# Patient Record
Sex: FEMALE | Race: WHITE | NOT HISPANIC OR LATINO | Employment: OTHER | URBAN - METROPOLITAN AREA
[De-identification: names, ages, dates, MRNs, and addresses within clinical notes are randomized per-mention and may not be internally consistent; named-entity substitution may affect disease eponyms.]

---

## 2017-05-29 ENCOUNTER — LAB VISIT (OUTPATIENT)
Dept: LAB | Facility: HOSPITAL | Age: 67
End: 2017-05-29
Attending: INTERNAL MEDICINE
Payer: MEDICARE

## 2017-05-29 ENCOUNTER — OFFICE VISIT (OUTPATIENT)
Dept: INTERNAL MEDICINE | Facility: CLINIC | Age: 67
End: 2017-05-29
Payer: MEDICARE

## 2017-05-29 VITALS
DIASTOLIC BLOOD PRESSURE: 78 MMHG | SYSTOLIC BLOOD PRESSURE: 114 MMHG | BODY MASS INDEX: 39.63 KG/M2 | WEIGHT: 209.88 LBS | RESPIRATION RATE: 16 BRPM | TEMPERATURE: 98 F | OXYGEN SATURATION: 98 % | HEIGHT: 61 IN | HEART RATE: 61 BPM

## 2017-05-29 DIAGNOSIS — E78.5 HYPERLIPIDEMIA, UNSPECIFIED HYPERLIPIDEMIA TYPE: ICD-10-CM

## 2017-05-29 DIAGNOSIS — R73.02 IGT (IMPAIRED GLUCOSE TOLERANCE): ICD-10-CM

## 2017-05-29 DIAGNOSIS — E55.9 VITAMIN D DEFICIENCY: ICD-10-CM

## 2017-05-29 DIAGNOSIS — I10 BENIGN ESSENTIAL HYPERTENSION: ICD-10-CM

## 2017-05-29 DIAGNOSIS — Z95.0 S/P PLACEMENT OF CARDIAC PACEMAKER: ICD-10-CM

## 2017-05-29 DIAGNOSIS — I49.5 SICK SINUS SYNDROME: ICD-10-CM

## 2017-05-29 DIAGNOSIS — Z79.01 CHRONIC ANTICOAGULATION: ICD-10-CM

## 2017-05-29 DIAGNOSIS — I48.91 ATRIAL FIBRILLATION, UNSPECIFIED TYPE: ICD-10-CM

## 2017-05-29 DIAGNOSIS — I10 BENIGN ESSENTIAL HYPERTENSION: Primary | ICD-10-CM

## 2017-05-29 LAB
25(OH)D3+25(OH)D2 SERPL-MCNC: 45 NG/ML
ALBUMIN SERPL BCP-MCNC: 3.9 G/DL
ALP SERPL-CCNC: 79 U/L
ALT SERPL W/O P-5'-P-CCNC: 21 U/L
ANION GAP SERPL CALC-SCNC: 13 MMOL/L
AST SERPL-CCNC: 20 U/L
BASOPHILS # BLD AUTO: 0.04 K/UL
BASOPHILS NFR BLD: 0.8 %
BILIRUB SERPL-MCNC: 0.9 MG/DL
BUN SERPL-MCNC: 36 MG/DL
CALCIUM SERPL-MCNC: 10 MG/DL
CHLORIDE SERPL-SCNC: 106 MMOL/L
CHOLEST/HDLC SERPL: 5 {RATIO}
CO2 SERPL-SCNC: 22 MMOL/L
CREAT SERPL-MCNC: 1.5 MG/DL
DIFFERENTIAL METHOD: NORMAL
EOSINOPHIL # BLD AUTO: 0.2 K/UL
EOSINOPHIL NFR BLD: 4 %
ERYTHROCYTE [DISTWIDTH] IN BLOOD BY AUTOMATED COUNT: 13 %
EST. GFR  (AFRICAN AMERICAN): 41.3 ML/MIN/1.73 M^2
EST. GFR  (NON AFRICAN AMERICAN): 35.8 ML/MIN/1.73 M^2
GLUCOSE SERPL-MCNC: 101 MG/DL
HCT VFR BLD AUTO: 40.1 %
HDL/CHOLESTEROL RATIO: 20.2 %
HDLC SERPL-MCNC: 223 MG/DL
HDLC SERPL-MCNC: 45 MG/DL
HGB BLD-MCNC: 13.4 G/DL
LDLC SERPL CALC-MCNC: 134.8 MG/DL
LYMPHOCYTES # BLD AUTO: 1.3 K/UL
LYMPHOCYTES NFR BLD: 27 %
MCH RBC QN AUTO: 31 PG
MCHC RBC AUTO-ENTMCNC: 33.4 %
MCV RBC AUTO: 93 FL
MONOCYTES # BLD AUTO: 0.4 K/UL
MONOCYTES NFR BLD: 8.7 %
NEUTROPHILS # BLD AUTO: 2.8 K/UL
NEUTROPHILS NFR BLD: 59.3 %
NONHDLC SERPL-MCNC: 178 MG/DL
PLATELET # BLD AUTO: 206 K/UL
PMV BLD AUTO: 10.5 FL
POTASSIUM SERPL-SCNC: 4.2 MMOL/L
PROT SERPL-MCNC: 7.2 G/DL
RBC # BLD AUTO: 4.32 M/UL
SODIUM SERPL-SCNC: 141 MMOL/L
TRIGL SERPL-MCNC: 216 MG/DL
TSH SERPL DL<=0.005 MIU/L-ACNC: 1.29 UIU/ML
WBC # BLD AUTO: 4.71 K/UL

## 2017-05-29 PROCEDURE — 80053 COMPREHEN METABOLIC PANEL: CPT

## 2017-05-29 PROCEDURE — 82306 VITAMIN D 25 HYDROXY: CPT

## 2017-05-29 PROCEDURE — 99204 OFFICE O/P NEW MOD 45 MIN: CPT | Mod: S$PBB,,, | Performed by: INTERNAL MEDICINE

## 2017-05-29 PROCEDURE — 84443 ASSAY THYROID STIM HORMONE: CPT

## 2017-05-29 PROCEDURE — 99999 PR PBB SHADOW E&M-NEW PATIENT-LVL V: CPT | Mod: PBBFAC,,, | Performed by: INTERNAL MEDICINE

## 2017-05-29 PROCEDURE — 85025 COMPLETE CBC W/AUTO DIFF WBC: CPT

## 2017-05-29 PROCEDURE — 83036 HEMOGLOBIN GLYCOSYLATED A1C: CPT

## 2017-05-29 PROCEDURE — 36415 COLL VENOUS BLD VENIPUNCTURE: CPT

## 2017-05-29 PROCEDURE — 80061 LIPID PANEL: CPT

## 2017-05-29 RX ORDER — ATORVASTATIN CALCIUM 20 MG/1
20 TABLET, FILM COATED ORAL DAILY
COMMUNITY
End: 2018-07-26

## 2017-05-29 RX ORDER — PROPAFENONE HYDROCHLORIDE 225 MG/1
225 CAPSULE, EXTENDED RELEASE ORAL EVERY 12 HOURS
Qty: 180 CAPSULE | Refills: 3 | Status: SHIPPED | OUTPATIENT
Start: 2017-05-29 | End: 2018-05-31 | Stop reason: SDUPTHER

## 2017-05-29 RX ORDER — OLMESARTAN MEDOXOMIL 20 MG/1
20 TABLET ORAL DAILY
COMMUNITY
End: 2017-08-16 | Stop reason: SDUPTHER

## 2017-05-29 RX ORDER — PROPAFENONE HYDROCHLORIDE 225 MG/1
225 CAPSULE, EXTENDED RELEASE ORAL EVERY 12 HOURS
COMMUNITY
End: 2017-05-29 | Stop reason: SDUPTHER

## 2017-05-29 RX ORDER — AMLODIPINE BESYLATE 5 MG/1
5 TABLET ORAL DAILY
Qty: 90 TABLET | Refills: 3 | Status: SHIPPED | OUTPATIENT
Start: 2017-05-29 | End: 2018-07-03 | Stop reason: SDUPTHER

## 2017-05-29 RX ORDER — BISOPROLOL FUMARATE 5 MG/1
5 TABLET, FILM COATED ORAL DAILY
COMMUNITY
End: 2017-05-29 | Stop reason: SDUPTHER

## 2017-05-29 RX ORDER — AMLODIPINE BESYLATE 5 MG/1
5 TABLET ORAL DAILY
COMMUNITY
End: 2017-05-29 | Stop reason: SDUPTHER

## 2017-05-29 RX ORDER — BISOPROLOL FUMARATE 5 MG/1
5 TABLET, FILM COATED ORAL DAILY
Qty: 90 TABLET | Refills: 3 | Status: SHIPPED | OUTPATIENT
Start: 2017-05-29 | End: 2018-06-12 | Stop reason: SDUPTHER

## 2017-05-29 RX ORDER — TRIAMTERENE AND HYDROCHLOROTHIAZIDE 37.5; 25 MG/1; MG/1
1 CAPSULE ORAL EVERY MORNING
COMMUNITY
End: 2018-09-06 | Stop reason: SDUPTHER

## 2017-05-29 NOTE — PROGRESS NOTES
INTERNAL MEDICINE ESTABLISH CARE VISIT NOTE      CHIEF COMPLAINT     EST CARE    HPI     Jesenia Curiel is a 66 y.o. C female who presents to [facility]. Moved here from [place] - Dr. Martínez = PCP. Retired gastroenterologist.     HTN - amlodipine 5mg daily, bisoprolol (generic) 5mg daily, olmesartan 20mg daily, triamterene-HCTZ 37.5-25mg daily.. On the regimen for >25yrs.   No BP issues recently.   CKD3 likely 2/2 HTN.    HLD - atorvastatin 20mg daily.    Vit D Def - OTC vit D 2000 units daily.    SSS s/p PM (not AICD).   Afib - on propafenone SR 225mg bid and xarelto 15mg daily. Easy bruising. No hematochezia/hematuria.   Needs referral to cardiology here.   GERD on Zantac OTC prn.     Recently on belviq 10mg BID when she was in [place] - lost 40lbs but then gained back 20 after stopping belviq. Tried contrave, which did not work and gave her tachycardia. Never on adipex.    Ruptured disc S/p L3-L4 laminectomy. Chronically w/ no DTR at the L patella and some numbness on that side. L leg weaker than right side but s/p PT. No falls.     Past Medical History:  Past Medical History:   Diagnosis Date    Allergy     Anemia     Atrial fibrillation 2017    Disorder of kidney and ureter     GERD (gastroesophageal reflux disease)     Hyperlipidemia     Hypertension        Past Surgical History:  Past Surgical History:   Procedure Laterality Date    BACK SURGERY      lamenectomy     SECTION      ,     HYSTERECTOMY      pace maker  2009    replacement 2014    salpingo opherectomy Bilateral 1998    TONSILLECTOMY  1953       Allergies:  Review of patient's allergies indicates:   Allergen Reactions    Iodinated contrast media - oral and iv dye Anaphylaxis    Penicillins Anaphylaxis    Ciprofloxacin Rash    Sulfa (sulfonamide antibiotics) Rash    Tetracyclines Rash       Home Medications:  Prior to Admission medications    Medication Sig Start Date End Date Taking? Authorizing Provider    amlodipine (NORVASC) 5 MG tablet Take 5 mg by mouth once daily.   Yes Historical Provider, MD   bisoprolol (ZEBETA) 5 MG tablet Take 5 mg by mouth once daily.   Yes Historical Provider, MD   ERGOCALCIFEROL, VITAMIN D2, (VITAMIN D ORAL) Take 2,000 Units by mouth once daily.   Yes Historical Provider, MD   olmesartan (BENICAR) 20 MG tablet Take 20 mg by mouth once daily.   Yes Historical Provider, MD   propafenone (RYTHMOL SR) 225 MG Cp12 Take 225 mg by mouth every 12 (twelve) hours.   Yes Historical Provider, MD   rivaroxaban (XARELTO) 15 mg Tab Take 15 mg by mouth daily with dinner or evening meal.   Yes Historical Provider, MD   triamterene-hydrochlorothiazide 37.5-25 mg (DYAZIDE) 37.5-25 mg per capsule Take 1 capsule by mouth every morning.   Yes Historical Provider, MD       Family History:  Family History   Problem Relation Age of Onset    Hypertension Mother     Diabetes Mother     Hyperlipidemia Mother     Coronary artery disease Mother     Hypertension Father     Diabetes Father     Hyperlipidemia Father     Coronary artery disease Father     Coronary artery disease Brother     Diabetes Paternal Grandmother     Diabetes Paternal Grandfather        Social History:  Social History   Substance Use Topics    Smoking status: Never Smoker    Smokeless tobacco: Not on file    Alcohol use 3.6 oz/week     6 Glasses of wine per week      Comment: occassionally       Review of Systems:  Review of Systems   Constitutional: Negative for chills and fever.   HENT: Negative.    Respiratory: Negative for shortness of breath and wheezing.    Cardiovascular: Positive for palpitations (intermittently). Negative for chest pain and leg swelling.   Gastrointestinal: Negative.    Genitourinary: Negative.    Musculoskeletal: Positive for arthralgias.   Skin: Negative for rash.   Neurological: Negative.    Psychiatric/Behavioral: Negative.        Health Maintainence:   Immunizations:   TDap is up to date, Influenza is  "up to date, Pneumovax is up to date. Zostavax is UTD. Prevnar is up to date.   Cancer Screening:  Mammogram: is up to date. Fall 2016  Colonoscopy: is up to date. At 50 y/o. Does not want another one.   DEXA:  is up to date. Osteopenia - Fall 2016.   Screening:  Hepatitis C is up to date in pts born between 0108-9348.     PHYSICAL EXAM     /78   Pulse 61   Temp 98.3 °F (36.8 °C) (Oral)   Resp 16   Ht 5' 1" (1.549 m)   Wt 95.2 kg (209 lb 14.1 oz)   SpO2 98%   BMI 39.66 kg/m²     GEN - A+OX4, NAD   HEENT - PERRL, EOMI, OP clear. MMM.   Neck - No thyromegaly or cervical LAD. No thyroid masses felt.  CV - RRR, no m/r   Chest - CTAB, no wheezing or rhonchi  Abd - S/NT/ND/+BS.   Ext - 2+BDP and radial pulses. No LE edema.   Neuro - 3+ DTR on the R and none on the L. 5/5 BLE strength.   Skin - No rash.      LABS     Labs reviewed.     ASSESSMENT/PLAN     Jesenia Curiel is a 66 y.o. female with  Diagnoses and all orders for this visit:    Benign essential hypertension - Stable and controlled. Continue current medications.  -     triamterene-hydrochlorothiazide 37.5-25 mg (DYAZIDE) 37.5-25 mg per capsule; Take 1 capsule by mouth every morning.  -     olmesartan (BENICAR) 20 MG tablet; Take 20 mg by mouth once daily.  -     Comprehensive metabolic panel; Future; Expected date: 05/29/2017  -     TSH; Future; Expected date: 05/29/2017  -     amlodipine (NORVASC) 5 MG tablet; Take 1 tablet (5 mg total) by mouth once daily.  -     bisoprolol (ZEBETA) 5 MG tablet; Take 1 tablet (5 mg total) by mouth once daily.    Hyperlipidemia, unspecified hyperlipidemia type  -     Comprehensive metabolic panel; Future; Expected date: 05/29/2017  -     Lipid panel; Future; Expected date: 05/29/2017  -     atorvastatin (LIPITOR) 20 MG tablet; Take 20 mg by mouth once daily.    Atrial fibrillation, unspecified type  -     rivaroxaban (XARELTO) 15 mg Tab; Take 15 mg by mouth daily with dinner or evening meal.  -     CBC auto " differential; Future; Expected date: 05/29/2017  -     Comprehensive metabolic panel; Future; Expected date: 05/29/2017  -     TSH; Future; Expected date: 05/29/2017  -     Ambulatory Referral to Cardiology  -     propafenone (RYTHMOL SR) 225 MG Cp12; Take 1 capsule (225 mg total) by mouth every 12 (twelve) hours.  -     PROPAFENONE, PLASMA; Future; Expected date: 05/29/2017    Chronic anticoagulation  -     rivaroxaban (XARELTO) 15 mg Tab; Take 15 mg by mouth daily with dinner or evening meal.  -     CBC auto differential; Future; Expected date: 05/29/2017  -     Ambulatory Referral to Cardiology    Vitamin D deficiency  -     ERGOCALCIFEROL, VITAMIN D2, (VITAMIN D ORAL); Take 2,000 Units by mouth once daily.  -     Vitamin D; Future; Expected date: 05/29/2017    Sick sinus syndrome  -     Cancel: Ambulatory consult to Electrophysiology  -     Ambulatory Referral to Cardiology    S/P placement of cardiac pacemaker  -     Cancel: Ambulatory consult to Electrophysiology  -     Ambulatory Referral to Cardiology    IGT (impaired glucose tolerance)  -     Hemoglobin A1c; Future; Expected date: 05/29/2017    BMI 39.66,adult  -     lorcaserin (BELVIQ) 10 mg Tab; Take 10 mg by mouth 2 (two) times daily.    Will get medical records from Dr. Martínez in [place].   50 minutes was spent on patient with over half the time was spent in coordination of care and/or counseling.    RTC in 1 month, sooner if needed and depending on labs.    Sandra Michaud MD  Department of Internal Medicine - [facility]  1:25 PM

## 2017-05-30 LAB
ESTIMATED AVG GLUCOSE: 128 MG/DL
HBA1C MFR BLD HPLC: 6.1 %

## 2017-06-02 LAB — PROPAFENONE SERPL-MCNC: 0.9 MCG/ML

## 2017-06-03 ENCOUNTER — TELEPHONE (OUTPATIENT)
Dept: INTERNAL MEDICINE | Facility: CLINIC | Age: 67
End: 2017-06-03

## 2017-06-03 DIAGNOSIS — R73.03 PRE-DIABETES: ICD-10-CM

## 2017-06-03 NOTE — TELEPHONE ENCOUNTER
Please call and let Dr. Curiel know that the propafenone level is normal.    Your labs are consistent with pre-diabetes. This means you are at risk for developing diabetes. I recommend eating low carb diet. Stay away from simple sugars.    Vitamin D, thyroid and liver functions and blood count normal. Cholesterol is still high. Would she be ok w/ increasing atorvastatin from 20 to 40mg daily?    Her creatinine is 1.5. Can you ask pt if she's ever had chronic kidney disease in the past?     Please encourage pt to sign up for the portal also.

## 2017-06-05 NOTE — TELEPHONE ENCOUNTER
Spoke to patient, results given via phone as instructed, patient expressed understanding verbally, no additional questions.   pt stated she got results from portal

## 2017-06-14 ENCOUNTER — DOCUMENTATION ONLY (OUTPATIENT)
Dept: INTERNAL MEDICINE | Facility: CLINIC | Age: 67
End: 2017-06-14

## 2017-06-14 DIAGNOSIS — M85.852 OSTEOPENIA OF LEFT THIGH: ICD-10-CM

## 2017-06-14 NOTE — PROGRESS NOTES
Outside records from [facility]oss[facility] MD in [place], [place] received.    Labs from 3/4/14  BMP-glucose 106, BUNs 25, sodium 140, potassium 4.3, chloride 102, bicarbonate 26, creatinine 1.3, calcium 10.9  Hemoglobin A1c 5.4  ALT 21  Lipid panel-total cholesterol 217, triglycerides 204, HDL 54,     Bone density scan 11/24/15-osteopenia  T score of L1 spine -0.2 (0.3 from 2013)  T score of femoral neck -2.2 (from -1.9 IN 2013)    Mammogram 11/27/15-negative.      Fecal globulin 12/12/16-negative     Labs-11/21/16  CBC-white blood cell 5.7, hemoglobin 13.9, platelets 236.  Lipid profile-total cholesterol 227, triglycerides 151, HDL 55, .  TSH 2.29, within normal limits  CMP-glucose 112, BUNs 26, creatinine 1.2, estimated GFR 45, sodium 142, potassium 4.3, chloride 100, bicarbonate 27, anion gap 15, total protein 7.3, calcium 10.5, albumin 4.6, alkaline phosphatase 82, AST 20, ALT 21, total bilirubin 0.6.  Hemoglobin A1c 5.5     Here no sent throat progress note 2/26/16-right-sided hearing loss and ringing  Prominent right-sided low frequency sensory hearing loss.  Repeat hearing test in 6-12 months would be appropriate.      Flu vaccine record-11/21/16     Esophagram 11/10/14-mild esophageal dysmotility.  Small sliding type hiatal hernia.  Mildly delayed transit of barium coated marshmallow at the level of the aortic arch which past with subsequent administration of water.  No strictures noted.      Her annual visit office note 11/21/16-Pneumovax was given.  Flu vaccine was also given.  Per records, Prevnar 13 given November 20, 2015.  Zoster given 10/14/13.    Records updated. Will scan.     Sandra Michaud MD  Department of Internal Medicine - [facility]  1:27 PM

## 2017-06-26 ENCOUNTER — OFFICE VISIT (OUTPATIENT)
Dept: CARDIOLOGY | Facility: CLINIC | Age: 67
End: 2017-06-26
Payer: MEDICARE

## 2017-06-26 ENCOUNTER — HOSPITAL ENCOUNTER (OUTPATIENT)
Dept: CARDIOLOGY | Facility: CLINIC | Age: 67
Discharge: HOME | End: 2017-06-26
Payer: MEDICARE

## 2017-06-26 VITALS
WEIGHT: 206.38 LBS | DIASTOLIC BLOOD PRESSURE: 60 MMHG | HEART RATE: 60 BPM | SYSTOLIC BLOOD PRESSURE: 130 MMHG | BODY MASS INDEX: 38.96 KG/M2 | HEIGHT: 61 IN

## 2017-06-26 DIAGNOSIS — I49.5 SICK SINUS SYNDROME: ICD-10-CM

## 2017-06-26 DIAGNOSIS — N18.30 CKD (CHRONIC KIDNEY DISEASE) STAGE 3, GFR 30-59 ML/MIN: ICD-10-CM

## 2017-06-26 DIAGNOSIS — E78.5 HYPERLIPIDEMIA, UNSPECIFIED HYPERLIPIDEMIA TYPE: ICD-10-CM

## 2017-06-26 DIAGNOSIS — I48.0 PAROXYSMAL ATRIAL FIBRILLATION: ICD-10-CM

## 2017-06-26 DIAGNOSIS — I10 BENIGN ESSENTIAL HYPERTENSION: ICD-10-CM

## 2017-06-26 DIAGNOSIS — Z95.0 S/P PLACEMENT OF CARDIAC PACEMAKER: ICD-10-CM

## 2017-06-26 DIAGNOSIS — I48.0 PAROXYSMAL ATRIAL FIBRILLATION: Primary | ICD-10-CM

## 2017-06-26 PROCEDURE — 99999 PR PBB SHADOW E&M-EST. PATIENT-LVL IV: CPT | Mod: PBBFAC,,, | Performed by: INTERNAL MEDICINE

## 2017-06-26 PROCEDURE — 93010 ELECTROCARDIOGRAM REPORT: CPT | Mod: S$PBB,,, | Performed by: INTERNAL MEDICINE

## 2017-06-26 PROCEDURE — 1126F AMNT PAIN NOTED NONE PRSNT: CPT | Mod: ,,, | Performed by: INTERNAL MEDICINE

## 2017-06-26 PROCEDURE — 1159F MED LIST DOCD IN RCRD: CPT | Mod: ,,, | Performed by: INTERNAL MEDICINE

## 2017-06-26 PROCEDURE — 93005 ELECTROCARDIOGRAM TRACING: CPT | Mod: PBBFAC | Performed by: INTERNAL MEDICINE

## 2017-06-26 PROCEDURE — 99203 OFFICE O/P NEW LOW 30 MIN: CPT | Mod: S$PBB,,, | Performed by: INTERNAL MEDICINE

## 2017-06-26 RX ORDER — CLOBETASOL PROPIONATE 0.5 MG/G
1 AEROSOL, FOAM TOPICAL
COMMUNITY
End: 2020-04-14 | Stop reason: SDUPTHER

## 2017-06-26 NOTE — LETTER
June 26, 2017      Sandra Michaud MD  [address]  [facility] [address]           [facility]  [address]  [facility] [address]  Phone: [phone]          Patient: Jesenia Curiel   MR Number: [id number]   YOB: 1950   Date of Visit: 6/26/2017       Dear Dr. Sandra Michaud:    Thank you for referring Jesenia Curiel to me for evaluation. Attached you will find relevant portions of my assessment and plan of care.    If you have questions, please do not hesitate to call me. I look forward to following Jesenia Curiel along with you.    Sincerely,    Verna Hodgson MD    Enclosure  CC:  No Recipients    If you would like to receive this communication electronically, please contact [email or web address] or [phone] to request more information on [facility] access.    For providers and/or their staff who would like to refer a patient to [facility], please contact us through our one-stop-shop provider referral line, [facility] , at [phone].    If you feel you have received this communication in error or would no longer like to receive these types of communications, please e-mail [email or web address]

## 2017-06-26 NOTE — PROGRESS NOTES
Subjective:   Patient ID:  Jesenia Curiel is a 66 y.o. female who presents for evaluation of Atrial fibrillation, unspecified type; Chronic anticoagulation; Sick sinus syndrome; and S/P placement of cardiac pacemaker      HPI:  She recently moved from [place] and presents today to establish care. 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 She has a history of atrial fibrillation and sick sinus syndrome. She had a [employer] pacemaker placed in  and was diagnosed with atrial fibrillation in . She has never required cardioversion. She has maintained sinus rhythm on Propafenone. A stress test in  was normal per her report. Jesenia Curiel denies any chest pain, shortness of breath, PND, orthopnea, palpitations, leg edema, or syncope. She does not exercise on a regular basis. She just started taking Belvique and has lost 5 lbs.    ECG: Atrial paced rhythm. Anterior T wave inversions. QTc 406 ms.    Past Medical History:   Diagnosis Date    Allergy     Anemia     Atrial fibrillation 2017    CKD (chronic kidney disease) stage 3, GFR 30-59 ml/min 2017    Disorder of kidney and ureter     GERD (gastroesophageal reflux disease)     Hyperlipidemia     Hypertension     Pre-diabetes 6/3/2017       Past Surgical History:   Procedure Laterality Date    BACK SURGERY      lamenectomy     SECTION      ,     HYSTERECTOMY      INSERT / REPLACE / REMOVE PACEMAKER      placement    INSERT / REPLACE / REMOVE PACEMAKER  2014    [employer] Model #[id number] replacement    pace maker      replacement     salpingo opherectomy Bilateral 1998    TONSILLECTOMY         Social History     Social History    Marital status:      Spouse name: N/A    Number of children: N/A    Years of education: N/A     Social History Main Topics    Smoking status: Never Smoker    Smokeless tobacco: Never Used    Alcohol use 3.6 oz/week     6 Glasses of wine per week       Comment: occassionally    Drug use: No    Sexual activity: Not Currently     Other Topics Concern    None     Social History Narrative    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       Family History   Problem Relation Age of Onset    Hypertension Mother     Diabetes Mother     Hyperlipidemia Mother     Coronary artery disease Mother     Heart disease Mother     Stroke Mother     Hypertension Father     Diabetes Father     Hyperlipidemia Father     Coronary artery disease Father     Heart disease Father     Coronary artery disease Brother     Heart disease Brother     Diabetes Paternal Grandmother     Diabetes Paternal Grandfather     Heart attack Neg Hx        Patient's Medications   New Prescriptions    No medications on file   Previous Medications    AMLODIPINE (NORVASC) 5 MG TABLET    Take 1 tablet (5 mg total) by mouth once daily.    ATORVASTATIN (LIPITOR) 20 MG TABLET    Take 20 mg by mouth once daily.    BISOPROLOL (ZEBETA) 5 MG TABLET    Take 1 tablet (5 mg total) by mouth once daily.    CLOBETASOL (OLUX) 0.05 % FOAM    Apply topically 2 (two) times daily.    ERGOCALCIFEROL, VITAMIN D2, (VITAMIN D ORAL)    Take 2,000 Units by mouth once daily.    LORCASERIN (BELVIQ) 10 MG TAB    Take 10 mg by mouth 2 (two) times daily.    OLMESARTAN (BENICAR) 20 MG TABLET    Take 20 mg by mouth once daily.    PROPAFENONE (RYTHMOL SR) 225 MG CP12    Take 1 capsule (225 mg total) by mouth every 12 (twelve) hours.    RIVAROXABAN (XARELTO) 15 MG TAB    Take 15 mg by mouth daily with dinner or evening meal.    TRIAMTERENE-HYDROCHLOROTHIAZIDE 37.5-25 MG (DYAZIDE) 37.5-25 MG PER CAPSULE    Take 1 capsule by mouth every morning.   Modified Medications    No medications on file   Discontinued Medications    No medications on file       Review of Systems   Constitution: Negative for weakness, malaise/fatigue and weight gain.   HENT: Negative for headaches and hearing loss.    Eyes:  "Negative for visual disturbance.   Cardiovascular: Negative for chest pain, claudication, dyspnea on exertion, leg swelling, near-syncope, orthopnea, palpitations, paroxysmal nocturnal dyspnea and syncope.   Respiratory: Negative for cough, shortness of breath, sleep disturbances due to breathing, snoring and wheezing.    Endocrine: Negative for cold intolerance, heat intolerance, polydipsia, polyphagia and polyuria.   Hematologic/Lymphatic: Negative for bleeding problem. Does not bruise/bleed easily.   Skin: Negative for rash and suspicious lesions.   Musculoskeletal: Negative for arthritis, falls, joint pain, muscle weakness and myalgias.   Gastrointestinal: Negative for abdominal pain, change in bowel habit, constipation, diarrhea, heartburn, hematochezia, melena and nausea.   Genitourinary: Negative for hematuria and nocturia.   Neurological: Negative for excessive daytime sleepiness, dizziness, light-headedness and loss of balance.   Psychiatric/Behavioral: Negative for depression. The patient is not nervous/anxious.    Allergic/Immunologic: Negative for environmental allergies.       /60 (BP Location: Left arm, Patient Position: Sitting, BP Method: Automatic)   Pulse 60   Ht 5' 1" (1.549 m)   Wt 93.6 kg (206 lb 5.6 oz)   BMI 38.99 kg/m²     Objective:   Physical Exam   Constitutional: She is oriented to person, place, and time. She appears well-developed and well-nourished.        HENT:   Head: Normocephalic and atraumatic.   Mouth/Throat: Oropharynx is clear and moist.   Eyes: Conjunctivae and EOM are normal. Pupils are equal, round, and reactive to light. No scleral icterus.   Neck: Normal range of motion. Neck supple. No hepatojugular reflux and no JVD present. No tracheal deviation present. No thyromegaly present.   Cardiovascular: Normal rate, regular rhythm, normal heart sounds and intact distal pulses.  PMI is not displaced.    Pulses:       Carotid pulses are 2+ on the right side, and 2+ on " the left side.       Radial pulses are 2+ on the right side, and 2+ on the left side.        Dorsalis pedis pulses are 2+ on the right side, and 2+ on the left side.        Posterior tibial pulses are 2+ on the right side, and 2+ on the left side.   Pulmonary/Chest: Effort normal and breath sounds normal.   Abdominal: Soft. Bowel sounds are normal. She exhibits no distension and no mass. There is no hepatosplenomegaly. There is no tenderness.   Musculoskeletal: She exhibits no edema or tenderness.   Lymphadenopathy:     She has no cervical adenopathy.   Neurological: She is alert and oriented to person, place, and time.   Skin: Skin is warm and dry. No rash noted. No cyanosis or erythema. Nails show no clubbing.   Psychiatric: She has a normal mood and affect. Her speech is normal and behavior is normal.       Lab Results   Component Value Date     05/29/2017    K 4.2 05/29/2017     05/29/2017    CO2 22 (L) 05/29/2017    BUN 36 (H) 05/29/2017    CREATININE 1.5 (H) 05/29/2017     05/29/2017    HGBA1C 6.1 05/29/2017    AST 20 05/29/2017    ALT 21 05/29/2017    ALBUMIN 3.9 05/29/2017    PROT 7.2 05/29/2017    BILITOT 0.9 05/29/2017    WBC 4.71 05/29/2017    HGB 13.4 05/29/2017    HCT 40.1 05/29/2017    MCV 93 05/29/2017     05/29/2017    TSH 1.293 05/29/2017    CHOL 223 (H) 05/29/2017    HDL 45 05/29/2017    LDLCALC 134.8 05/29/2017    TRIG 216 (H) 05/29/2017       Assessment:     1. Paroxysmal atrial fibrillation : CHADS2-Vasc is 3. Continue Xarelto and propafenone. I have requested her records from her previous cardiologist.   2. Benign essential hypertension : Blood pressure is at goal. I have made no changes. Continue current regimen.   3. Sick sinus syndrome    4. Hyperlipidemia, unspecified hyperlipidemia type : LDL above goal. We discussed that aerobic exercise and weight loss will aid in lowering LDL.   5. S/P placement of cardiac pacemaker : I have referred her to device clinic for  management of her pacemaker.   6. CKD (chronic kidney disease) stage 3, GFR 30-59 ml/min        Plan:     Jesenia was seen today for atrial fibrillation, unspecified type, chronic anticoagulation, sick sinus syndrome and s/p placement of cardiac pacemaker.    Diagnoses and all orders for this visit:    Paroxysmal atrial fibrillation  -     Ambulatory Referral to Electrophysiology  -     EKG 12-lead; Future    Benign essential hypertension    Sick sinus syndrome    Hyperlipidemia, unspecified hyperlipidemia type    S/P placement of cardiac pacemaker  -     Ambulatory Referral to Electrophysiology    CKD (chronic kidney disease) stage 3, GFR 30-59 ml/min        Thank you for allowing me to participate in this patient's care. Please do not hesitate to contact me with any questions or concerns.

## 2017-06-30 ENCOUNTER — PATIENT MESSAGE (OUTPATIENT)
Dept: INTERNAL MEDICINE | Facility: CLINIC | Age: 67
End: 2017-06-30

## 2017-06-30 ENCOUNTER — OFFICE VISIT (OUTPATIENT)
Dept: INTERNAL MEDICINE | Facility: CLINIC | Age: 67
End: 2017-06-30
Payer: MEDICARE

## 2017-06-30 VITALS
HEART RATE: 60 BPM | HEIGHT: 61 IN | DIASTOLIC BLOOD PRESSURE: 66 MMHG | TEMPERATURE: 98 F | BODY MASS INDEX: 38.51 KG/M2 | WEIGHT: 203.94 LBS | SYSTOLIC BLOOD PRESSURE: 110 MMHG

## 2017-06-30 DIAGNOSIS — I48.91 ATRIAL FIBRILLATION, UNSPECIFIED TYPE: ICD-10-CM

## 2017-06-30 DIAGNOSIS — N18.30 CKD (CHRONIC KIDNEY DISEASE) STAGE 3, GFR 30-59 ML/MIN: Primary | ICD-10-CM

## 2017-06-30 DIAGNOSIS — R73.03 PRE-DIABETES: ICD-10-CM

## 2017-06-30 DIAGNOSIS — E78.5 HYPERLIPIDEMIA, UNSPECIFIED HYPERLIPIDEMIA TYPE: ICD-10-CM

## 2017-06-30 PROCEDURE — 1159F MED LIST DOCD IN RCRD: CPT | Mod: ,,, | Performed by: INTERNAL MEDICINE

## 2017-06-30 PROCEDURE — 99213 OFFICE O/P EST LOW 20 MIN: CPT | Mod: PBBFAC | Performed by: INTERNAL MEDICINE

## 2017-06-30 PROCEDURE — 99214 OFFICE O/P EST MOD 30 MIN: CPT | Mod: S$PBB,,, | Performed by: INTERNAL MEDICINE

## 2017-06-30 PROCEDURE — 99999 PR PBB SHADOW E&M-EST. PATIENT-LVL III: CPT | Mod: PBBFAC,,, | Performed by: INTERNAL MEDICINE

## 2017-06-30 PROCEDURE — 1126F AMNT PAIN NOTED NONE PRSNT: CPT | Mod: ,,, | Performed by: INTERNAL MEDICINE

## 2017-06-30 NOTE — PROGRESS NOTES
"Subjective:       Patient ID: Jesenia Curiel is a 66 y.o. female.    Chief Complaint: Weight Loss (follow up on medication)    HPI   Started on Belviq at last visit. Did suppress diet (less). Eats 2 meals a day - hard boiled egg during lunch and slice of bread; salad at night; fruits at night in the evening. Weight loss of 6lbs. Hasn't added exercise to routine yet.     Review of Systems   Constitutional: Negative for unexpected weight change.   HENT: Negative for congestion and rhinorrhea.    Respiratory: Negative for shortness of breath.    Cardiovascular: Negative for chest pain, palpitations and leg swelling.   Gastrointestinal: Negative for abdominal pain, constipation, diarrhea, nausea and vomiting.   Neurological: Negative for dizziness.   Psychiatric/Behavioral: Negative for dysphoric mood. The patient is not nervous/anxious.          Objective:      Physical Exam    /66   Pulse 60   Temp 97.9 °F (36.6 °C) (Oral)   Ht 5' 1" (1.549 m)   Wt 92.5 kg (203 lb 14.8 oz)   BMI 38.53 kg/m²     GEN - A+OX4, NAD   HEENT - PERRL, EOMI, OP clear  Neck - No thyromegaly or cervical LAD. No thyroid masses felt.  CV - RRR, no m/r   Chest - CTAB, no wheezing or rhonchi  Abd - S/NT/ND/+BS.   Ext - 2+BDP and radial pulses. No LE edema.  Skin - No rash.    Labs reviewed.    Assessment/Plan     Jesenia was seen today for weight loss.    Diagnoses and all orders for this visit:    CKD (chronic kidney disease) stage 3, GFR 30-59 ml/min - Cr here 1.5 and baseline around 1.2-1.3. Repeat BMP.   -     Basic metabolic panel; Future    BMI 38.53,adult - 6lbs weight loss in the last month. No adverse effects. On propafenone level ok prior to Belviq initiation. Will recheck propafenone level in a mo. Recommended adding physical activities to the regimen.  -     lorcaserin (BELVIQ) 10 mg Tab; Take 10 mg by mouth 2 (two) times daily.    Hyperlipidemia, unspecified hyperlipidemia type - on atorvastatin 20mg daily. If still " elevated at next visit, then will increase to 40mg daily.   -     Lipid panel; Future    Pre-diabetes - as above.     Return in about 6 months (around 12/30/2017).      Sandra Michaud MD  Department of Internal Medicine - [facility]  3:31 PM

## 2017-07-10 ENCOUNTER — INITIAL CONSULT (OUTPATIENT)
Dept: ELECTROPHYSIOLOGY | Facility: CLINIC | Age: 67
End: 2017-07-10
Payer: MEDICARE

## 2017-07-10 ENCOUNTER — HOSPITAL ENCOUNTER (OUTPATIENT)
Dept: CARDIOLOGY | Facility: CLINIC | Age: 67
Discharge: HOME | End: 2017-07-10
Payer: MEDICARE

## 2017-07-10 ENCOUNTER — CLINICAL SUPPORT (OUTPATIENT)
Dept: ELECTROPHYSIOLOGY | Facility: CLINIC | Age: 67
End: 2017-07-10
Payer: MEDICARE

## 2017-07-10 VITALS
SYSTOLIC BLOOD PRESSURE: 124 MMHG | DIASTOLIC BLOOD PRESSURE: 82 MMHG | HEART RATE: 60 BPM | BODY MASS INDEX: 38.37 KG/M2 | WEIGHT: 203.25 LBS | HEIGHT: 61 IN

## 2017-07-10 DIAGNOSIS — Z95.0 S/P PLACEMENT OF CARDIAC PACEMAKER: ICD-10-CM

## 2017-07-10 DIAGNOSIS — Z95.0 CARDIAC PACEMAKER IN SITU: ICD-10-CM

## 2017-07-10 DIAGNOSIS — N18.30 CKD (CHRONIC KIDNEY DISEASE) STAGE 3, GFR 30-59 ML/MIN: ICD-10-CM

## 2017-07-10 DIAGNOSIS — I49.5 SICK SINUS SYNDROME: ICD-10-CM

## 2017-07-10 DIAGNOSIS — I10 BENIGN ESSENTIAL HYPERTENSION: ICD-10-CM

## 2017-07-10 DIAGNOSIS — Z95.0 CARDIAC PACEMAKER IN SITU: Primary | ICD-10-CM

## 2017-07-10 DIAGNOSIS — Z79.01 CHRONIC ANTICOAGULATION: ICD-10-CM

## 2017-07-10 DIAGNOSIS — I48.91 ATRIAL FIBRILLATION, UNSPECIFIED TYPE: ICD-10-CM

## 2017-07-10 DIAGNOSIS — I48.0 PAROXYSMAL ATRIAL FIBRILLATION: Primary | ICD-10-CM

## 2017-07-10 PROCEDURE — 99204 OFFICE O/P NEW MOD 45 MIN: CPT | Mod: S$PBB,25,, | Performed by: INTERNAL MEDICINE

## 2017-07-10 PROCEDURE — 1126F AMNT PAIN NOTED NONE PRSNT: CPT | Mod: ,,, | Performed by: INTERNAL MEDICINE

## 2017-07-10 PROCEDURE — 99999 PR PBB SHADOW E&M-EST. PATIENT-LVL III: CPT | Mod: PBBFAC,,, | Performed by: INTERNAL MEDICINE

## 2017-07-10 PROCEDURE — 93280 PM DEVICE PROGR EVAL DUAL: CPT | Mod: PBBFAC | Performed by: INTERNAL MEDICINE

## 2017-07-10 PROCEDURE — 93010 ELECTROCARDIOGRAM REPORT: CPT | Mod: S$PBB,,, | Performed by: INTERNAL MEDICINE

## 2017-07-10 PROCEDURE — 1159F MED LIST DOCD IN RCRD: CPT | Mod: ,,, | Performed by: INTERNAL MEDICINE

## 2017-07-10 NOTE — PROGRESS NOTES
Subjective:     HPI    I had the pleasure of seeing Jesenia Curiel in consultation at your request for the evaluation of atrial fibrillation. 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 dual chamber pacemaker implantation in 5/2009 (gen change 9/2014). She has been on Rythmol since 2009, which has significantly brought down her arrhythmia burden (episodes used to last for hours but now last seconds to minutes).    I reviewed today's device interrogation, which shows stable device and lead function. She is pacing 98% in the RA. AF burden <1% (longest episode 1m 26s).      I reviewed ECGs dated 11/17/23015 (atrial pacing at 78 bpm), 9/9/22014 (NSR), and 10/14/2013 (NSR).     I reviewed today's ECG tracing, which shows atrial pacing at 60 bpm.    Review of Systems   Constitution: Negative for decreased appetite, malaise/fatigue, weight gain and weight loss.   HENT: Negative for sore throat.    Eyes: Negative for blurred vision.   Cardiovascular: Negative for chest pain, dyspnea on exertion, irregular heartbeat, leg swelling, near-syncope, orthopnea, palpitations, paroxysmal nocturnal dyspnea and syncope.   Respiratory: Negative for shortness of breath.    Skin: Negative for rash.   Musculoskeletal: Negative for arthritis.   Gastrointestinal: Negative for abdominal pain.   Neurological: Negative for focal weakness.   Psychiatric/Behavioral: Negative for altered mental status.        Objective:    Physical Exam   Constitutional: She is oriented to person, place, and time. She appears well-developed and well-nourished. No distress.   HENT:   Head: Normocephalic and atraumatic.   Mouth/Throat: Oropharynx is clear and moist.   Eyes: Conjunctivae are normal. Pupils are equal, round, and reactive to light. No scleral icterus.   Neck: Normal range of motion. Neck supple. No JVD present. No thyromegaly present.   Cardiovascular: Normal  rate, regular rhythm, normal heart sounds and intact distal pulses.  Exam reveals no gallop and no friction rub.    No murmur heard.  Pulmonary/Chest: Effort normal and breath sounds normal. No respiratory distress. She has no rales.   Abdominal: Soft. Bowel sounds are normal. She exhibits no distension.   Musculoskeletal: She exhibits no edema.   Neurological: She is alert and oriented to person, place, and time.   Skin: Skin is warm and dry.   Psychiatric: She has a normal mood and affect. Her behavior is normal.   Vitals reviewed.        Assessment:       1. Paroxysmal atrial fibrillation    2. Sick sinus syndrome    3. S/P placement of cardiac pacemaker    4. Chronic anticoagulation    5. CKD (chronic kidney disease) stage 3, GFR 30-59 ml/min    6. Benign essential hypertension         Plan:     In summary, Jesenia Curiel is a 66 year old female with a history of sick sinus syndrome, pacemaker implantation, and symptomatic PAF. Her AF has been controlled for many years on Rythmol, and her overall AF burden is <1%. Her NNE2SX2-VLHe Score is 3 (age, female, HTN)  which corresponds to a yearly risk of stroke without warfarin treatment estimated at 3.2%.She should continue on Xarelto.    She will continue to have regular device checks and will see me again in 1 year.    Thank you for allowing me to participate in the care of this patient. Please do not hesitate to call me with any questions or concerns.

## 2017-07-10 NOTE — LETTER
July 10, 2017      Verna Hodgson MD  [address]  [facility] [address]           [facility]rhythm[facility]  [address]  [facility] [address]  Phone: [phone]  Fax: [phone]          Patient: Jesenia Curiel   MR Number: [id number]   YOB: 1950   Date of Visit: 7/10/2017       Dear Dr. Verna Hodgson:    Thank you for referring Jesenia Curiel to me for evaluation. Attached you will find relevant portions of my assessment and plan of care.    If you have questions, please do not hesitate to call me. I look forward to following Jesenia Curiel along with you.    Sincerely,    Trell Hodgson MD    Enclosure  CC:  No Recipients    If you would like to receive this communication electronically, please contact [email or web address] or [phone] to request more information on [employer] access.    For providers and/or their staff who would like to refer a patient to [facility], please contact us through our one-stop-shop provider referral line, [facility], at [phone].    If you feel you have received this communication in error or would no longer like to receive these types of communications, please e-mail [email or web address]

## 2017-07-11 DIAGNOSIS — I48.91 ATRIAL FIBRILLATION, UNSPECIFIED TYPE: Primary | ICD-10-CM

## 2017-07-28 ENCOUNTER — LAB VISIT (OUTPATIENT)
Dept: LAB | Facility: HOSPITAL | Age: 67
End: 2017-07-28
Attending: INTERNAL MEDICINE
Payer: MEDICARE

## 2017-07-28 DIAGNOSIS — N18.30 CKD (CHRONIC KIDNEY DISEASE) STAGE 3, GFR 30-59 ML/MIN: ICD-10-CM

## 2017-07-28 DIAGNOSIS — I48.91 ATRIAL FIBRILLATION, UNSPECIFIED TYPE: ICD-10-CM

## 2017-07-28 DIAGNOSIS — E78.5 HYPERLIPIDEMIA, UNSPECIFIED HYPERLIPIDEMIA TYPE: ICD-10-CM

## 2017-07-28 LAB
ANION GAP SERPL CALC-SCNC: 10 MMOL/L
BUN SERPL-MCNC: 21 MG/DL
CALCIUM SERPL-MCNC: 10.2 MG/DL
CHLORIDE SERPL-SCNC: 103 MMOL/L
CHOLEST/HDLC SERPL: 4.4 {RATIO}
CO2 SERPL-SCNC: 26 MMOL/L
CREAT SERPL-MCNC: 1.4 MG/DL
EST. GFR  (AFRICAN AMERICAN): 45 ML/MIN/1.73 M^2
EST. GFR  (NON AFRICAN AMERICAN): 39 ML/MIN/1.73 M^2
GLUCOSE SERPL-MCNC: 107 MG/DL
HDL/CHOLESTEROL RATIO: 22.7 %
HDLC SERPL-MCNC: 176 MG/DL
HDLC SERPL-MCNC: 40 MG/DL
LDLC SERPL CALC-MCNC: 107.4 MG/DL
NONHDLC SERPL-MCNC: 136 MG/DL
POTASSIUM SERPL-SCNC: 3.8 MMOL/L
SODIUM SERPL-SCNC: 139 MMOL/L
TRIGL SERPL-MCNC: 143 MG/DL

## 2017-07-28 PROCEDURE — 36415 COLL VENOUS BLD VENIPUNCTURE: CPT

## 2017-07-28 PROCEDURE — 80048 BASIC METABOLIC PNL TOTAL CA: CPT

## 2017-07-28 PROCEDURE — 80061 LIPID PANEL: CPT

## 2017-08-03 ENCOUNTER — TELEPHONE (OUTPATIENT)
Dept: INTERNAL MEDICINE | Facility: CLINIC | Age: 67
End: 2017-08-03

## 2017-08-03 LAB — PROPAFENONE SERPL-MCNC: NORMAL UG/L

## 2017-08-03 NOTE — TELEPHONE ENCOUNTER
----- Message from Nina Arguelles sent at 8/3/2017 11:22 AM CDT -----  There was an issue with a test ordered on this patient from 7/28/17.  Please call the [facility] as soon as possible at [phone] ext. [phone] for complete details.  Anyone in the [facility] lab will be able to assist you with further information.

## 2017-08-16 DIAGNOSIS — I10 BENIGN ESSENTIAL HYPERTENSION: ICD-10-CM

## 2017-08-16 RX ORDER — OLMESARTAN MEDOXOMIL 20 MG/1
TABLET ORAL
Qty: 90 TABLET | Refills: 3 | Status: SHIPPED | OUTPATIENT
Start: 2017-08-16 | End: 2018-05-12 | Stop reason: SDUPTHER

## 2017-09-27 ENCOUNTER — PATIENT MESSAGE (OUTPATIENT)
Dept: INTERNAL MEDICINE | Facility: CLINIC | Age: 67
End: 2017-09-27

## 2017-10-11 ENCOUNTER — CLINICAL SUPPORT (OUTPATIENT)
Dept: ELECTROPHYSIOLOGY | Facility: CLINIC | Age: 67
End: 2017-10-11
Payer: MEDICARE

## 2017-10-11 DIAGNOSIS — Z95.0 CARDIAC PACEMAKER IN SITU: ICD-10-CM

## 2017-10-11 PROCEDURE — 93294 REM INTERROG EVL PM/LDLS PM: CPT | Mod: ,,, | Performed by: INTERNAL MEDICINE

## 2017-10-11 PROCEDURE — 93296 REM INTERROG EVL PM/IDS: CPT | Mod: PBBFAC | Performed by: INTERNAL MEDICINE

## 2017-10-19 ENCOUNTER — TELEPHONE (OUTPATIENT)
Dept: ELECTROPHYSIOLOGY | Facility: CLINIC | Age: 67
End: 2017-10-19

## 2017-10-19 NOTE — TELEPHONE ENCOUNTER
Called patient to follow up with [employer] update, called pt contact number as well as her 's number listed, both numbers have a busy signal.

## 2017-11-17 ENCOUNTER — OFFICE VISIT (OUTPATIENT)
Dept: URGENT CARE | Facility: CLINIC | Age: 67
End: 2017-11-17
Payer: MEDICARE

## 2017-11-17 VITALS
RESPIRATION RATE: 18 BRPM | TEMPERATURE: 98 F | OXYGEN SATURATION: 97 % | SYSTOLIC BLOOD PRESSURE: 114 MMHG | HEART RATE: 60 BPM | DIASTOLIC BLOOD PRESSURE: 63 MMHG | WEIGHT: 203 LBS | BODY MASS INDEX: 38.33 KG/M2 | HEIGHT: 61 IN

## 2017-11-17 DIAGNOSIS — S80.12XA TRAUMATIC HEMATOMA OF LEFT LOWER LEG, INITIAL ENCOUNTER: Primary | ICD-10-CM

## 2017-11-17 DIAGNOSIS — M79.605 LEG PAIN, LEFT: ICD-10-CM

## 2017-11-17 PROCEDURE — 99203 OFFICE O/P NEW LOW 30 MIN: CPT | Mod: S$GLB,,, | Performed by: PHYSICIAN ASSISTANT

## 2017-11-17 RX ORDER — ACETAMINOPHEN AND CODEINE PHOSPHATE 300; 30 MG/1; MG/1
1 TABLET ORAL EVERY 6 HOURS PRN
Qty: 12 TABLET | Refills: 0 | Status: SHIPPED | OUTPATIENT
Start: 2017-11-17 | End: 2017-11-20

## 2017-11-17 NOTE — PATIENT INSTRUCTIONS
- Please return here or go to the Emergency Department for any concerns or worsening of condition.   - If you were prescribed a narcotic medication, do not drive or operate heavy equipment or machinery while taking these medications.  - Apply ice packs to the affected area(s) for 20-30 minutes several times daily for swelling and pain in addition to rest, elevation, and compression (ex. Extremities). Allow 1 hour between icing sessions to avoid damage to skin.   * Large, cheap, and reusable ice pack(s) can be easily made as follows. INSTRUCTIONS: Mix 1 cup of rubbing (isopropyl) alcohol to 3 cups water into a 1 gallon zip lock bag. Squeeze remaining air out of the bag and seal. Bag(s) to be kept flat in a freezer until cold and after each use.   - Please follow up with your primary care provider (PCP) or discussed specialist(s) as needed.           Hematoma  A hematoma is a collection of blood trapped outside of a blood vessel. It is what we think of as a bruise or a contusion. It is usually seen under the skin as a black and blue spot on your arm or leg, or a bump on your head after an injury. It can be almost anywhere on or in your body. It can also occur in an internal organ where it can be more serious.  A hematoma is caused by an injury with damage to small blood vessels. This causes blood to leak into the tissues. Blood forms a pocket under the skin that swells and looks like a purplish patch.  Gradually the blood in the hematoma is absorbed back into the body. The swelling and pain of the hematoma will go away. This takes from 1 to 4 weeks, depending on the size of the hematoma. The skin over the hematoma may turn bluish then brown and yellow as the blood is dissolved and absorbed. Usually, this only takes a couple of weeks but can last months.  Home care  · Limit motion of the joints near the hematoma. If the hematoma is large and painful, avoid sports and other vigorous physical activity until the swelling  and pain goes away.  · Apply an ice pack (ice cubes in a plastic bag, wrapped in a thin towel or a frozen bag of peas) over the injured area for 20 minutes every 1 to 2 hours the first day. Continue with ice packs 3 to 4 times a day for the next 2 days. Continue the use of ice packs for relief of pain and swelling as needed.  · If you need anything for pain, you can take acetaminophen, unless you were given a different pain medicine to use. Talk with your doctor before using this medicine if you have chronic liver or kidney disease. Also talk with your doctor if you have had a stomach ulcer or digestive tract bleeding, or are taking blood-thinner medicines.  Follow-up care  Follow up with your healthcare provider, or as advised. If X-rays or a CT scan were done, you will be notified if there is a change in the reading, especially if it affects treatment.  When to seek medical advice  Call your healthcare provider right away f any of the following occur:  · Redness around the hematoma  · Increase in pain or warmth in the hematoma  · Increase in size of the hematoma  · Fever of 100.4ºF (38ºC) or higher, or as directed by your healthcare provider  · If the hematoma is on the arm or leg, watch for:  ¨ Increased swelling or pain in the extremity  ¨ Numbness or tingling or blue color of the hand or foot  Date Last Reviewed: 11/5/2015  © 3391-4286 [employer]. [address] [place] [address], [place] [address]. All rights reserved. This information is not intended as a substitute for professional medical care. Always follow your healthcare professional's instructions.

## 2017-11-17 NOTE — PROGRESS NOTES
"Subjective:       Patient ID: Jesenia Curiel is a 67 y.o. female.    Vitals:  height is 5' 1" (1.549 m) and weight is 92.1 kg (203 lb). Her tympanic temperature is 98.3 °F (36.8 °C). Her blood pressure is 114/63 and her pulse is 60. Her respiration is 18 and oxygen saturation is 97%.     Chief Complaint: Trauma (left leg pain from fall tuesday)    Present with fall on Tuesday and pain to left leg.      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 Has diffuse bruising/ecchymosis of left thigh and lower leg s/p fall 3 days ago. Has been using ice and elevation. Pain has slightly improved but leg is continuing to look worse. Temporarily stopped her anticoagulate to see if her condition would improve. Has history of chronic neuropathy/radiculopathy symptoms with decreased sensation of her left lower extremity.       Trauma   The incident occurred 3 to 5 days ago (3 days ago). The incident occurred at home. The injury mechanism was a pulled limb and a twisted limb. The injury occurred in the context of other. No protective equipment was used. There is an injury to the left lower leg (left ). The pain is moderate. It is unlikely that a foreign body is present. Associated symptoms include numbness (chronic for left LE). Pertinent negatives include no abdominal pain, chest pain, neck pain or weakness. There have been prior injuries to these areas (fracture tib-fib in 1995).     Review of Systems   Constitution: Negative for weakness and malaise/fatigue.   HENT: Negative for nosebleeds.    Cardiovascular: Positive for irregular heartbeat (chronic A-Fib) and leg swelling. Negative for chest pain and syncope.   Respiratory: Negative for shortness of breath.    Skin: Positive for color change and rash.   Musculoskeletal: Positive for falls, joint pain (left ) and joint swelling (left ). Negative for back pain and neck pain.   Gastrointestinal: Negative for abdominal pain. "   Genitourinary: Negative for hematuria.   Neurological: Positive for numbness (chronic for left LE) and paresthesias (chronic for left LE). Negative for disturbances in coordination and dizziness.   Psychiatric/Behavioral: Negative for altered mental status.   Allergic/Immunologic: Negative for hives.       Objective:      Physical Exam   Constitutional: She is oriented to person, place, and time. She appears well-developed and well-nourished.   HENT:   Head: Normocephalic and atraumatic. Head is without abrasion, without contusion and without laceration.   Right Ear: External ear normal.   Left Ear: External ear normal.   Nose: Nose normal.   Mouth/Throat: Oropharynx is clear and moist.   Eyes: Conjunctivae, EOM and lids are normal. Pupils are equal, round, and reactive to light.   Neck: Trachea normal, full passive range of motion without pain and phonation normal. Neck supple.   Cardiovascular: Normal rate, regular rhythm, normal heart sounds, intact distal pulses and normal pulses.  Exam reveals no decreased pulses.    Pulses:       Dorsalis pedis pulses are 2+ on the right side, and 2+ on the left side.        Posterior tibial pulses are 2+ on the right side, and 2+ on the left side.   Pulmonary/Chest: Effort normal and breath sounds normal. No stridor. No respiratory distress.   Musculoskeletal: Normal range of motion.        Left lower leg: She exhibits tenderness, swelling and edema. She exhibits no deformity and no laceration.        Legs:  Neurological: She is alert and oriented to person, place, and time.   Skin: Skin is warm, dry and intact. Capillary refill takes less than 2 seconds. Bruising and ecchymosis noted. No abrasion, no burn, no laceration, no lesion, no petechiae and no rash noted. No erythema.        Psychiatric: She has a normal mood and affect. Her speech is normal and behavior is normal. Judgment and thought content normal. Cognition and memory are normal.   Nursing note and vitals  "reviewed.      /63   Pulse 60   Temp 98.3 °F (36.8 °C) (Tympanic)   Resp 18   Ht 5' 1" (1.549 m)   Wt 92.1 kg (203 lb)   SpO2 97%   BMI 38.36 kg/m²      HISTORY: Recent fall. History of remote fracture in 1995.    TECHNIQUE: 2 view radiograph(s) of the left tibia/fibula    COMPARISON: N/A      FINDINGS:     Fracture: No acute fracture. Remote proximal fibular and distal tibial fracture deformity.     Soft Tissues: Diffuse soft tissue swelling about the mid leg.     Other: N/A   Impression     Diffuse soft tissue swelling. No acute fracture.    Remote fracture deformities.      Electronically signed by: SHAUN ALLRED  Date: 11/17/17  Time: 11:18      Assessment:       1. Traumatic hematoma of left lower leg, initial encounter    2. Leg pain, left        Old proximal tib/fib fracture visualized. No new fractures appreciated at this time.   Non suspicion for compartment syndrome at this time.     Plan:         Traumatic hematoma of left lower leg, initial encounter  -     acetaminophen-codeine 300-30mg (TYLENOL #3) 300-30 mg Tab; Take 1 tablet by mouth every 6 (six) hours as needed (pain).  Dispense: 12 tablet; Refill: 0    Leg pain, left  -     X-Ray Tibia Fibula 2 View Left; Future; Expected date: 11/17/2017    - Please return here or go to the Emergency Department for any concerns or worsening of condition.   - Use ACE bandage for compression for the next 5-7 days  - If you were prescribed a narcotic medication, do not drive or operate heavy equipment or machinery while taking these medications.  - Apply ice packs to the affected area(s) for 20-30 minutes several times daily for swelling and pain in addition to rest, elevation, and compression (ex. Extremities). Allow 1 hour between icing sessions to avoid damage to skin.   * Large, cheap, and reusable ice pack(s) can be easily made as follows. INSTRUCTIONS: Mix 1 cup of rubbing (isopropyl) alcohol to 3 cups water into a 1 gallon zip lock bag. Squeeze " remaining air out of the bag and seal. Bag(s) to be kept flat in a freezer until cold and after each use.   - Please follow up with your primary care provider (PCP) or discussed specialist(s) as needed.

## 2018-01-05 ENCOUNTER — OFFICE VISIT (OUTPATIENT)
Dept: INTERNAL MEDICINE | Facility: CLINIC | Age: 68
End: 2018-01-05
Payer: MEDICARE

## 2018-01-05 ENCOUNTER — IMMUNIZATION (OUTPATIENT)
Dept: INTERNAL MEDICINE | Facility: CLINIC | Age: 68
End: 2018-01-05
Payer: MEDICARE

## 2018-01-05 ENCOUNTER — LAB VISIT (OUTPATIENT)
Dept: LAB | Facility: HOSPITAL | Age: 68
End: 2018-01-05
Attending: INTERNAL MEDICINE
Payer: MEDICARE

## 2018-01-05 ENCOUNTER — PATIENT MESSAGE (OUTPATIENT)
Dept: INTERNAL MEDICINE | Facility: CLINIC | Age: 68
End: 2018-01-05

## 2018-01-05 VITALS
TEMPERATURE: 98 F | RESPIRATION RATE: 16 BRPM | SYSTOLIC BLOOD PRESSURE: 114 MMHG | BODY MASS INDEX: 36.23 KG/M2 | DIASTOLIC BLOOD PRESSURE: 64 MMHG | HEIGHT: 61 IN | HEART RATE: 60 BPM | WEIGHT: 191.88 LBS

## 2018-01-05 DIAGNOSIS — E78.5 HYPERLIPIDEMIA, UNSPECIFIED HYPERLIPIDEMIA TYPE: ICD-10-CM

## 2018-01-05 DIAGNOSIS — E66.01 SEVERE OBESITY (BMI 35.0-39.9) WITH COMORBIDITY: ICD-10-CM

## 2018-01-05 DIAGNOSIS — Z79.01 CHRONIC ANTICOAGULATION: ICD-10-CM

## 2018-01-05 DIAGNOSIS — I49.5 SICK SINUS SYNDROME: ICD-10-CM

## 2018-01-05 DIAGNOSIS — R73.03 PRE-DIABETES: ICD-10-CM

## 2018-01-05 DIAGNOSIS — I48.91 ATRIAL FIBRILLATION, UNSPECIFIED TYPE: ICD-10-CM

## 2018-01-05 DIAGNOSIS — I10 BENIGN ESSENTIAL HYPERTENSION: Primary | ICD-10-CM

## 2018-01-05 DIAGNOSIS — I10 BENIGN ESSENTIAL HYPERTENSION: ICD-10-CM

## 2018-01-05 LAB
ALBUMIN SERPL BCP-MCNC: 4.3 G/DL
ALP SERPL-CCNC: 76 U/L
ALT SERPL W/O P-5'-P-CCNC: 18 U/L
ANION GAP SERPL CALC-SCNC: 11 MMOL/L
AST SERPL-CCNC: 20 U/L
BASOPHILS # BLD AUTO: 0.05 K/UL
BASOPHILS NFR BLD: 0.8 %
BILIRUB SERPL-MCNC: 0.8 MG/DL
BUN SERPL-MCNC: 24 MG/DL
CALCIUM SERPL-MCNC: 10.7 MG/DL
CHLORIDE SERPL-SCNC: 101 MMOL/L
CHOLEST SERPL-MCNC: 216 MG/DL
CHOLEST/HDLC SERPL: 4.5 {RATIO}
CO2 SERPL-SCNC: 28 MMOL/L
CREAT SERPL-MCNC: 1.4 MG/DL
DIFFERENTIAL METHOD: NORMAL
EOSINOPHIL # BLD AUTO: 0.2 K/UL
EOSINOPHIL NFR BLD: 2.9 %
ERYTHROCYTE [DISTWIDTH] IN BLOOD BY AUTOMATED COUNT: 12.8 %
EST. GFR  (AFRICAN AMERICAN): 45 ML/MIN/1.73 M^2
EST. GFR  (NON AFRICAN AMERICAN): 39 ML/MIN/1.73 M^2
ESTIMATED AVG GLUCOSE: 94 MG/DL
GLUCOSE SERPL-MCNC: 98 MG/DL
HBA1C MFR BLD HPLC: 4.9 %
HCT VFR BLD AUTO: 41.1 %
HDLC SERPL-MCNC: 48 MG/DL
HDLC SERPL: 22.2 %
HGB BLD-MCNC: 13.7 G/DL
LDLC SERPL CALC-MCNC: 132 MG/DL
LYMPHOCYTES # BLD AUTO: 1.4 K/UL
LYMPHOCYTES NFR BLD: 23.8 %
MCH RBC QN AUTO: 30.7 PG
MCHC RBC AUTO-ENTMCNC: 33.3 G/DL
MCV RBC AUTO: 92 FL
MONOCYTES # BLD AUTO: 0.6 K/UL
MONOCYTES NFR BLD: 9.9 %
NEUTROPHILS # BLD AUTO: 3.7 K/UL
NEUTROPHILS NFR BLD: 62.4 %
NONHDLC SERPL-MCNC: 168 MG/DL
PLATELET # BLD AUTO: 252 K/UL
PMV BLD AUTO: 9.7 FL
POTASSIUM SERPL-SCNC: 4.3 MMOL/L
PROT SERPL-MCNC: 7.4 G/DL
RBC # BLD AUTO: 4.46 M/UL
SODIUM SERPL-SCNC: 140 MMOL/L
TRIGL SERPL-MCNC: 180 MG/DL
WBC # BLD AUTO: 5.96 K/UL

## 2018-01-05 PROCEDURE — 83036 HEMOGLOBIN GLYCOSYLATED A1C: CPT

## 2018-01-05 PROCEDURE — 99213 OFFICE O/P EST LOW 20 MIN: CPT | Mod: PBBFAC | Performed by: INTERNAL MEDICINE

## 2018-01-05 PROCEDURE — 80053 COMPREHEN METABOLIC PANEL: CPT

## 2018-01-05 PROCEDURE — 99999 PR PBB SHADOW E&M-EST. PATIENT-LVL III: CPT | Mod: PBBFAC,,, | Performed by: INTERNAL MEDICINE

## 2018-01-05 PROCEDURE — 99214 OFFICE O/P EST MOD 30 MIN: CPT | Mod: S$PBB,,, | Performed by: INTERNAL MEDICINE

## 2018-01-05 PROCEDURE — 36415 COLL VENOUS BLD VENIPUNCTURE: CPT

## 2018-01-05 PROCEDURE — 85025 COMPLETE CBC W/AUTO DIFF WBC: CPT

## 2018-01-05 PROCEDURE — 90662 IIV NO PRSV INCREASED AG IM: CPT | Mod: PBBFAC

## 2018-01-05 PROCEDURE — 80061 LIPID PANEL: CPT

## 2018-01-05 NOTE — PROGRESS NOTES
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    Chief Complaint: Hypertension and Hyperlipidemia    HPI   Had a fall on November. Still w/ lingering hematoma in the L shin. Able to bare weight on it. No pain. Occasional itching. Gone down in size.     htn - amlodipine 5mg daily, triamterene-hctz 37.5-25mg daily, olmesartan 20mg daily, bisoprolol 5mg daily.    SSS s/p PM (not AICD). Remote pacemaker monitored 10/11/17.   Afib - on propafenone SR 225mg bid and xarelto 15mg daily.    HLD - atorva 20mg daily.    IGT 5/2017 6.1    Obesity - on belviq 10mg BID. Has lost about 18lbs since she's last seen me (12 in the last 2 mo).     Declines getting MMG. Would like flu vaccine today.    Review of Systems  Comprehensive review of systems otherwise negative. See history/subjective section for more details.    Objective:      Physical Exam    /64   Pulse 60   Temp 97.8 °F (36.6 °C) (Oral)   Resp 16   Ht 5' 1" (1.549 m)   Wt 87 kg (191 lb 14.4 oz)   BMI 36.26 kg/m²     GEN - A+OX4, NAD   HEENT - PERRL, EOMI, OP clear. MMM.  Neck - No thyromegaly or cervical LAD. No thyroid masses felt.  CV - RRR, no m/r   Chest - CTAB, no wheezing or rhonchi  Abd - S/NT/ND/+BS.   Ext - 2+B PT and radial pulses. No LE edema.   Skin - No rash. L shin w/ large hematoma that's not painful and some hyperpigmentation.     Previous labs reviewed.    Assessment/Plan     Jesenia was seen today for hypertension and hyperlipidemia.    Diagnoses and all orders for this visit:    Benign essential hypertension - Stable and controlled. Continue current medications.  -     Comprehensive metabolic panel; Future    Hyperlipidemia, unspecified hyperlipidemia type  -     Comprehensive metabolic panel; Future  -     Lipid panel; Future    Sick sinus syndrome - PM in place. Recently remotely interrogated.     Atrial fibrillation, unspecified type - cont propafenone and xarelto    Pre-diabetes - cont diet and exercise.  -     " Hemoglobin A1c; Future    Chronic anticoagulation  -     CBC auto differential; Future    Severe obesity (BMI 36.26) with comorbidity - cont to work on diet and exercise. Benefiting from Belviq. Cont medicine.    Flu vaccine today.    Return in about 6 months (around 7/5/2018).      Sandra Michaud MD  Department of Internal Medicine - [facility]  11:12 AM

## 2018-01-08 ENCOUNTER — TELEPHONE (OUTPATIENT)
Dept: INTERNAL MEDICINE | Facility: CLINIC | Age: 68
End: 2018-01-08

## 2018-01-08 DIAGNOSIS — E83.52 HYPERCALCEMIA: Primary | ICD-10-CM

## 2018-01-08 NOTE — TELEPHONE ENCOUNTER
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 Can you see if she's taking her lipitor 20mg consistently? If so, I may want to go up to 40mg daily. Her prediabetes has resolved and a1c is now normal. Blood count is normal. Kidney function is stable with Cr of 1.4. Her calcium is very mildly elevated though. Is she taking any multivitamins or calcium supplements? If so, I recommend stopping it and coming in in another month to recheck this. Her HCTZ in her blood pressure medicine can also sometimes cause this. She can cont her vit D.

## 2018-01-15 ENCOUNTER — CLINICAL SUPPORT (OUTPATIENT)
Dept: ELECTROPHYSIOLOGY | Facility: CLINIC | Age: 68
End: 2018-01-15
Payer: MEDICARE

## 2018-01-15 DIAGNOSIS — Z95.0 CARDIAC PACEMAKER IN SITU: ICD-10-CM

## 2018-01-15 PROCEDURE — 93294 REM INTERROG EVL PM/LDLS PM: CPT | Mod: ,,, | Performed by: INTERNAL MEDICINE

## 2018-01-15 PROCEDURE — 93296 REM INTERROG EVL PM/IDS: CPT | Mod: PBBFAC | Performed by: INTERNAL MEDICINE

## 2018-01-20 DIAGNOSIS — I48.0 PAROXYSMAL ATRIAL FIBRILLATION: ICD-10-CM

## 2018-01-20 DIAGNOSIS — Z95.0 CARDIAC PACEMAKER IN SITU: Primary | ICD-10-CM

## 2018-01-20 DIAGNOSIS — I49.5 SSS (SICK SINUS SYNDROME): ICD-10-CM

## 2018-03-09 ENCOUNTER — TELEPHONE (OUTPATIENT)
Dept: ELECTROPHYSIOLOGY | Facility: CLINIC | Age: 68
End: 2018-03-09

## 2018-03-09 NOTE — TELEPHONE ENCOUNTER
Callep the pt to inform her that the schedule isn't open for June nor July.  Said she'd call back in a month.

## 2018-04-23 ENCOUNTER — CLINICAL SUPPORT (OUTPATIENT)
Dept: ELECTROPHYSIOLOGY | Facility: CLINIC | Age: 68
End: 2018-04-23
Attending: INTERNAL MEDICINE
Payer: MEDICARE

## 2018-04-23 DIAGNOSIS — Z95.0 CARDIAC PACEMAKER IN SITU: ICD-10-CM

## 2018-04-23 PROCEDURE — 93294 REM INTERROG EVL PM/LDLS PM: CPT | Mod: ,,, | Performed by: INTERNAL MEDICINE

## 2018-04-23 PROCEDURE — 93296 REM INTERROG EVL PM/IDS: CPT | Mod: PBBFAC | Performed by: INTERNAL MEDICINE

## 2018-05-01 ENCOUNTER — TELEPHONE (OUTPATIENT)
Dept: INTERNAL MEDICINE | Facility: CLINIC | Age: 68
End: 2018-05-01

## 2018-05-01 NOTE — TELEPHONE ENCOUNTER
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

## 2018-05-01 NOTE — TELEPHONE ENCOUNTER
----- Message from Danna Bundy sent at 5/1/2018 12:57 PM CDT -----  Contact: self/ [phone]  Sooner appointment than the  can schedule.    Did you offer to schedule the next available appt and put the patient on the wait list?:   yes    When is the first available appointment: after 9/4    What visit type: phys/ep    Patient preference of timeframe to be scheduled:  Late morning    What is the reason the patient is requesting a sooner appointment? (insurance terminating, changing jobs):  She is scheduled for her phys in May.    Would you prefer an answer via [employer]?    Comments:

## 2018-05-07 DIAGNOSIS — N18.9 CHRONIC KIDNEY DISEASE, UNSPECIFIED CKD STAGE: ICD-10-CM

## 2018-05-07 DIAGNOSIS — Z12.11 COLON CANCER SCREENING: ICD-10-CM

## 2018-05-12 DIAGNOSIS — I10 BENIGN ESSENTIAL HYPERTENSION: ICD-10-CM

## 2018-05-12 RX ORDER — OLMESARTAN MEDOXOMIL 20 MG/1
TABLET ORAL
Qty: 90 TABLET | Refills: 2 | Status: SHIPPED | OUTPATIENT
Start: 2018-05-12 | End: 2018-11-24 | Stop reason: SDUPTHER

## 2018-05-31 DIAGNOSIS — I48.91 ATRIAL FIBRILLATION, UNSPECIFIED TYPE: ICD-10-CM

## 2018-05-31 RX ORDER — NITROFURANTOIN 25; 75 MG/1; MG/1
CAPSULE ORAL
COMMUNITY
Start: 2018-04-23 | End: 2018-07-26

## 2018-05-31 RX ORDER — PROPAFENONE HYDROCHLORIDE 225 MG/1
225 CAPSULE, EXTENDED RELEASE ORAL EVERY 12 HOURS
Qty: 180 CAPSULE | Refills: 3 | Status: SHIPPED | OUTPATIENT
Start: 2018-05-31 | End: 2018-06-04 | Stop reason: SDUPTHER

## 2018-05-31 RX ORDER — MUPIROCIN 20 MG/G
OINTMENT TOPICAL
COMMUNITY
Start: 2018-04-23 | End: 2018-07-26

## 2018-05-31 RX ORDER — OXYCODONE HYDROCHLORIDE 5 MG/1
5 TABLET ORAL EVERY 6 HOURS PRN
Refills: 0 | COMMUNITY
Start: 2018-04-02 | End: 2019-02-13

## 2018-05-31 RX ORDER — PROPAFENONE HYDROCHLORIDE 225 MG/1
225 CAPSULE, EXTENDED RELEASE ORAL EVERY 12 HOURS
Qty: 180 CAPSULE | Refills: 3 | Status: CANCELLED | OUTPATIENT
Start: 2018-05-31

## 2018-05-31 NOTE — TELEPHONE ENCOUNTER
Please call to remind pt to follow up with Dr. Trell Hodgson in July for  device interrogation and let her know that I forwarded her propafenone refill request to Dr. Hodgson.

## 2018-06-04 DIAGNOSIS — I48.91 ATRIAL FIBRILLATION, UNSPECIFIED TYPE: ICD-10-CM

## 2018-06-05 RX ORDER — PROPAFENONE HYDROCHLORIDE 225 MG/1
225 CAPSULE, EXTENDED RELEASE ORAL EVERY 12 HOURS
Qty: 180 CAPSULE | Refills: 3 | Status: SHIPPED | OUTPATIENT
Start: 2018-06-05 | End: 2019-06-10 | Stop reason: SDUPTHER

## 2018-06-12 DIAGNOSIS — I10 BENIGN ESSENTIAL HYPERTENSION: ICD-10-CM

## 2018-06-12 RX ORDER — BISOPROLOL FUMARATE 5 MG/1
5 TABLET, FILM COATED ORAL DAILY
Qty: 90 TABLET | Refills: 3 | Status: SHIPPED | OUTPATIENT
Start: 2018-06-12 | End: 2018-12-13 | Stop reason: SDUPTHER

## 2018-07-03 DIAGNOSIS — I10 BENIGN ESSENTIAL HYPERTENSION: ICD-10-CM

## 2018-07-03 RX ORDER — AMLODIPINE BESYLATE 5 MG/1
5 TABLET ORAL DAILY
Qty: 90 TABLET | Refills: 3 | Status: SHIPPED | OUTPATIENT
Start: 2018-07-03 | End: 2019-07-06 | Stop reason: SDUPTHER

## 2018-07-20 ENCOUNTER — TELEPHONE (OUTPATIENT)
Dept: ELECTROPHYSIOLOGY | Facility: CLINIC | Age: 68
End: 2018-07-20

## 2018-07-20 DIAGNOSIS — I48.91 ATRIAL FIBRILLATION, UNSPECIFIED TYPE: Primary | ICD-10-CM

## 2018-07-23 ENCOUNTER — OFFICE VISIT (OUTPATIENT)
Dept: ELECTROPHYSIOLOGY | Facility: CLINIC | Age: 68
End: 2018-07-23
Payer: MEDICARE

## 2018-07-23 ENCOUNTER — CLINICAL SUPPORT (OUTPATIENT)
Dept: ELECTROPHYSIOLOGY | Facility: CLINIC | Age: 68
End: 2018-07-23
Attending: INTERNAL MEDICINE
Payer: MEDICARE

## 2018-07-23 ENCOUNTER — HOSPITAL ENCOUNTER (OUTPATIENT)
Dept: CARDIOLOGY | Facility: CLINIC | Age: 68
Discharge: HOME | End: 2018-07-23
Payer: MEDICARE

## 2018-07-23 VITALS
HEIGHT: 61 IN | HEART RATE: 60 BPM | DIASTOLIC BLOOD PRESSURE: 68 MMHG | WEIGHT: 185 LBS | BODY MASS INDEX: 34.93 KG/M2 | SYSTOLIC BLOOD PRESSURE: 114 MMHG

## 2018-07-23 DIAGNOSIS — Z95.0 S/P PLACEMENT OF CARDIAC PACEMAKER: ICD-10-CM

## 2018-07-23 DIAGNOSIS — Z79.01 CHRONIC ANTICOAGULATION: ICD-10-CM

## 2018-07-23 DIAGNOSIS — I49.5 SICK SINUS SYNDROME: ICD-10-CM

## 2018-07-23 DIAGNOSIS — I48.0 PAROXYSMAL ATRIAL FIBRILLATION: ICD-10-CM

## 2018-07-23 DIAGNOSIS — N18.30 CKD (CHRONIC KIDNEY DISEASE) STAGE 3, GFR 30-59 ML/MIN: ICD-10-CM

## 2018-07-23 DIAGNOSIS — I10 BENIGN ESSENTIAL HYPERTENSION: ICD-10-CM

## 2018-07-23 DIAGNOSIS — I48.0 PAROXYSMAL ATRIAL FIBRILLATION: Primary | ICD-10-CM

## 2018-07-23 DIAGNOSIS — E78.2 MIXED HYPERLIPIDEMIA: ICD-10-CM

## 2018-07-23 DIAGNOSIS — Z95.0 CARDIAC PACEMAKER IN SITU: ICD-10-CM

## 2018-07-23 DIAGNOSIS — I48.91 ATRIAL FIBRILLATION, UNSPECIFIED TYPE: ICD-10-CM

## 2018-07-23 DIAGNOSIS — I49.5 SSS (SICK SINUS SYNDROME): ICD-10-CM

## 2018-07-23 PROCEDURE — 99999 PR PBB SHADOW E&M-EST. PATIENT-LVL III: CPT | Mod: PBBFAC,,, | Performed by: INTERNAL MEDICINE

## 2018-07-23 PROCEDURE — 93280 PM DEVICE PROGR EVAL DUAL: CPT | Mod: PBBFAC | Performed by: INTERNAL MEDICINE

## 2018-07-23 PROCEDURE — 93010 ELECTROCARDIOGRAM REPORT: CPT | Mod: S$PBB,,, | Performed by: INTERNAL MEDICINE

## 2018-07-23 PROCEDURE — 93005 ELECTROCARDIOGRAM TRACING: CPT | Mod: PBBFAC | Performed by: INTERNAL MEDICINE

## 2018-07-23 PROCEDURE — 99214 OFFICE O/P EST MOD 30 MIN: CPT | Mod: S$PBB,,, | Performed by: INTERNAL MEDICINE

## 2018-07-23 PROCEDURE — 99213 OFFICE O/P EST LOW 20 MIN: CPT | Mod: PBBFAC,25 | Performed by: INTERNAL MEDICINE

## 2018-07-23 RX ORDER — ASPIRIN 81 MG/1
81 TABLET ORAL DAILY
Start: 2018-07-23

## 2018-07-23 NOTE — PROGRESS NOTES
Subjective:     HPI    Cardiologist: Verna Hodgson MD    I had the pleasure of seeing Jesenia Curiel in follow-up for his history of atrial fibrillation. 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 dual chamber pacemaker implantation in 5/2009 (gen change 9/2014). She has been on Rythmol since 2009, which has significantly brought down her arrhythmia burden (episodes used to last for hours but now last seconds to minutes). At her 2017 office visit, she was found to have a <1% AF burden, with the longest episode lasting <90 seconds.    I reviewed today's device interrogation, which shows stable device and lead function. She is paced 11% in the RV. No mode-switch episodes have been reported. Estimated battery longevity 8-9 years.     My interpretation of today's ECG is atrial pacing at 60 bpm.    Review of Systems   Constitution: Negative for decreased appetite, malaise/fatigue, weight gain and weight loss.   HENT: Negative for sore throat.    Eyes: Negative for blurred vision.   Cardiovascular: Negative for chest pain, dyspnea on exertion, irregular heartbeat, leg swelling, near-syncope, orthopnea, palpitations, paroxysmal nocturnal dyspnea and syncope.   Respiratory: Negative for shortness of breath.    Skin: Negative for rash.   Musculoskeletal: Negative for arthritis.   Gastrointestinal: Negative for abdominal pain.   Neurological: Negative for focal weakness.   Psychiatric/Behavioral: Negative for altered mental status.        Objective:    Physical Exam   Constitutional: She is oriented to person, place, and time. She appears well-developed and well-nourished. No distress.   HENT:   Head: Normocephalic and atraumatic.   Mouth/Throat: Oropharynx is clear and moist.   Eyes: Conjunctivae are normal. Pupils are equal, round, and reactive to light. No scleral icterus.   Neck: Normal range of motion. Neck supple. No JVD present.  No thyromegaly present.   Cardiovascular: Normal rate, regular rhythm, normal heart sounds and intact distal pulses.  Exam reveals no gallop and no friction rub.    No murmur heard.  Pulmonary/Chest: Effort normal and breath sounds normal. No respiratory distress. She has no rales.   Abdominal: Soft. Bowel sounds are normal. She exhibits no distension.   Musculoskeletal: She exhibits no edema.   Neurological: She is alert and oriented to person, place, and time.   Skin: Skin is warm and dry.   Psychiatric: She has a normal mood and affect. Her behavior is normal.   Vitals reviewed.        Assessment:       1. Paroxysmal atrial fibrillation    2. Benign essential hypertension    3. Mixed hyperlipidemia    4. Sick sinus syndrome    5. S/P placement of cardiac pacemaker    6. CKD (chronic kidney disease) stage 3, GFR 30-59 ml/min    7. Chronic anticoagulation         Plan:     In summary, Jesenia Curiel is a 67 year old female with a history of sick sinus syndrome, pacemaker implantation, and symptomatic PAF. Her AF has been controlled for many years on Rythmol, and her overall AF burden is <1%. Over the past 2 years she has had virtually no AF. The plan is to stop Xarelto and start aspirin 81 mg daily. She should continue Rythmol for AF suppression.    She will continue to have regular device checks and will see me again in 1 year.    Thank you for allowing me to participate in the care of this patient. Please do not hesitate to call me with any questions or concerns.

## 2018-07-25 ENCOUNTER — OFFICE VISIT (OUTPATIENT)
Dept: INTERNAL MEDICINE | Facility: CLINIC | Age: 68
End: 2018-07-25
Payer: MEDICARE

## 2018-07-25 ENCOUNTER — DOCUMENTATION ONLY (OUTPATIENT)
Dept: INTERNAL MEDICINE | Facility: CLINIC | Age: 68
End: 2018-07-25

## 2018-07-25 ENCOUNTER — LAB VISIT (OUTPATIENT)
Dept: LAB | Facility: HOSPITAL | Age: 68
End: 2018-07-25
Payer: MEDICARE

## 2018-07-25 VITALS
HEART RATE: 62 BPM | BODY MASS INDEX: 35.46 KG/M2 | OXYGEN SATURATION: 97 % | SYSTOLIC BLOOD PRESSURE: 114 MMHG | DIASTOLIC BLOOD PRESSURE: 80 MMHG | WEIGHT: 187.81 LBS | HEIGHT: 61 IN

## 2018-07-25 DIAGNOSIS — Z12.11 SCREENING FOR MALIGNANT NEOPLASM OF COLON: ICD-10-CM

## 2018-07-25 DIAGNOSIS — E66.01 MORBID OBESITY: ICD-10-CM

## 2018-07-25 DIAGNOSIS — I10 BENIGN ESSENTIAL HYPERTENSION: ICD-10-CM

## 2018-07-25 DIAGNOSIS — Z95.0 S/P PLACEMENT OF CARDIAC PACEMAKER: ICD-10-CM

## 2018-07-25 DIAGNOSIS — E83.52 HYPERCALCEMIA: ICD-10-CM

## 2018-07-25 DIAGNOSIS — R73.03 PRE-DIABETES: ICD-10-CM

## 2018-07-25 DIAGNOSIS — E78.2 MIXED HYPERLIPIDEMIA: ICD-10-CM

## 2018-07-25 DIAGNOSIS — Z79.01 CHRONIC ANTICOAGULATION: ICD-10-CM

## 2018-07-25 DIAGNOSIS — I49.5 SICK SINUS SYNDROME: ICD-10-CM

## 2018-07-25 DIAGNOSIS — E55.9 VITAMIN D DEFICIENCY: ICD-10-CM

## 2018-07-25 DIAGNOSIS — I48.0 PAROXYSMAL ATRIAL FIBRILLATION: Primary | ICD-10-CM

## 2018-07-25 DIAGNOSIS — M85.852 OSTEOPENIA OF LEFT THIGH: ICD-10-CM

## 2018-07-25 DIAGNOSIS — N18.30 CKD (CHRONIC KIDNEY DISEASE) STAGE 3, GFR 30-59 ML/MIN: ICD-10-CM

## 2018-07-25 LAB
25(OH)D3+25(OH)D2 SERPL-MCNC: 49 NG/ML
ALBUMIN SERPL BCP-MCNC: 4.1 G/DL
ALP SERPL-CCNC: 67 U/L
ALT SERPL W/O P-5'-P-CCNC: 21 U/L
ANION GAP SERPL CALC-SCNC: 8 MMOL/L
AST SERPL-CCNC: 19 U/L
BASOPHILS # BLD AUTO: 0.03 K/UL
BASOPHILS NFR BLD: 0.6 %
BILIRUB SERPL-MCNC: 0.8 MG/DL
BILIRUB UR QL STRIP: NEGATIVE
BUN SERPL-MCNC: 25 MG/DL
CALCIUM SERPL-MCNC: 10.6 MG/DL
CHLORIDE SERPL-SCNC: 104 MMOL/L
CHOLEST SERPL-MCNC: 212 MG/DL
CHOLEST/HDLC SERPL: 3.7 {RATIO}
CLARITY UR REFRACT.AUTO: ABNORMAL
CO2 SERPL-SCNC: 28 MMOL/L
COLOR UR AUTO: YELLOW
CREAT SERPL-MCNC: 1.5 MG/DL
DIFFERENTIAL METHOD: ABNORMAL
EOSINOPHIL # BLD AUTO: 0.2 K/UL
EOSINOPHIL NFR BLD: 3.8 %
ERYTHROCYTE [DISTWIDTH] IN BLOOD BY AUTOMATED COUNT: 12.9 %
EST. GFR  (AFRICAN AMERICAN): 41 ML/MIN/1.73 M^2
EST. GFR  (NON AFRICAN AMERICAN): 36 ML/MIN/1.73 M^2
ESTIMATED AVG GLUCOSE: 108 MG/DL
GLUCOSE SERPL-MCNC: 102 MG/DL
GLUCOSE UR QL STRIP: NEGATIVE
HBA1C MFR BLD HPLC: 5.4 %
HCT VFR BLD AUTO: 42.4 %
HDLC SERPL-MCNC: 57 MG/DL
HDLC SERPL: 26.9 %
HGB BLD-MCNC: 14.2 G/DL
HGB UR QL STRIP: NEGATIVE
KETONES UR QL STRIP: NEGATIVE
LDLC SERPL CALC-MCNC: 131.8 MG/DL
LEUKOCYTE ESTERASE UR QL STRIP: NEGATIVE
LYMPHOCYTES # BLD AUTO: 1.3 K/UL
LYMPHOCYTES NFR BLD: 24.2 %
MCH RBC QN AUTO: 31.1 PG
MCHC RBC AUTO-ENTMCNC: 33.5 G/DL
MCV RBC AUTO: 93 FL
MONOCYTES # BLD AUTO: 0.4 K/UL
MONOCYTES NFR BLD: 8.1 %
NEUTROPHILS # BLD AUTO: 3.3 K/UL
NEUTROPHILS NFR BLD: 63.1 %
NITRITE UR QL STRIP: NEGATIVE
NONHDLC SERPL-MCNC: 155 MG/DL
PH UR STRIP: 7 [PH] (ref 5–8)
PLATELET # BLD AUTO: 221 K/UL
PMV BLD AUTO: 9.6 FL
POTASSIUM SERPL-SCNC: 4.2 MMOL/L
PROT SERPL-MCNC: 7.3 G/DL
PROT UR QL STRIP: NEGATIVE
RBC # BLD AUTO: 4.56 M/UL
SODIUM SERPL-SCNC: 140 MMOL/L
SP GR UR STRIP: 1.01 (ref 1–1.03)
TRIGL SERPL-MCNC: 116 MG/DL
URN SPEC COLLECT METH UR: ABNORMAL
UROBILINOGEN UR STRIP-ACNC: NEGATIVE EU/DL
WBC # BLD AUTO: 5.28 K/UL

## 2018-07-25 PROCEDURE — 80053 COMPREHEN METABOLIC PANEL: CPT

## 2018-07-25 PROCEDURE — 81003 URINALYSIS AUTO W/O SCOPE: CPT

## 2018-07-25 PROCEDURE — 99999 PR PBB SHADOW E&M-EST. PATIENT-LVL V: CPT | Mod: PBBFAC,,, | Performed by: NURSE PRACTITIONER

## 2018-07-25 PROCEDURE — 85025 COMPLETE CBC W/AUTO DIFF WBC: CPT

## 2018-07-25 PROCEDURE — 80061 LIPID PANEL: CPT

## 2018-07-25 PROCEDURE — 83036 HEMOGLOBIN GLYCOSYLATED A1C: CPT

## 2018-07-25 PROCEDURE — 99214 OFFICE O/P EST MOD 30 MIN: CPT | Mod: S$PBB,,, | Performed by: NURSE PRACTITIONER

## 2018-07-25 PROCEDURE — 99215 OFFICE O/P EST HI 40 MIN: CPT | Mod: PBBFAC | Performed by: NURSE PRACTITIONER

## 2018-07-25 PROCEDURE — 82306 VITAMIN D 25 HYDROXY: CPT

## 2018-07-25 PROCEDURE — 36415 COLL VENOUS BLD VENIPUNCTURE: CPT

## 2018-07-25 NOTE — PROGRESS NOTES
Internal Medicine Annual Exam       CHIEF COMPLAINT     The patient, Jesenia Curiel, who is a 67 y.o. female with afib, htn, SSS, HLD, CKD, Vit d def, pre-dm and osteopenia presents for an annual exam.    HPI   She is a pt of Dr Michaud. Last seen 2018    htn - amlodipine 5mg daily, triamterene-hctz 37.5-25mg daily, olmesartan 20mg daily, bisoprolol 5mg daily.     SSS s/p PM (not AICD). Remote pacemaker monitored 10/11/17.   Followed by Cardiology - Dr Hodgson appointment 2018.   Stopped Xarelto and started asa 81mg     Afib - on propafenone SR 225mg bid and asa 81mg.      HLD - atorva 20mg daily. Increase to 40mg      IGT 2017 6.1- 2018 A1c normal.      Obesity - on belviq 10mg BID. Has lost additional 4lb since January.   Total - 25lb     CKD- creatinine stable at 1.4. Calcium mildly elevated. Does not take MVI or calcium supplement      Falls- poor depth perception and left leg weakness from back surgery .     HM-  MMG- declines   Dexa- 2013 will repeat 2018   FOBT- needed will order   Eye- >5 years ago.     Past Medical History:  Past Medical History:   Diagnosis Date    Allergy     Anemia     Atrial fibrillation 2017    CKD (chronic kidney disease) stage 3, GFR 30-59 ml/min 2017    Disorder of kidney and ureter     GERD (gastroesophageal reflux disease)     Hyperlipidemia     Hypertension     Pre-diabetes 6/3/2017       Past Surgical History:   Procedure Laterality Date    BACK SURGERY      lamenectomy     SECTION      ,     HYSTERECTOMY      INSERT / REPLACE / REMOVE PACEMAKER      placement    INSERT / REPLACE / REMOVE PACEMAKER  2014    [employer] Model #[id number] replacement    pace maker      replacement     salpingo opherectomy Bilateral 1998    TONSILLECTOMY          Family History   Problem Relation Age of Onset    Hypertension Mother     Diabetes Mother     Hyperlipidemia Mother     Coronary artery disease Mother      Heart disease Mother     Stroke Mother     Hypertension Father     Diabetes Father     Hyperlipidemia Father     Coronary artery disease Father     Heart disease Father     Coronary artery disease Brother     Heart disease Brother     Diabetes Paternal Grandmother     Diabetes Paternal Grandfather     Heart attack Neg Hx         Social History     Social History    Marital status:      Spouse name: N/A    Number of children: N/A    Years of education: N/A     Occupational History    Not on file.     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        History   Smoking Status    Never Smoker   Smokeless Tobacco    Never Used        Allergies as of 07/25/2018 - Reviewed 07/25/2018   Allergen Reaction Noted    Iodinated contrast- oral and iv dye Anaphylaxis 05/29/2017    Penicillins Anaphylaxis 05/29/2017    Ciprofloxacin Rash 05/29/2017    Sulfa (sulfonamide antibiotics) Rash 05/29/2017    Tetracyclines Rash 05/29/2017          Home Medications:  Prior to Admission medications    Medication Sig Start Date End Date Taking? Authorizing Provider   amLODIPine (NORVASC) 5 MG tablet Take 1 tablet (5 mg total) by mouth once daily. 7/3/18  Yes Denice Lara MD   aspirin (ECOTRIN) 81 MG EC tablet Take 1 tablet (81 mg total) by mouth once daily. 7/23/18  Yes Trell Hodgson MD   atorvastatin (LIPITOR) 20 MG tablet Take 20 mg by mouth once daily.   Yes Historical Provider, MD   bisoprolol (ZEBETA) 5 MG tablet TAKE 1 TABLET (5 MG TOTAL) BY MOUTH ONCE DAILY. 6/12/18  Yes Sandra Michaud MD   clobetasol (OLUX) 0.05 % Foam Apply 1 application topically every 30 days.    Yes  Historical Provider, MD   ERGOCALCIFEROL, VITAMIN D2, (VITAMIN D ORAL) Take 2,000 Units by mouth once daily.   Yes Historical Provider, MD   lorcaserin (BELVIQ) 10 mg Tab Take 10 mg by mouth 2 (two) times daily. 4/9/18  Yes Sandra Michaud MD   mupirocin (BACTROBAN) 2 % ointment  4/23/18  Yes Historical Provider, MD   nitrofurantoin, macrocrystal-monohydrate, (MACROBID) 100 MG capsule  4/23/18  Yes Historical Provider, MD   olmesartan (BENICAR) 20 MG tablet TAKE 1 TABLET EVERY DAY 5/12/18  Yes Sandra Michaud MD   oxyCODONE (ROXICODONE) 5 MG immediate release tablet Take 5 mg by mouth every 6 (six) hours as needed. 4/2/18  Yes Historical Provider, MD   propafenone (RYTHMOL SR) 225 MG Cp12 Take 1 capsule (225 mg total) by mouth every 12 (twelve) hours. 6/5/18  Yes Trell Hodgson MD   triamterene-hydrochlorothiazide 37.5-25 mg (DYAZIDE) 37.5-25 mg per capsule Take 1 capsule by mouth every morning.   Yes Historical Provider, MD       Review of Systems:  Review of Systems   Constitutional: Negative for activity change and unexpected weight change.   HENT: Negative for hearing loss, rhinorrhea and trouble swallowing.    Eyes: Negative for discharge and visual disturbance.   Respiratory: Negative for cough, chest tightness, shortness of breath and wheezing.    Cardiovascular: Negative for chest pain, palpitations and leg swelling.   Gastrointestinal: Negative for blood in stool, constipation, diarrhea and vomiting.   Endocrine: Negative for polydipsia and polyuria.   Genitourinary: Negative for difficulty urinating, dysuria, hematuria and menstrual problem.   Musculoskeletal: Negative for arthralgias, joint swelling and neck pain.   Skin: Negative for rash.   Neurological: Negative for dizziness, weakness, light-headedness and headaches.   Psychiatric/Behavioral: Negative for confusion and dysphoric mood.       Health Maintainence:   Immunizations:  Health Maintenance       Date Due Completion Date    TETANUS VACCINE 10/12/1967 ---     "Fecal Occult Blood Test (FOBT)/[facility] 12/12/2017 12/12/2016 (Done)    Override on 12/12/2016: Done    Influenza Vaccine 08/01/2018 1/5/2018    Override on 1/5/2018: Done    Override on 11/21/2016: Done    DEXA SCAN 11/24/2018 11/24/2015 (Done)    Override on 11/24/2015: Done    Override on 9/27/2013: Done    Mammogram 01/05/2020 1/5/2018 (Declined)    Override on 1/5/2018: Declined    Override on 11/27/2015: Done    Lipid Panel 01/05/2023 1/5/2018           PHYSICAL EXAM     /80 (BP Location: Right arm, Patient Position: Sitting, BP Method: Large (Manual))   Pulse 62   Ht 5' 1" (1.549 m)   Wt 85.2 kg (187 lb 13.3 oz)   SpO2 97%   BMI 35.49 kg/m²  Body mass index is 35.49 kg/m².    Physical Exam   Constitutional: She is oriented to person, place, and time. She appears well-developed and well-nourished.   HENT:   Head: Normocephalic.   Right Ear: External ear normal.   Left Ear: External ear normal.   Nose: Nose normal.   Mouth/Throat: Oropharynx is clear and moist. No oropharyngeal exudate.   Eyes: Pupils are equal, round, and reactive to light.   Neck: Neck supple. No JVD present. No tracheal deviation present. No thyromegaly present.   Cardiovascular: Normal rate, regular rhythm, normal heart sounds and intact distal pulses.  Exam reveals no gallop and no friction rub.    No murmur heard.  Pulmonary/Chest: Effort normal and breath sounds normal. No respiratory distress. She has no wheezes. She has no rales.   Abdominal: Soft. Bowel sounds are normal. She exhibits no distension. There is no tenderness.   Musculoskeletal: Normal range of motion. She exhibits no edema or tenderness.   Lymphadenopathy:     She has no cervical adenopathy.   Neurological: She is alert and oriented to person, place, and time.   Skin: Skin is warm and dry. No rash noted.   Psychiatric: She has a normal mood and affect. Her behavior is normal.   Vitals reviewed.      LABS     Lab Results   Component Value Date    HGBA1C 4.9 " 01/05/2018     CMP  Sodium   Date Value Ref Range Status   01/05/2018 140 136 - 145 mmol/L Final     Potassium   Date Value Ref Range Status   01/05/2018 4.3 3.5 - 5.1 mmol/L Final     Chloride   Date Value Ref Range Status   01/05/2018 101 95 - 110 mmol/L Final     CO2   Date Value Ref Range Status   01/05/2018 28 23 - 29 mmol/L Final     Glucose   Date Value Ref Range Status   01/05/2018 98 70 - 110 mg/dL Final     BUN, Bld   Date Value Ref Range Status   01/05/2018 24 (H) 8 - 23 mg/dL Final     Creatinine   Date Value Ref Range Status   01/05/2018 1.4 0.5 - 1.4 mg/dL Final     Calcium   Date Value Ref Range Status   01/05/2018 10.7 (H) 8.7 - 10.5 mg/dL Final     Total Protein   Date Value Ref Range Status   01/05/2018 7.4 6.0 - 8.4 g/dL Final     Albumin   Date Value Ref Range Status   01/05/2018 4.3 3.5 - 5.2 g/dL Final     Total Bilirubin   Date Value Ref Range Status   01/05/2018 0.8 0.1 - 1.0 mg/dL Final     Comment:     For infants and newborns, interpretation of results should be based  on gestational age, weight and in agreement with clinical  observations.  Premature Infant recommended reference ranges:  Up to 24 hours.............<8.0 mg/dL  Up to 48 hours............<12.0 mg/dL  3-5 days..................<15.0 mg/dL  6-29 days.................<15.0 mg/dL       Alkaline Phosphatase   Date Value Ref Range Status   01/05/2018 76 55 - 135 U/L Final     AST   Date Value Ref Range Status   01/05/2018 20 10 - 40 U/L Final     ALT   Date Value Ref Range Status   01/05/2018 18 10 - 44 U/L Final     Anion Gap   Date Value Ref Range Status   01/05/2018 11 8 - 16 mmol/L Final     eGFR if    Date Value Ref Range Status   01/05/2018 45 (A) >60 mL/min/1.73 m^2 Final     eGFR if non    Date Value Ref Range Status   01/05/2018 39 (A) >60 mL/min/1.73 m^2 Final     Comment:     Calculation used to obtain the estimated glomerular filtration  rate (eGFR) is the CKD-EPI equation.        Lab  Results   Component Value Date    WBC 5.96 01/05/2018    HGB 13.7 01/05/2018    HCT 41.1 01/05/2018    MCV 92 01/05/2018     01/05/2018     Lab Results   Component Value Date    CHOL 216 (H) 01/05/2018    CHOL 176 07/28/2017    CHOL 223 (H) 05/29/2017     Lab Results   Component Value Date    HDL 48 01/05/2018    HDL 40 07/28/2017    HDL 45 05/29/2017     Lab Results   Component Value Date    LDLCALC 132.0 01/05/2018    LDLCALC 107.4 07/28/2017    LDLCALC 134.8 05/29/2017     Lab Results   Component Value Date    TRIG 180 (H) 01/05/2018    TRIG 143 07/28/2017    TRIG 216 (H) 05/29/2017     Lab Results   Component Value Date    CHOLHDL 22.2 01/05/2018    CHOLHDL 22.7 07/28/2017    CHOLHDL 20.2 05/29/2017     Lab Results   Component Value Date    TSH 1.293 05/29/2017       ASSESSMENT/PLAN     Jesenia Curiel is a 67 y.o. female with  Past Medical History:   Diagnosis Date    Allergy     Anemia     Atrial fibrillation 5/29/2017    CKD (chronic kidney disease) stage 3, GFR 30-59 ml/min 6/26/2017    Disorder of kidney and ureter     GERD (gastroesophageal reflux disease)     Hyperlipidemia     Hypertension     Pre-diabetes 6/3/2017     Paroxysmal atrial fibrillation- stable with Rythmol. Will continue. Cardiology stopped xarelto, continue asa only     Chronic anticoagulation- xarelto stopped, will cont asa only     Benign essential hypertension- at goal. Will cont current meds. Low na diet. Labs today   -     CBC auto differential; Future; Expected date: 07/25/2018  -     Comprehensive metabolic panel; Future; Expected date: 07/25/2018  -     Urinalysis    Mixed hyperlipidemia- At goal on last check. Will recheck today. Cont atorvastatin   -     Lipid panel; Future; Expected date: 07/25/2018    Sick sinus syndrome- stable. Pacer interrogated by cards this week.     S/P placement of cardiac pacemaker    CKD (chronic kidney disease) stage 3, GFR 30-59 ml/min- will check levles today. Advised BP control.      Pre-diabetes- a1c at goal on check 6 months ago will recheck today. Cont low sugar and carb diet.   -     Hemoglobin A1c; Future; Expected date: 07/25/2018    Vitamin D deficiency- vitamin d level today. Cont supplement   -     Vitamin D; Future; Expected date: 07/25/2018    Osteopenia of left thigh- DEXA this November.   -     DXA Bone Density Appendicular Skeleton; Future; Expected date: 07/25/2018    Screening for malignant neoplasm of colon- FOBT   -     Fecal Immunochemical Test (iFOBT); Future; Expected date: 07/25/2018    Hypercalcemia- will check CMP today     Obesity- cont belvique. Congratulated weight loss and encouraged continued weight loss.     Follow up with PCP in 6 months     Gwen Whitaker-Neto HAYNES, APRN, FNP-c   Department of Internal Medicine - [facility]  10:30 AM

## 2018-07-26 ENCOUNTER — OFFICE VISIT (OUTPATIENT)
Dept: CARDIOLOGY | Facility: CLINIC | Age: 68
End: 2018-07-26
Payer: MEDICARE

## 2018-07-26 ENCOUNTER — PATIENT MESSAGE (OUTPATIENT)
Dept: INTERNAL MEDICINE | Facility: CLINIC | Age: 68
End: 2018-07-26

## 2018-07-26 VITALS
WEIGHT: 186.94 LBS | BODY MASS INDEX: 35.29 KG/M2 | HEIGHT: 61 IN | HEART RATE: 68 BPM | SYSTOLIC BLOOD PRESSURE: 120 MMHG | DIASTOLIC BLOOD PRESSURE: 70 MMHG

## 2018-07-26 DIAGNOSIS — I49.5 SICK SINUS SYNDROME: ICD-10-CM

## 2018-07-26 DIAGNOSIS — Z95.0 S/P PLACEMENT OF CARDIAC PACEMAKER: ICD-10-CM

## 2018-07-26 DIAGNOSIS — I48.0 PAROXYSMAL ATRIAL FIBRILLATION: Primary | ICD-10-CM

## 2018-07-26 DIAGNOSIS — E78.2 MIXED HYPERLIPIDEMIA: ICD-10-CM

## 2018-07-26 DIAGNOSIS — I10 BENIGN ESSENTIAL HYPERTENSION: ICD-10-CM

## 2018-07-26 PROBLEM — Z79.01 CHRONIC ANTICOAGULATION: Status: RESOLVED | Noted: 2017-05-29 | Resolved: 2018-07-26

## 2018-07-26 PROBLEM — E83.52 HYPERCALCEMIA: Status: ACTIVE | Noted: 2018-07-26

## 2018-07-26 PROBLEM — E66.01 MORBID OBESITY: Status: ACTIVE | Noted: 2018-07-26

## 2018-07-26 PROCEDURE — 99999 PR PBB SHADOW E&M-EST. PATIENT-LVL III: CPT | Mod: PBBFAC,,, | Performed by: INTERNAL MEDICINE

## 2018-07-26 PROCEDURE — 99214 OFFICE O/P EST MOD 30 MIN: CPT | Mod: S$PBB,,, | Performed by: INTERNAL MEDICINE

## 2018-07-26 PROCEDURE — 99213 OFFICE O/P EST LOW 20 MIN: CPT | Mod: PBBFAC | Performed by: INTERNAL MEDICINE

## 2018-07-26 RX ORDER — ATORVASTATIN CALCIUM 40 MG/1
40 TABLET, FILM COATED ORAL DAILY
Qty: 90 TABLET | Refills: 3 | Status: SHIPPED | OUTPATIENT
Start: 2018-07-26 | End: 2019-03-29

## 2018-07-26 NOTE — PROGRESS NOTES
Subjective:   Patient ID:  Jesenia Curiel is a 67 y.o. female who presents for follow-up of Atrial Fibrillation      HPI: She has been feeling well. She recently saw Dr JOSE Hodgson, and her xarelto was stopped and replaced with asa. Jesenia Curiel denies any chest pain, shortness of breath, PND, orthopnea, palpitations, leg edema, or syncope. Her weight is down 23 lbs. Her last pacer interrogation was  and showed no mode switches or VT. She is RA paced 98% and RV paced 11%.     ECG: (18): Atrial paced rhythm at 60 bpm. QTc 398 ms.    Past Medical History:   Diagnosis Date    Allergy     Anemia     Atrial fibrillation 2017    CKD (chronic kidney disease) stage 3, GFR 30-59 ml/min 2017    Disorder of kidney and ureter     GERD (gastroesophageal reflux disease)     Hyperlipidemia     Hypertension     Pre-diabetes 6/3/2017       Past Surgical History:   Procedure Laterality Date    BACK SURGERY      lamenectomy     SECTION      ,     HYSTERECTOMY      INSERT / REPLACE / REMOVE PACEMAKER      placement    INSERT / REPLACE / REMOVE PACEMAKER  2014    [employer] Model #[id number] replacement    pace maker      replacement     salpingo opherectomy Bilateral 1998    TONSILLECTOMY  1953       Social History     Social History    Marital status:      Spouse name: N/A    Number of children: N/A    Years of education: N/A     Social History Main Topics    Smoking status: Never Smoker    Smokeless tobacco: Never Used    Alcohol use 3.6 oz/week     6 Glasses of wine per week      Comment: occassionally    Drug use: No    Sexual activity: Not Currently     Other Topics Concern    None     Social History Narrative    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       Family History   Problem Relation Age of Onset    Hypertension Mother     Diabetes Mother     Hyperlipidemia Mother     Coronary artery disease Mother      Heart disease Mother     Stroke Mother     Hypertension Father     Diabetes Father     Hyperlipidemia Father     Coronary artery disease Father     Heart disease Father     Coronary artery disease Brother     Heart disease Brother     Hyperthyroidism Brother     Diabetes Paternal Grandmother     Diabetes Paternal Grandfather     Heart attack Neg Hx        Patient's Medications   New Prescriptions    ATORVASTATIN (LIPITOR) 40 MG TABLET    Take 1 tablet (40 mg total) by mouth once daily.   Previous Medications    AMLODIPINE (NORVASC) 5 MG TABLET    Take 1 tablet (5 mg total) by mouth once daily.    ASPIRIN (ECOTRIN) 81 MG EC TABLET    Take 1 tablet (81 mg total) by mouth once daily.    BISOPROLOL (ZEBETA) 5 MG TABLET    TAKE 1 TABLET (5 MG TOTAL) BY MOUTH ONCE DAILY.    CLOBETASOL (OLUX) 0.05 % FOAM    Apply 1 application topically every 30 days.     ERGOCALCIFEROL, VITAMIN D2, (VITAMIN D ORAL)    Take 2,000 Units by mouth once daily.    LORCASERIN (BELVIQ) 10 MG TAB    Take 10 mg by mouth 2 (two) times daily.    OLMESARTAN (BENICAR) 20 MG TABLET    TAKE 1 TABLET EVERY DAY    OXYCODONE (ROXICODONE) 5 MG IMMEDIATE RELEASE TABLET    Take 5 mg by mouth every 6 (six) hours as needed.    PROPAFENONE (RYTHMOL SR) 225 MG CP12    Take 1 capsule (225 mg total) by mouth every 12 (twelve) hours.    TRIAMTERENE-HYDROCHLOROTHIAZIDE 37.5-25 MG (DYAZIDE) 37.5-25 MG PER CAPSULE    Take 1 capsule by mouth every morning.   Modified Medications    No medications on file   Discontinued Medications    ATORVASTATIN (LIPITOR) 20 MG TABLET    Take 20 mg by mouth once daily.    MUPIROCIN (BACTROBAN) 2 % OINTMENT        NITROFURANTOIN, MACROCRYSTAL-MONOHYDRATE, (MACROBID) 100 MG CAPSULE           Review of Systems   Constitution: Negative for weakness, malaise/fatigue and weight gain.   HENT: Negative for hearing loss.    Eyes: Negative for visual disturbance.   Cardiovascular: Negative for chest pain, claudication, dyspnea on  "exertion, leg swelling, near-syncope, orthopnea, palpitations, paroxysmal nocturnal dyspnea and syncope.   Respiratory: Negative for cough, shortness of breath, sleep disturbances due to breathing, snoring and wheezing.    Endocrine: Negative for cold intolerance, heat intolerance, polydipsia, polyphagia and polyuria.   Hematologic/Lymphatic: Negative for bleeding problem. Does not bruise/bleed easily.   Skin: Negative for rash and suspicious lesions.   Musculoskeletal: Negative for arthritis, falls, joint pain, muscle weakness and myalgias.   Gastrointestinal: Negative for abdominal pain, change in bowel habit, constipation, diarrhea, heartburn, hematochezia, melena and nausea.   Genitourinary: Negative for hematuria and nocturia.   Neurological: Negative for excessive daytime sleepiness, dizziness, headaches, light-headedness and loss of balance.   Psychiatric/Behavioral: Negative for depression. The patient is not nervous/anxious.    Allergic/Immunologic: Negative for environmental allergies.       /70 (BP Location: Left arm, Patient Position: Sitting, BP Method: Large (Manual))   Pulse 68   Ht 5' 1" (1.549 m)   Wt 84.8 kg (186 lb 15.2 oz)   BMI 35.32 kg/m²     Objective:   Physical Exam   Constitutional: She is oriented to person, place, and time. She appears well-developed and well-nourished.        HENT:   Head: Normocephalic and atraumatic.   Mouth/Throat: Oropharynx is clear and moist.   Eyes: Conjunctivae and EOM are normal. Pupils are equal, round, and reactive to light. No scleral icterus.   Neck: Normal range of motion. Neck supple. No hepatojugular reflux and no JVD present. No tracheal deviation present. No thyromegaly present.   Cardiovascular: Normal rate, regular rhythm, normal heart sounds and intact distal pulses.  PMI is not displaced.    Pulses:       Carotid pulses are 2+ on the right side, and 2+ on the left side.       Radial pulses are 2+ on the right side, and 2+ on the left side. "        Dorsalis pedis pulses are 2+ on the right side, and 2+ on the left side.        Posterior tibial pulses are 2+ on the right side, and 2+ on the left side.   Pulmonary/Chest: Effort normal and breath sounds normal.   Abdominal: Soft. Bowel sounds are normal. She exhibits no distension and no mass. There is no hepatosplenomegaly. There is no tenderness.   Musculoskeletal: She exhibits no edema or tenderness.   Lymphadenopathy:     She has no cervical adenopathy.   Neurological: She is alert and oriented to person, place, and time.   Skin: Skin is warm and dry. No rash noted. No cyanosis or erythema. Nails show no clubbing.   Psychiatric: She has a normal mood and affect. Her speech is normal and behavior is normal.       Lab Results   Component Value Date     07/25/2018    K 4.2 07/25/2018     07/25/2018    CO2 28 07/25/2018    BUN 25 (H) 07/25/2018    CREATININE 1.5 (H) 07/25/2018     07/25/2018    HGBA1C 5.4 07/25/2018    AST 19 07/25/2018    ALT 21 07/25/2018    ALBUMIN 4.1 07/25/2018    PROT 7.3 07/25/2018    BILITOT 0.8 07/25/2018    WBC 5.28 07/25/2018    HGB 14.2 07/25/2018    HCT 42.4 07/25/2018    MCV 93 07/25/2018     07/25/2018    TSH 1.293 05/29/2017    CHOL 212 (H) 07/25/2018    HDL 57 07/25/2018    LDLCALC 131.8 07/25/2018    TRIG 116 07/25/2018       Assessment:     1. Paroxysmal atrial fibrillation : No recurrences for the past two years. Xarelto stopped and replaced with asa. Continue propafenone for rhythm control.   2. Benign essential hypertension : Blood pressure is at goal. I have made no changes. Continue current regimen.   3. Mixed hyperlipidemia : LDL above goal. Increase atorvastatin to 40 mg and repeat fasting labs in 6 weeks.   4. S/P placement of cardiac pacemaker    5. Sick sinus syndrome        Plan:     Jesenia was seen today for atrial fibrillation.    Diagnoses and all orders for this visit:    Paroxysmal atrial fibrillation  -     atorvastatin  (LIPITOR) 40 MG tablet; Take 1 tablet (40 mg total) by mouth once daily.  -     Lipid panel; Future  -     Comprehensive metabolic panel; Future    Benign essential hypertension  -     atorvastatin (LIPITOR) 40 MG tablet; Take 1 tablet (40 mg total) by mouth once daily.  -     Lipid panel; Future  -     Comprehensive metabolic panel; Future    Mixed hyperlipidemia  -     atorvastatin (LIPITOR) 40 MG tablet; Take 1 tablet (40 mg total) by mouth once daily.  -     Lipid panel; Future  -     Comprehensive metabolic panel; Future    S/P placement of cardiac pacemaker  -     atorvastatin (LIPITOR) 40 MG tablet; Take 1 tablet (40 mg total) by mouth once daily.  -     Lipid panel; Future  -     Comprehensive metabolic panel; Future    Sick sinus syndrome  -     atorvastatin (LIPITOR) 40 MG tablet; Take 1 tablet (40 mg total) by mouth once daily.  -     Lipid panel; Future  -     Comprehensive metabolic panel; Future        Thank you for allowing me to participate in this patient's care. Please do not hesitate to contact me with any questions or concerns.

## 2018-07-26 NOTE — TELEPHONE ENCOUNTER
Vitamin D  WNL  FLP at goal.   A1c at goal.   Calcium decreased to 10.6 and corrected = 10.5 near normal   Urine normal

## 2018-08-17 DIAGNOSIS — N18.9 CHRONIC KIDNEY DISEASE, UNSPECIFIED CKD STAGE: ICD-10-CM

## 2018-09-06 ENCOUNTER — LAB VISIT (OUTPATIENT)
Dept: LAB | Facility: HOSPITAL | Age: 68
End: 2018-09-06
Attending: INTERNAL MEDICINE
Payer: MEDICARE

## 2018-09-06 ENCOUNTER — PATIENT MESSAGE (OUTPATIENT)
Dept: INTERNAL MEDICINE | Facility: CLINIC | Age: 68
End: 2018-09-06

## 2018-09-06 DIAGNOSIS — I10 BENIGN ESSENTIAL HYPERTENSION: ICD-10-CM

## 2018-09-06 DIAGNOSIS — I48.0 PAROXYSMAL ATRIAL FIBRILLATION: ICD-10-CM

## 2018-09-06 DIAGNOSIS — E78.2 MIXED HYPERLIPIDEMIA: ICD-10-CM

## 2018-09-06 DIAGNOSIS — Z95.0 S/P PLACEMENT OF CARDIAC PACEMAKER: ICD-10-CM

## 2018-09-06 DIAGNOSIS — I49.5 SICK SINUS SYNDROME: ICD-10-CM

## 2018-09-06 LAB
ALBUMIN SERPL BCP-MCNC: 3.9 G/DL
ALP SERPL-CCNC: 62 U/L
ALT SERPL W/O P-5'-P-CCNC: 18 U/L
ANION GAP SERPL CALC-SCNC: 6 MMOL/L
AST SERPL-CCNC: 16 U/L
BILIRUB SERPL-MCNC: 1 MG/DL
BUN SERPL-MCNC: 34 MG/DL
CALCIUM SERPL-MCNC: 10.4 MG/DL
CHLORIDE SERPL-SCNC: 104 MMOL/L
CHOLEST SERPL-MCNC: 187 MG/DL
CHOLEST/HDLC SERPL: 3.9 {RATIO}
CO2 SERPL-SCNC: 29 MMOL/L
CREAT SERPL-MCNC: 1.4 MG/DL
EST. GFR  (AFRICAN AMERICAN): 45 ML/MIN/1.73 M^2
EST. GFR  (NON AFRICAN AMERICAN): 39 ML/MIN/1.73 M^2
GLUCOSE SERPL-MCNC: 119 MG/DL
HDLC SERPL-MCNC: 48 MG/DL
HDLC SERPL: 25.7 %
LDLC SERPL CALC-MCNC: 114.6 MG/DL
NONHDLC SERPL-MCNC: 139 MG/DL
POTASSIUM SERPL-SCNC: 3.9 MMOL/L
PROT SERPL-MCNC: 6.8 G/DL
SODIUM SERPL-SCNC: 139 MMOL/L
TRIGL SERPL-MCNC: 122 MG/DL

## 2018-09-06 PROCEDURE — 80061 LIPID PANEL: CPT

## 2018-09-06 PROCEDURE — 80053 COMPREHEN METABOLIC PANEL: CPT

## 2018-09-06 PROCEDURE — 36415 COLL VENOUS BLD VENIPUNCTURE: CPT

## 2018-09-06 RX ORDER — TRIAMTERENE AND HYDROCHLOROTHIAZIDE 37.5; 25 MG/1; MG/1
1 CAPSULE ORAL EVERY MORNING
Qty: 90 CAPSULE | Refills: 1 | Status: SHIPPED | OUTPATIENT
Start: 2018-09-06 | End: 2019-02-26 | Stop reason: SDUPTHER

## 2018-09-07 ENCOUNTER — TELEPHONE (OUTPATIENT)
Dept: CARDIOLOGY | Facility: CLINIC | Age: 68
End: 2018-09-07

## 2018-09-07 ENCOUNTER — PATIENT MESSAGE (OUTPATIENT)
Dept: CARDIOLOGY | Facility: CLINIC | Age: 68
End: 2018-09-07

## 2018-09-07 NOTE — TELEPHONE ENCOUNTER
----- Message from Verna Hodgson MD sent at 9/7/2018 11:01 AM CDT -----  Her lipids have improved, but LDL is > 100. Recommend increasing the atorvastatin to 80 mg daily if she has been tolerating the 40 mg dose.

## 2018-11-01 ENCOUNTER — PATIENT MESSAGE (OUTPATIENT)
Dept: INTERNAL MEDICINE | Facility: CLINIC | Age: 68
End: 2018-11-01

## 2018-11-05 ENCOUNTER — CLINICAL SUPPORT (OUTPATIENT)
Dept: CARDIOLOGY | Facility: HOSPITAL | Age: 68
End: 2018-11-05
Attending: INTERNAL MEDICINE
Payer: MEDICARE

## 2018-11-05 DIAGNOSIS — Z95.0 CARDIAC PACEMAKER IN SITU: ICD-10-CM

## 2018-11-05 PROCEDURE — 93296 REM INTERROG EVL PM/IDS: CPT

## 2018-11-07 ENCOUNTER — LAB VISIT (OUTPATIENT)
Dept: LAB | Facility: HOSPITAL | Age: 68
End: 2018-11-07
Attending: INTERNAL MEDICINE
Payer: MEDICARE

## 2018-11-07 DIAGNOSIS — Z12.11 COLON CANCER SCREENING: ICD-10-CM

## 2018-11-07 PROCEDURE — 82274 ASSAY TEST FOR BLOOD FECAL: CPT

## 2018-11-08 DIAGNOSIS — Z95.0 CARDIAC PACEMAKER IN SITU: Primary | ICD-10-CM

## 2018-11-08 LAB — HEMOCCULT STL QL IA: NEGATIVE

## 2018-11-12 ENCOUNTER — HOSPITAL ENCOUNTER (OUTPATIENT)
Dept: RADIOLOGY | Facility: CLINIC | Age: 68
Discharge: HOME | End: 2018-11-12
Attending: NURSE PRACTITIONER
Payer: MEDICARE

## 2018-11-12 DIAGNOSIS — M85.852 OSTEOPENIA OF LEFT THIGH: ICD-10-CM

## 2018-11-12 PROCEDURE — 77080 DXA BONE DENSITY AXIAL: CPT | Mod: TC

## 2018-11-12 PROCEDURE — 77080 DXA BONE DENSITY AXIAL: CPT | Mod: 26,,, | Performed by: INTERNAL MEDICINE

## 2018-11-19 ENCOUNTER — TELEPHONE (OUTPATIENT)
Dept: INTERNAL MEDICINE | Facility: CLINIC | Age: 68
End: 2018-11-19

## 2018-11-19 NOTE — TELEPHONE ENCOUNTER
----- Message from Rita Bee MD sent at 11/19/2018 10:29 AM CST -----  DEXA with osteopenia (borderline between normal bone and osteoporosis).  I recommend over-the-counter calcium and Vitamin D supplement (such as caltrate twice a day) and weight-bearing exercises as tolerated (running, doing squats or jumping jacks, etc).  We recommend routine f/u c PCP and repeat DEXA in about 2 years.  MA to notify pt of results.

## 2018-11-24 DIAGNOSIS — I10 BENIGN ESSENTIAL HYPERTENSION: ICD-10-CM

## 2018-11-26 RX ORDER — OLMESARTAN MEDOXOMIL 20 MG/1
TABLET ORAL
Qty: 90 TABLET | Refills: 3 | Status: SHIPPED | OUTPATIENT
Start: 2018-11-26 | End: 2019-11-21 | Stop reason: SDUPTHER

## 2018-11-30 DIAGNOSIS — N18.9 CHRONIC KIDNEY DISEASE, UNSPECIFIED CKD STAGE: ICD-10-CM

## 2018-12-13 DIAGNOSIS — I10 BENIGN ESSENTIAL HYPERTENSION: ICD-10-CM

## 2018-12-13 RX ORDER — BISOPROLOL FUMARATE 5 MG/1
5 TABLET, FILM COATED ORAL DAILY
Qty: 90 TABLET | Refills: 2 | Status: SHIPPED | OUTPATIENT
Start: 2018-12-13 | End: 2019-06-17 | Stop reason: SDUPTHER

## 2019-01-03 PROCEDURE — 93294 REM INTERROG EVL PM/LDLS PM: CPT | Mod: ,,, | Performed by: INTERNAL MEDICINE

## 2019-01-31 ENCOUNTER — EXTERNAL CHRONIC CARE MANAGEMENT (OUTPATIENT)
Dept: PRIMARY CARE CLINIC | Facility: CLINIC | Age: 69
End: 2019-01-31
Payer: MEDICARE

## 2019-01-31 PROCEDURE — 99490 CHRNC CARE MGMT STAFF 1ST 20: CPT | Mod: S$PBB,,, | Performed by: INTERNAL MEDICINE

## 2019-01-31 PROCEDURE — 99490 CHRNC CARE MGMT STAFF 1ST 20: CPT | Mod: PBBFAC | Performed by: INTERNAL MEDICINE

## 2019-01-31 PROCEDURE — 99490 PR CHRONIC CARE MGMT, 1ST 20 MIN: ICD-10-PCS | Mod: S$PBB,,, | Performed by: INTERNAL MEDICINE

## 2019-02-12 ENCOUNTER — CLINICAL SUPPORT (OUTPATIENT)
Dept: CARDIOLOGY | Facility: HOSPITAL | Age: 69
End: 2019-02-12
Attending: INTERNAL MEDICINE
Payer: MEDICARE

## 2019-02-12 DIAGNOSIS — Z95.0 CARDIAC PACEMAKER IN SITU: ICD-10-CM

## 2019-02-12 PROCEDURE — 93296 REM INTERROG EVL PM/IDS: CPT

## 2019-02-13 ENCOUNTER — OFFICE VISIT (OUTPATIENT)
Dept: OPHTHALMOLOGY | Facility: CLINIC | Age: 69
End: 2019-02-13
Payer: MEDICARE

## 2019-02-13 ENCOUNTER — TELEPHONE (OUTPATIENT)
Dept: OPHTHALMOLOGY | Facility: CLINIC | Age: 69
End: 2019-02-13

## 2019-02-13 DIAGNOSIS — H25.13 NUCLEAR SCLEROSIS, BILATERAL: Primary | ICD-10-CM

## 2019-02-13 PROCEDURE — 99999 PR PBB SHADOW E&M-EST. PATIENT-LVL II: CPT | Mod: PBBFAC,,, | Performed by: OPHTHALMOLOGY

## 2019-02-13 PROCEDURE — 99999 PR PBB SHADOW E&M-EST. PATIENT-LVL II: ICD-10-PCS | Mod: PBBFAC,,, | Performed by: OPHTHALMOLOGY

## 2019-02-13 PROCEDURE — 99212 OFFICE O/P EST SF 10 MIN: CPT | Mod: PBBFAC | Performed by: OPHTHALMOLOGY

## 2019-02-13 PROCEDURE — 92004 PR EYE EXAM, NEW PATIENT,COMPREHESV: ICD-10-PCS | Mod: S$PBB,,, | Performed by: OPHTHALMOLOGY

## 2019-02-13 PROCEDURE — 92004 COMPRE OPH EXAM NEW PT 1/>: CPT | Mod: S$PBB,,, | Performed by: OPHTHALMOLOGY

## 2019-02-13 RX ORDER — PREDNISOLONE ACETATE 10 MG/ML
1 SUSPENSION/ DROPS OPHTHALMIC 4 TIMES DAILY
Qty: 10 ML | Refills: 4 | Status: SHIPPED | OUTPATIENT
Start: 2019-02-13 | End: 2019-02-13 | Stop reason: SDUPTHER

## 2019-02-13 RX ORDER — TOBRAMYCIN 3 MG/ML
1 SOLUTION/ DROPS OPHTHALMIC 4 TIMES DAILY
Qty: 5 ML | Refills: 3 | Status: SHIPPED | OUTPATIENT
Start: 2019-02-13 | End: 2019-02-23

## 2019-02-13 RX ORDER — TROPICAMIDE 10 MG/ML
1 SOLUTION/ DROPS OPHTHALMIC
Status: CANCELLED | OUTPATIENT
Start: 2019-02-13

## 2019-02-13 RX ORDER — TETRACAINE HYDROCHLORIDE 5 MG/ML
1 SOLUTION OPHTHALMIC
Status: CANCELLED | OUTPATIENT
Start: 2019-02-13

## 2019-02-13 RX ORDER — PHENYLEPHRINE HYDROCHLORIDE 25 MG/ML
1 SOLUTION/ DROPS OPHTHALMIC
Status: CANCELLED | OUTPATIENT
Start: 2019-02-13

## 2019-02-13 RX ORDER — TOBRAMYCIN 3 MG/ML
1 SOLUTION/ DROPS OPHTHALMIC 4 TIMES DAILY
Qty: 5 ML | Refills: 3 | Status: SHIPPED | OUTPATIENT
Start: 2019-02-13 | End: 2019-02-13 | Stop reason: SDUPTHER

## 2019-02-13 RX ORDER — MOXIFLOXACIN 5 MG/ML
1 SOLUTION/ DROPS OPHTHALMIC
Status: CANCELLED | OUTPATIENT
Start: 2019-02-13

## 2019-02-13 RX ORDER — PREDNISOLONE ACETATE 10 MG/ML
1 SUSPENSION/ DROPS OPHTHALMIC 4 TIMES DAILY
Qty: 10 ML | Refills: 4 | Status: SHIPPED | OUTPATIENT
Start: 2019-02-13 | End: 2019-08-12

## 2019-02-13 NOTE — PROGRESS NOTES
HPI     Self referral    Patient states she has known about her cataracts for about 5 years. She   states she recently went to a local optom and was told she has mature   cataracts. Right worse than left. Patient states she is having increasing   glare a halos at night. (-)flashes/floaters.     Eye meds:  Quita GRANDEN OU    Last edited by Alexia Macias on 2/13/2019  8:29 AM. (History)            Assessment /Plan     For exam results, see Encounter Report.    Nuclear sclerosis, bilateral      Visually Significant Cataract: Patient reports decreased vision consistent with the clinical amount of lenticular opacity, which reaches the level of visual significance and affects activities of daily living. Risks, benefits, and alternatives to cataract surgery were discussed and the consent reviewed. IOL options were discussed, including ATIOLs and the associated side effects and additional patient cost associated with them.   IOL Selections:   Right eye  IOL: [id number] vs 225 25.5 at 96      Left eye  IOL: [id number] vs 300 32.0 at 97 (also order 31.5 and 32.5) (HOLD for now)    Pt wishes to have right eye done first.  ALLERGIC to Quinalones, so use Tobramycin and Pred Forte    The patient expresses a desire to reduce spectacle dependence. I reviewed various IOL and LASER refractive surgical options and we will attempt to minimize spectacle dependence by managing astigmatism and optimizing IOL selection. Femtosecond LASER assisted cataract surgery (FLACS) technology was explained to the patient with educational videos and discussion.  The patient voices understanding and wishes to implement this technology during the cataract procedure.  I explained the increased precision of the LASER versus manual techniques, especially as it relates to astigmatism reduction with arcuate incisions.  I emphasized that although our goal is to reduce the need for refractive correction after surgery, there may still be a need for spectacle  correction to achieve optimal visual acuity, and that a reasonable range of functional vision should be the expectation.  No guarantees are made about post operative refraction or visual acuity, as the eye may heal in unpredictable ways, and the standard risks, benefits, and alternatives to cataract surgery were explained.  The patient understands that the refractive portions of this cataract procedure are not covered by insurance, and that there is an out of pocket expense of $1500 per eye. I also explained that even though our pre-operative plan is to utilize advanced refractive technologies during surgery, that I may decide to eliminate part or all of this plan if surgical challenges or complications arise, or I feel that it is not in the patient's best interest. Consent forms and an ABN form were given to the patient to review.    Catalys Parameters:  Right Eye:   JESSY:  12mm   ?Need to sony patient sitting up?: Yes  Capsulotomy: Scanned Capsule   stGstrstastdstest:st st1st Arcuate:  Toric Sony: at  Axis: 96   Incisions: OFF  Left Eye:   JESSY:  12mm   ?Need to sony patient sitting up?: Yes  Capsulotomy: Scanned Capsule  stGstrstastdstest:st st1st Arcuate: Toric Sony: at  Axis: 97   Incisions:  OFF

## 2019-02-14 ENCOUNTER — TELEPHONE (OUTPATIENT)
Dept: OPHTHALMOLOGY | Facility: CLINIC | Age: 69
End: 2019-02-14

## 2019-02-14 DIAGNOSIS — H25.12 NUCLEAR SCLEROTIC CATARACT OF LEFT EYE: Primary | ICD-10-CM

## 2019-02-14 DIAGNOSIS — H25.11 NUCLEAR SCLEROTIC CATARACT OF RIGHT EYE: ICD-10-CM

## 2019-02-26 DIAGNOSIS — I10 BENIGN ESSENTIAL HYPERTENSION: ICD-10-CM

## 2019-02-27 RX ORDER — TRIAMTERENE AND HYDROCHLOROTHIAZIDE 37.5; 25 MG/1; MG/1
CAPSULE ORAL
Qty: 90 CAPSULE | Refills: 3 | Status: SHIPPED | OUTPATIENT
Start: 2019-02-27 | End: 2020-03-02

## 2019-03-28 ENCOUNTER — LAB VISIT (OUTPATIENT)
Dept: LAB | Facility: HOSPITAL | Age: 69
End: 2019-03-28
Attending: INTERNAL MEDICINE
Payer: MEDICARE

## 2019-03-28 ENCOUNTER — OFFICE VISIT (OUTPATIENT)
Dept: INTERNAL MEDICINE | Facility: CLINIC | Age: 69
End: 2019-03-28
Payer: MEDICARE

## 2019-03-28 VITALS
HEART RATE: 65 BPM | WEIGHT: 189.81 LBS | HEIGHT: 61 IN | DIASTOLIC BLOOD PRESSURE: 82 MMHG | TEMPERATURE: 98 F | BODY MASS INDEX: 35.84 KG/M2 | SYSTOLIC BLOOD PRESSURE: 116 MMHG

## 2019-03-28 DIAGNOSIS — Z00.00 ANNUAL PHYSICAL EXAM: Primary | ICD-10-CM

## 2019-03-28 DIAGNOSIS — E78.2 MIXED HYPERLIPIDEMIA: ICD-10-CM

## 2019-03-28 DIAGNOSIS — E66.01 SEVERE OBESITY (BMI 35.0-39.9) WITH COMORBIDITY: ICD-10-CM

## 2019-03-28 DIAGNOSIS — N18.30 CKD (CHRONIC KIDNEY DISEASE) STAGE 3, GFR 30-59 ML/MIN: ICD-10-CM

## 2019-03-28 DIAGNOSIS — I48.0 PAROXYSMAL ATRIAL FIBRILLATION: ICD-10-CM

## 2019-03-28 DIAGNOSIS — Z95.0 S/P PLACEMENT OF CARDIAC PACEMAKER: ICD-10-CM

## 2019-03-28 DIAGNOSIS — I10 BENIGN ESSENTIAL HYPERTENSION: ICD-10-CM

## 2019-03-28 DIAGNOSIS — I49.5 SSS (SICK SINUS SYNDROME): ICD-10-CM

## 2019-03-28 DIAGNOSIS — M85.852 OSTEOPENIA OF LEFT THIGH: ICD-10-CM

## 2019-03-28 PROBLEM — R73.03 PRE-DIABETES: Status: RESOLVED | Noted: 2017-06-03 | Resolved: 2019-03-28

## 2019-03-28 LAB
ALBUMIN SERPL BCP-MCNC: 4 G/DL (ref 3.5–5.2)
ALP SERPL-CCNC: 69 U/L (ref 55–135)
ALT SERPL W/O P-5'-P-CCNC: 21 U/L (ref 10–44)
ANION GAP SERPL CALC-SCNC: 10 MMOL/L (ref 8–16)
AST SERPL-CCNC: 23 U/L (ref 10–40)
BASOPHILS # BLD AUTO: 0.03 K/UL (ref 0–0.2)
BASOPHILS NFR BLD: 0.6 % (ref 0–1.9)
BILIRUB SERPL-MCNC: 0.7 MG/DL (ref 0.1–1)
BUN SERPL-MCNC: 29 MG/DL (ref 8–23)
CALCIUM SERPL-MCNC: 10.5 MG/DL (ref 8.7–10.5)
CHLORIDE SERPL-SCNC: 102 MMOL/L (ref 95–110)
CHOLEST SERPL-MCNC: 204 MG/DL (ref 120–199)
CHOLEST/HDLC SERPL: 3.9 {RATIO} (ref 2–5)
CO2 SERPL-SCNC: 26 MMOL/L (ref 23–29)
CREAT SERPL-MCNC: 1.4 MG/DL (ref 0.5–1.4)
DIFFERENTIAL METHOD: ABNORMAL
EOSINOPHIL # BLD AUTO: 0.2 K/UL (ref 0–0.5)
EOSINOPHIL NFR BLD: 3 % (ref 0–8)
ERYTHROCYTE [DISTWIDTH] IN BLOOD BY AUTOMATED COUNT: 13.3 % (ref 11.5–14.5)
EST. GFR  (AFRICAN AMERICAN): 44 ML/MIN/1.73 M^2
EST. GFR  (NON AFRICAN AMERICAN): 38 ML/MIN/1.73 M^2
GLUCOSE SERPL-MCNC: 117 MG/DL (ref 70–110)
HCT VFR BLD AUTO: 42.2 % (ref 37–48.5)
HDLC SERPL-MCNC: 52 MG/DL (ref 40–75)
HDLC SERPL: 25.5 % (ref 20–50)
HGB BLD-MCNC: 13.8 G/DL (ref 12–16)
LDLC SERPL CALC-MCNC: 127.4 MG/DL (ref 63–159)
LYMPHOCYTES # BLD AUTO: 1.5 K/UL (ref 1–4.8)
LYMPHOCYTES NFR BLD: 27.4 % (ref 18–48)
MCH RBC QN AUTO: 31.4 PG (ref 27–31)
MCHC RBC AUTO-ENTMCNC: 32.7 G/DL (ref 32–36)
MCV RBC AUTO: 96 FL (ref 82–98)
MONOCYTES # BLD AUTO: 0.4 K/UL (ref 0.3–1)
MONOCYTES NFR BLD: 8.1 % (ref 4–15)
NEUTROPHILS # BLD AUTO: 3.2 K/UL (ref 1.8–7.7)
NEUTROPHILS NFR BLD: 60.7 % (ref 38–73)
NONHDLC SERPL-MCNC: 152 MG/DL
PLATELET # BLD AUTO: 227 K/UL (ref 150–350)
PMV BLD AUTO: 9.4 FL (ref 9.2–12.9)
POTASSIUM SERPL-SCNC: 4.3 MMOL/L (ref 3.5–5.1)
PROT SERPL-MCNC: 7.3 G/DL (ref 6–8.4)
RBC # BLD AUTO: 4.4 M/UL (ref 4–5.4)
SODIUM SERPL-SCNC: 138 MMOL/L (ref 136–145)
TRIGL SERPL-MCNC: 123 MG/DL (ref 30–150)
TSH SERPL DL<=0.005 MIU/L-ACNC: 1.11 UIU/ML (ref 0.4–4)
WBC # BLD AUTO: 5.29 K/UL (ref 3.9–12.7)

## 2019-03-28 PROCEDURE — 80061 LIPID PANEL: CPT

## 2019-03-28 PROCEDURE — 36415 COLL VENOUS BLD VENIPUNCTURE: CPT

## 2019-03-28 PROCEDURE — 99397 PR PREVENTIVE VISIT,EST,65 & OVER: ICD-10-PCS | Mod: S$PBB,,, | Performed by: INTERNAL MEDICINE

## 2019-03-28 PROCEDURE — 84443 ASSAY THYROID STIM HORMONE: CPT

## 2019-03-28 PROCEDURE — 99397 PER PM REEVAL EST PAT 65+ YR: CPT | Mod: S$PBB,,, | Performed by: INTERNAL MEDICINE

## 2019-03-28 PROCEDURE — 99999 PR PBB SHADOW E&M-EST. PATIENT-LVL III: ICD-10-PCS | Mod: PBBFAC,,, | Performed by: INTERNAL MEDICINE

## 2019-03-28 PROCEDURE — 80053 COMPREHEN METABOLIC PANEL: CPT

## 2019-03-28 PROCEDURE — 99213 OFFICE O/P EST LOW 20 MIN: CPT | Mod: PBBFAC | Performed by: INTERNAL MEDICINE

## 2019-03-28 PROCEDURE — 85025 COMPLETE CBC W/AUTO DIFF WBC: CPT

## 2019-03-28 PROCEDURE — 99999 PR PBB SHADOW E&M-EST. PATIENT-LVL III: CPT | Mod: PBBFAC,,, | Performed by: INTERNAL MEDICINE

## 2019-03-28 NOTE — PROGRESS NOTES
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    HTN - amlodipine 5mg daily, triamterene-hctz 37.5-25mg daily, olmesartan 20mg daily, bisoprolol 5mg daily.     SSS s/p PM (not AICD). Remote pacemaker interrogation w/ Dr. Hodgson.   Afib - on propafenone SR 225mg bid and previously on xarelto. Follows w/ Dr. JOSE Hodgson - last seen 18. Stopped on Xarelto and started on asa 81mg daily as she has almost no AF on interrogation.     HLD - atorva 40mg daily, increased from 20mg daily 18 by Dr. CANDICE Hodgson.  .8 18-->114.6 18. Recommended inc to 80mg daily. Pt reports still on 40mg daily.      IGT 2017 6.1 but a1c of 5.4 18     Obesity - on belviq 10mg BID.     Osteopenia - DEXA 18    CKD3 - Cr baseline 1.4-1.5.    Last fall was 2018 where she broke her finger and also w/ hematomas. Therefore stopped on xarelto.    Past Medical History:  Past Medical History:   Diagnosis Date    Allergy     Anemia     Atrial fibrillation 2017    CKD (chronic kidney disease) stage 3, GFR 30-59 ml/min 2017    Disorder of kidney and ureter     GERD (gastroesophageal reflux disease)     Hyperlipidemia     Hypertension     Pre-diabetes 6/3/2017       Past Surgical History:  Past Surgical History:   Procedure Laterality Date    BACK SURGERY      lamenectomy     SECTION      ,     HYSTERECTOMY      INSERT / REPLACE / REMOVE PACEMAKER      placement    INSERT / REPLACE / REMOVE PACEMAKER  2014    [employer] Model #[id number] replacement    pace maker      replacement     salpingo opherectomy Bilateral 1998    TONSILLECTOMY         Allergies:  Review of patient's allergies indicates:   Allergen Reactions    Iodinated contrast- oral and iv dye Anaphylaxis    Penicillins Anaphylaxis    Ciprofloxacin Rash    Sulfa (sulfonamide antibiotics) Rash     Tetracyclines Rash       Home Medications:  Prior to Admission medications    Medication Sig Start Date End Date Taking? Authorizing Provider   amLODIPine (NORVASC) 5 MG tablet Take 1 tablet (5 mg total) by mouth once daily. 7/3/18   Denice Lara MD   aspirin (ECOTRIN) 81 MG EC tablet Take 1 tablet (81 mg total) by mouth once daily. 7/23/18   Trell Hodgson MD   atorvastatin (LIPITOR) 40 MG tablet Take 1 tablet (40 mg total) by mouth once daily. 7/26/18 7/26/19  Verna Hodgson MD   BELVIQ 10 mg Tab TAKE 10 MG BY MOUTH 2 (TWO) TIMES DAILY. 1/17/19   Sandra Michaud MD   bisoprolol (ZEBETA) 5 MG tablet TAKE 1 TABLET (5 MG TOTAL) BY MOUTH ONCE DAILY. 12/13/18   Sandra Michaud MD   clobetasol (OLUX) 0.05 % Foam Apply 1 application topically every 30 days.     Historical Provider, MD   ERGOCALCIFEROL, VITAMIN D2, (VITAMIN D ORAL) Take 2,000 Units by mouth once daily.    Historical Provider, MD   FLUZONE HIGH-DOSE 2018-19, PF, 180 mcg/0.5 mL vaccine  11/9/18   Historical Provider, MD   olmesartan (BENICAR) 20 MG tablet TAKE 1 TABLET EVERY DAY 11/26/18   Sandra Michaud MD   prednisoLONE acetate (PRED FORTE) 1 % DrpS Place 1 drop into the right eye 4 (four) times daily. 2/13/19   Raisa Moreno MD   propafenone (RYTHMOL SR) 225 MG Cp12 Take 1 capsule (225 mg total) by mouth every 12 (twelve) hours. 6/5/18   Trell Hodgson MD   triamterene-hydrochlorothiazide 37.5-25 mg (DYAZIDE) 37.5-25 mg per capsule TAKE 1 CAPSULE BY MOUTH EVERY DAY IN THE MORNING 2/27/19   Sandra Michaud MD       Family History:  Family History   Problem Relation Age of Onset    Hypertension Mother     Diabetes Mother     Hyperlipidemia Mother     Coronary artery disease Mother     Heart disease Mother     Stroke Mother     Hypertension Father     Diabetes Father     Hyperlipidemia Father     Coronary artery disease Father     Heart disease Father     Coronary artery disease Brother     Heart disease Brother     Hyperthyroidism Brother     Diabetes  "Paternal Grandmother     Diabetes Paternal Grandfather     Heart attack Neg Hx        Social History:  Social History     Tobacco Use    Smoking status: Never Smoker    Smokeless tobacco: Never Used   Substance Use Topics    Alcohol use: Yes     Alcohol/week: 3.6 oz     Types: 6 Glasses of wine per week     Comment: occassionally    Drug use: No       Review of Systems:  Review of Systems Comprehensive review of systems otherwise negative. See history/subjective section for more details.    Health Maintainence:   Td - uncertain.   Declines MMG.   FITKIT 11/2018 neg.   DEXA 11/2018.   Hep C 2011.   Shingles 11/2013  Prevnar 2015 Pneumovax 2016    PHYSICAL EXAM     /82 (BP Location: Left arm, Patient Position: Sitting, BP Method: Large (Manual))   Pulse 65   Temp 97.6 °F (36.4 °C)   Ht 5' 1" (1.549 m)   Wt 86.1 kg (189 lb 13.1 oz)   BMI 35.87 kg/m²     GEN - A+OX4, NAD   HEENT - PERRL, EOMI, OP clear. MMM. TM normal.   Neck - No thyromegaly or cervical LAD. No thyroid masses felt.  CV - RRR, no m/r   Chest - CTAB, no wheezing or rhonchi  Abd - S/NT/ND/+BS.   Ext - 2+BDP and radial pulses. No LE edema.   Neuro - PERRL, EOMI, no nystagmus, eyebrow raise, facial sensation, hearing, m of mastication, smile, palatal raise, shoulder shrug, tongue protrusion symmetric and intact. 5/5 BUE and BLE strength. Sensation to light touch intact throughout. 2+ DTRs. Normal gait.   MSK - No spinal tenderness to palpation. Normal gait.   Skin - No rash.    LABS     Previous labs reviewed.    ASSESSMENT/PLAN     Jesenia Curiel is a 68 y.o. female with  Jesenia was seen today for follow-up.    Diagnoses and all orders for this visit:    Annual - reports UTD on Td. Declines MMG. Otherwise UTD.    Benign essential hypertension - Stable and controlled. Continue current medications.  -     Comprehensive metabolic panel; Future; Expected date: 03/28/2019  -     TSH; Future; Expected date: 03/28/2019    SSS (sick sinus " syndrome) S/P placement of cardiac pacemaker - f/u w/ Dr. Hodgson.    Mixed hyperlipidemia - on atorva 40.   -     Comprehensive metabolic panel; Future; Expected date: 03/28/2019  -     Lipid panel; Future; Expected date: 03/28/2019    Paroxysmal atrial fibrillation - asa 81 and bisoprolol.     CKD (chronic kidney disease) stage 3, GFR 30-59 ml/min - stable.   -     CBC auto differential; Future; Expected date: 03/28/2019  -     Comprehensive metabolic panel; Future; Expected date: 03/28/2019    Osteopenia of left thigh - no calcium due to hyperCa. Vit D. Cont exercise.     Severe obesity (BMI 35.87) with comorbidity - on belviq - helps and w/o issues. Cont diet and exercise.   -     CBC auto differential; Future; Expected date: 03/28/2019      RTC in 12 months, sooner if needed and depending on labs.    Sandra Michaud MD  [facility]  6:46 AM

## 2019-03-29 ENCOUNTER — TELEPHONE (OUTPATIENT)
Dept: INTERNAL MEDICINE | Facility: CLINIC | Age: 69
End: 2019-03-29

## 2019-03-29 DIAGNOSIS — E78.5 HYPERLIPIDEMIA, UNSPECIFIED HYPERLIPIDEMIA TYPE: Primary | ICD-10-CM

## 2019-03-29 RX ORDER — ATORVASTATIN CALCIUM 80 MG/1
80 TABLET, FILM COATED ORAL DAILY
Qty: 90 TABLET | Refills: 3 | Status: SHIPPED | OUTPATIENT
Start: 2019-03-29 | End: 2020-03-20 | Stop reason: SDUPTHER

## 2019-03-29 NOTE — TELEPHONE ENCOUNTER
Spoke with pt, notified of results. Pt verbalized understanding  Pt would like to increase medication to 80mg. Please send 80mg

## 2019-03-29 NOTE — TELEPHONE ENCOUNTER
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 Cholesterol is still high, so I recommend increasing atorvastatin from 40 to 80mg daily as the total cholesterol is still high. Kidney function is unchanged at chronic kidney disease stage 3. Let me know if she wants me to send in the 80mg tabs or if she wants to take 2 of the 40mg.

## 2019-04-05 ENCOUNTER — PES CALL (OUTPATIENT)
Dept: ADMINISTRATIVE | Facility: CLINIC | Age: 69
End: 2019-04-05

## 2019-05-27 ENCOUNTER — TELEPHONE (OUTPATIENT)
Dept: OPTOMETRY | Facility: CLINIC | Age: 69
End: 2019-05-27

## 2019-05-28 ENCOUNTER — CLINICAL SUPPORT (OUTPATIENT)
Dept: CARDIOLOGY | Facility: HOSPITAL | Age: 69
End: 2019-05-28
Attending: INTERNAL MEDICINE
Payer: MEDICARE

## 2019-05-28 DIAGNOSIS — Z95.0 CARDIAC PACEMAKER IN SITU: ICD-10-CM

## 2019-06-03 ENCOUNTER — TELEPHONE (OUTPATIENT)
Dept: OPHTHALMOLOGY | Facility: CLINIC | Age: 69
End: 2019-06-03

## 2019-06-03 ENCOUNTER — ANESTHESIA EVENT (OUTPATIENT)
Dept: SURGERY | Facility: OTHER | Age: 69
End: 2019-06-03
Payer: MEDICARE

## 2019-06-03 ENCOUNTER — HOSPITAL ENCOUNTER (OUTPATIENT)
Facility: OTHER | Age: 69
Discharge: HOME | End: 2019-06-03
Attending: OPHTHALMOLOGY | Admitting: OPHTHALMOLOGY
Payer: MEDICARE

## 2019-06-03 ENCOUNTER — ANESTHESIA (OUTPATIENT)
Dept: SURGERY | Facility: OTHER | Age: 69
End: 2019-06-03
Payer: MEDICARE

## 2019-06-03 VITALS
SYSTOLIC BLOOD PRESSURE: 100 MMHG | RESPIRATION RATE: 16 BRPM | WEIGHT: 185 LBS | TEMPERATURE: 98 F | BODY MASS INDEX: 34.04 KG/M2 | DIASTOLIC BLOOD PRESSURE: 55 MMHG | HEIGHT: 62 IN | OXYGEN SATURATION: 95 % | HEART RATE: 60 BPM

## 2019-06-03 DIAGNOSIS — H25.13 NUCLEAR SCLEROSIS, BILATERAL: ICD-10-CM

## 2019-06-03 PROCEDURE — 63600175 PHARM REV CODE 636 W HCPCS: Performed by: NURSE ANESTHETIST, CERTIFIED REGISTERED

## 2019-06-03 PROCEDURE — 37000008 HC ANESTHESIA 1ST 15 MINUTES: Performed by: OPHTHALMOLOGY

## 2019-06-03 PROCEDURE — 37000009 HC ANESTHESIA EA ADD 15 MINS: Performed by: OPHTHALMOLOGY

## 2019-06-03 PROCEDURE — 36000706: Performed by: OPHTHALMOLOGY

## 2019-06-03 PROCEDURE — V2787 ASTIGMATISM-CORRECT FUNCTION: HCPCS | Performed by: OPHTHALMOLOGY

## 2019-06-03 PROCEDURE — 66984 PR REMOVAL, CATARACT, W/INSRT INTRAOC LENS, W/O ENDO CYCLO: ICD-10-PCS | Mod: RT,,, | Performed by: OPHTHALMOLOGY

## 2019-06-03 PROCEDURE — 66999 UNLISTED PX ANT SEGMENT EYE: CPT | Mod: CSM,RT,, | Performed by: OPHTHALMOLOGY

## 2019-06-03 PROCEDURE — 25000003 PHARM REV CODE 250: Performed by: OPHTHALMOLOGY

## 2019-06-03 PROCEDURE — 71000015 HC POSTOP RECOV 1ST HR: Performed by: OPHTHALMOLOGY

## 2019-06-03 PROCEDURE — 66984 XCAPSL CTRC RMVL W/O ECP: CPT | Mod: RT,,, | Performed by: OPHTHALMOLOGY

## 2019-06-03 PROCEDURE — 36000707: Performed by: OPHTHALMOLOGY

## 2019-06-03 PROCEDURE — 66999 PR FEMTO TORIC: ICD-10-PCS | Mod: CSM,RT,, | Performed by: OPHTHALMOLOGY

## 2019-06-03 DEVICE — IMPLANTABLE DEVICE: Type: IMPLANTABLE DEVICE | Site: EYE | Status: FUNCTIONAL

## 2019-06-03 RX ORDER — ACETAMINOPHEN 325 MG/1
650 TABLET ORAL EVERY 4 HOURS PRN
Status: DISCONTINUED | OUTPATIENT
Start: 2019-06-03 | End: 2019-06-03 | Stop reason: HOSPADM

## 2019-06-03 RX ORDER — LIDOCAINE HYDROCHLORIDE 40 MG/ML
INJECTION, SOLUTION RETROBULBAR
Status: DISCONTINUED | OUTPATIENT
Start: 2019-06-03 | End: 2019-06-03 | Stop reason: HOSPADM

## 2019-06-03 RX ORDER — PHENYLEPHRINE HYDROCHLORIDE 25 MG/ML
1 SOLUTION/ DROPS OPHTHALMIC
Status: COMPLETED | OUTPATIENT
Start: 2019-06-03 | End: 2019-06-03

## 2019-06-03 RX ORDER — MOXIFLOXACIN 5 MG/ML
1 SOLUTION/ DROPS OPHTHALMIC
Status: DISCONTINUED | OUTPATIENT
Start: 2019-06-03 | End: 2019-06-03

## 2019-06-03 RX ORDER — PHENYLEPHRINE HYDROCHLORIDE 100 MG/ML
1 SOLUTION/ DROPS OPHTHALMIC
Status: DISCONTINUED | OUTPATIENT
Start: 2019-06-03 | End: 2019-06-03 | Stop reason: HOSPADM

## 2019-06-03 RX ORDER — TETRACAINE HYDROCHLORIDE 5 MG/ML
1 SOLUTION OPHTHALMIC ONCE
Status: COMPLETED | OUTPATIENT
Start: 2019-06-03 | End: 2019-06-03

## 2019-06-03 RX ORDER — MIDAZOLAM HYDROCHLORIDE 1 MG/ML
INJECTION INTRAMUSCULAR; INTRAVENOUS
Status: DISCONTINUED | OUTPATIENT
Start: 2019-06-03 | End: 2019-06-03

## 2019-06-03 RX ORDER — TETRACAINE HYDROCHLORIDE 5 MG/ML
SOLUTION OPHTHALMIC
Status: DISCONTINUED | OUTPATIENT
Start: 2019-06-03 | End: 2019-06-03 | Stop reason: HOSPADM

## 2019-06-03 RX ORDER — TROPICAMIDE 10 MG/ML
1 SOLUTION/ DROPS OPHTHALMIC
Status: COMPLETED | OUTPATIENT
Start: 2019-06-03 | End: 2019-06-03

## 2019-06-03 RX ORDER — TETRACAINE HYDROCHLORIDE 5 MG/ML
1 SOLUTION OPHTHALMIC
Status: COMPLETED | OUTPATIENT
Start: 2019-06-03 | End: 2019-06-03

## 2019-06-03 RX ORDER — TOBRAMYCIN 3 MG/ML
SOLUTION/ DROPS OPHTHALMIC
Status: DISCONTINUED | OUTPATIENT
Start: 2019-06-03 | End: 2019-06-03 | Stop reason: HOSPADM

## 2019-06-03 RX ORDER — TOBRAMYCIN 3 MG/ML
1 SOLUTION/ DROPS OPHTHALMIC
Status: COMPLETED | OUTPATIENT
Start: 2019-06-03 | End: 2019-06-03

## 2019-06-03 RX ORDER — MOXIFLOXACIN 5 MG/ML
1 SOLUTION/ DROPS OPHTHALMIC
Status: DISCONTINUED | OUTPATIENT
Start: 2019-06-03 | End: 2019-06-03 | Stop reason: HOSPADM

## 2019-06-03 RX ORDER — PROPARACAINE HYDROCHLORIDE 5 MG/ML
1 SOLUTION/ DROPS OPHTHALMIC
Status: DISCONTINUED | OUTPATIENT
Start: 2019-06-03 | End: 2019-06-03 | Stop reason: HOSPADM

## 2019-06-03 RX ADMIN — PHENYLEPHRINE HYDROCHLORIDE 1 DROP: 25 SOLUTION/ DROPS OPHTHALMIC at 12:06

## 2019-06-03 RX ADMIN — TOBRAMYCIN 1 DROP: 3 SOLUTION/ DROPS OPHTHALMIC at 12:06

## 2019-06-03 RX ADMIN — TROPICAMIDE 1 DROP: 10 SOLUTION/ DROPS OPHTHALMIC at 12:06

## 2019-06-03 RX ADMIN — TETRACAINE HYDROCHLORIDE 1 DROP: 5 SOLUTION OPHTHALMIC at 12:06

## 2019-06-03 RX ADMIN — MIDAZOLAM HYDROCHLORIDE 2 MG: 1 INJECTION, SOLUTION INTRAMUSCULAR; INTRAVENOUS at 01:06

## 2019-06-03 RX ADMIN — TETRACAINE HYDROCHLORIDE 1 DROP: 5 SOLUTION OPHTHALMIC at 02:06

## 2019-06-03 NOTE — OP NOTE
SURGEON:  Raisa Moreno M.D.    PREOPERATIVE DIAGNOSIS:    Nuclear Sclerotic Cataract Right Eye    POSTOPERATIVE DIAGNOSIS:    Nuclear Sclerotic Cataract Right Eye    PROCEDURES:    Phacoemulsification with  intraocular lens, Right eye (41238)  With Femtosecond LASER assist    DATE OF SURGERY: 06/03/2019    IMPLANT: BPT874 26.0    ANESTHESIA:  MAC with topical Lidocaine    COMPLICATIONS:  None    ESTIMATED BLOOD LOSS: None    SPECIMENS: None    INDICATIONS:    The patient has a history of painless progressive visual loss and  difficulty with activities of daily living secondary to cataract formation.  After a thorough discussion of the risks, benefits, and alternatives to cataract surgery, including, but not limited to, the rare risks of infection, retinal detachment, hemorrhage, need for additional surgery, loss of vision, and even loss of the eye, the patient voices understanding and desires to proceed.    DESCRIPTION OF PROCEDURE:    After verification of consent and marking of the operative eye, the patient was positioned under the femtosecond LASER. Topical anesthetic drops were administered. A surgical timeout was initiated with verification of patient identifiers and the laser surgical plan. The eye was docked securely and the laser portion of the cataract procedure was carried out without complication.  The patient was returned to the pre-operative area to await the intraocular surgical portion of the cataract procedure.  The patients IOL calculations were reviewed, and the lens selection confirmed.   After verification and marking of the proper eye in the preop holding area, the patient was brought to the operating room in supine position where the eye was prepped and draped in standard sterile fashion with 5% Betadine and a lid speculum placed in the eye.   Topical 4% Lidocaine was used in addition to the preoperative anesthesia and the procedure was begun by the creation of a paracentesis incision through  which viscoelastic was used to fill the anterior chamber.  Next, a keratome blade was used to create a triplanar temporal clear corneal incision and a cystotome and Utrata forceps used to fashion a continuous curvilinear capsulorrhexis.  Hydrodissection was carried out using the Jean hydrodissection cannula and the nucleus was found to be mobile.  Phacoemulsification of the nucleus was carried out using a quick chop technique, and all remaining epinuclear and cortical material was removed.  The eye was then reformed with Viscoelastic and the  intraocular lens was implanted into the capsular bag.  All remaining viscoelastics were removed from the eye and at the end of the case the pupil was round, the lens was well-centered within the capsular bag and all wounds were found to be water tight.  Drops of Vigamox and Pred Forte were instilled and a shield was placed over the eye. The patient will follow up with Dr. Moreno in the morning.

## 2019-06-03 NOTE — TELEPHONE ENCOUNTER
----- Message from Tracy Gary sent at 5/29/2019 10:16 AM CDT -----  Contact: Jesenia  Needs Advice    Reason for call: pt needed to speak with you about her surgery.         Communication Preference: [phone]    Additional Information:

## 2019-06-03 NOTE — DISCHARGE INSTRUCTIONS
Raisa Moreno MD  [facility]  Department of Ophthalmology      AFTER: Cataract Surgery:  Relax at home and DO NOT exert yourself for the rest of the day.  Plan to see Dr. Moreno tomorrow at the eye clinic:   [facility]  [place] [address]    Refer to attached eye drop instruction sheet     Precautions:  DO NOT rub your eye.  You may resume moderate activity the day after surgery.  Wear protective sunglasses during the day and a shield at night for 1(one) week.  If you have pain, redness and decreased vision, call Dr. Moreno (or the on-call doctor after hours) @[phone].            Home Care Instructions for Eye Surgeries    1. ACTIVITY:  Limit your activity today. Relax at home and DO NOT exert yourself for the rest of the day. Increase activity gradually. You may return to work or school as directed by your physician.    2. DIET:  Drink plenty of fluids. Resume your normal diet unless instructed otherwise.    3. PAIN:  Expect a moderate amount of pain. If a prescription for pain is not sent home with you, you may take your commonly used pain reliever as directed. If this is not sufficient, call your physician. You may resume any other prescription medication unless otherwise directed by your physician.     Discuss any problem with your physician as soon as it arises. Do not Delay.      EMERGENCY- If you are unable to contact your physician, please go to the nearest Emergency Room.       Anesthesia: Monitored Anesthesia Care (MAC)    Anesthesia Safety  · Have an adult family member or friend drive you home after the procedure.  · For the first 24 hours after your surgery:  · Do not drive or use heavy equipment.  · Do not make important decisions or sign documents.  · Avoid alcohol.  · Have someone stay with you, if possible. They can watch for problems and help keep you safe.

## 2019-06-03 NOTE — DISCHARGE SUMMARY
Outcome: Successful outpatient ophthalmic surgical procedure  Preprinted Instructions given to patient.  Regular diet.  Activity: No restrictions  Meds: see Med Rec  Condition: stable  Follow up: 1 day with Dr Moreno  Disposition: Home  Diagnosis: s/p eye surgery

## 2019-06-03 NOTE — PLAN OF CARE
Jesenia Curiel has met all discharge criteria from Phase II. Vital Signs are stable, ambulating  without difficulty. Discharge instructions given, patient verbalized understanding. Discharged from facility via wheelchair in stable condition.

## 2019-06-03 NOTE — ANESTHESIA PREPROCEDURE EVALUATION
06/03/2019  Jesenia Curiel is a 68 y.o., female.    Anesthesia Evaluation    I have reviewed the Patient Summary Reports.    I have reviewed the Nursing Notes.   I have reviewed the Medications.     Review of Systems  Anesthesia Hx:  No problems with previous Anesthesia  Denies Family Hx of Anesthesia complications.   Denies Personal Hx of Anesthesia complications.   Social:  Non-Smoker    Hematology/Oncology:     Oncology Normal    -- Anemia:   EENT/Dental:EENT/Dental Normal   Cardiovascular:   Exercise tolerance: good Pacemaker Hypertension Dysrhythmias atrial fibrillation    Pulmonary:  Pulmonary Normal    Renal/:   Chronic Renal Disease, CRI    Hepatic/GI:   GERD    Musculoskeletal:  Musculoskeletal Normal    Neurological:  Neurology Normal    Endocrine:  Endocrine Normal    Dermatological:  Skin Normal    Psych:  Psychiatric Normal           Physical Exam  General:  Obesity    Airway/Jaw/Neck:  Airway Findings: Mouth Opening: Normal Tongue: Normal  General Airway Assessment: Adult  Mallampati: I  TM Distance: Normal, at least 6 cm  Jaw/Neck Findings:  Neck ROM: Normal ROM      Dental:  Dental Findings: In tact             Anesthesia Plan  Type of Anesthesia, risks & benefits discussed:  Anesthesia Type:  MAC  Patient's Preference:   Intra-op Monitoring Plan:   Intra-op Monitoring Plan Comments:   Post Op Pain Control Plan:   Post Op Pain Control Plan Comments:   Induction:    Beta Blocker:         Informed Consent: Patient understands risks and agrees with Anesthesia plan.  Questions answered. Anesthesia consent signed with patient.  ASA Score: 3     Day of Surgery Review of History & Physical:    H&P update referred to the surgeon.         Ready For Surgery From Anesthesia Perspective.

## 2019-06-03 NOTE — ANESTHESIA POSTPROCEDURE EVALUATION
Anesthesia Post Evaluation    Patient: Jesenia Curiel    Procedure(s) Performed: Procedure(s) (LRB):  EXTRACTION, CATARACT, WITH IOL INSERTION (Right)    Final Anesthesia Type: MAC  Patient location during evaluation: [facility]  Patient participation: Yes- Able to Participate  Level of consciousness: awake and alert, awake and oriented  Post-procedure vital signs: reviewed and stable  Pain management: adequate  Airway patency: patent  PONV status at discharge: No PONV  Anesthetic complications: no      Cardiovascular status: blood pressure returned to baseline  Respiratory status: unassisted  Hydration status: euvolemic  Follow-up not needed.          Vitals Value Taken Time   /43 6/3/2019 12:25 PM   Temp 36.4 °C (97.6 °F) 6/3/2019 12:25 PM   Pulse 71 6/3/2019 12:25 PM   Resp 18 6/3/2019 12:25 PM   SpO2 95 % 6/3/2019 12:25 PM         No case tracking events are documented in the log.      Pain/Criss Score: No data recorded

## 2019-06-04 ENCOUNTER — OFFICE VISIT (OUTPATIENT)
Dept: OPHTHALMOLOGY | Facility: CLINIC | Age: 69
End: 2019-06-04
Attending: OPHTHALMOLOGY
Payer: MEDICARE

## 2019-06-04 DIAGNOSIS — H25.11 NUCLEAR SCLEROTIC CATARACT OF RIGHT EYE: ICD-10-CM

## 2019-06-04 DIAGNOSIS — Z98.890 POST-OPERATIVE STATE: Primary | ICD-10-CM

## 2019-06-04 PROCEDURE — 99024 PR POST-OP FOLLOW-UP VISIT: ICD-10-PCS | Mod: POP,,, | Performed by: OPHTHALMOLOGY

## 2019-06-04 PROCEDURE — 99999 PR PBB SHADOW E&M-EST. PATIENT-LVL II: CPT | Mod: PBBFAC,,, | Performed by: OPHTHALMOLOGY

## 2019-06-04 PROCEDURE — 99999 PR PBB SHADOW E&M-EST. PATIENT-LVL II: ICD-10-PCS | Mod: PBBFAC,,, | Performed by: OPHTHALMOLOGY

## 2019-06-04 PROCEDURE — 99024 POSTOP FOLLOW-UP VISIT: CPT | Mod: POP,,, | Performed by: OPHTHALMOLOGY

## 2019-06-04 PROCEDURE — 99212 OFFICE O/P EST SF 10 MIN: CPT | Mod: PBBFAC | Performed by: OPHTHALMOLOGY

## 2019-06-04 NOTE — PROGRESS NOTES
HPI     1 day PO Phaco w IOL - OD 06/03/19    Pt states that she has a little ache in the OD this morning comes and   goes, other than that doing fine, using drops as instructed.    Eye Med(s) - Pred - TID - OD    Tobramycin - TID - OD    Last edited by Olivia Holly MA on 6/4/2019  9:34 AM. (History)            Assessment /Plan     For exam results, see Encounter Report.    Post-operative state    Nuclear sclerotic cataract of right eye      Slit lamp exam:  L/L: nl  K: clear, wound sealed  AC: 1+ cell  Lens: IOL centered and stable    POD1 s/p Phaco/IOL  Appropriate precautions and post op medications reviewed.  Patient instructed to call or come in if symptoms of redness, decreased vision, or pain are experienced.

## 2019-06-10 DIAGNOSIS — I48.91 ATRIAL FIBRILLATION, UNSPECIFIED TYPE: ICD-10-CM

## 2019-06-10 RX ORDER — PROPAFENONE HYDROCHLORIDE 225 MG/1
225 CAPSULE, EXTENDED RELEASE ORAL EVERY 12 HOURS
Qty: 180 CAPSULE | Refills: 3 | OUTPATIENT
Start: 2019-06-10

## 2019-06-10 RX ORDER — PROPAFENONE HYDROCHLORIDE 225 MG/1
225 CAPSULE, EXTENDED RELEASE ORAL EVERY 12 HOURS
Qty: 180 CAPSULE | Refills: 3 | Status: SHIPPED | OUTPATIENT
Start: 2019-06-10 | End: 2020-06-01 | Stop reason: SDUPTHER

## 2019-06-11 ENCOUNTER — OFFICE VISIT (OUTPATIENT)
Dept: OPHTHALMOLOGY | Facility: CLINIC | Age: 69
End: 2019-06-11
Attending: OPHTHALMOLOGY
Payer: MEDICARE

## 2019-06-11 DIAGNOSIS — Z98.890 POST-OPERATIVE STATE: ICD-10-CM

## 2019-06-11 DIAGNOSIS — H25.13 NUCLEAR SCLEROSIS, BILATERAL: Primary | ICD-10-CM

## 2019-06-11 PROCEDURE — 99999 PR PBB SHADOW E&M-EST. PATIENT-LVL II: CPT | Mod: PBBFAC,,, | Performed by: OPHTHALMOLOGY

## 2019-06-11 PROCEDURE — 99212 OFFICE O/P EST SF 10 MIN: CPT | Mod: PBBFAC | Performed by: OPHTHALMOLOGY

## 2019-06-11 PROCEDURE — 99024 PR POST-OP FOLLOW-UP VISIT: ICD-10-PCS | Mod: POP,,, | Performed by: OPHTHALMOLOGY

## 2019-06-11 PROCEDURE — 99024 POSTOP FOLLOW-UP VISIT: CPT | Mod: POP,,, | Performed by: OPHTHALMOLOGY

## 2019-06-11 PROCEDURE — 99999 PR PBB SHADOW E&M-EST. PATIENT-LVL II: ICD-10-PCS | Mod: PBBFAC,,, | Performed by: OPHTHALMOLOGY

## 2019-06-11 RX ORDER — TROPICAMIDE 10 MG/ML
1 SOLUTION/ DROPS OPHTHALMIC
Status: CANCELLED | OUTPATIENT
Start: 2019-06-11

## 2019-06-11 RX ORDER — TETRACAINE HYDROCHLORIDE 5 MG/ML
1 SOLUTION OPHTHALMIC
Status: CANCELLED | OUTPATIENT
Start: 2019-06-11

## 2019-06-11 RX ORDER — MOXIFLOXACIN 5 MG/ML
1 SOLUTION/ DROPS OPHTHALMIC
Status: CANCELLED | OUTPATIENT
Start: 2019-06-11

## 2019-06-11 RX ORDER — TOBRAMYCIN 3 MG/ML
1 SOLUTION/ DROPS OPHTHALMIC 3 TIMES DAILY
Qty: 5 ML | Refills: 0 | Status: SHIPPED | OUTPATIENT
Start: 2019-06-11 | End: 2019-06-21

## 2019-06-11 RX ORDER — PHENYLEPHRINE HYDROCHLORIDE 25 MG/ML
1 SOLUTION/ DROPS OPHTHALMIC
Status: CANCELLED | OUTPATIENT
Start: 2019-06-11

## 2019-06-11 NOTE — PROGRESS NOTES
HPI     1 wk post op CE with IOL OD (6/3/2019).  Pt states that she feels as though her vision is doing well OD. Pt denies   any flashes or floaters OU. Pt complains of gritty feeling in OD but   thinks its caused by her dry eyes.    Pt confirms using  PGB TID OD      Last edited by Martha Benoit on 6/11/2019  8:54 AM. (History)            Assessment /Plan     For exam results, see Encounter Report.    Nuclear sclerosis, bilateral    Post-operative state      Slit lamp exam:  L/L: nl  K: clear, wound sealed  AC: trace cell  Iris/Lens: IOL centered and stable    POW1 s/p phaco: Surgery healing well with no signs of infection or abnormal inflammation.    Patient wishes to proceed with surgery in the second eye. Risks, benefits, alternatives reviewed. IOL selection reviewed.     Left eye  IOL: [id number] vs 300 32.0 at 97 (also order 31.5 and 32.5)     ALLERGIC to Quinalones, so use Tobramycin and Pred Forte    The patient expresses a desire to reduce spectacle dependence. I reviewed various IOL and LASER refractive surgical options and we will attempt to minimize spectacle dependence by managing astigmatism and optimizing IOL selection. Femtosecond LASER assisted cataract surgery (FLACS) technology was explained to the patient with educational videos and discussion.  The patient voices understanding and wishes to implement this technology during the cataract procedure.  I explained the increased precision of the LASER versus manual techniques, especially as it relates to astigmatism reduction with arcuate incisions.  I emphasized that although our goal is to reduce the need for refractive correction after surgery, there may still be a need for spectacle correction to achieve optimal visual acuity, and that a reasonable range of functional vision should be the expectation.  No guarantees are made about post operative refraction or visual acuity, as the eye may heal in unpredictable ways, and the standard risks,  benefits, and alternatives to cataract surgery were explained.  The patient understands that the refractive portions of this cataract procedure are not covered by insurance, and that there is an out of pocket expense of $1500 per eye. I also explained that even though our pre-operative plan is to utilize advanced refractive technologies during surgery, that I may decide to eliminate part or all of this plan if surgical challenges or complications arise, or I feel that it is not in the patient's best interest. Consent forms and an ABN form were given to the patient to review.    Catalys Parameters:    Left Eye:   JESSY:  12mm   ?Need to sony patient sitting up?: Yes  Capsulotomy: Scanned Capsule  rdGrdrrdarddrderd:rd rd3rd Arcuate: Toric Sony: at  Axis: 97   Incisions:  OFF

## 2019-06-17 DIAGNOSIS — I10 BENIGN ESSENTIAL HYPERTENSION: ICD-10-CM

## 2019-06-17 RX ORDER — BISOPROLOL FUMARATE 5 MG/1
5 TABLET, FILM COATED ORAL DAILY
Qty: 90 TABLET | Refills: 3 | Status: SHIPPED | OUTPATIENT
Start: 2019-06-17 | End: 2020-06-12

## 2019-06-24 ENCOUNTER — TELEPHONE (OUTPATIENT)
Dept: OPHTHALMOLOGY | Facility: CLINIC | Age: 69
End: 2019-06-24

## 2019-06-24 DIAGNOSIS — H25.12 NUCLEAR SCLEROTIC CATARACT OF LEFT EYE: Primary | ICD-10-CM

## 2019-06-28 ENCOUNTER — TELEPHONE (OUTPATIENT)
Dept: INTERNAL MEDICINE | Facility: CLINIC | Age: 69
End: 2019-06-28

## 2019-06-28 NOTE — TELEPHONE ENCOUNTER
Called pt and let her know Kevin Michaud will be out on maternity leave in september and will not be back until after Thanksgiving pt refused to see NP or PA so she will wait until Dr. Michaud returns

## 2019-06-28 NOTE — TELEPHONE ENCOUNTER
----- Message from Marguerite Egan sent at 6/28/2019  2:58 PM CDT -----  Contact: Pt   Pt is requesting a call back in regards to scheduling a f/u visit in September.       Pt can be contacted at [phone]

## 2019-07-06 DIAGNOSIS — I10 BENIGN ESSENTIAL HYPERTENSION: ICD-10-CM

## 2019-07-08 RX ORDER — AMLODIPINE BESYLATE 5 MG/1
TABLET ORAL
Qty: 90 TABLET | Refills: 3 | Status: SHIPPED | OUTPATIENT
Start: 2019-07-08 | End: 2020-06-26

## 2019-08-09 NOTE — PROGRESS NOTES
Subjective:     HPI    Cardiologist: Verna Hodgson MD    I had the pleasure of seeing Jesenia Curiel in follow-up for his history of atrial fibrillation. 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olo dual chamber pacemaker implantation in 5/2009 (gen change 9/2014). She has been on Rythmol since 2009, which has significantly brought down her arrhythmia burden (episodes used to last for hours but now last seconds to minutes). At her 2017 office visit, she was found to have a <1% AF burden, with the longest episode lasting <90 seconds. At her 7/2018 visit, her device recorded no mode-switch episodes. Xarelto was stopped at that visit.    I reviewed today's device interrogation, which shows stable device and lead function. She is RA paced 98% and RV paced 22% of the time. No mode-switch episodes have been reported. Estimated battery longevity 8-9 years.     My interpretation of today's ECG is atrial pacing at 60 bpm.    Review of Systems   Constitution: Positive for malaise/fatigue. Negative for decreased appetite, weight gain and weight loss.   HENT: Negative for sore throat.    Eyes: Negative for blurred vision.   Cardiovascular: Negative for chest pain, dyspnea on exertion, irregular heartbeat, leg swelling, near-syncope, orthopnea, palpitations, paroxysmal nocturnal dyspnea and syncope.   Respiratory: Negative for shortness of breath.    Skin: Negative for rash.   Musculoskeletal: Negative for arthritis.   Gastrointestinal: Negative for abdominal pain.   Neurological: Negative for focal weakness.   Psychiatric/Behavioral: Negative for altered mental status.        Objective:    Physical Exam   Constitutional: She is oriented to person, place, and time. She appears well-developed and well-nourished. No distress.   HENT:   Head: Normocephalic and atraumatic.   Mouth/Throat: Oropharynx is clear and moist.   Eyes: Pupils are  equal, round, and reactive to light. Conjunctivae are normal. No scleral icterus.   Neck: Normal range of motion. Neck supple. No JVD present. No thyromegaly present.   Cardiovascular: Normal rate, regular rhythm, normal heart sounds and intact distal pulses. Exam reveals no gallop and no friction rub.   No murmur heard.  Pulmonary/Chest: Effort normal and breath sounds normal. No respiratory distress. She has no rales.   Abdominal: Soft. Bowel sounds are normal. She exhibits no distension.   Musculoskeletal: She exhibits no edema.   Neurological: She is alert and oriented to person, place, and time.   Skin: Skin is warm and dry.   Psychiatric: She has a normal mood and affect. Her behavior is normal.   Vitals reviewed.        Assessment:       1. Sick sinus syndrome    2. S/P placement of cardiac pacemaker    3. Hyperlipidemia, unspecified hyperlipidemia type    4. Benign essential hypertension    5. Atrial fibrillation, unspecified type    6. CKD (chronic kidney disease) stage 3, GFR 30-59 ml/min         Plan:     In summary, Jesenia Curiel is a 68 year old female with a history of sick sinus syndrome, pacemaker implantation, and symptomatic PAF. Her AF has been controlled for many years on Rythmol, with virtually no AF over the past several years. The plan is to continue aspirin and Rythmol, and to see me again in 1 year.    Ms. Curiel complains of fatigue today. The plan is to increase rate response.    Thank you for allowing me to participate in the care of this patient. Please do not hesitate to call me with any questions or concerns.

## 2019-08-12 ENCOUNTER — HOSPITAL ENCOUNTER (OUTPATIENT)
Dept: CARDIOLOGY | Facility: CLINIC | Age: 69
Discharge: HOME | End: 2019-08-12
Payer: MEDICARE

## 2019-08-12 ENCOUNTER — OFFICE VISIT (OUTPATIENT)
Dept: ELECTROPHYSIOLOGY | Facility: CLINIC | Age: 69
End: 2019-08-12
Payer: MEDICARE

## 2019-08-12 ENCOUNTER — CLINICAL SUPPORT (OUTPATIENT)
Dept: CARDIOLOGY | Facility: HOSPITAL | Age: 69
End: 2019-08-12
Attending: INTERNAL MEDICINE
Payer: MEDICARE

## 2019-08-12 VITALS
HEART RATE: 60 BPM | HEIGHT: 61 IN | WEIGHT: 189.81 LBS | BODY MASS INDEX: 35.84 KG/M2 | SYSTOLIC BLOOD PRESSURE: 112 MMHG | DIASTOLIC BLOOD PRESSURE: 68 MMHG

## 2019-08-12 DIAGNOSIS — I49.5 SICK SINUS SYNDROME: Primary | ICD-10-CM

## 2019-08-12 DIAGNOSIS — I49.5 SSS (SICK SINUS SYNDROME): ICD-10-CM

## 2019-08-12 DIAGNOSIS — N18.30 CKD (CHRONIC KIDNEY DISEASE) STAGE 3, GFR 30-59 ML/MIN: ICD-10-CM

## 2019-08-12 DIAGNOSIS — I48.0 PAROXYSMAL ATRIAL FIBRILLATION: ICD-10-CM

## 2019-08-12 DIAGNOSIS — I48.91 ATRIAL FIBRILLATION, UNSPECIFIED TYPE: ICD-10-CM

## 2019-08-12 DIAGNOSIS — E78.5 HYPERLIPIDEMIA, UNSPECIFIED HYPERLIPIDEMIA TYPE: ICD-10-CM

## 2019-08-12 DIAGNOSIS — I10 BENIGN ESSENTIAL HYPERTENSION: ICD-10-CM

## 2019-08-12 DIAGNOSIS — Z95.0 S/P PLACEMENT OF CARDIAC PACEMAKER: ICD-10-CM

## 2019-08-12 DIAGNOSIS — Z95.0 CARDIAC PACEMAKER IN SITU: ICD-10-CM

## 2019-08-12 PROCEDURE — 99213 OFFICE O/P EST LOW 20 MIN: CPT | Mod: PBBFAC | Performed by: INTERNAL MEDICINE

## 2019-08-12 PROCEDURE — 93010 ELECTROCARDIOGRAM REPORT: CPT | Mod: S$PBB,,, | Performed by: INTERNAL MEDICINE

## 2019-08-12 PROCEDURE — 99999 PR PBB SHADOW E&M-EST. PATIENT-LVL III: CPT | Mod: PBBFAC,,, | Performed by: INTERNAL MEDICINE

## 2019-08-12 PROCEDURE — 99999 PR PBB SHADOW E&M-EST. PATIENT-LVL III: ICD-10-PCS | Mod: PBBFAC,,, | Performed by: INTERNAL MEDICINE

## 2019-08-12 PROCEDURE — 93280 PM DEVICE PROGR EVAL DUAL: CPT

## 2019-08-12 PROCEDURE — 99214 PR OFFICE/OUTPT VISIT, EST, LEVL IV, 30-39 MIN: ICD-10-PCS | Mod: S$PBB,,, | Performed by: INTERNAL MEDICINE

## 2019-08-12 PROCEDURE — 93005 ELECTROCARDIOGRAM TRACING: CPT | Mod: PBBFAC,59 | Performed by: INTERNAL MEDICINE

## 2019-08-12 PROCEDURE — 93010 RHYTHM STRIP: ICD-10-PCS | Mod: S$PBB,,, | Performed by: INTERNAL MEDICINE

## 2019-08-12 PROCEDURE — 99214 OFFICE O/P EST MOD 30 MIN: CPT | Mod: S$PBB,,, | Performed by: INTERNAL MEDICINE

## 2019-08-22 ENCOUNTER — OFFICE VISIT (OUTPATIENT)
Dept: CARDIOLOGY | Facility: CLINIC | Age: 69
End: 2019-08-22
Payer: MEDICARE

## 2019-08-22 VITALS
HEART RATE: 60 BPM | WEIGHT: 192.88 LBS | SYSTOLIC BLOOD PRESSURE: 138 MMHG | BODY MASS INDEX: 35.49 KG/M2 | HEIGHT: 62 IN | DIASTOLIC BLOOD PRESSURE: 61 MMHG

## 2019-08-22 DIAGNOSIS — N18.30 CKD (CHRONIC KIDNEY DISEASE) STAGE 3, GFR 30-59 ML/MIN: ICD-10-CM

## 2019-08-22 DIAGNOSIS — I10 BENIGN ESSENTIAL HYPERTENSION: Primary | ICD-10-CM

## 2019-08-22 DIAGNOSIS — I48.0 PAROXYSMAL ATRIAL FIBRILLATION: ICD-10-CM

## 2019-08-22 DIAGNOSIS — E78.2 MIXED HYPERLIPIDEMIA: ICD-10-CM

## 2019-08-22 DIAGNOSIS — I49.5 SICK SINUS SYNDROME: ICD-10-CM

## 2019-08-22 DIAGNOSIS — Z95.0 S/P PLACEMENT OF CARDIAC PACEMAKER: ICD-10-CM

## 2019-08-22 PROCEDURE — 99999 PR PBB SHADOW E&M-EST. PATIENT-LVL III: ICD-10-PCS | Mod: PBBFAC,,, | Performed by: INTERNAL MEDICINE

## 2019-08-22 PROCEDURE — 99214 PR OFFICE/OUTPT VISIT, EST, LEVL IV, 30-39 MIN: ICD-10-PCS | Mod: S$PBB,,, | Performed by: INTERNAL MEDICINE

## 2019-08-22 PROCEDURE — 99999 PR PBB SHADOW E&M-EST. PATIENT-LVL III: CPT | Mod: PBBFAC,,, | Performed by: INTERNAL MEDICINE

## 2019-08-22 PROCEDURE — 99213 OFFICE O/P EST LOW 20 MIN: CPT | Mod: PBBFAC | Performed by: INTERNAL MEDICINE

## 2019-08-22 PROCEDURE — 99214 OFFICE O/P EST MOD 30 MIN: CPT | Mod: S$PBB,,, | Performed by: INTERNAL MEDICINE

## 2019-08-22 NOTE — PROGRESS NOTES
Subjective:   Patient ID:  Jesenia Curiel is a 68 y.o. 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 She went to [place] earlier this year. Jesenia Curiel denies any chest pain, shortness of breath, PND, orthopnea, palpitations, leg edema, or syncope.  She is not currently exercising. Her atorvastatin was increased to 80 mg in March. She has been tolerating the increased dose well. She had a recent PPM check earlier this month. She is RA paced 98%, RV paced 22% and no afib was seen.    ECG (19): Atrial paced rhythm.     Past Medical History:   Diagnosis Date    Allergy     Anemia     Atrial fibrillation 2017    CKD (chronic kidney disease) stage 3, GFR 30-59 ml/min 2017    Disorder of kidney and ureter     GERD (gastroesophageal reflux disease)     Hyperlipidemia     Hypertension     Pre-diabetes 6/3/2017       Past Surgical History:   Procedure Laterality Date    BACK SURGERY      lamenectomy     SECTION      ,     EXTRACTION, CATARACT, WITH IOL INSERTION Right 6/3/2019    Performed [facility]    HYSTERECTOMY      INSERT / REPLACE / REMOVE PACEMAKER      placement    INSERT / REPLACE / REMOVE PACEMAKER  2014    [employer]olo Model #[id number] replacement    pace maker      replacement     salpingo opherectomy Bilateral 1998    TONSILLECTOMY  1953       Social History     Socioeconomic History    Marital status:      Spouse name: Not on file    Number of children: Not on file    Years of education: Not on file    Highest education level: Not on file   Occupational History    Not on file   Social Needs    Financial resource strain: Not on file    Food insecurity:     Worry: Not on file     Inability: Not on file    Transportation needs:     Medical: Not on file     Non-medical: Not on file   Tobacco Use    Smoking status: Never Smoker     Smokeless tobacco: Never Used   Substance and Sexual Activity    Alcohol use: Yes     Alcohol/week: 3.6 oz     Types: 6 Glasses of wine per week     Comment: occassionally    Drug use: No    Sexual activity: Not Currently   Lifestyle    Physical activity:     Days per week: Not on file     Minutes per session: Not on file    Stress: Not on file   Relationships    Social connections:     Talks on phone: Not on file     Gets together: Not on file     Attends Faith service: Not on file     Active member of club or organization: Not on file     Attends meetings of clubs or organizations: Not on file     Relationship status: Not on file   Other Topics Concern    Not on file   Social History Narrative    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       Family History   Problem Relation Age of Onset    Hypertension Mother     Diabetes Mother     Hyperlipidemia Mother     Coronary artery disease Mother     Heart disease Mother     Stroke Mother     Hypertension Father     Diabetes Father     Hyperlipidemia Father     Coronary artery disease Father     Heart disease Father     Coronary artery disease Brother     Heart disease Brother     Hyperthyroidism Brother     Diabetes Paternal Grandmother     Diabetes Paternal Grandfather     Heart attack Neg Hx        Patient's Medications   New Prescriptions    No medications on file   Previous Medications    AMLODIPINE (NORVASC) 5 MG TABLET    TAKE 1 TABLET BY MOUTH EVERY DAY    ASPIRIN (ECOTRIN) 81 MG EC TABLET    Take 1 tablet (81 mg total) by mouth once daily.    ATORVASTATIN (LIPITOR) 80 MG TABLET    Take 1 tablet (80 mg total) by mouth once daily.    BISOPROLOL (ZEBETA) 5 MG TABLET    TAKE 1 TABLET (5 MG TOTAL) BY MOUTH ONCE DAILY.    CLOBETASOL (OLUX) 0.05 % FOAM    Apply 1 application topically every 30 days.     ERGOCALCIFEROL, VITAMIN D2, (VITAMIN D ORAL)    Take 2,000 Units by mouth once daily.    LORCASERIN  "(BELVIQ) 10 MG TAB    Take 10 mg by mouth 2 (two) times daily.    OLMESARTAN (BENICAR) 20 MG TABLET    TAKE 1 TABLET EVERY DAY    PROPAFENONE (RYTHMOL SR) 225 MG CP12    Take 1 capsule (225 mg total) by mouth every 12 (twelve) hours.    TRIAMTERENE-HYDROCHLOROTHIAZIDE 37.5-25 MG (DYAZIDE) 37.5-25 MG PER CAPSULE    TAKE 1 CAPSULE BY MOUTH EVERY DAY IN THE MORNING   Modified Medications    No medications on file   Discontinued Medications    No medications on file       Review of Systems   Constitution: Negative for malaise/fatigue and weight gain.   HENT: Negative for hearing loss.    Eyes: Negative for visual disturbance.   Cardiovascular: Negative for chest pain, claudication, dyspnea on exertion, leg swelling, near-syncope, orthopnea, palpitations, paroxysmal nocturnal dyspnea and syncope.   Respiratory: Negative for cough, shortness of breath, sleep disturbances due to breathing, snoring and wheezing.    Endocrine: Negative for cold intolerance, heat intolerance, polydipsia, polyphagia and polyuria.   Hematologic/Lymphatic: Negative for bleeding problem. Does not bruise/bleed easily.   Skin: Negative for rash and suspicious lesions.   Musculoskeletal: Negative for arthritis, falls, joint pain, muscle weakness and myalgias.   Gastrointestinal: Negative for abdominal pain, change in bowel habit, constipation, diarrhea, heartburn, hematochezia, melena and nausea.   Genitourinary: Negative for hematuria and nocturia.   Neurological: Negative for excessive daytime sleepiness, dizziness, headaches, light-headedness, loss of balance and weakness.   Psychiatric/Behavioral: Negative for depression. The patient is not nervous/anxious.    Allergic/Immunologic: Negative for environmental allergies.       /61 (BP Location: Left arm, Patient Position: Sitting, BP Method: Medium (Automatic))   Pulse 60   Ht 5' 1.5" (1.562 m)   Wt 87.5 kg (192 lb 14.4 oz)   BMI 35.86 kg/m²     Objective:   Physical Exam "   Constitutional: She is oriented to person, place, and time. She appears well-developed and well-nourished.        HENT:   Head: Normocephalic and atraumatic.   Mouth/Throat: Oropharynx is clear and moist.   Eyes: Pupils are equal, round, and reactive to light. Conjunctivae and EOM are normal. No scleral icterus.   Neck: Normal range of motion. Neck supple. No hepatojugular reflux and no JVD present. No tracheal deviation present. No thyromegaly present.   Cardiovascular: Normal rate, regular rhythm, normal heart sounds and intact distal pulses. PMI is not displaced.   Pulses:       Carotid pulses are 2+ on the right side, and 2+ on the left side.       Radial pulses are 2+ on the right side, and 2+ on the left side.        Dorsalis pedis pulses are 2+ on the right side, and 2+ on the left side.        Posterior tibial pulses are 2+ on the right side, and 2+ on the left side.   Pulmonary/Chest: Effort normal and breath sounds normal.   Abdominal: Soft. Bowel sounds are normal. She exhibits no distension and no mass. There is no hepatosplenomegaly. There is no tenderness.   Musculoskeletal: She exhibits no edema or tenderness.   Lymphadenopathy:     She has no cervical adenopathy.   Neurological: She is alert and oriented to person, place, and time.   Skin: Skin is warm and dry. No rash noted. No cyanosis or erythema. Nails show no clubbing.   Psychiatric: She has a normal mood and affect. Her speech is normal and behavior is normal.       Lab Results   Component Value Date     03/28/2019    K 4.3 03/28/2019     03/28/2019    CO2 26 03/28/2019    BUN 29 (H) 03/28/2019    CREATININE 1.4 03/28/2019     (H) 03/28/2019    HGBA1C 5.4 07/25/2018    AST 23 03/28/2019    ALT 21 03/28/2019    ALBUMIN 4.0 03/28/2019    PROT 7.3 03/28/2019    BILITOT 0.7 03/28/2019    WBC 5.29 03/28/2019    HGB 13.8 03/28/2019    HCT 42.2 03/28/2019    MCV 96 03/28/2019     03/28/2019    TSH 1.111 03/28/2019    CHOL  204 (H) 03/28/2019    HDL 52 03/28/2019    LDLCALC 127.4 03/28/2019    TRIG 123 03/28/2019       Assessment:     1. Benign essential hypertension : Her BP was slightly elevated today. Other readings have been at goal. No changes made.   2. Mixed hyperlipidemia : Atorvastatin recently increased. FLP ordered.   3. S/P placement of cardiac pacemaker    4. Sick sinus syndrome    5. CKD (chronic kidney disease) stage 3, GFR 30-59 ml/min    6. Paroxysmal atrial fibrillation : Follows with Dr. JOSE Hodgson. Continue asa and propafenone. [id number] is 3.       Plan:     Jesenia was seen today for paroxysmal atrial fibrillation.    Diagnoses and all orders for this visit:    Benign essential hypertension  -     Comprehensive metabolic panel; Future  -     Lipid panel; Future  -     Transthoracic echo (TTE) 2D with Color Flow; Future    Mixed hyperlipidemia  -     Comprehensive metabolic panel; Future  -     Lipid panel; Future  -     Transthoracic echo (TTE) 2D with Color Flow; Future    S/P placement of cardiac pacemaker    Sick sinus syndrome    CKD (chronic kidney disease) stage 3, GFR 30-59 ml/min    Paroxysmal atrial fibrillation  -     Comprehensive metabolic panel; Future  -     Lipid panel; Future  -     Transthoracic echo (TTE) 2D with Color Flow; Future        Thank you for allowing me to participate in this patient's care. Please do not hesitate to contact me with any questions or concerns.

## 2019-08-27 ENCOUNTER — CLINICAL SUPPORT (OUTPATIENT)
Dept: CARDIOLOGY | Facility: HOSPITAL | Age: 69
End: 2019-08-27
Attending: INTERNAL MEDICINE
Payer: MEDICARE

## 2019-08-27 DIAGNOSIS — Z95.0 CARDIAC PACEMAKER IN SITU: ICD-10-CM

## 2019-08-27 PROCEDURE — 93296 REM INTERROG EVL PM/IDS: CPT

## 2019-09-05 ENCOUNTER — HOSPITAL ENCOUNTER (OUTPATIENT)
Dept: CARDIOLOGY | Facility: CLINIC | Age: 69
Discharge: HOME | End: 2019-09-05
Attending: INTERNAL MEDICINE
Payer: MEDICARE

## 2019-09-05 ENCOUNTER — PATIENT MESSAGE (OUTPATIENT)
Dept: CARDIOLOGY | Facility: CLINIC | Age: 69
End: 2019-09-05

## 2019-09-05 VITALS
HEART RATE: 67 BPM | HEIGHT: 62 IN | BODY MASS INDEX: 35.33 KG/M2 | WEIGHT: 192 LBS | SYSTOLIC BLOOD PRESSURE: 112 MMHG | DIASTOLIC BLOOD PRESSURE: 74 MMHG

## 2019-09-05 DIAGNOSIS — E78.2 MIXED HYPERLIPIDEMIA: ICD-10-CM

## 2019-09-05 DIAGNOSIS — I48.0 PAROXYSMAL ATRIAL FIBRILLATION: ICD-10-CM

## 2019-09-05 DIAGNOSIS — I10 BENIGN ESSENTIAL HYPERTENSION: ICD-10-CM

## 2019-09-05 LAB
ASCENDING AORTA: 2.67 CM
AV INDEX (PROSTH): 0.76
AV MEAN GRADIENT: 3 MMHG
AV PEAK GRADIENT: 5 MMHG
AV VALVE AREA: 2.34 CM2
AV VELOCITY RATIO: 0.78
BSA FOR ECHO PROCEDURE: 1.95 M2
CV ECHO LV RWT: 0.28 CM
DOP CALC AO PEAK VEL: 1.17 M/S
DOP CALC AO VTI: 26.38 CM
DOP CALC LVOT AREA: 3.1 CM2
DOP CALC LVOT DIAMETER: 1.98 CM
DOP CALC LVOT PEAK VEL: 0.91 M/S
DOP CALC LVOT STROKE VOLUME: 61.7 CM3
DOP CALCLVOT PEAK VEL VTI: 20.05 CM
E WAVE DECELERATION TIME: 178.99 MSEC
E/A RATIO: 1.5
E/E' RATIO: 16.5 M/S
ECHO LV POSTERIOR WALL: 0.77 CM (ref 0.6–1.1)
FRACTIONAL SHORTENING: 20 % (ref 28–44)
INTERVENTRICULAR SEPTUM: 0.64 CM (ref 0.6–1.1)
LA MAJOR: 5 CM
LA MINOR: 4.89 CM
LA WIDTH: 4.78 CM
LEFT ATRIUM SIZE: 3.58 CM
LEFT ATRIUM VOLUME INDEX: 38.3 ML/M2
LEFT ATRIUM VOLUME: 71.92 CM3
LEFT INTERNAL DIMENSION IN SYSTOLE: 4.4 CM (ref 2.1–4)
LEFT VENTRICLE DIASTOLIC VOLUME INDEX: 77.93 ML/M2
LEFT VENTRICLE DIASTOLIC VOLUME: 146.41 ML
LEFT VENTRICLE MASS INDEX: 72 G/M2
LEFT VENTRICLE SYSTOLIC VOLUME INDEX: 46.7 ML/M2
LEFT VENTRICLE SYSTOLIC VOLUME: 87.67 ML
LEFT VENTRICULAR INTERNAL DIMENSION IN DIASTOLE: 5.48 CM (ref 3.5–6)
LEFT VENTRICULAR MASS: 135.81 G
LV LATERAL E/E' RATIO: 12.38 M/S
LV SEPTAL E/E' RATIO: 24.75 M/S
MV PEAK A VEL: 0.66 M/S
MV PEAK E VEL: 0.99 M/S
PISA TR MAX VEL: 2.74 M/S
RA MAJOR: 4.58 CM
RA PRESSURE: 3 MMHG
RA WIDTH: 3.52 CM
RIGHT VENTRICULAR END-DIASTOLIC DIMENSION: 3.49 CM
RV TISSUE DOPPLER FREE WALL SYSTOLIC VELOCITY 1 (APICAL 4 CHAMBER VIEW): 10.94 CM/S
SINUS: 2.85 CM
STJ: 2.79 CM
TDI LATERAL: 0.08 M/S
TDI SEPTAL: 0.04 M/S
TDI: 0.06 M/S
TR MAX PG: 30 MMHG
TRICUSPID ANNULAR PLANE SYSTOLIC EXCURSION: 1.94 CM
TV REST PULMONARY ARTERY PRESSURE: 33 MMHG

## 2019-09-05 PROCEDURE — 93306 TTE W/DOPPLER COMPLETE: CPT | Mod: PBBFAC | Performed by: INTERNAL MEDICINE

## 2019-09-05 PROCEDURE — 93306 TRANSTHORACIC ECHO (TTE) COMPLETE (CUPID ONLY): ICD-10-PCS | Mod: 26,S$PBB,, | Performed by: INTERNAL MEDICINE

## 2019-09-06 ENCOUNTER — PATIENT MESSAGE (OUTPATIENT)
Dept: CARDIOLOGY | Facility: CLINIC | Age: 69
End: 2019-09-06

## 2019-09-18 ENCOUNTER — TELEPHONE (OUTPATIENT)
Dept: OPHTHALMOLOGY | Facility: CLINIC | Age: 69
End: 2019-09-18

## 2019-09-18 NOTE — TELEPHONE ENCOUNTER
Patient advised we are proceding with Toric IOL with Laser.  She agreed and will wait for Batsheva to call to arrange payment.

## 2019-09-18 NOTE — TELEPHONE ENCOUNTER
----- Message from Claire Batres sent at 9/18/2019 10:13 AM CDT -----  Contact: Jesenia May calling to find out what type of cataract surgery she is slotted for and what lenses she is getting.  She would also like to know what medication she is to take on the morning of surgery.  Please contact her at [phone]

## 2019-09-19 ENCOUNTER — TELEPHONE (OUTPATIENT)
Dept: OPTOMETRY | Facility: CLINIC | Age: 69
End: 2019-09-19

## 2019-09-19 NOTE — TELEPHONE ENCOUNTER
Spoke with patient. Confirmed sx arrival time, which is 12pm on 9/23/2019. Nothing to eat or drink after 9 pm the night before sx. May have water, gatorade, or powerade after 9 pm until leaving home the morning of surgery. Pt is to start eyedrops on Saturday, one drop TID into operative eye. TR 9/19/2019

## 2019-09-23 ENCOUNTER — ANESTHESIA (OUTPATIENT)
Dept: SURGERY | Facility: OTHER | Age: 69
End: 2019-09-23
Payer: MEDICARE

## 2019-09-23 ENCOUNTER — ANESTHESIA EVENT (OUTPATIENT)
Dept: SURGERY | Facility: OTHER | Age: 69
End: 2019-09-23
Payer: MEDICARE

## 2019-09-23 ENCOUNTER — HOSPITAL ENCOUNTER (OUTPATIENT)
Facility: OTHER | Age: 69
Discharge: HOME | End: 2019-09-23
Attending: OPHTHALMOLOGY | Admitting: OPHTHALMOLOGY
Payer: MEDICARE

## 2019-09-23 VITALS
HEART RATE: 65 BPM | RESPIRATION RATE: 16 BRPM | BODY MASS INDEX: 35.87 KG/M2 | TEMPERATURE: 98 F | DIASTOLIC BLOOD PRESSURE: 61 MMHG | HEIGHT: 61 IN | SYSTOLIC BLOOD PRESSURE: 121 MMHG | OXYGEN SATURATION: 98 % | WEIGHT: 190 LBS

## 2019-09-23 DIAGNOSIS — H25.12 NUCLEAR SCLEROTIC CATARACT OF LEFT EYE: Primary | ICD-10-CM

## 2019-09-23 PROCEDURE — 71000015 HC POSTOP RECOV 1ST HR: Performed by: OPHTHALMOLOGY

## 2019-09-23 PROCEDURE — V2787 ASTIGMATISM-CORRECT FUNCTION: HCPCS | Performed by: OPHTHALMOLOGY

## 2019-09-23 PROCEDURE — 63600175 PHARM REV CODE 636 W HCPCS: Performed by: NURSE ANESTHETIST, CERTIFIED REGISTERED

## 2019-09-23 PROCEDURE — 36000707: Performed by: OPHTHALMOLOGY

## 2019-09-23 PROCEDURE — 66999 UNLISTED PX ANT SEGMENT EYE: CPT | Mod: CSM,LT,, | Performed by: OPHTHALMOLOGY

## 2019-09-23 PROCEDURE — 66984 PR REMOVAL, CATARACT, W/INSRT INTRAOC LENS, W/O ENDO CYCLO: ICD-10-PCS | Mod: LT,,, | Performed by: OPHTHALMOLOGY

## 2019-09-23 PROCEDURE — 66984 XCAPSL CTRC RMVL W/O ECP: CPT | Mod: LT,,, | Performed by: OPHTHALMOLOGY

## 2019-09-23 PROCEDURE — 37000009 HC ANESTHESIA EA ADD 15 MINS: Performed by: OPHTHALMOLOGY

## 2019-09-23 PROCEDURE — 36000706: Performed by: OPHTHALMOLOGY

## 2019-09-23 PROCEDURE — 66999 PR FEMTO TORIC: ICD-10-PCS | Mod: CSM,LT,, | Performed by: OPHTHALMOLOGY

## 2019-09-23 PROCEDURE — 25000003 PHARM REV CODE 250: Performed by: NURSE ANESTHETIST, CERTIFIED REGISTERED

## 2019-09-23 PROCEDURE — 37000008 HC ANESTHESIA 1ST 15 MINUTES: Performed by: OPHTHALMOLOGY

## 2019-09-23 PROCEDURE — 25000003 PHARM REV CODE 250: Performed by: OPHTHALMOLOGY

## 2019-09-23 PROCEDURE — A4216 STERILE WATER/SALINE, 10 ML: HCPCS | Performed by: NURSE ANESTHETIST, CERTIFIED REGISTERED

## 2019-09-23 RX ORDER — MOXIFLOXACIN 5 MG/ML
1 SOLUTION/ DROPS OPHTHALMIC
Status: DISCONTINUED | OUTPATIENT
Start: 2019-09-23 | End: 2019-09-23

## 2019-09-23 RX ORDER — PHENYLEPHRINE HYDROCHLORIDE 25 MG/ML
1 SOLUTION/ DROPS OPHTHALMIC
Status: COMPLETED | OUTPATIENT
Start: 2019-09-23 | End: 2019-09-23

## 2019-09-23 RX ORDER — SODIUM CHLORIDE 0.9 % (FLUSH) 0.9 %
SYRINGE (ML) INJECTION
Status: DISCONTINUED | OUTPATIENT
Start: 2019-09-23 | End: 2019-09-23

## 2019-09-23 RX ORDER — TOBRAMYCIN 3 MG/ML
SOLUTION/ DROPS OPHTHALMIC
Status: DISCONTINUED | OUTPATIENT
Start: 2019-09-23 | End: 2019-09-23 | Stop reason: HOSPADM

## 2019-09-23 RX ORDER — TETRACAINE HYDROCHLORIDE 5 MG/ML
SOLUTION OPHTHALMIC
Status: DISCONTINUED | OUTPATIENT
Start: 2019-09-23 | End: 2019-09-23 | Stop reason: HOSPADM

## 2019-09-23 RX ORDER — TROPICAMIDE 10 MG/ML
1 SOLUTION/ DROPS OPHTHALMIC
Status: COMPLETED | OUTPATIENT
Start: 2019-09-23 | End: 2019-09-23

## 2019-09-23 RX ORDER — TETRACAINE HYDROCHLORIDE 5 MG/ML
1 SOLUTION OPHTHALMIC
Status: COMPLETED | OUTPATIENT
Start: 2019-09-23 | End: 2019-09-23

## 2019-09-23 RX ORDER — ACETAMINOPHEN 325 MG/1
650 TABLET ORAL EVERY 4 HOURS PRN
Status: DISCONTINUED | OUTPATIENT
Start: 2019-09-23 | End: 2019-09-23 | Stop reason: HOSPADM

## 2019-09-23 RX ORDER — MOXIFLOXACIN 5 MG/ML
1 SOLUTION/ DROPS OPHTHALMIC
Status: CANCELLED | OUTPATIENT
Start: 2019-09-23

## 2019-09-23 RX ORDER — TOBRAMYCIN 3 MG/ML
1 SOLUTION/ DROPS OPHTHALMIC
Status: COMPLETED | OUTPATIENT
Start: 2019-09-23 | End: 2019-09-23

## 2019-09-23 RX ORDER — TOBRAMYCIN 3 MG/ML
1 SOLUTION/ DROPS OPHTHALMIC
Status: DISPENSED | OUTPATIENT
Start: 2019-09-23 | End: 2019-09-23

## 2019-09-23 RX ORDER — PHENYLEPHRINE HYDROCHLORIDE 100 MG/ML
1 SOLUTION/ DROPS OPHTHALMIC ONCE
Status: COMPLETED | OUTPATIENT
Start: 2019-09-23 | End: 2019-09-23

## 2019-09-23 RX ORDER — PROPARACAINE HYDROCHLORIDE 5 MG/ML
1 SOLUTION/ DROPS OPHTHALMIC
Status: DISCONTINUED | OUTPATIENT
Start: 2019-09-23 | End: 2019-09-23 | Stop reason: HOSPADM

## 2019-09-23 RX ORDER — LIDOCAINE HYDROCHLORIDE 40 MG/ML
INJECTION, SOLUTION RETROBULBAR
Status: DISCONTINUED | OUTPATIENT
Start: 2019-09-23 | End: 2019-09-23 | Stop reason: HOSPADM

## 2019-09-23 RX ORDER — PHENYLEPHRINE HYDROCHLORIDE 10 MG/ML
INJECTION INTRAVENOUS
Status: DISCONTINUED | OUTPATIENT
Start: 2019-09-23 | End: 2019-09-23

## 2019-09-23 RX ORDER — MIDAZOLAM HYDROCHLORIDE 1 MG/ML
INJECTION, SOLUTION INTRAMUSCULAR; INTRAVENOUS
Status: DISCONTINUED | OUTPATIENT
Start: 2019-09-23 | End: 2019-09-23

## 2019-09-23 RX ADMIN — PHENYLEPHRINE HYDROCHLORIDE 1 DROP: 25 SOLUTION/ DROPS OPHTHALMIC at 12:09

## 2019-09-23 RX ADMIN — PHENYLEPHRINE HYDROCHLORIDE 200 MCG: 10 INJECTION INTRAVENOUS at 01:09

## 2019-09-23 RX ADMIN — TETRACAINE HYDROCHLORIDE 1 DROP: 5 SOLUTION OPHTHALMIC at 12:09

## 2019-09-23 RX ADMIN — TOBRAMYCIN 1 DROP: 3 SOLUTION/ DROPS OPHTHALMIC at 12:09

## 2019-09-23 RX ADMIN — TOBRAMYCIN 1 DROP: 3 SOLUTION/ DROPS OPHTHALMIC at 02:09

## 2019-09-23 RX ADMIN — TROPICAMIDE 1 DROP: 10 SOLUTION/ DROPS OPHTHALMIC at 12:09

## 2019-09-23 RX ADMIN — SODIUM CHLORIDE, PRESERVATIVE FREE 10 ML: 5 INJECTION INTRAVENOUS at 01:09

## 2019-09-23 RX ADMIN — MIDAZOLAM 2 MG: 1 INJECTION INTRAMUSCULAR; INTRAVENOUS at 01:09

## 2019-09-23 RX ADMIN — SODIUM CHLORIDE, PRESERVATIVE FREE 5 ML: 5 INJECTION INTRAVENOUS at 01:09

## 2019-09-23 NOTE — ANESTHESIA POSTPROCEDURE EVALUATION
Anesthesia Post Evaluation    Patient: Jesenia Curiel    Procedure(s) Performed: Procedure(s) (LRB):  EXTRACTION, CATARACT, WITH IOL INSERTION (Left)    Final Anesthesia Type: MAC  Patient location during evaluation: [facility]  Patient participation: Yes- Able to Participate  Level of consciousness: awake and alert  Post-procedure vital signs: reviewed and stable (123/58, HR-60, O2%-99, RR-20)  Pain management: adequate  Airway patency: patent  PONV status at discharge: No PONV  Anesthetic complications: no      Cardiovascular status: hemodynamically stable  Respiratory status: unassisted, spontaneous ventilation and room air  Hydration status: euvolemic  Follow-up not needed.          Vitals Value Taken Time   /65 9/23/2019 12:36 PM   Temp 36.6 °C (97.9 °F) 9/23/2019 12:36 PM   Pulse 60 9/23/2019 12:36 PM   Resp 18 9/23/2019 12:36 PM   SpO2 97 % 9/23/2019 12:36 PM         No case tracking events are documented in the log.      Pain/Criss Score: No data recorded

## 2019-09-23 NOTE — DISCHARGE INSTRUCTIONS
Raisa Moreno MD  [facility]  Department of Ophthalmology    AFTER: Cataract Surgery:  Relax at home and DO NOT exert yourself for the rest of the day.  Plan to see Dr. Moreno tomorrow at the eye clinic:   [facility] [address] [address]  [place] [address]    Refer to attached eye drop instruction sheet     Precautions:  DO NOT rub your eye.  You may resume moderate activity the day after surgery.  Wear protective sunglasses during the day and a shield at night for 1(one) week.  If you have pain, redness and decreased vision, call Dr. Moreno (or the on-call doctor after hours) @[phone].    Home Care Instructions for Eye Surgeries    1. ACTIVITY:  Limit your activity today. Relax at home and DO NOT exert yourself for the rest of the day. Increase activity gradually. You may return to work or school as directed by your physician.    2. DIET:  Drink plenty of fluids. Resume your normal diet unless instructed otherwise.    3. PAIN:  Expect a moderate amount of pain. If a prescription for pain is not sent home with you, you may take your commonly used pain reliever as directed. If this is not sufficient, call your physician. You may resume any other prescription medication unless otherwise directed by your physician.     Discuss any problem with your physician as soon as it arises. Do not Delay.    EMERGENCY- If you are unable to contact your physician, please go to the nearest Emergency Room.     Anesthesia: Monitored Anesthesia Care (MAC)  Anesthesia Safety  · Have an adult family member or friend drive you home after the procedure.  · For the first 24 hours after your surgery:  · Do not drive or use heavy equipment.  · Do not make important decisions or sign documents.  · Avoid alcohol.  · Have someone stay with you, if possible. They can watch for problems and help keep you safe.

## 2019-09-23 NOTE — OP NOTE
SURGEON:  Raisa Moreno M.D.    PREOPERATIVE DIAGNOSIS:    Nuclear Sclerotic Cataract Left Eye    POSTOPERATIVE DIAGNOSIS:    Nuclear Sclerotic Cataract Left Eye    PROCEDURES:    Phacoemulsification with  intraocular lens, Left eye (19884)  With Femtosecond LASER assist    DATE OF SURGERY: 09/23/2019    IMPLANT: [id number] 32.0    ANESTHESIA:  MAC with topical Lidocaine    COMPLICATIONS:  None    ESTIMATED BLOOD LOSS: None    SPECIMENS: None    INDICATIONS:    The patient has a history of painless progressive visual loss and  difficulty with activities of daily living secondary to cataract formation.  After a thorough discussion of the risks, benefits, and alternatives to cataract surgery, including, but not limited to, the rare risks of infection, retinal detachment, hemorrhage, need for additional surgery, loss of vision, and even loss of the eye, the patient voices understanding and desires to proceed.    DESCRIPTION OF PROCEDURE:    After verification of consent and marking of the operative eye, the patient was positioned under the femtosecond LASER. Topical anesthetic drops were administered. A surgical timeout was initiated with verification of patient identifiers and the laser surgical plan. The eye was docked securely and the laser portion of the cataract procedure was carried out without complication.  The patient was returned to the pre-operative area to await the intraocular surgical portion of the cataract procedure.  The patients IOL calculations were reviewed, and the lens selection confirmed.   After verification and marking of the proper eye in the preop holding area, the patient was brought to the operating room in supine position where the eye was prepped and draped in standard sterile fashion with 5% Betadine and a lid speculum placed in the eye.   Topical 4% Lidocaine was used in addition to the preoperative anesthesia and the procedure was begun by the creation of a paracentesis incision through  which viscoelastic was used to fill the anterior chamber.  Next, a keratome blade was used to create a triplanar temporal clear corneal incision and a cystotome and Utrata forceps used to fashion a continuous curvilinear capsulorrhexis.  Hydrodissection was carried out using the Jean hydrodissection cannula and the nucleus was found to be mobile.  Phacoemulsification of the nucleus was carried out using a quick chop technique, and all remaining epinuclear and cortical material was removed.  The eye was then reformed with Viscoelastic and the  intraocular lens was implanted into the capsular bag.  All remaining viscoelastics were removed from the eye and at the end of the case the pupil was round, the lens was well-centered within the capsular bag and all wounds were found to be water tight.  Drops of Vigamox and Pred Forte were instilled and a shield was placed over the eye. The patient will follow up with Dr. Moreno in the morning.

## 2019-09-23 NOTE — ANESTHESIA PREPROCEDURE EVALUATION
09/23/2019  Jesenia Curiel is a 68 y.o., female.    Pre-op Assessment    I have reviewed the Patient Summary Reports.     I have reviewed the Nursing Notes.   I have reviewed the Medications.     Review of Systems  Anesthesia Hx:  No problems with previous Anesthesia  Denies Family Hx of Anesthesia complications.   Denies Personal Hx of Anesthesia complications.   Social:  Non-Smoker    Hematology/Oncology:     Oncology Normal    -- Anemia:   EENT/Dental:EENT/Dental Normal   Cardiovascular:   Exercise tolerance: good Pacemaker Hypertension Dysrhythmias atrial fibrillation    Pulmonary:  Pulmonary Normal    Renal/:   Chronic Renal Disease, CRI    Hepatic/GI:   GERD    Musculoskeletal:  Musculoskeletal Normal    Neurological:  Neurology Normal    Endocrine:  Endocrine Normal    Dermatological:  Skin Normal    Psych:  Psychiatric Normal           Physical Exam  General:  Obesity    Airway/Jaw/Neck:  Airway Findings: Mouth Opening: Normal Tongue: Normal  General Airway Assessment: Adult  Mallampati: I  TM Distance: Normal, at least 6 cm  Jaw/Neck Findings:  Neck ROM: Normal ROM      Dental:  Dental Findings: In tact             Anesthesia Plan  Type of Anesthesia, risks & benefits discussed:  Anesthesia Type:  MAC  Patient's Preference:   Intra-op Monitoring Plan:   Intra-op Monitoring Plan Comments:   Post Op Pain Control Plan:   Post Op Pain Control Plan Comments:   Induction:    Beta Blocker:         Informed Consent: Patient understands risks and agrees with Anesthesia plan.  Questions answered. Anesthesia consent signed with patient.  ASA Score: 3     Day of Surgery Review of History & Physical:    H&P update referred to the surgeon.         Ready For Surgery From Anesthesia Perspective.

## 2019-09-24 ENCOUNTER — OFFICE VISIT (OUTPATIENT)
Dept: OPHTHALMOLOGY | Facility: CLINIC | Age: 69
End: 2019-09-24
Attending: OPHTHALMOLOGY
Payer: MEDICARE

## 2019-09-24 DIAGNOSIS — Z98.890 POST-OPERATIVE STATE: Primary | ICD-10-CM

## 2019-09-24 DIAGNOSIS — H25.12 NUCLEAR SCLEROTIC CATARACT OF LEFT EYE: ICD-10-CM

## 2019-09-24 PROCEDURE — 99212 OFFICE O/P EST SF 10 MIN: CPT | Mod: PBBFAC | Performed by: OPHTHALMOLOGY

## 2019-09-24 PROCEDURE — 99999 PR PBB SHADOW E&M-EST. PATIENT-LVL II: ICD-10-PCS | Mod: PBBFAC,,, | Performed by: OPHTHALMOLOGY

## 2019-09-24 PROCEDURE — 99999 PR PBB SHADOW E&M-EST. PATIENT-LVL II: CPT | Mod: PBBFAC,,, | Performed by: OPHTHALMOLOGY

## 2019-09-24 PROCEDURE — 99024 PR POST-OP FOLLOW-UP VISIT: ICD-10-PCS | Mod: POP,,, | Performed by: OPHTHALMOLOGY

## 2019-09-24 PROCEDURE — 99024 POSTOP FOLLOW-UP VISIT: CPT | Mod: POP,,, | Performed by: OPHTHALMOLOGY

## 2019-09-24 RX ORDER — PREDNISOLONE ACETATE 10 MG/ML
SUSPENSION/ DROPS OPHTHALMIC
Refills: 4 | COMMUNITY
Start: 2019-08-27 | End: 2020-09-22

## 2019-09-24 RX ORDER — TOBRAMYCIN 3 MG/ML
SOLUTION/ DROPS OPHTHALMIC
Refills: 3 | COMMUNITY
Start: 2019-08-26 | End: 2020-09-22

## 2019-09-24 NOTE — PROGRESS NOTES
HPI     POD1 CE w/IOL OS     Patient states she is doing well, slight photophobia otherwise no   problems.     Using drops as directed OS TID     Last edited by Toma George, PCT on 9/24/2019  9:58 AM. (History)            Assessment /Plan     For exam results, see Encounter Report.    Post-operative state    Nuclear sclerotic cataract of left eye      Slit lamp exam:  L/L: nl  K: clear, wound sealed  AC: 1+ cell  Lens: IOL centered and stable    POD1 s/p Phaco/IOL  Appropriate precautions and post op medications reviewed.  Patient instructed to call or come in if symptoms of redness, decreased vision, or pain are experienced.

## 2019-09-30 ENCOUNTER — CLINICAL SUPPORT (OUTPATIENT)
Dept: CARDIOLOGY | Facility: HOSPITAL | Age: 69
End: 2019-09-30
Payer: MEDICARE

## 2019-09-30 PROCEDURE — 93294 REM INTERROG EVL PM/LDLS PM: CPT | Mod: ,,, | Performed by: INTERNAL MEDICINE

## 2019-09-30 PROCEDURE — 93294 CARDIAC DEVICE CHECK - REMOTE: ICD-10-PCS | Mod: ,,, | Performed by: INTERNAL MEDICINE

## 2019-10-21 NOTE — TELEPHONE ENCOUNTER
----- Message from Luz Love sent at 10/21/2019 12:33 PM CDT -----  Contact: [facility]/pharmacy #[id number] - [place] - [address] AT [phone] (Phone)  Patient is calling for an RX refill or new RX.  Is this a refill or new RX:  refill  RX name and strength: lorcaserin (BELVIQ) 10 mg Tab  Directions (copy/paste from chart):    Is this a 30 day or 90 day RX:    Local pharmacy or mail order pharmacy:  local  Pharmacy name and phone # (copy/paste from chart):   [facility]/pharmacy #[id number] [place] - [address] AT [facility] [phone] (Phone)  [phone] (Fax)      Comments:

## 2019-11-01 ENCOUNTER — OFFICE VISIT (OUTPATIENT)
Dept: OPTOMETRY | Facility: CLINIC | Age: 69
End: 2019-11-01
Payer: MEDICARE

## 2019-11-01 DIAGNOSIS — Z98.42 S/P BILATERAL CATARACT EXTRACTION: Primary | ICD-10-CM

## 2019-11-01 DIAGNOSIS — Z98.41 S/P BILATERAL CATARACT EXTRACTION: Primary | ICD-10-CM

## 2019-11-01 PROCEDURE — 99024 PR POST-OP FOLLOW-UP VISIT: ICD-10-PCS | Mod: POP,,, | Performed by: OPTOMETRIST

## 2019-11-01 PROCEDURE — 99999 PR PBB SHADOW E&M-EST. PATIENT-LVL II: CPT | Mod: PBBFAC,,, | Performed by: OPTOMETRIST

## 2019-11-01 PROCEDURE — 99024 POSTOP FOLLOW-UP VISIT: CPT | Mod: POP,,, | Performed by: OPTOMETRIST

## 2019-11-01 PROCEDURE — 99999 PR PBB SHADOW E&M-EST. PATIENT-LVL II: ICD-10-PCS | Mod: PBBFAC,,, | Performed by: OPTOMETRIST

## 2019-11-01 PROCEDURE — 92134 CPTRZ OPH DX IMG PST SGM RTA: CPT | Mod: PBBFAC | Performed by: OPTOMETRIST

## 2019-11-01 PROCEDURE — 99212 OFFICE O/P EST SF 10 MIN: CPT | Mod: PBBFAC,25 | Performed by: OPTOMETRIST

## 2019-11-01 NOTE — PROGRESS NOTES
HPI     1 mo CE w/IOL OS 09/23/2019    Patient states she is doing well vision is good has noticed a lot of   improvement since sx dry eye seems to be a little worse since surgery.   Denies pain     D/c comb gtts    AT's Systane only at this time     Last edited by Paige Redd on 11/1/2019 10:35 AM. (History)            Assessment /Plan     For exam results, see Encounter Report.    S/P bilateral cataract extraction  -     OCT- Retina            1.  Pt doing well.  No rx given.  No CME OU.   Retina flat and intact OU--no holes, tears, breaks, or RDs.  Return to yearly exams.

## 2019-11-15 DIAGNOSIS — Z12.11 COLON CANCER SCREENING: ICD-10-CM

## 2019-11-21 DIAGNOSIS — I10 BENIGN ESSENTIAL HYPERTENSION: ICD-10-CM

## 2019-11-21 RX ORDER — OLMESARTAN MEDOXOMIL 20 MG/1
TABLET ORAL
Qty: 90 TABLET | Refills: 3 | Status: SHIPPED | OUTPATIENT
Start: 2019-11-21 | End: 2020-11-06

## 2019-12-29 ENCOUNTER — CLINICAL SUPPORT (OUTPATIENT)
Dept: CARDIOLOGY | Facility: HOSPITAL | Age: 69
End: 2019-12-29
Payer: MEDICARE

## 2019-12-29 DIAGNOSIS — I48.91 UNSPECIFIED ATRIAL FIBRILLATION: ICD-10-CM

## 2019-12-29 DIAGNOSIS — Z95.0 PRESENCE OF CARDIAC PACEMAKER: ICD-10-CM

## 2019-12-29 DIAGNOSIS — I49.5 SICK SINUS SYNDROME: ICD-10-CM

## 2019-12-29 PROCEDURE — 93294 REM INTERROG EVL PM/LDLS PM: CPT | Mod: ,,, | Performed by: INTERNAL MEDICINE

## 2019-12-29 PROCEDURE — 93294 CARDIAC DEVICE CHECK - REMOTE: ICD-10-PCS | Mod: ,,, | Performed by: INTERNAL MEDICINE

## 2020-01-06 ENCOUNTER — PATIENT MESSAGE (OUTPATIENT)
Dept: ADMINISTRATIVE | Facility: HOSPITAL | Age: 70
End: 2020-01-06

## 2020-01-06 ENCOUNTER — PATIENT OUTREACH (OUTPATIENT)
Dept: ADMINISTRATIVE | Facility: HOSPITAL | Age: 70
End: 2020-01-06

## 2020-01-06 ENCOUNTER — DOCUMENTATION ONLY (OUTPATIENT)
Dept: ADMINISTRATIVE | Facility: HOSPITAL | Age: 70
End: 2020-01-06

## 2020-01-06 NOTE — PROGRESS NOTES
Chart Reviewed- Vaccines Updated- Responded to pt Portal, mailing Fit Kit to her and made aware of need to call our office to schedule her MMG.

## 2020-02-05 ENCOUNTER — PATIENT OUTREACH (OUTPATIENT)
Dept: ADMINISTRATIVE | Facility: HOSPITAL | Age: 70
End: 2020-02-05

## 2020-02-05 ENCOUNTER — PATIENT MESSAGE (OUTPATIENT)
Dept: ADMINISTRATIVE | Facility: HOSPITAL | Age: 70
End: 2020-02-05

## 2020-02-05 DIAGNOSIS — N18.9 CHRONIC KIDNEY DISEASE, UNSPECIFIED CKD STAGE: ICD-10-CM

## 2020-02-05 NOTE — PROGRESS NOTES
Chart Reviewed - Vaccines Updated - Portal message sent to call to schedule her MMG and to mail her Fit Kit.

## 2020-02-10 ENCOUNTER — LAB VISIT (OUTPATIENT)
Dept: LAB | Facility: HOSPITAL | Age: 70
End: 2020-02-10
Attending: INTERNAL MEDICINE
Payer: MEDICARE

## 2020-02-10 DIAGNOSIS — Z12.11 COLON CANCER SCREENING: ICD-10-CM

## 2020-02-10 PROCEDURE — 82274 ASSAY TEST FOR BLOOD FECAL: CPT

## 2020-02-17 LAB — HEMOCCULT STL QL IA: NEGATIVE

## 2020-02-25 ENCOUNTER — OFFICE VISIT (OUTPATIENT)
Dept: URGENT CARE | Facility: CLINIC | Age: 70
End: 2020-02-25
Payer: MEDICARE

## 2020-02-25 VITALS
RESPIRATION RATE: 16 BRPM | OXYGEN SATURATION: 98 % | HEART RATE: 60 BPM | SYSTOLIC BLOOD PRESSURE: 130 MMHG | WEIGHT: 190 LBS | TEMPERATURE: 98 F | DIASTOLIC BLOOD PRESSURE: 78 MMHG | BODY MASS INDEX: 35.87 KG/M2 | HEIGHT: 61 IN

## 2020-02-25 DIAGNOSIS — A46 ERYSIPELAS: Primary | ICD-10-CM

## 2020-02-25 PROCEDURE — 99213 PR OFFICE/OUTPT VISIT, EST, LEVL III, 20-29 MIN: ICD-10-PCS | Mod: S$GLB,,, | Performed by: FAMILY MEDICINE

## 2020-02-25 PROCEDURE — 99213 OFFICE O/P EST LOW 20 MIN: CPT | Mod: S$GLB,,, | Performed by: FAMILY MEDICINE

## 2020-02-25 RX ORDER — ERYTHROMYCIN 500 MG/1
500 TABLET, COATED ORAL
Qty: 30 TABLET | Refills: 0 | Status: SHIPPED | OUTPATIENT
Start: 2020-02-25 | End: 2020-03-06

## 2020-02-25 NOTE — PROGRESS NOTES
"Subjective:       Patient ID: Jesenia Curiel is a 69 y.o. female.    Vitals:  height is 5' 1" (1.549 m) and weight is 86.2 kg (190 lb). Her oral temperature is 98 °F (36.7 °C). Her blood pressure is 130/78 and her pulse is 60. Her respiration is 16 and oxygen saturation is 98%.     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 The current episode started in the past 7 days (X4 DAYS). The problem occurs constantly. The problem has been unchanged. Associated symptoms include a rash. Pertinent negatives include no arthralgias, chills, coughing, fever, joint swelling or sore throat. Treatments tried: NEOSPORIN. The treatment provided mild relief.       Constitution: Negative for chills and fever.   HENT: Negative for facial swelling and sore throat.    Neck: Negative for painful lymph nodes.   Eyes: Negative for eye itching and eyelid swelling.   Respiratory: Negative for cough.    Musculoskeletal: Negative for joint pain and joint swelling.   Skin: Positive for rash. Negative for color change, pale, wound, abrasion, laceration, lesion, skin thickening/induration, puncture wound, erythema, bruising, abscess, avulsion and hives.        R FACE   Allergic/Immunologic: Positive for itching. Negative for environmental allergies, immunocompromised state and hives.   Hematologic/Lymphatic: Negative for swollen lymph nodes.       Objective:      Physical Exam   Constitutional: She is oriented to person, place, and time. She appears well-developed and well-nourished. She is cooperative.  Non-toxic appearance. She does not appear ill. No distress.   HENT:   Head: Normocephalic and atraumatic.   Right Ear: Hearing, tympanic membrane, external ear and ear canal normal.   Left Ear: Hearing, tympanic " membrane, external ear and ear canal normal.   Nose: Nose normal. No mucosal edema, rhinorrhea or nasal deformity. No epistaxis. Right sinus exhibits no maxillary sinus tenderness and no frontal sinus tenderness. Left sinus exhibits no maxillary sinus tenderness and no frontal sinus tenderness.   Mouth/Throat: Uvula is midline, oropharynx is clear and moist and mucous membranes are normal. No trismus in the jaw. Normal dentition. No uvula swelling. No posterior oropharyngeal erythema.   Eyes: Conjunctivae and lids are normal. Right eye exhibits no discharge. Left eye exhibits no discharge. No scleral icterus.   Neck: Trachea normal, normal range of motion, full passive range of motion without pain and phonation normal. Neck supple.   Cardiovascular: Normal rate, regular rhythm, normal heart sounds, intact distal pulses and normal pulses.   Pulmonary/Chest: Effort normal and breath sounds normal. No respiratory distress.   Abdominal: Soft. Normal appearance and bowel sounds are normal. She exhibits no distension, no pulsatile midline mass and no mass. There is no tenderness.   Musculoskeletal: Normal range of motion. She exhibits no edema or deformity.   Neurological: She is alert and oriented to person, place, and time. She exhibits normal muscle tone. Coordination normal.   Skin: Skin is warm, dry, intact, not diaphoretic and not pale.   Right temporal area with mild erythema minimal warmth and and a circular rash erythema  Psychiatric: She has a normal mood and affect. Her speech is normal and behavior is normal. Judgment and thought content normal. Cognition and memory are normal.   Nursing note and vitals reviewed.        Assessment:       1. Erysipelas        Plan:         Erysipelas    Other orders  -     erythromycin base (E-MYCIN) 500 MG tablet; Take 1 tablet (500 mg total) by mouth 3 (three) times daily with meals. for 10 days  Dispense: 30 tablet; Refill: 0

## 2020-02-27 ENCOUNTER — PATIENT MESSAGE (OUTPATIENT)
Dept: INTERNAL MEDICINE | Facility: CLINIC | Age: 70
End: 2020-02-27

## 2020-03-02 ENCOUNTER — PATIENT OUTREACH (OUTPATIENT)
Dept: ADMINISTRATIVE | Facility: OTHER | Age: 70
End: 2020-03-02

## 2020-03-02 ENCOUNTER — HOSPITAL ENCOUNTER (EMERGENCY)
Facility: HOSPITAL | Age: 70
Discharge: HOME | End: 2020-03-02
Attending: EMERGENCY MEDICINE
Payer: MEDICARE

## 2020-03-02 VITALS
RESPIRATION RATE: 18 BRPM | HEART RATE: 61 BPM | BODY MASS INDEX: 34.96 KG/M2 | OXYGEN SATURATION: 96 % | WEIGHT: 190 LBS | HEIGHT: 62 IN | TEMPERATURE: 98 F | SYSTOLIC BLOOD PRESSURE: 127 MMHG | DIASTOLIC BLOOD PRESSURE: 69 MMHG

## 2020-03-02 DIAGNOSIS — I10 BENIGN ESSENTIAL HYPERTENSION: ICD-10-CM

## 2020-03-02 DIAGNOSIS — B02.9 HERPES ZOSTER WITHOUT COMPLICATION: Primary | ICD-10-CM

## 2020-03-02 PROCEDURE — 99284 EMERGENCY DEPT VISIT MOD MDM: CPT | Mod: ,,, | Performed by: EMERGENCY MEDICINE

## 2020-03-02 PROCEDURE — 99283 EMERGENCY DEPT VISIT LOW MDM: CPT

## 2020-03-02 PROCEDURE — 25000003 PHARM REV CODE 250: Performed by: PHYSICIAN ASSISTANT

## 2020-03-02 PROCEDURE — 99284 PR EMERGENCY DEPT VISIT,LEVEL IV: ICD-10-PCS | Mod: ,,, | Performed by: EMERGENCY MEDICINE

## 2020-03-02 RX ORDER — VALACYCLOVIR HYDROCHLORIDE 500 MG/1
1000 TABLET, FILM COATED ORAL 2 TIMES DAILY
Status: DISCONTINUED | OUTPATIENT
Start: 2020-03-02 | End: 2020-03-02 | Stop reason: HOSPADM

## 2020-03-02 RX ORDER — VALACYCLOVIR HYDROCHLORIDE 1 G/1
1000 TABLET, FILM COATED ORAL 3 TIMES DAILY
Qty: 21 TABLET | Refills: 0 | Status: SHIPPED | OUTPATIENT
Start: 2020-03-02 | End: 2020-09-22

## 2020-03-02 RX ORDER — TRIAMTERENE AND HYDROCHLOROTHIAZIDE 37.5; 25 MG/1; MG/1
CAPSULE ORAL
Qty: 90 CAPSULE | Refills: 2 | Status: SHIPPED | OUTPATIENT
Start: 2020-03-02 | End: 2020-12-12 | Stop reason: SDUPTHER

## 2020-03-02 RX ORDER — PROPARACAINE HYDROCHLORIDE 5 MG/ML
1 SOLUTION/ DROPS OPHTHALMIC
Status: COMPLETED | OUTPATIENT
Start: 2020-03-02 | End: 2020-03-02

## 2020-03-02 RX ADMIN — FLUORESCEIN SODIUM 1 EACH: 1 STRIP OPHTHALMIC at 11:03

## 2020-03-02 RX ADMIN — PROPARACAINE HYDROCHLORIDE 1 DROP: 5 SOLUTION/ DROPS OPHTHALMIC at 11:03

## 2020-03-02 NOTE — PROGRESS NOTES
Chart reviewed.   Mammogram order not placed, patient declined 1/5/2018.  Immunizations reconciled.    updated.

## 2020-03-02 NOTE — ED NOTES
Pharmacy not verifying med, needing renal labs. MD contacting pharmacy to dose pt since labs were not drawn today.

## 2020-03-02 NOTE — ED PROVIDER NOTES
Encounter Date: 3/2/2020       History     Chief Complaint   Patient presents with    Rash     ?singles and worried getting into eye     Patient is a 69 year old female with past medical history of atrial fibrillation, EKG, GERD, hypertension, hyperlipidemia, pre diabetes who presents the emergency department with complaints facial rash, itching, and pre-auricular lymphadenopathy that began about 9 days ago. She presented to urgent care 6 days was diagnosed erysipelas and given erythromycin.  She reports that about 5 days ago, she noticed vesicles and realized that this was in fact shingles.  She reports last episode of shingles was in 2005.  She reports that this morning, she noticed slight blurred vision and discomfort to the right eye.  She denies photophobia, double vision, floaters.  She has not started antivirals but has taken erythromycin. She denies worsening or alleviating factors. This is the extent of the patient's complaints.         Review of patient's allergies indicates:   Allergen Reactions    Iodinated contrast media Anaphylaxis    Penicillins Anaphylaxis    Ciprofloxacin Rash    Quinolones Rash    Sulfa (sulfonamide antibiotics) Rash    Tetracyclines Rash     Past Medical History:   Diagnosis Date    Allergy     Atrial fibrillation 5/29/2017    CKD (chronic kidney disease) stage 3, GFR 30-59 ml/min 6/26/2017    Disorder of kidney and ureter     GERD (gastroesophageal reflux disease)     Hyperlipidemia     Hypertension     Pre-diabetes 6/3/2017     Past Surgical History:   Procedure Laterality Date    BACK SURGERY  2000    lamenectomy    CARDIAC SURGERY      st loida pacemaker     CATARACT EXTRACTION W/  INTRAOCULAR LENS IMPLANT Right 6/3/2019    Procedure: EXTRACTION, CATARACT, WITH IOL INSERTION;  Surgeon: Raisa Moreno MD;  [facility];  Service: Ophthalmology;  Laterality: Right;  Laser    CATARACT EXTRACTION W/  INTRAOCULAR LENS IMPLANT Left 9/23/2019    Procedure:  EXTRACTION, CATARACT, WITH IOL INSERTION;  Surgeon: Raisa Moreno MD;  Location: [facility];  Service: Ophthalmology;  Laterality: Left;  LASER ASSISTED     SECTION      ,     HYSTERECTOMY      INSERT / REPLACE / REMOVE PACEMAKER      placement    INSERT / REPLACE / REMOVE PACEMAKER  2014    [employer] Model #[id number] replacement    pace maker      replacement     salpingo opherectomy Bilateral 1998    TONSILLECTOMY  1953     Family History   Problem Relation Age of Onset    Hypertension Mother     Diabetes Mother     Hyperlipidemia Mother     Coronary artery disease Mother     Heart disease Mother     Stroke Mother     Hypertension Father     Diabetes Father     Hyperlipidemia Father     Coronary artery disease Father     Heart disease Father     Coronary artery disease Brother     Heart disease Brother     Hyperthyroidism Brother     Diabetes Paternal Grandmother     Diabetes Paternal Grandfather     Heart attack Neg Hx      Social History     Tobacco Use    Smoking status: Never Smoker    Smokeless tobacco: Never Used   Substance Use Topics    Alcohol use: Yes     Alcohol/week: 6.0 standard drinks     Types: 6 Glasses of wine per week     Comment: occassionally    Drug use: No     Review of Systems   Constitutional: Negative for fever.   HENT: Negative for sore throat.    Eyes: Positive for pain and visual disturbance.   Respiratory: Negative for shortness of breath.    Cardiovascular: Negative for chest pain.   Gastrointestinal: Negative for nausea.   Genitourinary: Negative for dysuria.   Musculoskeletal: Negative for back pain.   Skin: Positive for rash.   Neurological: Negative for weakness and headaches.       Physical Exam     Initial Vitals [20 1005]   BP Pulse Resp Temp SpO2   127/69 61 18 97.7 °F (36.5 °C) 96 %      MAP       --         Physical Exam    Nursing note and vitals reviewed.  Constitutional: She appears well-developed and  well-nourished. She is not diaphoretic. No distress.   HENT:   Head: Normocephalic and atraumatic.   Right Ear: External ear normal.   Left Ear: External ear normal.   Mouth/Throat: Oropharynx is clear and moist.   Eyes: Conjunctivae and EOM are normal. Pupils are equal, round, and reactive to light.   Wood's lamp examination reveals no fluorescein uptake.  Visual acuity 20/20 in right eye.   Neck: Normal range of motion. Neck supple.   Cardiovascular: Normal rate, regular rhythm, normal heart sounds and intact distal pulses. Exam reveals no gallop and no friction rub.    No murmur heard.  Pulmonary/Chest: Breath sounds normal. She has no wheezes. She has no rhonchi. She has no rales.   Abdominal: Soft. Bowel sounds are normal. There is no tenderness.   Musculoskeletal: Normal range of motion.   Lymphadenopathy:     She has no cervical adenopathy.   Neurological: She is alert and oriented to person, place, and time. She has normal strength. No cranial nerve deficit or sensory deficit. GCS score is 15. GCS eye subscore is 4. GCS verbal subscore is 5. GCS motor subscore is 6.   Skin: Skin is warm and dry. Capillary refill takes less than 2 seconds.   Vesicles surrounded by erythematous base around right temporal region/cranial nerve 5 maxillary distribution.   Psychiatric: She has a normal mood and affect. Her behavior is normal. Judgment and thought content normal.         ED Course   Procedures  Labs Reviewed - No data to display           Medical Decision Making:   History:   Old Medical Records: I decided to obtain old medical records.  Initial Assessment:   Emergent evaluation of a 69-year-old female who presents the emergency department with rash pruritus, right eye discomfort, right eye blurred vision.  She reports rash began about 9 days ago however I complaints began this morning.  Patient is afebrile, hemodynamically stable, nontoxic appearing.  Will perform a Wood's lamp evaluation and contact  Ophthalmology.   Differential Diagnosis:   Differential diagnosis includes but is not limited to herpes zoster, herpes zoster ophthalmologic complication, dermatitis, cellulitis.  ED Management:  Wood's lamp examination reveals no fluorescein uptake.  Discussed case with Ophthalmology who agrees that the patient does not need an emergent consultation in the ED.  Patient is scheduled for an appointment for tomorrow morning at 10:00 a.m..  Will also give a prescription for valacylovir TID.  She is instructed to take this medication as prescribed. The patient was instructed to follow up with Ophthalmology tomorrow or to return to the emergency department for worsening symptoms. The treatment plan was discussed with the patient who demonstrated understanding and comfort with plan. The patient's history, physical exam, and plan of care was discussed with and agreed upon with my supervising physician.                 Attending Attestation:     Physician Attestation Statement for NP/PA:   I have conducted a face to face encounter with this patient in addition to the NP/PA, due to Patient Request    Other NP/PA Attestation Additions:    History of Present Illness: > 72 hr of Localized, pruritic, mildly painful vesicular rash to right temporal region with concern of extension to right eye despite no acuity changes, unusual lacrimation, burning/pain to right eye or any other complaints. No fever or chills. No neck pain or neck stiffness. No headache. No ambulation/gait problems, hearing deficits or pain/itchiness to right ear.   Physical Exam: Localized fascicular rash to right temporal region at maxillary (V2) distribution.  No extension to ear canal, tympanic membrane, nose, nasal septum, or right eye.  Acuity, fields grossly intact. Pupils equally round and reactive to light with no direct or consensual photophobia.  No hyperemia to sclera.  No fluorescein uptake to right cornea.   Medical Decision Making: Localized  vesicular rash consistent with herpetic zoster with no appreciated on ocular or auditory extension.  Despite greater than 24/48 hours of symptom onset, initiated antiviral medication.  No ocular involvement or immunosuppressive state that may warrant IV treatment or admission.  No ocular involvement not may need emergent ophthalmological evaluation at this time.  Nonetheless, referred and spoke to Ophthalmology who will see her in 24 hr as an outpatient.  ____________________  Gerard Calloway MD, [facility]  Emergency Medicine Staff           Attending ED Notes:   STAFF ATTENDING PHYSICIAN NOTE:  I evaluated patient, discussed with the [facility] and agree with their management of care. I have also reviewed their notes, assessments, and/or procedures documented on Jesenia Salud Escamillabons. I individually repeated key portions of the KIA[facility]'s history and physical exam, discussed Jesenia Curiel's care with the [facility], reviewed their documentation and agree with their assessment and management of care provided. I was also present for any critical portion of any procedure(s) performed.  ____________________  Gerard Calloway MD, [facility]  Emergency Medicine Staff                          Clinical Impression:     1. Herpes zoster without complication            Disposition:   Disposition: Discharged  Condition: Stable     ED Disposition Condition    Discharge Stable        ED Prescriptions     Medication Sig Dispense Start Date End Date Auth. Provider    valACYclovir (VALTREX) 1000 MG tablet Take 1 tablet (1,000 mg total) by mouth 3 (three) times daily. for 7 days 21 tablet 3/2/2020 3/9/2020 Gaviota Rhoades PA-C        Follow-up Information     Follow up With Specialties Details Why Contact Info Additional Information    [facility] - Ophthalmology Ophthalmology Schedule an appointment as soon as possible for a visit in 1 day  [address] [facility] [address] [phone] The Optometry department is located on the 10th floor.  If you are seeing Dr. Figueroa, her clinic is located in the [facility] on the 1st floor.    [facility] Emergency Medicine Go to  If symptoms worsen [id number]  [facility] [id number] [phone]                       Future Appointments   Date Time Provider Department Center   3/3/2020 10:00 AM Delano Ordonez OD [facility] OPTOMTY [facility] [address]   3/28/2020 10:00 AM HOME MONITOR DEVICE CHECK, [facility]   4/14/2020 10:30 AM Sandra Michaud MD [facility]                    Gaviota Rhoades PA-C  03/02/20 [id number]       Richard Calloway MD  03/03/20 [id number]

## 2020-03-02 NOTE — ED NOTES
Patient identifiers verified and correct for Jesenia Curiel 1950.   LOC: The patient is awake, alert and aware of environment with an appropriate affect, the patient is oriented x 3 and speaking appropriately.   APPEARANCE: Patient appears comfortable and in no acute distress, patient is clean and well groomed.  SKIN: The skin is warm and dry, color consistent with ethnicity, patient has normal skin turgor and moist mucus membranes, skin intact, no breakdown or bruising noted. EX: R eye irritation   MUSCULOSKELETAL: Patient moving all extremities spontaneously, no swelling noted.  RESPIRATORY: Airway is open and patent, respirations are spontaneous, patient has a normal effort and rate, no accessory muscle use noted, O2 sats noted at 96% on room air.  CARDIAC: Patient has a normal rate and regular rhythm, no edema noted, capillary refill < 3 seconds.   GASTRO: Soft and non tender to palpation, no distention noted, normoactive bowel sounds present in all four quadrants. Pt states bowel movements have been regular.  : Pt denies any pain or frequency with urination.  NEURO: Pt opens eyes spontaneously, behavior appropriate to situation, follows commands, facial expression symmetrical, bilateral hand grasp equal and even, purposeful motor response noted, normal sensation in all extremities when touched with a finger.

## 2020-03-02 NOTE — DISCHARGE INSTRUCTIONS
Please take valcyclovir three times daily for 7 days and follow up with Ophthalmology in 1 day.  Please return to the emergency department for any new or worsening symptoms.

## 2020-03-03 ENCOUNTER — OFFICE VISIT (OUTPATIENT)
Dept: OPTOMETRY | Facility: CLINIC | Age: 70
End: 2020-03-03
Payer: MEDICARE

## 2020-03-03 DIAGNOSIS — Z98.41 S/P BILATERAL CATARACT EXTRACTION: ICD-10-CM

## 2020-03-03 DIAGNOSIS — Z98.42 S/P BILATERAL CATARACT EXTRACTION: ICD-10-CM

## 2020-03-03 DIAGNOSIS — Z96.1 PSEUDOPHAKIA OF BOTH EYES: ICD-10-CM

## 2020-03-03 DIAGNOSIS — Z13.5 SCREENING FOR EYE CONDITION: ICD-10-CM

## 2020-03-03 DIAGNOSIS — B02.8 HERPES ZOSTER WITH COMPLICATION: Primary | ICD-10-CM

## 2020-03-03 PROCEDURE — 92014 PR EYE EXAM, EST PATIENT,COMPREHESV: ICD-10-PCS | Mod: S$PBB,,, | Performed by: OPTOMETRIST

## 2020-03-03 PROCEDURE — 92014 COMPRE OPH EXAM EST PT 1/>: CPT | Mod: S$PBB,,, | Performed by: OPTOMETRIST

## 2020-03-03 PROCEDURE — 99999 PR PBB SHADOW E&M-EST. PATIENT-LVL III: ICD-10-PCS | Mod: PBBFAC,,, | Performed by: OPTOMETRIST

## 2020-03-03 PROCEDURE — 99213 OFFICE O/P EST LOW 20 MIN: CPT | Mod: PBBFAC | Performed by: OPTOMETRIST

## 2020-03-03 PROCEDURE — 99999 PR PBB SHADOW E&M-EST. PATIENT-LVL III: CPT | Mod: PBBFAC,,, | Performed by: OPTOMETRIST

## 2020-03-03 NOTE — PATIENT INSTRUCTIONS
Recent diagnosis of Herpes Zoster on right side of face.  Dr. Curiel reports some symptoms of ocular discomfort in the right eye.  No evidence of involvement of the right eye secondary to Herpez Zoster.       S/P cataract surgery, with posterior chamber intraocular lens.    Good unaided distance VA in each eye.  Refraction not repeated.     Reassued Dr. Curiel that there is no evidence of involvement of the right eye, but she is to monitor for change in symptoms and to call for recheck if she notes any apparent changes in symptoms.  Continue to take Valacyclovir as prescribed by emergency room physician.  Dr. Curiel declines Rx for topical ointment for application to skin lesions on right side of face.

## 2020-03-03 NOTE — PROGRESS NOTES
HPI     Follow-up      Additional comments: Pt comes in today as a ED follow up for Shingles on   right side. Initial skin eruption night of 02/29/20.Was placed on   Valacyclovir yesterday in [facility](1000 mg tid). Noted ocular discomfort   on right side, and reports seeing film over OD              Comments     Patient's age: 69 y.o.  Occupation: Retired GI physician   Approximate date of last eye examination:  11/01/2019  Name of last eye doctor seen:   City/State: [place]  Wears glasses? N   Any problem with VA ?  Film over OD   Wears CLs?:  No   Headaches?  No   Eye pain/discomfort?  Minor pain in OD upon awakening in AM yesterday                                                                                    Flashes?  No   Floaters?  No   Diplopia/Double vision?  No   Patient's Ocular History:         Any eye surgeries? S/p phaco OU          Any eye injury?  No          Any treatment for eye disease?  Dryness   Family history of eye disease?  No   Significant patient medical history:         1. Diabetes?  No        If yes, IDDM or NIDDM?  n/a   2. HBP?  Yes, controlled               3. Other (describe):  Kidney Disease, pace maker, recent   presentation of Herpes Zoster on right side of face.    ! OTC eyedrops currently using:  Systane prn    ! Prescription eye meds currently using:  No    ! Any history of allergy/adverse reaction to any eye meds used   previously?  No     ! Any history of allergy/adverse reaction to eyedrops used during prior   eye exam(s)? No     ! Any history of allergy/adverse reaction to Novacaine or similar meds?   No    ! Any history of allergy/adverse reaction to Epinephrine or similar meds?   No     ! Patient okay with use of anesthetic eyedrops to check eye pressure?    Yes         ! Patient okay with use of eyedrops to dilate pupils today?  Yes    !  Allergies/Medications/Medical History/Family History reviewed today?    Yes                                                                        Last edited by Delano Ordonez, OD on 3/3/2020 10:42 AM. (History)            Assessment /Plan     For exam results, see Encounter Report.    1. Herpes zoster with complication     2. Screening for eye condition     3. S/P bilateral cataract extraction     4. Pseudophakia of both eyes                    Recent diagnosis of Herpes Zoster on right side of face.  Dr. Curiel reports some symptoms of ocular discomfort in the right eye.  No evidence of involvement of the right eye secondary to Herpez Zoster.       S/P cataract surgery, with posterior chamber intraocular lens.    Good unaided distance VA in each eye.  Refraction not repeated.     Reassued Dr. Curiel that there is no evidence of involvement of the right eye, but she is to monitor for change in symptoms and to call for recheck if she notes any apparent changes in symptoms.  Continue to take Valacyclovir as prescribed by emergency room physician.  Dr. Curiel declines Rx for topical ointment for application to skin lesions on right side of face.

## 2020-03-20 DIAGNOSIS — E78.5 HYPERLIPIDEMIA, UNSPECIFIED HYPERLIPIDEMIA TYPE: ICD-10-CM

## 2020-03-20 RX ORDER — ATORVASTATIN CALCIUM 80 MG/1
80 TABLET, FILM COATED ORAL DAILY
Qty: 90 TABLET | Refills: 3 | Status: SHIPPED | OUTPATIENT
Start: 2020-03-20 | End: 2021-03-24

## 2020-03-20 RX ORDER — ATORVASTATIN CALCIUM 80 MG/1
TABLET, FILM COATED ORAL
Qty: 90 TABLET | Refills: 3 | OUTPATIENT
Start: 2020-03-20

## 2020-03-28 ENCOUNTER — CLINICAL SUPPORT (OUTPATIENT)
Dept: CARDIOLOGY | Facility: HOSPITAL | Age: 70
End: 2020-03-28
Attending: INTERNAL MEDICINE
Payer: MEDICARE

## 2020-03-28 DIAGNOSIS — Z95.0 CARDIAC PACEMAKER IN SITU: ICD-10-CM

## 2020-03-28 PROCEDURE — 93294 CARDIAC DEVICE CHECK - REMOTE: ICD-10-PCS | Mod: ,,, | Performed by: INTERNAL MEDICINE

## 2020-03-28 PROCEDURE — 93296 REM INTERROG EVL PM/IDS: CPT | Performed by: INTERNAL MEDICINE

## 2020-03-28 PROCEDURE — 93294 REM INTERROG EVL PM/LDLS PM: CPT | Mod: ,,, | Performed by: INTERNAL MEDICINE

## 2020-04-07 ENCOUNTER — PATIENT MESSAGE (OUTPATIENT)
Dept: INTERNAL MEDICINE | Facility: CLINIC | Age: 70
End: 2020-04-07

## 2020-04-09 ENCOUNTER — TELEPHONE (OUTPATIENT)
Dept: INTERNAL MEDICINE | Facility: CLINIC | Age: 70
End: 2020-04-09

## 2020-04-09 ENCOUNTER — PATIENT MESSAGE (OUTPATIENT)
Dept: INTERNAL MEDICINE | Facility: CLINIC | Age: 70
End: 2020-04-09

## 2020-04-09 NOTE — TELEPHONE ENCOUNTER
Called pt and converted her over to a virtual appointment and explained how to do a virtual visit.  Also sent portal message.

## 2020-04-09 NOTE — TELEPHONE ENCOUNTER
Haylee, can you call pt to change appt to virtual and walk her through how to do visit/send her link for tutorial?

## 2020-04-14 ENCOUNTER — OFFICE VISIT (OUTPATIENT)
Dept: INTERNAL MEDICINE | Facility: CLINIC | Age: 70
End: 2020-04-14
Payer: MEDICARE

## 2020-04-14 DIAGNOSIS — I48.0 PAROXYSMAL ATRIAL FIBRILLATION: ICD-10-CM

## 2020-04-14 DIAGNOSIS — E66.01 MORBID OBESITY: ICD-10-CM

## 2020-04-14 DIAGNOSIS — N18.30 CKD (CHRONIC KIDNEY DISEASE) STAGE 3, GFR 30-59 ML/MIN: ICD-10-CM

## 2020-04-14 DIAGNOSIS — I10 BENIGN ESSENTIAL HYPERTENSION: Primary | ICD-10-CM

## 2020-04-14 DIAGNOSIS — E78.2 MIXED HYPERLIPIDEMIA: ICD-10-CM

## 2020-04-14 PROCEDURE — 99211 OFF/OP EST MAY X REQ PHY/QHP: CPT

## 2020-04-14 PROCEDURE — 99214 OFFICE O/P EST MOD 30 MIN: CPT | Mod: 95,,, | Performed by: INTERNAL MEDICINE

## 2020-04-14 PROCEDURE — 99214 PR OFFICE/OUTPT VISIT, EST, LEVL IV, 30-39 MIN: ICD-10-PCS | Mod: 95,,, | Performed by: INTERNAL MEDICINE

## 2020-04-14 PROCEDURE — G0463 HOSPITAL OUTPT CLINIC VISIT: HCPCS

## 2020-04-14 RX ORDER — CLOBETASOL PROPIONATE 0.5 MG/G
1 AEROSOL, FOAM TOPICAL
Qty: 100 G | Refills: 3 | Status: SHIPPED | OUTPATIENT
Start: 2020-04-14

## 2020-04-14 NOTE — PROGRESS NOTES
The patient location is: home  The chief complaint leading to consultation is: annual  Visit type: Virtual visit with synchronous audio and video  Total time spent with patient: 20 min  Each patient to whom he or she provides medical services by telemedicine is:  (1) informed of the relationship between the physician and patient and the respective role of any other health care provider with respect to management of the patient; and (2) notified that he or she may decline to receive medical services by telemedicine and may withdraw from such care at any time.    Notes:     Subjective:       Patient ID: Jesenia Curiel is a 69 y.o. female.    Chief Complaint: annual    Hypertension   This is a chronic problem. The current episode started more than 1 year ago. The problem has been resolved since onset. The problem is controlled. Pertinent negatives include no anxiety, blurred vision, chest pain, headaches, malaise/fatigue, neck pain, orthopnea, palpitations, peripheral edema, PND, shortness of breath or sweats. Agents associated with hypertension include NSAIDs. Risk factors for coronary artery disease include dyslipidemia, family history, obesity, post-menopausal state and sedentary lifestyle. Past treatments include ACE inhibitors, angiotensin blockers, beta blockers, calcium channel blockers, diuretics and lifestyle changes. The current treatment provides significant improvement. There are no compliance problems.      HTN - amlodipine 5mg daily, triamterene-hctz 37.5-25mg daily, olmesartan 20mg daily, bisoprolol 5mg daily.     SSS s/p PM (not AICD). Remote pacemaker interrogation w/ Dr. Hodgson.   Afib - on propafenone SR 225mg bid and previously on xarelto. Follows w/ Dr. JOSE Hodgson - last seen 7/23/18. Stopped on Xarelto and started on asa 81mg daily as she has almost no AF on interrogation.  Last saw Dr. CANDICE Hodgson 8/22/19.  Echo 9/5/19 w/ grade II LV DD. Low normal RV systolic fxn. LVEF 55%.     HLD - atorva 40mg  daily  LDL 98 9/2019    CKD3 - baseline Cr 1.4-1.5    Obesity - previously was on belviq and took off of the market. Last taken at end of Feb. Started w/ weight gain - 6lbs in 6 weeks.     Review of Systems   Constitutional: Negative for malaise/fatigue.   Eyes: Negative for blurred vision.   Respiratory: Negative for shortness of breath.    Cardiovascular: Negative for chest pain, palpitations, orthopnea and PND.   Musculoskeletal: Negative for neck pain.   Neurological: Negative for headaches.     Comprehensive review of systems otherwise negative. See history/subjective section for more details.    Objective:      Physical Exam      Gen - A+OX4, NAD  CHEST - completing full sentences. Normal work of breathing.       Assessment/Plan     Diagnoses and all orders for this visit:    Benign essential hypertension - Stable and controlled. Continue current medications.  -     Comprehensive metabolic panel; Future; Expected date: 04/14/2020    Mixed hyperlipidemia - cont atorva 80  -     Comprehensive metabolic panel; Future; Expected date: 04/14/2020  -     Lipid panel; Future; Expected date: 04/14/2020    Paroxysmal atrial fibrillation - cont asa 81 and propafenone  -     CBC auto differential; Future; Expected date: 04/14/2020    CKD (chronic kidney disease) stage 3, GFR 30-59 ml/min - Cr stable.   -     CBC auto differential; Future; Expected date: 04/14/2020  -     Comprehensive metabolic panel; Future; Expected date: 04/14/2020    Morbid obesity - off belviq. Offered bariatric medicine referral. Pt reports would like to wait and see if weight will plateau. Will let me know.   -     CBC auto differential; Future; Expected date: 04/14/2020    Other orders  -     clobetasoL (OLUX) 0.05 % Foam; Apply 1 application topically every 30 days.    Pt will ask about shingrix at local pharmacy.   Declines MMG - knows risks.   Labs when COVID risk decreases.   Didn't get flu vaccine this last season. Recommended to get it for  the upcoming season starting Aug/Sept.     RTC in 1 yr, sooner if needed.     Sandra Michaud MD  Department of Internal Medicine - [facility]  10:42 AM

## 2020-06-01 DIAGNOSIS — I48.91 ATRIAL FIBRILLATION, UNSPECIFIED TYPE: ICD-10-CM

## 2020-06-01 RX ORDER — PROPAFENONE HYDROCHLORIDE 225 MG/1
225 CAPSULE, EXTENDED RELEASE ORAL EVERY 12 HOURS
Qty: 180 CAPSULE | Refills: 3 | Status: SHIPPED | OUTPATIENT
Start: 2020-06-01 | End: 2021-05-27 | Stop reason: SDUPTHER

## 2020-06-26 ENCOUNTER — CLINICAL SUPPORT (OUTPATIENT)
Dept: CARDIOLOGY | Facility: HOSPITAL | Age: 70
End: 2020-06-26
Attending: INTERNAL MEDICINE
Payer: MEDICARE

## 2020-06-26 ENCOUNTER — PES CALL (OUTPATIENT)
Dept: ADMINISTRATIVE | Facility: CLINIC | Age: 70
End: 2020-06-26

## 2020-06-26 DIAGNOSIS — Z95.0 CARDIAC PACEMAKER IN SITU: ICD-10-CM

## 2020-06-26 PROCEDURE — 93294 CARDIAC DEVICE CHECK - REMOTE: ICD-10-PCS | Mod: ,,, | Performed by: INTERNAL MEDICINE

## 2020-06-26 PROCEDURE — 93294 REM INTERROG EVL PM/LDLS PM: CPT | Mod: ,,, | Performed by: INTERNAL MEDICINE

## 2020-06-26 PROCEDURE — 93296 REM INTERROG EVL PM/IDS: CPT | Performed by: INTERNAL MEDICINE

## 2020-07-09 DIAGNOSIS — I49.8 OTHER SPECIFIED CARDIAC ARRHYTHMIAS: Primary | ICD-10-CM

## 2020-07-17 DIAGNOSIS — Z71.89 COMPLEX CARE COORDINATION: ICD-10-CM

## 2020-09-22 ENCOUNTER — LAB VISIT (OUTPATIENT)
Dept: LAB | Facility: HOSPITAL | Age: 70
End: 2020-09-22
Attending: INTERNAL MEDICINE
Payer: MEDICARE

## 2020-09-22 ENCOUNTER — OFFICE VISIT (OUTPATIENT)
Dept: INTERNAL MEDICINE | Facility: CLINIC | Age: 70
End: 2020-09-22
Payer: MEDICARE

## 2020-09-22 VITALS
DIASTOLIC BLOOD PRESSURE: 80 MMHG | OXYGEN SATURATION: 97 % | WEIGHT: 207.69 LBS | TEMPERATURE: 98 F | BODY MASS INDEX: 38.22 KG/M2 | HEART RATE: 77 BPM | HEIGHT: 62 IN | SYSTOLIC BLOOD PRESSURE: 118 MMHG

## 2020-09-22 DIAGNOSIS — M85.80 OSTEOPENIA, UNSPECIFIED LOCATION: ICD-10-CM

## 2020-09-22 DIAGNOSIS — E78.2 MIXED HYPERLIPIDEMIA: ICD-10-CM

## 2020-09-22 DIAGNOSIS — I10 BENIGN ESSENTIAL HYPERTENSION: Primary | ICD-10-CM

## 2020-09-22 DIAGNOSIS — Z95.0 S/P PLACEMENT OF CARDIAC PACEMAKER: ICD-10-CM

## 2020-09-22 DIAGNOSIS — E66.01 SEVERE OBESITY (BMI 35.0-39.9) WITH COMORBIDITY: ICD-10-CM

## 2020-09-22 DIAGNOSIS — I49.5 SICK SINUS SYNDROME: ICD-10-CM

## 2020-09-22 DIAGNOSIS — I48.0 PAROXYSMAL ATRIAL FIBRILLATION: ICD-10-CM

## 2020-09-22 DIAGNOSIS — N18.30 CKD (CHRONIC KIDNEY DISEASE) STAGE 3, GFR 30-59 ML/MIN: ICD-10-CM

## 2020-09-22 DIAGNOSIS — E66.01 MORBID OBESITY: ICD-10-CM

## 2020-09-22 DIAGNOSIS — I10 BENIGN ESSENTIAL HYPERTENSION: ICD-10-CM

## 2020-09-22 DIAGNOSIS — Z86.19 HISTORY OF SHINGLES: ICD-10-CM

## 2020-09-22 LAB
ALBUMIN SERPL BCP-MCNC: 3.9 G/DL (ref 3.5–5.2)
ALP SERPL-CCNC: 72 U/L (ref 55–135)
ALT SERPL W/O P-5'-P-CCNC: 27 U/L (ref 10–44)
ANION GAP SERPL CALC-SCNC: 8 MMOL/L (ref 8–16)
AST SERPL-CCNC: 23 U/L (ref 10–40)
BASOPHILS # BLD AUTO: 0.05 K/UL (ref 0–0.2)
BASOPHILS NFR BLD: 1.2 % (ref 0–1.9)
BILIRUB SERPL-MCNC: 0.8 MG/DL (ref 0.1–1)
BUN SERPL-MCNC: 41 MG/DL (ref 8–23)
CALCIUM SERPL-MCNC: 10.4 MG/DL (ref 8.7–10.5)
CHLORIDE SERPL-SCNC: 108 MMOL/L (ref 95–110)
CHOLEST SERPL-MCNC: 219 MG/DL (ref 120–199)
CHOLEST/HDLC SERPL: 4.1 {RATIO} (ref 2–5)
CO2 SERPL-SCNC: 27 MMOL/L (ref 23–29)
CREAT SERPL-MCNC: 1.7 MG/DL (ref 0.5–1.4)
DIFFERENTIAL METHOD: ABNORMAL
EOSINOPHIL # BLD AUTO: 0.2 K/UL (ref 0–0.5)
EOSINOPHIL NFR BLD: 4.4 % (ref 0–8)
ERYTHROCYTE [DISTWIDTH] IN BLOOD BY AUTOMATED COUNT: 13.1 % (ref 11.5–14.5)
EST. GFR  (AFRICAN AMERICAN): 34.7 ML/MIN/1.73 M^2
EST. GFR  (NON AFRICAN AMERICAN): 30.1 ML/MIN/1.73 M^2
GLUCOSE SERPL-MCNC: 117 MG/DL (ref 70–110)
HCT VFR BLD AUTO: 41.5 % (ref 37–48.5)
HDLC SERPL-MCNC: 54 MG/DL (ref 40–75)
HDLC SERPL: 24.7 % (ref 20–50)
HGB BLD-MCNC: 13.3 G/DL (ref 12–16)
IMM GRANULOCYTES # BLD AUTO: 0.02 K/UL (ref 0–0.04)
IMM GRANULOCYTES NFR BLD AUTO: 0.5 % (ref 0–0.5)
LDLC SERPL CALC-MCNC: 123 MG/DL (ref 63–159)
LYMPHOCYTES # BLD AUTO: 1.2 K/UL (ref 1–4.8)
LYMPHOCYTES NFR BLD: 27 % (ref 18–48)
MCH RBC QN AUTO: 31.5 PG (ref 27–31)
MCHC RBC AUTO-ENTMCNC: 32 G/DL (ref 32–36)
MCV RBC AUTO: 98 FL (ref 82–98)
MONOCYTES # BLD AUTO: 0.4 K/UL (ref 0.3–1)
MONOCYTES NFR BLD: 8.6 % (ref 4–15)
NEUTROPHILS # BLD AUTO: 2.5 K/UL (ref 1.8–7.7)
NEUTROPHILS NFR BLD: 58.3 % (ref 38–73)
NONHDLC SERPL-MCNC: 165 MG/DL
NRBC BLD-RTO: 0 /100 WBC
PLATELET # BLD AUTO: 208 K/UL (ref 150–350)
PMV BLD AUTO: 10 FL (ref 9.2–12.9)
POTASSIUM SERPL-SCNC: 4.7 MMOL/L (ref 3.5–5.1)
PROT SERPL-MCNC: 7.1 G/DL (ref 6–8.4)
RBC # BLD AUTO: 4.22 M/UL (ref 4–5.4)
SODIUM SERPL-SCNC: 143 MMOL/L (ref 136–145)
TRIGL SERPL-MCNC: 210 MG/DL (ref 30–150)
WBC # BLD AUTO: 4.3 K/UL (ref 3.9–12.7)

## 2020-09-22 PROCEDURE — 85025 COMPLETE CBC W/AUTO DIFF WBC: CPT

## 2020-09-22 PROCEDURE — 99999 PR PBB SHADOW E&M-EST. PATIENT-LVL IV: CPT | Mod: PBBFAC,,, | Performed by: INTERNAL MEDICINE

## 2020-09-22 PROCEDURE — 99214 OFFICE O/P EST MOD 30 MIN: CPT | Mod: S$PBB,,, | Performed by: INTERNAL MEDICINE

## 2020-09-22 PROCEDURE — 80061 LIPID PANEL: CPT

## 2020-09-22 PROCEDURE — 99999 PR PBB SHADOW E&M-EST. PATIENT-LVL IV: ICD-10-PCS | Mod: PBBFAC,,, | Performed by: INTERNAL MEDICINE

## 2020-09-22 PROCEDURE — 99214 PR OFFICE/OUTPT VISIT, EST, LEVL IV, 30-39 MIN: ICD-10-PCS | Mod: S$PBB,,, | Performed by: INTERNAL MEDICINE

## 2020-09-22 PROCEDURE — 36415 COLL VENOUS BLD VENIPUNCTURE: CPT

## 2020-09-22 PROCEDURE — 99214 OFFICE O/P EST MOD 30 MIN: CPT | Mod: PBBFAC | Performed by: INTERNAL MEDICINE

## 2020-09-22 PROCEDURE — 80053 COMPREHEN METABOLIC PANEL: CPT

## 2020-09-22 RX ORDER — A/SINGAPORE/GP1908/2015 IVR-180 (AN A/MICHIGAN/45/2015 (H1N1)PDM09-LIKE VIRUS, A/HONG KONG/4801/2014, NYMC X-263B (H3N2) (AN A/HONG KONG/4801/2014-LIKE VIRUS), AND B/BRISBANE/60/2008, WILD TYPE (A B/BRISBANE/60/2008-LIKE VIRUS) 15; 15; 15 UG/.5ML; UG/.5ML; UG/.5ML
INJECTION, SUSPENSION INTRAMUSCULAR
COMMUNITY
Start: 2020-08-28 | End: 2022-10-11

## 2020-09-22 NOTE — PROGRESS NOTES
Subjective:       Patient ID: Jesenia Curiel is a 69 y.o. female.    Chief Complaint: Follow-up    HPI   HTN - amlodipine 5mg daily, triamterene-hctz 37.5-25mg daily, olmesartan 20mg daily, bisoprolol 5mg daily.     SSS s/p PM (not AICD). Remote pacemaker interrogation w/ Dr. Hodgson.   Afib - on propafenone SR 225mg bid and previously on xarelto. Follows w/ Dr. JOSE Hodgson - last seen 7/23/18. Stopped on Xarelto and started on asa 81mg daily as she has almost no AF on interrogation.  Last saw Dr. CANDICE Hodgson 8/22/19.  Echo 9/5/19 w/ grade II LV DD. Low normal RV systolic fxn. LVEF 55%.     HLD - atorva 40mg daily  LDL 98 9/2019 - needs updated albs.     CKD3 - baseline Cr 1.4-1.5     Obesity - previously was on belviq and took off of the market. Last taken at end of Feb. Started w/ weight gain. Off about 15lbs since stopping med. Feels that the weight gain stabilized.   Started exercising - treadmill up to 20 min at 2mph; prohibited by her hip, lower back and SI joint pains - takes tylenol prn; pain that radiates to the back of the thighs that improves w/ exercise. Declines PT at this time.   Reports a decent diet - does not have a lot of sweets/desserts.     Had flu vaccine 8/28/20.  FITKIT neg 2/17/20.  DEXA 11/12/18 - Osteopenia. Still take D 2000 units once a day. H/o hypercalcemia.   Had shingles around the eyes but no eye involvement (seen Dr. Ordonez) 3/3/20.    Review of Systems   Constitutional: Positive for activity change and unexpected weight change.   HENT: Negative for hearing loss, rhinorrhea and trouble swallowing.    Eyes: Negative for discharge and visual disturbance.   Respiratory: Negative for chest tightness and wheezing.    Cardiovascular: Negative for chest pain and palpitations.   Gastrointestinal: Negative for blood in stool, constipation, diarrhea and vomiting.   Endocrine: Negative for polydipsia and polyuria.   Genitourinary: Negative for difficulty urinating, dysuria, hematuria and  "menstrual problem.   Musculoskeletal: Positive for arthralgias. Negative for joint swelling and neck pain.   Neurological: Negative for weakness and headaches.   Psychiatric/Behavioral: Negative for confusion and dysphoric mood.         Objective:      Physical Exam    /80   Pulse 77   Temp 97.9 °F (36.6 °C)   Ht 5' 1.5" (1.562 m)   Wt 94.2 kg (207 lb 10.8 oz)   SpO2 97%   BMI 38.60 kg/m²     GEN - A+OX4, NAD   HEENT - PERRL, EOMI, OP clear. TM normal.   Neck - No thyromegaly or cervical LAD. No thyroid masses felt.  CV - RRR, no m/r   Chest - CTAB, no wheezing or rhonchi  Abd - S/NT/ND/+BS.   Ext - 2+BDP and radial pulses. No C/C/E.  Neuro - 5/5 BUE and BLE strength. Normal gait.   MSK - no spinal tenderness to palpation. Normal gait.   Skin - No rash.    Due for labs.     Assessment/Plan     Jesenia was seen today for follow-up.    Diagnoses and all orders for this visit:    Benign essential hypertension - Stable and controlled. Continue current medications. Check CMP.     Mixed hyperlipidemia - cont atorva 80mg qd. Check FLP.    Sick sinus syndrome S/P placement of cardiac pacemaker - has upcoming appts w/ Suzanne Hodgson    Paroxysmal atrial fibrillation - on asa and propafenone.     CKD (chronic kidney disease) stage 3, GFR 30-59 ml/min - check CMP and cbc.     Severe obesity (BMI 38.6) with comorbidity - cont to work on diet and exercise.     Osteopenia, unspecified location - cont Vit D. H/o high Ca so not on calcium supplements. Repeat DEXA at the end of the yr/next yr.     History of shingles - can do shingles vaccine next March.     Pt reports does not want Td unless she gets a cut.     Follow up in about 1 year (around 9/22/2021).      Sandra Michaud MD  Department of Internal Medicine - [facility] Kyle Stubbs  9:38 AM    "

## 2020-09-23 ENCOUNTER — TELEPHONE (OUTPATIENT)
Dept: INTERNAL MEDICINE | Facility: CLINIC | Age: 70
End: 2020-09-23

## 2020-09-23 DIAGNOSIS — N39.0 URINARY TRACT INFECTION WITHOUT HEMATURIA, SITE UNSPECIFIED: ICD-10-CM

## 2020-09-23 DIAGNOSIS — N18.30 CKD (CHRONIC KIDNEY DISEASE) STAGE 3, GFR 30-59 ML/MIN: Primary | ICD-10-CM

## 2020-09-23 NOTE — TELEPHONE ENCOUNTER
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 Please confirm she's taking atorvastatin 80mg consistently. Blood count normal. Kidney function worsened a little to now a creatinine of 1.7 (normally 1.4 or 1.5). recommend repeating kidney function in 2 weeks along w/ checking urine.

## 2020-09-24 ENCOUNTER — TELEPHONE (OUTPATIENT)
Dept: INTERNAL MEDICINE | Facility: CLINIC | Age: 70
End: 2020-09-24

## 2020-09-24 ENCOUNTER — CLINICAL SUPPORT (OUTPATIENT)
Dept: CARDIOLOGY | Facility: HOSPITAL | Age: 70
End: 2020-09-24
Payer: MEDICARE

## 2020-09-24 DIAGNOSIS — Z95.0 PRESENCE OF CARDIAC PACEMAKER: ICD-10-CM

## 2020-09-24 PROCEDURE — 93294 REM INTERROG EVL PM/LDLS PM: CPT | Mod: ,,, | Performed by: INTERNAL MEDICINE

## 2020-09-24 PROCEDURE — 93294 CARDIAC DEVICE CHECK - REMOTE: ICD-10-PCS | Mod: ,,, | Performed by: INTERNAL MEDICINE

## 2020-09-24 NOTE — TELEPHONE ENCOUNTER
Spoke with . All results given . Yes she taking her Atorvastatin 80mg qd as directed. Her f/u labs scheduled for  2 weeks Oct 8th at 12;30pm Thx Griselda

## 2020-09-25 ENCOUNTER — PATIENT OUTREACH (OUTPATIENT)
Dept: ADMINISTRATIVE | Facility: OTHER | Age: 70
End: 2020-09-25

## 2020-09-28 ENCOUNTER — CLINICAL SUPPORT (OUTPATIENT)
Dept: CARDIOLOGY | Facility: HOSPITAL | Age: 70
End: 2020-09-28
Attending: INTERNAL MEDICINE
Payer: MEDICARE

## 2020-09-28 ENCOUNTER — OFFICE VISIT (OUTPATIENT)
Dept: ELECTROPHYSIOLOGY | Facility: CLINIC | Age: 70
End: 2020-09-28
Payer: MEDICARE

## 2020-09-28 ENCOUNTER — HOSPITAL ENCOUNTER (OUTPATIENT)
Dept: CARDIOLOGY | Facility: CLINIC | Age: 70
Discharge: HOME | End: 2020-09-28
Payer: MEDICARE

## 2020-09-28 VITALS
SYSTOLIC BLOOD PRESSURE: 111 MMHG | WEIGHT: 207.25 LBS | HEART RATE: 60 BPM | BODY MASS INDEX: 38.14 KG/M2 | DIASTOLIC BLOOD PRESSURE: 56 MMHG | HEIGHT: 62 IN

## 2020-09-28 DIAGNOSIS — N18.30 CKD (CHRONIC KIDNEY DISEASE) STAGE 3, GFR 30-59 ML/MIN: ICD-10-CM

## 2020-09-28 DIAGNOSIS — I10 BENIGN ESSENTIAL HYPERTENSION: ICD-10-CM

## 2020-09-28 DIAGNOSIS — E78.5 HYPERLIPIDEMIA, UNSPECIFIED HYPERLIPIDEMIA TYPE: ICD-10-CM

## 2020-09-28 DIAGNOSIS — Z95.0 S/P PLACEMENT OF CARDIAC PACEMAKER: ICD-10-CM

## 2020-09-28 DIAGNOSIS — I49.8 OTHER SPECIFIED CARDIAC ARRHYTHMIAS: ICD-10-CM

## 2020-09-28 DIAGNOSIS — I48.0 PAROXYSMAL ATRIAL FIBRILLATION: Primary | ICD-10-CM

## 2020-09-28 DIAGNOSIS — I49.5 SICK SINUS SYNDROME: ICD-10-CM

## 2020-09-28 PROCEDURE — 93010 RHYTHM STRIP: ICD-10-PCS | Mod: S$PBB,,, | Performed by: INTERNAL MEDICINE

## 2020-09-28 PROCEDURE — 93280 CARDIAC DEVICE CHECK - IN CLINIC & HOSPITAL: ICD-10-PCS | Mod: 26,,, | Performed by: INTERNAL MEDICINE

## 2020-09-28 PROCEDURE — 99213 OFFICE O/P EST LOW 20 MIN: CPT | Mod: PBBFAC,25 | Performed by: INTERNAL MEDICINE

## 2020-09-28 PROCEDURE — 99999 PR PBB SHADOW E&M-EST. PATIENT-LVL III: CPT | Mod: PBBFAC,,, | Performed by: INTERNAL MEDICINE

## 2020-09-28 PROCEDURE — 93280 PM DEVICE PROGR EVAL DUAL: CPT

## 2020-09-28 PROCEDURE — 93280 PM DEVICE PROGR EVAL DUAL: CPT | Mod: 26,,, | Performed by: INTERNAL MEDICINE

## 2020-09-28 PROCEDURE — 99214 PR OFFICE/OUTPT VISIT, EST, LEVL IV, 30-39 MIN: ICD-10-PCS | Mod: S$PBB,,, | Performed by: INTERNAL MEDICINE

## 2020-09-28 PROCEDURE — 93005 ELECTROCARDIOGRAM TRACING: CPT | Mod: PBBFAC,59 | Performed by: INTERNAL MEDICINE

## 2020-09-28 PROCEDURE — 93010 ELECTROCARDIOGRAM REPORT: CPT | Mod: S$PBB,,, | Performed by: INTERNAL MEDICINE

## 2020-09-28 PROCEDURE — 99214 OFFICE O/P EST MOD 30 MIN: CPT | Mod: S$PBB,,, | Performed by: INTERNAL MEDICINE

## 2020-09-28 PROCEDURE — 99999 PR PBB SHADOW E&M-EST. PATIENT-LVL III: ICD-10-PCS | Mod: PBBFAC,,, | Performed by: INTERNAL MEDICINE

## 2020-09-28 NOTE — PROGRESS NOTES
Subjective:     HPI    Cardiologist: Verna Hodgson MD    I had the pleasure of seeing Jesenia Curiel in follow-up for her history of atrial fibrillation. She last saw me in 8/2019. 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olo dual chamber pacemaker implantation in 5/2009 (gen change 9/2014). She has been on Rythmol since 2009, which has significantly brought down her arrhythmia burden (episodes used to last for hours but now last seconds to minutes). At her 2017 office visit, she was found to have a <1% AF burden, with the longest episode lasting <90 seconds. At her 7/2018 visit, her device recorded no mode-switch episodes. Xarelto was stopped at that visit.    An echo done in 9/2019 showed an EF of 55%, and grade 2 DD.    I reviewed today's device interrogation, which shows stable device and lead function. She is RA paced 99% and RV paced 37% of the time. No mode-switch episodes have been reported. Estimated battery longevity 8.3 years.     Ms. Curiel describes episodes of regular tachycardia at 110 bpm range, which occur once monthly and last several minutes.    My interpretation of today's ECG is atrial pacing at 60 bpm.    Review of Systems   Constitution: Positive for malaise/fatigue. Negative for decreased appetite, weight gain and weight loss.   HENT: Negative for sore throat.    Eyes: Negative for blurred vision.   Cardiovascular: Negative for chest pain, dyspnea on exertion, irregular heartbeat, leg swelling, near-syncope, orthopnea, palpitations, paroxysmal nocturnal dyspnea and syncope.   Respiratory: Negative for shortness of breath.    Skin: Negative for rash.   Musculoskeletal: Negative for arthritis.   Gastrointestinal: Negative for abdominal pain.   Neurological: Negative for focal weakness.   Psychiatric/Behavioral: Negative for altered mental status.        Objective:    Physical Exam   Constitutional: She  is oriented to person, place, and time. She appears well-developed and well-nourished. No distress.   HENT:   Head: Normocephalic and atraumatic.   Mouth/Throat: Oropharynx is clear and moist.   Eyes: Pupils are equal, round, and reactive to light. Conjunctivae are normal. No scleral icterus.   Neck: Normal range of motion. Neck supple. No JVD present. No thyromegaly present.   Cardiovascular: Normal rate, regular rhythm, normal heart sounds and intact distal pulses. Exam reveals no gallop and no friction rub.   No murmur heard.  Pulmonary/Chest: Effort normal and breath sounds normal. No respiratory distress. She has no rales.   Abdominal: Soft. Bowel sounds are normal. She exhibits no distension.   Musculoskeletal:         General: No edema.   Neurological: She is alert and oriented to person, place, and time.   Skin: Skin is warm and dry.   Psychiatric: She has a normal mood and affect. Her behavior is normal.   Vitals reviewed.        Assessment:       1. Paroxysmal atrial fibrillation    2. Benign essential hypertension    3. Hyperlipidemia, unspecified hyperlipidemia type    4. Sick sinus syndrome    5. S/P placement of cardiac pacemaker    6. CKD (chronic kidney disease) stage 3, GFR 30-59 ml/min         Plan:     In summary, Jesenia Curiel is a 69 year old female with a history of sick sinus syndrome, pacemaker implantation, and symptomatic PAF. Her AF has been controlled for many years on Rythmol, with virtually no AF over the past several years. The plan is to continue aspirin and Rythmol, and to see me again in 1 year.    With regard to her palpitations, we discussed the option of an event monitor. Given that episodes are so rare and short-lived, we both agree for now the best plan is to hold the course.    Thank you for allowing me to participate in the care of this patient. Please do not hesitate to call me with any questions or concerns.

## 2020-09-29 ENCOUNTER — PATIENT MESSAGE (OUTPATIENT)
Dept: OTHER | Facility: OTHER | Age: 70
End: 2020-09-29

## 2020-10-08 ENCOUNTER — LAB VISIT (OUTPATIENT)
Dept: LAB | Facility: HOSPITAL | Age: 70
End: 2020-10-08
Attending: INTERNAL MEDICINE
Payer: MEDICARE

## 2020-10-08 DIAGNOSIS — N18.30 CKD (CHRONIC KIDNEY DISEASE) STAGE 3, GFR 30-59 ML/MIN: ICD-10-CM

## 2020-10-08 DIAGNOSIS — N39.0 URINARY TRACT INFECTION WITHOUT HEMATURIA, SITE UNSPECIFIED: ICD-10-CM

## 2020-10-08 LAB
ANION GAP SERPL CALC-SCNC: 12 MMOL/L (ref 8–16)
BUN SERPL-MCNC: 26 MG/DL (ref 8–23)
CALCIUM SERPL-MCNC: 9.8 MG/DL (ref 8.7–10.5)
CHLORIDE SERPL-SCNC: 104 MMOL/L (ref 95–110)
CO2 SERPL-SCNC: 24 MMOL/L (ref 23–29)
CREAT SERPL-MCNC: 1.4 MG/DL (ref 0.5–1.4)
EST. GFR  (AFRICAN AMERICAN): 43.9 ML/MIN/1.73 M^2
EST. GFR  (NON AFRICAN AMERICAN): 38.1 ML/MIN/1.73 M^2
GLUCOSE SERPL-MCNC: 105 MG/DL (ref 70–110)
POTASSIUM SERPL-SCNC: 4.3 MMOL/L (ref 3.5–5.1)
SODIUM SERPL-SCNC: 140 MMOL/L (ref 136–145)

## 2020-10-08 PROCEDURE — 36415 COLL VENOUS BLD VENIPUNCTURE: CPT

## 2020-10-08 PROCEDURE — 80048 BASIC METABOLIC PNL TOTAL CA: CPT

## 2020-10-13 ENCOUNTER — OFFICE VISIT (OUTPATIENT)
Dept: CARDIOLOGY | Facility: CLINIC | Age: 70
End: 2020-10-13
Payer: MEDICARE

## 2020-10-13 VITALS
DIASTOLIC BLOOD PRESSURE: 63 MMHG | HEART RATE: 66 BPM | HEIGHT: 62 IN | BODY MASS INDEX: 38.46 KG/M2 | WEIGHT: 209 LBS | SYSTOLIC BLOOD PRESSURE: 138 MMHG

## 2020-10-13 DIAGNOSIS — E78.2 MIXED HYPERLIPIDEMIA: ICD-10-CM

## 2020-10-13 DIAGNOSIS — I10 BENIGN ESSENTIAL HYPERTENSION: ICD-10-CM

## 2020-10-13 DIAGNOSIS — I48.0 PAROXYSMAL ATRIAL FIBRILLATION: Primary | ICD-10-CM

## 2020-10-13 DIAGNOSIS — Z95.0 S/P PLACEMENT OF CARDIAC PACEMAKER: ICD-10-CM

## 2020-10-13 DIAGNOSIS — N18.31 STAGE 3A CHRONIC KIDNEY DISEASE: ICD-10-CM

## 2020-10-13 DIAGNOSIS — I49.5 SICK SINUS SYNDROME: ICD-10-CM

## 2020-10-13 PROCEDURE — 99214 OFFICE O/P EST MOD 30 MIN: CPT | Mod: PBBFAC | Performed by: INTERNAL MEDICINE

## 2020-10-13 PROCEDURE — 99214 PR OFFICE/OUTPT VISIT, EST, LEVL IV, 30-39 MIN: ICD-10-PCS | Mod: S$PBB,,, | Performed by: INTERNAL MEDICINE

## 2020-10-13 PROCEDURE — 99999 PR PBB SHADOW E&M-EST. PATIENT-LVL IV: ICD-10-PCS | Mod: PBBFAC,,, | Performed by: INTERNAL MEDICINE

## 2020-10-13 PROCEDURE — 99999 PR PBB SHADOW E&M-EST. PATIENT-LVL IV: CPT | Mod: PBBFAC,,, | Performed by: INTERNAL MEDICINE

## 2020-10-13 PROCEDURE — 99214 OFFICE O/P EST MOD 30 MIN: CPT | Mod: S$PBB,,, | Performed by: INTERNAL MEDICINE

## 2020-10-13 NOTE — PATIENT INSTRUCTIONS
We discussed keeping a log of home blood pressures and notifying the office if it is consistently running above 130/80 mmHg. I have asked her to send me her readings via [facility] in 1-2 weeks.

## 2020-10-13 NOTE — PROGRESS NOTES
Subjective:   Patient ID:  Jesenia Curiel is a 70 y.o. 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  She has not had any recent recurrence of her atrial fibrillation.  She does report a recent 14 lb weight gain after the discontinuation of her weight loss medication.  She did recently start walking 3 days a week.Jesenia Curiel denies any chest pain, shortness of breath, PND, orthopnea, palpitations, leg edema, or syncope.  Her last echocardiogram was 919 and showed an ejection fraction of 55%.  Her last device interrogation showed she is are a paced 99% the time and RV paced 37% of the time.    ECG 2020:  Atrial paced rhythm    Past Medical History:   Diagnosis Date    Allergy     Atrial fibrillation 2017    CKD (chronic kidney disease) stage 3, GFR 30-59 ml/min 2017    Disorder of kidney and ureter     GERD (gastroesophageal reflux disease)     Hyperlipidemia     Hypertension     Pre-diabetes 6/3/2017       Past Surgical History:   Procedure Laterality Date    BACK SURGERY      lamenectomy    CARDIAC SURGERY      st paolo pacemaker     CATARACT EXTRACTION W/  INTRAOCULAR LENS IMPLANT Right 6/3/2019    Procedure: EXTRACTION, CATARACT, WITH IOL INSERTION;  Surgeon: Raisa Moreno MD;  [facility];  Service: Ophthalmology;  Laterality: Right;  Laser    CATARACT EXTRACTION W/  INTRAOCULAR LENS IMPLANT Left 2019    Procedure: EXTRACTION, CATARACT, WITH IOL INSERTION;  Surgeon: Raisa Moreno MD;  Location: [facility];  Service: Ophthalmology;  Laterality: Left;  LASER ASSISTED     SECTION      ,     HYSTERECTOMY      INSERT / REPLACE / REMOVE PACEMAKER      placement    INSERT / REPLACE / REMOVE PACEMAKER  2014    [employer] Paolo Model #[id number] replacement    pace maker      replacement     salpingo opherectomy Bilateral 1998    TONSILLECTOMY   1953       Social History     Socioeconomic History    Marital status:      Spouse name: Not on file    Number of children: Not on file    Years of education: Not on file    Highest education level: Not on file   Occupational History    Not on file   Social Needs    Financial resource strain: Not hard at all    Food insecurity     Worry: Never true     Inability: Never true    Transportation needs     Medical: No     Non-medical: No   Tobacco Use    Smoking status: Never Smoker    Smokeless tobacco: Never Used   Substance and Sexual Activity    Alcohol use: Yes     Alcohol/week: 6.0 standard drinks     Types: 6 Glasses of wine per week     Frequency: 4 or more times a week     Drinks per session: 1 or 2     Binge frequency: Never     Comment: occassionally    Drug use: No    Sexual activity: Not Currently   Lifestyle    Physical activity     Days per week: 4 days     Minutes per session: 20 min    Stress: To some extent   Relationships    Social connections     Talks on phone: Patient refused     Gets together: Patient refused     Attends [employer] service: Not on file     Active member of club or organization: No     Attends meetings of clubs or organizations: Never     Relationship status:    Other Topics Concern    Not on file   Social History Narrative    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       Family History   Problem Relation Age of Onset    Hypertension Mother     Diabetes Mother     Hyperlipidemia Mother     Coronary artery disease Mother     Heart disease Mother     Stroke Mother     Hypertension Father     Diabetes Father     Hyperlipidemia Father     Coronary artery disease Father     Heart disease Father     Coronary artery disease Brother     Heart disease Brother     Hyperthyroidism Brother     Diabetes Paternal Grandmother     Diabetes Paternal Grandfather     Heart attack Neg Hx        Patient's Medications   New  Prescriptions    No medications on file   Previous Medications    AMLODIPINE (NORVASC) 5 MG TABLET    TAKE 1 TABLET BY MOUTH EVERY DAY    ASPIRIN (ECOTRIN) 81 MG EC TABLET    Take 1 tablet (81 mg total) by mouth once daily.    ATORVASTATIN (LIPITOR) 80 MG TABLET    Take 1 tablet (80 mg total) by mouth once daily.    BISOPROLOL (ZEBETA) 5 MG TABLET    TAKE 1 TABLET BY MOUTH EVERY DAY    CLOBETASOL (OLUX) 0.05 % FOAM    Apply 1 application topically every 30 days.    ERGOCALCIFEROL, VITAMIN D2, (VITAMIN D ORAL)    Take 2,000 Units by mouth once daily.    FLUAD QUAD 2020-21,65Y UP,,PF, 60 MCG (15 MCG X 4)/0.5 ML SYRG    TO BE ADMINISTERED BY PHARMACIST FOR IMMUNIZATION    OLMESARTAN (BENICAR) 20 MG TABLET    TAKE 1 TABLET EVERY DAY    PROPAFENONE (RYTHMOL SR) 225 MG CP12    Take 1 capsule (225 mg total) by mouth every 12 (twelve) hours.    TRIAMTERENE-HYDROCHLOROTHIAZIDE 37.5-25 MG (DYAZIDE) 37.5-25 MG PER CAPSULE    TAKE 1 CAPSULE BY MOUTH EVERY DAY IN THE MORNING   Modified Medications    No medications on file   Discontinued Medications    No medications on file       Review of Systems   Constitution: Positive for weight gain. Negative for malaise/fatigue.   HENT: Negative for hearing loss.    Eyes: Negative for visual disturbance.   Cardiovascular: Negative for chest pain, claudication, dyspnea on exertion, leg swelling, near-syncope, orthopnea, palpitations, paroxysmal nocturnal dyspnea and syncope.   Respiratory: Negative for cough, shortness of breath, sleep disturbances due to breathing, snoring and wheezing.    Endocrine: Negative for cold intolerance, heat intolerance, polydipsia, polyphagia and polyuria.   Hematologic/Lymphatic: Negative for bleeding problem. Does not bruise/bleed easily.   Skin: Negative for rash and suspicious lesions.   Musculoskeletal: Negative for arthritis, falls, joint pain, muscle weakness and myalgias.   Gastrointestinal: Negative for abdominal pain, change in bowel habit,  "constipation, diarrhea, heartburn, hematochezia, melena and nausea.   Genitourinary: Negative for hematuria and nocturia.   Neurological: Negative for excessive daytime sleepiness, dizziness, headaches, light-headedness, loss of balance and weakness.   Psychiatric/Behavioral: Negative for depression. The patient is not nervous/anxious.    Allergic/Immunologic: Negative for environmental allergies.       /63 (BP Location: Left arm, Patient Position: Sitting, BP Method: X-Large (Automatic))   Pulse 66   Ht 5' 1.5" (1.562 m)   Wt 94.8 kg (208 lb 15.9 oz)   BMI 38.85 kg/m²     Objective:   Physical Exam   Constitutional: She is oriented to person, place, and time. She appears well-developed and well-nourished.        HENT:   Head: Normocephalic and atraumatic.   Mouth/Throat: Oropharynx is clear and moist.   Eyes: Pupils are equal, round, and reactive to light. Conjunctivae and EOM are normal. No scleral icterus.   Neck: Normal range of motion. Neck supple. No hepatojugular reflux and no JVD present. No tracheal deviation present. No thyromegaly present.   Cardiovascular: Normal rate, regular rhythm, normal heart sounds and intact distal pulses. PMI is not displaced.   Pulses:       Carotid pulses are 2+ on the right side and 2+ on the left side.       Radial pulses are 2+ on the right side and 2+ on the left side.        Dorsalis pedis pulses are 2+ on the right side and 2+ on the left side.        Posterior tibial pulses are 2+ on the right side and 2+ on the left side.   Pulmonary/Chest: Effort normal and breath sounds normal.   Abdominal: Soft. Bowel sounds are normal. She exhibits no distension and no mass. There is no hepatosplenomegaly. There is no abdominal tenderness.   Musculoskeletal:         General: No tenderness or edema.   Lymphadenopathy:     She has no cervical adenopathy.   Neurological: She is alert and oriented to person, place, and time.   Skin: Skin is warm and dry. No rash noted. No " cyanosis or erythema. Nails show no clubbing.   Psychiatric: She has a normal mood and affect. Her speech is normal and behavior is normal.       Lab Results   Component Value Date     10/08/2020    K 4.3 10/08/2020     10/08/2020    CO2 24 10/08/2020    BUN 26 (H) 10/08/2020    CREATININE 1.4 10/08/2020     10/08/2020    HGBA1C 5.4 07/25/2018    AST 23 09/22/2020    ALT 27 09/22/2020    ALBUMIN 3.9 09/22/2020    PROT 7.1 09/22/2020    BILITOT 0.8 09/22/2020    WBC 4.30 09/22/2020    HGB 13.3 09/22/2020    HCT 41.5 09/22/2020    MCV 98 09/22/2020     09/22/2020    TSH 1.111 03/28/2019    CHOL 219 (H) 09/22/2020    HDL 54 09/22/2020    LDLCALC 123.0 09/22/2020    TRIG 210 (H) 09/22/2020       Assessment:     1. Paroxysmal atrial fibrillation :  She has been maintaining sinus rhythm on Rythmol.  She has not had any documented atrial fibrillation since 2017 and has been off of anticoagulation.  She remains on an aspirin 81 mg a day.  She follows with Dr. Trell Hodgson.   2. Benign essential hypertension :  Her blood pressure was slightly elevated today.  Her other readings in epic have been at goal.  I have asked her to monitor her blood pressure at home and notify me if she is persistently running above 130/80.   3. Mixed hyperlipidemia :  She tolerated the increase of her atorvastatin to 80 mg at her last visit.  Despite the increase her LDL has gone up from 98 to 123.  This is likely due to her recent weight gain and inactivity.  She did recently start exercising again and hopefully this will help lower her LDL back down to goal.   4. S/P placement of cardiac pacemaker    5. Sick sinus syndrome    6. Stage 3a chronic kidney disease        Plan:     Jesenia was seen today for benign essential hypertension.    Diagnoses and all orders for this visit:    Paroxysmal atrial fibrillation    Benign essential hypertension    Mixed hyperlipidemia    S/P placement of cardiac pacemaker    Sick sinus  syndrome    Stage 3a chronic kidney disease        Thank you for allowing me to participate in this patient's care. Please do not hesitate to contact me with any questions or concerns.

## 2020-10-22 ENCOUNTER — PATIENT MESSAGE (OUTPATIENT)
Dept: CARDIOLOGY | Facility: CLINIC | Age: 70
End: 2020-10-22

## 2020-10-23 ENCOUNTER — TELEPHONE (OUTPATIENT)
Dept: CARDIOLOGY | Facility: CLINIC | Age: 70
End: 2020-10-23

## 2020-10-23 DIAGNOSIS — I10 BENIGN ESSENTIAL HYPERTENSION: ICD-10-CM

## 2020-10-23 RX ORDER — AMLODIPINE BESYLATE 10 MG/1
10 TABLET ORAL DAILY
Qty: 90 TABLET | Refills: 3 | Status: SHIPPED | OUTPATIENT
Start: 2020-10-23 | End: 2021-11-02

## 2020-11-06 ENCOUNTER — PATIENT MESSAGE (OUTPATIENT)
Dept: CARDIOLOGY | Facility: CLINIC | Age: 70
End: 2020-11-06

## 2020-11-06 ENCOUNTER — TELEPHONE (OUTPATIENT)
Dept: CARDIOLOGY | Facility: CLINIC | Age: 70
End: 2020-11-06

## 2020-11-06 DIAGNOSIS — I10 BENIGN ESSENTIAL HYPERTENSION: ICD-10-CM

## 2020-11-06 RX ORDER — OLMESARTAN MEDOXOMIL 40 MG/1
40 TABLET ORAL DAILY
Qty: 90 TABLET | Refills: 3 | Status: SHIPPED | OUTPATIENT
Start: 2020-11-06 | End: 2021-09-09 | Stop reason: SDUPTHER

## 2020-12-02 ENCOUNTER — PATIENT MESSAGE (OUTPATIENT)
Dept: ADMINISTRATIVE | Facility: HOSPITAL | Age: 70
End: 2020-12-02

## 2020-12-11 ENCOUNTER — PATIENT MESSAGE (OUTPATIENT)
Dept: OTHER | Facility: OTHER | Age: 70
End: 2020-12-11

## 2020-12-14 ENCOUNTER — PATIENT MESSAGE (OUTPATIENT)
Dept: ADMINISTRATIVE | Facility: HOSPITAL | Age: 70
End: 2020-12-14

## 2020-12-14 ENCOUNTER — PATIENT MESSAGE (OUTPATIENT)
Dept: INTERNAL MEDICINE | Facility: CLINIC | Age: 70
End: 2020-12-14

## 2020-12-14 DIAGNOSIS — I10 BENIGN ESSENTIAL HYPERTENSION: ICD-10-CM

## 2020-12-14 RX ORDER — TRIAMTERENE AND HYDROCHLOROTHIAZIDE 37.5; 25 MG/1; MG/1
1 CAPSULE ORAL EVERY MORNING
Qty: 90 CAPSULE | Refills: 2 | Status: SHIPPED | OUTPATIENT
Start: 2020-12-14 | End: 2021-02-09 | Stop reason: SDUPTHER

## 2020-12-23 ENCOUNTER — CLINICAL SUPPORT (OUTPATIENT)
Dept: CARDIOLOGY | Facility: HOSPITAL | Age: 70
End: 2020-12-23
Payer: MEDICARE

## 2020-12-23 DIAGNOSIS — I49.5 SICK SINUS SYNDROME: ICD-10-CM

## 2020-12-23 DIAGNOSIS — I48.91 UNSPECIFIED ATRIAL FIBRILLATION: ICD-10-CM

## 2020-12-23 DIAGNOSIS — Z95.0 PRESENCE OF CARDIAC PACEMAKER: ICD-10-CM

## 2020-12-23 PROCEDURE — 93294 CARDIAC DEVICE CHECK - REMOTE: ICD-10-PCS | Mod: ,,, | Performed by: INTERNAL MEDICINE

## 2020-12-23 PROCEDURE — 93294 REM INTERROG EVL PM/LDLS PM: CPT | Mod: ,,, | Performed by: INTERNAL MEDICINE

## 2020-12-23 PROCEDURE — 93296 REM INTERROG EVL PM/IDS: CPT | Performed by: INTERNAL MEDICINE

## 2020-12-30 ENCOUNTER — PES CALL (OUTPATIENT)
Dept: ADMINISTRATIVE | Facility: CLINIC | Age: 70
End: 2020-12-30

## 2021-01-05 ENCOUNTER — PATIENT MESSAGE (OUTPATIENT)
Dept: INTERNAL MEDICINE | Facility: CLINIC | Age: 71
End: 2021-01-05

## 2021-01-05 DIAGNOSIS — E66.9 OBESITY (BMI 30-39.9): Primary | ICD-10-CM

## 2021-01-07 ENCOUNTER — PATIENT MESSAGE (OUTPATIENT)
Dept: INTERNAL MEDICINE | Facility: CLINIC | Age: 71
End: 2021-01-07

## 2021-01-12 ENCOUNTER — IMMUNIZATION (OUTPATIENT)
Dept: INTERNAL MEDICINE | Facility: CLINIC | Age: 71
End: 2021-01-12
Payer: MEDICARE

## 2021-01-12 DIAGNOSIS — Z23 NEED FOR VACCINATION: ICD-10-CM

## 2021-01-12 PROCEDURE — 91300 COVID-19, MRNA, LNP-S, PF, 30 MCG/0.3 ML DOSE VACCINE: CPT | Mod: PBBFAC,PO

## 2021-02-02 ENCOUNTER — IMMUNIZATION (OUTPATIENT)
Dept: INTERNAL MEDICINE | Facility: CLINIC | Age: 71
End: 2021-02-02
Payer: MEDICARE

## 2021-02-02 DIAGNOSIS — Z23 NEED FOR VACCINATION: Primary | ICD-10-CM

## 2021-02-02 PROCEDURE — 91300 COVID-19, MRNA, LNP-S, PF, 30 MCG/0.3 ML DOSE VACCINE: CPT | Mod: ,,, | Performed by: FAMILY MEDICINE

## 2021-02-02 PROCEDURE — 91300 COVID-19, MRNA, LNP-S, PF, 30 MCG/0.3 ML DOSE VACCINE: ICD-10-PCS | Mod: ,,, | Performed by: FAMILY MEDICINE

## 2021-02-02 PROCEDURE — 0002A COVID-19, MRNA, LNP-S, PF, 30 MCG/0.3 ML DOSE VACCINE: ICD-10-PCS | Mod: CV19,,, | Performed by: FAMILY MEDICINE

## 2021-02-02 PROCEDURE — 0002A COVID-19, MRNA, LNP-S, PF, 30 MCG/0.3 ML DOSE VACCINE: CPT | Mod: CV19,,, | Performed by: FAMILY MEDICINE

## 2021-02-09 ENCOUNTER — OFFICE VISIT (OUTPATIENT)
Dept: INTERNAL MEDICINE | Facility: CLINIC | Age: 71
End: 2021-02-09
Payer: MEDICARE

## 2021-02-09 ENCOUNTER — PATIENT OUTREACH (OUTPATIENT)
Dept: ADMINISTRATIVE | Facility: OTHER | Age: 71
End: 2021-02-09

## 2021-02-09 VITALS
OXYGEN SATURATION: 96 % | TEMPERATURE: 98 F | DIASTOLIC BLOOD PRESSURE: 84 MMHG | SYSTOLIC BLOOD PRESSURE: 122 MMHG | BODY MASS INDEX: 39.39 KG/M2 | HEART RATE: 75 BPM | WEIGHT: 214.06 LBS | HEIGHT: 62 IN

## 2021-02-09 DIAGNOSIS — N18.32 STAGE 3B CHRONIC KIDNEY DISEASE: ICD-10-CM

## 2021-02-09 DIAGNOSIS — E66.01 SEVERE OBESITY (BMI 35.0-39.9) WITH COMORBIDITY: ICD-10-CM

## 2021-02-09 DIAGNOSIS — I48.91 ATRIAL FIBRILLATION, UNSPECIFIED TYPE: ICD-10-CM

## 2021-02-09 DIAGNOSIS — H02.9 EYELID LESION: ICD-10-CM

## 2021-02-09 DIAGNOSIS — E78.2 MIXED HYPERLIPIDEMIA: ICD-10-CM

## 2021-02-09 DIAGNOSIS — I10 BENIGN ESSENTIAL HYPERTENSION: Primary | ICD-10-CM

## 2021-02-09 DIAGNOSIS — I49.5 SICK SINUS SYNDROME DUE TO SINOATRIAL NODE DYSFUNCTION: ICD-10-CM

## 2021-02-09 DIAGNOSIS — Z95.0 CARDIAC PACEMAKER IN SITU: ICD-10-CM

## 2021-02-09 PROCEDURE — 99214 OFFICE O/P EST MOD 30 MIN: CPT | Mod: S$PBB,,, | Performed by: INTERNAL MEDICINE

## 2021-02-09 PROCEDURE — 99214 OFFICE O/P EST MOD 30 MIN: CPT | Mod: PBBFAC | Performed by: INTERNAL MEDICINE

## 2021-02-09 PROCEDURE — 99999 PR PBB SHADOW E&M-EST. PATIENT-LVL IV: CPT | Mod: PBBFAC,,, | Performed by: INTERNAL MEDICINE

## 2021-02-09 PROCEDURE — 99214 PR OFFICE/OUTPT VISIT, EST, LEVL IV, 30-39 MIN: ICD-10-PCS | Mod: S$PBB,,, | Performed by: INTERNAL MEDICINE

## 2021-02-09 PROCEDURE — 99999 PR PBB SHADOW E&M-EST. PATIENT-LVL IV: ICD-10-PCS | Mod: PBBFAC,,, | Performed by: INTERNAL MEDICINE

## 2021-02-09 RX ORDER — TRIAMTERENE AND HYDROCHLOROTHIAZIDE 37.5; 25 MG/1; MG/1
1 CAPSULE ORAL EVERY MORNING
Qty: 90 CAPSULE | Refills: 3 | Status: SHIPPED | OUTPATIENT
Start: 2021-02-09 | End: 2021-03-23 | Stop reason: ALTCHOICE

## 2021-02-11 ENCOUNTER — INITIAL CONSULT (OUTPATIENT)
Dept: BARIATRICS | Facility: CLINIC | Age: 71
End: 2021-02-11
Payer: MEDICARE

## 2021-02-11 VITALS
DIASTOLIC BLOOD PRESSURE: 82 MMHG | HEIGHT: 62 IN | HEART RATE: 59 BPM | WEIGHT: 211.63 LBS | BODY MASS INDEX: 38.94 KG/M2 | OXYGEN SATURATION: 99 % | SYSTOLIC BLOOD PRESSURE: 110 MMHG

## 2021-02-11 DIAGNOSIS — E78.2 MIXED HYPERLIPIDEMIA: ICD-10-CM

## 2021-02-11 DIAGNOSIS — E66.01 CLASS 2 SEVERE OBESITY DUE TO EXCESS CALORIES WITH SERIOUS COMORBIDITY AND BODY MASS INDEX (BMI) OF 39.0 TO 39.9 IN ADULT: Primary | ICD-10-CM

## 2021-02-11 DIAGNOSIS — I10 BENIGN ESSENTIAL HYPERTENSION: ICD-10-CM

## 2021-02-11 DIAGNOSIS — I48.91 ATRIAL FIBRILLATION, UNSPECIFIED TYPE: ICD-10-CM

## 2021-02-11 DIAGNOSIS — N18.31 STAGE 3A CHRONIC KIDNEY DISEASE: ICD-10-CM

## 2021-02-11 PROCEDURE — 99214 OFFICE O/P EST MOD 30 MIN: CPT | Mod: PBBFAC | Performed by: INTERNAL MEDICINE

## 2021-02-11 PROCEDURE — 99214 PR OFFICE/OUTPT VISIT, EST, LEVL IV, 30-39 MIN: ICD-10-PCS | Mod: S$PBB,,, | Performed by: INTERNAL MEDICINE

## 2021-02-11 PROCEDURE — 99214 OFFICE O/P EST MOD 30 MIN: CPT | Mod: S$PBB,,, | Performed by: INTERNAL MEDICINE

## 2021-02-11 PROCEDURE — 99999 PR PBB SHADOW E&M-EST. PATIENT-LVL IV: ICD-10-PCS | Mod: PBBFAC,,, | Performed by: INTERNAL MEDICINE

## 2021-02-11 PROCEDURE — 99999 PR PBB SHADOW E&M-EST. PATIENT-LVL IV: CPT | Mod: PBBFAC,,, | Performed by: INTERNAL MEDICINE

## 2021-02-11 RX ORDER — TOPIRAMATE 25 MG/1
25 TABLET ORAL 2 TIMES DAILY
Qty: 60 TABLET | Refills: 0 | Status: SHIPPED | OUTPATIENT
Start: 2021-02-11 | End: 2021-03-05 | Stop reason: SDUPTHER

## 2021-03-09 RX ORDER — TOPIRAMATE 25 MG/1
25 TABLET ORAL 2 TIMES DAILY
Qty: 60 TABLET | Refills: 0 | Status: SHIPPED | OUTPATIENT
Start: 2021-03-09 | End: 2021-04-05

## 2021-03-10 ENCOUNTER — PATIENT MESSAGE (OUTPATIENT)
Dept: BARIATRICS | Facility: CLINIC | Age: 71
End: 2021-03-10

## 2021-03-10 DIAGNOSIS — Z12.11 COLON CANCER SCREENING: ICD-10-CM

## 2021-03-15 ENCOUNTER — LAB VISIT (OUTPATIENT)
Dept: LAB | Facility: HOSPITAL | Age: 71
End: 2021-03-15
Attending: INTERNAL MEDICINE
Payer: MEDICARE

## 2021-03-15 ENCOUNTER — TELEPHONE (OUTPATIENT)
Dept: INTERNAL MEDICINE | Facility: CLINIC | Age: 71
End: 2021-03-15

## 2021-03-15 ENCOUNTER — PATIENT MESSAGE (OUTPATIENT)
Dept: INTERNAL MEDICINE | Facility: CLINIC | Age: 71
End: 2021-03-15

## 2021-03-15 DIAGNOSIS — E78.2 MIXED HYPERLIPIDEMIA: ICD-10-CM

## 2021-03-15 DIAGNOSIS — N17.9 AKI (ACUTE KIDNEY INJURY): Primary | ICD-10-CM

## 2021-03-15 LAB
ALBUMIN SERPL BCP-MCNC: 3.9 G/DL (ref 3.5–5.2)
ALP SERPL-CCNC: 68 U/L (ref 55–135)
ALT SERPL W/O P-5'-P-CCNC: 23 U/L (ref 10–44)
ANION GAP SERPL CALC-SCNC: 9 MMOL/L (ref 8–16)
AST SERPL-CCNC: 24 U/L (ref 10–40)
BILIRUB SERPL-MCNC: 0.9 MG/DL (ref 0.1–1)
BUN SERPL-MCNC: 35 MG/DL (ref 8–23)
CALCIUM SERPL-MCNC: 9.9 MG/DL (ref 8.7–10.5)
CHLORIDE SERPL-SCNC: 104 MMOL/L (ref 95–110)
CHOLEST SERPL-MCNC: 166 MG/DL (ref 120–199)
CHOLEST/HDLC SERPL: 4.6 {RATIO} (ref 2–5)
CO2 SERPL-SCNC: 24 MMOL/L (ref 23–29)
CREAT SERPL-MCNC: 2 MG/DL (ref 0.5–1.4)
EST. GFR  (AFRICAN AMERICAN): 28.3 ML/MIN/1.73 M^2
EST. GFR  (NON AFRICAN AMERICAN): 24.6 ML/MIN/1.73 M^2
GLUCOSE SERPL-MCNC: 104 MG/DL (ref 70–110)
HDLC SERPL-MCNC: 36 MG/DL (ref 40–75)
HDLC SERPL: 21.7 % (ref 20–50)
LDLC SERPL CALC-MCNC: 95 MG/DL (ref 63–159)
NONHDLC SERPL-MCNC: 130 MG/DL
POTASSIUM SERPL-SCNC: 4.1 MMOL/L (ref 3.5–5.1)
PROT SERPL-MCNC: 7.2 G/DL (ref 6–8.4)
SODIUM SERPL-SCNC: 137 MMOL/L (ref 136–145)
TRIGL SERPL-MCNC: 175 MG/DL (ref 30–150)

## 2021-03-15 PROCEDURE — 80061 LIPID PANEL: CPT | Performed by: INTERNAL MEDICINE

## 2021-03-15 PROCEDURE — 36415 COLL VENOUS BLD VENIPUNCTURE: CPT | Performed by: INTERNAL MEDICINE

## 2021-03-15 PROCEDURE — 80053 COMPREHEN METABOLIC PANEL: CPT | Performed by: INTERNAL MEDICINE

## 2021-03-16 ENCOUNTER — LAB VISIT (OUTPATIENT)
Dept: LAB | Facility: HOSPITAL | Age: 71
End: 2021-03-16
Attending: INTERNAL MEDICINE
Payer: MEDICARE

## 2021-03-16 DIAGNOSIS — N17.9 AKI (ACUTE KIDNEY INJURY): ICD-10-CM

## 2021-03-16 PROCEDURE — 80053 COMPREHEN METABOLIC PANEL: CPT | Performed by: INTERNAL MEDICINE

## 2021-03-16 PROCEDURE — 36415 COLL VENOUS BLD VENIPUNCTURE: CPT | Performed by: INTERNAL MEDICINE

## 2021-03-17 ENCOUNTER — TELEPHONE (OUTPATIENT)
Dept: INTERNAL MEDICINE | Facility: CLINIC | Age: 71
End: 2021-03-17

## 2021-03-17 DIAGNOSIS — N17.9 AKI (ACUTE KIDNEY INJURY): Primary | ICD-10-CM

## 2021-03-17 LAB
ALBUMIN SERPL BCP-MCNC: 4 G/DL (ref 3.5–5.2)
ALP SERPL-CCNC: 66 U/L (ref 55–135)
ALT SERPL W/O P-5'-P-CCNC: 22 U/L (ref 10–44)
ANION GAP SERPL CALC-SCNC: 12 MMOL/L (ref 8–16)
AST SERPL-CCNC: 24 U/L (ref 10–40)
BILIRUB SERPL-MCNC: 0.8 MG/DL (ref 0.1–1)
BUN SERPL-MCNC: 31 MG/DL (ref 8–23)
CALCIUM SERPL-MCNC: 9.8 MG/DL (ref 8.7–10.5)
CHLORIDE SERPL-SCNC: 101 MMOL/L (ref 95–110)
CO2 SERPL-SCNC: 22 MMOL/L (ref 23–29)
CREAT SERPL-MCNC: 2 MG/DL (ref 0.5–1.4)
EST. GFR  (AFRICAN AMERICAN): 28.3 ML/MIN/1.73 M^2
EST. GFR  (NON AFRICAN AMERICAN): 24.6 ML/MIN/1.73 M^2
GLUCOSE SERPL-MCNC: 98 MG/DL (ref 70–110)
POTASSIUM SERPL-SCNC: 3.8 MMOL/L (ref 3.5–5.1)
PROT SERPL-MCNC: 7.2 G/DL (ref 6–8.4)
SODIUM SERPL-SCNC: 135 MMOL/L (ref 136–145)

## 2021-03-18 ENCOUNTER — PATIENT OUTREACH (OUTPATIENT)
Dept: ADMINISTRATIVE | Facility: OTHER | Age: 71
End: 2021-03-18

## 2021-03-19 ENCOUNTER — OFFICE VISIT (OUTPATIENT)
Dept: NEPHROLOGY | Facility: CLINIC | Age: 71
End: 2021-03-19
Payer: MEDICARE

## 2021-03-19 VITALS
WEIGHT: 205.94 LBS | HEIGHT: 62 IN | HEART RATE: 81 BPM | DIASTOLIC BLOOD PRESSURE: 74 MMHG | SYSTOLIC BLOOD PRESSURE: 116 MMHG | OXYGEN SATURATION: 94 % | BODY MASS INDEX: 37.9 KG/M2

## 2021-03-19 DIAGNOSIS — N18.32 STAGE 3B CHRONIC KIDNEY DISEASE: Primary | ICD-10-CM

## 2021-03-19 PROCEDURE — 99999 PR PBB SHADOW E&M-EST. PATIENT-LVL III: ICD-10-PCS | Mod: PBBFAC,,, | Performed by: INTERNAL MEDICINE

## 2021-03-19 PROCEDURE — 99213 OFFICE O/P EST LOW 20 MIN: CPT | Mod: PBBFAC | Performed by: INTERNAL MEDICINE

## 2021-03-19 PROCEDURE — 99204 OFFICE O/P NEW MOD 45 MIN: CPT | Mod: S$PBB,,, | Performed by: INTERNAL MEDICINE

## 2021-03-19 PROCEDURE — 99204 PR OFFICE/OUTPT VISIT, NEW, LEVL IV, 45-59 MIN: ICD-10-PCS | Mod: S$PBB,,, | Performed by: INTERNAL MEDICINE

## 2021-03-19 PROCEDURE — 99999 PR PBB SHADOW E&M-EST. PATIENT-LVL III: CPT | Mod: PBBFAC,,, | Performed by: INTERNAL MEDICINE

## 2021-03-22 ENCOUNTER — PATIENT MESSAGE (OUTPATIENT)
Dept: NEPHROLOGY | Facility: CLINIC | Age: 71
End: 2021-03-22

## 2021-03-22 ENCOUNTER — LAB VISIT (OUTPATIENT)
Dept: LAB | Facility: HOSPITAL | Age: 71
End: 2021-03-22
Attending: INTERNAL MEDICINE
Payer: MEDICARE

## 2021-03-22 DIAGNOSIS — N18.32 STAGE 3B CHRONIC KIDNEY DISEASE: ICD-10-CM

## 2021-03-22 LAB
BASOPHILS # BLD AUTO: 0.06 K/UL (ref 0–0.2)
BASOPHILS NFR BLD: 1.2 % (ref 0–1.9)
DIFFERENTIAL METHOD: ABNORMAL
EOSINOPHIL # BLD AUTO: 0.2 K/UL (ref 0–0.5)
EOSINOPHIL NFR BLD: 4.3 % (ref 0–8)
ERYTHROCYTE [DISTWIDTH] IN BLOOD BY AUTOMATED COUNT: 12.2 % (ref 11.5–14.5)
HCT VFR BLD AUTO: 40.3 % (ref 37–48.5)
HGB BLD-MCNC: 13.8 G/DL (ref 12–16)
IMM GRANULOCYTES # BLD AUTO: 0.02 K/UL (ref 0–0.04)
IMM GRANULOCYTES NFR BLD AUTO: 0.4 % (ref 0–0.5)
LYMPHOCYTES # BLD AUTO: 1.2 K/UL (ref 1–4.8)
LYMPHOCYTES NFR BLD: 23.8 % (ref 18–48)
MCH RBC QN AUTO: 31.3 PG (ref 27–31)
MCHC RBC AUTO-ENTMCNC: 34.2 G/DL (ref 32–36)
MCV RBC AUTO: 91 FL (ref 82–98)
MONOCYTES # BLD AUTO: 0.5 K/UL (ref 0.3–1)
MONOCYTES NFR BLD: 10.4 % (ref 4–15)
NEUTROPHILS # BLD AUTO: 3 K/UL (ref 1.8–7.7)
NEUTROPHILS NFR BLD: 59.9 % (ref 38–73)
NRBC BLD-RTO: 0 /100 WBC
PLATELET # BLD AUTO: 251 K/UL (ref 150–350)
PMV BLD AUTO: 10.3 FL (ref 9.2–12.9)
RBC # BLD AUTO: 4.41 M/UL (ref 4–5.4)
WBC # BLD AUTO: 5.08 K/UL (ref 3.9–12.7)

## 2021-03-22 PROCEDURE — 84165 PATHOLOGIST INTERPRETATION SPE: ICD-10-PCS | Mod: 26,,, | Performed by: PATHOLOGY

## 2021-03-22 PROCEDURE — 84165 PROTEIN E-PHORESIS SERUM: CPT | Performed by: INTERNAL MEDICINE

## 2021-03-22 PROCEDURE — 87340 HEPATITIS B SURFACE AG IA: CPT | Performed by: INTERNAL MEDICINE

## 2021-03-22 PROCEDURE — 84550 ASSAY OF BLOOD/URIC ACID: CPT | Performed by: INTERNAL MEDICINE

## 2021-03-22 PROCEDURE — 83520 IMMUNOASSAY QUANT NOS NONAB: CPT | Performed by: INTERNAL MEDICINE

## 2021-03-22 PROCEDURE — 86334 IMMUNOFIX E-PHORESIS SERUM: CPT | Performed by: INTERNAL MEDICINE

## 2021-03-22 PROCEDURE — 86803 HEPATITIS C AB TEST: CPT | Performed by: INTERNAL MEDICINE

## 2021-03-22 PROCEDURE — 86160 COMPLEMENT ANTIGEN: CPT | Mod: 59 | Performed by: INTERNAL MEDICINE

## 2021-03-22 PROCEDURE — 86334 PATHOLOGIST INTERPRETATION IFE: ICD-10-PCS | Mod: 26,,, | Performed by: PATHOLOGY

## 2021-03-22 PROCEDURE — 84165 PROTEIN E-PHORESIS SERUM: CPT | Mod: 26,,, | Performed by: PATHOLOGY

## 2021-03-22 PROCEDURE — 82550 ASSAY OF CK (CPK): CPT | Performed by: INTERNAL MEDICINE

## 2021-03-22 PROCEDURE — 85025 COMPLETE CBC W/AUTO DIFF WBC: CPT | Performed by: INTERNAL MEDICINE

## 2021-03-22 PROCEDURE — 82306 VITAMIN D 25 HYDROXY: CPT | Performed by: INTERNAL MEDICINE

## 2021-03-22 PROCEDURE — 86160 COMPLEMENT ANTIGEN: CPT | Performed by: INTERNAL MEDICINE

## 2021-03-22 PROCEDURE — 83970 ASSAY OF PARATHORMONE: CPT | Performed by: INTERNAL MEDICINE

## 2021-03-22 PROCEDURE — 83880 ASSAY OF NATRIURETIC PEPTIDE: CPT | Performed by: INTERNAL MEDICINE

## 2021-03-22 PROCEDURE — 83516 IMMUNOASSAY NONANTIBODY: CPT | Performed by: INTERNAL MEDICINE

## 2021-03-22 PROCEDURE — 80069 RENAL FUNCTION PANEL: CPT | Performed by: INTERNAL MEDICINE

## 2021-03-22 PROCEDURE — 86334 IMMUNOFIX E-PHORESIS SERUM: CPT | Mod: 26,,, | Performed by: PATHOLOGY

## 2021-03-23 ENCOUNTER — CLINICAL SUPPORT (OUTPATIENT)
Dept: CARDIOLOGY | Facility: HOSPITAL | Age: 71
End: 2021-03-23
Payer: MEDICARE

## 2021-03-23 DIAGNOSIS — Z95.0 PRESENCE OF CARDIAC PACEMAKER: ICD-10-CM

## 2021-03-23 DIAGNOSIS — I49.5 SICK SINUS SYNDROME: ICD-10-CM

## 2021-03-23 DIAGNOSIS — E79.0 HYPERURICEMIA: Primary | ICD-10-CM

## 2021-03-23 DIAGNOSIS — I48.91 UNSPECIFIED ATRIAL FIBRILLATION: ICD-10-CM

## 2021-03-23 DIAGNOSIS — I10 HYPERTENSION, UNSPECIFIED TYPE: ICD-10-CM

## 2021-03-23 LAB
25(OH)D3+25(OH)D2 SERPL-MCNC: 47 NG/ML (ref 30–96)
ALBUMIN SERPL BCP-MCNC: 4.2 G/DL (ref 3.5–5.2)
ALBUMIN SERPL ELPH-MCNC: 4.23 G/DL (ref 3.35–5.55)
ALPHA1 GLOB SERPL ELPH-MCNC: 0.36 G/DL (ref 0.17–0.41)
ALPHA2 GLOB SERPL ELPH-MCNC: 0.89 G/DL (ref 0.43–0.99)
ANION GAP SERPL CALC-SCNC: 11 MMOL/L (ref 8–16)
B-GLOBULIN SERPL ELPH-MCNC: 0.9 G/DL (ref 0.5–1.1)
BNP SERPL-MCNC: 45 PG/ML (ref 0–99)
BUN SERPL-MCNC: 26 MG/DL (ref 8–23)
C3 SERPL-MCNC: 130 MG/DL (ref 50–180)
C4 SERPL-MCNC: 38 MG/DL (ref 11–44)
CALCIUM SERPL-MCNC: 10.3 MG/DL (ref 8.7–10.5)
CHLORIDE SERPL-SCNC: 98 MMOL/L (ref 95–110)
CK SERPL-CCNC: 82 U/L (ref 20–180)
CO2 SERPL-SCNC: 22 MMOL/L (ref 23–29)
CREAT SERPL-MCNC: 1.8 MG/DL (ref 0.5–1.4)
EST. GFR  (AFRICAN AMERICAN): 32.2 ML/MIN/1.73 M^2
EST. GFR  (NON AFRICAN AMERICAN): 27.9 ML/MIN/1.73 M^2
GAMMA GLOB SERPL ELPH-MCNC: 0.82 G/DL (ref 0.67–1.58)
GLUCOSE SERPL-MCNC: 103 MG/DL (ref 70–110)
HBV SURFACE AG SERPL QL IA: NEGATIVE
HCV AB SERPL QL IA: NEGATIVE
KAPPA LC SER QL IA: 3.12 MG/DL (ref 0.33–1.94)
KAPPA LC/LAMBDA SER IA: 1.53 (ref 0.26–1.65)
LAMBDA LC SER QL IA: 2.04 MG/DL (ref 0.57–2.63)
PHOSPHATE SERPL-MCNC: 2.6 MG/DL (ref 2.7–4.5)
POTASSIUM SERPL-SCNC: 3.4 MMOL/L (ref 3.5–5.1)
PROT SERPL-MCNC: 7.2 G/DL (ref 6–8.4)
PTH-INTACT SERPL-MCNC: 43 PG/ML (ref 9–77)
SODIUM SERPL-SCNC: 131 MMOL/L (ref 136–145)
URATE SERPL-MCNC: 8.3 MG/DL (ref 2.4–5.7)

## 2021-03-23 PROCEDURE — 93294 CARDIAC DEVICE CHECK - REMOTE: ICD-10-PCS | Mod: ,,, | Performed by: INTERNAL MEDICINE

## 2021-03-23 PROCEDURE — 93294 REM INTERROG EVL PM/LDLS PM: CPT | Mod: ,,, | Performed by: INTERNAL MEDICINE

## 2021-03-23 RX ORDER — HYDROCHLOROTHIAZIDE 25 MG/1
25 TABLET ORAL DAILY
Qty: 30 TABLET | Refills: 0 | Status: SHIPPED | OUTPATIENT
Start: 2021-03-23 | End: 2021-04-15

## 2021-03-23 RX ORDER — ALLOPURINOL 100 MG/1
100 TABLET ORAL DAILY
Qty: 90 TABLET | Refills: 1 | Status: SHIPPED | OUTPATIENT
Start: 2021-03-23 | End: 2021-11-19 | Stop reason: SINTOL

## 2021-03-24 LAB
CYSTATIN C SERPL-MCNC: 2.29 MG/L (ref 0.68–1.36)
GFR/BSA.PRED SERPLBLD CYS-BASED-ARV: 23 ML/MIN/BSA
PROTEINASE3 IGG SER-ACNC: <0.2 U

## 2021-03-25 LAB
INTERPRETATION SERPL IFE-IMP: NORMAL
MYELOPEROXIDASE AB SER-ACNC: 4 UNITS
PATHOLOGIST INTERPRETATION IFE: NORMAL
PATHOLOGIST INTERPRETATION SPE: NORMAL

## 2021-04-01 DIAGNOSIS — I10 HYPERTENSION, UNSPECIFIED TYPE: Primary | ICD-10-CM

## 2021-04-05 ENCOUNTER — PATIENT OUTREACH (OUTPATIENT)
Dept: ADMINISTRATIVE | Facility: HOSPITAL | Age: 71
End: 2021-04-05

## 2021-04-05 ENCOUNTER — LAB VISIT (OUTPATIENT)
Dept: LAB | Facility: HOSPITAL | Age: 71
End: 2021-04-05
Attending: INTERNAL MEDICINE
Payer: MEDICARE

## 2021-04-05 ENCOUNTER — PATIENT MESSAGE (OUTPATIENT)
Dept: ADMINISTRATIVE | Facility: HOSPITAL | Age: 71
End: 2021-04-05

## 2021-04-05 DIAGNOSIS — I10 HYPERTENSION, UNSPECIFIED TYPE: ICD-10-CM

## 2021-04-05 LAB
ANION GAP SERPL CALC-SCNC: 13 MMOL/L (ref 8–16)
BASOPHILS # BLD AUTO: 0.07 K/UL (ref 0–0.2)
BASOPHILS NFR BLD: 1 % (ref 0–1.9)
BUN SERPL-MCNC: 26 MG/DL (ref 8–23)
CALCIUM SERPL-MCNC: 10.5 MG/DL (ref 8.7–10.5)
CHLORIDE SERPL-SCNC: 94 MMOL/L (ref 95–110)
CO2 SERPL-SCNC: 27 MMOL/L (ref 23–29)
CREAT SERPL-MCNC: 1.6 MG/DL (ref 0.5–1.4)
DIFFERENTIAL METHOD: ABNORMAL
EOSINOPHIL # BLD AUTO: 0.3 K/UL (ref 0–0.5)
EOSINOPHIL NFR BLD: 4.2 % (ref 0–8)
ERYTHROCYTE [DISTWIDTH] IN BLOOD BY AUTOMATED COUNT: 12.6 % (ref 11.5–14.5)
EST. GFR  (AFRICAN AMERICAN): 37.1 ML/MIN/1.73 M^2
EST. GFR  (NON AFRICAN AMERICAN): 32.2 ML/MIN/1.73 M^2
GLUCOSE SERPL-MCNC: 104 MG/DL (ref 70–110)
HCT VFR BLD AUTO: 39.8 % (ref 37–48.5)
HGB BLD-MCNC: 13.4 G/DL (ref 12–16)
IMM GRANULOCYTES # BLD AUTO: 0.02 K/UL (ref 0–0.04)
IMM GRANULOCYTES NFR BLD AUTO: 0.3 % (ref 0–0.5)
LYMPHOCYTES # BLD AUTO: 1.5 K/UL (ref 1–4.8)
LYMPHOCYTES NFR BLD: 21 % (ref 18–48)
MCH RBC QN AUTO: 31.2 PG (ref 27–31)
MCHC RBC AUTO-ENTMCNC: 33.7 G/DL (ref 32–36)
MCV RBC AUTO: 93 FL (ref 82–98)
MONOCYTES # BLD AUTO: 0.6 K/UL (ref 0.3–1)
MONOCYTES NFR BLD: 8 % (ref 4–15)
NEUTROPHILS # BLD AUTO: 4.5 K/UL (ref 1.8–7.7)
NEUTROPHILS NFR BLD: 65.5 % (ref 38–73)
NRBC BLD-RTO: 0 /100 WBC
PLATELET # BLD AUTO: 253 K/UL (ref 150–450)
PMV BLD AUTO: 9.8 FL (ref 9.2–12.9)
POTASSIUM SERPL-SCNC: 3.2 MMOL/L (ref 3.5–5.1)
RBC # BLD AUTO: 4.3 M/UL (ref 4–5.4)
SODIUM SERPL-SCNC: 134 MMOL/L (ref 136–145)
WBC # BLD AUTO: 6.89 K/UL (ref 3.9–12.7)

## 2021-04-05 PROCEDURE — 85025 COMPLETE CBC W/AUTO DIFF WBC: CPT | Performed by: INTERNAL MEDICINE

## 2021-04-05 PROCEDURE — 80048 BASIC METABOLIC PNL TOTAL CA: CPT | Performed by: INTERNAL MEDICINE

## 2021-04-05 PROCEDURE — 36415 COLL VENOUS BLD VENIPUNCTURE: CPT | Performed by: INTERNAL MEDICINE

## 2021-04-06 ENCOUNTER — PATIENT MESSAGE (OUTPATIENT)
Dept: NEPHROLOGY | Facility: CLINIC | Age: 71
End: 2021-04-06

## 2021-04-06 ENCOUNTER — TELEPHONE (OUTPATIENT)
Dept: NEPHROLOGY | Facility: CLINIC | Age: 71
End: 2021-04-06

## 2021-04-06 ENCOUNTER — PATIENT MESSAGE (OUTPATIENT)
Dept: BARIATRICS | Facility: CLINIC | Age: 71
End: 2021-04-06

## 2021-04-06 DIAGNOSIS — E87.6 HYPOKALEMIA: Primary | ICD-10-CM

## 2021-04-06 DIAGNOSIS — D89.2 PARAPROTEINEMIA: ICD-10-CM

## 2021-04-06 RX ORDER — POTASSIUM CHLORIDE 750 MG/1
10 TABLET, EXTENDED RELEASE ORAL DAILY
Qty: 90 TABLET | Refills: 1 | Status: SHIPPED | OUTPATIENT
Start: 2021-04-06 | End: 2021-09-28

## 2021-04-07 ENCOUNTER — TELEPHONE (OUTPATIENT)
Dept: HEMATOLOGY/ONCOLOGY | Facility: CLINIC | Age: 71
End: 2021-04-07

## 2021-04-16 DIAGNOSIS — I10 HYPERTENSION, UNSPECIFIED TYPE: ICD-10-CM

## 2021-04-16 RX ORDER — HYDROCHLOROTHIAZIDE 25 MG/1
25 TABLET ORAL DAILY
Qty: 30 TABLET | Refills: 0 | Status: CANCELLED | OUTPATIENT
Start: 2021-04-16

## 2021-04-16 RX ORDER — HYDROCHLOROTHIAZIDE 25 MG/1
25 TABLET ORAL DAILY
Qty: 30 TABLET | Refills: 0 | Status: SHIPPED | OUTPATIENT
Start: 2021-04-16 | End: 2021-05-11

## 2021-05-14 ENCOUNTER — OFFICE VISIT (OUTPATIENT)
Dept: HEMATOLOGY/ONCOLOGY | Facility: CLINIC | Age: 71
End: 2021-05-14
Payer: MEDICARE

## 2021-05-14 VITALS
HEIGHT: 62 IN | HEART RATE: 86 BPM | RESPIRATION RATE: 18 BRPM | WEIGHT: 204.06 LBS | OXYGEN SATURATION: 96 % | TEMPERATURE: 98 F | SYSTOLIC BLOOD PRESSURE: 126 MMHG | DIASTOLIC BLOOD PRESSURE: 65 MMHG | BODY MASS INDEX: 37.55 KG/M2

## 2021-05-14 DIAGNOSIS — D89.2 PARAPROTEINEMIA: Primary | ICD-10-CM

## 2021-05-14 DIAGNOSIS — N18.31 STAGE 3A CHRONIC KIDNEY DISEASE: ICD-10-CM

## 2021-05-14 PROCEDURE — 99204 PR OFFICE/OUTPT VISIT, NEW, LEVL IV, 45-59 MIN: ICD-10-PCS | Mod: S$PBB,,, | Performed by: INTERNAL MEDICINE

## 2021-05-14 PROCEDURE — 99999 PR PBB SHADOW E&M-EST. PATIENT-LVL IV: CPT | Mod: PBBFAC,,, | Performed by: INTERNAL MEDICINE

## 2021-05-14 PROCEDURE — 99214 OFFICE O/P EST MOD 30 MIN: CPT | Mod: PBBFAC | Performed by: INTERNAL MEDICINE

## 2021-05-14 PROCEDURE — 99999 PR PBB SHADOW E&M-EST. PATIENT-LVL IV: ICD-10-PCS | Mod: PBBFAC,,, | Performed by: INTERNAL MEDICINE

## 2021-05-14 PROCEDURE — 99204 OFFICE O/P NEW MOD 45 MIN: CPT | Mod: S$PBB,,, | Performed by: INTERNAL MEDICINE

## 2021-05-18 ENCOUNTER — PATIENT OUTREACH (OUTPATIENT)
Dept: ADMINISTRATIVE | Facility: OTHER | Age: 71
End: 2021-05-18

## 2021-05-19 ENCOUNTER — OFFICE VISIT (OUTPATIENT)
Dept: NEPHROLOGY | Facility: CLINIC | Age: 71
End: 2021-05-19
Payer: MEDICARE

## 2021-05-19 ENCOUNTER — LAB VISIT (OUTPATIENT)
Dept: LAB | Facility: HOSPITAL | Age: 71
End: 2021-05-19
Attending: INTERNAL MEDICINE
Payer: MEDICARE

## 2021-05-19 ENCOUNTER — PATIENT MESSAGE (OUTPATIENT)
Dept: NEPHROLOGY | Facility: CLINIC | Age: 71
End: 2021-05-19

## 2021-05-19 VITALS
BODY MASS INDEX: 37.93 KG/M2 | HEIGHT: 62 IN | OXYGEN SATURATION: 95 % | WEIGHT: 206.13 LBS | DIASTOLIC BLOOD PRESSURE: 70 MMHG | SYSTOLIC BLOOD PRESSURE: 122 MMHG | HEART RATE: 68 BPM

## 2021-05-19 DIAGNOSIS — E87.6 HYPOKALEMIA: Primary | ICD-10-CM

## 2021-05-19 DIAGNOSIS — N18.32 STAGE 3B CHRONIC KIDNEY DISEASE: ICD-10-CM

## 2021-05-19 DIAGNOSIS — E87.6 HYPOKALEMIA: ICD-10-CM

## 2021-05-19 LAB
ALBUMIN SERPL BCP-MCNC: 4.2 G/DL (ref 3.5–5.2)
ALBUMIN SERPL BCP-MCNC: 4.2 G/DL (ref 3.5–5.2)
ALP SERPL-CCNC: 75 U/L (ref 55–135)
ALT SERPL W/O P-5'-P-CCNC: 25 U/L (ref 10–44)
ANION GAP SERPL CALC-SCNC: 11 MMOL/L (ref 8–16)
AST SERPL-CCNC: 25 U/L (ref 10–40)
BILIRUB DIRECT SERPL-MCNC: 0.3 MG/DL (ref 0.1–0.3)
BILIRUB SERPL-MCNC: 0.8 MG/DL (ref 0.1–1)
BUN SERPL-MCNC: 28 MG/DL (ref 8–23)
CALCIUM SERPL-MCNC: 11.1 MG/DL (ref 8.7–10.5)
CHLORIDE SERPL-SCNC: 105 MMOL/L (ref 95–110)
CK SERPL-CCNC: 84 U/L (ref 20–180)
CO2 SERPL-SCNC: 24 MMOL/L (ref 23–29)
CREAT SERPL-MCNC: 1.7 MG/DL (ref 0.5–1.4)
EST. GFR  (AFRICAN AMERICAN): 34.5 ML/MIN/1.73 M^2
EST. GFR  (NON AFRICAN AMERICAN): 29.9 ML/MIN/1.73 M^2
GLUCOSE SERPL-MCNC: 117 MG/DL (ref 70–110)
MAGNESIUM SERPL-MCNC: 1.9 MG/DL (ref 1.6–2.6)
PHOSPHATE SERPL-MCNC: 2.1 MG/DL (ref 2.7–4.5)
POTASSIUM SERPL-SCNC: 3.8 MMOL/L (ref 3.5–5.1)
PROT SERPL-MCNC: 7.7 G/DL (ref 6–8.4)
SODIUM SERPL-SCNC: 140 MMOL/L (ref 136–145)
URATE SERPL-MCNC: 6.4 MG/DL (ref 2.4–5.7)

## 2021-05-19 PROCEDURE — 82247 BILIRUBIN TOTAL: CPT | Performed by: INTERNAL MEDICINE

## 2021-05-19 PROCEDURE — 83735 ASSAY OF MAGNESIUM: CPT | Performed by: INTERNAL MEDICINE

## 2021-05-19 PROCEDURE — 84075 ASSAY ALKALINE PHOSPHATASE: CPT | Performed by: INTERNAL MEDICINE

## 2021-05-19 PROCEDURE — 84460 ALANINE AMINO (ALT) (SGPT): CPT | Mod: 91 | Performed by: INTERNAL MEDICINE

## 2021-05-19 PROCEDURE — 99215 OFFICE O/P EST HI 40 MIN: CPT | Mod: S$PBB,,, | Performed by: INTERNAL MEDICINE

## 2021-05-19 PROCEDURE — 99214 OFFICE O/P EST MOD 30 MIN: CPT | Mod: PBBFAC | Performed by: INTERNAL MEDICINE

## 2021-05-19 PROCEDURE — 84550 ASSAY OF BLOOD/URIC ACID: CPT | Mod: 91 | Performed by: INTERNAL MEDICINE

## 2021-05-19 PROCEDURE — 99999 PR PBB SHADOW E&M-EST. PATIENT-LVL IV: ICD-10-PCS | Mod: PBBFAC,,, | Performed by: INTERNAL MEDICINE

## 2021-05-19 PROCEDURE — 82550 ASSAY OF CK (CPK): CPT | Performed by: INTERNAL MEDICINE

## 2021-05-19 PROCEDURE — 99999 PR PBB SHADOW E&M-EST. PATIENT-LVL IV: CPT | Mod: PBBFAC,,, | Performed by: INTERNAL MEDICINE

## 2021-05-19 PROCEDURE — 99215 PR OFFICE/OUTPT VISIT, EST, LEVL V, 40-54 MIN: ICD-10-PCS | Mod: S$PBB,,, | Performed by: INTERNAL MEDICINE

## 2021-05-19 PROCEDURE — 80069 RENAL FUNCTION PANEL: CPT | Performed by: INTERNAL MEDICINE

## 2021-05-19 PROCEDURE — 36415 COLL VENOUS BLD VENIPUNCTURE: CPT | Performed by: INTERNAL MEDICINE

## 2021-05-20 DIAGNOSIS — E83.52 HYPERCALCEMIA: Primary | ICD-10-CM

## 2021-05-20 LAB
CYSTATIN C SERPL-MCNC: 2.39 MG/L (ref 0.68–1.36)
GFR/BSA.PRED SERPLBLD CYS-BASED-ARV: 22 ML/MIN/BSA

## 2021-05-21 ENCOUNTER — TELEPHONE (OUTPATIENT)
Dept: NEPHROLOGY | Facility: CLINIC | Age: 71
End: 2021-05-21

## 2021-05-26 ENCOUNTER — LAB VISIT (OUTPATIENT)
Dept: LAB | Facility: HOSPITAL | Age: 71
End: 2021-05-26
Attending: INTERNAL MEDICINE
Payer: MEDICARE

## 2021-05-26 DIAGNOSIS — E83.52 HYPERCALCEMIA: ICD-10-CM

## 2021-05-26 DIAGNOSIS — E83.52 HYPERCALCEMIA: Primary | ICD-10-CM

## 2021-05-26 LAB
ANION GAP SERPL CALC-SCNC: 13 MMOL/L (ref 8–16)
BUN SERPL-MCNC: 25 MG/DL (ref 8–23)
CA-I BLDV-SCNC: 1.37 MMOL/L (ref 1.06–1.42)
CALCIUM SERPL-MCNC: 11.2 MG/DL (ref 8.7–10.5)
CHLORIDE SERPL-SCNC: 103 MMOL/L (ref 95–110)
CO2 SERPL-SCNC: 23 MMOL/L (ref 23–29)
CREAT SERPL-MCNC: 1.5 MG/DL (ref 0.5–1.4)
EST. GFR  (AFRICAN AMERICAN): 40.1 ML/MIN/1.73 M^2
EST. GFR  (NON AFRICAN AMERICAN): 34.8 ML/MIN/1.73 M^2
GLUCOSE SERPL-MCNC: 124 MG/DL (ref 70–110)
PHOSPHATE SERPL-MCNC: 2.6 MG/DL (ref 2.7–4.5)
POTASSIUM SERPL-SCNC: 3.6 MMOL/L (ref 3.5–5.1)
PTH-INTACT SERPL-MCNC: 47 PG/ML (ref 9–77)
SODIUM SERPL-SCNC: 139 MMOL/L (ref 136–145)

## 2021-05-26 PROCEDURE — 83970 ASSAY OF PARATHORMONE: CPT | Performed by: INTERNAL MEDICINE

## 2021-05-26 PROCEDURE — 84100 ASSAY OF PHOSPHORUS: CPT | Performed by: INTERNAL MEDICINE

## 2021-05-26 PROCEDURE — 36415 COLL VENOUS BLD VENIPUNCTURE: CPT | Performed by: INTERNAL MEDICINE

## 2021-05-26 PROCEDURE — 82652 VIT D 1 25-DIHYDROXY: CPT | Performed by: INTERNAL MEDICINE

## 2021-05-26 PROCEDURE — 82330 ASSAY OF CALCIUM: CPT | Performed by: INTERNAL MEDICINE

## 2021-05-26 PROCEDURE — 80048 BASIC METABOLIC PNL TOTAL CA: CPT | Performed by: INTERNAL MEDICINE

## 2021-05-27 ENCOUNTER — PATIENT MESSAGE (OUTPATIENT)
Dept: NEPHROLOGY | Facility: CLINIC | Age: 71
End: 2021-05-27

## 2021-05-27 DIAGNOSIS — I48.91 ATRIAL FIBRILLATION, UNSPECIFIED TYPE: ICD-10-CM

## 2021-05-27 LAB — ACE SERPL-CCNC: 33 U/L (ref 16–85)

## 2021-05-27 RX ORDER — PROPAFENONE HYDROCHLORIDE 225 MG/1
225 CAPSULE, EXTENDED RELEASE ORAL EVERY 12 HOURS
Qty: 180 CAPSULE | Refills: 3 | Status: SHIPPED | OUTPATIENT
Start: 2021-05-27 | End: 2022-03-08 | Stop reason: SDUPTHER

## 2021-05-28 ENCOUNTER — PATIENT MESSAGE (OUTPATIENT)
Dept: INTERNAL MEDICINE | Facility: CLINIC | Age: 71
End: 2021-05-28

## 2021-05-28 ENCOUNTER — LAB VISIT (OUTPATIENT)
Dept: LAB | Facility: HOSPITAL | Age: 71
End: 2021-05-28
Attending: INTERNAL MEDICINE
Payer: MEDICARE

## 2021-05-28 DIAGNOSIS — E83.52 HYPERCALCEMIA: ICD-10-CM

## 2021-05-28 PROCEDURE — 36415 COLL VENOUS BLD VENIPUNCTURE: CPT | Performed by: INTERNAL MEDICINE

## 2021-05-28 RX ORDER — TOPIRAMATE 25 MG/1
25 TABLET ORAL 3 TIMES DAILY
Qty: 90 TABLET | Refills: 5 | Status: SHIPPED | OUTPATIENT
Start: 2021-05-28 | End: 2021-11-19

## 2021-05-31 LAB — 1,25(OH)2D3 SERPL-MCNC: 25 PG/ML (ref 20–79)

## 2021-06-03 LAB — FGF-23.INTACT SERPL-MCNC: 65 PG/ML

## 2021-06-04 LAB — PTH RELATED PROT SERPL-SCNC: 1.2 PMOL/L

## 2021-06-09 ENCOUNTER — PATIENT MESSAGE (OUTPATIENT)
Dept: NEPHROLOGY | Facility: CLINIC | Age: 71
End: 2021-06-09

## 2021-06-09 ENCOUNTER — TELEPHONE (OUTPATIENT)
Dept: NEPHROLOGY | Facility: CLINIC | Age: 71
End: 2021-06-09
Payer: MEDICARE

## 2021-06-09 DIAGNOSIS — N18.32 STAGE 3B CHRONIC KIDNEY DISEASE: Primary | ICD-10-CM

## 2021-06-09 RX ORDER — FUROSEMIDE 20 MG/1
20 TABLET ORAL DAILY
Qty: 90 TABLET | Refills: 3 | Status: SHIPPED | OUTPATIENT
Start: 2021-06-09 | End: 2022-06-01 | Stop reason: SDUPTHER

## 2021-06-21 ENCOUNTER — CLINICAL SUPPORT (OUTPATIENT)
Dept: CARDIOLOGY | Facility: HOSPITAL | Age: 71
End: 2021-06-21
Payer: MEDICARE

## 2021-06-21 DIAGNOSIS — Z95.0 PRESENCE OF CARDIAC PACEMAKER: ICD-10-CM

## 2021-06-21 PROCEDURE — 93294 REM INTERROG EVL PM/LDLS PM: CPT | Mod: ,,, | Performed by: INTERNAL MEDICINE

## 2021-06-21 PROCEDURE — 93294 CARDIAC DEVICE CHECK - REMOTE: ICD-10-PCS | Mod: ,,, | Performed by: INTERNAL MEDICINE

## 2021-06-30 ENCOUNTER — LAB VISIT (OUTPATIENT)
Dept: LAB | Facility: HOSPITAL | Age: 71
End: 2021-06-30
Attending: INTERNAL MEDICINE
Payer: MEDICARE

## 2021-06-30 DIAGNOSIS — N18.32 STAGE 3B CHRONIC KIDNEY DISEASE: ICD-10-CM

## 2021-06-30 LAB
ANION GAP SERPL CALC-SCNC: 10 MMOL/L (ref 8–16)
BUN SERPL-MCNC: 30 MG/DL (ref 8–23)
CALCIUM SERPL-MCNC: 10.7 MG/DL (ref 8.7–10.5)
CHLORIDE SERPL-SCNC: 104 MMOL/L (ref 95–110)
CO2 SERPL-SCNC: 25 MMOL/L (ref 23–29)
CREAT SERPL-MCNC: 1.7 MG/DL (ref 0.5–1.4)
EST. GFR  (AFRICAN AMERICAN): 34.5 ML/MIN/1.73 M^2
EST. GFR  (NON AFRICAN AMERICAN): 29.9 ML/MIN/1.73 M^2
GLUCOSE SERPL-MCNC: 108 MG/DL (ref 70–110)
POTASSIUM SERPL-SCNC: 3.9 MMOL/L (ref 3.5–5.1)
SODIUM SERPL-SCNC: 139 MMOL/L (ref 136–145)

## 2021-06-30 PROCEDURE — 36415 COLL VENOUS BLD VENIPUNCTURE: CPT | Performed by: INTERNAL MEDICINE

## 2021-06-30 PROCEDURE — 80048 BASIC METABOLIC PNL TOTAL CA: CPT | Performed by: INTERNAL MEDICINE

## 2021-07-07 ENCOUNTER — PATIENT MESSAGE (OUTPATIENT)
Dept: ADMINISTRATIVE | Facility: HOSPITAL | Age: 71
End: 2021-07-07

## 2021-07-09 DIAGNOSIS — I49.8 OTHER SPECIFIED CARDIAC ARRHYTHMIAS: Primary | ICD-10-CM

## 2021-07-12 ENCOUNTER — DOCUMENTATION ONLY (OUTPATIENT)
Dept: ADMINISTRATIVE | Facility: HOSPITAL | Age: 71
End: 2021-07-12

## 2021-07-12 ENCOUNTER — PATIENT OUTREACH (OUTPATIENT)
Dept: ADMINISTRATIVE | Facility: HOSPITAL | Age: 71
End: 2021-07-12

## 2021-07-12 ENCOUNTER — PATIENT MESSAGE (OUTPATIENT)
Dept: ADMINISTRATIVE | Facility: HOSPITAL | Age: 71
End: 2021-07-12

## 2021-07-12 DIAGNOSIS — Z12.31 ENCOUNTER FOR SCREENING MAMMOGRAM FOR MALIGNANT NEOPLASM OF BREAST: ICD-10-CM

## 2021-07-12 DIAGNOSIS — Z12.39 ENCOUNTER FOR SCREENING FOR MALIGNANT NEOPLASM OF BREAST, UNSPECIFIED SCREENING MODALITY: Primary | ICD-10-CM

## 2021-09-09 ENCOUNTER — TELEPHONE (OUTPATIENT)
Dept: ADMINISTRATIVE | Facility: OTHER | Age: 71
End: 2021-09-09

## 2021-09-09 ENCOUNTER — PATIENT MESSAGE (OUTPATIENT)
Dept: INTERNAL MEDICINE | Facility: CLINIC | Age: 71
End: 2021-09-09

## 2021-09-09 DIAGNOSIS — I10 BENIGN ESSENTIAL HYPERTENSION: ICD-10-CM

## 2021-09-09 RX ORDER — OLMESARTAN MEDOXOMIL 40 MG/1
40 TABLET ORAL DAILY
Qty: 90 TABLET | Refills: 0 | Status: SHIPPED | OUTPATIENT
Start: 2021-09-09 | End: 2021-09-11 | Stop reason: SDUPTHER

## 2021-09-09 RX ORDER — BISOPROLOL FUMARATE 5 MG/1
5 TABLET, FILM COATED ORAL DAILY
Qty: 90 TABLET | Refills: 0 | Status: SHIPPED | OUTPATIENT
Start: 2021-09-09 | End: 2021-09-11 | Stop reason: SDUPTHER

## 2021-09-11 ENCOUNTER — PATIENT MESSAGE (OUTPATIENT)
Dept: CARDIOLOGY | Facility: CLINIC | Age: 71
End: 2021-09-11

## 2021-09-11 DIAGNOSIS — I10 BENIGN ESSENTIAL HYPERTENSION: ICD-10-CM

## 2021-09-13 RX ORDER — BISOPROLOL FUMARATE 5 MG/1
5 TABLET, FILM COATED ORAL DAILY
Qty: 90 TABLET | Refills: 3 | Status: SHIPPED | OUTPATIENT
Start: 2021-09-13 | End: 2021-12-15 | Stop reason: SDUPTHER

## 2021-09-13 RX ORDER — OLMESARTAN MEDOXOMIL 40 MG/1
40 TABLET ORAL DAILY
Qty: 90 TABLET | Refills: 3 | Status: ON HOLD | OUTPATIENT
Start: 2021-09-13 | End: 2022-01-12 | Stop reason: SDUPTHER

## 2021-09-19 ENCOUNTER — CLINICAL SUPPORT (OUTPATIENT)
Dept: CARDIOLOGY | Facility: HOSPITAL | Age: 71
End: 2021-09-19
Payer: MEDICARE

## 2021-09-19 DIAGNOSIS — I49.5 SICK SINUS SYNDROME: ICD-10-CM

## 2021-09-19 DIAGNOSIS — I48.91 UNSPECIFIED ATRIAL FIBRILLATION: ICD-10-CM

## 2021-09-19 DIAGNOSIS — Z95.0 PRESENCE OF CARDIAC PACEMAKER: ICD-10-CM

## 2021-09-19 PROCEDURE — 93294 REM INTERROG EVL PM/LDLS PM: CPT | Mod: ,,, | Performed by: INTERNAL MEDICINE

## 2021-09-19 PROCEDURE — 93294 CARDIAC DEVICE CHECK - REMOTE: ICD-10-PCS | Mod: ,,, | Performed by: INTERNAL MEDICINE

## 2021-10-04 ENCOUNTER — PATIENT MESSAGE (OUTPATIENT)
Dept: ADMINISTRATIVE | Facility: HOSPITAL | Age: 71
End: 2021-10-04

## 2021-10-04 ENCOUNTER — CLINICAL SUPPORT (OUTPATIENT)
Dept: CARDIOLOGY | Facility: HOSPITAL | Age: 71
End: 2021-10-04
Attending: INTERNAL MEDICINE
Payer: MEDICARE

## 2021-10-04 ENCOUNTER — HOSPITAL ENCOUNTER (OUTPATIENT)
Dept: CARDIOLOGY | Facility: CLINIC | Age: 71
Discharge: HOME | End: 2021-10-04
Payer: MEDICARE

## 2021-10-04 ENCOUNTER — OFFICE VISIT (OUTPATIENT)
Dept: ELECTROPHYSIOLOGY | Facility: CLINIC | Age: 71
End: 2021-10-04
Payer: MEDICARE

## 2021-10-04 VITALS
HEIGHT: 61 IN | SYSTOLIC BLOOD PRESSURE: 133 MMHG | WEIGHT: 203.94 LBS | HEART RATE: 62 BPM | DIASTOLIC BLOOD PRESSURE: 65 MMHG | BODY MASS INDEX: 38.51 KG/M2

## 2021-10-04 DIAGNOSIS — I49.8 OTHER SPECIFIED CARDIAC ARRHYTHMIAS: ICD-10-CM

## 2021-10-04 DIAGNOSIS — I48.91 ATRIAL FIBRILLATION, UNSPECIFIED TYPE: ICD-10-CM

## 2021-10-04 DIAGNOSIS — I49.5 SICK SINUS SYNDROME: ICD-10-CM

## 2021-10-04 DIAGNOSIS — Z95.0 S/P PLACEMENT OF CARDIAC PACEMAKER: Primary | ICD-10-CM

## 2021-10-04 DIAGNOSIS — I10 BENIGN ESSENTIAL HYPERTENSION: ICD-10-CM

## 2021-10-04 DIAGNOSIS — E78.2 MIXED HYPERLIPIDEMIA: ICD-10-CM

## 2021-10-04 PROCEDURE — 93280 CARDIAC DEVICE CHECK - IN CLINIC & HOSPITAL: ICD-10-PCS | Mod: 26,,, | Performed by: INTERNAL MEDICINE

## 2021-10-04 PROCEDURE — 93280 PM DEVICE PROGR EVAL DUAL: CPT

## 2021-10-04 PROCEDURE — 99213 OFFICE O/P EST LOW 20 MIN: CPT | Mod: PBBFAC | Performed by: INTERNAL MEDICINE

## 2021-10-04 PROCEDURE — 93005 ELECTROCARDIOGRAM TRACING: CPT | Mod: PBBFAC | Performed by: INTERNAL MEDICINE

## 2021-10-04 PROCEDURE — 93010 ELECTROCARDIOGRAM REPORT: CPT | Mod: S$PBB,,, | Performed by: INTERNAL MEDICINE

## 2021-10-04 PROCEDURE — 93280 PM DEVICE PROGR EVAL DUAL: CPT | Mod: 26,,, | Performed by: INTERNAL MEDICINE

## 2021-10-04 PROCEDURE — 99215 PR OFFICE/OUTPT VISIT, EST, LEVL V, 40-54 MIN: ICD-10-PCS | Mod: S$PBB,,, | Performed by: INTERNAL MEDICINE

## 2021-10-04 PROCEDURE — 99999 PR PBB SHADOW E&M-EST. PATIENT-LVL III: CPT | Mod: PBBFAC,,, | Performed by: INTERNAL MEDICINE

## 2021-10-04 PROCEDURE — 93010 RHYTHM STRIP: ICD-10-PCS | Mod: S$PBB,,, | Performed by: INTERNAL MEDICINE

## 2021-10-04 PROCEDURE — 99215 OFFICE O/P EST HI 40 MIN: CPT | Mod: S$PBB,,, | Performed by: INTERNAL MEDICINE

## 2021-10-04 PROCEDURE — 99999 PR PBB SHADOW E&M-EST. PATIENT-LVL III: ICD-10-PCS | Mod: PBBFAC,,, | Performed by: INTERNAL MEDICINE

## 2021-10-07 NOTE — H&P
CC: Painless, progressive loss of vision  Present Illness: Nuclear sclerotic cataract  Allergies/Current Meds: see meds  Mental Status: A&O x3  Pertinent Medical History: n/a     Physical Exam  General: NAD  HEENT: Eye white/quiet  Lungs: Adequate respirations  Heart: + pulses  Abdomen: soft  Rectal/GI/: deferred     Impression: Cataract  Plan: Phacoemulsification with lens implant     Isotretinoin Pregnancy And Lactation Text: This medication is Pregnancy Category X and is considered extremely dangerous during pregnancy. It is unknown if it is excreted in breast milk.

## 2021-10-08 ENCOUNTER — TELEPHONE (OUTPATIENT)
Dept: CARDIOLOGY | Facility: HOSPITAL | Age: 71
End: 2021-10-08

## 2021-10-12 ENCOUNTER — TELEPHONE (OUTPATIENT)
Dept: CARDIOLOGY | Facility: HOSPITAL | Age: 71
End: 2021-10-12

## 2021-11-02 DIAGNOSIS — I10 BENIGN ESSENTIAL HYPERTENSION: ICD-10-CM

## 2021-11-02 RX ORDER — AMLODIPINE BESYLATE 10 MG/1
TABLET ORAL
Qty: 90 TABLET | Refills: 3 | Status: SHIPPED | OUTPATIENT
Start: 2021-11-02 | End: 2022-11-03

## 2021-11-15 ENCOUNTER — PATIENT OUTREACH (OUTPATIENT)
Dept: ADMINISTRATIVE | Facility: OTHER | Age: 71
End: 2021-11-15
Payer: MEDICARE

## 2021-11-16 ENCOUNTER — OFFICE VISIT (OUTPATIENT)
Dept: OPHTHALMOLOGY | Facility: CLINIC | Age: 71
End: 2021-11-16
Payer: MEDICARE

## 2021-11-16 ENCOUNTER — TELEPHONE (OUTPATIENT)
Dept: OPHTHALMOLOGY | Facility: CLINIC | Age: 71
End: 2021-11-16

## 2021-11-16 DIAGNOSIS — H02.413 MECHANICAL PTOSIS, ACQUIRED, BILATERAL: ICD-10-CM

## 2021-11-16 DIAGNOSIS — Z13.9 SCREENING FOR UNSPECIFIED CONDITION: Primary | ICD-10-CM

## 2021-11-16 DIAGNOSIS — H02.9 EYELID LESION: Primary | ICD-10-CM

## 2021-11-16 PROCEDURE — 99999 PR PBB SHADOW E&M-EST. PATIENT-LVL III: ICD-10-PCS | Mod: PBBFAC,,, | Performed by: OPHTHALMOLOGY

## 2021-11-16 PROCEDURE — 92285 EXTERNAL OCULAR PHOTOGRAPHY: CPT | Mod: PBBFAC | Performed by: OPHTHALMOLOGY

## 2021-11-16 PROCEDURE — 99213 PR OFFICE/OUTPT VISIT, EST, LEVL III, 20-29 MIN: ICD-10-PCS | Mod: S$PBB,,, | Performed by: OPHTHALMOLOGY

## 2021-11-16 PROCEDURE — 99213 OFFICE O/P EST LOW 20 MIN: CPT | Mod: PBBFAC | Performed by: OPHTHALMOLOGY

## 2021-11-16 PROCEDURE — 99999 PR PBB SHADOW E&M-EST. PATIENT-LVL III: CPT | Mod: PBBFAC,,, | Performed by: OPHTHALMOLOGY

## 2021-11-16 PROCEDURE — 92285 EXTERNAL PHOTOGRAPHY - OU - BOTH EYES: ICD-10-PCS | Mod: 26,S$PBB,, | Performed by: OPHTHALMOLOGY

## 2021-11-16 PROCEDURE — 99213 OFFICE O/P EST LOW 20 MIN: CPT | Mod: S$PBB,,, | Performed by: OPHTHALMOLOGY

## 2021-11-18 ENCOUNTER — TELEPHONE (OUTPATIENT)
Dept: INTERNAL MEDICINE | Facility: CLINIC | Age: 71
End: 2021-11-18
Payer: MEDICARE

## 2021-11-19 ENCOUNTER — OFFICE VISIT (OUTPATIENT)
Dept: NEPHROLOGY | Facility: CLINIC | Age: 71
End: 2021-11-19
Payer: MEDICARE

## 2021-11-19 ENCOUNTER — LAB VISIT (OUTPATIENT)
Dept: LAB | Facility: HOSPITAL | Age: 71
End: 2021-11-19
Attending: INTERNAL MEDICINE
Payer: MEDICARE

## 2021-11-19 VITALS
DIASTOLIC BLOOD PRESSURE: 72 MMHG | HEART RATE: 64 BPM | HEIGHT: 61 IN | SYSTOLIC BLOOD PRESSURE: 120 MMHG | WEIGHT: 200.81 LBS | OXYGEN SATURATION: 95 % | BODY MASS INDEX: 37.91 KG/M2

## 2021-11-19 DIAGNOSIS — N18.32 STAGE 3B CHRONIC KIDNEY DISEASE: ICD-10-CM

## 2021-11-19 DIAGNOSIS — N18.32 STAGE 3B CHRONIC KIDNEY DISEASE: Primary | ICD-10-CM

## 2021-11-19 PROBLEM — R73.01 IMPAIRED FASTING GLUCOSE: Status: ACTIVE | Noted: 2021-11-19

## 2021-11-19 LAB
ALBUMIN SERPL BCP-MCNC: 4.3 G/DL (ref 3.5–5.2)
ANION GAP SERPL CALC-SCNC: 10 MMOL/L (ref 8–16)
BASOPHILS # BLD AUTO: 0.08 K/UL (ref 0–0.2)
BASOPHILS NFR BLD: 0.9 % (ref 0–1.9)
BUN SERPL-MCNC: 22 MG/DL (ref 8–23)
CALCIUM SERPL-MCNC: 10.4 MG/DL (ref 8.7–10.5)
CHLORIDE SERPL-SCNC: 107 MMOL/L (ref 95–110)
CO2 SERPL-SCNC: 26 MMOL/L (ref 23–29)
CREAT SERPL-MCNC: 1.3 MG/DL (ref 0.5–1.4)
DIFFERENTIAL METHOD: NORMAL
EOSINOPHIL # BLD AUTO: 0.3 K/UL (ref 0–0.5)
EOSINOPHIL NFR BLD: 3.3 % (ref 0–8)
ERYTHROCYTE [DISTWIDTH] IN BLOOD BY AUTOMATED COUNT: 13.1 % (ref 11.5–14.5)
EST. GFR  (AFRICAN AMERICAN): 47.4 ML/MIN/1.73 M^2
EST. GFR  (NON AFRICAN AMERICAN): 41.1 ML/MIN/1.73 M^2
GLUCOSE SERPL-MCNC: 106 MG/DL (ref 70–110)
HCT VFR BLD AUTO: 45.2 % (ref 37–48.5)
HGB BLD-MCNC: 14.6 G/DL (ref 12–16)
IMM GRANULOCYTES # BLD AUTO: 0.03 K/UL (ref 0–0.04)
IMM GRANULOCYTES NFR BLD AUTO: 0.3 % (ref 0–0.5)
LYMPHOCYTES # BLD AUTO: 1.7 K/UL (ref 1–4.8)
LYMPHOCYTES NFR BLD: 19.8 % (ref 18–48)
MCH RBC QN AUTO: 30.7 PG (ref 27–31)
MCHC RBC AUTO-ENTMCNC: 32.3 G/DL (ref 32–36)
MCV RBC AUTO: 95 FL (ref 82–98)
MONOCYTES # BLD AUTO: 0.5 K/UL (ref 0.3–1)
MONOCYTES NFR BLD: 5.9 % (ref 4–15)
NEUTROPHILS # BLD AUTO: 6 K/UL (ref 1.8–7.7)
NEUTROPHILS NFR BLD: 69.8 % (ref 38–73)
NRBC BLD-RTO: 0 /100 WBC
PHOSPHATE SERPL-MCNC: 3.1 MG/DL (ref 2.7–4.5)
PLATELET # BLD AUTO: 241 K/UL (ref 150–450)
PMV BLD AUTO: 10 FL (ref 9.2–12.9)
POTASSIUM SERPL-SCNC: 4.2 MMOL/L (ref 3.5–5.1)
RBC # BLD AUTO: 4.76 M/UL (ref 4–5.4)
SODIUM SERPL-SCNC: 143 MMOL/L (ref 136–145)
WBC # BLD AUTO: 8.6 K/UL (ref 3.9–12.7)

## 2021-11-19 PROCEDURE — 80069 RENAL FUNCTION PANEL: CPT | Performed by: INTERNAL MEDICINE

## 2021-11-19 PROCEDURE — 83520 IMMUNOASSAY QUANT NOS NONAB: CPT | Performed by: INTERNAL MEDICINE

## 2021-11-19 PROCEDURE — 36415 COLL VENOUS BLD VENIPUNCTURE: CPT | Performed by: INTERNAL MEDICINE

## 2021-11-19 PROCEDURE — 99999 PR PBB SHADOW E&M-EST. PATIENT-LVL III: ICD-10-PCS | Mod: PBBFAC,,, | Performed by: INTERNAL MEDICINE

## 2021-11-19 PROCEDURE — 99213 OFFICE O/P EST LOW 20 MIN: CPT | Mod: PBBFAC | Performed by: INTERNAL MEDICINE

## 2021-11-19 PROCEDURE — 99214 PR OFFICE/OUTPT VISIT, EST, LEVL IV, 30-39 MIN: ICD-10-PCS | Mod: S$PBB,,, | Performed by: INTERNAL MEDICINE

## 2021-11-19 PROCEDURE — 99214 OFFICE O/P EST MOD 30 MIN: CPT | Mod: S$PBB,,, | Performed by: INTERNAL MEDICINE

## 2021-11-19 PROCEDURE — 86334 IMMUNOFIX E-PHORESIS SERUM: CPT | Performed by: INTERNAL MEDICINE

## 2021-11-19 PROCEDURE — 86334 PATHOLOGIST INTERPRETATION IFE: ICD-10-PCS | Mod: 26,,, | Performed by: PATHOLOGY

## 2021-11-19 PROCEDURE — 84165 PATHOLOGIST INTERPRETATION SPE: ICD-10-PCS | Mod: 26,,, | Performed by: PATHOLOGY

## 2021-11-19 PROCEDURE — 99999 PR PBB SHADOW E&M-EST. PATIENT-LVL III: CPT | Mod: PBBFAC,,, | Performed by: INTERNAL MEDICINE

## 2021-11-19 PROCEDURE — 84165 PROTEIN E-PHORESIS SERUM: CPT | Performed by: INTERNAL MEDICINE

## 2021-11-19 PROCEDURE — 84165 PROTEIN E-PHORESIS SERUM: CPT | Mod: 26,,, | Performed by: PATHOLOGY

## 2021-11-19 PROCEDURE — 86334 IMMUNOFIX E-PHORESIS SERUM: CPT | Mod: 26,,, | Performed by: PATHOLOGY

## 2021-11-19 PROCEDURE — 85025 COMPLETE CBC W/AUTO DIFF WBC: CPT | Performed by: INTERNAL MEDICINE

## 2021-11-19 RX ORDER — TOPIRAMATE 25 MG/1
25 TABLET ORAL 2 TIMES DAILY
Qty: 90 TABLET | Refills: 5
Start: 2021-11-19 | End: 2021-11-19

## 2021-11-22 LAB
ALBUMIN SERPL ELPH-MCNC: 4.42 G/DL (ref 3.35–5.55)
ALPHA1 GLOB SERPL ELPH-MCNC: 0.39 G/DL (ref 0.17–0.41)
ALPHA2 GLOB SERPL ELPH-MCNC: 1.04 G/DL (ref 0.43–0.99)
B-GLOBULIN SERPL ELPH-MCNC: 0.89 G/DL (ref 0.5–1.1)
GAMMA GLOB SERPL ELPH-MCNC: 0.87 G/DL (ref 0.67–1.58)
INTERPRETATION SERPL IFE-IMP: NORMAL
KAPPA LC SER QL IA: 2.78 MG/DL (ref 0.33–1.94)
KAPPA LC/LAMBDA SER IA: 1.66 (ref 0.26–1.65)
LAMBDA LC SER QL IA: 1.67 MG/DL (ref 0.57–2.63)
PATHOLOGIST INTERPRETATION IFE: NORMAL
PATHOLOGIST INTERPRETATION SPE: NORMAL
PROT SERPL-MCNC: 7.6 G/DL (ref 6–8.4)

## 2021-11-23 ENCOUNTER — CLINICAL SUPPORT (OUTPATIENT)
Dept: CARDIOLOGY | Facility: HOSPITAL | Age: 71
End: 2021-11-23
Attending: INTERNAL MEDICINE
Payer: MEDICARE

## 2021-11-23 ENCOUNTER — OFFICE VISIT (OUTPATIENT)
Dept: CARDIOLOGY | Facility: CLINIC | Age: 71
End: 2021-11-23
Payer: MEDICARE

## 2021-11-23 VITALS
DIASTOLIC BLOOD PRESSURE: 53 MMHG | HEART RATE: 60 BPM | WEIGHT: 199.5 LBS | SYSTOLIC BLOOD PRESSURE: 108 MMHG | HEIGHT: 61 IN | BODY MASS INDEX: 37.66 KG/M2

## 2021-11-23 DIAGNOSIS — Z95.0 S/P PLACEMENT OF CARDIAC PACEMAKER: ICD-10-CM

## 2021-11-23 DIAGNOSIS — I49.8 OTHER SPECIFIED CARDIAC ARRHYTHMIAS: ICD-10-CM

## 2021-11-23 DIAGNOSIS — I48.0 PAROXYSMAL ATRIAL FIBRILLATION: Primary | ICD-10-CM

## 2021-11-23 DIAGNOSIS — I49.5 SICK SINUS SYNDROME: ICD-10-CM

## 2021-11-23 DIAGNOSIS — R73.01 IMPAIRED FASTING GLUCOSE: ICD-10-CM

## 2021-11-23 DIAGNOSIS — E78.2 MIXED HYPERLIPIDEMIA: ICD-10-CM

## 2021-11-23 DIAGNOSIS — N18.31 STAGE 3A CHRONIC KIDNEY DISEASE: ICD-10-CM

## 2021-11-23 DIAGNOSIS — I10 BENIGN ESSENTIAL HYPERTENSION: ICD-10-CM

## 2021-11-23 PROCEDURE — 99999 PR PBB SHADOW E&M-EST. PATIENT-LVL IV: CPT | Mod: PBBFAC,,, | Performed by: INTERNAL MEDICINE

## 2021-11-23 PROCEDURE — 99214 OFFICE O/P EST MOD 30 MIN: CPT | Mod: PBBFAC | Performed by: INTERNAL MEDICINE

## 2021-11-23 PROCEDURE — 99214 OFFICE O/P EST MOD 30 MIN: CPT | Mod: S$PBB,,, | Performed by: INTERNAL MEDICINE

## 2021-11-23 PROCEDURE — 99999 PR PBB SHADOW E&M-EST. PATIENT-LVL IV: ICD-10-PCS | Mod: PBBFAC,,, | Performed by: INTERNAL MEDICINE

## 2021-11-23 PROCEDURE — 99214 PR OFFICE/OUTPT VISIT, EST, LEVL IV, 30-39 MIN: ICD-10-PCS | Mod: S$PBB,,, | Performed by: INTERNAL MEDICINE

## 2021-11-26 ENCOUNTER — LAB VISIT (OUTPATIENT)
Dept: SPORTS MEDICINE | Facility: CLINIC | Age: 71
End: 2021-11-26
Payer: MEDICARE

## 2021-11-26 DIAGNOSIS — Z13.9 SCREENING FOR UNSPECIFIED CONDITION: ICD-10-CM

## 2021-11-26 PROCEDURE — U0003 INFECTIOUS AGENT DETECTION BY NUCLEIC ACID (DNA OR RNA); SEVERE ACUTE RESPIRATORY SYNDROME CORONAVIRUS 2 (SARS-COV-2) (CORONAVIRUS DISEASE [COVID-19]), AMPLIFIED PROBE TECHNIQUE, MAKING USE OF HIGH THROUGHPUT TECHNOLOGIES AS DESCRIBED BY CMS-2020-01-R: HCPCS | Performed by: OPHTHALMOLOGY

## 2021-11-26 PROCEDURE — U0005 INFEC AGEN DETEC AMPLI PROBE: HCPCS | Performed by: OPHTHALMOLOGY

## 2021-11-27 LAB
SARS-COV-2 RNA RESP QL NAA+PROBE: NOT DETECTED
SARS-COV-2- CYCLE NUMBER: NORMAL

## 2021-11-29 ENCOUNTER — PROCEDURE VISIT (OUTPATIENT)
Dept: OPHTHALMOLOGY | Facility: CLINIC | Age: 71
End: 2021-11-29
Payer: MEDICARE

## 2021-11-29 ENCOUNTER — TELEPHONE (OUTPATIENT)
Dept: OPHTHALMOLOGY | Facility: CLINIC | Age: 71
End: 2021-11-29
Payer: MEDICARE

## 2021-11-29 VITALS — DIASTOLIC BLOOD PRESSURE: 76 MMHG | HEART RATE: 75 BPM | SYSTOLIC BLOOD PRESSURE: 124 MMHG

## 2021-11-29 DIAGNOSIS — H02.9 EYELID LESION: Primary | ICD-10-CM

## 2021-11-29 PROCEDURE — 67840 REMOVE EYELID LESION: CPT | Mod: PBBFAC | Performed by: OPHTHALMOLOGY

## 2021-11-29 PROCEDURE — 11311 PR SHAV SKIN LES 0.6-1.0 CM FACE,FACIAL: ICD-10-PCS | Mod: S$PBB,,, | Performed by: OPHTHALMOLOGY

## 2021-11-29 PROCEDURE — 88305 TISSUE EXAM BY PATHOLOGIST: CPT | Performed by: STUDENT IN AN ORGANIZED HEALTH CARE EDUCATION/TRAINING PROGRAM

## 2021-11-29 PROCEDURE — 11311 SHAVE SKIN LESION 0.6-1.0 CM: CPT | Mod: S$PBB,,, | Performed by: OPHTHALMOLOGY

## 2021-11-29 PROCEDURE — 99499 NO LOS: ICD-10-PCS | Mod: S$PBB,,, | Performed by: OPHTHALMOLOGY

## 2021-11-29 PROCEDURE — 88305 TISSUE EXAM BY PATHOLOGIST: ICD-10-PCS | Mod: 26,,, | Performed by: STUDENT IN AN ORGANIZED HEALTH CARE EDUCATION/TRAINING PROGRAM

## 2021-11-29 PROCEDURE — 88305 TISSUE EXAM BY PATHOLOGIST: CPT | Mod: 26,,, | Performed by: STUDENT IN AN ORGANIZED HEALTH CARE EDUCATION/TRAINING PROGRAM

## 2021-11-29 PROCEDURE — 99499 UNLISTED E&M SERVICE: CPT | Mod: S$PBB,,, | Performed by: OPHTHALMOLOGY

## 2021-12-02 ENCOUNTER — TELEPHONE (OUTPATIENT)
Dept: ELECTROPHYSIOLOGY | Facility: CLINIC | Age: 71
End: 2021-12-02
Payer: MEDICARE

## 2021-12-02 ENCOUNTER — TELEPHONE (OUTPATIENT)
Dept: OPHTHALMOLOGY | Facility: CLINIC | Age: 71
End: 2021-12-02
Payer: MEDICARE

## 2021-12-02 LAB
FINAL PATHOLOGIC DIAGNOSIS: NORMAL
GROSS: NORMAL
Lab: NORMAL
MICROSCOPIC EXAM: NORMAL

## 2021-12-06 ENCOUNTER — PATIENT MESSAGE (OUTPATIENT)
Dept: ELECTROPHYSIOLOGY | Facility: CLINIC | Age: 71
End: 2021-12-06
Payer: MEDICARE

## 2021-12-06 DIAGNOSIS — T82.110S PACEMAKER LEAD MALFUNCTION, SEQUELA: Primary | ICD-10-CM

## 2021-12-06 DIAGNOSIS — Z01.812 ENCOUNTER FOR PRE-OPERATIVE LABORATORY TESTING: ICD-10-CM

## 2021-12-06 DIAGNOSIS — I49.9 CARDIAC ARRHYTHMIA, UNSPECIFIED CARDIAC ARRHYTHMIA TYPE: ICD-10-CM

## 2021-12-06 RX ORDER — PREDNISONE 50 MG/1
TABLET ORAL
Qty: 3 TABLET | Refills: 0 | Status: ON HOLD | OUTPATIENT
Start: 2021-12-06 | End: 2022-01-13 | Stop reason: HOSPADM

## 2021-12-06 RX ORDER — DIPHENHYDRAMINE HCL 50 MG
CAPSULE ORAL
Qty: 3 CAPSULE | Refills: 0 | Status: ON HOLD | OUTPATIENT
Start: 2021-12-06 | End: 2022-01-13 | Stop reason: HOSPADM

## 2021-12-06 RX ORDER — CIMETIDINE 300 MG/1
TABLET, FILM COATED ORAL
Qty: 3 TABLET | Refills: 0 | Status: SHIPPED | OUTPATIENT
Start: 2021-12-06 | End: 2022-03-14

## 2021-12-07 ENCOUNTER — TELEPHONE (OUTPATIENT)
Dept: ELECTROPHYSIOLOGY | Facility: CLINIC | Age: 71
End: 2021-12-07
Payer: MEDICARE

## 2021-12-18 ENCOUNTER — CLINICAL SUPPORT (OUTPATIENT)
Dept: CARDIOLOGY | Facility: HOSPITAL | Age: 71
End: 2021-12-18
Payer: MEDICARE

## 2021-12-18 DIAGNOSIS — I49.5 SICK SINUS SYNDROME: ICD-10-CM

## 2021-12-18 DIAGNOSIS — I48.91 UNSPECIFIED ATRIAL FIBRILLATION: ICD-10-CM

## 2021-12-18 DIAGNOSIS — Z95.0 PRESENCE OF CARDIAC PACEMAKER: ICD-10-CM

## 2021-12-18 PROCEDURE — 93294 CARDIAC DEVICE CHECK - REMOTE: ICD-10-PCS | Mod: ,,, | Performed by: INTERNAL MEDICINE

## 2021-12-18 PROCEDURE — 93294 REM INTERROG EVL PM/LDLS PM: CPT | Mod: ,,, | Performed by: INTERNAL MEDICINE

## 2022-01-06 DIAGNOSIS — Z01.818 PRE-OP TESTING: ICD-10-CM

## 2022-01-07 ENCOUNTER — TELEPHONE (OUTPATIENT)
Dept: ELECTROPHYSIOLOGY | Facility: CLINIC | Age: 72
End: 2022-01-07
Payer: MEDICARE

## 2022-01-07 ENCOUNTER — LAB VISIT (OUTPATIENT)
Dept: LAB | Facility: HOSPITAL | Age: 72
End: 2022-01-07
Attending: INTERNAL MEDICINE
Payer: MEDICARE

## 2022-01-07 DIAGNOSIS — I49.9 CARDIAC ARRHYTHMIA, UNSPECIFIED CARDIAC ARRHYTHMIA TYPE: ICD-10-CM

## 2022-01-07 DIAGNOSIS — T82.110S PACEMAKER LEAD MALFUNCTION, SEQUELA: ICD-10-CM

## 2022-01-07 DIAGNOSIS — Z01.812 ENCOUNTER FOR PRE-OPERATIVE LABORATORY TESTING: ICD-10-CM

## 2022-01-07 LAB
ANION GAP SERPL CALC-SCNC: 10 MMOL/L (ref 8–16)
APTT BLDCRRT: 25.1 SEC (ref 21–32)
BASOPHILS # BLD AUTO: 0.07 K/UL (ref 0–0.2)
BASOPHILS NFR BLD: 1.4 % (ref 0–1.9)
BUN SERPL-MCNC: 26 MG/DL (ref 8–23)
CALCIUM SERPL-MCNC: 10.3 MG/DL (ref 8.7–10.5)
CHLORIDE SERPL-SCNC: 108 MMOL/L (ref 95–110)
CO2 SERPL-SCNC: 24 MMOL/L (ref 23–29)
CREAT SERPL-MCNC: 1.3 MG/DL (ref 0.5–1.4)
DIFFERENTIAL METHOD: ABNORMAL
EOSINOPHIL # BLD AUTO: 0.2 K/UL (ref 0–0.5)
EOSINOPHIL NFR BLD: 3.3 % (ref 0–8)
ERYTHROCYTE [DISTWIDTH] IN BLOOD BY AUTOMATED COUNT: 13.2 % (ref 11.5–14.5)
EST. GFR  (AFRICAN AMERICAN): 47.4 ML/MIN/1.73 M^2
EST. GFR  (NON AFRICAN AMERICAN): 41.1 ML/MIN/1.73 M^2
GLUCOSE SERPL-MCNC: 97 MG/DL (ref 70–110)
HCT VFR BLD AUTO: 43 % (ref 37–48.5)
HGB BLD-MCNC: 13.6 G/DL (ref 12–16)
IMM GRANULOCYTES # BLD AUTO: 0.02 K/UL (ref 0–0.04)
IMM GRANULOCYTES NFR BLD AUTO: 0.4 % (ref 0–0.5)
INR PPP: 1 (ref 0.8–1.2)
LYMPHOCYTES # BLD AUTO: 1.5 K/UL (ref 1–4.8)
LYMPHOCYTES NFR BLD: 28.9 % (ref 18–48)
MCH RBC QN AUTO: 30.4 PG (ref 27–31)
MCHC RBC AUTO-ENTMCNC: 31.6 G/DL (ref 32–36)
MCV RBC AUTO: 96 FL (ref 82–98)
MONOCYTES # BLD AUTO: 0.4 K/UL (ref 0.3–1)
MONOCYTES NFR BLD: 7.3 % (ref 4–15)
NEUTROPHILS # BLD AUTO: 3 K/UL (ref 1.8–7.7)
NEUTROPHILS NFR BLD: 58.7 % (ref 38–73)
NRBC BLD-RTO: 0 /100 WBC
PLATELET # BLD AUTO: 241 K/UL (ref 150–450)
PMV BLD AUTO: 9.8 FL (ref 9.2–12.9)
POTASSIUM SERPL-SCNC: 4.5 MMOL/L (ref 3.5–5.1)
PROTHROMBIN TIME: 10.5 SEC (ref 9–12.5)
RBC # BLD AUTO: 4.47 M/UL (ref 4–5.4)
SODIUM SERPL-SCNC: 142 MMOL/L (ref 136–145)
WBC # BLD AUTO: 5.09 K/UL (ref 3.9–12.7)

## 2022-01-07 PROCEDURE — 85610 PROTHROMBIN TIME: CPT | Performed by: INTERNAL MEDICINE

## 2022-01-07 PROCEDURE — 85025 COMPLETE CBC W/AUTO DIFF WBC: CPT | Performed by: INTERNAL MEDICINE

## 2022-01-07 PROCEDURE — 85730 THROMBOPLASTIN TIME PARTIAL: CPT | Performed by: INTERNAL MEDICINE

## 2022-01-07 PROCEDURE — 36415 COLL VENOUS BLD VENIPUNCTURE: CPT | Performed by: INTERNAL MEDICINE

## 2022-01-07 PROCEDURE — 80048 BASIC METABOLIC PNL TOTAL CA: CPT | Performed by: INTERNAL MEDICINE

## 2022-01-07 NOTE — TELEPHONE ENCOUNTER
Conatcted pt to let her know that her covid test scheduled for Monday is being cancelled due to it taking greater than 72 hours to be resulted. Covid testing will be done on admit as a rapid test.

## 2022-01-12 ENCOUNTER — TELEPHONE (OUTPATIENT)
Dept: ELECTROPHYSIOLOGY | Facility: CLINIC | Age: 72
End: 2022-01-12
Payer: MEDICARE

## 2022-01-12 ENCOUNTER — ANESTHESIA EVENT (OUTPATIENT)
Dept: MEDSURG UNIT | Facility: HOSPITAL | Age: 72
End: 2022-01-12
Payer: MEDICARE

## 2022-01-12 DIAGNOSIS — I10 BENIGN ESSENTIAL HYPERTENSION: ICD-10-CM

## 2022-01-12 NOTE — TELEPHONE ENCOUNTER
Spoke to Dr Curiel    CONFIRMED procedure arrival time of 1/13/2022 at 8:00 am    Reiterated instructions including:  -Directions to check in desk  -NPO after midnight night prior to procedure  -Confirmed pt will start contrast prep tonight at 7pm  -Pre-procedure LABS completed and reviewed and okay to proceed.  -COVID vaccines documented and rapid test to be done prior to procedure.  -Confirmed no fever, cough, or shortness of breath in the past 30 days  -Confirmed Contrast Prep Instructions of taking Benadryl 50 mg, Tagamet 300 mg, and Prednisone 50 mg 13 hrs, 7 hrs, and 1 hr prior to procedure  -Do not wear mascara day of procedure  -Bathe night prior and morning prior to procedure with Hibiclens solution or an antibacterial soap  -Reviewed current visitor policy    Patient verbalizes understanding of above and appreciates call.

## 2022-01-12 NOTE — TELEPHONE ENCOUNTER
No new care gaps identified.  Powered by [employer]. Reference number: [id number].   1/12/2022 12:15:17 PM CST

## 2022-01-12 NOTE — ANESTHESIA PREPROCEDURE EVALUATION
01/12/2022  Jesenia Curiel is a 71 y.o., female.  Patient Active Problem List   Diagnosis    Atrial fibrillation    Benign essential hypertension    Hyperlipidemia    Vitamin D deficiency    Sick sinus syndrome    S/P placement of cardiac pacemaker    Osteopenia of left thigh    CKD (chronic kidney disease) stage 3, GFR 30-59 ml/min    Morbid obesity    Hypercalcemia    Nuclear sclerosis, bilateral    Nuclear sclerotic cataract of left eye    Paraproteinemia    Impaired fasting glucose         Anesthesia Evaluation          Review of Systems      Physical Exam   Airway/Jaw/Neck:  Airway Findings: Mouth Opening: Normal Tongue: Normal  General Airway Assessment: Average  Mallampati: II  No airway difficulties anticipated.     Dental:  No active dental problems.    Chest/Lungs:  Chest/Lungs Findings: Clear to auscultation     Heart/Vascular:  Heart Findings: Rate: Normal  Rhythm: Regular Rhythm  Sounds: Normal        Mental Status:  Mental Status Findings:  Cooperative, Alert and Oriented         Anesthesia Plan  Type of Anesthesia, risks & benefits discussed:  Anesthesia Type:  general    Patient's Preference:   Plan Factors:          Intra-op Monitoring Plan: standard ASA monitors  Intra-op Monitoring Plan Comments:   Post Op Pain Control Plan:   Post Op Pain Control Plan Comments:     Induction:    Beta Blocker:         Informed Consent: Patient understands risks and agrees with Anesthesia plan.  Questions answered. Anesthesia consent signed with patient.  ASA Score: 3     Day of Surgery Review of History & Physical:        Anesthesia Plan Notes: Chart reviewed, patient interviewed and examined.  The plan for anesthesia for this case was discussed.  Questions were answered and the e-consent was signed.  Jose Fischer M.D.         Ready For Surgery From Anesthesia Perspective.

## 2022-01-13 ENCOUNTER — ANESTHESIA (OUTPATIENT)
Dept: MEDSURG UNIT | Facility: HOSPITAL | Age: 72
End: 2022-01-13
Payer: MEDICARE

## 2022-01-13 ENCOUNTER — HOSPITAL ENCOUNTER (OUTPATIENT)
Facility: HOSPITAL | Age: 72
Discharge: HOME | End: 2022-01-13
Attending: INTERNAL MEDICINE | Admitting: INTERNAL MEDICINE
Payer: MEDICARE

## 2022-01-13 VITALS
TEMPERATURE: 98 F | OXYGEN SATURATION: 95 % | WEIGHT: 198 LBS | DIASTOLIC BLOOD PRESSURE: 59 MMHG | HEIGHT: 61 IN | SYSTOLIC BLOOD PRESSURE: 103 MMHG | HEART RATE: 74 BPM | BODY MASS INDEX: 37.38 KG/M2 | RESPIRATION RATE: 18 BRPM

## 2022-01-13 DIAGNOSIS — R73.01 IMPAIRED FASTING GLUCOSE: Primary | ICD-10-CM

## 2022-01-13 DIAGNOSIS — T82.110A PACEMAKER LEAD MALFUNCTION, INITIAL ENCOUNTER: ICD-10-CM

## 2022-01-13 DIAGNOSIS — I49.9 ARRHYTHMIA: ICD-10-CM

## 2022-01-13 DIAGNOSIS — Z95.9 CARDIAC DEVICE IN SITU: ICD-10-CM

## 2022-01-13 LAB
CTP QC/QA: YES
SARS-COV-2 AG RESP QL IA.RAPID: NEGATIVE

## 2022-01-13 PROCEDURE — 27800903 OPTIME MED/SURG SUP & DEVICES OTHER IMPLANTS: Performed by: INTERNAL MEDICINE

## 2022-01-13 PROCEDURE — D9220A PRA ANESTHESIA: Mod: ANES,,, | Performed by: ANESTHESIOLOGY

## 2022-01-13 PROCEDURE — 25000003 PHARM REV CODE 250: Performed by: NURSE ANESTHETIST, CERTIFIED REGISTERED

## 2022-01-13 PROCEDURE — 93005 ELECTROCARDIOGRAM TRACING: CPT | Mod: 59

## 2022-01-13 PROCEDURE — C1898 LEAD, PMKR, OTHER THAN TRANS: HCPCS | Performed by: INTERNAL MEDICINE

## 2022-01-13 PROCEDURE — 33216 INSERT 1 ELECTRODE PM-DEFIB: CPT | Performed by: INTERNAL MEDICINE

## 2022-01-13 PROCEDURE — 25000003 PHARM REV CODE 250: Performed by: INTERNAL MEDICINE

## 2022-01-13 PROCEDURE — D9220A PRA ANESTHESIA: ICD-10-PCS | Mod: ANES,,, | Performed by: ANESTHESIOLOGY

## 2022-01-13 PROCEDURE — 93010 EKG 12-LEAD: ICD-10-PCS | Mod: ,,, | Performed by: INTERNAL MEDICINE

## 2022-01-13 PROCEDURE — 93010 ELECTROCARDIOGRAM REPORT: CPT | Mod: 76,,, | Performed by: INTERNAL MEDICINE

## 2022-01-13 PROCEDURE — 63600175 PHARM REV CODE 636 W HCPCS: Performed by: NURSE PRACTITIONER

## 2022-01-13 PROCEDURE — 25500020 PHARM REV CODE 255: Performed by: NURSE ANESTHETIST, CERTIFIED REGISTERED

## 2022-01-13 PROCEDURE — 63600175 PHARM REV CODE 636 W HCPCS: Performed by: ANESTHESIOLOGY

## 2022-01-13 PROCEDURE — C1894 INTRO/SHEATH, NON-LASER: HCPCS | Performed by: INTERNAL MEDICINE

## 2022-01-13 PROCEDURE — D9220A PRA ANESTHESIA: ICD-10-PCS | Mod: CRNA,,, | Performed by: NURSE ANESTHETIST, CERTIFIED REGISTERED

## 2022-01-13 PROCEDURE — 37000008 HC ANESTHESIA 1ST 15 MINUTES: Performed by: INTERNAL MEDICINE

## 2022-01-13 PROCEDURE — 93005 ELECTROCARDIOGRAM TRACING: CPT

## 2022-01-13 PROCEDURE — 37000009 HC ANESTHESIA EA ADD 15 MINS: Performed by: INTERNAL MEDICINE

## 2022-01-13 PROCEDURE — 63600175 PHARM REV CODE 636 W HCPCS: Performed by: NURSE ANESTHETIST, CERTIFIED REGISTERED

## 2022-01-13 PROCEDURE — 33216 PR INSERT TRANSVEN ELECTRODE,SING CHAMBER: ICD-10-PCS | Mod: 22,GC,, | Performed by: INTERNAL MEDICINE

## 2022-01-13 PROCEDURE — D9220A PRA ANESTHESIA: Mod: CRNA,,, | Performed by: NURSE ANESTHETIST, CERTIFIED REGISTERED

## 2022-01-13 PROCEDURE — C1769 GUIDE WIRE: HCPCS | Performed by: INTERNAL MEDICINE

## 2022-01-13 PROCEDURE — 93010 ELECTROCARDIOGRAM REPORT: CPT | Mod: ,,, | Performed by: INTERNAL MEDICINE

## 2022-01-13 PROCEDURE — 27201423 OPTIME MED/SURG SUP & DEVICES STERILE SUPPLY: Performed by: INTERNAL MEDICINE

## 2022-01-13 PROCEDURE — 63600175 PHARM REV CODE 636 W HCPCS: Performed by: INTERNAL MEDICINE

## 2022-01-13 PROCEDURE — 33216 INSERT 1 ELECTRODE PM-DEFIB: CPT | Mod: 22,GC,, | Performed by: INTERNAL MEDICINE

## 2022-01-13 DEVICE — LEAD CAP (IS-1/DF-1/SJ4/DF4/IS4)
Type: IMPLANTABLE DEVICE | Site: CHEST | Status: FUNCTIONAL
Brand: SJM™

## 2022-01-13 DEVICE — PACING LEAD
Type: IMPLANTABLE DEVICE | Site: HEART | Status: FUNCTIONAL
Brand: TENDRIL MRI™

## 2022-01-13 RX ORDER — VANCOMYCIN HYDROCHLORIDE 1 G/20ML
INJECTION, POWDER, LYOPHILIZED, FOR SOLUTION INTRAVENOUS
Status: DISCONTINUED | OUTPATIENT
Start: 2022-01-13 | End: 2022-01-13 | Stop reason: HOSPADM

## 2022-01-13 RX ORDER — DIPHENHYDRAMINE HYDROCHLORIDE 50 MG/ML
25 INJECTION INTRAMUSCULAR; INTRAVENOUS EVERY 6 HOURS PRN
Status: DISCONTINUED | OUTPATIENT
Start: 2022-01-13 | End: 2022-01-13 | Stop reason: HOSPADM

## 2022-01-13 RX ORDER — ACETAMINOPHEN 325 MG/1
650 TABLET ORAL EVERY 4 HOURS PRN
Status: DISCONTINUED | OUTPATIENT
Start: 2022-01-13 | End: 2022-01-13 | Stop reason: HOSPADM

## 2022-01-13 RX ORDER — LIDOCAINE HYDROCHLORIDE 20 MG/ML
INJECTION, SOLUTION EPIDURAL; INFILTRATION; INTRACAUDAL; PERINEURAL
Status: DISCONTINUED | OUTPATIENT
Start: 2022-01-13 | End: 2022-01-13

## 2022-01-13 RX ORDER — PHENYLEPHRINE HCL IN 0.9% NACL 1 MG/10 ML
SYRINGE (ML) INTRAVENOUS
Status: DISCONTINUED | OUTPATIENT
Start: 2022-01-13 | End: 2022-01-13

## 2022-01-13 RX ORDER — BUPIVACAINE HYDROCHLORIDE 2.5 MG/ML
INJECTION, SOLUTION EPIDURAL; INFILTRATION; INTRACAUDAL
Status: DISCONTINUED | OUTPATIENT
Start: 2022-01-13 | End: 2022-01-13 | Stop reason: HOSPADM

## 2022-01-13 RX ORDER — CLARITHROMYCIN 500 MG/1
500 TABLET, FILM COATED ORAL EVERY 12 HOURS
Status: DISCONTINUED | OUTPATIENT
Start: 2022-01-13 | End: 2022-01-13

## 2022-01-13 RX ORDER — FENTANYL CITRATE 50 UG/ML
25 INJECTION, SOLUTION INTRAMUSCULAR; INTRAVENOUS EVERY 5 MIN PRN
Status: DISCONTINUED | OUTPATIENT
Start: 2022-01-13 | End: 2022-01-13 | Stop reason: HOSPADM

## 2022-01-13 RX ORDER — FENTANYL CITRATE 50 UG/ML
INJECTION, SOLUTION INTRAMUSCULAR; INTRAVENOUS
Status: DISCONTINUED | OUTPATIENT
Start: 2022-01-13 | End: 2022-01-13

## 2022-01-13 RX ORDER — PROPOFOL 10 MG/ML
VIAL (ML) INTRAVENOUS
Status: DISCONTINUED | OUTPATIENT
Start: 2022-01-13 | End: 2022-01-13

## 2022-01-13 RX ORDER — VANCOMYCIN HCL IN 5 % DEXTROSE 1G/250ML
1000 PLASTIC BAG, INJECTION (ML) INTRAVENOUS
Status: DISCONTINUED | OUTPATIENT
Start: 2022-01-13 | End: 2022-01-13 | Stop reason: HOSPADM

## 2022-01-13 RX ORDER — IODIXANOL 320 MG/ML
INJECTION, SOLUTION INTRAVASCULAR
Status: DISCONTINUED | OUTPATIENT
Start: 2022-01-13 | End: 2022-01-13

## 2022-01-13 RX ORDER — OLMESARTAN MEDOXOMIL 40 MG/1
40 TABLET ORAL DAILY
Qty: 90 TABLET | Refills: 3 | Status: SHIPPED | OUTPATIENT
Start: 2022-01-13 | End: 2023-01-03 | Stop reason: SDUPTHER

## 2022-01-13 RX ORDER — HYDROMORPHONE HYDROCHLORIDE 1 MG/ML
0.2 INJECTION, SOLUTION INTRAMUSCULAR; INTRAVENOUS; SUBCUTANEOUS EVERY 5 MIN PRN
Status: DISCONTINUED | OUTPATIENT
Start: 2022-01-13 | End: 2022-01-13 | Stop reason: HOSPADM

## 2022-01-13 RX ORDER — PROPOFOL 10 MG/ML
VIAL (ML) INTRAVENOUS CONTINUOUS PRN
Status: DISCONTINUED | OUTPATIENT
Start: 2022-01-13 | End: 2022-01-13

## 2022-01-13 RX ORDER — ONDANSETRON 2 MG/ML
INJECTION INTRAMUSCULAR; INTRAVENOUS
Status: DISCONTINUED | OUTPATIENT
Start: 2022-01-13 | End: 2022-01-13

## 2022-01-13 RX ORDER — EPHEDRINE SULFATE 50 MG/ML
INJECTION, SOLUTION INTRAVENOUS
Status: DISCONTINUED | OUTPATIENT
Start: 2022-01-13 | End: 2022-01-13

## 2022-01-13 RX ORDER — MIDAZOLAM HYDROCHLORIDE 1 MG/ML
INJECTION, SOLUTION INTRAMUSCULAR; INTRAVENOUS
Status: DISCONTINUED | OUTPATIENT
Start: 2022-01-13 | End: 2022-01-13

## 2022-01-13 RX ORDER — SODIUM CHLORIDE 0.9 G/100ML
IRRIGANT IRRIGATION
Status: DISCONTINUED | OUTPATIENT
Start: 2022-01-13 | End: 2022-01-13 | Stop reason: HOSPADM

## 2022-01-13 RX ORDER — CLARITHROMYCIN 500 MG/1
500 TABLET, FILM COATED ORAL 2 TIMES DAILY
Qty: 10 TABLET | Refills: 0 | Status: SHIPPED | OUTPATIENT
Start: 2022-01-13 | End: 2022-01-18

## 2022-01-13 RX ORDER — ONDANSETRON 2 MG/ML
4 INJECTION INTRAMUSCULAR; INTRAVENOUS ONCE AS NEEDED
Status: DISCONTINUED | OUTPATIENT
Start: 2022-01-13 | End: 2022-01-13 | Stop reason: HOSPADM

## 2022-01-13 RX ORDER — LIDOCAINE HYDROCHLORIDE 20 MG/ML
INJECTION, SOLUTION INFILTRATION; PERINEURAL
Status: DISCONTINUED | OUTPATIENT
Start: 2022-01-13 | End: 2022-01-13 | Stop reason: HOSPADM

## 2022-01-13 RX ADMIN — ACETAMINOPHEN 650 MG: 325 TABLET ORAL at 03:01

## 2022-01-13 RX ADMIN — FENTANYL CITRATE 25 MCG: 50 INJECTION INTRAMUSCULAR; INTRAVENOUS at 01:01

## 2022-01-13 RX ADMIN — MIDAZOLAM HYDROCHLORIDE 2 MG: 1 INJECTION, SOLUTION INTRAMUSCULAR; INTRAVENOUS at 12:01

## 2022-01-13 RX ADMIN — EPHEDRINE SULFATE 10 MG: 50 INJECTION INTRAVENOUS at 01:01

## 2022-01-13 RX ADMIN — VANCOMYCIN HYDROCHLORIDE 1 MG: 1 INJECTION, POWDER, LYOPHILIZED, FOR SOLUTION INTRAVENOUS at 01:01

## 2022-01-13 RX ADMIN — SODIUM CHLORIDE: 0.9 INJECTION, SOLUTION INTRAVENOUS at 12:01

## 2022-01-13 RX ADMIN — Medication 125 MCG/KG/MIN: at 12:01

## 2022-01-13 RX ADMIN — EPHEDRINE SULFATE 10 MG: 50 INJECTION INTRAVENOUS at 02:01

## 2022-01-13 RX ADMIN — IODIXANOL 10 ML: 320 INJECTION, SOLUTION INTRAVASCULAR at 12:01

## 2022-01-13 RX ADMIN — PROPOFOL 40 MG: 10 INJECTION, EMULSION INTRAVENOUS at 12:01

## 2022-01-13 RX ADMIN — Medication 200 MCG: at 01:01

## 2022-01-13 RX ADMIN — Medication 100 MCG: at 02:01

## 2022-01-13 RX ADMIN — FENTANYL CITRATE 25 MCG: 50 INJECTION INTRAMUSCULAR; INTRAVENOUS at 03:01

## 2022-01-13 RX ADMIN — LIDOCAINE HYDROCHLORIDE 40 MG: 20 INJECTION, SOLUTION EPIDURAL; INFILTRATION; INTRACAUDAL at 12:01

## 2022-01-13 RX ADMIN — SODIUM CHLORIDE: 0.9 INJECTION, SOLUTION INTRAVENOUS at 02:01

## 2022-01-13 RX ADMIN — Medication 100 MCG/KG/MIN: at 02:01

## 2022-01-13 RX ADMIN — ONDANSETRON 4 MG: 2 INJECTION INTRAMUSCULAR; INTRAVENOUS at 02:01

## 2022-01-13 RX ADMIN — Medication 100 MCG: at 01:01

## 2022-01-13 NOTE — PROGRESS NOTES
Admitted to pacu 4.  Vs stable, arousable.  aquacell dressing intact.  CDI.   notified of arrival.

## 2022-01-13 NOTE — PLAN OF CARE
"Vss. sats 96% on room air. Pt aaox4 follows commands. Pt's  re updated by ep pacu rn. Verbalizes understanding. 12 lead ekg done and chest xray done per md order. sr noted. ekg in chart. Tele box on sscu 09.  Pt bedrest x3hrs. Left chest wall incision with dermabond, aquacell ag, ice pack and left arm sling. Prn po and iv pain meds given per md order. Pt complaints "soreness to left chest wall incision".  At time of transfer to [facility], pt denies pain. Prn pain meds effective. Due to void later. Denies bladder pressure/pain. Tolerating sips of water in ep pacu 4. See flowsheet for full assessment. md did not update pt in ep pacu 4. sscu rn aware. Pharmacy delivered antibiotic prescription. On stretcher with chart.   "

## 2022-01-13 NOTE — Clinical Note
A venogram was performed in the left axillary vein. The vessel was injected via hand injection  with 10 mL of contrast. Per CRNA

## 2022-01-13 NOTE — NURSING TRANSFER
Nursing Transfer Note      1/13/2022     Reason patient is being transferred: ep pacu 4 to sscu 09/home    Transfer To: ep pacu 4 to sscu 09    Transfer via stretcher    Transfer with cardiac monitoring, tele box on confirmed by tele tech    Transported by anam Patel rn    Medicines sent: antibiotic delivered from pharmacy    Any special needs or follow-up needed: bedrest x3hrs from 1457    Chart send with patient: Yes    Notified: spouse    Patient reassessed at: 1/13/2022 1630    Upon arrival to floor: cardiac monitor applied, patient oriented to room, call bell in reach and bed in lowest position.  Pt has box at home. Not a new device. [facility]. Pt has card already/booklet.

## 2022-01-13 NOTE — BRIEF OP NOTE
: Dr. Trell Hodgson MD  Date of procedure: 1/13/2022  Post-operative Diagnosis: RA lead fracture    Procedure Performed: RA lead revision    Description of Procedure: The patient was brought to the EP lab in the fasting state. Prepped and draped in sterile fashion. Safety timeout was performed. Sedation administered by anesthesia staff. Selective venogram of the left axillary and cephalic veins performed via left ac IV. Lidocaine used for local anesthetic. Incision made. Blunt and electrocautery dissection performed. Pocket made. Fluoroscopic guided axillary access obtained. Guide/J wire advanced and confirmed in IVC. Peel away sheath advanced over J wire. RA lead advanced into RA via peel away sheath. After adequate p waves and current of injury detected, the RA lead was fixed into place in the RA appendage. Peel away sheath removed and RA lead sutured into place. Pocket washed using antibiotic solution. Leads connected to generator. Generator placed into pocket, sutured in place, and washed with antibiotic solution. Deep layer closed with interrupted 3-0 suture. Intermediate layer closed with running 3-0 suture. Superficial layer closed with running 4-0 suture. Skin closed with Dermabond. Meplex dressing to be placed after dermabond has dried.     EBL: <10 mL    Specimens Removed: None  Complications: no immediate    1. Sling to left arm - wear for 48 hours, then only at night for 6 weeks.  2. NO HEPARIN PRODUCTS  3. Biaxin 500 mg BID for 5 days at discharge (or after the 2 doses of IV antibiotics if still in the hospital)  5. No lifting left elbow above shoulder height  6. No lifting over 5 pounds  7. No driving for 1 week and for 4 weeks if patient uses left arm to make turns  8. Dressing will be removed in AM  9. Chest Xray (ordered)  10. Follow up in device clinic in 1 week for device and wound checks.     Dr Hodgson, the attending electrophysiologist, was present for the entirety of this  procedure.    Medardo Arrington MD PGY 7

## 2022-01-13 NOTE — Clinical Note
A generator pocket was opened at the left  chest with blunt dissection, electrocautery and sharp dissection.

## 2022-01-13 NOTE — Clinical Note
The chest was prepped. The site was prepped with ChloraPrep. The patient was draped. The patient was positioned supine. The patient was secured using safety straps, with ulnar pads and to an armboard.

## 2022-01-13 NOTE — ANESTHESIA PROCEDURE NOTES
Intubation  Performed by: Nataly Bland CRNA  Authorized by: Jose Fischer MD     Intubation:     Induction:  Intravenous    Intubated:  Postinduction    Mask Ventilation:  Easy with oral airway    Attempts:  1    Attempted By:  Staff anesthesiologist    Method of Intubation:  Direct    Difficult Airway Encountered?: No      Complications:  None    Airway Device:  Supraglottic airway/LMA    Airway Device Size:  3.5    Style/Cuff Inflation:  Cuffed (inflated to minimal occlusive pressure)    Secured at:  The lips    Placement Verified By:  Capnometry    Findings Post-Intubation:  BS equal bilateral

## 2022-01-13 NOTE — TRANSFER OF CARE
"Anesthesia Transfer of Care Note    Patient: Jesenia Curiel    Procedure(s) Performed: Procedure(s) (LRB):  REVISION, ELECTRODE LEAD, CARDIAC PACEMAKER (Left)  Venogram, EP Lab    Patient location: PACU    Anesthesia Type: general    Transport from OR: Transported from OR on 6-10 L/min O2 by face mask with adequate spontaneous ventilation    Post pain: adequate analgesia    Post assessment: no apparent anesthetic complications and tolerated procedure well    Post vital signs: stable    Level of consciousness: sedated    Nausea/Vomiting: no nausea/vomiting    Complications: none    Transfer of care protocol was followed      Last vitals:   Visit Vitals  BP (!) 91/55 (BP Location: Right arm, Patient Position: Lying)   Pulse 78   Temp 36.4 °C (97.5 °F) (Temporal)   Resp (!) 21   Ht 5' 1" (1.549 m)   Wt 89.8 kg (198 lb)   SpO2 (!) 92%   Breastfeeding No   BMI 37.41 kg/m²     "

## 2022-01-13 NOTE — Clinical Note
The lead was inserted under fluorscopic guidance. A new lead was attached to the right atrium.

## 2022-01-13 NOTE — DISCHARGE SUMMARY
[facility] - Short Stay Cardiac Unit  Cardiology  Discharge Summary      Patient Name: Jesenia Curiel  MRN: [id number]  Admission Date: 1/13/2022  Hospital Length of Stay: 0 days  Discharge Date and Time:  01/13/2022 5:49 PM  Attending Physician: Trell Hodgson MD  Discharging Provider: Medardo Arrington MD  Primary Care Physician: Sandra Michaud MD    HPI:      HPI:         Cardiologist: Verna Hodgson MD     I had the pleasure of seeing Jesenia Curiel in follow-up for her history of atrial fibrillation. She last saw me in 8/2019. 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 dual chamber pacemaker implantation in 5/2009 (gen change 9/2014). She has been on Rythmol since 2009, which has significantly brought down her arrhythmia burden (episodes used to last for hours but now last seconds to minutes). At her 2017 office visit, she was found to have a <1% AF burden, with the longest episode lasting <90 seconds. At her 7/2018 visit, her device recorded no mode-switch episodes. Xarelto was stopped at that visit.     An echo done in 9/2019 showed an EF of 55%, and grade 2 DD.     I reviewed today's device interrogation, which shows stable device and lead function. She is RA paced 99% and RV paced 37% of the time. No mode-switch episodes have been reported. Estimated battery longevity 8.3 years.     Ms. Curiel describes episodes of regular tachycardia at 110 bpm range, which occur once monthly and last several minutes.     Has been having RA lead noise indicating lead fracture.     Interval History:  Patient denies fevers chills SOB nausea vomiting. Endorses taking her iodine prophylaxis.    Procedure(s) (LRB):  REVISION, ELECTRODE LEAD, CARDIAC PACEMAKER (Left)  Venogram, EP Lab     Indwelling Lines/Drains at time of discharge:  Lines/Drains/Airways     None                 Hospital Course (synopsis of major diagnoses, care, treatment,  and services provided during the course of the hospital stay): Patient underwent uncomplicated lead revision without issues. Patient without issues on chest xray patient discharged.       Pending Diagnostic Studies:     None          There are no hospital problems to display for this patient.      Discharged Condition: good    Follow Up:   Follow-up Information     Trell Hodgson MD In 4 months.    Specialties: Electrophysiology, Cardiovascular Disease, Cardiology  Contact information:  Lynette PORRAS  [facility] [id number] [phone]                       Patient Instructions:   Sling to left arm - wear for 24 hours, then only at night for 6 weeks.  NO HEPARIN PRODUCTS  Doxycycline 100 mg PO BID for 5 days at discharge  No lifting left elbow above shoulder height  No lifting over 5 pounds  No driving for 1 week and for 4 weeks if patient uses left arm to make turns  Patient may shower in 24 hours, do not let beam of shower hit site directly and no scrubbing in area  Follow up in device clinic in 1 week and with Dr. Hodgson in 4 months.    Medications:  Reconciled Home Medications:      Medication List      START taking these medications    clarithromycin 500 MG tablet  Commonly known as: BIAXIN  Take 1 tablet (500 mg total) by mouth 2 (two) times daily. for 5 days        CONTINUE taking these medications    amLODIPine 10 MG tablet  Commonly known as: NORVASC  TAKE 1 TABLET BY MOUTH EVERY DAY     aspirin 81 MG EC tablet  Commonly known as: ECOTRIN  Take 1 tablet (81 mg total) by mouth once daily.     atorvastatin 80 MG tablet  Commonly known as: LIPITOR  TAKE 1 TABLET BY MOUTH EVERY DAY     bisoprolol 5 MG tablet  Commonly known as: ZEBETA  Take 1 tablet (5 mg total) by mouth once daily.     cimetidine 300 MG tablet  Commonly known as: TAGAMET  Take one of each( Benadryl, Cimetidine, Prednisone) at 7:00 pm  the evening prior to procedure and take one of each at 2:00 am and 8:00 am on day of procedure      clobetasoL 0.05 % Foam  Commonly known as: OLUX  Apply 1 application topically every 30 days.     FLUAD QUAD 2020-21(65Y UP)(PF) 60 mcg (15 mcg x 4)/0.5 mL Syrg  Generic drug: flu vac 2020 65up-[id number](PF)  TO BE ADMINISTERED BY PHARMACIST FOR IMMUNIZATION     furosemide 20 MG tablet  Commonly known as: LASIX  Take 1 tablet (20 mg total) by mouth once daily.     KLOR-CON 10 10 MEQ Tbsr  Generic drug: potassium chloride  TAKE 1 TABLET (10 MEQ TOTAL) BY MOUTH ONCE DAILY.  DOSES     olmesartan 40 MG tablet  Commonly known as: BENICAR  Take 1 tablet (40 mg total) by mouth once daily.     propafenone 225 MG Cp12  Commonly known as: RYTHMOL SR  Take 1 capsule (225 mg total) by mouth every 12 (twelve) hours.     topiramate 25 MG tablet  Commonly known as: TOPAMAX  TAKE 1 TABLET BY MOUTH THREE TIMES A DAY     VITAMIN D ORAL  Take 2,000 Units by mouth once daily.        STOP taking these medications    diphenhydrAMINE 50 MG capsule  Commonly known as: BENADRYL     predniSONE 50 MG Tab  Commonly known as: DELTASONE            Time spent on the discharge of patient: 30 minutes    Medardo Arrington MD  Cardiology  [facility] - Short Stay Cardiac Unit

## 2022-01-13 NOTE — H&P
Electrophysiology Pre Procedure H&P    HPI:        Cardiologist: Verna Hodgson MD     I had the pleasure of seeing Jesenia Curiel in follow-up for her history of atrial fibrillation. She last saw me in 8/2019. 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 dual chamber pacemaker implantation in 5/2009 (gen change 9/2014). She has been on Rythmol since 2009, which has significantly brought down her arrhythmia burden (episodes used to last for hours but now last seconds to minutes). At her 2017 office visit, she was found to have a <1% AF burden, with the longest episode lasting <90 seconds. At her 7/2018 visit, her device recorded no mode-switch episodes. Xarelto was stopped at that visit.     An echo done in 9/2019 showed an EF of 55%, and grade 2 DD.     I reviewed today's device interrogation, which shows stable device and lead function. She is RA paced 99% and RV paced 37% of the time. No mode-switch episodes have been reported. Estimated battery longevity 8.3 years.     Ms. Curiel describes episodes of regular tachycardia at 110 bpm range, which occur once monthly and last several minutes.     Has been having RA lead noise indicating lead fracture.    Interval History:  Patient denies fevers chills SOB nausea vomiting. Endorses taking her iodine prophylaxis.    Past Medical History:   Diagnosis Date    Allergy     Arthritis     Atrial fibrillation 5/29/2017    CKD (chronic kidney disease) stage 3, GFR 30-59 ml/min 6/26/2017    Disorder of kidney and ureter     GERD (gastroesophageal reflux disease)     Hyperlipidemia     Hypertension     Impaired fasting glucose 11/19/2021    Pre-diabetes 6/3/2017        Social History     Socioeconomic History    Marital status:    Tobacco Use    Smoking status: Never Smoker    Smokeless tobacco: Never Used   Substance and Sexual Activity    Alcohol use: Yes      "Alcohol/week: 10.0 standard drinks     Types: 10 Glasses of wine per week    Drug use: No    Sexual activity: Not Currently     Partners: Male   Social History Narrative    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        Family History   Problem Relation Age of Onset    Hypertension Mother     Diabetes Mother     Hyperlipidemia Mother     Coronary artery disease Mother     Heart disease Mother     Stroke Mother     Hypertension Father     Diabetes Father     Hyperlipidemia Father     Coronary artery disease Father     Heart disease Father     Coronary artery disease Brother     Heart disease Brother     Hyperthyroidism Brother     Diabetes Paternal Grandmother     Diabetes Paternal Grandfather     Heart attack Neg Hx         Current Facility-Administered Medications   Medication Dose Route Frequency Provider Last Rate Last Admin    sodium chloride 0.9% bolus 1,000 mL  1,000 mL Intravenous Once Doris Jiménez NP        vancomycin in dextrose 5 % 1 gram/250 mL IVPB 1,000 mg  1,000 mg Intravenous On Call Procedure Doris Jiménez NP          ROS:  Constitutional: (-)fevers, (-)chills, (-)night sweats  HEENT: (-) headaches (-) lightheadedness (-) blurry vision  Cardiovascular: (-)chest pain (-)paroxysmal nocturnal dyspnea (-)orthopnea  Respiratory: (-)shortness of breath, (-)dyspnea on exertion (-)hemoptysis  Gastrointestinal: (-)abdominal pain (-)nausea (-)vomiting (-)tarry stools  Musculoskeletal: (-)arthralgias (-)limited range of motion  Neurologic: (-)parasthesias (-)mood disorder (-)anxiety  Endo: (-)polyuria (-)polydipsia (-)heat/cold intolerance  Skin: (-)rash     Vitals:    01/13/22 0904 01/13/22 0905   BP: 127/62 (!) 108/54   BP Location: Left arm Right arm   Patient Position: Lying Lying   Pulse: 79 78   Resp: 16    Temp: 98.2 °F (36.8 °C)    TempSrc: Temporal    SpO2: 95%    Weight: 89.8 kg (198 lb)    Height: 5' 1" (1.549 m)      Physical Exam:   Gen: " no acute distress, pleasant patient answering questions appropriately  HEENT: extra-ocular muscles intact, normocephalic-atraumatic  Neck: no jugular venous distension, no lymphadenopathy, no thyromegaly, no carotid bruits  CVS: regular rate and rhythm, S1S2 normal, no murmurs/rubs/gallops  CHEST: clear to auscultation bilaterally, no wheezes/rales/ronchi  ABD: Soft, non-tender, nondistended, bowel sounds present  EXT: No Edema, 2+ pulses bilaterally (radial, femoral, dorsales pedis, posterior tibial)  NEURO: awake, alert, and oriented   Skin: No rash     Review of Labs:   Reviewed at baseline  Most recent ECG  NSR rate 72BPM     Assessment/Plan:  Jesenia Curiel is a 71 y.o. female with PPM and RA lead nois  Plan RA lead revision  Will assess with venogram first and proceed accordingly  Tailor post op abx to allergies

## 2022-01-13 NOTE — NURSING
Patient received to room 9 via stretcher. Spouse at bedside. Left chest wall with aquacell dressing intact, sling/swddler to left arm. No bleeding, no hematoma noted. Patient denies any complaints. Vss. Telemetry monitor on. Bedrest continued . Call light within reach.

## 2022-01-13 NOTE — PATIENT INSTRUCTIONS
Sling to left arm - wear for 24 hours, then only at night for 6 weeks.  NO HEPARIN PRODUCTS  Biaxin PO BID for 5 days at discharge  No lifting left elbow above shoulder height  No lifting over 5 pounds  No driving for 1 week and for 4 weeks if patient uses left arm to make turns  Patient may shower in 24 hours, do not let beam of shower hit site directly and no scrubbing in area  Follow up in device clinic in 1 week and with Rema in 4 months.

## 2022-01-14 ENCOUNTER — TELEPHONE (OUTPATIENT)
Dept: PRIMARY CARE CLINIC | Facility: CLINIC | Age: 72
End: 2022-01-14
Payer: MEDICARE

## 2022-01-14 ENCOUNTER — TELEPHONE (OUTPATIENT)
Dept: ELECTROPHYSIOLOGY | Facility: CLINIC | Age: 72
End: 2022-01-14
Payer: MEDICARE

## 2022-01-14 DIAGNOSIS — T82.110S PACEMAKER LEAD MALFUNCTION, SEQUELA: ICD-10-CM

## 2022-01-14 DIAGNOSIS — Z01.812 ENCOUNTER FOR PRE-OPERATIVE LABORATORY TESTING: Primary | ICD-10-CM

## 2022-01-14 NOTE — TELEPHONE ENCOUNTER
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 She reports that yesterday when she woke up her throat really bothered her. Today it hurts even more and she is wondering if she was intubated. Looked through procedure log and checked with EP lab to verify she was not intubated. Pt also requested for a BMP to be checked on Tuesday, 1/18 to check her creatinine and it had not been scheduled. Lab appt made for pt. She also wanted to see how much contrast was given during the procedure. Verfied with EP lab that it was 10 ml.         ----- Message from Inderjit Duran MA sent at 1/14/2022  9:56 AM CST -----  Regarding: FW: please call  Good morning Laura   See below please advise.   Thanks     ----- Message -----  From: Nia Mcwilliams  Sent: 1/14/2022   9:51 AM CST  To: [facility] Cai Staff  Subject: please call                                      The pt is calling about her procedure yesterday. Please call her back @ [phone]. Thanks, Nia

## 2022-01-14 NOTE — ANESTHESIA POSTPROCEDURE EVALUATION
Anesthesia Post Evaluation    Patient: Jesenia Curiel    Procedure(s) Performed: Procedure(s) (LRB):  REVISION, ELECTRODE LEAD, CARDIAC PACEMAKER (Left)  Venogram, EP Lab    Final Anesthesia Type: general      Level of consciousness: awake and alert  Post-procedure vital signs: reviewed and stable  Pain control: Pain has been treated.  Airway patency: patent    PONV status: Absent or treated.  Anesthetic complications: no      Cardiovascular status: hemodynamically stable  Respiratory status: unassisted  Hydration status: euvolemic            Vitals Value Taken Time   /59 01/13/22 1745   Temp 36.6 °C (97.9 °F) 01/13/22 1615   Pulse 74 01/13/22 1745   Resp 18 01/13/22 1645   SpO2 95 % 01/13/22 1645         No case tracking events are documented in the log.      Pain/Criss Score: Pain Rating Prior to Med Admin: 7 (1/13/2022  3:46 PM)  Pain Rating Post Med Admin: 0 (1/13/2022  4:30 PM)  Criss Score: 9 (1/13/2022  4:30 PM)

## 2022-01-14 NOTE — NURSING
Patient voided and ambulated without any problems. Aaox4. Vss.  Left chest wall incision with aquacell dressing intact. No bleeding, no hematoma noted. AVS printed. Home care instructions reviewed . All questions answered. IV hep lock removed. patient ambulatory for discharge.

## 2022-01-17 ENCOUNTER — PATIENT MESSAGE (OUTPATIENT)
Dept: CARDIOLOGY | Facility: CLINIC | Age: 72
End: 2022-01-17
Payer: MEDICARE

## 2022-01-18 ENCOUNTER — TELEPHONE (OUTPATIENT)
Dept: CARDIOLOGY | Facility: HOSPITAL | Age: 72
End: 2022-01-18
Payer: MEDICARE

## 2022-01-18 ENCOUNTER — LAB VISIT (OUTPATIENT)
Dept: LAB | Facility: HOSPITAL | Age: 72
End: 2022-01-18
Attending: INTERNAL MEDICINE
Payer: MEDICARE

## 2022-01-18 DIAGNOSIS — Z01.812 ENCOUNTER FOR PRE-OPERATIVE LABORATORY TESTING: ICD-10-CM

## 2022-01-18 DIAGNOSIS — Z95.0 S/P PLACEMENT OF CARDIAC PACEMAKER: Primary | ICD-10-CM

## 2022-01-18 DIAGNOSIS — I95.81 POSTPROCEDURAL HYPOTENSION: ICD-10-CM

## 2022-01-18 DIAGNOSIS — T82.110S PACEMAKER LEAD MALFUNCTION, SEQUELA: ICD-10-CM

## 2022-01-18 LAB
ANION GAP SERPL CALC-SCNC: 10 MMOL/L (ref 8–16)
BUN SERPL-MCNC: 18 MG/DL (ref 8–23)
CALCIUM SERPL-MCNC: 10.5 MG/DL (ref 8.7–10.5)
CHLORIDE SERPL-SCNC: 106 MMOL/L (ref 95–110)
CO2 SERPL-SCNC: 23 MMOL/L (ref 23–29)
CREAT SERPL-MCNC: 1.4 MG/DL (ref 0.5–1.4)
EST. GFR  (AFRICAN AMERICAN): 43.3 ML/MIN/1.73 M^2
EST. GFR  (NON AFRICAN AMERICAN): 37.6 ML/MIN/1.73 M^2
GLUCOSE SERPL-MCNC: 117 MG/DL (ref 70–110)
POTASSIUM SERPL-SCNC: 3.7 MMOL/L (ref 3.5–5.1)
SODIUM SERPL-SCNC: 139 MMOL/L (ref 136–145)

## 2022-01-18 PROCEDURE — 36415 COLL VENOUS BLD VENIPUNCTURE: CPT | Performed by: INTERNAL MEDICINE

## 2022-01-18 PROCEDURE — 80048 BASIC METABOLIC PNL TOTAL CA: CPT | Performed by: INTERNAL MEDICINE

## 2022-01-18 NOTE — TELEPHONE ENCOUNTER
Pt reporting not feeling well and low BP since atrial lead revision. Has been holding amlodipine. Will check limited echo to rule out pericardial effusion.

## 2022-01-20 ENCOUNTER — HOSPITAL ENCOUNTER (OUTPATIENT)
Facility: HOSPITAL | Age: 72
Discharge: HOME | End: 2022-01-21
Attending: EMERGENCY MEDICINE | Admitting: INTERNAL MEDICINE
Payer: MEDICARE

## 2022-01-20 ENCOUNTER — CLINICAL SUPPORT (OUTPATIENT)
Dept: CARDIOLOGY | Facility: HOSPITAL | Age: 72
End: 2022-01-20
Attending: INTERNAL MEDICINE
Payer: MEDICARE

## 2022-01-20 DIAGNOSIS — Z01.812 ENCOUNTER FOR PRE-OPERATIVE LABORATORY TESTING: ICD-10-CM

## 2022-01-20 DIAGNOSIS — T82.110S PACEMAKER LEAD MALFUNCTION, SEQUELA: ICD-10-CM

## 2022-01-20 DIAGNOSIS — R06.02 SOB (SHORTNESS OF BREATH): Primary | ICD-10-CM

## 2022-01-20 DIAGNOSIS — R06.02 SHORTNESS OF BREATH: ICD-10-CM

## 2022-01-20 DIAGNOSIS — R55 SYNCOPE: ICD-10-CM

## 2022-01-20 DIAGNOSIS — R06.09 DYSPNEA ON EXERTION: ICD-10-CM

## 2022-01-20 DIAGNOSIS — R07.9 CHEST PAIN: ICD-10-CM

## 2022-01-20 LAB
ALBUMIN SERPL BCP-MCNC: 4.3 G/DL (ref 3.5–5.2)
ALP SERPL-CCNC: 110 U/L (ref 55–135)
ALT SERPL W/O P-5'-P-CCNC: 21 U/L (ref 10–44)
ANION GAP SERPL CALC-SCNC: 12 MMOL/L (ref 8–16)
ASCENDING AORTA: 3.01 CM
AST SERPL-CCNC: 24 U/L (ref 10–40)
AV INDEX (PROSTH): 0.71
AV MEAN GRADIENT: 3 MMHG
AV PEAK GRADIENT: 4 MMHG
AV VALVE AREA: 2.14 CM2
AV VELOCITY RATIO: 0.69
BASOPHILS # BLD AUTO: 0.07 K/UL (ref 0–0.2)
BASOPHILS NFR BLD: 1 % (ref 0–1.9)
BILIRUB SERPL-MCNC: 1 MG/DL (ref 0.1–1)
BNP SERPL-MCNC: 216 PG/ML (ref 0–99)
BSA FOR ECHO PROCEDURE: 1.97 M2
BUN SERPL-MCNC: 23 MG/DL (ref 8–23)
CA-I BLDV-SCNC: 1.25 MMOL/L (ref 1.06–1.42)
CALCIUM SERPL-MCNC: 11.2 MG/DL (ref 8.7–10.5)
CHLORIDE SERPL-SCNC: 104 MMOL/L (ref 95–110)
CO2 SERPL-SCNC: 22 MMOL/L (ref 23–29)
CREAT SERPL-MCNC: 1.3 MG/DL (ref 0.5–1.4)
CTP QC/QA: YES
CV ECHO LV RWT: 0.41 CM
D DIMER PPP IA.FEU-MCNC: 1.47 MG/L FEU
DIFFERENTIAL METHOD: ABNORMAL
DOP CALC AO PEAK VEL: 0.99 M/S
DOP CALC AO VTI: 15.84 CM
DOP CALC LVOT AREA: 3 CM2
DOP CALC LVOT DIAMETER: 1.96 CM
DOP CALC LVOT PEAK VEL: 0.68 M/S
DOP CALC LVOT STROKE VOLUME: 33.9 CM3
DOP CALCLVOT PEAK VEL VTI: 11.24 CM
ECHO LV POSTERIOR WALL: 0.82 CM (ref 0.6–1.1)
EJECTION FRACTION: 53 %
EOSINOPHIL # BLD AUTO: 0.2 K/UL (ref 0–0.5)
EOSINOPHIL NFR BLD: 2.3 % (ref 0–8)
ERYTHROCYTE [DISTWIDTH] IN BLOOD BY AUTOMATED COUNT: 13.3 % (ref 11.5–14.5)
EST. GFR  (AFRICAN AMERICAN): 47.4 ML/MIN/1.73 M^2
EST. GFR  (NON AFRICAN AMERICAN): 41.1 ML/MIN/1.73 M^2
FRACTIONAL SHORTENING: 24 % (ref 28–44)
GLUCOSE SERPL-MCNC: 123 MG/DL (ref 70–110)
HCT VFR BLD AUTO: 44.4 % (ref 37–48.5)
HGB BLD-MCNC: 14.7 G/DL (ref 12–16)
IMM GRANULOCYTES # BLD AUTO: 0.04 K/UL (ref 0–0.04)
IMM GRANULOCYTES NFR BLD AUTO: 0.6 % (ref 0–0.5)
INTERVENTRICULAR SEPTUM: 0.86 CM (ref 0.6–1.1)
LA MAJOR: 6.31 CM
LA MINOR: 3.99 CM
LA WIDTH: 3.48 CM
LEFT ATRIUM SIZE: 3.63 CM
LEFT ATRIUM VOLUME INDEX: 27.9 ML/M2
LEFT ATRIUM VOLUME: 52.49 CM3
LEFT INTERNAL DIMENSION IN SYSTOLE: 3.04 CM (ref 2.1–4)
LEFT VENTRICLE DIASTOLIC VOLUME INDEX: 37.02 ML/M2
LEFT VENTRICLE DIASTOLIC VOLUME: 69.6 ML
LEFT VENTRICLE MASS INDEX: 53 G/M2
LEFT VENTRICLE SYSTOLIC VOLUME INDEX: 19.2 ML/M2
LEFT VENTRICLE SYSTOLIC VOLUME: 36.07 ML
LEFT VENTRICULAR INTERNAL DIMENSION IN DIASTOLE: 3.99 CM (ref 3.5–6)
LEFT VENTRICULAR MASS: 99.41 G
LYMPHOCYTES # BLD AUTO: 1.4 K/UL (ref 1–4.8)
LYMPHOCYTES NFR BLD: 18.8 % (ref 18–48)
MCH RBC QN AUTO: 30.8 PG (ref 27–31)
MCHC RBC AUTO-ENTMCNC: 33.1 G/DL (ref 32–36)
MCV RBC AUTO: 93 FL (ref 82–98)
MONOCYTES # BLD AUTO: 0.5 K/UL (ref 0.3–1)
MONOCYTES NFR BLD: 6.6 % (ref 4–15)
NEUTROPHILS # BLD AUTO: 5.1 K/UL (ref 1.8–7.7)
NEUTROPHILS NFR BLD: 70.7 % (ref 38–73)
NRBC BLD-RTO: 0 /100 WBC
PISA TR MAX VEL: 2.34 M/S
PLATELET # BLD AUTO: 277 K/UL (ref 150–450)
PMV BLD AUTO: 9.8 FL (ref 9.2–12.9)
POTASSIUM SERPL-SCNC: 3.6 MMOL/L (ref 3.5–5.1)
PROT SERPL-MCNC: 8.2 G/DL (ref 6–8.4)
RA MAJOR: 4.17 CM
RA PRESSURE: 3 MMHG
RA WIDTH: 2.72 CM
RBC # BLD AUTO: 4.77 M/UL (ref 4–5.4)
RIGHT VENTRICULAR END-DIASTOLIC DIMENSION: 3.23 CM
SARS-COV-2 RDRP RESP QL NAA+PROBE: NEGATIVE
SINUS: 2.95 CM
SODIUM SERPL-SCNC: 138 MMOL/L (ref 136–145)
STJ: 3.03 CM
TDI LATERAL: 0.1 M/S
TDI SEPTAL: 0.08 M/S
TDI: 0.09 M/S
TR MAX PG: 22 MMHG
TRICUSPID ANNULAR PLANE SYSTOLIC EXCURSION: 2.5 CM
TROPONIN I SERPL DL<=0.01 NG/ML-MCNC: 0.02 NG/ML (ref 0–0.03)
TROPONIN I SERPL DL<=0.01 NG/ML-MCNC: 0.02 NG/ML (ref 0–0.03)
TV REST PULMONARY ARTERY PRESSURE: 25 MMHG
WBC # BLD AUTO: 7.27 K/UL (ref 3.9–12.7)

## 2022-01-20 PROCEDURE — 93280 PM DEVICE PROGR EVAL DUAL: CPT | Mod: 26,,, | Performed by: INTERNAL MEDICINE

## 2022-01-20 PROCEDURE — 99285 PR EMERGENCY DEPT VISIT,LEVEL V: ICD-10-PCS | Mod: CS,,, | Performed by: EMERGENCY MEDICINE

## 2022-01-20 PROCEDURE — U0002 COVID-19 LAB TEST NON-CDC: HCPCS | Performed by: EMERGENCY MEDICINE

## 2022-01-20 PROCEDURE — 85379 FIBRIN DEGRADATION QUANT: CPT | Performed by: EMERGENCY MEDICINE

## 2022-01-20 PROCEDURE — 93010 ELECTROCARDIOGRAM REPORT: CPT | Mod: ,,, | Performed by: INTERNAL MEDICINE

## 2022-01-20 PROCEDURE — 99285 EMERGENCY DEPT VISIT HI MDM: CPT | Mod: 25,CS

## 2022-01-20 PROCEDURE — 82330 ASSAY OF CALCIUM: CPT | Performed by: STUDENT IN AN ORGANIZED HEALTH CARE EDUCATION/TRAINING PROGRAM

## 2022-01-20 PROCEDURE — 93280 PM DEVICE PROGR EVAL DUAL: CPT

## 2022-01-20 PROCEDURE — 99285 EMERGENCY DEPT VISIT HI MDM: CPT | Mod: CS,,, | Performed by: EMERGENCY MEDICINE

## 2022-01-20 PROCEDURE — 25000003 PHARM REV CODE 250: Performed by: STUDENT IN AN ORGANIZED HEALTH CARE EDUCATION/TRAINING PROGRAM

## 2022-01-20 PROCEDURE — 85025 COMPLETE CBC W/AUTO DIFF WBC: CPT | Performed by: EMERGENCY MEDICINE

## 2022-01-20 PROCEDURE — 83880 ASSAY OF NATRIURETIC PEPTIDE: CPT | Performed by: EMERGENCY MEDICINE

## 2022-01-20 PROCEDURE — G0378 HOSPITAL OBSERVATION PER HR: HCPCS

## 2022-01-20 PROCEDURE — 93005 ELECTROCARDIOGRAM TRACING: CPT

## 2022-01-20 PROCEDURE — 93010 EKG 12-LEAD: ICD-10-PCS | Mod: ,,, | Performed by: INTERNAL MEDICINE

## 2022-01-20 PROCEDURE — 99214 PR OFFICE/OUTPT VISIT, EST, LEVL IV, 30-39 MIN: ICD-10-PCS | Mod: ,,, | Performed by: INTERNAL MEDICINE

## 2022-01-20 PROCEDURE — 84484 ASSAY OF TROPONIN QUANT: CPT | Mod: 91 | Performed by: STUDENT IN AN ORGANIZED HEALTH CARE EDUCATION/TRAINING PROGRAM

## 2022-01-20 PROCEDURE — 99214 OFFICE O/P EST MOD 30 MIN: CPT | Mod: ,,, | Performed by: INTERNAL MEDICINE

## 2022-01-20 PROCEDURE — 80053 COMPREHEN METABOLIC PANEL: CPT | Performed by: EMERGENCY MEDICINE

## 2022-01-20 PROCEDURE — 93280 CARDIAC DEVICE CHECK - IN CLINIC & HOSPITAL: ICD-10-PCS | Mod: 26,,, | Performed by: INTERNAL MEDICINE

## 2022-01-20 PROCEDURE — 84484 ASSAY OF TROPONIN QUANT: CPT | Performed by: EMERGENCY MEDICINE

## 2022-01-20 RX ORDER — IBUPROFEN 200 MG
16 TABLET ORAL
Status: DISCONTINUED | OUTPATIENT
Start: 2022-01-20 | End: 2022-01-21 | Stop reason: HOSPADM

## 2022-01-20 RX ORDER — PROPAFENONE HYDROCHLORIDE 225 MG/1
225 CAPSULE, EXTENDED RELEASE ORAL EVERY 12 HOURS
Status: DISCONTINUED | OUTPATIENT
Start: 2022-01-20 | End: 2022-01-21 | Stop reason: HOSPADM

## 2022-01-20 RX ORDER — GLUCAGON 1 MG
1 KIT INJECTION
Status: DISCONTINUED | OUTPATIENT
Start: 2022-01-20 | End: 2022-01-21 | Stop reason: HOSPADM

## 2022-01-20 RX ORDER — SODIUM CHLORIDE 0.9 % (FLUSH) 0.9 %
10 SYRINGE (ML) INJECTION EVERY 12 HOURS PRN
Status: DISCONTINUED | OUTPATIENT
Start: 2022-01-20 | End: 2022-01-21 | Stop reason: HOSPADM

## 2022-01-20 RX ORDER — FUROSEMIDE 20 MG/1
20 TABLET ORAL DAILY
Status: DISCONTINUED | OUTPATIENT
Start: 2022-01-21 | End: 2022-01-21 | Stop reason: HOSPADM

## 2022-01-20 RX ORDER — BISOPROLOL FUMARATE 5 MG/1
5 TABLET, FILM COATED ORAL DAILY
Status: DISCONTINUED | OUTPATIENT
Start: 2022-01-21 | End: 2022-01-21

## 2022-01-20 RX ORDER — AMLODIPINE BESYLATE 10 MG/1
10 TABLET ORAL DAILY
Status: DISCONTINUED | OUTPATIENT
Start: 2022-01-21 | End: 2022-01-21 | Stop reason: HOSPADM

## 2022-01-20 RX ORDER — TOPIRAMATE 25 MG/1
25 TABLET ORAL 2 TIMES DAILY
Status: DISCONTINUED | OUTPATIENT
Start: 2022-01-20 | End: 2022-01-21 | Stop reason: HOSPADM

## 2022-01-20 RX ORDER — NALOXONE HCL 0.4 MG/ML
0.02 VIAL (ML) INJECTION
Status: DISCONTINUED | OUTPATIENT
Start: 2022-01-20 | End: 2022-01-21 | Stop reason: HOSPADM

## 2022-01-20 RX ORDER — POTASSIUM CHLORIDE 750 MG/1
10 CAPSULE, EXTENDED RELEASE ORAL DAILY
Status: DISCONTINUED | OUTPATIENT
Start: 2022-01-21 | End: 2022-01-21 | Stop reason: HOSPADM

## 2022-01-20 RX ORDER — IBUPROFEN 200 MG
24 TABLET ORAL
Status: DISCONTINUED | OUTPATIENT
Start: 2022-01-20 | End: 2022-01-21 | Stop reason: HOSPADM

## 2022-01-20 RX ORDER — LOSARTAN POTASSIUM 50 MG/1
100 TABLET ORAL DAILY
Refills: 3 | Status: DISCONTINUED | OUTPATIENT
Start: 2022-01-21 | End: 2022-01-21 | Stop reason: HOSPADM

## 2022-01-20 RX ORDER — ATORVASTATIN CALCIUM 40 MG/1
80 TABLET, FILM COATED ORAL DAILY
Status: DISCONTINUED | OUTPATIENT
Start: 2022-01-21 | End: 2022-01-21 | Stop reason: HOSPADM

## 2022-01-20 RX ORDER — ASPIRIN 81 MG/1
81 TABLET ORAL DAILY
Status: DISCONTINUED | OUTPATIENT
Start: 2022-01-21 | End: 2022-01-21 | Stop reason: HOSPADM

## 2022-01-20 RX ADMIN — TOPIRAMATE 25 MG: 25 TABLET, FILM COATED ORAL at 09:01

## 2022-01-20 NOTE — ED PROVIDER NOTES
Encounter Date: 1/20/2022    SCRIBE #1 NOTE: ITrini, am scribing for, and in the presence of,  Waylon Canales MD. I have scribed the following portions of the note - Other sections scribed: PE,ROS.       History     Chief Complaint   Patient presents with    Shortness of Breath     Sudden onset this morning denies chest pain at this time     72 yo W with pmhx HTN, HLD, GERD, pAfib, SSS s/p pacemaker, CKD presents with a chief complaint of shortness of breath.  Shortness of breath occurred with exertion at 9:48 a.m. while walking from her car in the hospital.  It is still present but significantly improved.  No chest pain, cough, fever.  No history of similar symptoms.  She walked from her car to the lab 2 days ago without any difficulty.  Patient had a recent exchange of the atrial lead on her AICD.  Since that time she has had some presyncope and swelling at the left upper chest.  She spoke with her cardiologist Dr. Hodgson who recommended she come to the emergency department.  Patient is suspicious that she had a PE.  She has never had a PE diagnosed but during a remote pregnancy she suspect she had a small one. No immobilization.  She had a recent outpatient procedure for the pacemaker.  No exogenous hormones. She drove to [place] and back 4 weeks prior. She is a retired gastroenterologist.  She has a contrast media allergy and requires 24 hour prep.  She is apprehensive about getting any CTA because of her concerns for her allergy and her CKD.    The history is provided by the patient, the spouse and medical records. No  was used.     Review of patient's allergies indicates:   Allergen Reactions    Iodinated contrast media Anaphylaxis    Penicillins Anaphylaxis    Allopurinol analogues      Muscle cramps    Ciprofloxacin Rash    Quinolones Rash    Sulfa (sulfonamide antibiotics) Rash    Tetracyclines Rash     Past Medical History:   Diagnosis Date    Allergy      Arthritis     Atrial fibrillation 2017    CKD (chronic kidney disease) stage 3, GFR 30-59 ml/min 2017    Disorder of kidney and ureter     GERD (gastroesophageal reflux disease)     Hyperlipidemia     Hypertension     Impaired fasting glucose 2021    Pre-diabetes 6/3/2017     Past Surgical History:   Procedure Laterality Date    ADENOIDECTOMY      BACK SURGERY      lamenectomy    BREAST SURGERY      CARDIAC SURGERY      st paolo pacemaker     CATARACT EXTRACTION W/  INTRAOCULAR LENS IMPLANT Right 6/3/2019    Procedure: EXTRACTION, CATARACT, WITH IOL INSERTION;  Surgeon: Raisa Moreno MD;  Location: [facility];  Service: Ophthalmology;  Laterality: Right;  Laser    CATARACT EXTRACTION W/  INTRAOCULAR LENS IMPLANT Left 2019    Procedure: EXTRACTION, CATARACT, WITH IOL INSERTION;  Surgeon: Raisa Moreno MD;  Location: [facility];  Service: Ophthalmology;  Laterality: Left;  LASER ASSISTED     SECTION      ,     EYE SURGERY      FRACTURE SURGERY      HYSTERECTOMY      INSERT / REPLACE / REMOVE PACEMAKER      placement    INSERT / REPLACE / REMOVE PACEMAKER  2014    [employer] Model #[id number] replacement    pace maker      replacement     REVISION OF PROCEDURE INVOLVING PACEMAKER LEAD Left 2022    Procedure: REVISION, ELECTRODE LEAD, CARDIAC PACEMAKER;  Surgeon: Trell Hodgson MD;  Location: [facility] EP LAB;  Service: Cardiology;  Laterality: Left;  PPM lead Malfx, RA lead revision, SJM, MAC, GP, 3 PREP*dPPM SJM in situ**Contrast prep given*    salpingo opherectomy Bilateral     SPINE SURGERY      TONSILLECTOMY      TUBAL LIGATION       Family History   Problem Relation Age of Onset    Hypertension Mother     Diabetes Mother     Hyperlipidemia Mother     Coronary artery disease Mother     Heart disease Mother     Stroke Mother     Hypertension Father     Diabetes Father     Hyperlipidemia Father     Coronary artery disease  Father     Heart disease Father     Coronary artery disease Brother     Heart disease Brother     Hyperthyroidism Brother     Diabetes Paternal Grandmother     Diabetes Paternal Grandfather     Heart attack Neg Hx      Social History     Tobacco Use    Smoking status: Never Smoker    Smokeless tobacco: Never Used   Substance Use Topics    Alcohol use: Yes     Alcohol/week: 10.0 standard drinks     Types: 10 Glasses of wine per week    Drug use: No     Review of Systems   Constitutional: Negative for fever.   HENT: Negative for sore throat.    Respiratory: Positive for shortness of breath. Negative for cough.    Cardiovascular: Negative for chest pain.   Gastrointestinal: Negative for blood in stool and nausea.   Genitourinary: Negative for dysuria and vaginal bleeding.   Musculoskeletal: Negative for back pain.   Skin: Negative for rash.        .   Neurological: Positive for light-headedness. Negative for syncope and weakness.   Hematological: Does not bruise/bleed easily.       Physical Exam     Initial Vitals [01/20/22 1052]   BP Pulse Resp Temp SpO2   (!) 165/77 60 18 99.3 °F (37.4 °C) 95 %      MAP       --         Physical Exam    Nursing note and vitals reviewed.  Constitutional: She appears well-developed and well-nourished. She is not diaphoretic. No distress.   HENT:   Head: Normocephalic and atraumatic.   Eyes: EOM are normal. Right eye exhibits no discharge. Left eye exhibits no discharge. No scleral icterus.   Neck: Neck supple. No JVD present.   Left supraclavicular fullness that increases with standing and bearing down   Normal range of motion.  Cardiovascular: Normal rate, regular rhythm and intact distal pulses. Exam reveals no gallop and no friction rub.    No murmur heard.  Pulmonary/Chest: Breath sounds normal. No respiratory distress. She has no wheezes. She has no rhonchi. She has no rales. She exhibits no tenderness.   Left chest wall pacemaker incision intact with skin glue    Abdominal: Abdomen is soft. She exhibits no distension and no mass. There is no abdominal tenderness. There is no rebound and no guarding.   Musculoskeletal:         General: No tenderness or edema. Normal range of motion.      Cervical back: Normal range of motion and neck supple.     Neurological: She is alert and oriented to person, place, and time. She has normal strength. No sensory deficit.   Skin: Skin is warm and dry. Capillary refill takes less than 2 seconds.   Psychiatric: She has a normal mood and affect.         ED Course   Procedures  Labs Reviewed   CBC W/ AUTO DIFFERENTIAL - Abnormal; Notable for the following components:       Result Value    Immature Granulocytes 0.6 (*)     All other components within normal limits   COMPREHENSIVE METABOLIC PANEL - Abnormal; Notable for the following components:    CO2 22 (*)     Glucose 123 (*)     Calcium 11.2 (*)     eGFR if  47.4 (*)     eGFR if non  41.1 (*)     All other components within normal limits   B-TYPE NATRIURETIC PEPTIDE - Abnormal; Notable for the following components:     (*)     All other components within normal limits   D DIMER, QUANTITATIVE - Abnormal; Notable for the following components:    D-Dimer 1.47 (*)     All other components within normal limits   TROPONIN I   SARS-COV-2 RDRP GENE     EKG Readings: (Independently Interpreted)   Initial Reading: No STEMI. Rhythm: Paced Rhythm. Heart Rate: 91. ST Segments: Normal ST Segments. T Waves Flipped: V2. Axis: Left Axis Deviation.     ECG Results          EKG 12-lead (Final result)  Result time 01/20/22 13:10:56    Final result by Interface, Lab In [facility] (01/20/22 13:10:56)                 Narrative:    Test Reason : R06.02,    Vent. Rate : 091 BPM     Atrial Rate : 091 BPM     P-R Int : 000 ms          QRS Dur : 142 ms      QT Int : 414 ms       P-R-T Axes : 000 -88 076 degrees     QTc Int : 509 ms    Ventricular-paced rhythm with occasional AV  dual-paced complexes  Biventricular pacemaker detected  Abnormal ECG  When compared with ECG of 13-JAN-2022 15:02,  Electronic ventricular pacemaker has replaced Sinus rhythm  Confirmed by Verna Hodgson MD ([id number]) on 1/20/2022 1:10:43 PM    Referred By: TEVINERR   SELF           Confirmed By:Verna Hodgson MD                            Imaging Results          NM Lung Scan Ventilation Perfusion (Final result)  Result time 01/20/22 16:31:13    Final result by Sai Bee MD (01/20/22 16:31:13)                 Impression:      This represents a low probability of pulmonary embolism.    I, Sai Bee MD, attest that I reviewed and interpreted the images.    Electronically signed by resident: Alycia Bee  Date:    01/20/2022  Time:    15:44    Electronically signed by: Sai Bee  Date:    01/20/2022  Time:    16:31             Narrative:    EXAMINATION:  NM LUNG VENTILATION AND PERFUSION IMAGING    CLINICAL HISTORY:  Pulmonary embolism (PE) suspected, positive D-dimer;    TECHNIQUE:  42.5 mCi of Tc-99m-DTPA were placed in the nebulizer. Following the inhalation Tc-99m-DTPA in aerosol and the subsequent IV administration of 5.4 mCi of Tc-99m-MAA, multiple images of the thorax were obtained in various projections.    COMPARISON:  Chest radiograph 01/20/2022    FINDINGS:  Perfusion: No segmental or subsegmental defects.  Stripe sign within the right lower lobe posterior segment, unlikely to represent pulmonary embolism.    Ventilation: Normal distribution of tracer..                               X-Ray Chest AP Portable (Final result)  Result time 01/20/22 13:25:40    Final result by Casimiro Clark MD (01/20/22 13:25:40)                 Impression:      No significant intrathoracic abnormality.  No significant detrimental interval change in the appearance of the chest since 01/13/2022, with the current examination demonstrating a much improved inspiratory depth level.      Electronically signed by: Casimiro Clark  MD  Date:    01/20/2022  Time:    13:25             Narrative:    EXAMINATION:  XR CHEST AP PORTABLE    CLINICAL HISTORY:  Shortness of breath;    COMPARISON:  Comparison is made to 01/13/2022 at 16:08.  Clinical information of shortness of breath.    FINDINGS:  Cardiac pacemaker again noted.  Allowing for magnification of the cardiomediastinal silhouette related to projection, the heart does not appear significantly enlarged, and there has been no detrimental change in the appearance of the cardiomediastinal silhouette since the examination referenced above.  Pulmonary vascularity is normal, and there are no findings indicating current cardiac decompensation.  Lung zones are clear, and are free of significant airspace consolidation or volume loss.  No pleural fluid.  No pneumothorax.                                 Medications - No data to display  Medical Decision Making:   History:   Old Medical Records: I decided to obtain old medical records.  Initial Assessment:   70 yo W with pmhx HTN, HLD, GERD, pAfib, SSS s/p pacemaker, CKD presents with a chief complaint of shortness of breath.  Differential Diagnosis:   Arrhythmia, pericardial effusion, PE, ACS, pneumonia, pneumothorax, COVID-19  Clinical Tests:   Lab Tests: Ordered  Radiological Study: Ordered  Medical Tests: Ordered  ED Management:  Will obtain labs and chest x-ray.  Cardiology consulted. Bedside ultrasound with possible epicardial fat pad verses trace hematoma.  No circumferential effusion.  No right heart strain.    Reassessment: COVID neg. CBC without leukocytosis or anemia.  CMP without emergent abnormalities.  Troponin normal.  BNP slightly elevated at 216. Chest x-ray without acute process.  D-dimer elevated at 1.47.  I had an extensive discussion about the need for CTA with the patient.  Patient has a recovered creatinine at this time.  She reports an anaphylactic reaction to contrast media from 50 years ago and her only time having contrast  since that time was for the recent pacemaker lead exchange.  She requests a V/Q scan which was ordered.  Patient was evaluated by Cardiology.    Reassessment:  V/Q scan low probability for PE.  Cardiology ordered formal echo.  They recommend observation for further monitoring.            Scribe Attestation:   Scribe #1: I performed the above scribed service and the documentation accurately describes the services I performed. I attest to the accuracy of the note.    Attending Attestation:           Physician Attestation for Scribe:      Comments: I, Dr. Waylon Canales, personally performed the services described in this documentation. All medical record entries made by the scribe were at my direction and in my presence.  I have reviewed the chart and agree that the record reflects my personal performance and is accurate and complete. Waylon Canales MD.  4:41 PM 01/20/2022                   Clinical Impression:   Final diagnoses:  [R06.02] SOB (shortness of breath) (Primary)          ED Disposition Condition    Observation               Waylon Canales MD  01/20/22 9688

## 2022-01-20 NOTE — HPI
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olo dual chamber pacemaker implantation in 5/2009, presenting with dyspnea on exertion. Follows with Dr. Verna Hodgson in general cardiology clinic and Dr. Trell Hodgson in EP. She does not exercise at baseline but reports that at baseline shes able to walk around for prolonged period without shortness of breath. Since her right atrial lead revision last week she has noticed significant SOB w exertion, which was most significant with need for rest en route from parking lot to Dr. Hodgson's clinic today. No chest pain, no palpitations. In ED she is hypertensive w SBP to 160, HR 60 (of note >99% RA paced). Limited bedside echo with significant pericardial fat pad, unable to fully rule out clot though no biventricular systolic function and diastolic filling looks grossly normal. Noted to have elevated D-dimer to 1.47. Has reported hx anaphyacis w contrast and abnormal renal function at baseline (cr 1.3), planned for VQ scan.

## 2022-01-20 NOTE — SUBJECTIVE & OBJECTIVE
Past Medical History:   Diagnosis Date    Allergy     Arthritis     Atrial fibrillation 2017    CKD (chronic kidney disease) stage 3, GFR 30-59 ml/min 2017    Disorder of kidney and ureter     GERD (gastroesophageal reflux disease)     Hyperlipidemia     Hypertension     Impaired fasting glucose 2021    Pre-diabetes 6/3/2017       Past Surgical History:   Procedure Laterality Date    ADENOIDECTOMY      BACK SURGERY      lamenectomy    BREAST SURGERY      CARDIAC SURGERY      st paolo pacemaker     CATARACT EXTRACTION W/  INTRAOCULAR LENS IMPLANT Right 6/3/2019    Procedure: EXTRACTION, CATARACT, WITH IOL INSERTION;  Surgeon: Raisa Moreno MD;  Location: [facility];  Service: Ophthalmology;  Laterality: Right;  Laser    CATARACT EXTRACTION W/  INTRAOCULAR LENS IMPLANT Left 2019    Procedure: EXTRACTION, CATARACT, WITH IOL INSERTION;  Surgeon: Raisa Moreno MD;  Location: [facility];  Service: Ophthalmology;  Laterality: Left;  LASER ASSISTED     SECTION      ,     EYE SURGERY      FRACTURE SURGERY      HYSTERECTOMY      INSERT / REPLACE / REMOVE PACEMAKER      placement    INSERT / REPLACE / REMOVE PACEMAKER  2014    [employer] Model #[id number] replacement    pace maker      replacement     REVISION OF PROCEDURE INVOLVING PACEMAKER LEAD Left 2022    Procedure: REVISION, ELECTRODE LEAD, CARDIAC PACEMAKER;  Surgeon: Trell Hodgson MD;  Location: [facility] EP [facility];  Service: Cardiology;  Laterality: Left;  PPM lead Malfx, RA lead revision, SJM, MAC, GP, 3 PREP*dPPM SJM in situ**Contrast prep given*    salpingo opherectomy Bilateral     SPINE SURGERY      TONSILLECTOMY      TUBAL LIGATION         Review of patient's allergies indicates:   Allergen Reactions    Iodinated contrast media Anaphylaxis    Penicillins Anaphylaxis    Allopurinol analogues      Muscle cramps    Ciprofloxacin Rash    Quinolones Rash    Sulfa (sulfonamide  antibiotics) Rash    Tetracyclines Rash       No current facility-administered medications on file prior to encounter.     Current Outpatient Medications on File Prior to Encounter   Medication Sig    amLODIPine (NORVASC) 10 MG tablet TAKE 1 TABLET BY MOUTH EVERY DAY    aspirin (ECOTRIN) 81 MG EC tablet Take 1 tablet (81 mg total) by mouth once daily.    atorvastatin (LIPITOR) 80 MG tablet TAKE 1 TABLET BY MOUTH EVERY DAY    bisoprolol (ZEBETA) 5 MG tablet Take 1 tablet (5 mg total) by mouth once daily.    cimetidine (TAGAMET) 300 MG tablet Take one of each( Benadryl, Cimetidine, Prednisone) at 7:00 pm  the evening prior to procedure and take one of each at 2:00 am and 8:00 am on day of procedure    clobetasoL (OLUX) 0.05 % Foam Apply 1 application topically every 30 days.    ERGOCALCIFEROL, VITAMIN D2, (VITAMIN D ORAL) Take 2,000 Units by mouth once daily.    FLUAD QUAD 2020-21,65Y UP,,PF, 60 mcg (15 mcg x 4)/0.5 mL Syrg TO BE ADMINISTERED BY PHARMACIST FOR IMMUNIZATION    furosemide (LASIX) 20 MG tablet Take 1 tablet (20 mg total) by mouth once daily.    KLOR-CON 10 10 mEq TbSR TAKE 1 TABLET (10 MEQ TOTAL) BY MOUTH ONCE DAILY.  DOSES    olmesartan (BENICAR) 40 MG tablet Take 1 tablet (40 mg total) by mouth once daily.    propafenone (RYTHMOL SR) 225 MG Cp12 Take 1 capsule (225 mg total) by mouth every 12 (twelve) hours.    topiramate (TOPAMAX) 25 MG tablet TAKE 1 TABLET BY MOUTH THREE TIMES A DAY     Family History     Problem Relation (Age of Onset)    Coronary artery disease Mother, Father, Brother    Diabetes Mother, Father, Paternal Grandmother, Paternal Grandfather    Heart disease Mother, Father, Brother    Hyperlipidemia Mother, Father    Hypertension Mother, Father    Hyperthyroidism Brother    Stroke Mother        Tobacco Use    Smoking status: Never Smoker    Smokeless tobacco: Never Used   Substance and Sexual Activity    Alcohol use: Yes     Alcohol/week: 10.0 standard drinks      Types: 10 Glasses of wine per week    Drug use: No    Sexual activity: Not Currently     Partners: Male     Review of Systems   Constitutional: Negative. Negative for chills and fever.   HENT: Negative.    Eyes: Negative.    Cardiovascular: Positive for dyspnea on exertion. Negative for chest pain, claudication and paroxysmal nocturnal dyspnea.   Respiratory: Negative for cough, shortness of breath and wheezing.    Endocrine: Negative.    Hematologic/Lymphatic: Does not bruise/bleed easily.   Skin: Negative for nail changes and rash.   Musculoskeletal: Negative.  Negative for back pain.   Gastrointestinal: Negative for abdominal pain, melena, nausea and vomiting.   Neurological: Negative for dizziness and headaches.   Psychiatric/Behavioral: Negative for altered mental status, depression and substance abuse.   Allergic/Immunologic: Negative.      Objective:     Vital Signs (Most Recent):  Temp: 99.3 °F (37.4 °C) (01/20/22 1052)  Pulse: 100 (01/20/22 1434)  Resp: 18 (01/20/22 1434)  BP: 129/77 (01/20/22 1434)  SpO2: 95 % (01/20/22 1434) Vital Signs (24h Range):  Temp:  [99.3 °F (37.4 °C)] 99.3 °F (37.4 °C)  Pulse:  [] 100  Resp:  [18] 18  SpO2:  [95 %] 95 %  BP: (129-165)/(77) 129/77     Weight: 89.8 kg (198 lb)  Body mass index is 37.41 kg/m².    SpO2: 95 %  O2 Device (Oxygen Therapy): room air    No intake or output data in the 24 hours ending 01/20/22 1457    Lines/Drains/Airways     Peripheral Intravenous Line                 Peripheral IV - Single Lumen 01/20/22 1244 22 G Right Antecubital <1 day                Physical Exam  Constitutional:       Appearance: She is well-developed and well-nourished.   HENT:      Head: Normocephalic and atraumatic.   Eyes:      Extraocular Movements: EOM normal.      Conjunctiva/sclera: Conjunctivae normal.      Pupils: Pupils are equal, round, and reactive to light.   Neck:      Vascular: No JVD.   Cardiovascular:      Rate and Rhythm: Normal rate and regular rhythm.       Pulses: Intact distal pulses.      Heart sounds: Normal heart sounds. No murmur heard.  No friction rub. No gallop.    Pulmonary:      Effort: Pulmonary effort is normal.      Breath sounds: No stridor. No wheezing or rales.   Chest:      Chest wall: No tenderness.   Abdominal:      General: Bowel sounds are normal. There is no distension.      Palpations: Abdomen is soft. There is no mass.      Tenderness: There is no abdominal tenderness. There is no guarding.   Musculoskeletal:         General: No tenderness, deformity or edema.   Skin:     General: Skin is warm and dry.      Findings: No erythema or rash.      Comments: Intact, no purulence at site   Neurological:      Mental Status: She is alert and oriented to person, place, and time.   Psychiatric:         Mood and Affect: Mood and affect normal.         Significant Labs:   CMP   Recent Labs   Lab 01/20/22  1243      K 3.6      CO2 22*   *   BUN 23   CREATININE 1.3   CALCIUM 11.2*   PROT 8.2   ALBUMIN 4.3   BILITOT 1.0   ALKPHOS 110   AST 24   ALT 21   ANIONGAP 12   ESTGFRAFRICA 47.4*   EGFRNONAA 41.1*    and CBC   Recent Labs   Lab 01/20/22  1243   WBC 7.27   HGB 14.7   HCT 44.4          Significant Imaging: Echocardiogram:   Transthoracic echo (TTE) complete (Cupid Only):   Results for orders placed or performed during the hospital encounter of 09/05/19   Transthoracic echo (TTE) 2D with Color Flow   Result Value Ref Range    Ascending aorta 2.67 cm    STJ 2.79 cm    AV mean gradient 3 mmHg    Ao peak benny 1.17 m/s    Ao VTI 26.38 cm    IVS 0.64 0.6 - 1.1 cm    LA size 3.58 cm    Left Atrium Major Axis 5.00 cm    Left Atrium Minor Axis 4.89 cm    LVIDd 5.48 3.5 - 6.0 cm    LVIDs 4.40 (A) 2.1 - 4.0 cm    LVOT diameter 1.98 cm    LVOT peak VTI 20.05 cm    Posterior Wall 0.77 0.6 - 1.1 cm    MV Peak A Benny 0.66 m/s    E wave deceleration time 178.99 msec    MV Peak E Benny 0.99 m/s    RA Major Axis 4.58 cm    RA Width 3.52 cm    RVDD  3.49 cm    Sinus 2.85 cm    TAPSE 1.94 cm    TR Max Benny 2.74 m/s    TDI LATERAL 0.08 m/s    TDI SEPTAL 0.04 m/s    LA WIDTH 4.78 cm    LV Diastolic Volume 146.41 mL    LV Systolic Volume 87.67 mL    RV S' 10.94 cm/s    LVOT peak benny 0.91 m/s    LV LATERAL E/E' RATIO 12.38 m/s    LV SEPTAL E/E' RATIO 24.75 m/s    FS 20 %    LA volume 71.92 cm3    LV mass 135.81 g    Left Ventricle Relative Wall Thickness 0.28 cm    AV valve area 2.34 cm2    AV Velocity Ratio 0.78     AV index (prosthetic) 0.76     E/A ratio 1.50     Mean e' 0.06 m/s    LVOT area 3.1 cm2    LVOT stroke volume 61.70 cm3    AV peak gradient 5 mmHg    E/E' ratio 16.50 m/s    Triscuspid Valve Regurgitation Peak Gradient 30 mmHg    BSA 1.95 m2    LV Systolic Volume Index 46.7 mL/m2    LV Diastolic Volume Index 77.93 mL/m2    LA Volume Index 38.3 mL/m2    LV Mass Index 72 g/m2    Right Atrial Pressure (from IVC) 3 mmHg    TV rest pulmonary artery pressure 33 mmHg    Narrative    · Normal left ventricular systolic function. The estimated ejection   fraction is 55%  · No wall motion abnormalities.  · Mild left atrial enlargement.  · Grade II (moderate) left ventricular diastolic dysfunction consistent   with pseudonormalization.  · Elevated left atrial pressure.  · Low normal right ventricular systolic function.  · Mild mitral regurgitation.  · Mild tricuspid regurgitation.  · The estimated PA systolic pressure is 33 mm Hg  · Normal central venous pressure (3 mm Hg).

## 2022-01-20 NOTE — ASSESSMENT & PLAN NOTE
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 dual chamber pacemaker implantation in 5/2009, presenting with dyspnea on exertion.    Unclear etiology, some concern for PE given recent procedure with venous manipulation, though not hypoxic, hypotensive. No tachycardia though would not necessarily expect in this patient who is largely A-paced.    - Recommend placement in obs for further workup as below  - Avoid CTA given CKD, contrast allergy  - Follow up VQ scan (ordered by EM)  - formal TTE (ordered) to rule out pericardial effusion, clot; do not initiate anticoagulation prior to ruling out clot  - If VQ negative, TTE without complications of lead placement (ie pericardial effusion), will consider discharge with outpatient stress testing

## 2022-01-20 NOTE — CONSULTS
[facility] - Emergency Dept  Cardiology  Consult Note    Patient Name: Jesenia Curiel  MRN: [id number]  Admission Date: 1/20/2022  Hospital Length of Stay: 0 days  Code Status: Prior   Attending Provider: Waylon Canales MD   Consulting Provider: Magdy Clarke MD  Primary Care Physician: Sandra Michaud MD  Principal Problem:<principal problem not specified>    Patient information was obtained from patient, past medical records and ER records.     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 dual chamber pacemaker implantation in 5/2009, presenting with dyspnea on exertion. Follows with Dr. Verna Hodgson in general cardiology clinic and Dr. Trell Hodgson in EP. She does not exercise at baseline but reports that at baseline shes able to walk around for prolonged period without shortness of breath. Since her right atrial lead revision last week she has noticed significant SOB w exertion, which was most significant with need for rest en route from parking lot to Dr. Hodgson's clinic today. No chest pain, no palpitations. In ED she is hypertensive w SBP to 160, HR 60 (of note >99% RA paced). Limited bedside echo with significant pericardial fat pad, unable to fully rule out clot though no biventricular systolic function and diastolic filling looks grossly normal. Noted to have elevated D-dimer to 1.47. Has reported hx anaphyacis w contrast and abnormal renal function at baseline (cr 1.3), planned for VQ scan.      Past Medical History:   Diagnosis Date    Allergy     Arthritis     Atrial fibrillation 5/29/2017    CKD (chronic kidney disease)  stage 3, GFR 30-59 ml/min 2017    Disorder of kidney and ureter     GERD (gastroesophageal reflux disease)     Hyperlipidemia     Hypertension     Impaired fasting glucose 2021    Pre-diabetes 6/3/2017       Past Surgical History:   Procedure Laterality Date    ADENOIDECTOMY      BACK SURGERY      lamenectomy    BREAST SURGERY      CARDIAC SURGERY      st paolo pacemaker     CATARACT EXTRACTION W/  INTRAOCULAR LENS IMPLANT Right 6/3/2019    Procedure: EXTRACTION, CATARACT, WITH IOL INSERTION;  Surgeon: Raisa Moreno MD;  Location: [facility];  Service: Ophthalmology;  Laterality: Right;  Laser    CATARACT EXTRACTION W/  INTRAOCULAR LENS IMPLANT Left 2019    Procedure: EXTRACTION, CATARACT, WITH IOL INSERTION;  Surgeon: Raisa Moreno MD;  Location: [facility] OR;  Service: Ophthalmology;  Laterality: Left;  LASER ASSISTED     SECTION      ,     EYE SURGERY      FRACTURE SURGERY      HYSTERECTOMY      INSERT / REPLACE / REMOVE PACEMAKER      placement    INSERT / REPLACE / REMOVE PACEMAKER  2014    [employer]olo Model #[id number] replacement    pace maker      replacement     REVISION OF PROCEDURE INVOLVING PACEMAKER LEAD Left 2022    Procedure: REVISION, ELECTRODE LEAD, CARDIAC PACEMAKER;  Surgeon: Trell Hodgson MD;  Location: [facility] EP LAB;  Service: Cardiology;  Laterality: Left;  PPM lead Malfx, RA lead revision, SJM, MAC, GP, 3 PREP*dPPM SJM in situ**Contrast prep given*    salpingo opherectomy Bilateral     SPINE SURGERY      TONSILLECTOMY      TUBAL LIGATION         Review of patient's allergies indicates:   Allergen Reactions    Iodinated contrast media Anaphylaxis    Penicillins Anaphylaxis    Allopurinol analogues      Muscle cramps    Ciprofloxacin Rash    Quinolones Rash    Sulfa (sulfonamide antibiotics) Rash    Tetracyclines Rash       No current facility-administered medications on file prior to encounter.     Current  Outpatient Medications on File Prior to Encounter   Medication Sig    amLODIPine (NORVASC) 10 MG tablet TAKE 1 TABLET BY MOUTH EVERY DAY    aspirin (ECOTRIN) 81 MG EC tablet Take 1 tablet (81 mg total) by mouth once daily.    atorvastatin (LIPITOR) 80 MG tablet TAKE 1 TABLET BY MOUTH EVERY DAY    bisoprolol (ZEBETA) 5 MG tablet Take 1 tablet (5 mg total) by mouth once daily.    cimetidine (TAGAMET) 300 MG tablet Take one of each( Benadryl, Cimetidine, Prednisone) at 7:00 pm  the evening prior to procedure and take one of each at 2:00 am and 8:00 am on day of procedure    clobetasoL (OLUX) 0.05 % Foam Apply 1 application topically every 30 days.    ERGOCALCIFEROL, VITAMIN D2, (VITAMIN D ORAL) Take 2,000 Units by mouth once daily.    FLUAD QUAD 2020-21,65Y UP,,PF, 60 mcg (15 mcg x 4)/0.5 mL Syrg TO BE ADMINISTERED BY PHARMACIST FOR IMMUNIZATION    furosemide (LASIX) 20 MG tablet Take 1 tablet (20 mg total) by mouth once daily.    KLOR-CON 10 10 mEq TbSR TAKE 1 TABLET (10 MEQ TOTAL) BY MOUTH ONCE DAILY.  DOSES    olmesartan (BENICAR) 40 MG tablet Take 1 tablet (40 mg total) by mouth once daily.    propafenone (RYTHMOL SR) 225 MG Cp12 Take 1 capsule (225 mg total) by mouth every 12 (twelve) hours.    topiramate (TOPAMAX) 25 MG tablet TAKE 1 TABLET BY MOUTH THREE TIMES A DAY     Family History     Problem Relation (Age of Onset)    Coronary artery disease Mother, Father, Brother    Diabetes Mother, Father, Paternal Grandmother, Paternal Grandfather    Heart disease Mother, Father, Brother    Hyperlipidemia Mother, Father    Hypertension Mother, Father    Hyperthyroidism Brother    Stroke Mother        Tobacco Use    Smoking status: Never Smoker    Smokeless tobacco: Never Used   Substance and Sexual Activity    Alcohol use: Yes     Alcohol/week: 10.0 standard drinks     Types: 10 Glasses of wine per week    Drug use: No    Sexual activity: Not Currently     Partners: Male     Review of Systems    Constitutional: Negative. Negative for chills and fever.   HENT: Negative.    Eyes: Negative.    Cardiovascular: Positive for dyspnea on exertion. Negative for chest pain, claudication and paroxysmal nocturnal dyspnea.   Respiratory: Negative for cough, shortness of breath and wheezing.    Endocrine: Negative.    Hematologic/Lymphatic: Does not bruise/bleed easily.   Skin: Negative for nail changes and rash.   Musculoskeletal: Negative.  Negative for back pain.   Gastrointestinal: Negative for abdominal pain, melena, nausea and vomiting.   Neurological: Negative for dizziness and headaches.   Psychiatric/Behavioral: Negative for altered mental status, depression and substance abuse.   Allergic/Immunologic: Negative.      Objective:     Vital Signs (Most Recent):  Temp: 99.3 °F (37.4 °C) (01/20/22 1052)  Pulse: 100 (01/20/22 1434)  Resp: 18 (01/20/22 1434)  BP: 129/77 (01/20/22 1434)  SpO2: 95 % (01/20/22 1434) Vital Signs (24h Range):  Temp:  [99.3 °F (37.4 °C)] 99.3 °F (37.4 °C)  Pulse:  [] 100  Resp:  [18] 18  SpO2:  [95 %] 95 %  BP: (129-165)/(77) 129/77     Weight: 89.8 kg (198 lb)  Body mass index is 37.41 kg/m².    SpO2: 95 %  O2 Device (Oxygen Therapy): room air    No intake or output data in the 24 hours ending 01/20/22 1457    Lines/Drains/Airways     Peripheral Intravenous Line                 Peripheral IV - Single Lumen 01/20/22 1244 22 G Right Antecubital <1 day                Physical Exam  Constitutional:       Appearance: She is well-developed and well-nourished.   HENT:      Head: Normocephalic and atraumatic.   Eyes:      Extraocular Movements: EOM normal.      Conjunctiva/sclera: Conjunctivae normal.      Pupils: Pupils are equal, round, and reactive to light.   Neck:      Vascular: No JVD.   Cardiovascular:      Rate and Rhythm: Normal rate and regular rhythm.      Pulses: Intact distal pulses.      Heart sounds: Normal heart sounds. No murmur heard.  No friction rub. No gallop.     Pulmonary:      Effort: Pulmonary effort is normal.      Breath sounds: No stridor. No wheezing or rales.   Chest:      Chest wall: No tenderness.   Abdominal:      General: Bowel sounds are normal. There is no distension.      Palpations: Abdomen is soft. There is no mass.      Tenderness: There is no abdominal tenderness. There is no guarding.   Musculoskeletal:         General: No tenderness, deformity or edema.   Skin:     General: Skin is warm and dry.      Findings: No erythema or rash.      Comments: Intact, no purulence at site   Neurological:      Mental Status: She is alert and oriented to person, place, and time.   Psychiatric:         Mood and Affect: Mood and affect normal.         Significant Labs:   CMP   Recent Labs   Lab 01/20/22  1243      K 3.6      CO2 22*   *   BUN 23   CREATININE 1.3   CALCIUM 11.2*   PROT 8.2   ALBUMIN 4.3   BILITOT 1.0   ALKPHOS 110   AST 24   ALT 21   ANIONGAP 12   ESTGFRAFRICA 47.4*   EGFRNONAA 41.1*    and CBC   Recent Labs   Lab 01/20/22  1243   WBC 7.27   HGB 14.7   HCT 44.4          Significant Imaging: Echocardiogram:   Transthoracic echo (TTE) complete (Cupid Only):   Results for orders placed or performed during the hospital encounter of 09/05/19   Transthoracic echo (TTE) 2D with Color Flow   Result Value Ref Range    Ascending aorta 2.67 cm    STJ 2.79 cm    AV mean gradient 3 mmHg    Ao peak benny 1.17 m/s    Ao VTI 26.38 cm    IVS 0.64 0.6 - 1.1 cm    LA size 3.58 cm    Left Atrium Major Axis 5.00 cm    Left Atrium Minor Axis 4.89 cm    LVIDd 5.48 3.5 - 6.0 cm    LVIDs 4.40 (A) 2.1 - 4.0 cm    LVOT diameter 1.98 cm    LVOT peak VTI 20.05 cm    Posterior Wall 0.77 0.6 - 1.1 cm    MV Peak A Benny 0.66 m/s    E wave deceleration time 178.99 msec    MV Peak E Benny 0.99 m/s    RA Major Axis 4.58 cm    RA Width 3.52 cm    RVDD 3.49 cm    Sinus 2.85 cm    TAPSE 1.94 cm    TR Max Benny 2.74 m/s    TDI LATERAL 0.08 m/s    TDI SEPTAL 0.04 m/s    LA  WIDTH 4.78 cm    LV Diastolic Volume 146.41 mL    LV Systolic Volume 87.67 mL    RV S' 10.94 cm/s    LVOT peak soledad 0.91 m/s    LV LATERAL E/E' RATIO 12.38 m/s    LV SEPTAL E/E' RATIO 24.75 m/s    FS 20 %    LA volume 71.92 cm3    LV mass 135.81 g    Left Ventricle Relative Wall Thickness 0.28 cm    AV valve area 2.34 cm2    AV Velocity Ratio 0.78     AV index (prosthetic) 0.76     E/A ratio 1.50     Mean e' 0.06 m/s    LVOT area 3.1 cm2    LVOT stroke volume 61.70 cm3    AV peak gradient 5 mmHg    E/E' ratio 16.50 m/s    Triscuspid Valve Regurgitation Peak Gradient 30 mmHg    BSA 1.95 m2    LV Systolic Volume Index 46.7 mL/m2    LV Diastolic Volume Index 77.93 mL/m2    LA Volume Index 38.3 mL/m2    LV Mass Index 72 g/m2    Right Atrial Pressure (from IVC) 3 mmHg    TV rest pulmonary artery pressure 33 mmHg    Narrative    · Normal left ventricular systolic function. The estimated ejection   fraction is 55%  · No wall motion abnormalities.  · Mild left atrial enlargement.  · Grade II (moderate) left ventricular diastolic dysfunction consistent   with pseudonormalization.  · Elevated left atrial pressure.  · Low normal right ventricular systolic function.  · Mild mitral regurgitation.  · Mild tricuspid regurgitation.  · The estimated PA systolic pressure is 33 mm Hg  · Normal central venous pressure (3 mm Hg).        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 [place] dual chamber pacemaker implantation in 5/2009, presenting with dyspnea on exertion.    Unclear etiology, some concern for PE given recent procedure with venous manipulation, though not hypoxic, hypotensive. No tachycardia though would not necessarily expect in this patient who is largely A-paced.    - Recommend placement in obs for further workup as  below  - Avoid CTA given CKD, contrast allergy  - Follow up VQ scan (ordered by EM)  - formal TTE (ordered) to rule out pericardial effusion, clot; do not initiate anticoagulation prior to ruling out clot  - If VQ negative, TTE without complications of lead placement (ie pericardial effusion), will consider discharge with outpatient stress testing          VTE Risk Mitigation (From admission, onward)    None          Thank you for your consult. I will follow-up with patient. Please contact us if you have any additional questions.    Magdy Clarke MD  Cardiology   [facility] - Emergency Dept

## 2022-01-21 ENCOUNTER — PATIENT MESSAGE (OUTPATIENT)
Dept: CARDIOLOGY | Facility: CLINIC | Age: 72
End: 2022-01-21
Payer: MEDICARE

## 2022-01-21 VITALS
BODY MASS INDEX: 37.57 KG/M2 | HEIGHT: 61 IN | TEMPERATURE: 97 F | WEIGHT: 199 LBS | OXYGEN SATURATION: 92 % | RESPIRATION RATE: 16 BRPM | DIASTOLIC BLOOD PRESSURE: 64 MMHG | HEART RATE: 60 BPM | SYSTOLIC BLOOD PRESSURE: 132 MMHG

## 2022-01-21 DIAGNOSIS — R93.1 ABNORMAL FINDINGS ON DIAGNOSTIC IMAGING OF HEART AND CORONARY CIRCULATION: ICD-10-CM

## 2022-01-21 DIAGNOSIS — Z13.6 ENCOUNTER FOR SCREENING FOR CARDIOVASCULAR DISORDERS: ICD-10-CM

## 2022-01-21 LAB
ALBUMIN SERPL BCP-MCNC: 3.6 G/DL (ref 3.5–5.2)
ALP SERPL-CCNC: 94 U/L (ref 55–135)
ALT SERPL W/O P-5'-P-CCNC: 19 U/L (ref 10–44)
ANION GAP SERPL CALC-SCNC: 12 MMOL/L (ref 8–16)
AST SERPL-CCNC: 21 U/L (ref 10–40)
BASOPHILS # BLD AUTO: 0.05 K/UL (ref 0–0.2)
BASOPHILS NFR BLD: 0.8 % (ref 0–1.9)
BILIRUB SERPL-MCNC: 0.7 MG/DL (ref 0.1–1)
BUN SERPL-MCNC: 20 MG/DL (ref 8–23)
CALCIUM SERPL-MCNC: 10.2 MG/DL (ref 8.7–10.5)
CHLORIDE SERPL-SCNC: 106 MMOL/L (ref 95–110)
CO2 SERPL-SCNC: 19 MMOL/L (ref 23–29)
CREAT SERPL-MCNC: 1.3 MG/DL (ref 0.5–1.4)
DIFFERENTIAL METHOD: NORMAL
EOSINOPHIL # BLD AUTO: 0.2 K/UL (ref 0–0.5)
EOSINOPHIL NFR BLD: 3.4 % (ref 0–8)
ERYTHROCYTE [DISTWIDTH] IN BLOOD BY AUTOMATED COUNT: 13.2 % (ref 11.5–14.5)
EST. GFR  (AFRICAN AMERICAN): 47.4 ML/MIN/1.73 M^2
EST. GFR  (NON AFRICAN AMERICAN): 41.1 ML/MIN/1.73 M^2
GLUCOSE SERPL-MCNC: 132 MG/DL (ref 70–110)
HCT VFR BLD AUTO: 40.2 % (ref 37–48.5)
HGB BLD-MCNC: 13.2 G/DL (ref 12–16)
IMM GRANULOCYTES # BLD AUTO: 0.01 K/UL (ref 0–0.04)
IMM GRANULOCYTES NFR BLD AUTO: 0.2 % (ref 0–0.5)
LYMPHOCYTES # BLD AUTO: 1.8 K/UL (ref 1–4.8)
LYMPHOCYTES NFR BLD: 28.8 % (ref 18–48)
MCH RBC QN AUTO: 30.8 PG (ref 27–31)
MCHC RBC AUTO-ENTMCNC: 32.8 G/DL (ref 32–36)
MCV RBC AUTO: 94 FL (ref 82–98)
MONOCYTES # BLD AUTO: 0.6 K/UL (ref 0.3–1)
MONOCYTES NFR BLD: 10.1 % (ref 4–15)
NEUTROPHILS # BLD AUTO: 3.6 K/UL (ref 1.8–7.7)
NEUTROPHILS NFR BLD: 56.7 % (ref 38–73)
NRBC BLD-RTO: 0 /100 WBC
PLATELET # BLD AUTO: 252 K/UL (ref 150–450)
PMV BLD AUTO: 10.1 FL (ref 9.2–12.9)
POTASSIUM SERPL-SCNC: 3.4 MMOL/L (ref 3.5–5.1)
PROT SERPL-MCNC: 6.8 G/DL (ref 6–8.4)
RBC # BLD AUTO: 4.29 M/UL (ref 4–5.4)
SODIUM SERPL-SCNC: 137 MMOL/L (ref 136–145)
TROPONIN I SERPL DL<=0.01 NG/ML-MCNC: 0.01 NG/ML (ref 0–0.03)
WBC # BLD AUTO: 6.25 K/UL (ref 3.9–12.7)

## 2022-01-21 PROCEDURE — G0378 HOSPITAL OBSERVATION PER HR: HCPCS

## 2022-01-21 PROCEDURE — 36415 COLL VENOUS BLD VENIPUNCTURE: CPT | Performed by: STUDENT IN AN ORGANIZED HEALTH CARE EDUCATION/TRAINING PROGRAM

## 2022-01-21 PROCEDURE — 25000003 PHARM REV CODE 250: Performed by: STUDENT IN AN ORGANIZED HEALTH CARE EDUCATION/TRAINING PROGRAM

## 2022-01-21 PROCEDURE — 84484 ASSAY OF TROPONIN QUANT: CPT | Performed by: STUDENT IN AN ORGANIZED HEALTH CARE EDUCATION/TRAINING PROGRAM

## 2022-01-21 PROCEDURE — 85025 COMPLETE CBC W/AUTO DIFF WBC: CPT | Performed by: STUDENT IN AN ORGANIZED HEALTH CARE EDUCATION/TRAINING PROGRAM

## 2022-01-21 PROCEDURE — 99217 PR OBSERVATION CARE DISCHARGE: ICD-10-PCS | Mod: ,,, | Performed by: PHYSICIAN ASSISTANT

## 2022-01-21 PROCEDURE — 25000003 PHARM REV CODE 250: Performed by: INTERNAL MEDICINE

## 2022-01-21 PROCEDURE — 80053 COMPREHEN METABOLIC PANEL: CPT | Performed by: STUDENT IN AN ORGANIZED HEALTH CARE EDUCATION/TRAINING PROGRAM

## 2022-01-21 PROCEDURE — 99217 PR OBSERVATION CARE DISCHARGE: CPT | Mod: ,,, | Performed by: PHYSICIAN ASSISTANT

## 2022-01-21 RX ORDER — BISOPROLOL FUMARATE 5 MG/1
5 TABLET, FILM COATED ORAL DAILY
Status: DISCONTINUED | OUTPATIENT
Start: 2022-01-21 | End: 2022-01-21 | Stop reason: HOSPADM

## 2022-01-21 RX ORDER — BISOPROLOL FUMARATE 5 MG/1
5 TABLET, FILM COATED ORAL DAILY
Status: DISCONTINUED | OUTPATIENT
Start: 2022-01-21 | End: 2022-01-21

## 2022-01-21 RX ADMIN — TOPIRAMATE 25 MG: 25 TABLET, FILM COATED ORAL at 09:01

## 2022-01-21 RX ADMIN — PROPAFENONE 225 MG: 225 CAPSULE, EXTENDED RELEASE ORAL at 09:01

## 2022-01-21 RX ADMIN — LOSARTAN POTASSIUM 100 MG: 50 TABLET, FILM COATED ORAL at 09:01

## 2022-01-21 RX ADMIN — AMLODIPINE BESYLATE 10 MG: 10 TABLET ORAL at 09:01

## 2022-01-21 RX ADMIN — BISOPROLOL FUMARATE 5 MG: 5 TABLET, COATED ORAL at 11:01

## 2022-01-21 RX ADMIN — ATORVASTATIN CALCIUM 80 MG: 40 TABLET, FILM COATED ORAL at 09:01

## 2022-01-21 RX ADMIN — FUROSEMIDE 20 MG: 20 TABLET ORAL at 09:01

## 2022-01-21 RX ADMIN — POTASSIUM CHLORIDE 10 MEQ: 10 CAPSULE, COATED, EXTENDED RELEASE ORAL at 09:01

## 2022-01-21 RX ADMIN — ASPIRIN 81 MG: 81 TABLET, COATED ORAL at 09:01

## 2022-01-21 NOTE — SUBJECTIVE & OBJECTIVE
Interval History: Patient stable overnight with no acute events. No complaints this morning, wishes to be discharged.  Pt has no SOB at rest, but believes that if she walked around her room she would be. Denies chest pain or pain near PPM.    Review of Systems   Constitutional: Negative. Negative for chills and fever.   HENT: Negative.    Eyes: Negative.    Cardiovascular: Positive for dyspnea on exertion. Negative for chest pain, claudication and paroxysmal nocturnal dyspnea.   Respiratory: Negative for cough, shortness of breath and wheezing.    Endocrine: Negative.    Hematologic/Lymphatic: Does not bruise/bleed easily.   Skin: Negative for nail changes and rash.   Musculoskeletal: Negative.  Negative for back pain.   Gastrointestinal: Negative for abdominal pain, melena, nausea and vomiting.   Neurological: Negative for dizziness and headaches.   Psychiatric/Behavioral: Negative for altered mental status, depression and substance abuse.   Allergic/Immunologic: Negative.      Objective:     Vital Signs (Most Recent):  Temp: 96.8 °F (36 °C) (01/21/22 0749)  Pulse: 60 (01/21/22 0749)  Resp: 16 (01/21/22 0749)  BP: 132/64 (01/21/22 0936)  SpO2: 97 % (01/21/22 0749) Vital Signs (24h Range):  Temp:  [96.7 °F (35.9 °C)-99.3 °F (37.4 °C)] 96.8 °F (36 °C)  Pulse:  [] 60  Resp:  [16-18] 16  SpO2:  [92 %-97 %] 97 %  BP: ()/(54-77) 132/64     Weight: 90.3 kg (199 lb)  Body mass index is 37.6 kg/m².     SpO2: 97 %  O2 Device (Oxygen Therapy): room air      Intake/Output Summary (Last 24 hours) at 1/21/2022 1042  Last data filed at 1/21/2022 0600  Gross per 24 hour   Intake 360 ml   Output --   Net 360 ml       Lines/Drains/Airways     Peripheral Intravenous Line                 Peripheral IV - Single Lumen 01/20/22 1244 22 G Right Antecubital <1 day                Physical Exam  Constitutional:       Appearance: She is well-developed.   HENT:      Head: Normocephalic and atraumatic.   Eyes:       Conjunctiva/sclera: Conjunctivae normal.      Pupils: Pupils are equal, round, and reactive to light.   Neck:      Vascular: No JVD.      Comments: No JVD  Cardiovascular:      Rate and Rhythm: Regular rhythm. Bradycardia present.      Pulses: Intact distal pulses.      Heart sounds: Normal heart sounds. No murmur heard.  No friction rub. No gallop.    Pulmonary:      Effort: Pulmonary effort is normal.      Breath sounds: No stridor. No wheezing or rales.   Chest:      Chest wall: No tenderness.   Abdominal:      General: Bowel sounds are normal. There is no distension.      Palpations: Abdomen is soft. There is no mass.      Tenderness: There is no abdominal tenderness. There is no guarding.   Musculoskeletal:         General: No tenderness or deformity.      Comments: No pitting LE edema   Skin:     General: Skin is warm and dry.      Findings: No erythema or rash.      Comments: Intact, no purulence at site   Neurological:      Mental Status: She is alert and oriented to person, place, and time.         Significant Labs: All pertinent lab results from the last 24 hours have been reviewed.    Significant Imaging: Reviewed

## 2022-01-21 NOTE — ASSESSMENT & PLAN NOTE
Unclear etiology, recent PPM revision 1/13 due to lead fracture   CXR unremarkable, stable on RA  Cardiology consulted, appreciate recs  Pacemaker interrogated 1/20 with RA pacing 44% and RV pacing 19%, EP aware.  D-dimer elevated however she is not hypoxic or hypotensive and V/Q scan with low probability  Troponin negative X 3, EKG non- ischemic  TTE below, new WMA may be due to pacing? Will need outpatient ischemic eval  6 minute walk test without oxygen desaturation, HR ~80s  EP follow up in 1 week  SPECT stress ordered as outpatient

## 2022-01-21 NOTE — PLAN OF CARE
[facility] - Telemetry Stepdown ([facility])  Discharge Assessment    Primary Care Provider: Sandra Michaud MD     Discharge Assessment (most recent)     BRIEF DISCHARGE ASSESSMENT - 01/21/22 1115        Discharge Planning    Assessment Type Discharge Planning Brief Assessment     Resource/Environmental Concerns none     Support Systems Spouse/significant other     Equipment Currently Used at Home none     Current Living Arrangements home/apartment/condo     Patient/Family Anticipates Transition to home     Patient/Family Anticipated Services at Transition none     DME Needed Upon Discharge  none     Discharge Plan A Home     Discharge Plan B Home               Pt is a 71 y.o. female admitted with NAVA and has a PMH of paroxymal Afib and HTN. She lives with her , has not required and DME in the past and is independent in all of her IADls and ADLs. [facility] Discharge Packet given to patient and/or family with understanding verbalized.   name and number and estimated discharge date written on white board in patient's room with request to call for any questions or concerns.  Will continue to follow for needs.  Casimiro Fox RN,BSN

## 2022-01-21 NOTE — PLAN OF CARE
Problem: Adult Inpatient Plan of Care  Goal: Plan of Care Review  Outcome: Ongoing, Progressing     Problem: Breathing Pattern Ineffective  Goal: Effective Breathing Pattern  Outcome: Ongoing, Progressing     Problem: Fall Injury Risk  Goal: Absence of Fall and Fall-Related Injury  Outcome: Ongoing, Progressing     Pt remains free from falls or injuries. Pt rested well with no c/o SOB. Pt is on continuous pulse ox with O2 sats ranging from 96-98% room air.

## 2022-01-21 NOTE — PLAN OF CARE
Problem: Adult Inpatient Plan of Care  Goal: Plan of Care Review  1/21/2022 1158 by Adela Egan RN  Outcome: Met  1/21/2022 1158 by Adela Egan RN  Outcome: Ongoing, Progressing  Goal: Patient-Specific Goal (Individualized)  Outcome: Met  Goal: Absence of Hospital-Acquired Illness or Injury  Outcome: Met  Goal: Optimal Comfort and Wellbeing  Outcome: Met  Goal: Readiness for Transition of Care  Outcome: Met     Problem: Breathing Pattern Ineffective  Goal: Effective Breathing Pattern  Outcome: Met     Problem: Fall Injury Risk  Goal: Absence of Fall and Fall-Related Injury  Outcome: Met     Problem: Fatigue  Goal: Improved Activity Tolerance  Outcome: Met     Pt lying supine in bed with eyes open.  at bedside. AAOx4 and able to voice needs to staff. Pt and spouse verbalizes understanding of discharge instructions. Pt will be transported via w/c downstairs to go home via private vehicle per spouse.

## 2022-01-21 NOTE — HOSPITAL COURSE
71 year old woman with recent pacemaker lead revision admitted for acute onset SOB. On labs troponin and EKG unremarkable, BNP slightly elevated but no clinical signs of HF, D-dimer elevated initially but VQ scan reporting low probability of PE. TTE performed which showed wall motion abnormalities (EKG at the time with RV pacing, probably iatrogenic finding) and trace effusion (probably normal post-op finding). EP interrogated pt's device prior to presentation, and found significant RV pacing.

## 2022-01-21 NOTE — H&P
[facility] - Emergency Dept  [facility] Medicine  History & Physical    Patient Name: Jesenia Curiel  MRN: [id number]  Patient Class: OP- Observation  Admission Date: 1/20/2022  Attending Physician: Vaughn Shankar MD  Primary Care Provider: Sandra Michaud MD         Patient information was obtained from patient, spouse/SO and ER records.     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 She underwent right atrial lead revision on 1/13/22. She tolerated procedure well however in the days after she noticed that her blood pressure was running low which required adjustment in her BP medications. Earlier today she noticed new onset dyspnea on exertion without any chest pain. She denies any cough or fever recently. She then presented to the ED for further evaluation.     In the ED she was afebrile, slightly hypertensive with normal O2 saturation. CXR wan largely unremarkable. EKG was generally unchanged from last week. Initial troponin was not elevated. Echo did not reveal a pericardial effusion. D-dimer was elevated however V/Q scan returned with low probability for PE.         Past Medical History:   Diagnosis Date    Allergy     Arthritis     Atrial fibrillation 5/29/2017    CKD (chronic kidney disease) stage 3, GFR 30-59 ml/min 6/26/2017    Disorder of kidney and ureter     GERD (gastroesophageal reflux disease)     Hyperlipidemia     Hypertension     Impaired fasting glucose 11/19/2021    Pre-diabetes 6/3/2017       Past Surgical History:   Procedure Laterality Date    ADENOIDECTOMY      BACK SURGERY  2000    lamenectomy    BREAST  SURGERY      CARDIAC SURGERY      st paolo pacemaker     CATARACT EXTRACTION W/  INTRAOCULAR LENS IMPLANT Right 6/3/2019    Procedure: EXTRACTION, CATARACT, WITH IOL INSERTION;  Surgeon: Raisa Moreno MD;  Location: [facility] OR;  Service: Ophthalmology;  Laterality: Right;  Laser    CATARACT EXTRACTION W/  INTRAOCULAR LENS IMPLANT Left 2019    Procedure: EXTRACTION, CATARACT, WITH IOL INSERTION;  Surgeon: Raisa Moreno MD;  Location: [facility] OR;  Service: Ophthalmology;  Laterality: Left;  LASER ASSISTED     SECTION      ,     EYE SURGERY      FRACTURE SURGERY      HYSTERECTOMY      INSERT / REPLACE / REMOVE PACEMAKER      placement    INSERT / REPLACE / REMOVE PACEMAKER  2014    [employer] Paolo Model #[id number] replacement    pace maker      replacement     REVISION OF PROCEDURE INVOLVING PACEMAKER LEAD Left 2022    Procedure: REVISION, ELECTRODE LEAD, CARDIAC PACEMAKER;  Surgeon: Trell Hodgson MD;  Location: [facility] EP LAB;  Service: Cardiology;  Laterality: Left;  PPM lead Malfx, RA lead revision, SJM, MAC, GP, 3 PREP*dPPM SJM in situ**Contrast prep given*    salpingo opherectomy Bilateral     SPINE SURGERY      TONSILLECTOMY      TUBAL LIGATION         Review of patient's allergies indicates:   Allergen Reactions    Iodinated contrast media Anaphylaxis    Penicillins Anaphylaxis    Allopurinol analogues      Muscle cramps    Ciprofloxacin Rash    Quinolones Rash    Sulfa (sulfonamide antibiotics) Rash    Tetracyclines Rash       No current facility-administered medications on file prior to encounter.     Current Outpatient Medications on File Prior to Encounter   Medication Sig    amLODIPine (NORVASC) 10 MG tablet TAKE 1 TABLET BY MOUTH EVERY DAY    aspirin (ECOTRIN) 81 MG EC tablet Take 1 tablet (81 mg total) by mouth once daily.    atorvastatin (LIPITOR) 80 MG tablet TAKE 1 TABLET BY MOUTH EVERY DAY    bisoprolol (ZEBETA) 5 MG tablet Take 1 tablet (5  mg total) by mouth once daily.    cimetidine (TAGAMET) 300 MG tablet Take one of each( Benadryl, Cimetidine, Prednisone) at 7:00 pm  the evening prior to procedure and take one of each at 2:00 am and 8:00 am on day of procedure    clobetasoL (OLUX) 0.05 % Foam Apply 1 application topically every 30 days.    ERGOCALCIFEROL, VITAMIN D2, (VITAMIN D ORAL) Take 2,000 Units by mouth once daily.    FLUAD QUAD 2020-21,65Y UP,,PF, 60 mcg (15 mcg x 4)/0.5 mL Syrg TO BE ADMINISTERED BY PHARMACIST FOR IMMUNIZATION    furosemide (LASIX) 20 MG tablet Take 1 tablet (20 mg total) by mouth once daily.    KLOR-CON 10 10 mEq TbSR TAKE 1 TABLET (10 MEQ TOTAL) BY MOUTH ONCE DAILY.  DOSES    olmesartan (BENICAR) 40 MG tablet Take 1 tablet (40 mg total) by mouth once daily.    propafenone (RYTHMOL SR) 225 MG Cp12 Take 1 capsule (225 mg total) by mouth every 12 (twelve) hours.    topiramate (TOPAMAX) 25 MG tablet TAKE 1 TABLET BY MOUTH THREE TIMES A DAY     Family History     Problem Relation (Age of Onset)    Coronary artery disease Mother, Father, Brother    Diabetes Mother, Father, Paternal Grandmother, Paternal Grandfather    Heart disease Mother, Father, Brother    Hyperlipidemia Mother, Father    Hypertension Mother, Father    Hyperthyroidism Brother    Stroke Mother        Tobacco Use    Smoking status: Never Smoker    Smokeless tobacco: Never Used   Substance and Sexual Activity    Alcohol use: Yes     Alcohol/week: 10.0 standard drinks     Types: 10 Glasses of wine per week    Drug use: No    Sexual activity: Not Currently     Partners: Male     Review of Systems   Constitutional: Negative for activity change, appetite change, chills, diaphoresis, fatigue, fever and unexpected weight change.   HENT: Negative for congestion and sore throat.    Respiratory: Positive for shortness of breath.    Cardiovascular: Negative for chest pain and palpitations.   Gastrointestinal: Negative for abdominal pain, constipation,  diarrhea, nausea and vomiting.   Genitourinary: Negative for dysuria and hematuria.   Musculoskeletal: Negative for back pain.   Neurological: Negative for dizziness, light-headedness and headaches.   Psychiatric/Behavioral: Negative for confusion.     Objective:     Vital Signs (Most Recent):  Temp: 99.3 °F (37.4 °C) (01/20/22 1052)  Pulse: 94 (01/20/22 1842)  Resp: 18 (01/20/22 1842)  BP: 124/73 (01/20/22 1842)  SpO2: 97 % (01/20/22 1842) Vital Signs (24h Range):  Temp:  [99.3 °F (37.4 °C)] 99.3 °F (37.4 °C)  Pulse:  [] 94  Resp:  [18] 18  SpO2:  [95 %-97 %] 97 %  BP: (124-165)/(73-77) 124/73     Weight: 89.8 kg (198 lb)  Body mass index is 37.41 kg/m².    Physical Exam  Vitals and nursing note reviewed.   Constitutional:       General: She is not in acute distress.     Appearance: Normal appearance. She is not diaphoretic.   HENT:      Head: Normocephalic and atraumatic.   Eyes:      General: No scleral icterus.        Right eye: No discharge.         Left eye: No discharge.      Conjunctiva/sclera: Conjunctivae normal.   Cardiovascular:      Rate and Rhythm: Normal rate and regular rhythm.      Heart sounds: No murmur heard.      Pulmonary:      Effort: Pulmonary effort is normal. No respiratory distress.      Breath sounds: Normal breath sounds. No wheezing.   Abdominal:      General: There is no distension.      Palpations: Abdomen is soft.      Tenderness: There is no abdominal tenderness. There is no guarding.   Skin:     General: Skin is warm.             Significant Labs:   All pertinent labs within the past 24 hours have been reviewed.  BMP:   Recent Labs   Lab 01/20/22  1243   *      K 3.6      CO2 22*   BUN 23   CREATININE 1.3   CALCIUM 11.2*     CBC:   Recent Labs   Lab 01/20/22  1243   WBC 7.27   HGB 14.7   HCT 44.4        Troponin:   Recent Labs   Lab 01/20/22  1243   TROPONINI 0.016       Significant Imaging: I have reviewed all pertinent imaging results/findings  within the past 24 hours.    Assessment/Plan:     * Dyspnea on exertion  Unclear etiology   D-dimer elevated however she is not hypoxic or hypotensive and V/Q scan with low probability  Initial troponin was wnl, ACS rule out overnight. Will follow cardiology's final recommendation but likely DC in the AM with outpatient stress test       Morbid obesity  Body mass index is 37.41 kg/m². Morbid obesity complicates all aspects of disease management from diagnostic modalities to treatment. Weight loss encouraged and health benefits explained to patient.         CKD (chronic kidney disease) stage 3, GFR 30-59 ml/min  CKD 3B  Avoid nephrotoxic medications       S/P placement of cardiac pacemaker  See sick sinus syndrome       Sick sinus syndrome  S/p dual chamber pacemaker   Appreciate cardiology consult  Atrial lead revision last week without signs of clot or pericardial effusion on echo  Monitor on tele overnight       Hyperlipidemia  Continue Atorvastatin      Benign essential hypertension  Relatively elevated today  Continue home meds Norvasc, Bisoprolol and Losartan while in patient (on Olmesartan as outpatient)      Atrial fibrillation  Patient with Paroxysmal (<7 days) atrial fibrillation which is controlled currently with Beta Blocker. Patient is currently in paced rhythm. [id number] Score: 2. She is not on anticoagulation per cardiology.           VTE Risk Mitigation (From admission, onward)         Ordered     Reason for No Pharmacological VTE Prophylaxis  Once        Question:  Reasons:  Answer:  Risk of Bleeding    01/20/22 1746     IP VTE HIGH RISK PATIENT  Once         01/20/22 1746     Place sequential compression device  Until discontinued         01/20/22 1746                   Vaughn Shankar MD  Department of Hospital Medicine   [facility] - Emergency Dept

## 2022-01-21 NOTE — HPI
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 She underwent right atrial lead revision on 1/13/22. She tolerated procedure well however in the days after she noticed that her blood pressure was running low which required adjustment in her BP medications. Earlier today she noticed new onset dyspnea on exertion without any chest pain. She denies any cough or fever recently. She then presented to the ED for further evaluation.     In the ED she was afebrile, slightly hypertensive with normal O2 saturation. CXR wan largely unremarkable. EKG was generally unchanged from last week. Initial troponin was not elevated. Echo did not reveal a pericardial effusion. D-dimer was elevated however V/Q scan returned with low probability for PE.

## 2022-01-21 NOTE — PLAN OF CARE
[facility] - Telemetry Stepdown ([facility]-)  Discharge Final Note    Primary Care Provider: Sandra Michaud MD     Future Appointments   Date Time Provider Department Center   1/25/2022  3:15 PM ECHO, [facility] NOM ECHOSTR [facility]   3/14/2022 11:00 AM Sandra Michaud MD [facility] PR[facility]   3/18/2022 10:00 AM HOME MONITOR DEVICE CHECK, [facility] ARRHPCOLT [facility]   5/16/2022  8:45 AM EKG, [facility] EKG [facility]   5/16/2022  9:00 AM COORDINATED DEVICE CHECK [facility] HAYDEECOLT [facility]   5/16/2022 10:00 AM Trell Hodgson MD [facility] ARRHYTH [facility]       Pt discharged home with no services.      Casimiro Fox, RN,BSN      Expected Discharge Date: 1/21/2022    Final Discharge Note (most recent)     Final Note - 01/21/22 1118        Final Note    Assessment Type Final Discharge Note     Anticipated Discharge Disposition Home or Self Care     Hospital Resources/Appts/Education Provided Provided patient/caregiver with written discharge plan information;Appointments scheduled and added to AVS        Post-Acute Status    Discharge Delays None known at this time                 Important Message from Medicare             Contact Info     Sandra Michaud MD   Specialty: Internal Medicine   Relationship: PCP - General    [address]  [facility] [address]   Phone: [phone]       Next Steps: Follow up in 1 week(s)

## 2022-01-21 NOTE — PROGRESS NOTES
[facility] - Telemetry Stepdown ([place] [address])  Cardiology  Progress Note    Patient Name: Jesenia Curiel  MRN: [id number]  Admission Date: 1/20/2022  Hospital Length of Stay: 0 days  Code Status: Full Code   Attending Physician: Abhishek Arevalo MD   Primary Care Physician: Sandra Michaud MD  Expected Discharge Date: 1/21/2022  Principal Problem:Dyspnea on exertion    Subjective:     Hospital Course:   71 year old woman with recent pacemaker lead revision admitted for acute onset SOB. On labs troponin and EKG unremarkable, BNP slightly elevated but no clinical signs of HF, D-dimer elevated initially but VQ scan reporting low probability of PE. TTE performed which showed wall motion abnormalities (EKG at the time with RV pacing, probably iatrogenic finding) and trace effusion (probably normal post-op finding). EP interrogated pt's device prior to presentation, and found significant RV pacing.       Interval History: Patient stable overnight with no acute events. No complaints this morning, wishes to be discharged.  Pt has no SOB at rest, but believes that if she walked around her room she would be. Denies chest pain or pain near PPM.    Review of Systems   Constitutional: Negative. Negative for chills and fever.   HENT: Negative.    Eyes: Negative.    Cardiovascular: Positive for dyspnea on exertion. Negative for chest pain, claudication and paroxysmal nocturnal dyspnea.   Respiratory: Negative for cough, shortness of breath and wheezing.    Endocrine: Negative.    Hematologic/Lymphatic: Does not bruise/bleed easily.   Skin: Negative for nail changes and rash.   Musculoskeletal: Negative.  Negative for back pain.   Gastrointestinal: Negative for abdominal pain, melena, nausea and vomiting.   Neurological: Negative for dizziness and headaches.   Psychiatric/Behavioral: Negative for altered mental status, depression and substance abuse.   Allergic/Immunologic: Negative.      Objective:     Vital Signs (Most Recent):  Temp:  96.8 °F (36 °C) (01/21/22 0749)  Pulse: 60 (01/21/22 0749)  Resp: 16 (01/21/22 0749)  BP: 132/64 (01/21/22 0936)  SpO2: 97 % (01/21/22 0749) Vital Signs (24h Range):  Temp:  [96.7 °F (35.9 °C)-99.3 °F (37.4 °C)] 96.8 °F (36 °C)  Pulse:  [] 60  Resp:  [16-18] 16  SpO2:  [92 %-97 %] 97 %  BP: ()/(54-77) 132/64     Weight: 90.3 kg (199 lb)  Body mass index is 37.6 kg/m².     SpO2: 97 %  O2 Device (Oxygen Therapy): room air      Intake/Output Summary (Last 24 hours) at 1/21/2022 1042  Last data filed at 1/21/2022 0600  Gross per 24 hour   Intake 360 ml   Output --   Net 360 ml       Lines/Drains/Airways     Peripheral Intravenous Line                 Peripheral IV - Single Lumen 01/20/22 1244 22 G Right Antecubital <1 day                Physical Exam  Constitutional:       Appearance: She is well-developed.   HENT:      Head: Normocephalic and atraumatic.   Eyes:      Conjunctiva/sclera: Conjunctivae normal.      Pupils: Pupils are equal, round, and reactive to light.   Neck:      Vascular: No JVD.      Comments: No JVD  Cardiovascular:      Rate and Rhythm: Regular rhythm. Bradycardia present.      Pulses: Intact distal pulses.      Heart sounds: Normal heart sounds. No murmur heard.  No friction rub. No gallop.    Pulmonary:      Effort: Pulmonary effort is normal.      Breath sounds: No stridor. No wheezing or rales.   Chest:      Chest wall: No tenderness.   Abdominal:      General: Bowel sounds are normal. There is no distension.      Palpations: Abdomen is soft. There is no mass.      Tenderness: There is no abdominal tenderness. There is no guarding.   Musculoskeletal:         General: No tenderness or deformity.      Comments: No pitting LE edema   Skin:     General: Skin is warm and dry.      Findings: No erythema or rash.      Comments: Intact, no purulence at site   Neurological:      Mental Status: She is alert and oriented to person, place, and time.         Significant Labs: All pertinent lab  results from the last 24 hours have been reviewed.    Significant Imaging: Reviewed    Assessment and Plan:     Brief HPI: 71 year old woman with pAF, SSS with PPM and recently revised RA lead, admitted for SOB.    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olo dual chamber pacemaker implantation in 5/2009, presenting with dyspnea on exertion.  Pacemaker lead revised 1/13 due to lead fracture.  Pacemaker interrogated 1/20 with RA pacing 44% and RV pacing 19%, EP aware.  ACS and PE workup -ve, VQ scan with low probability of PE.   Unclear if pt has appropriate chronotropic response to exercise, if not this could be a cause of SOB.  TTE with wall motion changes that may be 2/2 RV pacing? Will need outpatient investigation of ischemia.    Plan  - TTE without complications of lead placement (trace pericardial effusion likely normal post-op finding)  - Workup chronotropic response with 6MWT and PT OT  - Workup ischemia with opt SPECT stress (cannot perform PET due to PPM manipulation)  - DC with EP and device clinic f/u in 1 week      VTE Risk Mitigation (From admission, onward)         Ordered     Reason for No Pharmacological VTE Prophylaxis  Once        Question:  Reasons:  Answer:  Risk of Bleeding    01/20/22 1746     IP VTE HIGH RISK PATIENT  Once         01/20/22 1746     Place sequential compression device  Until discontinued         01/20/22 1746              Thank you for your consult, we will sign off. Please contact us with any questions.     Rose Herrera MD  Cardiology  [facility] - Telemetry Stepdown ([place]-)

## 2022-01-21 NOTE — DISCHARGE SUMMARY
[facility] [address]  [facility] Medicine  Discharge Summary      Patient Name: Jesenia Curiel  MRN: [id number]  Patient Class: OP- Observation  Admission Date: 1/20/2022  Hospital Length of Stay: 0 days  Discharge Date and Time: 1/21/2022 12:55 PM  Attending Physician: No att. 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 She underwent right atrial lead revision on 1/13/22. She tolerated procedure well however in the days after she noticed that her blood pressure was running low which required adjustment in her BP medications. Earlier today she noticed new onset dyspnea on exertion without any chest pain. She denies any cough or fever recently. She then presented to the ED for further evaluation.     In the ED she was afebrile, slightly hypertensive with normal O2 saturation. CXR wan largely unremarkable. EKG was generally unchanged from last week. Initial troponin was not elevated. Echo did not reveal a pericardial effusion. D-dimer was elevated however V/Q scan returned with low probability for PE.         * No surgery found *      Hospital Course:   Mrs. Curiel was admitted to observation for dyspnea on exertion. Cardiology consulted given lead revision 1/13. Pacemaker interrogated 1/20 with RA pacing 44% and RV pacing 19%, EP aware. VQ scan with low probability of PE. TTE with EF 35%, segmental WMA, trace pericardial effusion, no evidence of lead placement complicatons. Will need outpatient ischemic eval for new WMA. May be due to RV pacing. 6MWT without oxygen needs. Outpatient SPECT stress ordered. Cardiology follow up in 1 week. Patient verbalized understanding. All questions answered.        Goals of Care  Treatment Preferences:  Code Status: Full Code      Consults:   Consults (From admission, onward)        Status Ordering Provider     Inpatient consult to Cardiology  Once        Provider:  (Not yet assigned)    RONIT Bhardwaj          * Dyspnea on exertion  Unclear etiology, recent PPM revision 1/13 due to lead fracture   CXR unremarkable, stable on RA  Cardiology consulted, appreciate recs  Pacemaker interrogated 1/20 with RA pacing 44% and RV pacing 19%, EP aware.  D-dimer elevated however she is not hypoxic or hypotensive and V/Q scan with low probability  Troponin negative X 3, EKG non- ischemic  TTE below, new WMA may be due to pacing? Will need outpatient ischemic eval  6 minute walk test without oxygen desaturation, HR ~80s  EP follow up in 1 week  SPECT stress ordered as outpatient       Final Active Diagnoses:    Diagnosis Date Noted POA    PRINCIPAL PROBLEM:  Dyspnea on exertion [R06.00] 01/20/2022 Yes    Morbid obesity [E66.01] 07/26/2018 Yes    CKD (chronic kidney disease) stage 3, GFR 30-59 ml/min [N18.30] 06/26/2017 Yes    Atrial fibrillation [I48.91] 05/29/2017 Yes    Benign essential hypertension [I10] 05/29/2017 Yes    Hyperlipidemia [E78.5] 05/29/2017 Yes    Sick sinus syndrome [I49.5] 05/29/2017 Yes    S/P placement of cardiac pacemaker [Z95.0] 05/29/2017 Yes      Problems Resolved During this Admission:       Discharged Condition: good    Disposition: Home or Self Care    Follow Up:   Follow-up Information     Sandra Michaud MD In 1 week.    Specialty: Internal Medicine  Contact information:  [address]  [place] [id number] [phone]                       Patient Instructions:      NM Myocardial Perfusion Spect Multi Exer   Standing Status: Future Standing Exp. Date: 01/21/23     Order Specific Question Answer Comments   May the Radiologist modify the order per protocol to meet the clinical needs of the patient? Yes    Will a Cardiologist read this study? No       Diet Cardiac     Notify your health care provider if you experience any of the following:  difficulty breathing or increased cough     Notify your health care provider if you experience any of the following:  increased confusion or weakness     Notify your health care provider if you experience any of the following:  persistent dizziness, light-headedness, or visual disturbances     Cardiac PET Scan Stress   Standing Status: Future Standing Exp. Date: 01/21/23     Order Specific Question Answer Comments   Which medicaton for the stress procedure? Regadenoson OK for cardiologist to change reagant   Release to patient Immediate      Activity as tolerated       Significant Diagnostic Studies: Labs:   BMP:   Recent Labs   Lab 01/20/22  1243 01/21/22  0305   * 132*    137   K 3.6 3.4*    106   CO2 22* 19*   BUN 23 20   CREATININE 1.3 1.3   CALCIUM 11.2* 10.2   , CBC   Recent Labs   Lab 01/20/22  1243 01/20/22  1243 01/21/22  0305   WBC 7.27  --  6.25   HGB 14.7  --  13.2   HCT 44.4   < > 40.2     --  252    < > = values in this interval not displayed.    and Troponin   Recent Labs   Lab 01/20/22  1243 01/20/22  1943 01/21/22  0305   TROPONINI 0.016 0.021 0.015     Cardiac Graphics: Echocardiogram:   Transthoracic echo (TTE) complete (Cupid Only):   Results for orders placed or performed during the hospital encounter of 01/20/22   Echo   Result Value Ref Range    BSA 1.97 m2    TDI SEPTAL 0.08 m/s    LA WIDTH 3.48 cm    TDI LATERAL 0.10 m/s    LVIDd 3.99 3.5 - 6.0 cm    IVS 0.86 0.6 - 1.1 cm    Posterior Wall 0.82 0.6 - 1.1 cm    LVIDs 3.04 2.1 - 4.0 cm    FS 24 28 - 44 %    LA volume 52.49 cm3    Sinus 2.95 cm    STJ 3.03 cm    Ascending aorta 3.01 cm    LV mass 99.41 g    LA size 3.63 cm    RVDD 3.23 cm    TAPSE 2.50 cm    Left Ventricle Relative Wall Thickness 0.41 cm    AV mean gradient 3 mmHg    AV valve area 2.14 cm2    AV Velocity Ratio 0.69     AV index (prosthetic) 0.71     Mean e'  0.09 m/s    LVOT diameter 1.96 cm    LVOT area 3.0 cm2    LVOT peak benny 0.68 m/s    LVOT peak VTI 11.24 cm    Ao peak benny 0.99 m/s    Ao VTI 15.84 cm    LVOT stroke volume 33.90 cm3    AV peak gradient 4 mmHg    TR Max Benny 2.34 m/s    LV Systolic Volume 36.07 mL    LV Systolic Volume Index 19.2 mL/m2    LV Diastolic Volume 69.60 mL    LV Diastolic Volume Index 37.02 mL/m2    LA Volume Index 27.9 mL/m2    LV Mass Index 53 g/m2    RA Major Axis 4.17 cm    Left Atrium Minor Axis 3.99 cm    Left Atrium Major Axis 6.31 cm    Triscuspid Valve Regurgitation Peak Gradient 22 mmHg    RA Width 2.72 cm    EF 53 %    Right Atrial Pressure (from IVC) 3 mmHg    TV rest pulmonary artery pressure 25 mmHg    Narrative    · The left ventricle is normal in size with low normal systolic function.  · The estimated ejection fraction is 53%.  · There are segmental left ventricular wall motion abnormalities.  · There is abnormal septal wall motion.  · Normal right ventricular size with normal right ventricular systolic   function.  · Normal left ventricular diastolic function.  · Mild mitral regurgitation.  · Normal central venous pressure (3 mmHg).  · The estimated PA systolic pressure is 25 mmHg.  · Trivial posterior pericardial effusion.          Pending Diagnostic Studies:     None         Medications:  Reconciled Home Medications:      Medication List      CONTINUE taking these medications    amLODIPine 10 MG tablet  Commonly known as: NORVASC  TAKE 1 TABLET BY MOUTH EVERY DAY     aspirin 81 MG EC tablet  Commonly known as: ECOTRIN  Take 1 tablet (81 mg total) by mouth once daily.     atorvastatin 80 MG tablet  Commonly known as: LIPITOR  TAKE 1 TABLET BY MOUTH EVERY DAY     bisoprolol 5 MG tablet  Commonly known as: ZEBETA  Take 1 tablet (5 mg total) by mouth once daily.     cimetidine 300 MG tablet  Commonly known as: TAGAMET  Take one of each( Benadryl, Cimetidine, Prednisone) at 7:00 pm  the evening prior to procedure and take one  of each at 2:00 am and 8:00 am on day of procedure     clobetasoL 0.05 % Foam  Commonly known as: OLUX  Apply 1 application topically every 30 days.     FLUAD QUAD 2020-21(65Y UP)(PF) 60 mcg (15 mcg x 4)/0.5 mL Syrg  Generic drug: flu vac 2020 [id number](PF)  TO BE ADMINISTERED BY PHARMACIST FOR IMMUNIZATION     furosemide 20 MG tablet  Commonly known as: LASIX  Take 1 tablet (20 mg total) by mouth once daily.     KLOR-CON 10 10 MEQ Tbsr  Generic drug: potassium chloride  TAKE 1 TABLET (10 MEQ TOTAL) BY MOUTH ONCE DAILY.  DOSES     olmesartan 40 MG tablet  Commonly known as: BENICAR  Take 1 tablet (40 mg total) by mouth once daily.     propafenone 225 MG Cp12  Commonly known as: RYTHMOL SR  Take 1 capsule (225 mg total) by mouth every 12 (twelve) hours.     topiramate 25 MG tablet  Commonly known as: TOPAMAX  TAKE 1 TABLET BY MOUTH THREE TIMES A DAY     VITAMIN D ORAL  Take 2,000 Units by mouth once daily.            Indwelling Lines/Drains at time of discharge:   Lines/Drains/Airways     None                 Time spent on the discharge of patient: 36 minutes         Judy Wolf PA-C  Department of Hospital Medicine  [facility] - Telemetry Stepdown ([place]-[facility])

## 2022-01-21 NOTE — HOSPITAL COURSE
Mrs. Curiel was admitted to observation for dyspnea on exertion. Cardiology consulted given lead revision 1/13. Pacemaker interrogated 1/20 with RA pacing 44% and RV pacing 19%, EP aware. VQ scan with low probability of PE. TTE with EF 35%, segmental WMA, trace pericardial effusion, no evidence of lead placement complicatons. Will need outpatient ischemic eval for new WMA. May be due to RV pacing. 6MWT without oxygen needs. Outpatient SPECT stress ordered. Cardiology follow up in 1 week. Patient verbalized understanding. All questions answered.

## 2022-01-21 NOTE — ASSESSMENT & PLAN NOTE
Relatively elevated today  Continue home meds Norvasc, Bisoprolol and Losartan while in patient (on Olmesartan as outpatient)

## 2022-01-21 NOTE — SUBJECTIVE & OBJECTIVE
Past Medical History:   Diagnosis Date    Allergy     Arthritis     Atrial fibrillation 2017    CKD (chronic kidney disease) stage 3, GFR 30-59 ml/min 2017    Disorder of kidney and ureter     GERD (gastroesophageal reflux disease)     Hyperlipidemia     Hypertension     Impaired fasting glucose 2021    Pre-diabetes 6/3/2017       Past Surgical History:   Procedure Laterality Date    ADENOIDECTOMY      BACK SURGERY      lamenectomy    BREAST SURGERY      CARDIAC SURGERY      st paolo pacemaker     CATARACT EXTRACTION W/  INTRAOCULAR LENS IMPLANT Right 6/3/2019    Procedure: EXTRACTION, CATARACT, WITH IOL INSERTION;  Surgeon: Raisa Moreno MD;  Location: [facility];  Service: Ophthalmology;  Laterality: Right;  Laser    CATARACT EXTRACTION W/  INTRAOCULAR LENS IMPLANT Left 2019    Procedure: EXTRACTION, CATARACT, WITH IOL INSERTION;  Surgeon: Raisa Moreno MD;  Location: [facility];  Service: Ophthalmology;  Laterality: Left;  LASER ASSISTED     SECTION      ,     EYE SURGERY      FRACTURE SURGERY      HYSTERECTOMY      INSERT / REPLACE / REMOVE PACEMAKER      placement    INSERT / REPLACE / REMOVE PACEMAKER  2014    [employer] Model #[id number] replacement    pace maker      replacement     REVISION OF PROCEDURE INVOLVING PACEMAKER LEAD Left 2022    Procedure: REVISION, ELECTRODE LEAD, CARDIAC PACEMAKER;  Surgeon: Trell Hodgson MD;  Location[facility] EP LAB;  Service: Cardiology;  Laterality: Left;  PPM lead Malfx, RA lead revision, SJM, MAC, GP, 3 PREP*dPPM SJM in situ**Contrast prep given*    salpingo opherectomy Bilateral     SPINE SURGERY      TONSILLECTOMY      TUBAL LIGATION         Review of patient's allergies indicates:   Allergen Reactions    Iodinated contrast media Anaphylaxis    Penicillins Anaphylaxis    Allopurinol analogues      Muscle cramps    Ciprofloxacin Rash    Quinolones Rash    Sulfa (sulfonamide  antibiotics) Rash    Tetracyclines Rash       No current facility-administered medications on file prior to encounter.     Current Outpatient Medications on File Prior to Encounter   Medication Sig    amLODIPine (NORVASC) 10 MG tablet TAKE 1 TABLET BY MOUTH EVERY DAY    aspirin (ECOTRIN) 81 MG EC tablet Take 1 tablet (81 mg total) by mouth once daily.    atorvastatin (LIPITOR) 80 MG tablet TAKE 1 TABLET BY MOUTH EVERY DAY    bisoprolol (ZEBETA) 5 MG tablet Take 1 tablet (5 mg total) by mouth once daily.    cimetidine (TAGAMET) 300 MG tablet Take one of each( Benadryl, Cimetidine, Prednisone) at 7:00 pm  the evening prior to procedure and take one of each at 2:00 am and 8:00 am on day of procedure    clobetasoL (OLUX) 0.05 % Foam Apply 1 application topically every 30 days.    ERGOCALCIFEROL, VITAMIN D2, (VITAMIN D ORAL) Take 2,000 Units by mouth once daily.    FLUAD QUAD 2020-21,65Y UP,,PF, 60 mcg (15 mcg x 4)/0.5 mL Syrg TO BE ADMINISTERED BY PHARMACIST FOR IMMUNIZATION    furosemide (LASIX) 20 MG tablet Take 1 tablet (20 mg total) by mouth once daily.    KLOR-CON 10 10 mEq TbSR TAKE 1 TABLET (10 MEQ TOTAL) BY MOUTH ONCE DAILY.  DOSES    olmesartan (BENICAR) 40 MG tablet Take 1 tablet (40 mg total) by mouth once daily.    propafenone (RYTHMOL SR) 225 MG Cp12 Take 1 capsule (225 mg total) by mouth every 12 (twelve) hours.    topiramate (TOPAMAX) 25 MG tablet TAKE 1 TABLET BY MOUTH THREE TIMES A DAY     Family History     Problem Relation (Age of Onset)    Coronary artery disease Mother, Father, Brother    Diabetes Mother, Father, Paternal Grandmother, Paternal Grandfather    Heart disease Mother, Father, Brother    Hyperlipidemia Mother, Father    Hypertension Mother, Father    Hyperthyroidism Brother    Stroke Mother        Tobacco Use    Smoking status: Never Smoker    Smokeless tobacco: Never Used   Substance and Sexual Activity    Alcohol use: Yes     Alcohol/week: 10.0 standard drinks      Types: 10 Glasses of wine per week    Drug use: No    Sexual activity: Not Currently     Partners: Male     Review of Systems   Constitutional: Negative for activity change, appetite change, chills, diaphoresis, fatigue, fever and unexpected weight change.   HENT: Negative for congestion and sore throat.    Respiratory: Positive for shortness of breath.    Cardiovascular: Negative for chest pain and palpitations.   Gastrointestinal: Negative for abdominal pain, constipation, diarrhea, nausea and vomiting.   Genitourinary: Negative for dysuria and hematuria.   Musculoskeletal: Negative for back pain.   Neurological: Negative for dizziness, light-headedness and headaches.   Psychiatric/Behavioral: Negative for confusion.     Objective:     Vital Signs (Most Recent):  Temp: 99.3 °F (37.4 °C) (01/20/22 1052)  Pulse: 94 (01/20/22 1842)  Resp: 18 (01/20/22 1842)  BP: 124/73 (01/20/22 1842)  SpO2: 97 % (01/20/22 1842) Vital Signs (24h Range):  Temp:  [99.3 °F (37.4 °C)] 99.3 °F (37.4 °C)  Pulse:  [] 94  Resp:  [18] 18  SpO2:  [95 %-97 %] 97 %  BP: (124-165)/(73-77) 124/73     Weight: 89.8 kg (198 lb)  Body mass index is 37.41 kg/m².    Physical Exam  Vitals and nursing note reviewed.   Constitutional:       General: She is not in acute distress.     Appearance: Normal appearance. She is not diaphoretic.   HENT:      Head: Normocephalic and atraumatic.   Eyes:      General: No scleral icterus.        Right eye: No discharge.         Left eye: No discharge.      Conjunctiva/sclera: Conjunctivae normal.   Cardiovascular:      Rate and Rhythm: Normal rate and regular rhythm.      Heart sounds: No murmur heard.      Pulmonary:      Effort: Pulmonary effort is normal. No respiratory distress.      Breath sounds: Normal breath sounds. No wheezing.   Abdominal:      General: There is no distension.      Palpations: Abdomen is soft.      Tenderness: There is no abdominal tenderness. There is no guarding.   Skin:      General: Skin is warm.             Significant Labs:   All pertinent labs within the past 24 hours have been reviewed.  BMP:   Recent Labs   Lab 01/20/22  1243   *      K 3.6      CO2 22*   BUN 23   CREATININE 1.3   CALCIUM 11.2*     CBC:   Recent Labs   Lab 01/20/22  1243   WBC 7.27   HGB 14.7   HCT 44.4        Troponin:   Recent Labs   Lab 01/20/22  1243   TROPONINI 0.016       Significant Imaging: I have reviewed all pertinent imaging results/findings within the past 24 hours.

## 2022-01-21 NOTE — ASSESSMENT & PLAN NOTE
Patient with Paroxysmal (<7 days) atrial fibrillation which is controlled currently with Beta Blocker. Patient is currently in paced rhythm. [id number] Score: 2. She is not on anticoagulation per cardiology.

## 2022-01-21 NOTE — ASSESSMENT & PLAN NOTE
S/p dual chamber pacemaker   Appreciate cardiology consult  Atrial lead revision last week without signs of clot or pericardial effusion on echo  Monitor on tele overnight

## 2022-01-21 NOTE — ASSESSMENT & PLAN NOTE
Body mass index is 37.41 kg/m². Morbid obesity complicates all aspects of disease management from diagnostic modalities to treatment. Weight loss encouraged and health benefits explained to patient.

## 2022-01-21 NOTE — ASSESSMENT & PLAN NOTE
Unclear etiology   D-dimer elevated however she is not hypoxic or hypotensive and V/Q scan with low probability  Initial troponin was wnl, ACS rule out overnight. Will follow cardiology's final recommendation but likely DC in the AM with outpatient stress test

## 2022-02-28 ENCOUNTER — TELEPHONE (OUTPATIENT)
Dept: CARDIOLOGY | Facility: HOSPITAL | Age: 72
End: 2022-02-28
Payer: MEDICARE

## 2022-02-28 NOTE — TELEPHONE ENCOUNTER
Remote transmission received and atrial undersensing noted on AMS episodes.  AT/AF burden <1 %, max episode 4 min 20 secs, egm c/w AF.  Not on OACs.  P waves measured 0.4 mV.  Spoke with patient and scheduled a device check on 3/8/2022 to assess A lead and to adjust sensitivity.

## 2022-03-07 NOTE — PROGRESS NOTES
Subjective:   Patient ID:  Jesenia Curiel is a 71 y.o. female who presents for follow-up of No chief complaint on file.    PROBLEM LIST:  PAF  SSS  PPM  HTN  HLD  CKD3  IFG    HPI 6/17:She recently moved from [place] and presents today to establish care. 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 She has a history of atrial fibrillation and sick sinus syndrome. She had a [employer] Paolo pacemaker placed in 2009 and was diagnosed with atrial fibrillation in 2008. She has never required cardioversion. She has maintained sinus rhythm on Propafenone. A stress test in 2009 was normal per her report. Jesenia Curiel denies any chest pain, shortness of breath, PND, orthopnea, palpitations, leg edema, or syncope. She does not exercise on a regular basis. She just started taking Belvique and has lost 5 lbs.    Interval gobkshl21/21 :She underwent a right atrial lead revision back in January.  One week later she developed acute onset shortness of breath which lasted for about an hour.  She was sent from the arrhythmia Clinic down to the emergency department.  Her evaluation there included an elevated BNP level of 200, normal troponin, and elevated D-dimer.  A follow-up V/Q scan was low probability for pulmonary embolism.  An echocardiogram was performed which did not show a significant pericardial effusion, normal ejection fraction, and reported mid septal wall motion abnormality.  An order was placed for a nuclear stress test, however she was unable to raise her arm at the time due to her recent pacemaker lead revision and wanted to follow up with me for further discussion.  She has not had any recurrence of her symptoms since that time.  She has been trying to increase the amount she is walking daily with her .  She has not had any exertional chest discomfort or shortness of breath.  She denies any PND, orthopnea, or palpitations.  She gets some mild dependent ankle edema at the end of the day.  She does report  that since her lead revision she has noticed swelling in her left supraclavicular fossa which is worse with standing.  She does not have any swelling or pain in her left arm or any tenderness in the supraclavicular area.    ECG 20-JAN-2022 11:00:31: Personally reviewed by me shows atrial sensed ventricular paced rhythm.    Device interrogation 1/22:  Conclusion    Additional Comments  IN-OFFICE device interrogation and testing performed  s/p RA lead revision on 1/13/2022  The incision is healing well, no s/s of infection noted; pt has completed the post op antibiotics  Presenting egram demonstrates AP-VS  Underlying rhythm c/w sinus bradycardia  Device fxn WNL  RA pacing 44%, RV pacing 19%  Atrial arrhythmias: none  Ventricular arrhythmias: none  Anticoagulant: none  Battery Status/Longevity:  3.3-4.3 years  Reprogramming at this visit: none  Reviewed s/s of infection and left arm restrictions  /70, O2 sat 96%  Pt will be evaulated in the ED for sudden onset SOB and near syncope this morning  RTC in 3 mos, F/U via remote interrogation q 3 mos  Report prepared by MELISA Casarez    Echo 1/22    Summary    · The left ventricle is normal in size with low normal systolic function.  · The estimated ejection fraction is 53%.  · There are segmental left ventricular wall motion abnormalities.  · There is abnormal septal wall motion.  · Normal right ventricular size with normal right ventricular systolic function.  · Normal left ventricular diastolic function.  · Mild mitral regurgitation.  · Normal central venous pressure (3 mmHg).  · The estimated PA systolic pressure is 25 mmHg.  · Trivial posterior pericardial effusion.          Past Medical History:   Diagnosis Date    Allergy     Arthritis     Atrial fibrillation 5/29/2017    CKD (chronic kidney disease) stage 3, GFR 30-59 ml/min 6/26/2017    Disorder of kidney and ureter     GERD (gastroesophageal reflux disease)     Hyperlipidemia     Hypertension      Impaired fasting glucose 2021    Pre-diabetes 6/3/2017       Past Surgical History:   Procedure Laterality Date    ADENOIDECTOMY      BACK SURGERY      lamenectomy    BREAST SURGERY      CARDIAC SURGERY      st paolo pacemaker     CATARACT EXTRACTION W/  INTRAOCULAR LENS IMPLANT Right 6/3/2019    Procedure: EXTRACTION, CATARACT, WITH IOL INSERTION;  Surgeon: Raisa Moreno MD;  Location: [facility];  Service: Ophthalmology;  Laterality: Right;  Laser    CATARACT EXTRACTION W/  INTRAOCULAR LENS IMPLANT Left 2019    Procedure: EXTRACTION, CATARACT, WITH IOL INSERTION;  Surgeon: Raisa Moreno MD;  Location: [facility];  Service: Ophthalmology;  Laterality: Left;  LASER ASSISTED     SECTION      ,     EYE SURGERY      FRACTURE SURGERY      HYSTERECTOMY      INSERT / REPLACE / REMOVE PACEMAKER      placement    INSERT / REPLACE / REMOVE PACEMAKER  2014    [employer] Paolo Model #[id number] replacement    pace maker      replacement     REVISION OF PROCEDURE INVOLVING PACEMAKER LEAD Left 2022    Procedure: REVISION, ELECTRODE LEAD, CARDIAC PACEMAKER;  Surgeon: Trell Hodgson MD;  Location: [facility];  Service: Cardiology;  Laterality: Left;  PPM lead Malfx, RA lead revision, SJM, MAC, GP, 3 PREP*dPPM SJM in situ**Contrast prep given*    salpingo opherectomy Bilateral     SPINE SURGERY      TONSILLECTOMY      TUBAL LIGATION         Social History     Socioeconomic History    Marital status:    Tobacco Use    Smoking status: Never Smoker    Smokeless tobacco: Never Used   Substance and Sexual Activity    Alcohol use: Yes     Alcohol/week: 10.0 standard drinks     Types: 10 Glasses of wine per week    Drug use: No    Sexual activity: Not Currently     Partners: Male   Social History Narrative    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 Walks daily along the [facility].       Family History   Problem Relation Age of Onset    Hypertension Mother      Diabetes Mother     Hyperlipidemia Mother     Coronary artery disease Mother     Heart disease Mother     Stroke Mother     Hypertension Father     Diabetes Father     Hyperlipidemia Father     Coronary artery disease Father     Heart disease Father     Coronary artery disease Brother     Heart disease Brother     Hyperthyroidism Brother     Diabetes Paternal Grandmother     Diabetes Paternal Grandfather     Heart attack Neg Hx        Patient's Medications   New Prescriptions    No medications on file   Previous Medications    AMLODIPINE (NORVASC) 10 MG TABLET    TAKE 1 TABLET BY MOUTH EVERY DAY    ASPIRIN (ECOTRIN) 81 MG EC TABLET    Take 1 tablet (81 mg total) by mouth once daily.    ATORVASTATIN (LIPITOR) 80 MG TABLET    TAKE 1 TABLET BY MOUTH EVERY DAY    BISOPROLOL (ZEBETA) 5 MG TABLET    Take 1 tablet (5 mg total) by mouth once daily.    CIMETIDINE (TAGAMET) 300 MG TABLET    Take one of each( Benadryl, Cimetidine, Prednisone) at 7:00 pm  the evening prior to procedure and take one of each at 2:00 am and 8:00 am on day of procedure    CLOBETASOL (OLUX) 0.05 % FOAM    Apply 1 application topically every 30 days.    ERGOCALCIFEROL, VITAMIN D2, (VITAMIN D ORAL)    Take 2,000 Units by mouth once daily.    FLUAD QUAD 2020-21,65Y UP,,PF, 60 MCG (15 MCG X 4)/0.5 ML SYRG    TO BE ADMINISTERED BY PHARMACIST FOR IMMUNIZATION    FUROSEMIDE (LASIX) 20 MG TABLET    Take 1 tablet (20 mg total) by mouth once daily.    KLOR-CON 10 10 MEQ TBSR    TAKE 1 TABLET (10 MEQ TOTAL) BY MOUTH ONCE DAILY.  DOSES    OLMESARTAN (BENICAR) 40 MG TABLET    Take 1 tablet (40 mg total) by mouth once daily.    PROPAFENONE (RYTHMOL SR) 225 MG CP12    Take 1 capsule (225 mg total) by mouth every 12 (twelve) hours.    TOPIRAMATE (TOPAMAX) 25 MG TABLET    TAKE 1 TABLET BY MOUTH THREE TIMES A DAY   Modified Medications    No medications on file   Discontinued Medications    No medications on file       Review of Systems    Constitutional: Negative for malaise/fatigue and weight gain.   HENT: Negative for hearing loss.    Eyes: Negative for visual disturbance.   Cardiovascular: Negative for chest pain, claudication, dyspnea on exertion, leg swelling, near-syncope, orthopnea, palpitations, paroxysmal nocturnal dyspnea and syncope.   Respiratory: Negative for cough, shortness of breath, sleep disturbances due to breathing, snoring and wheezing.    Endocrine: Negative for cold intolerance, heat intolerance, polydipsia, polyphagia and polyuria.   Hematologic/Lymphatic: Negative for bleeding problem. Does not bruise/bleed easily.   Skin: Negative for rash and suspicious lesions.   Musculoskeletal: Negative for arthritis, falls, joint pain, muscle weakness and myalgias.   Gastrointestinal: Negative for abdominal pain, change in bowel habit, constipation, diarrhea, heartburn, hematochezia, melena and nausea.   Genitourinary: Negative for hematuria and nocturia.   Neurological: Negative for excessive daytime sleepiness, dizziness, headaches, light-headedness, loss of balance and weakness.   Psychiatric/Behavioral: Negative for depression. The patient is not nervous/anxious.    Allergic/Immunologic: Negative for environmental allergies.       There were no vitals taken for this visit.    Objective:   Physical Exam  Constitutional:       Appearance: She is well-developed.      Comments:      HENT:      Head: Normocephalic and atraumatic.   Eyes:      General: No scleral icterus.     Conjunctiva/sclera: Conjunctivae normal.      Pupils: Pupils are equal, round, and reactive to light.   Neck:      Thyroid: No thyromegaly.      Vascular: No hepatojugular reflux or JVD.      Trachea: No tracheal deviation.   Cardiovascular:      Rate and Rhythm: Normal rate and regular rhythm.      Chest Wall: PMI is not displaced.      Pulses: Intact distal pulses.           Carotid pulses are 2+ on the right side and 2+ on the left side.       Radial pulses are  2+ on the right side and 2+ on the left side.        Dorsalis pedis pulses are 2+ on the right side and 2+ on the left side.        Posterior tibial pulses are 2+ on the right side and 2+ on the left side.      Heart sounds: Normal heart sounds.   Pulmonary:      Effort: Pulmonary effort is normal.      Breath sounds: Normal breath sounds.   Abdominal:      General: Bowel sounds are normal. There is no distension.      Palpations: Abdomen is soft. There is no mass.      Tenderness: There is no abdominal tenderness.   Musculoskeletal:         General: No tenderness.      Cervical back: Normal range of motion and neck supple.      Comments: She does have an asymmetric fullness on the left supraclavicular area which is exacerbated upon standing.  The area is soft and there is no palpable define mass.  It is nontender.   Lymphadenopathy:      Cervical: No cervical adenopathy.   Skin:     General: Skin is warm and dry.      Findings: No erythema or rash.      Nails: There is no clubbing.   Neurological:      Mental Status: She is alert and oriented to person, place, and time.   Psychiatric:         Speech: Speech normal.         Behavior: Behavior normal.         Lab Results   Component Value Date     01/21/2022    K 3.4 (L) 01/21/2022     01/21/2022    CO2 19 (L) 01/21/2022    BUN 20 01/21/2022    CREATININE 1.3 01/21/2022     (H) 01/21/2022    HGBA1C 5.4 07/25/2018    MG 1.9 05/19/2021    AST 21 01/21/2022    ALT 19 01/21/2022    ALBUMIN 3.6 01/21/2022    PROT 6.8 01/21/2022    BILITOT 0.7 01/21/2022    WBC 6.25 01/21/2022    HGB 13.2 01/21/2022    HCT 40.2 01/21/2022    MCV 94 01/21/2022     01/21/2022    INR 1.0 01/07/2022    TSH 1.111 03/28/2019    CHOL 166 03/15/2021    HDL 36 (L) 03/15/2021    LDLCALC 95.0 03/15/2021    TRIG 175 (H) 03/15/2021     (H) 01/20/2022       Assessment:     1. Paroxysmal atrial fibrillation :  Her CHADS2 Vasc score is 3. She is on aspirinas well as  propafenone for rhythm control.  She follows with Dr. Trell Hodgson.   2. Sick sinus syndrome :   3. S/P placement of cardiac pacemaker :  Status post recent right atrial lead revision.   4. Benign essential hypertension : Blood pressure is at goal. I have made no changes. Continue current regimen.   5. Mixed hyperlipidemia : Lipids are at goal. Continue statin therapy.   6. Stage 3a chronic kidney disease : Follows with Nephrology.   7. Impaired fasting glucose    8.      Swelling in the left supraclavicular fossa:  I did not feel any palpable mass.  I have ordered an ultrasound of that area for further evaluation.    Plan:     Diagnoses and all orders for this visit:    Paroxysmal atrial fibrillation    Benign essential hypertension    Mixed hyperlipidemia    S/P placement of cardiac pacemaker    Sick sinus syndrome    Stage 3a chronic kidney disease    Impaired fasting glucose        Thank you for allowing me to participate in this patient's care. Please do not hesitate to contact me with any questions or concerns.

## 2022-03-08 ENCOUNTER — OFFICE VISIT (OUTPATIENT)
Dept: CARDIOLOGY | Facility: CLINIC | Age: 72
End: 2022-03-08
Payer: MEDICARE

## 2022-03-08 ENCOUNTER — CLINICAL SUPPORT (OUTPATIENT)
Dept: CARDIOLOGY | Facility: HOSPITAL | Age: 72
End: 2022-03-08
Attending: INTERNAL MEDICINE
Payer: MEDICARE

## 2022-03-08 VITALS
WEIGHT: 201.25 LBS | DIASTOLIC BLOOD PRESSURE: 59 MMHG | HEART RATE: 87 BPM | BODY MASS INDEX: 38 KG/M2 | HEIGHT: 61 IN | SYSTOLIC BLOOD PRESSURE: 120 MMHG

## 2022-03-08 DIAGNOSIS — I48.0 PAROXYSMAL ATRIAL FIBRILLATION: Primary | ICD-10-CM

## 2022-03-08 DIAGNOSIS — R73.01 IMPAIRED FASTING GLUCOSE: ICD-10-CM

## 2022-03-08 DIAGNOSIS — R22.1 NECK SWELLING: ICD-10-CM

## 2022-03-08 DIAGNOSIS — N18.31 STAGE 3A CHRONIC KIDNEY DISEASE: ICD-10-CM

## 2022-03-08 DIAGNOSIS — I48.91 ATRIAL FIBRILLATION, UNSPECIFIED TYPE: ICD-10-CM

## 2022-03-08 DIAGNOSIS — E78.2 MIXED HYPERLIPIDEMIA: ICD-10-CM

## 2022-03-08 DIAGNOSIS — I10 BENIGN ESSENTIAL HYPERTENSION: ICD-10-CM

## 2022-03-08 DIAGNOSIS — T82.110S PACEMAKER LEAD MALFUNCTION, SEQUELA: ICD-10-CM

## 2022-03-08 DIAGNOSIS — I49.5 SICK SINUS SYNDROME: ICD-10-CM

## 2022-03-08 DIAGNOSIS — Z01.812 ENCOUNTER FOR PRE-OPERATIVE LABORATORY TESTING: ICD-10-CM

## 2022-03-08 DIAGNOSIS — Z95.0 S/P PLACEMENT OF CARDIAC PACEMAKER: ICD-10-CM

## 2022-03-08 PROCEDURE — 93280 PM DEVICE PROGR EVAL DUAL: CPT | Mod: 26,,, | Performed by: INTERNAL MEDICINE

## 2022-03-08 PROCEDURE — 99999 PR PBB SHADOW E&M-EST. PATIENT-LVL IV: ICD-10-PCS | Mod: PBBFAC,,, | Performed by: INTERNAL MEDICINE

## 2022-03-08 PROCEDURE — 93280 CARDIAC DEVICE CHECK - IN CLINIC & HOSPITAL: ICD-10-PCS | Mod: 26,,, | Performed by: INTERNAL MEDICINE

## 2022-03-08 PROCEDURE — 93280 PM DEVICE PROGR EVAL DUAL: CPT

## 2022-03-08 PROCEDURE — 99214 PR OFFICE/OUTPT VISIT, EST, LEVL IV, 30-39 MIN: ICD-10-PCS | Mod: S$PBB,,, | Performed by: INTERNAL MEDICINE

## 2022-03-08 PROCEDURE — 99214 OFFICE O/P EST MOD 30 MIN: CPT | Mod: S$PBB,,, | Performed by: INTERNAL MEDICINE

## 2022-03-08 PROCEDURE — 99214 OFFICE O/P EST MOD 30 MIN: CPT | Mod: PBBFAC | Performed by: INTERNAL MEDICINE

## 2022-03-08 PROCEDURE — 99999 PR PBB SHADOW E&M-EST. PATIENT-LVL IV: CPT | Mod: PBBFAC,,, | Performed by: INTERNAL MEDICINE

## 2022-03-08 RX ORDER — PROPAFENONE HYDROCHLORIDE 225 MG/1
225 CAPSULE, EXTENDED RELEASE ORAL EVERY 12 HOURS
Qty: 180 CAPSULE | Refills: 3 | Status: SHIPPED | OUTPATIENT
Start: 2022-03-08 | End: 2023-04-05 | Stop reason: SDUPTHER

## 2022-03-14 ENCOUNTER — LAB VISIT (OUTPATIENT)
Dept: LAB | Facility: HOSPITAL | Age: 72
End: 2022-03-14
Attending: INTERNAL MEDICINE
Payer: MEDICARE

## 2022-03-14 ENCOUNTER — OFFICE VISIT (OUTPATIENT)
Dept: PRIMARY CARE CLINIC | Facility: CLINIC | Age: 72
End: 2022-03-14
Payer: MEDICARE

## 2022-03-14 VITALS
BODY MASS INDEX: 36.02 KG/M2 | OXYGEN SATURATION: 98 % | DIASTOLIC BLOOD PRESSURE: 82 MMHG | HEART RATE: 92 BPM | WEIGHT: 195.75 LBS | TEMPERATURE: 98 F | SYSTOLIC BLOOD PRESSURE: 132 MMHG | HEIGHT: 62 IN

## 2022-03-14 DIAGNOSIS — I10 BENIGN ESSENTIAL HYPERTENSION: Primary | ICD-10-CM

## 2022-03-14 DIAGNOSIS — I77.1 TORTUOUS AORTA: ICD-10-CM

## 2022-03-14 DIAGNOSIS — N18.30 STAGE 3 CHRONIC KIDNEY DISEASE, UNSPECIFIED WHETHER STAGE 3A OR 3B CKD: ICD-10-CM

## 2022-03-14 DIAGNOSIS — R31.9 HEMATURIA, UNSPECIFIED TYPE: ICD-10-CM

## 2022-03-14 DIAGNOSIS — Z78.0 POSTMENOPAUSAL ESTROGEN DEFICIENCY: ICD-10-CM

## 2022-03-14 DIAGNOSIS — R73.9 HYPERGLYCEMIA: ICD-10-CM

## 2022-03-14 DIAGNOSIS — E66.01 SEVERE OBESITY (BMI 35.0-39.9) WITH COMORBIDITY: ICD-10-CM

## 2022-03-14 DIAGNOSIS — E78.2 MIXED HYPERLIPIDEMIA: ICD-10-CM

## 2022-03-14 DIAGNOSIS — Z12.11 ENCOUNTER FOR FIT (FECAL IMMUNOCHEMICAL TEST) SCREENING: ICD-10-CM

## 2022-03-14 PROCEDURE — 99999 PR PBB SHADOW E&M-EST. PATIENT-LVL IV: CPT | Mod: PBBFAC,,, | Performed by: INTERNAL MEDICINE

## 2022-03-14 PROCEDURE — 99214 OFFICE O/P EST MOD 30 MIN: CPT | Mod: PBBFAC,PN | Performed by: INTERNAL MEDICINE

## 2022-03-14 PROCEDURE — 99999 PR PBB SHADOW E&M-EST. PATIENT-LVL IV: ICD-10-PCS | Mod: PBBFAC,,, | Performed by: INTERNAL MEDICINE

## 2022-03-14 PROCEDURE — 83036 HEMOGLOBIN GLYCOSYLATED A1C: CPT | Performed by: INTERNAL MEDICINE

## 2022-03-14 PROCEDURE — 99215 OFFICE O/P EST HI 40 MIN: CPT | Mod: S$PBB,,, | Performed by: INTERNAL MEDICINE

## 2022-03-14 PROCEDURE — 80053 COMPREHEN METABOLIC PANEL: CPT | Performed by: INTERNAL MEDICINE

## 2022-03-14 PROCEDURE — 36415 COLL VENOUS BLD VENIPUNCTURE: CPT | Mod: PN | Performed by: INTERNAL MEDICINE

## 2022-03-14 PROCEDURE — 99215 PR OFFICE/OUTPT VISIT, EST, LEVL V, 40-54 MIN: ICD-10-PCS | Mod: S$PBB,,, | Performed by: INTERNAL MEDICINE

## 2022-03-14 PROCEDURE — 80061 LIPID PANEL: CPT | Performed by: INTERNAL MEDICINE

## 2022-03-14 NOTE — PROGRESS NOTES
INTERNAL MEDICINE ANNUAL VISIT NOTE      CHIEF COMPLAINT     ANNUAL    HPI     Jesenia Curiel is a 71 y.o. C female who presents for annual.  Cscope - last FITKIT 2/17/20.  MMG - declines.   DEXA - 11/12/18 osteopenia.     Last seen pt 2/9/21.     Since then, saw Dr. Garcia for weight loss 2/10/21. Started on topamax. Due to afib, rec to avoid stimulants for weight loss (previously on belviq).   Had to leave town w/ Edith. Working on the house. Didn't have access to her treadmill. Slowly walking w/  and getting back w/ . Started being stricter on her diet.     SSS s/p PM (not AICD). Remote pacemaker interrogation w/ Dr. Hodgson.   Afib - on propafenone SR 225mg bid and previously on xarelto. Follows w/ Dr. JOSE Hodgson - last seen 7/23/18. Stopped on Xarelto (hasn't had any Afib recorded since 2017) and  on asa 81mg daily.  Echo 9/5/19 w/ grade II LV DD. Low normal RV systolic fxn. LVEF 55%.  Saw Dr. JOSE Hodgson 10/4/21. Cont asa and rhythmol. RA lead fx'ED..   Saw Dr. CANDICE Hodgson 11/23/21  -->s/p RA lead revision 1/13/22.  -->hosp 1/20 through 1/21/22 due to increased SOB x 1-1.5hr. Elevated D dimer and VQ low prob. PM interrogated. EF 53% w/ segmental WMA, and no evidence of lead placement complications on echo. rec outpt ischemic eval for new WMA (may be due to RV pacing). 6MWT WNL.   Followed up w/ Dr. CANDICE Hodgson 3/8/22.  Reports SOB has resolved. Hasn't had any issues since then.   Daily palpitations that's intermittent. No assoc w/ CP/lightheadedness.  L supraclavicular puffiness more prominent min when upright - US ordered by Dr. Hodgson. Not painful. No actual mass palpated.     CKD 3 - baseline Cr 1.3-1.4  pth 5/26/21 wnl.   Renal US reviewed.   LOV Dr. Mcgarry in nephro 11/19/21. F/u in 6 mo. Changed HCTZ to lasix due to hypercalcemia. Stopped allopurinol due to side effect. On K 10mEq daily.    CXR 1/13/22 - Mediastinal structures are midline with calcification and tortuosity of the aorta and mild to  moderately enlarged cardiac silhouette.  On atorva 80mg daily. Due for repeat lipids.    IgG paraproteinemia. Seen  Statin 21. Recommended following labs yearly. No BM Bx at this point.     Past Medical History:  Past Medical History:   Diagnosis Date    Allergy     Arthritis     Atrial fibrillation 2017    CKD (chronic kidney disease) stage 3, GFR 30-59 ml/min 2017    Disorder of kidney and ureter     GERD (gastroesophageal reflux disease)     Hyperlipidemia     Hypertension     Impaired fasting glucose 2021    Pre-diabetes 6/3/2017       Past Surgical History:  Past Surgical History:   Procedure Laterality Date    ADENOIDECTOMY      BACK SURGERY      lamenectomy    BREAST SURGERY      CARDIAC SURGERY      st paolo pacemaker     CATARACT EXTRACTION W/  INTRAOCULAR LENS IMPLANT Right 6/3/2019    Procedure: EXTRACTION, CATARACT, WITH IOL INSERTION;  Surgeon: Raisa Moreno MD;  Location: [facility];  Service: Ophthalmology;  Laterality: Right;  Laser    CATARACT EXTRACTION W/  INTRAOCULAR LENS IMPLANT Left 2019    Procedure: EXTRACTION, CATARACT, WITH IOL INSERTION;  Surgeon: Raisa Moreno MD;  Location: [facility];  Service: Ophthalmology;  Laterality: Left;  LASER ASSISTED     SECTION      ,     EYE SURGERY      FRACTURE SURGERY      HYSTERECTOMY      INSERT / REPLACE / REMOVE PACEMAKER      placement    INSERT / REPLACE / REMOVE PACEMAKER  2014    [employer] Paolo Model #[id number] replacement    pace maker      replacement     REVISION OF PROCEDURE INVOLVING PACEMAKER LEAD Left 2022    Procedure: REVISION, ELECTRODE LEAD, CARDIAC PACEMAKER;  Surgeon: Trell Hodgson MD;  Location: [facility];  Service: Cardiology;  Laterality: Left;  PPM lead Malfx, RA lead revision, SJM, MAC, GP, 3 PREP*dPPM SJM in situ**Contrast prep given*    salpingo opherectomy Bilateral     SPINE SURGERY      TONSILLECTOMY      TUBAL LIGATION          Allergies:  Review of patient's allergies indicates:   Allergen Reactions    Iodinated contrast media Anaphylaxis    Penicillins Anaphylaxis    Allopurinol analogues      Muscle cramps    Ciprofloxacin Rash    Quinolones Rash    Sulfa (sulfonamide antibiotics) Rash    Tetracyclines Rash       Home Medications:  Prior to Admission medications    Medication Sig Start Date End Date Taking? Authorizing Provider   amLODIPine (NORVASC) 10 MG tablet TAKE 1 TABLET BY MOUTH EVERY DAY 11/2/21   Verna Hodgson MD   aspirin (ECOTRIN) 81 MG EC tablet Take 1 tablet (81 mg total) by mouth once daily. 7/23/18   Trell Hodgson MD   atorvastatin (LIPITOR) 80 MG tablet TAKE 1 TABLET BY MOUTH EVERY DAY 3/24/21   Sandra Michaud MD   bisoprolol (ZEBETA) 5 MG tablet Take 1 tablet (5 mg total) by mouth once daily. 12/15/21   Sandra Michaud MD   cimetidine (TAGAMET) 300 MG tablet Take one of each( Benadryl, Cimetidine, Prednisone) at 7:00 pm  the evening prior to procedure and take one of each at 2:00 am and 8:00 am on day of procedure  Patient not taking: Reported on 3/8/2022 12/6/21   Trell Hodgson MD   clobetasoL (OLUX) 0.05 % Foam Apply 1 application topically every 30 days. 4/14/20   Sandra Michaud MD   ERGOCALCIFEROL, VITAMIN D2, (VITAMIN D ORAL) Take 2,000 Units by mouth once daily.    Historical Provider   FLUAD QUAD 2020-21,65Y UP,,PF, 60 mcg (15 mcg x 4)/0.5 mL Syrg TO BE ADMINISTERED BY PHARMACIST FOR IMMUNIZATION 8/28/20   Historical Provider   furosemide (LASIX) 20 MG tablet Take 1 tablet (20 mg total) by mouth once daily. 6/9/21 6/9/22  Venkatesh Mcgarry MD   KLOR-CON 10 10 mEq TbSR TAKE 1 TABLET (10 MEQ TOTAL) BY MOUTH ONCE DAILY.  DOSES 9/28/21   Venkatesh Mcgarry MD   olmesartan (BENICAR) 40 MG tablet Take 1 tablet (40 mg total) by mouth once daily. 1/13/22   Sandra Michaud MD   propafenone (RYTHMOL SR) 225 MG Cp12 Take 1 capsule (225 mg total) by mouth every 12 (twelve) hours. 3/8/22   Trell Hodgson MD   topiramate  "(TOPAMAX) 25 MG tablet TAKE 1 TABLET BY MOUTH THREE TIMES A DAY 11/19/21   Sandra Michaud MD       Family History:  Family History   Problem Relation Age of Onset    Hypertension Mother     Diabetes Mother     Hyperlipidemia Mother     Coronary artery disease Mother     Heart disease Mother     Stroke Mother     Hypertension Father     Diabetes Father     Hyperlipidemia Father     Coronary artery disease Father     Heart disease Father     Coronary artery disease Brother     Heart disease Brother     Hyperthyroidism Brother     Diabetes Paternal Grandmother     Diabetes Paternal Grandfather     Heart attack Neg Hx        Social History:  Social History     Tobacco Use    Smoking status: Never Smoker    Smokeless tobacco: Never Used   Substance Use Topics    Alcohol use: Yes     Alcohol/week: 10.0 standard drinks     Types: 10 Glasses of wine per week    Drug use: No       Review of Systems:  Review of Systems Comprehensive review of systems otherwise negative. See history/subjective section for more details.    Health Maintainence:    reviewed.     PHYSICAL EXAM     /82 (BP Location: Left arm, Patient Position: Sitting, BP Method: Large (Manual))   Pulse 92   Temp 97.6 °F (36.4 °C) (Oral)   Ht 5' 1.5" (1.562 m)   Wt 88.8 kg (195 lb 12.3 oz)   SpO2 98%   BMI 36.39 kg/m²     GEN - A+OX4, NAD   HEENT - PERRL, EOMI, OP clear. MMM. TM normal.   Neck - No thyromegaly or cervical LAD. No thyroid masses felt. L supraclavicular fossa fullness but no masses. No axillary masses on palpation.   CV - RRR, no m/r   Chest - CTAB, no wheezing or rhonchi  Abd - S/NT/ND/+BS.   Ext - 2+BDP and radial pulses. No LE edema.   Neuro - PERRL, EOMI, no nystagmus, eyebrow raise, facial sensation, hearing, m of mastication, smile, palatal raise, shoulder shrug, tongue protrusion symmetric and intact. 5/5 BUE and BLE strength. Sensation to light touch intact throughout. 2+ DTRs. Normal gait.   MSK - No spinal " tenderness to palpation. Normal gait.   Skin - No rash. Some freckles.     LABS     Previous labs reviewed.    ASSESSMENT/PLAN     Jesenia Curiel is a 71 y.o. female with  Jesenia was seen today for follow-up.    Diagnoses and all orders for this visit:    Benign essential hypertension - Stable and controlled. Continue current medications.    Hyperglycemia - check a1c.   -     Hemoglobin A1C; Future; Expected date: 03/14/2022    Stage 3 chronic kidney disease, unspecified whether stage 3a or 3b CKD  -     Comprehensive Metabolic Panel; Future; Expected date: 03/14/2022    Mixed hyperlipidemia - cont atorva 80.   -     Lipid Panel; Future; Expected date: 03/14/2022  -     Comprehensive Metabolic Panel; Future; Expected date: 03/14/2022    Encounter for FIT (fecal immunochemical test) screening  -     Fecal Immunochemical Test (iFOBT); Future; Expected date: 03/14/2022    Postmenopausal estrogen deficiency  -     DXA Bone Density Spine And Hip; Future; Expected date: 03/14/2022    Severe obesity (BMI 36.39) with comorbidity - lost 6lbs in the last week! Getting back into exercise and watching diet.     Tortuous aorta - cont atorvastatin. Repeat FLP today.     Tdap and shingrix at the pharmacy.       RTC in 6 months, sooner if needed and depending on labs.    Sandra Michaud MD  Department of Internal Medicine - [facility]  9:45 AM

## 2022-03-15 ENCOUNTER — TELEPHONE (OUTPATIENT)
Dept: PRIMARY CARE CLINIC | Facility: CLINIC | Age: 72
End: 2022-03-15
Payer: MEDICARE

## 2022-03-15 LAB
ALBUMIN SERPL BCP-MCNC: 4.4 G/DL (ref 3.5–5.2)
ALP SERPL-CCNC: 97 U/L (ref 55–135)
ALT SERPL W/O P-5'-P-CCNC: 29 U/L (ref 10–44)
ANION GAP SERPL CALC-SCNC: 11 MMOL/L (ref 8–16)
AST SERPL-CCNC: 26 U/L (ref 10–40)
BILIRUB SERPL-MCNC: 0.7 MG/DL (ref 0.1–1)
BUN SERPL-MCNC: 21 MG/DL (ref 8–23)
CALCIUM SERPL-MCNC: 11 MG/DL (ref 8.7–10.5)
CHLORIDE SERPL-SCNC: 106 MMOL/L (ref 95–110)
CHOLEST SERPL-MCNC: 209 MG/DL (ref 120–199)
CHOLEST/HDLC SERPL: 4.2 {RATIO} (ref 2–5)
CO2 SERPL-SCNC: 24 MMOL/L (ref 23–29)
CREAT SERPL-MCNC: 1.2 MG/DL (ref 0.5–1.4)
EST. GFR  (AFRICAN AMERICAN): 52.2 ML/MIN/1.73 M^2
EST. GFR  (NON AFRICAN AMERICAN): 45.3 ML/MIN/1.73 M^2
ESTIMATED AVG GLUCOSE: 111 MG/DL (ref 68–131)
GLUCOSE SERPL-MCNC: 100 MG/DL (ref 70–110)
HBA1C MFR BLD: 5.5 % (ref 4–5.6)
HDLC SERPL-MCNC: 50 MG/DL (ref 40–75)
HDLC SERPL: 23.9 % (ref 20–50)
LDLC SERPL CALC-MCNC: 123.6 MG/DL (ref 63–159)
NONHDLC SERPL-MCNC: 159 MG/DL
POTASSIUM SERPL-SCNC: 3.8 MMOL/L (ref 3.5–5.1)
PROT SERPL-MCNC: 7.8 G/DL (ref 6–8.4)
SODIUM SERPL-SCNC: 141 MMOL/L (ref 136–145)
TRIGL SERPL-MCNC: 177 MG/DL (ref 30–150)

## 2022-03-15 NOTE — TELEPHONE ENCOUNTER
Please call and notify pt:    Sugars are ok. a1c is 5.5. Her fasting sugars were likely high previously as she was getting procedures/in hospital. We'll cont to monitor. Her calcium is high. Recommend to stay off any calcium supplements including MVI w/ calcium in it. She can continue vitamin D though. Cholesterol is high. Please confirm she's taking her atorvastatin 80mg consistently (suspect she may not be). Let me know response. If she's taking daily, then would change to crestor 40 (stronger cholesterol medicine).    Schedule f/u in 3 instead of 6 mo to recheck cholesterol and calcium. Thanks!

## 2022-03-15 NOTE — TELEPHONE ENCOUNTER
Patient called back. Stated that she was aware of her lab results. States she is not on any Calcium supplements. States her Calcium has been high her entire life, and this is not abnormal for her.  She does take Vitamin D. She is taking her Atorvastatin 80 mg. She would like to defer to her Cardiologist for any changes in her cholesterol medications.

## 2022-03-18 ENCOUNTER — CLINICAL SUPPORT (OUTPATIENT)
Dept: CARDIOLOGY | Facility: HOSPITAL | Age: 72
End: 2022-03-18
Payer: MEDICARE

## 2022-03-18 DIAGNOSIS — Z95.0 PRESENCE OF CARDIAC PACEMAKER: ICD-10-CM

## 2022-03-21 ENCOUNTER — PATIENT MESSAGE (OUTPATIENT)
Dept: ADMINISTRATIVE | Facility: HOSPITAL | Age: 72
End: 2022-03-21
Payer: MEDICARE

## 2022-03-22 ENCOUNTER — LAB VISIT (OUTPATIENT)
Dept: LAB | Facility: HOSPITAL | Age: 72
End: 2022-03-22
Attending: INTERNAL MEDICINE
Payer: MEDICARE

## 2022-03-22 DIAGNOSIS — Z12.11 ENCOUNTER FOR FIT (FECAL IMMUNOCHEMICAL TEST) SCREENING: ICD-10-CM

## 2022-03-22 PROCEDURE — 82274 ASSAY TEST FOR BLOOD FECAL: CPT | Performed by: INTERNAL MEDICINE

## 2022-03-29 LAB — HEMOCCULT STL QL IA: NEGATIVE

## 2022-04-05 ENCOUNTER — PATIENT MESSAGE (OUTPATIENT)
Dept: PRIMARY CARE CLINIC | Facility: CLINIC | Age: 72
End: 2022-04-05
Payer: MEDICARE

## 2022-04-05 DIAGNOSIS — E78.5 HYPERLIPIDEMIA, UNSPECIFIED HYPERLIPIDEMIA TYPE: ICD-10-CM

## 2022-04-06 ENCOUNTER — HOSPITAL ENCOUNTER (OUTPATIENT)
Dept: RADIOLOGY | Facility: CLINIC | Age: 72
Discharge: HOME | End: 2022-04-06
Attending: INTERNAL MEDICINE
Payer: MEDICARE

## 2022-04-06 DIAGNOSIS — Z78.0 POSTMENOPAUSAL ESTROGEN DEFICIENCY: ICD-10-CM

## 2022-04-06 PROCEDURE — 77080 DXA BONE DENSITY AXIAL: CPT | Mod: TC

## 2022-04-06 PROCEDURE — 77080 DXA BONE DENSITY AXIAL: CPT | Mod: 26,,, | Performed by: INTERNAL MEDICINE

## 2022-04-06 PROCEDURE — 77080 DEXA BONE DENSITY SPINE HIP: ICD-10-PCS | Mod: 26,,, | Performed by: INTERNAL MEDICINE

## 2022-04-06 RX ORDER — ATORVASTATIN CALCIUM 80 MG/1
80 TABLET, FILM COATED ORAL DAILY
Qty: 90 TABLET | Refills: 3 | Status: SHIPPED | OUTPATIENT
Start: 2022-04-06 | End: 2023-07-02 | Stop reason: SDUPTHER

## 2022-04-06 NOTE — TELEPHONE ENCOUNTER
Refill Authorization Note   Jesenia Curiel  is requesting a refill authorization.  Brief Assessment and Rationale for Refill:  Approve     Medication Therapy Plan:       Medication Reconciliation Completed: No   Comments:     No Care Gaps recommended.     Note composed:7:54 AM 04/06/2022

## 2022-04-11 ENCOUNTER — PES CALL (OUTPATIENT)
Dept: ADMINISTRATIVE | Facility: CLINIC | Age: 72
End: 2022-04-11
Payer: MEDICARE

## 2022-05-12 ENCOUNTER — PATIENT OUTREACH (OUTPATIENT)
Dept: ADMINISTRATIVE | Facility: OTHER | Age: 72
End: 2022-05-12
Payer: MEDICARE

## 2022-05-12 NOTE — PROGRESS NOTES
Subjective:     HPI     Cardiologist: Verna Hodgson MD    I had the pleasure of seeing Jesenia Curiel in follow-up for her history of atrial fibrillation. 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 dual chamber pacemaker implantation in 5/2009 (gen change 9/2014). She has been on Rythmol since 2009, which has significantly brought down her arrhythmia burden (episodes used to last for hours but now last seconds to minutes). At her 2017 office visit, she was found to have a <1% AF burden, with the longest episode lasting <90 seconds. At her 7/2018 visit, her device recorded no mode-switch episodes. Xarelto was stopped at that visit. At her last visit in 92020 no mode-switch episodes were seen. Ms. Curiel described episodes of regular tachycardia at 110 bpm range, which would occur once monthly and last several minutes. We discussed option of event monitor but she wanted to hold the course.    An echo done in 9/2019 showed an EF of 55%, and grade 2 DD.    At her 10/2021 visit a device check showed intermittent atrial lead noise. In 1/2022 an RA lead revision was performed.    I reviewed today's device interrogation which shows stable device/laed function, RA pacing 98%, RV pacing 5%, 9 AMS episodes the longest lasting 46 seconds, battery longevity 4 years.    Ms. Curiel is describing mild pocket discomfort, particularly when she sleeps on her side at night.     My interpretation of today's ECG is AV pacing at 77 bpm.    Review of Systems   Constitutional: Positive for malaise/fatigue. Negative for decreased appetite, weight gain and weight loss.   HENT: Negative for sore throat.    Eyes: Negative for blurred vision.   Cardiovascular: Negative for chest pain, dyspnea on exertion, irregular heartbeat, leg swelling, near-syncope, orthopnea, palpitations, paroxysmal nocturnal dyspnea and syncope.   Respiratory: Negative for  shortness of breath.    Skin: Negative for rash.   Musculoskeletal: Negative for arthritis.   Gastrointestinal: Negative for abdominal pain.   Neurological: Negative for focal weakness.   Psychiatric/Behavioral: Negative for altered mental status.        Objective:    Physical Exam  Vitals reviewed.   Constitutional:       General: She is not in acute distress.     Appearance: She is well-developed.   HENT:      Head: Normocephalic and atraumatic.   Eyes:      General: No scleral icterus.     Conjunctiva/sclera: Conjunctivae normal.      Pupils: Pupils are equal, round, and reactive to light.   Neck:      Thyroid: No thyromegaly.      Vascular: No JVD.   Cardiovascular:      Rate and Rhythm: Normal rate and regular rhythm.      Pulses: Intact distal pulses.      Heart sounds: Normal heart sounds. No murmur heard.    No friction rub. No gallop.   Pulmonary:      Effort: Pulmonary effort is normal. No respiratory distress.      Breath sounds: Normal breath sounds. No rales.   Abdominal:      General: Bowel sounds are normal. There is no distension.      Palpations: Abdomen is soft.   Musculoskeletal:      Cervical back: Normal range of motion and neck supple.   Skin:     General: Skin is warm and dry.   Neurological:      Mental Status: She is alert and oriented to person, place, and time.   Psychiatric:         Behavior: Behavior normal.           Assessment:       1. Sick sinus syndrome    2. S/P placement of cardiac pacemaker    3. Mixed hyperlipidemia    4. Benign essential hypertension    5. Atrial fibrillation, unspecified type         Plan:       In summary, Jesenia Curiel is a 71 year old female with sick sinus syndrome, pacemaker implantation, and symptomatic PAF. Her AF has been controlled for many years on Rythmol, with the longest episode of AF over the past year lasting 5.5 minutes. The plan is to continue aspirin and Rythmol.    Ms. Curiel is 3 months status-post RA lead revision for presumed lead  fracture. Her device is functioning appropriately. She is having some mild pocket discomfort. Reassurance provided. Plan is for regular device checks and to see me again in 1 year.    Thank you for allowing me to participate in the care of this patient. Please do not hesitate to call me with any questions or concerns.

## 2022-05-13 NOTE — PROGRESS NOTES
Patient's chart was reviewed for overdue MELVIN topics.  Health maintenance:updated  Immunizations:reconciled   Legacy:   Media:  Orders placed:  Tasked appts:  Labs Linked:  Upcoming appt:

## 2022-05-16 ENCOUNTER — OFFICE VISIT (OUTPATIENT)
Dept: ELECTROPHYSIOLOGY | Facility: CLINIC | Age: 72
End: 2022-05-16
Payer: MEDICARE

## 2022-05-16 ENCOUNTER — HOSPITAL ENCOUNTER (OUTPATIENT)
Dept: CARDIOLOGY | Facility: CLINIC | Age: 72
Discharge: HOME | End: 2022-05-16
Payer: MEDICARE

## 2022-05-16 ENCOUNTER — PATIENT MESSAGE (OUTPATIENT)
Dept: PRIMARY CARE CLINIC | Facility: CLINIC | Age: 72
End: 2022-05-16
Payer: MEDICARE

## 2022-05-16 ENCOUNTER — CLINICAL SUPPORT (OUTPATIENT)
Dept: CARDIOLOGY | Facility: HOSPITAL | Age: 72
End: 2022-05-16
Attending: INTERNAL MEDICINE
Payer: MEDICARE

## 2022-05-16 VITALS
DIASTOLIC BLOOD PRESSURE: 70 MMHG | HEIGHT: 62 IN | BODY MASS INDEX: 36.39 KG/M2 | WEIGHT: 197.75 LBS | SYSTOLIC BLOOD PRESSURE: 117 MMHG | HEART RATE: 77 BPM

## 2022-05-16 DIAGNOSIS — Z01.812 ENCOUNTER FOR PRE-OPERATIVE LABORATORY TESTING: ICD-10-CM

## 2022-05-16 DIAGNOSIS — I10 BENIGN ESSENTIAL HYPERTENSION: ICD-10-CM

## 2022-05-16 DIAGNOSIS — I49.8 OTHER SPECIFIED CARDIAC ARRHYTHMIAS: ICD-10-CM

## 2022-05-16 DIAGNOSIS — T82.110S PACEMAKER LEAD MALFUNCTION, SEQUELA: ICD-10-CM

## 2022-05-16 DIAGNOSIS — E78.2 MIXED HYPERLIPIDEMIA: ICD-10-CM

## 2022-05-16 DIAGNOSIS — I49.5 SICK SINUS SYNDROME: Primary | ICD-10-CM

## 2022-05-16 DIAGNOSIS — I48.91 ATRIAL FIBRILLATION, UNSPECIFIED TYPE: ICD-10-CM

## 2022-05-16 DIAGNOSIS — Z95.0 S/P PLACEMENT OF CARDIAC PACEMAKER: ICD-10-CM

## 2022-05-16 PROCEDURE — 93005 ELECTROCARDIOGRAM TRACING: CPT | Mod: PBBFAC | Performed by: INTERNAL MEDICINE

## 2022-05-16 PROCEDURE — 93010 ELECTROCARDIOGRAM REPORT: CPT | Mod: S$PBB,,, | Performed by: INTERNAL MEDICINE

## 2022-05-16 PROCEDURE — 99999 PR PBB SHADOW E&M-EST. PATIENT-LVL III: ICD-10-PCS | Mod: PBBFAC,,, | Performed by: INTERNAL MEDICINE

## 2022-05-16 PROCEDURE — 99213 OFFICE O/P EST LOW 20 MIN: CPT | Mod: PBBFAC | Performed by: INTERNAL MEDICINE

## 2022-05-16 PROCEDURE — 93280 PM DEVICE PROGR EVAL DUAL: CPT | Mod: 26,,, | Performed by: INTERNAL MEDICINE

## 2022-05-16 PROCEDURE — 99999 PR PBB SHADOW E&M-EST. PATIENT-LVL III: CPT | Mod: PBBFAC,,, | Performed by: INTERNAL MEDICINE

## 2022-05-16 PROCEDURE — 99214 PR OFFICE/OUTPT VISIT, EST, LEVL IV, 30-39 MIN: ICD-10-PCS | Mod: S$PBB,,, | Performed by: INTERNAL MEDICINE

## 2022-05-16 PROCEDURE — 93280 CARDIAC DEVICE CHECK - IN CLINIC & HOSPITAL: ICD-10-PCS | Mod: 26,,, | Performed by: INTERNAL MEDICINE

## 2022-05-16 PROCEDURE — 99214 OFFICE O/P EST MOD 30 MIN: CPT | Mod: S$PBB,,, | Performed by: INTERNAL MEDICINE

## 2022-05-16 PROCEDURE — 93010 RHYTHM STRIP: ICD-10-PCS | Mod: S$PBB,,, | Performed by: INTERNAL MEDICINE

## 2022-05-16 PROCEDURE — 93280 PM DEVICE PROGR EVAL DUAL: CPT

## 2022-05-16 RX ORDER — TOPIRAMATE 25 MG/1
TABLET ORAL
Qty: 270 TABLET | Refills: 0 | Status: SHIPPED | OUTPATIENT
Start: 2022-05-16 | End: 2022-08-18

## 2022-05-16 NOTE — TELEPHONE ENCOUNTER
Went ahead and refilled topamax x 90 days but she needs to follow up w/ Dr. Garcia since she's taking this to help w/ weight loss.

## 2022-06-01 ENCOUNTER — OFFICE VISIT (OUTPATIENT)
Dept: NEPHROLOGY | Facility: CLINIC | Age: 72
End: 2022-06-01
Payer: MEDICARE

## 2022-06-01 VITALS
OXYGEN SATURATION: 95 % | WEIGHT: 199.5 LBS | BODY MASS INDEX: 36.71 KG/M2 | HEART RATE: 83 BPM | HEIGHT: 62 IN | SYSTOLIC BLOOD PRESSURE: 112 MMHG | DIASTOLIC BLOOD PRESSURE: 80 MMHG

## 2022-06-01 DIAGNOSIS — E87.6 HYPOKALEMIA: ICD-10-CM

## 2022-06-01 DIAGNOSIS — E83.52 HYPERCALCEMIA: ICD-10-CM

## 2022-06-01 DIAGNOSIS — D89.2 PARAPROTEINEMIA: ICD-10-CM

## 2022-06-01 DIAGNOSIS — I10 HYPERTENSION, UNSPECIFIED TYPE: ICD-10-CM

## 2022-06-01 DIAGNOSIS — N18.32 STAGE 3B CHRONIC KIDNEY DISEASE: Primary | ICD-10-CM

## 2022-06-01 PROCEDURE — 99214 OFFICE O/P EST MOD 30 MIN: CPT | Mod: S$PBB,,, | Performed by: INTERNAL MEDICINE

## 2022-06-01 PROCEDURE — 99214 PR OFFICE/OUTPT VISIT, EST, LEVL IV, 30-39 MIN: ICD-10-PCS | Mod: S$PBB,,, | Performed by: INTERNAL MEDICINE

## 2022-06-01 PROCEDURE — 99999 PR PBB SHADOW E&M-EST. PATIENT-LVL III: ICD-10-PCS | Mod: PBBFAC,,, | Performed by: INTERNAL MEDICINE

## 2022-06-01 PROCEDURE — 99999 PR PBB SHADOW E&M-EST. PATIENT-LVL III: CPT | Mod: PBBFAC,,, | Performed by: INTERNAL MEDICINE

## 2022-06-01 PROCEDURE — 99213 OFFICE O/P EST LOW 20 MIN: CPT | Mod: PBBFAC | Performed by: INTERNAL MEDICINE

## 2022-06-01 RX ORDER — POTASSIUM CITRATE 10 MEQ/1
10 TABLET, EXTENDED RELEASE ORAL DAILY
Qty: 90 TABLET | Refills: 3 | Status: SHIPPED | OUTPATIENT
Start: 2022-06-01 | End: 2023-05-29

## 2022-06-01 RX ORDER — FUROSEMIDE 20 MG/1
20 TABLET ORAL DAILY
Qty: 90 TABLET | Refills: 3 | Status: SHIPPED | OUTPATIENT
Start: 2022-06-01 | End: 2023-06-01

## 2022-06-01 NOTE — PROGRESS NOTES
REASON FOR CONSULT/CHIEF COMPLAIN: Chronic Kidney Disease    REFERRING PHYSICIAN: Sandra Michaud MD    HISTORY OF PRESENT ILLNESS: 71 y.o. female  patient was referred here for abnormal renal function.   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 She had one episode of calcium urate stone at her very young age. She did use some NSAID's in the remote past and suffered few BARBARA's.   No new issues. Denied any issues with urination including dysuria, hematuria, urgency, hesitancy, nocturia, incomplete voiding. Denied chest pain, nausea, vomiting, abd pain, nausea, vomiting, diarrhea, shortness of breath, pedal edema, Orthopnea, PND.    ROS:  General: negative for chills, or fatigue  Respiratory: No cough, shortness of breath, or wheezing  Cardiovascular: No chest pain or dyspnea   Gastrointestinal: No abdominal pain, change in bowel habits  Genito-Urinary: No dysuria, trouble voiding, or hematuria  Neurological: No new focal weakness, no numbness  Dermatological: No rash or ulcers.    PAST MEDICAL HISTORY:  Past Medical History:   Diagnosis Date    Allergy     Arthritis     Atrial fibrillation 5/29/2017    CKD (chronic kidney disease) stage 3, GFR 30-59 ml/min 6/26/2017    Disorder of kidney and ureter     GERD (gastroesophageal reflux disease)     Hyperlipidemia     Hypertension     Impaired fasting glucose 11/19/2021    Pre-diabetes 6/3/2017       PAST SURGICAL HISTORY:  Past Surgical History:   Procedure Laterality Date    ADENOIDECTOMY      BACK SURGERY  2000    lamenectomy    BREAST SURGERY      CARDIAC SURGERY      st loida pacemaker     CATARACT EXTRACTION W/  INTRAOCULAR LENS IMPLANT Right 6/3/2019    Procedure: EXTRACTION, CATARACT, WITH IOL INSERTION;  Surgeon: Raisa Moreno MD;  Location: [facility];  Service: Ophthalmology;  Laterality: Right;  Laser    CATARACT EXTRACTION W/  INTRAOCULAR LENS IMPLANT Left 9/23/2019    Procedure:  EXTRACTION, CATARACT, WITH IOL INSERTION;  Surgeon: Raisa Moreno MD;  Location[facility] OR;  Service: Ophthalmology;  Laterality: Left;  LASER ASSISTED     SECTION      ,     EYE SURGERY      FRACTURE SURGERY      HYSTERECTOMY      INSERT / REPLACE / REMOVE PACEMAKER      placement    INSERT / REPLACE / REMOVE PACEMAKER  2014    [employer] Model #[id number] replacement    pace maker  2009    replacement     REVISION OF PROCEDURE INVOLVING PACEMAKER LEAD Left 2022    Procedure: REVISION, ELECTRODE LEAD, CARDIAC PACEMAKER;  Surgeon: Trell Hodgson MD;  Location: [facility] EP LAB;  Service: Cardiology;  Laterality: Left;  PPM lead Malfx, RA lead revision, SJM, MAC, GP, 3 PREP*dPPM SJM in situ**Contrast prep given*    salpingo opherectomy Bilateral     SPINE SURGERY      TONSILLECTOMY      TUBAL LIGATION         FAMILY HISTORY:   Family History   Problem Relation Age of Onset    Hypertension Mother     Diabetes Mother     Hyperlipidemia Mother     Coronary artery disease Mother     Heart disease Mother     Stroke Mother     Hypertension Father     Diabetes Father     Hyperlipidemia Father     Coronary artery disease Father     Heart disease Father     Coronary artery disease Brother     Heart disease Brother     Hyperthyroidism Brother     Diabetes Paternal Grandmother     Diabetes Paternal Grandfather     Heart attack Neg Hx        SOCIAL HISTORY:  Social History     Socioeconomic History    Marital status:    Tobacco Use    Smoking status: Never Smoker    Smokeless tobacco: Never Used   Substance and Sexual Activity    Alcohol use: Yes     Alcohol/week: 10.0 standard drinks     Types: 10 Glasses of wine per week    Drug use: No    Sexual activity: Not Currently     Partners: Male   Social History Narrative    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 [place]       ALLERGIES:  Review of patient's allergies indicates:    Allergen Reactions    Iodinated contrast media Anaphylaxis    Penicillins Anaphylaxis    Allopurinol analogues      Muscle cramps    Ciprofloxacin Rash    Quinolones Rash    Sulfa (sulfonamide antibiotics) Rash    Tetracyclines Rash       MEDICATIONS:    Current Outpatient Medications:     amLODIPine (NORVASC) 10 MG tablet, TAKE 1 TABLET BY MOUTH EVERY DAY, Disp: 90 tablet, Rfl: 3    aspirin (ECOTRIN) 81 MG EC tablet, Take 1 tablet (81 mg total) by mouth once daily., Disp: , Rfl:     atorvastatin (LIPITOR) 80 MG tablet, Take 1 tablet (80 mg total) by mouth once daily., Disp: 90 tablet, Rfl: 3    bisoprolol (ZEBETA) 5 MG tablet, Take 1 tablet (5 mg total) by mouth once daily., Disp: 90 tablet, Rfl: 3    clobetasoL (OLUX) 0.05 % Foam, Apply 1 application topically every 30 days., Disp: 100 g, Rfl: 3    ERGOCALCIFEROL, VITAMIN D2, (VITAMIN D ORAL), Take 2,000 Units by mouth once daily., Disp: , Rfl:     olmesartan (BENICAR) 40 MG tablet, Take 1 tablet (40 mg total) by mouth once daily., Disp: 90 tablet, Rfl: 3    potassium chloride (KLOR-CON 10) 10 MEQ TbSR, Take 1 tablet (10 mEq total) by mouth once daily., Disp: 90 tablet, Rfl: 1    propafenone (RYTHMOL SR) 225 MG Cp12, Take 1 capsule (225 mg total) by mouth every 12 (twelve) hours., Disp: 180 capsule, Rfl: 3    topiramate (TOPAMAX) 25 MG tablet, TAKE 1 TABLET BY MOUTH THREE TIMES A DAY, Disp: 270 tablet, Rfl: 0    FLUAD QUAD 2020-21,65Y UP,,PF, 60 mcg (15 mcg x 4)/0.5 mL Syrg, TO BE ADMINISTERED BY PHARMACIST FOR IMMUNIZATION, Disp: , Rfl:     furosemide (LASIX) 20 MG tablet, Take 1 tablet (20 mg total) by mouth once daily., Disp: 90 tablet, Rfl: 3   Medication List with Changes/Refills   Current Medications    AMLODIPINE (NORVASC) 10 MG TABLET    TAKE 1 TABLET BY MOUTH EVERY DAY    ASPIRIN (ECOTRIN) 81 MG EC TABLET    Take 1 tablet (81 mg total) by mouth once daily.    ATORVASTATIN (LIPITOR) 80 MG TABLET    Take 1 tablet (80 mg total) by mouth  "once daily.    BISOPROLOL (ZEBETA) 5 MG TABLET    Take 1 tablet (5 mg total) by mouth once daily.    CLOBETASOL (OLUX) 0.05 % FOAM    Apply 1 application topically every 30 days.    ERGOCALCIFEROL, VITAMIN D2, (VITAMIN D ORAL)    Take 2,000 Units by mouth once daily.    FLUAD QUAD 2020-21,65Y UP,,PF, 60 MCG (15 MCG X 4)/0.5 ML SYRG    TO BE ADMINISTERED BY PHARMACIST FOR IMMUNIZATION    OLMESARTAN (BENICAR) 40 MG TABLET    Take 1 tablet (40 mg total) by mouth once daily.    POTASSIUM CHLORIDE (KLOR-CON 10) 10 MEQ TBSR    Take 1 tablet (10 mEq total) by mouth once daily.    PROPAFENONE (RYTHMOL SR) 225 MG CP12    Take 1 capsule (225 mg total) by mouth every 12 (twelve) hours.    TOPIRAMATE (TOPAMAX) 25 MG TABLET    TAKE 1 TABLET BY MOUTH THREE TIMES A DAY   Changed and/or Refilled Medications    Modified Medication Previous Medication    FUROSEMIDE (LASIX) 20 MG TABLET furosemide (LASIX) 20 MG tablet       Take 1 tablet (20 mg total) by mouth once daily.    Take 1 tablet (20 mg total) by mouth once daily.        PHYSICAL EXAM:  /80   Pulse 83   Ht 5' 1.5" (1.562 m)   Wt 90.5 kg (199 lb 8.3 oz)   SpO2 95%   BMI 37.09 kg/m²     General: No distress, No fever or chills  Neck: No adenopathy,no carotid bruit,no JVD  Lungs:Clear to auscultation bilaterally, No Crackles  Heart: Regular rate and rhythm, no murmur, gallops or rubs  Abdomen: Soft, no tenderness, bowel sounds normal  Extremities: Atraumatic, no edema in LE  Skin: Turgor normal. No rashes or ulcers  Neurologic: No focal weakness, oriented.  Dialysis Access: Non applicable        LABS:  Lab Results   Component Value Date    HGB 13.2 01/21/2022        Lab Results   Component Value Date    CREATININE 1.2 03/14/2022       Prot/Creat Ratio, Urine   Date Value Ref Range Status   04/05/2021 Unable to calculate 0.00 - 0.20 Final   03/16/2021 0.08 0.00 - 0.20 Final       Lab Results   Component Value Date     03/14/2022    K 3.8 03/14/2022    CO2 24 " 03/14/2022       last PTH   Lab Results   Component Value Date    PTH 47.0 05/26/2021    CALCIUM 11.0 (H) 03/14/2022    CAION 1.25 01/20/2022    PHOS 3.1 11/19/2021       Lab Results   Component Value Date    HGBA1C 5.5 03/14/2022       Lab Results   Component Value Date    LDLCALC 123.6 03/14/2022         Creatinine, Urine   Date Value Ref Range Status   05/26/2021 82.0 15.0 - 325.0 mg/dL Final     Comment:     The random urine reference ranges provided were established   for 24 hour urine collections.  No reference ranges exist for  random urine specimens.  Correlate clinically.     04/05/2021 35.0 15.0 - 325.0 mg/dL Final     Comment:     The random urine reference ranges provided were established   for 24 hour urine collections.  No reference ranges exist for  random urine specimens.  Correlate clinically.     03/16/2021 221.0 15.0 - 325.0 mg/dL Final     Comment:     The random urine reference ranges provided were established   for 24 hour urine collections.  No reference ranges exist for  random urine specimens.  Correlate clinically.         Protein, Urine Random   Date Value Ref Range Status   04/05/2021 <7 0 - 15 mg/dL Final     Comment:     The random urine reference ranges provided were established   for 24 hour urine collections.  No reference ranges exist for  random urine specimens.  Correlate clinically.     03/16/2021 18 (H) 0 - 15 mg/dL Final     Comment:     The random urine reference ranges provided were established   for 24 hour urine collections.  No reference ranges exist for  random urine specimens.  Correlate clinically.         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 2021 it has bumped  to 2.0, now fluctuating between 1.2-1.4 mg/dl (Her creatinine trended down since the second diuretic, triamtere has been discontinued. But cystatin C estimated eGFR is higher than creatinine eGFR). Urine microscopy done by me in the clinic did not show any RBC/casts. Urine analysis has been negative for proteinuria or hematuria Renal ultrasound from march 2021 showed 10.8 and 9.9 cm kidneys. . CKD due to combination of age related nephron loss, HTN, previous BARBARA and NSAID use. Serological work was positive for monoclonal protein and was evaluated by [facility].   Hypokalemia due to diuretic improved with potassium 10 meq daily. Acid Base and hemoglobin in acceptable range. On topamax for weight loss but no acidosis noted with current dose.   Did work up for borderline hypercalcemia and low phosphate. Vitamin D, Vitamin 1,25 Vitamin D, PTHrP, FGF 23 are in acceptable range. 24 hour urine showed low calcium excretion despite high normal PTH. This could go along with Familial hypocalciuria hypercalcemia/due to HCTZ? . Switched HCTZ to lasix.      - Continue potassium 10 meq (switched to potassium citrate to compensate Topamax use) daily for lasix  induced hypokalemia  - Avoid NSAIDs intake  - Discussed about CKD, low sodium, and low animal protein diet.  - Discontinued Allopurinol due to side effect  - SGLT2 is not yet indicated due to lack proteinuria.    RTC in 6 months    Diagnosis and plan of care explained to the patient.  Pt Verbalized understanding. Answered all questions.  Thanks for allowing me to participate in the care of this patient.     9:02 PM    Venkatesh Mcgarry MD  Nephrology & Critical Care

## 2022-06-07 PROBLEM — E87.6 HYPOKALEMIA: Status: ACTIVE | Noted: 2022-06-07

## 2022-06-16 ENCOUNTER — CLINICAL SUPPORT (OUTPATIENT)
Dept: CARDIOLOGY | Facility: HOSPITAL | Age: 72
End: 2022-06-16
Attending: INTERNAL MEDICINE
Payer: MEDICARE

## 2022-06-16 DIAGNOSIS — Z95.0 PRESENCE OF CARDIAC PACEMAKER: ICD-10-CM

## 2022-06-16 PROCEDURE — 93294 REM INTERROG EVL PM/LDLS PM: CPT | Mod: ,,, | Performed by: INTERNAL MEDICINE

## 2022-06-16 PROCEDURE — 93294 CARDIAC DEVICE CHECK - REMOTE: ICD-10-PCS | Mod: ,,, | Performed by: INTERNAL MEDICINE

## 2022-07-06 NOTE — TELEPHONE ENCOUNTER
Amlodipine dose increased to 10 mg daily due to reported elevation in home readings of 140-160.   Fall with Harm Risk

## 2022-08-18 RX ORDER — TOPIRAMATE 25 MG/1
TABLET ORAL
Qty: 270 TABLET | Refills: 0 | Status: SHIPPED | OUTPATIENT
Start: 2022-08-18 | End: 2022-10-11 | Stop reason: SDUPTHER

## 2022-09-14 ENCOUNTER — CLINICAL SUPPORT (OUTPATIENT)
Dept: CARDIOLOGY | Facility: HOSPITAL | Age: 72
End: 2022-09-14
Payer: MEDICARE

## 2022-09-14 DIAGNOSIS — Z95.0 PRESENCE OF CARDIAC PACEMAKER: ICD-10-CM

## 2022-09-20 ENCOUNTER — TELEPHONE (OUTPATIENT)
Dept: ELECTROPHYSIOLOGY | Facility: CLINIC | Age: 72
End: 2022-09-20
Payer: MEDICARE

## 2022-09-20 NOTE — TELEPHONE ENCOUNTER
[facility] cardiac Home monitor shows disconnection.  Called pt and she is currently on vacation and will return home in 2 days.

## 2022-10-11 ENCOUNTER — LAB VISIT (OUTPATIENT)
Dept: LAB | Facility: HOSPITAL | Age: 72
End: 2022-10-11
Attending: INTERNAL MEDICINE
Payer: MEDICARE

## 2022-10-11 ENCOUNTER — OFFICE VISIT (OUTPATIENT)
Dept: PRIMARY CARE CLINIC | Facility: CLINIC | Age: 72
End: 2022-10-11
Payer: MEDICARE

## 2022-10-11 VITALS
HEART RATE: 88 BPM | SYSTOLIC BLOOD PRESSURE: 122 MMHG | WEIGHT: 196.63 LBS | BODY MASS INDEX: 36.18 KG/M2 | DIASTOLIC BLOOD PRESSURE: 84 MMHG | OXYGEN SATURATION: 95 % | HEIGHT: 62 IN

## 2022-10-11 DIAGNOSIS — I10 BENIGN ESSENTIAL HYPERTENSION: Primary | ICD-10-CM

## 2022-10-11 DIAGNOSIS — I48.91 ATRIAL FIBRILLATION, UNSPECIFIED TYPE: ICD-10-CM

## 2022-10-11 DIAGNOSIS — D89.2 PARAPROTEINEMIA: ICD-10-CM

## 2022-10-11 DIAGNOSIS — N18.32 STAGE 3B CHRONIC KIDNEY DISEASE: ICD-10-CM

## 2022-10-11 DIAGNOSIS — E78.2 MIXED HYPERLIPIDEMIA: ICD-10-CM

## 2022-10-11 DIAGNOSIS — E66.01 SEVERE OBESITY (BMI 35.0-39.9) WITH COMORBIDITY: ICD-10-CM

## 2022-10-11 LAB
ALBUMIN SERPL BCP-MCNC: 4.1 G/DL (ref 3.5–5.2)
ALP SERPL-CCNC: 98 U/L (ref 55–135)
ALT SERPL W/O P-5'-P-CCNC: 17 U/L (ref 10–44)
ANION GAP SERPL CALC-SCNC: 12 MMOL/L (ref 8–16)
AST SERPL-CCNC: 22 U/L (ref 10–40)
BILIRUB SERPL-MCNC: 0.9 MG/DL (ref 0.1–1)
BUN SERPL-MCNC: 26 MG/DL (ref 8–23)
CALCIUM SERPL-MCNC: 10.5 MG/DL (ref 8.7–10.5)
CHLORIDE SERPL-SCNC: 106 MMOL/L (ref 95–110)
CHOLEST SERPL-MCNC: 217 MG/DL (ref 120–199)
CHOLEST/HDLC SERPL: 4.4 {RATIO} (ref 2–5)
CO2 SERPL-SCNC: 24 MMOL/L (ref 23–29)
CREAT SERPL-MCNC: 1.3 MG/DL (ref 0.5–1.4)
EST. GFR  (NO RACE VARIABLE): 43.7 ML/MIN/1.73 M^2
GLUCOSE SERPL-MCNC: 104 MG/DL (ref 70–110)
HDLC SERPL-MCNC: 49 MG/DL (ref 40–75)
HDLC SERPL: 22.6 % (ref 20–50)
LDLC SERPL CALC-MCNC: 131.2 MG/DL (ref 63–159)
NONHDLC SERPL-MCNC: 168 MG/DL
POTASSIUM SERPL-SCNC: 4.1 MMOL/L (ref 3.5–5.1)
PROT SERPL-MCNC: 7.3 G/DL (ref 6–8.4)
SODIUM SERPL-SCNC: 142 MMOL/L (ref 136–145)
TRIGL SERPL-MCNC: 184 MG/DL (ref 30–150)

## 2022-10-11 PROCEDURE — 99999 PR PBB SHADOW E&M-EST. PATIENT-LVL IV: ICD-10-PCS | Mod: PBBFAC,,, | Performed by: INTERNAL MEDICINE

## 2022-10-11 PROCEDURE — 86334 IMMUNOFIX E-PHORESIS SERUM: CPT | Mod: 26,,, | Performed by: PATHOLOGY

## 2022-10-11 PROCEDURE — 80053 COMPREHEN METABOLIC PANEL: CPT | Performed by: INTERNAL MEDICINE

## 2022-10-11 PROCEDURE — 84165 PROTEIN E-PHORESIS SERUM: CPT | Mod: 26,,, | Performed by: PATHOLOGY

## 2022-10-11 PROCEDURE — 86334 PATHOLOGIST INTERPRETATION IFE: ICD-10-PCS | Mod: 26,,, | Performed by: PATHOLOGY

## 2022-10-11 PROCEDURE — 99214 OFFICE O/P EST MOD 30 MIN: CPT | Mod: PBBFAC,PN | Performed by: INTERNAL MEDICINE

## 2022-10-11 PROCEDURE — 99214 PR OFFICE/OUTPT VISIT, EST, LEVL IV, 30-39 MIN: ICD-10-PCS | Mod: S$PBB,,, | Performed by: INTERNAL MEDICINE

## 2022-10-11 PROCEDURE — 99999 PR PBB SHADOW E&M-EST. PATIENT-LVL IV: CPT | Mod: PBBFAC,,, | Performed by: INTERNAL MEDICINE

## 2022-10-11 PROCEDURE — G0008 ADMIN INFLUENZA VIRUS VAC: HCPCS | Mod: PBBFAC,PN

## 2022-10-11 PROCEDURE — 83520 IMMUNOASSAY QUANT NOS NONAB: CPT | Mod: 59 | Performed by: INTERNAL MEDICINE

## 2022-10-11 PROCEDURE — 86334 IMMUNOFIX E-PHORESIS SERUM: CPT | Performed by: INTERNAL MEDICINE

## 2022-10-11 PROCEDURE — 84165 PROTEIN E-PHORESIS SERUM: CPT | Performed by: INTERNAL MEDICINE

## 2022-10-11 PROCEDURE — 80061 LIPID PANEL: CPT | Performed by: INTERNAL MEDICINE

## 2022-10-11 PROCEDURE — 36415 COLL VENOUS BLD VENIPUNCTURE: CPT | Mod: PN | Performed by: INTERNAL MEDICINE

## 2022-10-11 PROCEDURE — 99214 OFFICE O/P EST MOD 30 MIN: CPT | Mod: S$PBB,,, | Performed by: INTERNAL MEDICINE

## 2022-10-11 PROCEDURE — 84165 PATHOLOGIST INTERPRETATION SPE: ICD-10-PCS | Mod: 26,,, | Performed by: PATHOLOGY

## 2022-10-11 RX ORDER — BISOPROLOL FUMARATE 5 MG/1
5 TABLET, FILM COATED ORAL DAILY
Qty: 90 TABLET | Refills: 3 | Status: SHIPPED | OUTPATIENT
Start: 2022-10-11 | End: 2023-09-29

## 2022-10-11 RX ORDER — TOPIRAMATE 25 MG/1
TABLET ORAL
Qty: 270 TABLET | Refills: 0
Start: 2022-10-11 | End: 2023-04-03 | Stop reason: SDUPTHER

## 2022-10-11 NOTE — PROGRESS NOTES
"Subjective:       Patient ID: Jesenia Curiel is a 71 y.o. female.    Chief Complaint: Follow-up    HPI  Had COVID in August - got it from wedding in [place].   Going on a cruise in Jan to [place].     SSS s/p PM s/p RA lead revision. Afib on rhythmol and ASA.  LOV Dr. JOSE Hodgson 5/16/22. F/u in 1 yr.  Palpitations sometimes. Sometimes w/ pocket pain. Otherwise no SOB/CP.    CKD 3b.  Saw Dr. Mcgarry 6/7/22.  Changed hctz to lasix 20mg daily due to hyperCa. Cont on olmesartan 40mg daily, amlodipine 10mg daily and bisoprolol 5mg daily. On urocit 10mEq daily as she's on topamax 25mg TID for obesity (started w/ Dr. Garcia in bariatrics clinic 2/11/2021).   Weight has been steady around 195lbs. Topamax TID didn't make a difference from BID so is taking it twice a day.   Checks her BP at home. 110s-120s/70s-80s.     IgG paraproteinemia  Last saw Dr. Lopez 5/14/22 - thinks mildly elevated light chains w/ normal ratio due to CKD. Due to repeat labs for f/u.    Review of Systems  Comprehensive review of systems otherwise negative. See history/subjective section for more details.    Objective:      Physical Exam    /84 (BP Location: Left arm, Patient Position: Sitting, BP Method: Large (Manual))   Pulse 88   Ht 5' 1.5" (1.562 m)   Wt 89.2 kg (196 lb 10.4 oz)   SpO2 95%   BMI 36.56 kg/m²     Gen - A+OX4, NAD  HEENT - PERRL, OP clear. MMM. Tm NORMAL.   Neck - no LAD.   CV - RRR  CHEST - CTAB, no crackles  Abd - S/NT/ND/+BS  Ext -2 + B DP and radial pulses. No LE edema.   MSK - no spinal or CVA tenderness to palpation. Normal gait.     Previous labs reviewed.     Assessment/Plan     Jesenia was seen today for follow-up.    Diagnoses and all orders for this visit:    Benign essential hypertension - Stable and controlled. Continue current medications.  -     bisoprolol (ZEBETA) 5 MG tablet; Take 1 tablet (5 mg total) by mouth once daily.    Stage 3b chronic kidney disease  -     Comprehensive Metabolic Panel; " Future    Atrial fibrillation, unspecified type - on rythmol.     Severe obesity (BMI 36.56) with comorbidity - weight stable on this dosage.   -     topiramate (TOPAMAX) 25 MG tablet; Take 1 tablet twice daily.    Paraproteinemia  -     IMMUNOGLOBULIN FREE LT CHAINS BLOOD; Future  -     PROTEIN ELECTROPHORESIS, SERUM; Future  -     IMMUNOFIXATION ELECTROPHORESIS, SERUM; Future    Mixed hyperlipidemia  -     Lipid Panel; Future    HD flu vaccine today.     Follow up in about 6 months (around 4/11/2023).      Sandra Michaud MD  Department of Internal Medicine - [facility]  7:40 AM

## 2022-10-12 LAB
ALBUMIN SERPL ELPH-MCNC: 4.17 G/DL (ref 3.35–5.55)
ALPHA1 GLOB SERPL ELPH-MCNC: 0.32 G/DL (ref 0.17–0.41)
ALPHA2 GLOB SERPL ELPH-MCNC: 0.96 G/DL (ref 0.43–0.99)
B-GLOBULIN SERPL ELPH-MCNC: 0.81 G/DL (ref 0.5–1.1)
GAMMA GLOB SERPL ELPH-MCNC: 0.75 G/DL (ref 0.67–1.58)
INTERPRETATION SERPL IFE-IMP: NORMAL
KAPPA LC SER QL IA: 2.84 MG/DL (ref 0.33–1.94)
KAPPA LC/LAMBDA SER IA: 1.83 (ref 0.26–1.65)
LAMBDA LC SER QL IA: 1.55 MG/DL (ref 0.57–2.63)
PATHOLOGIST INTERPRETATION IFE: NORMAL
PATHOLOGIST INTERPRETATION SPE: NORMAL
PROT SERPL-MCNC: 7 G/DL (ref 6–8.4)

## 2022-12-13 ENCOUNTER — CLINICAL SUPPORT (OUTPATIENT)
Dept: CARDIOLOGY | Facility: HOSPITAL | Age: 72
End: 2022-12-13
Payer: MEDICARE

## 2022-12-13 DIAGNOSIS — Z95.0 PRESENCE OF CARDIAC PACEMAKER: ICD-10-CM

## 2022-12-13 DIAGNOSIS — I49.5 SICK SINUS SYNDROME: ICD-10-CM

## 2022-12-13 PROCEDURE — 93294 REM INTERROG EVL PM/LDLS PM: CPT | Mod: ,,, | Performed by: INTERNAL MEDICINE

## 2022-12-13 PROCEDURE — 93294 CARDIAC DEVICE CHECK - REMOTE: ICD-10-PCS | Mod: ,,, | Performed by: INTERNAL MEDICINE

## 2022-12-15 ENCOUNTER — PES CALL (OUTPATIENT)
Dept: ADMINISTRATIVE | Facility: OTHER | Age: 72
End: 2022-12-15
Payer: MEDICARE

## 2023-02-16 NOTE — PROGRESS NOTES
Cardiology Clinic Note  Reason for Visit: Shortness of breath   LOV w/ Dr. Hodgson: 3/8/2022    HPI:     PROBLEM LIST:  PAF, s/p PPM 2009 and generator change 2014  SSS  HTN  HLD  CKD3  IFG      Jesenia Curiel is a 72 y.o. female, who presents with complaint of dyspnea on exertion. 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 She has been feeling short of breath since Jan 2022. She had an episode of acute shortness of breath one week after she underwent an atrial lead revision for her PPM. She was unable to undergo stress testing at that time due to being unable to lift her arm, and elected to monitor her symptoms while working on conditioning. She states that she felt her shortness of breath has been gradually worsening since August. She is now only able to walk short distances without needing to stop. She has had one episode of chest pain that occurred yesterday AM, radiated into her left arm and last less than 1 minute. She has also had a few episodes of reflux symptoms, which is not normal for her. She denies any orthopnea or PND. She typically has a mild amount of peripheral swelling, which is not recently worsened and resolves overnight. She has frequent palpitations.       ROS:    Pertinent ROS included in HPI and below.  PMH:     Past Medical History:   Diagnosis Date    Allergy     Arthritis     Atrial fibrillation 5/29/2017    CKD (chronic kidney disease) stage 3, GFR 30-59 ml/min 6/26/2017    Disorder of kidney and ureter     GERD (gastroesophageal reflux disease)     Hyperlipidemia     Hypertension     Impaired fasting glucose 11/19/2021    Pre-diabetes 6/3/2017     Past Surgical History:   Procedure Laterality Date    ADENOIDECTOMY      BACK SURGERY  2000    lamenectomy    BREAST SURGERY      CARDIAC SURGERY      st loida pacemaker     CATARACT EXTRACTION W/  INTRAOCULAR LENS IMPLANT Right 6/3/2019    Procedure: EXTRACTION, CATARACT, WITH IOL INSERTION;  Surgeon: Raisa Moreno MD;  Location:  [facility] OR;  Service: Ophthalmology;  Laterality: Right;  Laser    CATARACT EXTRACTION W/  INTRAOCULAR LENS IMPLANT Left 2019    Procedure: EXTRACTION, CATARACT, WITH IOL INSERTION;  Surgeon: Raisa Moreno MD;  Location: [facility] OR;  Service: Ophthalmology;  Laterality: Left;  LASER ASSISTED     SECTION      ,     EYE SURGERY      FRACTURE SURGERY      HYSTERECTOMY      INSERT / REPLACE / REMOVE PACEMAKER      placement    INSERT / REPLACE / REMOVE PACEMAKER  2014    [employer] Model #[id number] replacement    pace maker      replacement     REVISION OF PROCEDURE INVOLVING PACEMAKER LEAD Left 2022    Procedure: REVISION, ELECTRODE LEAD, CARDIAC PACEMAKER;  Surgeon: Trell Hodgson MD;  Location: [facility] EP LAB;  Service: Cardiology;  Laterality: Left;  PPM lead Malfx, RA lead revision, SJM, MAC, GP, 3 PREP*dPPM SJM in situ**Contrast prep given*    salpingo opherectomy Bilateral     SPINE SURGERY      TONSILLECTOMY      TUBAL LIGATION       Allergies:     Review of patient's allergies indicates:   Allergen Reactions    Iodinated contrast media Anaphylaxis    Penicillins Anaphylaxis    Allopurinol analogues      Muscle cramps    Ciprofloxacin Rash    Quinolones Rash    Sulfa (sulfonamide antibiotics) Rash    Tetracyclines Rash     Medications:     Current Outpatient Medications on File Prior to Visit   Medication Sig Dispense Refill    amLODIPine (NORVASC) 10 MG tablet TAKE 1 TABLET BY MOUTH EVERY DAY 90 tablet 3    aspirin (ECOTRIN) 81 MG EC tablet Take 1 tablet (81 mg total) by mouth once daily.      atorvastatin (LIPITOR) 80 MG tablet Take 1 tablet (80 mg total) by mouth once daily. 90 tablet 3    bisoprolol (ZEBETA) 5 MG tablet Take 1 tablet (5 mg total) by mouth once daily. 90 tablet 3    clobetasoL (OLUX) 0.05 % Foam Apply 1 application topically every 30 days. 100 g 3    ERGOCALCIFEROL, VITAMIN D2, (VITAMIN D ORAL) Take 2,000 Units by mouth once daily.      furosemide  "(LASIX) 20 MG tablet Take 1 tablet (20 mg total) by mouth once daily. 90 tablet 3    olmesartan (BENICAR) 40 MG tablet Take 1 tablet (40 mg total) by mouth once daily. 90 tablet 3    potassium citrate (UROCIT-K) 10 mEq (1,080 mg) TbSR Take 1 tablet (10 mEq total) by mouth once daily. 90 tablet 3    propafenone (RYTHMOL SR) 225 MG Cp12 Take 1 capsule (225 mg total) by mouth every 12 (twelve) hours. 180 capsule 3    topiramate (TOPAMAX) 25 MG tablet Take 1 tablet twice daily. 270 tablet 0     No current facility-administered medications on file prior to visit.     Social History:     Social History     Tobacco Use    Smoking status: Never    Smokeless tobacco: Never   Substance Use Topics    Alcohol use: Yes     Alcohol/week: 10.0 standard drinks     Types: 10 Glasses of wine per week     Family History:     Family History   Problem Relation Age of Onset    Hypertension Mother     Diabetes Mother     Hyperlipidemia Mother     Coronary artery disease Mother     Heart disease Mother     Stroke Mother     Hypertension Father     Diabetes Father     Hyperlipidemia Father     Coronary artery disease Father     Heart disease Father     Coronary artery disease Brother     Heart disease Brother     Hyperthyroidism Brother     Diabetes Paternal Grandmother     Diabetes Paternal Grandfather     Heart attack Neg Hx      Physical Exam:   BP (!) 118/58 (BP Location: Left arm, Patient Position: Sitting)   Pulse 64   Ht 5' 1.5" (1.562 m)   Wt 90 kg (198 lb 6.6 oz)   BMI 36.88 kg/m²      Physical Exam  Vitals and nursing note reviewed.   Constitutional:       Appearance: Normal appearance.   HENT:      Head: Normocephalic and atraumatic.   Neck:      Vascular: No carotid bruit or hepatojugular reflux.   Cardiovascular:      Rate and Rhythm: Normal rate and regular rhythm.      Pulses:           Radial pulses are 2+ on the right side and 2+ on the left side.        Dorsalis pedis pulses are 2+ on the right side and 2+ on the left " side.        Posterior tibial pulses are 2+ on the right side and 2+ on the left side.      Heart sounds: S1 normal and S2 normal.   Pulmonary:      Effort: Pulmonary effort is normal.      Breath sounds: Normal breath sounds.   Abdominal:      General: Bowel sounds are normal.      Palpations: Abdomen is soft.      Tenderness: There is no abdominal tenderness.   Feet:      Right foot:      Skin integrity: Skin integrity normal.      Left foot:      Skin integrity: Skin integrity normal.   Neurological:      Mental Status: She is alert.   Psychiatric:         Behavior: Behavior is cooperative.        Labs:     Blood Tests:  Lab Results   Component Value Date     (H) 01/20/2022     10/11/2022    K 4.1 10/11/2022     10/11/2022    CO2 24 10/11/2022    BUN 26 (H) 10/11/2022    CREATININE 1.3 10/11/2022     10/11/2022    HGBA1C 5.5 03/14/2022    MG 1.9 05/19/2021    AST 22 10/11/2022    ALT 17 10/11/2022    ALBUMIN 4.1 10/11/2022    PROT 7.3 10/11/2022    BILITOT 0.9 10/11/2022    WBC 6.25 01/21/2022    HGB 13.2 01/21/2022    HCT 40.2 01/21/2022    MCV 94 01/21/2022     01/21/2022    INR 1.0 01/07/2022    TSH 1.111 03/28/2019       Lab Results   Component Value Date    CHOL 217 (H) 10/11/2022    HDL 49 10/11/2022    TRIG 184 (H) 10/11/2022       Lab Results   Component Value Date    LDLCALC 131.2 10/11/2022         Imaging:     Echocardiogram  1/20/2022  The left ventricle is normal in size with low normal systolic function.  The estimated ejection fraction is 53%.  There are segmental left ventricular wall motion abnormalities.  There is abnormal septal wall motion.  Normal right ventricular size with normal right ventricular systolic function.  Normal left ventricular diastolic function.  Mild mitral regurgitation.  Normal central venous pressure (3 mmHg).  The estimated PA systolic pressure is 25 mmHg.  Trivial posterior pericardial effusion.    Stress testing  None    Cath  Lab  None    Other  2022 Device check   RA pacing  100%.    RV pacing  5.2%.   Atrial arrhythmias:  4 AMS - <1% burden; max duration 26 secs.     [id number]  Atrial-paced rhythm with prolonged AV conduction   Nonspecific ST abnormality   Abnormal ECG     Assessment:     1. Dyspnea on exertion    2. Chest pain, unspecified type    3. Sick sinus syndrome    4. Paroxysmal atrial fibrillation    5. Benign essential hypertension    6. Mixed hyperlipidemia    7. S/P placement of cardiac pacemaker    8. BMI 35.0-39.9        Plan:     Dyspnea on exertion  -     IN OFFICE EKG 12-LEAD (to Muse)  -     Stress Echo Which stress agent will be used? Pharmacological; Color Flow Doppler? No; Future    Chest pain, unspecified type  -     nitroGLYCERIN (NITROSTAT) 0.4 MG SL tablet; Place 1 tablet (0.4 mg total) under the tongue every 5 (five) minutes as needed for Chest pain.  Dispense: 20 tablet; Refill: 3  -     Stress Echo Which stress agent will be used? Pharmacological; Color Flow Doppler? 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 She understands and agrees.     Sick sinus syndrome  -     Stress Echo Which stress agent will be used? Pharmacological; Color Flow Doppler? No; Future    Paroxysmal atrial fibrillation  LOV w/ Dr. Trell Hodgson 2022  She is no longer on xarelto because she has not had documented episodes of afib device checks   Continues rythmol and aspirin alone     Benign essential hypertension  Well controlled on current medication.     Mixed hyperlipidemia  Continue atorvastatin 80 mg    S/P placement of cardiac pacemaker    BMI 35.0-39.9  Weight loss through a combination of diet and exercise encouraged.       Signed:  Paige Lewis PA-C  Cardiology     2023 4:35 PM    Follow-up:     Future Appointments   Date Time Provider Department Center   3/8/2023  2:30 PM EXERCISE,  STRESS ECHO [facility]OST[facility]   3/13/2023 10:00 AM HOME MONITOR DEVICE CHECK, [facility] ANGELI ARRKAR [facility]

## 2023-02-17 ENCOUNTER — PATIENT MESSAGE (OUTPATIENT)
Dept: CARDIOLOGY | Facility: CLINIC | Age: 73
End: 2023-02-17

## 2023-02-17 ENCOUNTER — OFFICE VISIT (OUTPATIENT)
Dept: CARDIOLOGY | Facility: CLINIC | Age: 73
End: 2023-02-17
Payer: MEDICARE

## 2023-02-17 VITALS
HEART RATE: 64 BPM | WEIGHT: 198.44 LBS | DIASTOLIC BLOOD PRESSURE: 58 MMHG | SYSTOLIC BLOOD PRESSURE: 118 MMHG | BODY MASS INDEX: 36.52 KG/M2 | HEIGHT: 62 IN

## 2023-02-17 DIAGNOSIS — R07.9 CHEST PAIN, UNSPECIFIED TYPE: ICD-10-CM

## 2023-02-17 DIAGNOSIS — E78.2 MIXED HYPERLIPIDEMIA: ICD-10-CM

## 2023-02-17 DIAGNOSIS — I48.0 PAROXYSMAL ATRIAL FIBRILLATION: ICD-10-CM

## 2023-02-17 DIAGNOSIS — E66.01 SEVERE OBESITY (BMI 35.0-39.9) WITH COMORBIDITY: ICD-10-CM

## 2023-02-17 DIAGNOSIS — I10 BENIGN ESSENTIAL HYPERTENSION: ICD-10-CM

## 2023-02-17 DIAGNOSIS — I49.5 SICK SINUS SYNDROME: ICD-10-CM

## 2023-02-17 DIAGNOSIS — Z95.0 S/P PLACEMENT OF CARDIAC PACEMAKER: ICD-10-CM

## 2023-02-17 DIAGNOSIS — R06.09 DYSPNEA ON EXERTION: Primary | ICD-10-CM

## 2023-02-17 PROCEDURE — 93005 ELECTROCARDIOGRAM TRACING: CPT | Mod: PBBFAC | Performed by: INTERNAL MEDICINE

## 2023-02-17 PROCEDURE — 99999 PR PBB SHADOW E&M-EST. PATIENT-LVL IV: ICD-10-PCS | Mod: PBBFAC,,, | Performed by: PHYSICIAN ASSISTANT

## 2023-02-17 PROCEDURE — 99214 OFFICE O/P EST MOD 30 MIN: CPT | Mod: S$PBB,,, | Performed by: PHYSICIAN ASSISTANT

## 2023-02-17 PROCEDURE — 99214 OFFICE O/P EST MOD 30 MIN: CPT | Mod: PBBFAC | Performed by: PHYSICIAN ASSISTANT

## 2023-02-17 PROCEDURE — 93010 ELECTROCARDIOGRAM REPORT: CPT | Mod: S$PBB,,, | Performed by: INTERNAL MEDICINE

## 2023-02-17 PROCEDURE — 93010 EKG 12-LEAD: ICD-10-PCS | Mod: S$PBB,,, | Performed by: INTERNAL MEDICINE

## 2023-02-17 PROCEDURE — 99999 PR PBB SHADOW E&M-EST. PATIENT-LVL IV: CPT | Mod: PBBFAC,,, | Performed by: PHYSICIAN ASSISTANT

## 2023-02-17 PROCEDURE — 99214 PR OFFICE/OUTPT VISIT, EST, LEVL IV, 30-39 MIN: ICD-10-PCS | Mod: S$PBB,,, | Performed by: PHYSICIAN ASSISTANT

## 2023-02-17 RX ORDER — NITROGLYCERIN 0.4 MG/1
0.4 TABLET SUBLINGUAL EVERY 5 MIN PRN
Qty: 20 TABLET | Refills: 3 | Status: SHIPPED | OUTPATIENT
Start: 2023-02-17 | End: 2024-02-17

## 2023-03-08 ENCOUNTER — HOSPITAL ENCOUNTER (OUTPATIENT)
Dept: CARDIOLOGY | Facility: HOSPITAL | Age: 73
Discharge: HOME | End: 2023-03-08
Attending: PHYSICIAN ASSISTANT
Payer: MEDICARE

## 2023-03-08 VITALS
WEIGHT: 195 LBS | BODY MASS INDEX: 36.82 KG/M2 | HEIGHT: 61 IN | RESPIRATION RATE: 16 BRPM | SYSTOLIC BLOOD PRESSURE: 115 MMHG | HEART RATE: 83 BPM | DIASTOLIC BLOOD PRESSURE: 55 MMHG

## 2023-03-08 DIAGNOSIS — I49.5 SICK SINUS SYNDROME: ICD-10-CM

## 2023-03-08 DIAGNOSIS — R06.09 DYSPNEA ON EXERTION: ICD-10-CM

## 2023-03-08 DIAGNOSIS — R07.9 CHEST PAIN, UNSPECIFIED TYPE: ICD-10-CM

## 2023-03-08 LAB
ASCENDING AORTA: 2.8 CM
BSA FOR ECHO PROCEDURE: 1.95 M2
CV ECHO LV RWT: 0.3 CM
CV STRESS BASE HR: 83 BPM
DIASTOLIC BLOOD PRESSURE: 55 MMHG
DOP CALC LVOT AREA: 3.3 CM2
DOP CALC LVOT DIAMETER: 2.06 CM
DOP CALC LVOT PEAK VEL: 0.95 M/S
DOP CALC LVOT STROKE VOLUME: 68.06 CM3
DOP CALCLVOT PEAK VEL VTI: 20.43 CM
E WAVE DECELERATION TIME: 211.99 MSEC
E/A RATIO: 2.34
E/E' RATIO: 10.3 M/S
ECHO LV POSTERIOR WALL: 0.77 CM (ref 0.6–1.1)
EJECTION FRACTION: 55 %
FRACTIONAL SHORTENING: 32 % (ref 28–44)
INTERVENTRICULAR SEPTUM: 0.88 CM (ref 0.6–1.1)
IVRT: 82.78 MSEC
LA MAJOR: 5.25 CM
LA MINOR: 4.92 CM
LA WIDTH: 3.66 CM
LEFT ATRIUM SIZE: 3.32 CM
LEFT ATRIUM VOLUME INDEX: 28.1 ML/M2
LEFT ATRIUM VOLUME: 52.47 CM3
LEFT INTERNAL DIMENSION IN SYSTOLE: 3.54 CM (ref 2.1–4)
LEFT VENTRICLE DIASTOLIC VOLUME INDEX: 68.44 ML/M2
LEFT VENTRICLE DIASTOLIC VOLUME: 127.98 ML
LEFT VENTRICLE MASS INDEX: 80 G/M2
LEFT VENTRICLE SYSTOLIC VOLUME INDEX: 27.9 ML/M2
LEFT VENTRICLE SYSTOLIC VOLUME: 52.11 ML
LEFT VENTRICULAR INTERNAL DIMENSION IN DIASTOLE: 5.17 CM (ref 3.5–6)
LEFT VENTRICULAR MASS: 149.55 G
LV LATERAL E/E' RATIO: 10.3 M/S
LV SEPTAL E/E' RATIO: 10.3 M/S
MV PEAK A VEL: 0.44 M/S
MV PEAK E VEL: 1.03 M/S
MV STENOSIS PRESSURE HALF TIME: 61.48 MS
MV VALVE AREA P 1/2 METHOD: 3.58 CM2
OHS CV CPX 1 MINUTE RECOVERY HEART RATE: 123 BPM
OHS CV CPX 85 PERCENT MAX PREDICTED HEART RATE MALE: 120
OHS CV CPX MAX PREDICTED HEART RATE: 142
OHS CV CPX PATIENT IS FEMALE: 1
OHS CV CPX PATIENT IS MALE: 0
OHS CV CPX PEAK DIASTOLIC BLOOD PRESSURE: 64 MMHG
OHS CV CPX PEAK HEAR RATE: 123 BPM
OHS CV CPX PEAK RATE PRESSURE PRODUCT: NORMAL
OHS CV CPX PEAK SYSTOLIC BLOOD PRESSURE: 164 MMHG
OHS CV CPX PERCENT MAX PREDICTED HEART RATE ACHIEVED: 87
OHS CV CPX RATE PRESSURE PRODUCT PRESENTING: 9545
PISA TR MAX VEL: 2.93 M/S
RA MAJOR: 4.62 CM
RA PRESSURE: 3 MMHG
RA WIDTH: 3.33 CM
RIGHT VENTRICULAR END-DIASTOLIC DIMENSION: 3.66 CM
RV TISSUE DOPPLER FREE WALL SYSTOLIC VELOCITY 1 (APICAL 4 CHAMBER VIEW): 12.7 CM/S
SINUS: 2.95 CM
STJ: 2.71 CM
SYSTOLIC BLOOD PRESSURE: 115 MMHG
TDI LATERAL: 0.1 M/S
TDI SEPTAL: 0.1 M/S
TDI: 0.1 M/S
TR MAX PG: 34 MMHG
TRICUSPID ANNULAR PLANE SYSTOLIC EXCURSION: 2.05 CM
TV REST PULMONARY ARTERY PRESSURE: 37 MMHG

## 2023-03-08 PROCEDURE — 93351 STRESS TTE COMPLETE: CPT

## 2023-03-08 PROCEDURE — 93351 STRESS ECHO (CUPID ONLY): ICD-10-PCS | Mod: 26,,, | Performed by: INTERNAL MEDICINE

## 2023-03-08 PROCEDURE — 93351 STRESS TTE COMPLETE: CPT | Mod: 26,,, | Performed by: INTERNAL MEDICINE

## 2023-03-08 PROCEDURE — 63600150 PHARM REV CODE 636: Performed by: PHYSICIAN ASSISTANT

## 2023-03-08 PROCEDURE — 63600175 PHARM REV CODE 636 W HCPCS: Performed by: PHYSICIAN ASSISTANT

## 2023-03-08 RX ORDER — ATROPINE SULFATE 0.1 MG/ML
0.5 INJECTION INTRAVENOUS
Status: COMPLETED | OUTPATIENT
Start: 2023-03-08 | End: 2023-03-08

## 2023-03-08 RX ORDER — DOBUTAMINE HYDROCHLORIDE 100 MG/100ML
40 INJECTION INTRAVENOUS
Status: COMPLETED | OUTPATIENT
Start: 2023-03-08 | End: 2023-03-08

## 2023-03-08 RX ADMIN — DOBUTAMINE IN DEXTROSE 40 MCG/KG/MIN: 100 INJECTION, SOLUTION INTRAVENOUS at 03:03

## 2023-03-08 RX ADMIN — ATROPINE SULFATE 0.5 MG: 0.1 INJECTION INTRAVENOUS at 03:03

## 2023-03-09 ENCOUNTER — PATIENT MESSAGE (OUTPATIENT)
Dept: PRIMARY CARE CLINIC | Facility: CLINIC | Age: 73
End: 2023-03-09
Payer: MEDICARE

## 2023-03-09 ENCOUNTER — PATIENT MESSAGE (OUTPATIENT)
Dept: CARDIOLOGY | Facility: CLINIC | Age: 73
End: 2023-03-09
Payer: MEDICARE

## 2023-03-09 ENCOUNTER — TELEPHONE (OUTPATIENT)
Dept: CARDIOLOGY | Facility: CLINIC | Age: 73
End: 2023-03-09
Payer: MEDICARE

## 2023-03-09 DIAGNOSIS — R06.02 SHORTNESS OF BREATH: Primary | ICD-10-CM

## 2023-03-09 DIAGNOSIS — I10 BENIGN ESSENTIAL HYPERTENSION: ICD-10-CM

## 2023-03-13 ENCOUNTER — CLINICAL SUPPORT (OUTPATIENT)
Dept: CARDIOLOGY | Facility: HOSPITAL | Age: 73
End: 2023-03-13
Payer: MEDICARE

## 2023-03-13 DIAGNOSIS — I49.5 SICK SINUS SYNDROME: ICD-10-CM

## 2023-03-13 DIAGNOSIS — I48.91 UNSPECIFIED ATRIAL FIBRILLATION: ICD-10-CM

## 2023-03-13 DIAGNOSIS — Z95.0 PRESENCE OF CARDIAC PACEMAKER: ICD-10-CM

## 2023-03-22 ENCOUNTER — PATIENT MESSAGE (OUTPATIENT)
Dept: ADMINISTRATIVE | Facility: HOSPITAL | Age: 73
End: 2023-03-22
Payer: MEDICARE

## 2023-03-22 ENCOUNTER — OFFICE VISIT (OUTPATIENT)
Dept: PRIMARY CARE CLINIC | Facility: CLINIC | Age: 73
End: 2023-03-22
Payer: MEDICARE

## 2023-03-22 ENCOUNTER — LAB VISIT (OUTPATIENT)
Dept: LAB | Facility: HOSPITAL | Age: 73
End: 2023-03-22
Payer: MEDICARE

## 2023-03-22 VITALS
OXYGEN SATURATION: 96 % | DIASTOLIC BLOOD PRESSURE: 68 MMHG | WEIGHT: 197.31 LBS | BODY MASS INDEX: 36.31 KG/M2 | HEART RATE: 91 BPM | HEIGHT: 62 IN | SYSTOLIC BLOOD PRESSURE: 130 MMHG | TEMPERATURE: 98 F

## 2023-03-22 DIAGNOSIS — I10 BENIGN ESSENTIAL HYPERTENSION: ICD-10-CM

## 2023-03-22 DIAGNOSIS — R06.83 SNORING: ICD-10-CM

## 2023-03-22 DIAGNOSIS — G47.8 OTHER SLEEP DISORDERS: ICD-10-CM

## 2023-03-22 DIAGNOSIS — I49.5 SICK SINUS SYNDROME: ICD-10-CM

## 2023-03-22 DIAGNOSIS — N18.32 STAGE 3B CHRONIC KIDNEY DISEASE: ICD-10-CM

## 2023-03-22 DIAGNOSIS — I70.0 AORTIC ATHEROSCLEROSIS: ICD-10-CM

## 2023-03-22 DIAGNOSIS — E66.01 SEVERE OBESITY (BMI 35.0-39.9) WITH COMORBIDITY: ICD-10-CM

## 2023-03-22 DIAGNOSIS — R06.02 SHORTNESS OF BREATH: ICD-10-CM

## 2023-03-22 DIAGNOSIS — R06.09 DOE (DYSPNEA ON EXERTION): Primary | ICD-10-CM

## 2023-03-22 DIAGNOSIS — E78.2 MIXED HYPERLIPIDEMIA: ICD-10-CM

## 2023-03-22 DIAGNOSIS — Z95.0 S/P PLACEMENT OF CARDIAC PACEMAKER: ICD-10-CM

## 2023-03-22 LAB
ANION GAP SERPL CALC-SCNC: 8 MMOL/L (ref 8–16)
BNP SERPL-MCNC: 167 PG/ML (ref 0–99)
BUN SERPL-MCNC: 28 MG/DL (ref 8–23)
CALCIUM SERPL-MCNC: 10.8 MG/DL (ref 8.7–10.5)
CHLORIDE SERPL-SCNC: 109 MMOL/L (ref 95–110)
CO2 SERPL-SCNC: 25 MMOL/L (ref 23–29)
CREAT SERPL-MCNC: 1.4 MG/DL (ref 0.5–1.4)
ERYTHROCYTE [DISTWIDTH] IN BLOOD BY AUTOMATED COUNT: 13.2 % (ref 11.5–14.5)
EST. GFR  (NO RACE VARIABLE): 39.7 ML/MIN/1.73 M^2
GLUCOSE SERPL-MCNC: 110 MG/DL (ref 70–110)
HCT VFR BLD AUTO: 43.6 % (ref 37–48.5)
HGB BLD-MCNC: 13.8 G/DL (ref 12–16)
MCH RBC QN AUTO: 30.3 PG (ref 27–31)
MCHC RBC AUTO-ENTMCNC: 31.7 G/DL (ref 32–36)
MCV RBC AUTO: 96 FL (ref 82–98)
PLATELET # BLD AUTO: 227 K/UL (ref 150–450)
PMV BLD AUTO: 10.1 FL (ref 9.2–12.9)
POTASSIUM SERPL-SCNC: 4.3 MMOL/L (ref 3.5–5.1)
RBC # BLD AUTO: 4.56 M/UL (ref 4–5.4)
SODIUM SERPL-SCNC: 142 MMOL/L (ref 136–145)
WBC # BLD AUTO: 5.35 K/UL (ref 3.9–12.7)

## 2023-03-22 PROCEDURE — 99214 PR OFFICE/OUTPT VISIT, EST, LEVL IV, 30-39 MIN: ICD-10-PCS | Mod: S$PBB,,, | Performed by: INTERNAL MEDICINE

## 2023-03-22 PROCEDURE — 99999 PR PBB SHADOW E&M-EST. PATIENT-LVL V: CPT | Mod: PBBFAC,,, | Performed by: INTERNAL MEDICINE

## 2023-03-22 PROCEDURE — 36415 COLL VENOUS BLD VENIPUNCTURE: CPT | Mod: PN | Performed by: PHYSICIAN ASSISTANT

## 2023-03-22 PROCEDURE — 83880 ASSAY OF NATRIURETIC PEPTIDE: CPT | Performed by: PHYSICIAN ASSISTANT

## 2023-03-22 PROCEDURE — 80048 BASIC METABOLIC PNL TOTAL CA: CPT | Performed by: PHYSICIAN ASSISTANT

## 2023-03-22 PROCEDURE — 99215 OFFICE O/P EST HI 40 MIN: CPT | Mod: PBBFAC,PN | Performed by: INTERNAL MEDICINE

## 2023-03-22 PROCEDURE — 85027 COMPLETE CBC AUTOMATED: CPT | Performed by: PHYSICIAN ASSISTANT

## 2023-03-22 PROCEDURE — 99999 PR PBB SHADOW E&M-EST. PATIENT-LVL V: ICD-10-PCS | Mod: PBBFAC,,, | Performed by: INTERNAL MEDICINE

## 2023-03-22 PROCEDURE — 99214 OFFICE O/P EST MOD 30 MIN: CPT | Mod: S$PBB,,, | Performed by: INTERNAL MEDICINE

## 2023-03-22 NOTE — PROGRESS NOTES
Subjective:       Patient ID: Jesenia Curiel is a 72 y.o. female.    Chief Complaint: Shortness of Breath    HPI  Had cruise in Jan and started exercising in oct (baseline does not exercise much). Last yr in August when she was walking in the airport, she had some SOB walking from gate to gate that she attributed to lack of exercise.   When she's on the treadmill, cannot walk at a fast pace and can only walk for about 30-40 min on the treadmill due to SOB.   Went to [place] in [place], couldn't keep up w/ the people on her first excursion - couldn't walk w/o multiple stops for the 1/2 mile to the [employer] machine. Had to cancel her other excursions.   She hasn't been exercising. Doesn't know if it's worse. Fairly sedentary life in general. No weight gain overall.   No wheezing. Dry cough occasionally. No fevers/chill. Some hot flashes at night.   2nd hand smoking for about 16 yrs ( smoked <1/2 ppd). No occupational exposure.     SSS s/p PM (remotely checked 2/21/23) s/p RA lead revision. Afib on rhythmol and ASA.  LOV Dr. JOSE Hodgson 5/16/22. F/u in 1 yr.  Saw CRISS Dyer 2/17/23 in cardiology for NAVA.   DSE 3/8/23  The stress echo portion of this study is negative for myocardial ischemia.  The ECG portion of this study is abnormal but not diagnostic for ischemia.  The patient reached the end of the protocol.  There were no arrhythmias during stress.  The left ventricle is normal in size with normal systolic function.  The estimated ejection fraction is 55%.  There is abnormal septal wall motion.  Normal left ventricular diastolic function.  The estimated PA systolic pressure is 37 mmHg.  Normal right ventricular size with normal right ventricular systolic function.  Normal central venous pressure (3 mmHg).     CKD 3B - Cr baseline 1.2-1.4, most recently 10/11/22 1.3, eGFR 43.7.  Saw Dr. Mcgarry 6/7/22. Due to F/U.  PTH 2021 wnl. Needs repeat  HTN - amlodipine 10mg daily, bisoprolol 5mg daily, lasix 20mg  "daily, olmesartan 40mg daily.  HCTZ-->hyperCa.  Checks her BP at home. 110s for systolic      IgG paraproteinemia  Last saw Dr. Lopez 5/14/22 - thinks mildly elevated light chains w/ normal ratio due to CKD. Due to repeat labs for f/u.     Obesity - on topamax 25mg BID and on urocit 10mEq daily.  Weight has been stable.     HLD - atorvastatin 80mg daily.  CXR 1/13/22 - Mediastinal structures are midline with calcification and tortuosity of the aorta and mild to moderately enlarged cardiac silhouette.    Snores at night.  never complains of her stopping breathing.    Review of Systems  Comprehensive review of systems otherwise negative. See history/subjective section for more details.    Objective:      Physical Exam    /68   Pulse 91   Temp 98 °F (36.7 °C) (Oral)   Ht 5' 1.5" (1.562 m)   Wt 89.5 kg (197 lb 5 oz)   SpO2 96%   BMI 36.68 kg/m²     GEN - A+OX4, NAD   HEENT - PERRL, EOMI, OP clear but small. TM normal.   Neck - No thyromegaly or cervical LAD. Does have significant fatty tissue around the neck.   CV - RRR, no m/r   Chest - CTAB, no wheezing or rhonchi  Abd - S/NT/ND/+BS.   Ext - 2+BDP and radial pulses. No C/C/E.  Skin - no rash.     Previous labs and imaging reviewed.     Assessment/Plan     Jesenia was seen today for shortness of breath.    Diagnoses and all orders for this visit:    NAVA (dyspnea on exertion)  -     Six Minute Walk Test to qualify for Home Oxygen; Future  -     Complete PFT with bronchodilator; Future  -     CT Chest Without Contrast; Future  -     Ambulatory referral/consult to Pulmonology; Future  -     Home Sleep Study; Future    Snoring  -     Home Sleep Study; Future    Other sleep disorders  -     Home Sleep Study; Future    Sick sinus syndrome S/P placement of cardiac pacemaker - f/u w/ Dr. JOSE denton.     Stage 3b chronic kidney disease - stable on labs 10/2022.    Benign essential hypertension - Stable and controlled. Continue current medications.    Mixed " hyperlipidemia - cont atorva 80    Aortic atherosclerosis - cont asa 81 and atorva 80.    Severe obesity (BMI 36.68) with comorbidity - cont topamax. Work up as above. Blood pressure at goal. Weight may also be contributory.       Follow up if symptoms worsen or fail to improve. Phone review of labs/imaging.      Sandra Michaud MD  Department of Internal Medicine - [facility]  8:13 AM

## 2023-03-23 ENCOUNTER — TELEPHONE (OUTPATIENT)
Dept: SLEEP MEDICINE | Facility: OTHER | Age: 73
End: 2023-03-23
Payer: MEDICARE

## 2023-03-27 NOTE — PROGRESS NOTES
Subjective:   Patient ID:  Jseenia Curiel is a 72 y.o. female who presents for follow-up of No chief complaint on file.    The patient location is: home  The chief complaint leading to consultation is: nava    Visit type: audiovisual    Face to Face time with patient: 30min  30 minutes of total time spent on the encounter, which includes face to face time and non-face to face time preparing to see the patient (eg, review of tests), Obtaining and/or reviewing separately obtained history, Documenting clinical information in the electronic or other health record, Independently interpreting results (not separately reported) and communicating results to the patient/family/caregiver, or Care coordination (not separately reported).         Each patient to whom he or she provides medical services by telemedicine is:  (1) informed of the relationship between the physician and patient and the respective role of any other health care provider with respect to management of the patient; and (2) notified that he or she may decline to receive medical services by telemedicine and may withdraw from such care at any time.    Notes:       PROBLEM LIST:  PAF  SSS  PPM  HTN  HLD  CKD3  IFG    HPI 6/17:She recently moved from [place] and presents today to establish care. 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 She has a history of atrial fibrillation and sick sinus syndrome. She had a [employer] Paolo pacemaker placed in 2009 and was diagnosed with atrial fibrillation in 2008. She has never required cardioversion. She has maintained sinus rhythm on Propafenone. A stress test in 2009 was normal per her report. Jesenia Curiel denies any chest pain, shortness of breath, PND, orthopnea, palpitations, leg edema, or syncope. She does not exercise on a regular basis. She just started taking Belvique and has lost 5 lbs.    Interval history:She reports NAVA with minimal exertion since January. No chest pain, orthopnea, leg edema. NICK had ST  depression on ECG but no WMA, normal LVEF and normal estimated CVP. BNP only minimally elevated. CT chest was abnormal with basilar and peripheral predominant groundglass opacification with subpleural reticulation.  Previous Covid infection?  No honeycombing.  Mild lower lobe traction bronchiectasis. No coronary artery calcification noted.    ECG 03/08/23: Personally reviewed by me shows atrial paced rhythm with prolonged AV conduction    NICK 1/23:  Summary    The stress echo portion of this study is negative for myocardial ischemia.  The ECG portion of this study is abnormal but not diagnostic for ischemia.  The patient reached the end of the protocol.  There were no arrhythmias during stress.  The left ventricle is normal in size with normal systolic function.  The estimated ejection fraction is 55%.  There is abnormal septal wall motion.  Normal left ventricular diastolic function.  The estimated PA systolic pressure is 37 mmHg.  Normal right ventricular size with normal right ventricular systolic function.  Normal central venous pressure (3 mmHg).    Device interrogation 2/23:  Conclusion    Additional Comments   Remote interrogation report on SJM PPM (implanted 9/22/2014)   Presenting egram demonstrates AP/VS   Device fxn WNL. DDDR  60/125   Autocapture algorithms: RA is monitor, RV is on   RA pacing 99%, RV pacing 2.5%   Atrial arrhythmias: AMS p53--wywa c/w afib (some undersensed P waves), longest at 2m 14s and peak vent rate of 122 bpm   Anticoagulation status: not on OAC   Rythmol  mg twice daily, Zebeta 5 mg daily   Ventricular arrhythmias: none detected   Battery status/longevity (estimated): 10.3 months   F/U via remote transmissions on a monthly basis until RRT is reached   Due 4/2023 w/MD and in clinic device check. Messaged MA to schedule appts   Report prepared by MELISA Brady    Echo 1/22    Summary    The left ventricle is normal in size with low normal systolic function.  The estimated  ejection fraction is 53%.  There are segmental left ventricular wall motion abnormalities.  There is abnormal septal wall motion.  Normal right ventricular size with normal right ventricular systolic function.  Normal left ventricular diastolic function.  Mild mitral regurgitation.  Normal central venous pressure (3 mmHg).  The estimated PA systolic pressure is 25 mmHg.  Trivial posterior pericardial effusion.          Past Medical History:   Diagnosis Date    Allergy     Arthritis     Atrial fibrillation 2017    CKD (chronic kidney disease) stage 3, GFR 30-59 ml/min 2017    Disorder of kidney and ureter     GERD (gastroesophageal reflux disease)     Hyperlipidemia     Hypertension     Impaired fasting glucose 2021    Pre-diabetes 6/3/2017       Past Surgical History:   Procedure Laterality Date    ADENOIDECTOMY      BACK SURGERY      lamenectomy    BREAST SURGERY      CARDIAC SURGERY      st paolo pacemaker     CATARACT EXTRACTION W/  INTRAOCULAR LENS IMPLANT Right 6/3/2019    Procedure: EXTRACTION, CATARACT, WITH IOL INSERTION;  Surgeon: Raisa Moreno MD;  Location: [facility];  Service: Ophthalmology;  Laterality: Right;  Laser    CATARACT EXTRACTION W/  INTRAOCULAR LENS IMPLANT Left 2019    Procedure: EXTRACTION, CATARACT, WITH IOL INSERTION;  Surgeon: Raisa Moreno MD;  Location: [facility];  Service: Ophthalmology;  Laterality: Left;  LASER ASSISTED     SECTION      ,     EYE SURGERY      FRACTURE SURGERY      HYSTERECTOMY      INSERT / REPLACE / REMOVE PACEMAKER      placement    INSERT / REPLACE / REMOVE PACEMAKER  2014    St Paolo Model #[id number] replacement    pace maker      replacement     REVISION OF PROCEDURE INVOLVING PACEMAKER LEAD Left 2022    Procedure: REVISION, ELECTRODE LEAD, CARDIAC PACEMAKER;  Surgeon: Trell Hodgson MD;  Location: [facility];  Service: Cardiology;  Laterality: Left;  PPM lead Malfx, RA lead revision, SJM, MAC, GP, 3  PREP*dPPM SJM in situ**Contrast prep given*    salpingo opherectomy Bilateral 1998    SPINE SURGERY      TONSILLECTOMY  1953    TUBAL LIGATION         Social History     Socioeconomic History    Marital status:    Tobacco Use    Smoking status: Never    Smokeless tobacco: Never   Substance and Sexual Activity    Alcohol use: Yes     Alcohol/week: 10.0 standard drinks     Types: 10 Glasses of wine per week    Drug use: No    Sexual activity: Not Currently     Partners: Male   Social History Narrative    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 Walks daily along the [facility].     Social Determinants of Health     Financial Resource Strain: Low Risk     Difficulty of Paying Living Expenses: Not hard at all   Food Insecurity: No Food Insecurity    Worried About Running Out of Food in the Last Year: Never true    Ran Out of Food in the Last Year: Never true   Transportation Needs: No Transportation Needs    Lack of Transportation (Medical): No    Lack of Transportation (Non-Medical): No   Physical Activity: Inactive    Days of Exercise per Week: 0 days    Minutes of Exercise per Session: 0 min   Stress: Stress Concern Present    Feeling of Stress : To some extent   Social Connections: Unknown    Frequency of Communication with Friends and Family: More than three times a week    Frequency of Social Gatherings with Friends and Family: More than three times a week    Active Member of Clubs or Organizations: No    Attends Club or Organization Meetings: Never    Marital Status:    Housing Stability: Low Risk     Unable to Pay for Housing in the Last Year: No    Number of Places Lived in the Last Year: 1    Unstable Housing in the Last Year: No       Family History   Problem Relation Age of Onset    Hypertension Mother     Diabetes Mother     Hyperlipidemia Mother     Coronary artery disease Mother     Heart disease Mother     Stroke Mother     Hypertension Father     Diabetes Father     Hyperlipidemia Father      Coronary artery disease Father     Heart disease Father     Coronary artery disease Brother     Heart disease Brother     Hyperthyroidism Brother     Diabetes Paternal Grandmother     Diabetes Paternal Grandfather     Heart attack Neg Hx        Patient's Medications   New Prescriptions    No medications on file   Previous Medications    AMLODIPINE (NORVASC) 10 MG TABLET    TAKE 1 TABLET BY MOUTH EVERY DAY    ASPIRIN (ECOTRIN) 81 MG EC TABLET    Take 1 tablet (81 mg total) by mouth once daily.    ATORVASTATIN (LIPITOR) 80 MG TABLET    Take 1 tablet (80 mg total) by mouth once daily.    BISOPROLOL (ZEBETA) 5 MG TABLET    Take 1 tablet (5 mg total) by mouth once daily.    CLOBETASOL (OLUX) 0.05 % FOAM    Apply 1 application topically every 30 days.    ERGOCALCIFEROL, VITAMIN D2, (VITAMIN D ORAL)    Take 2,000 Units by mouth once daily.    FUROSEMIDE (LASIX) 20 MG TABLET    Take 1 tablet (20 mg total) by mouth once daily.    NITROGLYCERIN (NITROSTAT) 0.4 MG SL TABLET    Place 1 tablet (0.4 mg total) under the tongue every 5 (five) minutes as needed for Chest pain.    OLMESARTAN (BENICAR) 40 MG TABLET    Take 1 tablet (40 mg total) by mouth once daily.    POTASSIUM CITRATE (UROCIT-K) 10 MEQ (1,080 MG) TBSR    Take 1 tablet (10 mEq total) by mouth once daily.    PROPAFENONE (RYTHMOL SR) 225 MG CP12    Take 1 capsule (225 mg total) by mouth every 12 (twelve) hours.    TOPIRAMATE (TOPAMAX) 25 MG TABLET    Take 1 tablet twice daily.   Modified Medications    No medications on file   Discontinued Medications    No medications on file       Review of Systems   Constitutional: Negative for malaise/fatigue and weight gain.   HENT:  Negative for hearing loss.    Eyes:  Negative for visual disturbance.   Cardiovascular:  Positive for dyspnea on exertion. Negative for chest pain, claudication, leg swelling, near-syncope, orthopnea, palpitations, paroxysmal nocturnal dyspnea and syncope.   Respiratory:  Negative for cough,  shortness of breath, sleep disturbances due to breathing, snoring and wheezing.    Endocrine: Negative for cold intolerance, heat intolerance, polydipsia, polyphagia and polyuria.   Hematologic/Lymphatic: Negative for bleeding problem. Does not bruise/bleed easily.   Skin:  Negative for rash and suspicious lesions.   Musculoskeletal:  Negative for arthritis, falls, joint pain, muscle weakness and myalgias.   Gastrointestinal:  Negative for abdominal pain, change in bowel habit, constipation, diarrhea, heartburn, hematochezia, melena and nausea.   Genitourinary:  Negative for hematuria and nocturia.   Neurological:  Negative for excessive daytime sleepiness, dizziness, headaches, light-headedness, loss of balance and weakness.   Psychiatric/Behavioral:  Negative for depression. The patient is not nervous/anxious.    Allergic/Immunologic: Negative for environmental allergies.     There were no vitals taken for this visit.    Objective:       Lab Results   Component Value Date     03/22/2023    K 4.3 03/22/2023     03/22/2023    CO2 25 03/22/2023    BUN 28 (H) 03/22/2023    CREATININE 1.4 03/22/2023     03/22/2023    HGBA1C 5.5 03/14/2022    MG 1.9 05/19/2021    AST 22 10/11/2022    ALT 17 10/11/2022    ALBUMIN 4.1 10/11/2022    PROT 7.3 10/11/2022    BILITOT 0.9 10/11/2022    WBC 5.35 03/22/2023    HGB 13.8 03/22/2023    HCT 43.6 03/22/2023    MCV 96 03/22/2023     03/22/2023    INR 1.0 01/07/2022    TSH 1.111 03/28/2019    CHOL 217 (H) 10/11/2022    HDL 49 10/11/2022    LDLCALC 131.2 10/11/2022    TRIG 184 (H) 10/11/2022     (H) 03/22/2023       Assessment:     1. Paroxysmal atrial fibrillation :  Her CHADS2 Vasc score is 3. She is on aspirinas well as propafenone for rhythm control.  She follows with Dr. Trell Hodgson.   2. Sick sinus syndrome :s/p ppm   3. S/P placement of cardiac pacemaker :  PAF on last device interrogation. < 5 minutes.   4. Benign essential hypertension : Blood  pressure is at goal. I have made no changes. Continue current regimen.   5. Mixed hyperlipidemia : LDL > 100 on atorvastatin 80 mg. No reported coronary calcium on recent chest CT. Discussed adding zetia for goal LDL < 100. She would like to hold off for now and address pulmonary issues.   6. Stage 3a chronic kidney disease : Follows with Nephrology.   7. Impaired fasting glucose : Recommend limiting sugar, alcohol, and simple carbohydrates in the diet.   8.      NAVA: Her chest CT was abnormal. She is scheduled for upcoming PFT's and Pulmonary appt in June.    Plan:     Diagnoses and all orders for this visit:    Paroxysmal atrial fibrillation    Benign essential hypertension    Mixed hyperlipidemia    Sick sinus syndrome    S/P placement of cardiac pacemaker    Stage 3a chronic kidney disease    Impaired fasting glucose        Thank you for allowing me to participate in this patient's care. Please do not hesitate to contact me with any questions or concerns.

## 2023-03-28 DIAGNOSIS — Z12.11 SCREENING FOR COLON CANCER: ICD-10-CM

## 2023-03-29 ENCOUNTER — OFFICE VISIT (OUTPATIENT)
Dept: CARDIOLOGY | Facility: CLINIC | Age: 73
End: 2023-03-29
Payer: MEDICARE

## 2023-03-29 ENCOUNTER — TELEPHONE (OUTPATIENT)
Dept: PRIMARY CARE CLINIC | Facility: CLINIC | Age: 73
End: 2023-03-29
Payer: MEDICARE

## 2023-03-29 DIAGNOSIS — Z95.0 S/P PLACEMENT OF CARDIAC PACEMAKER: ICD-10-CM

## 2023-03-29 DIAGNOSIS — N18.31 STAGE 3A CHRONIC KIDNEY DISEASE: ICD-10-CM

## 2023-03-29 DIAGNOSIS — I49.5 SICK SINUS SYNDROME: ICD-10-CM

## 2023-03-29 DIAGNOSIS — I48.0 PAROXYSMAL ATRIAL FIBRILLATION: Primary | ICD-10-CM

## 2023-03-29 DIAGNOSIS — I10 BENIGN ESSENTIAL HYPERTENSION: ICD-10-CM

## 2023-03-29 DIAGNOSIS — E27.8 ADRENAL INCIDENTALOMA: Primary | ICD-10-CM

## 2023-03-29 DIAGNOSIS — R73.01 IMPAIRED FASTING GLUCOSE: ICD-10-CM

## 2023-03-29 DIAGNOSIS — E78.2 MIXED HYPERLIPIDEMIA: ICD-10-CM

## 2023-03-29 PROCEDURE — 99214 OFFICE O/P EST MOD 30 MIN: CPT | Mod: 95,,, | Performed by: INTERNAL MEDICINE

## 2023-03-29 PROCEDURE — 99214 PR OFFICE/OUTPT VISIT, EST, LEVL IV, 30-39 MIN: ICD-10-PCS | Mod: 95,,, | Performed by: INTERNAL MEDICINE

## 2023-03-29 NOTE — TELEPHONE ENCOUNTER
Please call and notify pt:  CT of the chest shows some groundglass opacification that may be a sign of previous COVID infection. There is also some mild mucus plug. I want her to follow up with pulmonary as discussed.   There's also an incidental finding of 1.3cm adrenal gland nodule. I'd like her to follow up with endocrinology for further evaluation as adrenal gland hormones will need to be checked along w/ either repeat CT to look at the adrenal gland.

## 2023-03-31 ENCOUNTER — HOSPITAL ENCOUNTER (OUTPATIENT)
Dept: PULMONOLOGY | Facility: CLINIC | Age: 73
Discharge: HOME | End: 2023-03-31
Payer: MEDICARE

## 2023-03-31 VITALS — WEIGHT: 197.06 LBS | BODY MASS INDEX: 37.2 KG/M2 | HEIGHT: 61 IN

## 2023-03-31 DIAGNOSIS — R06.09 DOE (DYSPNEA ON EXERTION): ICD-10-CM

## 2023-03-31 LAB
DLCO ADJ PRE: 11.36 ML/(MIN*MMHG) (ref 13.25–24.72)
DLCO SINGLE BREATH LLN: 13.25
DLCO SINGLE BREATH PRE REF: 60.5 %
DLCO SINGLE BREATH REF: 18.98
DLCOC SBVA LLN: 2.67
DLCOC SBVA PRE REF: 86.1 %
DLCOC SBVA REF: 4.28
DLCOC SINGLE BREATH LLN: 13.25
DLCOC SINGLE BREATH PRE REF: 59.8 %
DLCOC SINGLE BREATH REF: 18.98
DLCOCSBVAULN: 5.89
DLCOCSINGLEBREATHULN: 24.72
DLCOSINGLEBREATHULN: 24.72
DLCOVA LLN: 2.67
DLCOVA PRE REF: 87.2 %
DLCOVA PRE: 3.73 ML/(MIN*MMHG*L) (ref 2.67–5.89)
DLCOVA REF: 4.28
DLCOVAULN: 5.89
DLVAADJ PRE: 3.68 ML/(MIN*MMHG*L) (ref 2.67–5.89)
ERV LLN: -16449.43
ERV PRE REF: 56 %
ERV REF: 0.57
ERVULN: ABNORMAL
FEF 25 75 LLN: 0.73
FEF 25 75 PRE REF: 55.7 %
FEF 25 75 REF: 1.64
FET100 CHG: -0.2 %
FEV05 LLN: 0.66
FEV05 REF: 1.52
FEV1 CHG: 2.9 %
FEV1 FVC LLN: 64
FEV1 FVC PRE REF: 93.4 %
FEV1 FVC REF: 78
FEV1 LLN: 1.37
FEV1 PRE REF: 71.9 %
FEV1 REF: 1.91
FEV1 VOL CHG: 0.04
FRCPLETH LLN: 1.72
FRCPLETH PREREF: 69 %
FRCPLETH REF: 2.54
FRCPLETHULN: 3.37
FVC CHG: 1.8 %
FVC LLN: 1.77
FVC PRE REF: 76.4 %
FVC REF: 2.46
FVC VOL CHG: 0.03
IVC PRE: 2 L (ref 1.77–3.18)
IVC SINGLE BREATH LLN: 1.77
IVC SINGLE BREATH PRE REF: 81.5 %
IVC SINGLE BREATH REF: 2.46
IVCSINGLEBREATHULN: 3.18
LLN IC: -16448.38
PEF LLN: 3.41
PEF PRE REF: 122.7 %
PEF REF: 4.96
PHYSICIAN COMMENT: ABNORMAL
POST FEF 25 75: 0.97 L/S (ref 0.73–2.95)
POST FET 100: 6.29 SEC
POST FEV1 FVC: 73.81 % (ref 64.31–90.04)
POST FEV1: 1.41 L (ref 1.37–2.42)
POST FEV5: 1.18 L (ref 0.66–2.37)
POST FVC: 1.91 L (ref 1.77–3.18)
POST PEF: 5.67 L/S (ref 3.41–6.52)
PRE DLCO: 11.49 ML/(MIN*MMHG) (ref 13.25–24.72)
PRE ERV: 0.32 L (ref -16449.43–16450.57)
PRE FEF 25 75: 0.91 L/S (ref 0.73–2.95)
PRE FET 100: 6.3 SEC
PRE FEV05 REF: 75 %
PRE FEV1 FVC: 72.97 % (ref 64.31–90.04)
PRE FEV1: 1.37 L (ref 1.37–2.42)
PRE FEV5: 1.14 L (ref 0.66–2.37)
PRE FRC PL: 1.76 L (ref 1.72–3.37)
PRE FVC: 1.88 L (ref 1.77–3.18)
PRE IC: 1.68 L (ref -16448.38–16451.62)
PRE PEF: 6.09 L/S (ref 3.41–6.52)
PRE REF IC: 104.1 %
PRE RV: 1.44 L (ref 1.4–2.55)
PRE TLC: 3.44 L (ref 3.45–5.42)
PRE VTG: 1.86 L
RAW PRE REF: 183.8 %
RAW PRE: 5.62 CMH2O*S/L (ref 3.06–3.06)
RAW REF: 3.06
REF IC: 1.62
RV LLN: 1.4
RV PRE REF: 72.8 %
RV REF: 1.97
RVTLC LLN: 34
RVTLC PRE REF: 95.4 %
RVTLC PRE: 41.76 % (ref 34.19–53.37)
RVTLC REF: 44
RVTLCULN: 53
RVULN: 2.55
SGAW PRE REF: 84.9 %
SGAW PRE: 0.09 1/(CMH2O*S) (ref 0.1–0.1)
SGAW REF: 0.1
TLC LLN: 3.45
TLC PRE REF: 77.5 %
TLC REF: 4.44
TLC ULN: 5.42
ULN IC: ABNORMAL
VA PRE: 3.08 L (ref 4.29–4.29)
VA SINGLE BREATH LLN: 4.29
VA SINGLE BREATH PRE REF: 71.9 %
VA SINGLE BREATH REF: 4.29
VASINGLEBREATHULN: 4.29
VC LLN: 1.77
VC PRE REF: 81.5 %
VC PRE: 2 L (ref 1.77–3.18)
VC REF: 2.46
VC ULN: 3.18

## 2023-03-31 PROCEDURE — 94729 DIFFUSING CAPACITY: CPT | Mod: PBBFAC | Performed by: INTERNAL MEDICINE

## 2023-03-31 PROCEDURE — 94618 PULMONARY STRESS TESTING: CPT | Mod: PBBFAC | Performed by: INTERNAL MEDICINE

## 2023-03-31 PROCEDURE — 94729 DIFFUSING CAPACITY: CPT | Mod: 26,S$PBB,, | Performed by: INTERNAL MEDICINE

## 2023-03-31 PROCEDURE — 94060 PR EVAL OF BRONCHOSPASM: ICD-10-PCS | Mod: 26,S$PBB,, | Performed by: INTERNAL MEDICINE

## 2023-03-31 PROCEDURE — 94726 PULM FUNCT TST PLETHYSMOGRAP: ICD-10-PCS | Mod: 26,S$PBB,, | Performed by: INTERNAL MEDICINE

## 2023-03-31 PROCEDURE — 94618 PULMONARY STRESS TESTING: CPT | Mod: 26,S$PBB,, | Performed by: INTERNAL MEDICINE

## 2023-03-31 PROCEDURE — 94729 PR C02/MEMBANE DIFFUSE CAPACITY: ICD-10-PCS | Mod: 26,S$PBB,, | Performed by: INTERNAL MEDICINE

## 2023-03-31 PROCEDURE — 94060 EVALUATION OF WHEEZING: CPT | Mod: 26,S$PBB,, | Performed by: INTERNAL MEDICINE

## 2023-03-31 PROCEDURE — 94060 EVALUATION OF WHEEZING: CPT | Mod: PBBFAC | Performed by: INTERNAL MEDICINE

## 2023-03-31 PROCEDURE — 94726 PLETHYSMOGRAPHY LUNG VOLUMES: CPT | Mod: PBBFAC | Performed by: INTERNAL MEDICINE

## 2023-03-31 PROCEDURE — 94618 PULMONARY STRESS TESTING: ICD-10-PCS | Mod: 26,S$PBB,, | Performed by: INTERNAL MEDICINE

## 2023-03-31 PROCEDURE — 94726 PLETHYSMOGRAPHY LUNG VOLUMES: CPT | Mod: 26,S$PBB,, | Performed by: INTERNAL MEDICINE

## 2023-03-31 NOTE — PROCEDURES
Jesenia Curiel is a 72 y.o.  female patient, who presents for a 6 minute walk test ordered by MD Keily.  The diagnosis is Qualify for Oxygen; Dyspnea on Exertion.  The patient's BMI is 37.3 kg/m2.  Predicted distance (lower limit of normal) is 219.66 meters.      Test Results:    The test was completed without stopping.  The total time walked was 360 seconds.  During walking, the patient reported:  Dyspnea.  The patient used no assistive devices during testing.     03/31/2023---------Distance: 243.84 meters (800 feet)     O2 Sat % Supplemental Oxygen Heart Rate Blood Pressure Drew Scale   Pre-exercise  (Resting) 94 % Room Air 77 bpm 125/66 mmHg 0   During Exercise 94 % Room Air 101 bpm 118/65 mmHg 3   Post-exercise  (Recovery) 96 % Room Air  82 bpm       Recovery Time: 58 seconds    Performing nurse/tech: Ana MIRANDA      PREVIOUS STUDY:   The patient has not had a previous study.      CLINICAL INTERPRETATION:  Six minute walk distance is 243.84 meters (800 feet) with moderate dyspnea.  During exercise, there was no desaturation while breathing room air.  Blood pressure remained stable and Heart rate increased significantly with walking.  The patient did not report non-pulmonary symptoms during exercise.  Significant exercise impairment is likely due to subjective symptoms.  The patient did complete the study, walking 360 seconds of the 360 second test.  No previous study performed.  Based upon age and body mass index, exercise capacity may be normal.

## 2023-04-03 ENCOUNTER — HOSPITAL ENCOUNTER (OUTPATIENT)
Dept: SLEEP MEDICINE | Facility: OTHER | Age: 73
Discharge: HOME | End: 2023-04-03
Attending: INTERNAL MEDICINE
Payer: MEDICARE

## 2023-04-03 DIAGNOSIS — R06.09 DOE (DYSPNEA ON EXERTION): ICD-10-CM

## 2023-04-03 DIAGNOSIS — R06.83 SNORING: ICD-10-CM

## 2023-04-03 DIAGNOSIS — G47.8 OTHER SLEEP DISORDERS: ICD-10-CM

## 2023-04-03 PROCEDURE — 95800 SLP STDY UNATTENDED: CPT

## 2023-04-04 ENCOUNTER — PATIENT MESSAGE (OUTPATIENT)
Dept: PRIMARY CARE CLINIC | Facility: CLINIC | Age: 73
End: 2023-04-04
Payer: MEDICARE

## 2023-04-05 ENCOUNTER — PATIENT MESSAGE (OUTPATIENT)
Dept: PRIMARY CARE CLINIC | Facility: CLINIC | Age: 73
End: 2023-04-05
Payer: MEDICARE

## 2023-04-05 DIAGNOSIS — I48.91 ATRIAL FIBRILLATION, UNSPECIFIED TYPE: ICD-10-CM

## 2023-04-05 DIAGNOSIS — G47.33 OSA (OBSTRUCTIVE SLEEP APNEA): Primary | ICD-10-CM

## 2023-04-05 PROCEDURE — 95806 PR SLEEP STUDY, UNATTENDED, SIMUL RECORD HR/O2 SAT/RESP FLOW/RESP EFFT: ICD-10-PCS | Mod: 26,,, | Performed by: INTERNAL MEDICINE

## 2023-04-05 PROCEDURE — 95806 SLEEP STUDY UNATT&RESP EFFT: CPT | Mod: 26,,, | Performed by: INTERNAL MEDICINE

## 2023-04-05 RX ORDER — PROPAFENONE HYDROCHLORIDE 225 MG/1
225 CAPSULE, EXTENDED RELEASE ORAL EVERY 12 HOURS
Qty: 180 CAPSULE | Refills: 3 | Status: SHIPPED | OUTPATIENT
Start: 2023-04-05 | End: 2023-11-06 | Stop reason: SDUPTHER

## 2023-04-10 ENCOUNTER — TELEPHONE (OUTPATIENT)
Dept: NEPHROLOGY | Facility: CLINIC | Age: 73
End: 2023-04-10
Payer: MEDICARE

## 2023-04-12 ENCOUNTER — TELEPHONE (OUTPATIENT)
Dept: PRIMARY CARE CLINIC | Facility: CLINIC | Age: 73
End: 2023-04-12
Payer: MEDICARE

## 2023-04-12 DIAGNOSIS — R19.5 POSITIVE COLORECTAL CANCER SCREENING USING COLOGUARD TEST: Primary | ICD-10-CM

## 2023-04-12 LAB — HEMOCCULT STL QL IA: POSITIVE

## 2023-04-12 NOTE — TELEPHONE ENCOUNTER
Please let Dr. Curiel know that her fecal test is positive. 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 Referral to endo procedure  to call her - should call within the next few days - to schedule a Cscope.

## 2023-04-14 ENCOUNTER — PATIENT MESSAGE (OUTPATIENT)
Dept: PRIMARY CARE CLINIC | Facility: CLINIC | Age: 73
End: 2023-04-14
Payer: MEDICARE

## 2023-04-14 NOTE — TELEPHONE ENCOUNTER
Dr Curiel asking for recommendations for someone to do her scope.  Prefers communication about this through portal.  Thank you!

## 2023-04-14 NOTE — TELEPHONE ENCOUNTER
Heather PICKERING Michaud Sandra Staff  Caller: Self/[phone] (Today, 10:26 AM)  Patient is returning a phone call.   Who left a message for the patient: Madeleine   Does patient know what this is regarding:     Would you like a call back, or a response through your [facility] portal?:   call back   Comments:

## 2023-05-04 ENCOUNTER — PATIENT MESSAGE (OUTPATIENT)
Dept: ELECTROPHYSIOLOGY | Facility: CLINIC | Age: 73
End: 2023-05-04
Payer: MEDICARE

## 2023-05-04 DIAGNOSIS — I49.8 OTHER CARDIAC ARRHYTHMIA: ICD-10-CM

## 2023-05-17 ENCOUNTER — TELEPHONE (OUTPATIENT)
Dept: ENDOSCOPY | Facility: HOSPITAL | Age: 73
End: 2023-05-17
Payer: MEDICARE

## 2023-05-22 ENCOUNTER — TELEPHONE (OUTPATIENT)
Dept: FAMILY MEDICINE | Facility: CLINIC | Age: 73
End: 2023-05-22
Payer: MEDICARE

## 2023-05-24 ENCOUNTER — PATIENT MESSAGE (OUTPATIENT)
Dept: GASTROENTEROLOGY | Facility: CLINIC | Age: 73
End: 2023-05-24

## 2023-05-24 ENCOUNTER — OFFICE VISIT (OUTPATIENT)
Dept: GASTROENTEROLOGY | Facility: CLINIC | Age: 73
End: 2023-05-24
Payer: MEDICARE

## 2023-05-24 DIAGNOSIS — R19.5 OCCULT BLOOD POSITIVE STOOL: ICD-10-CM

## 2023-05-24 DIAGNOSIS — G47.30 SLEEP APNEA, UNSPECIFIED TYPE: ICD-10-CM

## 2023-05-24 DIAGNOSIS — I27.20 PULMONARY HYPERTENSION: ICD-10-CM

## 2023-05-24 DIAGNOSIS — Z12.11 ENCOUNTER FOR SCREENING COLONOSCOPY: Primary | ICD-10-CM

## 2023-05-24 DIAGNOSIS — Z95.0 CARDIAC PACEMAKER: ICD-10-CM

## 2023-05-24 DIAGNOSIS — J98.4 LUNG DISEASE: ICD-10-CM

## 2023-05-24 PROCEDURE — 99204 OFFICE O/P NEW MOD 45 MIN: CPT | Mod: 95,,, | Performed by: INTERNAL MEDICINE

## 2023-05-24 PROCEDURE — 99204 PR OFFICE/OUTPT VISIT, NEW, LEVL IV, 45-59 MIN: ICD-10-PCS | Mod: 95,,, | Performed by: INTERNAL MEDICINE

## 2023-05-24 NOTE — Clinical Note
Carissa referral to endoscopy schedulers has been placed for 2nd floor colonoscopy  Return GI clinic telemedicine video visit 3 months for follow-up

## 2023-05-24 NOTE — Clinical Note
"Procedure: Colonoscopy  Diagnosis: Screening colonoscopy  Procedure Timin-12 weeks  *If within 4 weeks selected, please raul as high priority*  *If greater than 12 weeks, please select "5-12 weeks" and delay sending until 2 months prior to requested date*  Provider: Myself  Location: [address]  Additional Scheduling Information:  2nd floor for airway protection patient with lung disease elevated pulmonary artery pressure pacemaker and fecal occult blood positive stool, patient needs pulmonary and cardiac clearance before patient has cardiology and pulmonary clinic appointment scheduled  Prep Specifications:Standard prep  Have you attached a patient to this message: yes "

## 2023-05-24 NOTE — PATIENT INSTRUCTIONS
"Procedure: Colonoscopy    Diagnosis: Screening colonoscopy    Procedure Timin-12 weeks    *If within 4 weeks selected, please raul as high priority*    *If greater than 12 weeks, please select "5-12 weeks" and delay sending until 2 months prior to requested date*    Provider: Myself    Location: [address]    Additional Scheduling Information:  2nd floor for airway protection patient with lung disease elevated pulmonary artery pressure pacemaker and fecal occult blood positive stool, patient needs pulmonary and cardiac clearance before patient has cardiology and pulmonary clinic appointment scheduled    Prep Specifications:Standard prep    Have you attached a patient to this message: yes   "

## 2023-05-24 NOTE — PROGRESS NOTES
The patient location is: At home  The chief complaint leading to consultation is:  Screening colonoscopy positive stool for occult blood test last colonoscopy about 20 years ago    Visit type: audiovisual    Face to Face time with patient: 30 minutes of total time spent on the encounter, which includes face to face time and non-face to face time preparing to see the patient (eg, review of tests), Obtaining and/or reviewing separately obtained history, Documenting clinical information in the electronic or other health record, Independently interpreting results (not separately reported) and communicating results to the patient/family/caregiver, or Care coordination (not separately reported).     Each patient to whom he or she provides medical services by telemedicine is:  (1) informed of the relationship between the physician and patient and the respective role of any other health care provider with respect to management of the patient; and (2) notified that he or she may decline to receive medical services by telemedicine and may withdraw from such care at any time.    Notes:         [facility] Consultation Note    Reason for Consult:  The primary encounter diagnosis was Encounter for screening colonoscopy. Diagnoses of Occult blood positive stool, Cardiac pacemaker, Sleep apnea, unspecified type, Pulmonary hypertension, and Lung disease were also pertinent to this visit.    PCP:   Sandra Michaud   [address] / [place] [id number]    Referring MD:  Sandra Michaud Md  [address] [address]    Initial History of Present Illness (HPI):  This is a 72 y.o. female here for evaluation of stool for occult blood positive test last screening colonoscopy about 20 years ago never had colon polyps no family history of colon cancer no family history of advanced colon adenomas polyps no FAP no attenuated FAP no MA P no Cook syndrome nobody with esophageal or stomach cancer nobody with small-bowel cancer  nobody with kidney bladder or ureter cancer nobody with ovarian or uterine cancer.  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  She has a pacemaker in place she is followed by Cardiology and being worked up by Pulmonary for some interested in she will lung disease she does have sleep apnea but does not love her CPAP machine she has no shortness of breath when she is at rest she has mild pulmonary hypertension she has some shortness of breath when she exerts herself no dysphagia no odynophagia no GERD symptoms no abdominal pain or early satiety no diarrhea averages 1-2 well-formed bowel movements a day does not take any NSAIDs other than her aspirin no abdominal pain no unintentional weight loss.    Abdominal pain - no  Reflux - no  Dysphagia - no   Bowel habits - normal  GI bleeding - none  NSAID usage -aspirin but no other NSAIDs    Interval HPI 05/24/2023:  The patient's last visit with me was on Visit date not found.      ROS:  Constitutional: No fevers, chills, No weight loss, some chronic fatigue  ENT:  No heartburn no dysphagia no odynophagia no hoarseness  CV: No chest pain, has a history of atrial fibrillation and cardiac pacemaker in place and pulmonary hypertension  Pulm:  Nonproductive cough, No shortness of breath at rest but some with exertion, no wheezing, history of sleep apnea not living her CPAP machine or tolerating it well  Ophtho: No vision changes  GI: see HPI  Derm: No rash, no itching  Heme: No lymphadenopathy, No easy bruising  MSK: No significant arthritis  : No dysuria, No hematuria, chronic kidney disease  Endo: No hot or cold intolerance  Neuro: No syncope, No seizure, no strokes  Psych: No uncontrolled anxiety, No uncontrolled depression    Medical History:  has a past medical history of Allergy, Arthritis, Atrial fibrillation (5/29/2017), CKD (chronic kidney disease) stage 3, GFR 30-59 ml/min (6/26/2017), Disorder of kidney and ureter, GERD  (gastroesophageal reflux disease), Hyperlipidemia, Hypertension, Impaired fasting glucose (2021), and Pre-diabetes (6/3/2017).    Surgical History:  has a past surgical history that includes Hysterectomy; salpingo opherectomy (Bilateral, );  section; Tonsillectomy (); Back surgery (); pace maker (); Insert / replace / remove pacemaker (); Insert / replace / remove pacemaker (2014); Cataract extraction w/  intraocular lens implant (Right, 6/3/2019); Cardiac surgery; Cataract extraction w/  intraocular lens implant (Left, 2019); Adenoidectomy; Eye surgery; Fracture surgery; Spine surgery; Breast surgery; Tubal ligation; and Revision of procedure involving pacemaker lead (Left, 2022).    Family History: family history includes Coronary artery disease in her brother, father, and mother; Diabetes in her father, mother, paternal grandfather, and paternal grandmother; Heart disease in her brother, father, and mother; Hyperlipidemia in her father and mother; Hypertension in her father and mother; Hyperthyroidism in her brother; Stroke in her mother..     Social History:  reports that she has never smoked. She has never used smokeless tobacco. She reports current alcohol use of about 10.0 standard drinks per week. She reports that she does not use drugs.    Review of patient's allergies indicates:   Allergen Reactions    Iodinated contrast media Anaphylaxis    Penicillins Anaphylaxis    Allopurinol analogues      Muscle cramps    Ciprofloxacin Rash    Quinolones Rash    Sulfa (sulfonamide antibiotics) Rash    Tetracyclines Rash       Medication List with Changes/Refills   Current Medications    AMLODIPINE (NORVASC) 10 MG TABLET    TAKE 1 TABLET BY MOUTH EVERY DAY    ASPIRIN (ECOTRIN) 81 MG EC TABLET    Take 1 tablet (81 mg total) by mouth once daily.    ATORVASTATIN (LIPITOR) 80 MG TABLET    Take 1 tablet (80 mg total) by mouth once daily.    BISOPROLOL (ZEBETA) 5 MG TABLET     Take 1 tablet (5 mg total) by mouth once daily.    CLOBETASOL (OLUX) 0.05 % FOAM    Apply 1 application topically every 30 days.    ERGOCALCIFEROL, VITAMIN D2, (VITAMIN D ORAL)    Take 2,000 Units by mouth once daily.    FUROSEMIDE (LASIX) 20 MG TABLET    Take 1 tablet (20 mg total) by mouth once daily.    NITROGLYCERIN (NITROSTAT) 0.4 MG SL TABLET    Place 1 tablet (0.4 mg total) under the tongue every 5 (five) minutes as needed for Chest pain.    OLMESARTAN (BENICAR) 40 MG TABLET    Take 1 tablet (40 mg total) by mouth once daily.    POTASSIUM CITRATE (UROCIT-K) 10 MEQ (1,080 MG) TBSR    Take 1 tablet (10 mEq total) by mouth once daily.    PROPAFENONE (RYTHMOL SR) 225 MG CP12    Take 1 capsule (225 mg total) by mouth every 12 (twelve) hours.    TOPIRAMATE (TOPAMAX) 25 MG TABLET    Take 1 tablet twice daily.         Objective Findings:    Vital Signs:  There were no vitals taken for this visit.  There is no height or weight on file to calculate BMI.    Physical Exam:  Telemedicine video visit  General Appearance: Well appearing in no acute distress  Neurologic:  Alert and oriented x4  Psychiatric:  Normal speech mentation and affect    Labs:  Lab Results   Component Value Date    WBC 5.35 03/22/2023    HGB 13.8 03/22/2023    HCT 43.6 03/22/2023     03/22/2023    CHOL 217 (H) 10/11/2022    TRIG 184 (H) 10/11/2022    HDL 49 10/11/2022    ALT 17 10/11/2022    AST 22 10/11/2022     03/22/2023    K 4.3 03/22/2023     03/22/2023    CREATININE 1.4 03/22/2023    BUN 28 (H) 03/22/2023    CO2 25 03/22/2023    TSH 1.111 03/28/2019    INR 1.0 01/07/2022    HGBA1C 5.5 03/14/2022               Medical Decision Making:  Lab work reviewed  Colonoscopy talk given  Optical colonoscopy versus CT colonography talk given  The potential limitations and potential risks of both  Importance of clearance by Pulmonary and Cardiology for this procedure  2nd floor endoscopy discussion made      Assessment:  1. Encounter for  screening colonoscopy    2. Occult blood positive stool    3. Cardiac pacemaker    4. Sleep apnea, unspecified type    5. Pulmonary hypertension    6. Lung disease         Recommendations:  1. Referral to endoscopy schedulers for screening colonoscopy and evaluation of occult blood in stool on a stool fit test will recommend 2nd floor for airway protection as patient is currently undergoing evaluation by Cardiology and Pulmonary for some lung disease she does have a pacemaker in place pulmonary artery pressure slightly elevated and patient with some shortness of breath on exertion.  2. Return to GI clinic 3 months for follow-up okay for telemedicine video visit      Follow up in about 3 months (around 8/24/2023).      Order summary:         Thank you so much for allowing me to participate in the care of Jesenia Curiel    Tao Gomez MD    DISCLAIMER: This note was prepared with [employer] voice recognition transcription software. Garbled syntax, mangled or inadvertent pronouns, and other bizarre constructions may be attributed to that software system. While efforts were made to correct any mistakes made by this voice recognition program, some errors and/or omissions may remain in the note that were missed when the note was originally created.

## 2023-05-27 DIAGNOSIS — N18.32 STAGE 3B CHRONIC KIDNEY DISEASE: ICD-10-CM

## 2023-05-29 ENCOUNTER — PATIENT MESSAGE (OUTPATIENT)
Dept: NEPHROLOGY | Facility: CLINIC | Age: 73
End: 2023-05-29
Payer: MEDICARE

## 2023-05-29 ENCOUNTER — TELEPHONE (OUTPATIENT)
Dept: NEPHROLOGY | Facility: CLINIC | Age: 73
End: 2023-05-29
Payer: MEDICARE

## 2023-05-29 RX ORDER — POTASSIUM CITRATE 10 MEQ/1
TABLET, EXTENDED RELEASE ORAL
Qty: 90 TABLET | Refills: 3 | Status: SHIPPED | OUTPATIENT
Start: 2023-05-29

## 2023-06-06 ENCOUNTER — PATIENT MESSAGE (OUTPATIENT)
Dept: PULMONOLOGY | Facility: CLINIC | Age: 73
End: 2023-06-06

## 2023-06-06 ENCOUNTER — LAB VISIT (OUTPATIENT)
Dept: LAB | Facility: HOSPITAL | Age: 73
End: 2023-06-06
Attending: STUDENT IN AN ORGANIZED HEALTH CARE EDUCATION/TRAINING PROGRAM
Payer: MEDICARE

## 2023-06-06 ENCOUNTER — PATIENT MESSAGE (OUTPATIENT)
Dept: NEPHROLOGY | Facility: CLINIC | Age: 73
End: 2023-06-06
Payer: MEDICARE

## 2023-06-06 ENCOUNTER — OFFICE VISIT (OUTPATIENT)
Dept: PULMONOLOGY | Facility: CLINIC | Age: 73
End: 2023-06-06
Payer: MEDICARE

## 2023-06-06 VITALS
HEIGHT: 61 IN | WEIGHT: 199.06 LBS | BODY MASS INDEX: 37.58 KG/M2 | DIASTOLIC BLOOD PRESSURE: 82 MMHG | SYSTOLIC BLOOD PRESSURE: 120 MMHG | OXYGEN SATURATION: 95 % | HEART RATE: 92 BPM

## 2023-06-06 DIAGNOSIS — E66.01 MORBID OBESITY: ICD-10-CM

## 2023-06-06 DIAGNOSIS — I70.0 AORTIC ATHEROSCLEROSIS: ICD-10-CM

## 2023-06-06 DIAGNOSIS — J84.9 INTERSTITIAL PULMONARY DISEASE, UNSPECIFIED: ICD-10-CM

## 2023-06-06 DIAGNOSIS — R06.09 DOE (DYSPNEA ON EXERTION): ICD-10-CM

## 2023-06-06 DIAGNOSIS — R93.89 ABNORMAL CT OF THE CHEST: ICD-10-CM

## 2023-06-06 DIAGNOSIS — J84.10 CALCIFIED GRANULOMA OF LUNG: ICD-10-CM

## 2023-06-06 DIAGNOSIS — N18.31 STAGE 3A CHRONIC KIDNEY DISEASE: ICD-10-CM

## 2023-06-06 DIAGNOSIS — G47.33 OBSTRUCTIVE SLEEP APNEA: ICD-10-CM

## 2023-06-06 DIAGNOSIS — R93.89 ABNORMAL CT OF THE CHEST: Primary | ICD-10-CM

## 2023-06-06 PROBLEM — J98.4 CALCIFIED GRANULOMA OF LUNG: Status: ACTIVE | Noted: 2023-06-06

## 2023-06-06 LAB — RHEUMATOID FACT SERPL-ACNC: <13 IU/ML (ref 0–15)

## 2023-06-06 PROCEDURE — 99205 OFFICE O/P NEW HI 60 MIN: CPT | Mod: S$PBB,,, | Performed by: STUDENT IN AN ORGANIZED HEALTH CARE EDUCATION/TRAINING PROGRAM

## 2023-06-06 PROCEDURE — 99999 PR PBB SHADOW E&M-EST. PATIENT-LVL IV: CPT | Mod: PBBFAC,,, | Performed by: STUDENT IN AN ORGANIZED HEALTH CARE EDUCATION/TRAINING PROGRAM

## 2023-06-06 PROCEDURE — 86431 RHEUMATOID FACTOR QUANT: CPT | Performed by: STUDENT IN AN ORGANIZED HEALTH CARE EDUCATION/TRAINING PROGRAM

## 2023-06-06 PROCEDURE — 36415 COLL VENOUS BLD VENIPUNCTURE: CPT | Performed by: STUDENT IN AN ORGANIZED HEALTH CARE EDUCATION/TRAINING PROGRAM

## 2023-06-06 PROCEDURE — 86200 CCP ANTIBODY: CPT | Performed by: STUDENT IN AN ORGANIZED HEALTH CARE EDUCATION/TRAINING PROGRAM

## 2023-06-06 PROCEDURE — 86039 ANTINUCLEAR ANTIBODIES (ANA): CPT | Performed by: STUDENT IN AN ORGANIZED HEALTH CARE EDUCATION/TRAINING PROGRAM

## 2023-06-06 PROCEDURE — 99999 PR PBB SHADOW E&M-EST. PATIENT-LVL IV: ICD-10-PCS | Mod: PBBFAC,,, | Performed by: STUDENT IN AN ORGANIZED HEALTH CARE EDUCATION/TRAINING PROGRAM

## 2023-06-06 PROCEDURE — 86235 NUCLEAR ANTIGEN ANTIBODY: CPT | Mod: 59 | Performed by: STUDENT IN AN ORGANIZED HEALTH CARE EDUCATION/TRAINING PROGRAM

## 2023-06-06 PROCEDURE — 99205 PR OFFICE/OUTPT VISIT, NEW, LEVL V, 60-74 MIN: ICD-10-PCS | Mod: S$PBB,,, | Performed by: STUDENT IN AN ORGANIZED HEALTH CARE EDUCATION/TRAINING PROGRAM

## 2023-06-06 PROCEDURE — 99214 OFFICE O/P EST MOD 30 MIN: CPT | Mod: PBBFAC | Performed by: STUDENT IN AN ORGANIZED HEALTH CARE EDUCATION/TRAINING PROGRAM

## 2023-06-06 PROCEDURE — 86038 ANTINUCLEAR ANTIBODIES: CPT | Performed by: STUDENT IN AN ORGANIZED HEALTH CARE EDUCATION/TRAINING PROGRAM

## 2023-06-06 RX ORDER — FUROSEMIDE 40 MG/1
40 TABLET ORAL DAILY
Qty: 7 TABLET | Refills: 0 | Status: SHIPPED | OUTPATIENT
Start: 2023-06-06 | End: 2023-06-17

## 2023-06-06 NOTE — PROGRESS NOTES
"Subjective:     Reason for visit:  Dyspnea on exertion    Patient ID:  Jesenia Curiel is a 72 y.o. morbidly obese female with CKD, AFib and sick sinus syndrome, HTN, hyperlipidemia, JOSÉ not yet on CPAP    Patient is accompanied by her  who provides additional history    Interval History:  Unable to keep up with others during a recent cruise excursion.  This is an abrupt change in her exercise capacity.  Patient states that she typically works out in preparation for vacation and last year was no different.  The change, however is that she has significant fatigue and shortness of breath with only moderate exertion.  Her weight has been stable, but she admits to being a couch potato.   The last time she exercise with the intention going to vacation was in 2019.  Since that time she is exercise intermittently.  Has no joint complaints, rashes or fevers.      Additional Pulmonary History:  Childhood Illnesses:  None  Occupational/Environmental:  Retired gastroenterologist  Tobacco/Smoking:  Never    Objective:     Vitals:    06/06/23 1101   BP: 120/82   BP Location: Right arm   Patient Position: Sitting   BP Method: Medium (Manual)   Pulse: 92   SpO2: 95%   Weight: 90.3 kg (199 lb 1.2 oz)   Height: 5' 1" (1.549 m)         Physical Exam  Vitals and nursing note reviewed.   Constitutional:       General: She is not in acute distress.     Appearance: She is morbidly obese. She is not ill-appearing, toxic-appearing or diaphoretic.   HENT:      Head: Normocephalic and atraumatic.      Nose: No rhinorrhea.      Mouth/Throat:      Mouth: Mucous membranes are moist.   Eyes:      General: No scleral icterus.     Extraocular Movements: Extraocular movements intact.   Cardiovascular:      Rate and Rhythm: Normal rate and regular rhythm.   Pulmonary:      Effort: No tachypnea, accessory muscle usage or retractions.   Abdominal:      General: There is no distension.   Skin:     General: Skin is warm and dry.      " Coloration: Skin is not jaundiced.      Findings: No rash.   Neurological:      General: No focal deficit present.      Mental Status: Mental status is at baseline.        Personal Diagnostic Review and Interpretation  03/27/2023 CT chest without contrast:  Bibasilar ground-glass with subpleural reticulations but no honeycombing; scattered subcentimeter nodules; RUL calcified granuloma; no lymphadenopathy      Pertinent Studies Reviewed & Interpreted:     Pulmonary Function Tests:   03/31/2023: FEV1 72, FVC 76, FEV1/FVC 73, FEF ; no bronchodilator response.  TLC 78, ERV 56.  DLCO 60    6 Minute Walk Tests:   03/31/2023:  243 m (220 m) SpO2 94% on room air at peak exercise.  Appropriate tachycardic response but drop in systolic blood pressure from 125-118    Echocardiograms:   03/08/2023 dobutamine stress:  Negative for ischemia EF 55%, PASP 37    01/20/2022: EF 53% with segmental WMA and abnormal septal wall motion; PASP 25    03/22/2023 BNP: 167      Assessment & Plan:       Problem List Items Addressed This Visit          Cardiac/Vascular    NAVA (dyspnea on exertion)    Overview     Suspect this is multifactorial issue including morbid obesity, deconditioning and diastolic heart failure.  Complicated issue given imaging findings, however it would be unusual for patient to have developed an autoimmune interstitial lung disease in the 7th decade of her life.  She has a borderline KOKO on her recent echo with a pulmonary artery pressure that has increased from the 20s to over 37.  Additionally, she is morbid obese yet has an elevated BNP with LE edema and dependent ground-glass on her CT scan.  We will order screening ILD labs, although some of these have been negative in the past as part of her CKD workup.  Restriction on PFTs consistent with obesity given low ERV and complicated by partial airspace filling (GGOs).  Will place on course of Lasix with a goal of removing fluid and repeat her CT scan to see if  the GGOs resolved.  If they worsen or persist, we will pursue more invasive workup.  It is imperative that the patient lose weight nonetheless and exercise regularly         Relevant Medications    furosemide (LASIX) 40 MG tablet    Aortic atherosclerosis    Overview     Noted on recent CT.  Currently on ASA statin.  Needs to lose weight.            Renal/    CKD (chronic kidney disease) stage 3, GFR 30-59 ml/min    Overview     Complicates every aspect of patient care.  Renally dose medicines.            Endocrine    Morbid obesity    Overview     It is imperative that patient lose weight.  Suspect these are playing a part in restrictive physiology on PFTs, and likely contributing to some of the abnormalities on CT imaging.            Other    Obstructive sleep apnea    Overview     Recent HST with AHI 18 consistent with moderate sleep apnea.  Needs to see sleep medicine.          Other Visit Diagnoses       Abnormal CT of the chest    -  Primary    Relevant Medications    furosemide (LASIX) 40 MG tablet    Other Relevant Orders    Cyclic Citrullinated Peptide Antibody, IgG    BOLIVAR Screen w/Reflex    Rheumatoid Factor    Interstitial pulmonary disease, unspecified        Relevant Orders    CT Chest Without Contrast             Portions of the record may have been created with voice-recognition software. Occasional wrong-word or sound-a-like substitutions may have occurred due to the inherent limitations of voice-recognition software. Read the chart carefully and recognize, using context, where substitutions have occurred.

## 2023-06-06 NOTE — PROGRESS NOTES
"Subjective:     Reason for visit:  Dyspnea on exertion    Patient ID:  Jesenia Curiel is a 72 y.o. morbidly obese female with CKD, AFib and sick sinus syndrome, HTN, hyperlipidemia, JOSÉ not yet on CPAP    Patient is accompanied by her  who provides additional history    Interval History:  Unable to keep up with others during a recent cruise excursion.  This is an abrupt change in her exercise capacity.  Patient states that she typically works out in preparation for vacation and last year was no different.  The change, however is that she has significant fatigue and shortness of breath with only moderate exertion.  Her weight has been stable, but she admits to being a couch potato.   The last time she exercise with the intention going to vacation was in 2019.  Since that time she is exercise intermittently.  Has no joint complaints, rashes or fevers.      Additional Pulmonary History:  Childhood Illnesses:  None  Occupational/Environmental:  Retired gastroenterologist  Tobacco/Smoking:  Never    Objective:     Vitals:    06/06/23 1101   BP: 120/82   BP Location: Right arm   Patient Position: Sitting   BP Method: Medium (Manual)   Pulse: 92   SpO2: 95%   Weight: 90.3 kg (199 lb 1.2 oz)   Height: 5' 1" (1.549 m)         Physical Exam  Vitals and nursing note reviewed.   Constitutional:       General: She is not in acute distress.     Appearance: She is morbidly obese. She is not ill-appearing, toxic-appearing or diaphoretic.   HENT:      Head: Normocephalic and atraumatic.      Nose: No rhinorrhea.      Mouth/Throat:      Mouth: Mucous membranes are moist.   Eyes:      General: No scleral icterus.     Extraocular Movements: Extraocular movements intact.   Cardiovascular:      Rate and Rhythm: Normal rate and regular rhythm.   Pulmonary:      Effort: No tachypnea, accessory muscle usage or retractions.   Abdominal:      General: There is no distension.   Skin:     General: Skin is warm and dry.      " Coloration: Skin is not jaundiced.      Findings: No rash.   Neurological:      General: No focal deficit present.      Mental Status: Mental status is at baseline.        Personal Diagnostic Review and Interpretation  03/27/2023 CT chest without contrast:  Bibasilar ground-glass with subpleural reticulations but no honeycombing; scattered subcentimeter nodules; RUL calcified granuloma; no lymphadenopathy      Pertinent Studies Reviewed & Interpreted:     Pulmonary Function Tests:   03/31/2023: FEV1 72, FVC 76, FEV1/FVC 73, FEF ; no bronchodilator response.  TLC 78, ERV 56.  DLCO 60    6 Minute Walk Tests:   03/31/2023:  243 m (220 m) SpO2 94% on room air at peak exercise.  Appropriate tachycardic response but drop in systolic blood pressure from 125-118    Echocardiograms:   03/08/2023 dobutamine stress:  Negative for ischemia EF 55%, PASP 37    01/20/2022: EF 53% with segmental WMA and abnormal septal wall motion; PASP 25    03/22/2023 BNP: 167      Assessment & Plan:       Problem List Items Addressed This Visit          Pulmonary    Calcified granuloma of lung    Overview     RUL calcified granuloma on recent CT chest.  Benign with no need for follow-up.            Cardiac/Vascular    NAVA (dyspnea on exertion)    Overview     Suspect this is multifactorial issue including morbid obesity, deconditioning and diastolic heart failure.  Complicated issue given imaging findings, however it would be unusual for patient to have developed an autoimmune interstitial lung disease in the 7th decade of her life.  She has a borderline KOKO on her recent echo with a pulmonary artery pressure that has increased from the 20s to over 37.  Additionally, she is morbid obese yet has an elevated BNP with LE edema and dependent ground-glass on her CT scan.  We will order screening ILD labs, although some of these have been negative in the past as part of her CKD workup.  Restriction on PFTs consistent with obesity given low ERV  and complicated by partial airspace filling (GGOs).  Will place on course of Lasix with a goal of removing fluid and repeat her CT scan to see if the GGOs resolved.  If they worsen or persist, we will pursue more invasive workup.  It is imperative that the patient lose weight nonetheless and exercise regularly         Relevant Medications    furosemide (LASIX) 40 MG tablet    Aortic atherosclerosis    Overview     Noted on recent CT.  Currently on ASA statin.  Needs to lose weight.            Renal/    CKD (chronic kidney disease) stage 3, GFR 30-59 ml/min    Overview     Complicates every aspect of patient care.  Renally dose medicines.            Endocrine    Morbid obesity    Overview     It is imperative that patient lose weight.  Suspect these are playing a part in restrictive physiology on PFTs, and likely contributing to some of the abnormalities on CT imaging.            Other    Obstructive sleep apnea    Overview     Recent HST with AHI 18 consistent with moderate sleep apnea.  Needs to see sleep medicine.          Other Visit Diagnoses       Abnormal CT of the chest    -  Primary    Relevant Medications    furosemide (LASIX) 40 MG tablet    Other Relevant Orders    Cyclic Citrullinated Peptide Antibody, IgG    BOLIVAR Screen w/Reflex    Rheumatoid Factor    Interstitial pulmonary disease, unspecified        Relevant Orders    CT Chest Without Contrast             Portions of the record may have been created with voice-recognition software. Occasional wrong-word or sound-a-like substitutions may have occurred due to the inherent limitations of voice-recognition software. Read the chart carefully and recognize, using context, where substitutions have occurred.

## 2023-06-07 LAB
ANA PATTERN 1: NORMAL
ANA SER QL IF: POSITIVE
ANA TITR SER IF: NORMAL {TITER}
CCP AB SER IA-ACNC: 0.5 U/ML

## 2023-06-08 LAB
ANTI SM ANTIBODY: 0.1 RATIO (ref 0–0.99)
ANTI SM/RNP ANTIBODY: 0.08 RATIO (ref 0–0.99)
ANTI-SM INTERPRETATION: NEGATIVE
ANTI-SM/RNP INTERPRETATION: NEGATIVE
ANTI-SSA ANTIBODY: 0.08 RATIO (ref 0–0.99)
ANTI-SSA INTERPRETATION: NEGATIVE
ANTI-SSB ANTIBODY: 0.05 RATIO (ref 0–0.99)
ANTI-SSB INTERPRETATION: NEGATIVE
DSDNA AB SER-ACNC: NORMAL [IU]/ML

## 2023-06-11 ENCOUNTER — CLINICAL SUPPORT (OUTPATIENT)
Dept: CARDIOLOGY | Facility: HOSPITAL | Age: 73
End: 2023-06-11
Payer: MEDICARE

## 2023-06-11 DIAGNOSIS — Z95.0 PRESENCE OF CARDIAC PACEMAKER: ICD-10-CM

## 2023-06-11 PROCEDURE — 93294 CARDIAC DEVICE CHECK - REMOTE: ICD-10-PCS | Mod: ,,, | Performed by: INTERNAL MEDICINE

## 2023-06-11 PROCEDURE — 93294 REM INTERROG EVL PM/LDLS PM: CPT | Mod: ,,, | Performed by: INTERNAL MEDICINE

## 2023-06-15 ENCOUNTER — OFFICE VISIT (OUTPATIENT)
Dept: NEPHROLOGY | Facility: CLINIC | Age: 73
End: 2023-06-15
Payer: MEDICARE

## 2023-06-15 ENCOUNTER — LAB VISIT (OUTPATIENT)
Dept: LAB | Facility: HOSPITAL | Age: 73
End: 2023-06-15
Attending: INTERNAL MEDICINE
Payer: MEDICARE

## 2023-06-15 VITALS
BODY MASS INDEX: 37.63 KG/M2 | WEIGHT: 199.31 LBS | SYSTOLIC BLOOD PRESSURE: 100 MMHG | HEART RATE: 96 BPM | DIASTOLIC BLOOD PRESSURE: 74 MMHG | OXYGEN SATURATION: 96 % | HEIGHT: 61 IN

## 2023-06-15 DIAGNOSIS — E66.01 MORBID OBESITY: ICD-10-CM

## 2023-06-15 DIAGNOSIS — N18.32 STAGE 3B CHRONIC KIDNEY DISEASE: ICD-10-CM

## 2023-06-15 DIAGNOSIS — N18.32 STAGE 3B CHRONIC KIDNEY DISEASE: Primary | ICD-10-CM

## 2023-06-15 DIAGNOSIS — I10 BENIGN ESSENTIAL HYPERTENSION: ICD-10-CM

## 2023-06-15 DIAGNOSIS — R73.01 IMPAIRED FASTING GLUCOSE: ICD-10-CM

## 2023-06-15 LAB
ALBUMIN SERPL BCP-MCNC: 4.1 G/DL (ref 3.5–5.2)
ANION GAP SERPL CALC-SCNC: 13 MMOL/L (ref 8–16)
BASOPHILS # BLD AUTO: 0.06 K/UL (ref 0–0.2)
BASOPHILS NFR BLD: 1 % (ref 0–1.9)
BNP SERPL-MCNC: 128 PG/ML (ref 0–99)
BUN SERPL-MCNC: 37 MG/DL (ref 8–23)
CALCIUM SERPL-MCNC: 10.5 MG/DL (ref 8.7–10.5)
CHLORIDE SERPL-SCNC: 104 MMOL/L (ref 95–110)
CO2 SERPL-SCNC: 25 MMOL/L (ref 23–29)
CREAT SERPL-MCNC: 1.7 MG/DL (ref 0.5–1.4)
DIFFERENTIAL METHOD: NORMAL
EOSINOPHIL # BLD AUTO: 0.2 K/UL (ref 0–0.5)
EOSINOPHIL NFR BLD: 3.5 % (ref 0–8)
ERYTHROCYTE [DISTWIDTH] IN BLOOD BY AUTOMATED COUNT: 12.8 % (ref 11.5–14.5)
EST. GFR  (NO RACE VARIABLE): 31.5 ML/MIN/1.73 M^2
GLUCOSE SERPL-MCNC: 107 MG/DL (ref 70–110)
HCT VFR BLD AUTO: 43.5 % (ref 37–48.5)
HGB BLD-MCNC: 14.1 G/DL (ref 12–16)
IMM GRANULOCYTES # BLD AUTO: 0.02 K/UL (ref 0–0.04)
IMM GRANULOCYTES NFR BLD AUTO: 0.3 % (ref 0–0.5)
LYMPHOCYTES # BLD AUTO: 1.5 K/UL (ref 1–4.8)
LYMPHOCYTES NFR BLD: 23.3 % (ref 18–48)
MCH RBC QN AUTO: 30.7 PG (ref 27–31)
MCHC RBC AUTO-ENTMCNC: 32.4 G/DL (ref 32–36)
MCV RBC AUTO: 95 FL (ref 82–98)
MONOCYTES # BLD AUTO: 0.4 K/UL (ref 0.3–1)
MONOCYTES NFR BLD: 5.9 % (ref 4–15)
NEUTROPHILS # BLD AUTO: 4.2 K/UL (ref 1.8–7.7)
NEUTROPHILS NFR BLD: 66 % (ref 38–73)
NRBC BLD-RTO: 0 /100 WBC
PHOSPHATE SERPL-MCNC: 2.9 MG/DL (ref 2.7–4.5)
PLATELET # BLD AUTO: 252 K/UL (ref 150–450)
PMV BLD AUTO: 10 FL (ref 9.2–12.9)
POTASSIUM SERPL-SCNC: 4.1 MMOL/L (ref 3.5–5.1)
RBC # BLD AUTO: 4.6 M/UL (ref 4–5.4)
SODIUM SERPL-SCNC: 142 MMOL/L (ref 136–145)
WBC # BLD AUTO: 6.3 K/UL (ref 3.9–12.7)

## 2023-06-15 PROCEDURE — 83880 ASSAY OF NATRIURETIC PEPTIDE: CPT | Performed by: INTERNAL MEDICINE

## 2023-06-15 PROCEDURE — 99999 PR PBB SHADOW E&M-EST. PATIENT-LVL IV: CPT | Mod: PBBFAC,,, | Performed by: INTERNAL MEDICINE

## 2023-06-15 PROCEDURE — 99215 PR OFFICE/OUTPT VISIT, EST, LEVL V, 40-54 MIN: ICD-10-PCS | Mod: S$PBB,,, | Performed by: INTERNAL MEDICINE

## 2023-06-15 PROCEDURE — 99214 OFFICE O/P EST MOD 30 MIN: CPT | Mod: PBBFAC | Performed by: INTERNAL MEDICINE

## 2023-06-15 PROCEDURE — 83516 IMMUNOASSAY NONANTIBODY: CPT | Performed by: INTERNAL MEDICINE

## 2023-06-15 PROCEDURE — 99215 OFFICE O/P EST HI 40 MIN: CPT | Mod: S$PBB,,, | Performed by: INTERNAL MEDICINE

## 2023-06-15 PROCEDURE — 36415 COLL VENOUS BLD VENIPUNCTURE: CPT | Performed by: INTERNAL MEDICINE

## 2023-06-15 PROCEDURE — 85025 COMPLETE CBC W/AUTO DIFF WBC: CPT | Performed by: INTERNAL MEDICINE

## 2023-06-15 PROCEDURE — 80069 RENAL FUNCTION PANEL: CPT | Performed by: INTERNAL MEDICINE

## 2023-06-15 PROCEDURE — 99999 PR PBB SHADOW E&M-EST. PATIENT-LVL IV: ICD-10-PCS | Mod: PBBFAC,,, | Performed by: INTERNAL MEDICINE

## 2023-06-16 ENCOUNTER — HOSPITAL ENCOUNTER (OUTPATIENT)
Dept: RADIOLOGY | Facility: HOSPITAL | Age: 73
Discharge: HOME | End: 2023-06-16
Attending: STUDENT IN AN ORGANIZED HEALTH CARE EDUCATION/TRAINING PROGRAM
Payer: MEDICARE

## 2023-06-16 ENCOUNTER — PATIENT MESSAGE (OUTPATIENT)
Dept: NEPHROLOGY | Facility: CLINIC | Age: 73
End: 2023-06-16
Payer: MEDICARE

## 2023-06-16 DIAGNOSIS — J84.9 INTERSTITIAL PULMONARY DISEASE, UNSPECIFIED: ICD-10-CM

## 2023-06-16 LAB
1,3 BETA GLUCAN SER-MCNC: <31 PG/ML
ACE SERPL-CCNC: 36 U/L (ref 16–85)
FUNGITELL COMMENTS: NEGATIVE
GALACTOMANNAN AG SERPL IA-ACNC: <0.5 INDEX
PROTEINASE3 IGG SER-ACNC: <0.2 U

## 2023-06-16 PROCEDURE — 71250 CT CHEST WITHOUT CONTRAST: ICD-10-PCS | Mod: 26,,, | Performed by: STUDENT IN AN ORGANIZED HEALTH CARE EDUCATION/TRAINING PROGRAM

## 2023-06-16 PROCEDURE — 71250 CT THORAX DX C-: CPT | Mod: TC

## 2023-06-16 PROCEDURE — 71250 CT THORAX DX C-: CPT | Mod: 26,,, | Performed by: STUDENT IN AN ORGANIZED HEALTH CARE EDUCATION/TRAINING PROGRAM

## 2023-06-17 ENCOUNTER — PATIENT MESSAGE (OUTPATIENT)
Dept: NEPHROLOGY | Facility: CLINIC | Age: 73
End: 2023-06-17
Payer: MEDICARE

## 2023-06-17 DIAGNOSIS — R06.09 DOE (DYSPNEA ON EXERTION): ICD-10-CM

## 2023-06-17 DIAGNOSIS — R93.89 ABNORMAL CT OF THE CHEST: ICD-10-CM

## 2023-06-17 RX ORDER — AMLODIPINE BESYLATE 10 MG/1
5 TABLET ORAL DAILY
Qty: 90 TABLET | Refills: 3
Start: 2023-06-17 | End: 2023-09-29

## 2023-06-17 RX ORDER — FUROSEMIDE 40 MG/1
40 TABLET ORAL DAILY
Qty: 30 TABLET | Refills: 2 | Status: SHIPPED | OUTPATIENT
Start: 2023-06-17 | End: 2023-09-08

## 2023-06-17 NOTE — PROGRESS NOTES
REASON FOR CONSULT/CHIEF COMPLAIN: Chronic Kidney Disease    REFERRING PHYSICIAN: Sandra Michaud MD    HISTORY OF PRESENT ILLNESS: 72 y.o. female  patient was referred here for abnormal renal function.   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 She had one episode of calcium urate stone at her very young age. She did use some NSAID's in the remote past and suffered few BARBARA's.   Has been having dyspnea on exertion for which she has been seeing cardiology and pulmonary. Denied any issues with urination including dysuria, hematuria, urgency, hesitancy, nocturia, incomplete voiding. Denied chest pain, nausea, vomiting, abd pain, nausea, vomiting, diarrhea, shortness of breath, pedal edema, Orthopnea, PND.    ROS:  General: negative for chills, or fatigue  Respiratory: No cough, or wheezing  Cardiovascular: No chest pain or dyspnea   Gastrointestinal: No abdominal pain, change in bowel habits  Genito-Urinary: No dysuria, trouble voiding, or hematuria    PAST MEDICAL HISTORY:  Past Medical History:   Diagnosis Date    Allergy     Arthritis     Atrial fibrillation 5/29/2017    CKD (chronic kidney disease) stage 3, GFR 30-59 ml/min 6/26/2017    Disorder of kidney and ureter     GERD (gastroesophageal reflux disease)     Hyperlipidemia     Hypertension     Impaired fasting glucose 11/19/2021    Pre-diabetes 6/3/2017       PAST SURGICAL HISTORY:  Past Surgical History:   Procedure Laterality Date    ADENOIDECTOMY      BACK SURGERY  2000    lamenectomy    BREAST SURGERY      CARDIAC SURGERY      st loida pacemaker     CATARACT EXTRACTION W/  INTRAOCULAR LENS IMPLANT Right 6/3/2019    Procedure: EXTRACTION, CATARACT, WITH IOL INSERTION;  Surgeon: Raisa Moreno MD;  Location: [facility];  Service: Ophthalmology;  Laterality: Right;  Laser    CATARACT EXTRACTION W/  INTRAOCULAR LENS IMPLANT Left 9/23/2019    Procedure: EXTRACTION, CATARACT, WITH IOL INSERTION;   Surgeon: Raisa Moreno MD;  Location: [facility] OR;  Service: Ophthalmology;  Laterality: Left;  LASER ASSISTED     SECTION      ,     EYE SURGERY      FRACTURE SURGERY      HYSTERECTOMY      INSERT / REPLACE / REMOVE PACEMAKER      placement    INSERT / REPLACE / REMOVE PACEMAKER  2014    [employer] Model #[id number] replacement    pace maker      replacement     REVISION OF PROCEDURE INVOLVING PACEMAKER LEAD Left 2022    Procedure: REVISION, ELECTRODE LEAD, CARDIAC PACEMAKER;  Surgeon: Trell Hodgson MD;  Location: [facility] EP LAB;  Service: Cardiology;  Laterality: Left;  PPM lead Malfx, RA lead revision, SJM, MAC, GP, 3 PREP*dPPM SJM in situ**Contrast prep given*    salpingo opherectomy Bilateral     SPINE SURGERY      TONSILLECTOMY      TUBAL LIGATION         FAMILY HISTORY:   Family History   Problem Relation Age of Onset    Hypertension Mother     Diabetes Mother     Hyperlipidemia Mother     Coronary artery disease Mother     Heart disease Mother     Stroke Mother     Hypertension Father     Diabetes Father     Hyperlipidemia Father     Coronary artery disease Father     Heart disease Father     Coronary artery disease Brother     Heart disease Brother     Hyperthyroidism Brother     Diabetes Paternal Grandmother     Diabetes Paternal Grandfather     Heart attack Neg Hx        SOCIAL HISTORY:  Social History     Socioeconomic History    Marital status:    Tobacco Use    Smoking status: Never    Smokeless tobacco: Never   Substance and Sexual Activity    Alcohol use: Yes     Alcohol/week: 10.0 standard drinks     Types: 10 Glasses of wine per week    Drug use: No    Sexual activity: Not Currently     Partners: Male   Social History Narrative    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     Social Determinants of Health     Financial Resource Strain: Low Risk     Difficulty of Paying Living Expenses: Not hard at all   Food Insecurity: No  Food Insecurity    Worried About Running Out of Food in the Last Year: Never true    Ran Out of Food in the Last Year: Never true   Transportation Needs: No Transportation Needs    Lack of Transportation (Medical): No    Lack of Transportation (Non-Medical): No   Physical Activity: Inactive    Days of Exercise per Week: 0 days    Minutes of Exercise per Session: 0 min   Stress: No Stress Concern Present    Feeling of Stress : Not at all   Social Connections: Unknown    Frequency of Communication with Friends and Family: Once a week    Frequency of Social Gatherings with Friends and Family: Once a week    Active Member of Clubs or Organizations: No    Attends Club or Organization Meetings: Never    Marital Status:    Housing Stability: Unknown    Unable to Pay for Housing in the Last Year: No    Number of Places Lived in the Last Year: 0    Unstable Housing in the Last Year: Patient refused       ALLERGIES:  Review of patient's allergies indicates:   Allergen Reactions    Iodinated contrast media Anaphylaxis    Penicillins Anaphylaxis    Allopurinol analogues      Muscle cramps    Ciprofloxacin Rash    Quinolones Rash    Sulfa (sulfonamide antibiotics) Rash    Tetracyclines Rash       MEDICATIONS:    Current Outpatient Medications:     amLODIPine (NORVASC) 10 MG tablet, TAKE 1 TABLET BY MOUTH EVERY DAY, Disp: 90 tablet, Rfl: 3    aspirin (ECOTRIN) 81 MG EC tablet, Take 1 tablet (81 mg total) by mouth once daily., Disp: , Rfl:     atorvastatin (LIPITOR) 80 MG tablet, Take 1 tablet (80 mg total) by mouth once daily., Disp: 90 tablet, Rfl: 3    bisoprolol (ZEBETA) 5 MG tablet, Take 1 tablet (5 mg total) by mouth once daily., Disp: 90 tablet, Rfl: 3    clobetasoL (OLUX) 0.05 % Foam, Apply 1 application topically every 30 days., Disp: 100 g, Rfl: 3    ERGOCALCIFEROL, VITAMIN D2, (VITAMIN D ORAL), Take 2,000 Units by mouth once daily., Disp: , Rfl:     nitroGLYCERIN (NITROSTAT) 0.4 MG SL tablet, Place 1 tablet (0.4  mg total) under the tongue every 5 (five) minutes as needed for Chest pain., Disp: 20 tablet, Rfl: 3    olmesartan (BENICAR) 40 MG tablet, Take 1 tablet (40 mg total) by mouth once daily., Disp: 90 tablet, Rfl: 3    potassium citrate (UROCIT-K) 10 mEq (1,080 mg) TbSR, TAKE 1 TABLET BY MOUTH ONCE DAILY, Disp: 90 tablet, Rfl: 3    propafenone (RYTHMOL SR) 225 MG Cp12, Take 1 capsule (225 mg total) by mouth every 12 (twelve) hours., Disp: 180 capsule, Rfl: 3    topiramate (TOPAMAX) 25 MG tablet, Take 1 tablet twice daily., Disp: 180 tablet, Rfl: 3    furosemide (LASIX) 40 MG tablet, Take 1 tablet (40 mg total) by mouth once daily., Disp: 30 tablet, Rfl: 2   Medication List with Changes/Refills   Current Medications    AMLODIPINE (NORVASC) 10 MG TABLET    TAKE 1 TABLET BY MOUTH EVERY DAY    ASPIRIN (ECOTRIN) 81 MG EC TABLET    Take 1 tablet (81 mg total) by mouth once daily.    ATORVASTATIN (LIPITOR) 80 MG TABLET    Take 1 tablet (80 mg total) by mouth once daily.    BISOPROLOL (ZEBETA) 5 MG TABLET    Take 1 tablet (5 mg total) by mouth once daily.    CLOBETASOL (OLUX) 0.05 % FOAM    Apply 1 application topically every 30 days.    ERGOCALCIFEROL, VITAMIN D2, (VITAMIN D ORAL)    Take 2,000 Units by mouth once daily.    FUROSEMIDE (LASIX) 20 MG TABLET    Take 1 tablet (20 mg total) by mouth once daily.    NITROGLYCERIN (NITROSTAT) 0.4 MG SL TABLET    Place 1 tablet (0.4 mg total) under the tongue every 5 (five) minutes as needed for Chest pain.    OLMESARTAN (BENICAR) 40 MG TABLET    Take 1 tablet (40 mg total) by mouth once daily.    POTASSIUM CITRATE (UROCIT-K) 10 MEQ (1,080 MG) TBSR    TAKE 1 TABLET BY MOUTH ONCE DAILY    PROPAFENONE (RYTHMOL SR) 225 MG CP12    Take 1 capsule (225 mg total) by mouth every 12 (twelve) hours.    TOPIRAMATE (TOPAMAX) 25 MG TABLET    Take 1 tablet twice daily.   Changed and/or Refilled Medications    Modified Medication Previous Medication    FUROSEMIDE (LASIX) 40 MG TABLET furosemide  "(LASIX) 40 MG tablet       Take 1 tablet (40 mg total) by mouth once daily.    Take 1 tablet (40 mg total) by mouth once daily. for 7 days        PHYSICAL EXAM:  /74   Pulse 96   Ht 5' 1" (1.549 m)   Wt 90.4 kg (199 lb 4.7 oz)   SpO2 96%   BMI 37.66 kg/m²     General: No distress, No fever or chills  Neck: No adenopathy,no carotid bruit,no JVD  Lungs:Clear to auscultation bilaterally, No Crackles  Heart: Regular rate and rhythm, no murmur, gallops or rubs  Abdomen: Soft, no tenderness, bowel sounds normal  Extremities: Atraumatic, no edema in LE  Skin: Turgor normal. No rashes or ulcers  Neurologic: No focal weakness, oriented.  Dialysis Access: Non applicable        LABS:  Lab Results   Component Value Date    HGB 14.1 06/15/2023        Lab Results   Component Value Date    CREATININE 1.7 (H) 06/15/2023       Prot/Creat Ratio, Urine   Date Value Ref Range Status   06/15/2023 Unable to calculate 0.00 - 0.20 Final   04/05/2021 Unable to calculate 0.00 - 0.20 Final   03/16/2021 0.08 0.00 - 0.20 Final       Lab Results   Component Value Date     06/15/2023    K 4.1 06/15/2023    CO2 25 06/15/2023       last PTH   Lab Results   Component Value Date    PTH 47.0 05/26/2021    CALCIUM 10.5 06/15/2023    CAION 1.25 01/20/2022    PHOS 2.9 06/15/2023       Lab Results   Component Value Date    HGBA1C 5.5 03/14/2022       Lab Results   Component Value Date    LDLCALC 131.2 10/11/2022         Creatinine, Urine   Date Value Ref Range Status   06/15/2023 30.0 15.0 - 325.0 mg/dL Final   05/26/2021 82.0 15.0 - 325.0 mg/dL Final     Comment:     The random urine reference ranges provided were established   for 24 hour urine collections.  No reference ranges exist for  random urine specimens.  Correlate clinically.     04/05/2021 35.0 15.0 - 325.0 mg/dL Final     Comment:     The random urine reference ranges provided were established   for 24 hour urine collections.  No reference ranges exist for  random urine " specimens.  Correlate clinically.         Protein, Urine Random   Date Value Ref Range Status   06/15/2023 <7 0 - 15 mg/dL Final   04/05/2021 <7 0 - 15 mg/dL Final     Comment:     The random urine reference ranges provided were established   for 24 hour urine collections.  No reference ranges exist for  random urine specimens.  Correlate clinically.     03/16/2021 18 (H) 0 - 15 mg/dL Final     Comment:     The random urine reference ranges provided were established   for 24 hour urine collections.  No reference ranges exist for  random urine specimens.  Correlate clinically.         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 2021 it has bumped to 2.0, now fluctuating between 1.2-1.5 mg/dl (Fluctuates based on her volume status). But cystatin C estimated eGFR is higher than creatinine eGFR). Urine microscopy done by me during initial visits did not show any RBC/casts. Urine analysis has been negative for proteinuria or hematuria Renal ultrasound from march 2021 showed 10.8 and 9.9 cm kidneys. . CKD due to combination of age related nephron loss, HTN, previous BARBARA and NSAID use. Serological work was positive for monoclonal protein and was evaluated by [facility].   Hypokalemia due to diuretic improved with potassium 10 meq daily. Acid Base and hemoglobin in acceptable range. On topamax for weight loss but no acidosis noted with current dose.   Did work up for borderline hypercalcemia and low phosphate. Vitamin D, Vitamin 1,25 Vitamin D, PTHrP, FGF 23 are in acceptable range. 24 hour urine showed low calcium excretion despite high normal PTH. This could go along with  Familial hypocalciuria hypercalcemia/due to HCTZ? . Switched HCTZ to lasix.    - Agree with lasix 40 mg daily, while we evaluate further into etiology of dyspnea on exertion. (JVD seen through ultrasound is not significantly elevated though).  - Blood pressures are really soft, goal systolic 110-130. Cutting down the amlodipine to 5 mg daily and wean off further if needed to get to this goal.  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 Fungitell and Aspergillus negative. Urine histoplasma antigen pending.  - Continue potassium 10 meq (switched to potassium citrate to compensate Topamax use) daily for lasix  induced hypokalemia.  - No proteinuria to consider SGLT2, however due to expanding indications for SGLT2 will discuss it next visit.  - Avoid NSAIDs intake  - Discussed about CKD, low sodium, and low animal protein diet.  - Discontinued Allopurinol previously due to side effects    RTC in 1 year    Diagnosis and plan of care explained to the patient.  Pt Verbalized understanding. Answered all questions.  Thanks for allowing me to participate in the care of this patient.     9:02 PM    Venkatesh Mcgarry MD  Nephrology & Critical Care

## 2023-06-19 LAB — MYELOPEROXIDASE AB SER-ACNC: <0.2 U/ML

## 2023-06-20 DIAGNOSIS — N18.32 STAGE 3B CHRONIC KIDNEY DISEASE: ICD-10-CM

## 2023-06-20 DIAGNOSIS — R06.09 DOE (DYSPNEA ON EXERTION): Primary | ICD-10-CM

## 2023-06-21 ENCOUNTER — PATIENT MESSAGE (OUTPATIENT)
Dept: NEPHROLOGY | Facility: CLINIC | Age: 73
End: 2023-06-21
Payer: MEDICARE

## 2023-06-27 ENCOUNTER — TELEPHONE (OUTPATIENT)
Dept: ENDOSCOPY | Facility: HOSPITAL | Age: 73
End: 2023-06-27
Payer: MEDICARE

## 2023-06-27 DIAGNOSIS — Z12.11 SPECIAL SCREENING FOR MALIGNANT NEOPLASMS, COLON: Primary | ICD-10-CM

## 2023-06-27 RX ORDER — POLYETHYLENE GLYCOL 3350, SODIUM SULFATE ANHYDROUS, SODIUM BICARBONATE, SODIUM CHLORIDE, POTASSIUM CHLORIDE 236; 22.74; 6.74; 5.86; 2.97 G/4L; G/4L; G/4L; G/4L; G/4L
4 POWDER, FOR SOLUTION ORAL ONCE
Qty: 4000 ML | Refills: 0 | Status: SHIPPED | OUTPATIENT
Start: 2023-06-27 | End: 2023-06-27

## 2023-06-27 NOTE — TELEPHONE ENCOUNTER
"----- Message -----   From: Tao Gomez MD   Sent: 2023   5:04 PM CDT   To: [facility]     Procedure: Colonoscopy     Diagnosis: Screening colonoscopy     Procedure Timin-12 weeks     *If within 4 weeks selected, please raul as high priority*     *If greater than 12 weeks, please select "5-12 weeks" and delay sending until 2 months prior to requested date*     Provider: Myself     Location: [address]     Additional Scheduling Information:  2nd floor for airway protection patient with lung disease elevated pulmonary artery pressure pacemaker and fecal occult blood positive stool, patient needs pulmonary and cardiac clearance before patient has cardiology and pulmonary clinic appointment scheduled     Prep Specifications:Standard prep     Have you attached a patient to this message: yes   "

## 2023-06-27 NOTE — TELEPHONE ENCOUNTER
Spoke to pt to schedule procedure(s) Colonoscopy       Physician to perform procedure(s) Dr. OMAR Gomez  Date of Procedure (s) 9/15/23  Arrival Time 8:45 AM  Time of Procedure(s) 9:45 AM   Location of Procedure(s) [facility] 2nd Floor  Type of Rx Prep sent to patient: PEG  Instructions provided to patient via [facility]    Patient was informed on the following information and verbalized understanding. Screening questionnaire reviewed with patient and complete. If procedure requires anesthesia, a responsible adult needs to be present to accompany the patient home, patient cannot drive after receiving anesthesia. Appointment details are tentative, especially check-in time. Patient will receive a prep-op call 4 days prior to confirm check-in time for procedure. If applicable the patient should contact their pharmacy to verify Rx for procedure prep is ready for pick-up. Patient was advised to call the scheduling department at [phone] if pharmacy states no Rx is available. Patient was advised to call the endoscopy scheduling department if any questions or concerns arise.      [facility] Scheduling Department

## 2023-06-29 ENCOUNTER — CLINICAL SUPPORT (OUTPATIENT)
Dept: CARDIOLOGY | Facility: HOSPITAL | Age: 73
End: 2023-06-29
Attending: INTERNAL MEDICINE
Payer: MEDICARE

## 2023-06-29 ENCOUNTER — OFFICE VISIT (OUTPATIENT)
Dept: ELECTROPHYSIOLOGY | Facility: CLINIC | Age: 73
End: 2023-06-29
Payer: MEDICARE

## 2023-06-29 VITALS
WEIGHT: 195 LBS | SYSTOLIC BLOOD PRESSURE: 105 MMHG | DIASTOLIC BLOOD PRESSURE: 70 MMHG | BODY MASS INDEX: 36.82 KG/M2 | HEART RATE: 60 BPM | HEIGHT: 61 IN

## 2023-06-29 DIAGNOSIS — I49.5 SICK SINUS SYNDROME: Primary | ICD-10-CM

## 2023-06-29 DIAGNOSIS — Z95.0 S/P PLACEMENT OF CARDIAC PACEMAKER: ICD-10-CM

## 2023-06-29 DIAGNOSIS — I49.8 OTHER CARDIAC ARRHYTHMIA: ICD-10-CM

## 2023-06-29 PROCEDURE — 99214 PR OFFICE/OUTPT VISIT, EST, LEVL IV, 30-39 MIN: ICD-10-PCS | Mod: S$PBB,,, | Performed by: INTERNAL MEDICINE

## 2023-06-29 PROCEDURE — 93280 PM DEVICE PROGR EVAL DUAL: CPT

## 2023-06-29 PROCEDURE — 93010 ELECTROCARDIOGRAM REPORT: CPT | Mod: S$PBB,,, | Performed by: INTERNAL MEDICINE

## 2023-06-29 PROCEDURE — 93280 PM DEVICE PROGR EVAL DUAL: CPT | Mod: 26,,, | Performed by: INTERNAL MEDICINE

## 2023-06-29 PROCEDURE — 93005 ELECTROCARDIOGRAM TRACING: CPT | Mod: PBBFAC | Performed by: INTERNAL MEDICINE

## 2023-06-29 PROCEDURE — 99999 PR PBB SHADOW E&M-EST. PATIENT-LVL III: ICD-10-PCS | Mod: PBBFAC,,, | Performed by: INTERNAL MEDICINE

## 2023-06-29 PROCEDURE — 99214 OFFICE O/P EST MOD 30 MIN: CPT | Mod: S$PBB,,, | Performed by: INTERNAL MEDICINE

## 2023-06-29 PROCEDURE — 99999 PR PBB SHADOW E&M-EST. PATIENT-LVL III: CPT | Mod: PBBFAC,,, | Performed by: INTERNAL MEDICINE

## 2023-06-29 PROCEDURE — 93010 RHYTHM STRIP: ICD-10-PCS | Mod: S$PBB,,, | Performed by: INTERNAL MEDICINE

## 2023-06-29 PROCEDURE — 93280 CARDIAC DEVICE CHECK - IN CLINIC & HOSPITAL: ICD-10-PCS | Mod: 26,,, | Performed by: INTERNAL MEDICINE

## 2023-06-29 PROCEDURE — 99213 OFFICE O/P EST LOW 20 MIN: CPT | Mod: PBBFAC | Performed by: INTERNAL MEDICINE

## 2023-06-29 NOTE — PROGRESS NOTES
Subjective:     HPI     Cardiologist: Verna Hodgson MD    I had the pleasure of seeing Jesenia Curiel in follow-up for her history of atrial fibrillation. Last seen in 5/2022. 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 dual chamber pacemaker implantation in 5/2009 (gen change 9/2014). She has been on Rythmol since 2009, which has significantly brought down her arrhythmia burden (episodes used to last for hours but now last seconds to minutes). At her 2017 office visit, she was found to have a <1% AF burden, with the longest episode lasting <90 seconds. At her 7/2018 visit, her device recorded no mode-switch episodes. Xarelto was stopped at that visit. At her last visit in 92020 no mode-switch episodes were seen. Ms. Curiel described episodes of regular tachycardia at 110 bpm range, which would occur once monthly and last several minutes. We discussed option of event monitor but she wanted to hold the course.    An echo done in 9/2019 showed an EF of 55%, and grade 2 DD.    At her 10/2021 visit a device check showed intermittent atrial lead noise. In 1/2022 an RA lead revision was performed.    At her 5/2022 visit, Ms. Curiel described mild pocket discomfort, particularly when she would sleep on her side at night. Device check showed 9 AMS episodes, longest 46 seconds.    I reviewed today's device interrogation which shows stable device/lead function, RA pacing 97%, RV pacing 2.9%, AMS <1% (likely undersensing). Regarding recent AF episodes, 4 hour episodes on 6/25/2023 between 3-7 am. Battery 7 months.    Pt describes NAVA with minimal exertion. Stress echo unremarkable. Per pt, CT chest consistent with diastolic heart failure, although aggressive diuresis has not improved her symptoms.    My interpretation of today's ECG is AV pacing at 77 bpm.    Review of Systems   Constitutional: Positive for malaise/fatigue. Negative  for decreased appetite, weight gain and weight loss.   HENT:  Negative for sore throat.    Eyes:  Negative for blurred vision.   Cardiovascular:  Positive for dyspnea on exertion. Negative for chest pain, irregular heartbeat, leg swelling, near-syncope, orthopnea, palpitations, paroxysmal nocturnal dyspnea and syncope.   Respiratory:  Negative for shortness of breath.    Skin:  Negative for rash.   Musculoskeletal:  Negative for arthritis.   Gastrointestinal:  Negative for abdominal pain.   Neurological:  Negative for focal weakness.   Psychiatric/Behavioral:  Negative for altered mental status.       Objective:    Physical Exam  Vitals reviewed.   Constitutional:       General: She is not in acute distress.     Appearance: She is well-developed.   HENT:      Head: Normocephalic and atraumatic.   Eyes:      General: No scleral icterus.     Conjunctiva/sclera: Conjunctivae normal.      Pupils: Pupils are equal, round, and reactive to light.   Neck:      Thyroid: No thyromegaly.      Vascular: No JVD.   Cardiovascular:      Rate and Rhythm: Normal rate and regular rhythm.      Pulses: Intact distal pulses.      Heart sounds: Normal heart sounds. No murmur heard.    No friction rub. No gallop.   Pulmonary:      Effort: Pulmonary effort is normal. No respiratory distress.      Breath sounds: Normal breath sounds. No rales.   Abdominal:      General: Bowel sounds are normal. There is no distension.      Palpations: Abdomen is soft.   Musculoskeletal:      Cervical back: Normal range of motion and neck supple.   Skin:     General: Skin is warm and dry.   Neurological:      Mental Status: She is alert and oriented to person, place, and time.   Psychiatric:         Behavior: Behavior normal.         Assessment:       1. Sick sinus syndrome    2. S/P placement of cardiac pacemaker         Plan:       In summary, Jesenia Curiel is a 72 year old female with sick sinus syndrome, pacemaker implantation, status-post RA lead  revision in 2022, and symptomatic PAF. Her AF has been controlled for many years on Rythmol, with the longest episode of AF over the past year lasting several minutes. Briefly discussed starting anticoagulation (CHADS2-VASc of 3) but given ongoing issues with NAVA she would prefer to hold the course.    Her device is functioning appropriately. We have adjusted accelerometer to make HR trend more favorable. Plan is for regular device checks and to see me again in 6 months.    Thank you for allowing me to participate in the care of this patient. Please do not hesitate to call me with any questions or concerns.

## 2023-07-02 DIAGNOSIS — E78.5 HYPERLIPIDEMIA, UNSPECIFIED HYPERLIPIDEMIA TYPE: ICD-10-CM

## 2023-07-03 RX ORDER — ATORVASTATIN CALCIUM 80 MG/1
80 TABLET, FILM COATED ORAL DAILY
Qty: 90 TABLET | Refills: 3 | Status: SHIPPED | OUTPATIENT
Start: 2023-07-03 | End: 2023-09-29

## 2023-07-10 ENCOUNTER — OFFICE VISIT (OUTPATIENT)
Dept: PULMONOLOGY | Facility: CLINIC | Age: 73
End: 2023-07-10
Payer: MEDICARE

## 2023-07-10 ENCOUNTER — LAB VISIT (OUTPATIENT)
Dept: LAB | Facility: HOSPITAL | Age: 73
End: 2023-07-10
Attending: INTERNAL MEDICINE
Payer: MEDICARE

## 2023-07-10 VITALS
SYSTOLIC BLOOD PRESSURE: 109 MMHG | DIASTOLIC BLOOD PRESSURE: 62 MMHG | HEIGHT: 61 IN | OXYGEN SATURATION: 93 % | HEART RATE: 102 BPM | BODY MASS INDEX: 36.96 KG/M2 | WEIGHT: 195.75 LBS

## 2023-07-10 DIAGNOSIS — R06.09 DOE (DYSPNEA ON EXERTION): ICD-10-CM

## 2023-07-10 DIAGNOSIS — I70.0 AORTIC ATHEROSCLEROSIS: ICD-10-CM

## 2023-07-10 DIAGNOSIS — N18.32 STAGE 3B CHRONIC KIDNEY DISEASE: ICD-10-CM

## 2023-07-10 DIAGNOSIS — J84.9 INTERSTITIAL LUNG DISEASE: ICD-10-CM

## 2023-07-10 DIAGNOSIS — J84.10 CALCIFIED GRANULOMA OF LUNG: Primary | ICD-10-CM

## 2023-07-10 DIAGNOSIS — E66.01 MORBID OBESITY: ICD-10-CM

## 2023-07-10 DIAGNOSIS — G47.33 OBSTRUCTIVE SLEEP APNEA: ICD-10-CM

## 2023-07-10 DIAGNOSIS — N18.31 STAGE 3A CHRONIC KIDNEY DISEASE: ICD-10-CM

## 2023-07-10 LAB
ALBUMIN SERPL BCP-MCNC: 4 G/DL (ref 3.5–5.2)
ANION GAP SERPL CALC-SCNC: 10 MMOL/L (ref 8–16)
BNP SERPL-MCNC: 70 PG/ML (ref 0–99)
BUN SERPL-MCNC: 27 MG/DL (ref 8–23)
CALCIUM SERPL-MCNC: 10.6 MG/DL (ref 8.7–10.5)
CHLORIDE SERPL-SCNC: 106 MMOL/L (ref 95–110)
CO2 SERPL-SCNC: 25 MMOL/L (ref 23–29)
CREAT SERPL-MCNC: 1.6 MG/DL (ref 0.5–1.4)
EST. GFR  (NO RACE VARIABLE): 33.8 ML/MIN/1.73 M^2
GLUCOSE SERPL-MCNC: 97 MG/DL (ref 70–110)
PHOSPHATE SERPL-MCNC: 3 MG/DL (ref 2.7–4.5)
POTASSIUM SERPL-SCNC: 3.9 MMOL/L (ref 3.5–5.1)
SODIUM SERPL-SCNC: 141 MMOL/L (ref 136–145)

## 2023-07-10 PROCEDURE — 99214 PR OFFICE/OUTPT VISIT, EST, LEVL IV, 30-39 MIN: ICD-10-PCS | Mod: S$PBB,,, | Performed by: STUDENT IN AN ORGANIZED HEALTH CARE EDUCATION/TRAINING PROGRAM

## 2023-07-10 PROCEDURE — 36415 COLL VENOUS BLD VENIPUNCTURE: CPT | Performed by: INTERNAL MEDICINE

## 2023-07-10 PROCEDURE — 80069 RENAL FUNCTION PANEL: CPT | Performed by: INTERNAL MEDICINE

## 2023-07-10 PROCEDURE — 99999 PR PBB SHADOW E&M-EST. PATIENT-LVL III: CPT | Mod: PBBFAC,,, | Performed by: STUDENT IN AN ORGANIZED HEALTH CARE EDUCATION/TRAINING PROGRAM

## 2023-07-10 PROCEDURE — 99214 OFFICE O/P EST MOD 30 MIN: CPT | Mod: S$PBB,,, | Performed by: STUDENT IN AN ORGANIZED HEALTH CARE EDUCATION/TRAINING PROGRAM

## 2023-07-10 PROCEDURE — 99213 OFFICE O/P EST LOW 20 MIN: CPT | Mod: PBBFAC | Performed by: STUDENT IN AN ORGANIZED HEALTH CARE EDUCATION/TRAINING PROGRAM

## 2023-07-10 PROCEDURE — 83880 ASSAY OF NATRIURETIC PEPTIDE: CPT | Performed by: INTERNAL MEDICINE

## 2023-07-10 PROCEDURE — 99999 PR PBB SHADOW E&M-EST. PATIENT-LVL III: ICD-10-PCS | Mod: PBBFAC,,, | Performed by: STUDENT IN AN ORGANIZED HEALTH CARE EDUCATION/TRAINING PROGRAM

## 2023-07-10 RX ORDER — POLYETHYLENE GLYCOL-3350 AND ELECTROLYTES 236; 6.74; 5.86; 2.97; 22.74 G/274.31G; G/274.31G; G/274.31G; G/274.31G; G/274.31G
4000 POWDER, FOR SOLUTION ORAL ONCE
COMMUNITY
Start: 2023-06-27 | End: 2023-09-26 | Stop reason: ALTCHOICE

## 2023-07-10 NOTE — H&P (VIEW-ONLY)
"Subjective:     Reason for visit:  Dyspnea on exertion    Patient ID:  Jesenia Curiel is a 72 y.o. morbidly obese female with CKD, AFib and sick sinus syndrome, HTN, hyperlipidemia, JOSÉ not yet on CPAP    Patient is accompanied by her  who provides additional history    Interval History:  No change in symptoms since last visit.  No improvement in symptoms following diuresis      Additional Pulmonary History:  Childhood Illnesses:  None  Occupational/Environmental:  Retired gastroenterologist  Tobacco/Smoking:  Never    Objective:     Vitals:    07/10/23 1603   BP: 109/62   BP Location: Left arm   Patient Position: Sitting   BP Method: Medium (Manual)   Pulse: 102   SpO2: (!) 93%   Weight: 88.8 kg (195 lb 12.3 oz)   Height: 5' 1" (1.549 m)           Physical Exam  Vitals and nursing note reviewed.   Constitutional:       General: She is not in acute distress.     Appearance: She is morbidly obese. She is not ill-appearing, toxic-appearing or diaphoretic.   HENT:      Head: Normocephalic and atraumatic.   Eyes:      General: No scleral icterus.     Extraocular Movements: Extraocular movements intact.   Cardiovascular:      Rate and Rhythm: Normal rate and regular rhythm.   Pulmonary:      Effort: No tachypnea, accessory muscle usage, respiratory distress or retractions.   Abdominal:      General: There is no distension.   Skin:     General: Skin is warm and dry.      Coloration: Skin is not jaundiced.      Findings: No rash.   Neurological:      General: No focal deficit present.      Mental Status: Mental status is at baseline.        Personal Diagnostic Review and Interpretation  03/27/2023 CT chest without contrast:  Bibasilar ground-glass with subpleural reticulations but no honeycombing; scattered subcentimeter nodules; RUL calcified granuloma; no lymphadenopathy      Pertinent Studies Reviewed & Interpreted:     Pulmonary Function Tests:   03/31/2023: FEV1 72, FVC 76, FEV1/FVC 73, FEF ; no " bronchodilator response.  TLC 78, ERV 56.  DLCO 60    6 Minute Walk Tests:   03/31/2023:  243 m (220 m) SpO2 94% on room air at peak exercise.  Appropriate tachycardic response but drop in systolic blood pressure from 125-118    Echocardiograms:   03/08/2023 dobutamine stress:  Negative for ischemia EF 55%, PASP 37    01/20/2022: EF 53% with segmental WMA and abnormal septal wall motion; PASP 25    Other pertinent laboratories:  03/22/2023 BNP: 167      Assessment & Plan:       Problem List Items Addressed This Visit          Pulmonary    Calcified granuloma of lung - Primary    Overview     RUL calcified granuloma on recent CT chest.  Benign with no need for follow-up.         Interstitial lung disease    Overview     BOLIVAR weakly positive (1:80) but exhaustive autoimmune/CTD workup otherwise negative.  Follow-up CT imaging with scattered mosaicism and bibasilar scarring with fibrosis and mild traction bronchiectasis.  Has appearance similar to hypersensitivity pneumonitis.  No feather/down bedding, no molds or organic material exposures.  Has carpet in her house which is vacuumed frequently.  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  Her  has quit smoking.  Does report an allergy to pet dander and is frequently watching her daughter's pet dog.  The time course of this fits with the symptoms as well.  Is around the dog almost weekly.  We will move forward with bronchoscopy, transbronchial biopsies and BAL with cell count and cultures.  May ultimately wind up needing wedge biopsy            Cardiac/Vascular    NAVA (dyspnea on exertion)    Overview     Multi factorial issue including morbid obesity deconditioning diastolic heart failure now with some underlying ILD.  Previous echo with borderline LAE and increasing PASP from 20-37.  Dyspnea did not improve appreciably with diuresis, however CT imaging appears much more clear.  Restrictive physiology on PFTs likely  combination of gas trapping and morbid obesity as evidenced by decreased ERV.  See the section on ILD         Aortic atherosclerosis    Overview     Noted on recent CT.  Currently on ASA statin.  Needs to lose weight.            Renal/    CKD (chronic kidney disease) stage 3, GFR 30-59 ml/min    Overview     Complicates every aspect of patient care.  Renally dose medicines.            Endocrine    Morbid obesity    Overview     It is imperative that patient lose weight.  Suspect these are playing a part in restrictive physiology on PFTs, and likely contributing to some of the abnormalities on CT imaging.            Other    Obstructive sleep apnea    Overview     Recent HST with AHI 18 consistent with moderate sleep apnea.  Needs to see sleep medicine.               Portions of the record may have been created with voice-recognition software. Occasional wrong-word or sound-a-like substitutions may have occurred due to the inherent limitations of voice-recognition software. Read the chart carefully and recognize, using context, where substitutions have occurred.

## 2023-07-11 ENCOUNTER — PATIENT MESSAGE (OUTPATIENT)
Dept: PULMONOLOGY | Facility: CLINIC | Age: 73
End: 2023-07-11
Payer: MEDICARE

## 2023-07-11 ENCOUNTER — PATIENT MESSAGE (OUTPATIENT)
Dept: NEPHROLOGY | Facility: CLINIC | Age: 73
End: 2023-07-11
Payer: MEDICARE

## 2023-07-11 ENCOUNTER — TELEPHONE (OUTPATIENT)
Dept: ENDOSCOPY | Facility: HOSPITAL | Age: 73
End: 2023-07-11
Payer: MEDICARE

## 2023-07-11 DIAGNOSIS — R93.89 ABNORMAL CAT SCAN: Primary | ICD-10-CM

## 2023-07-11 NOTE — TELEPHONE ENCOUNTER
Dear Dr Bhakta,    Patient has a scheduled procedure Colonoscopy 9/15/23 and in order to ensure patient safety, we would like to confirm if he/she can be cleared for the procedure.      Thank you for your prompt reply.    [facility] Endoscopy Scheduling

## 2023-07-12 ENCOUNTER — TELEPHONE (OUTPATIENT)
Dept: ENDOSCOPY | Facility: HOSPITAL | Age: 73
End: 2023-07-12
Payer: MEDICARE

## 2023-07-12 ENCOUNTER — TELEPHONE (OUTPATIENT)
Dept: PULMONOLOGY | Facility: CLINIC | Age: 73
End: 2023-07-12
Payer: MEDICARE

## 2023-07-12 NOTE — TELEPHONE ENCOUNTER
Pre Bronchoscopy instruction given to patient over the phone. Patient was instructed to remain in NPO after midnight. Patient was instructed to take morning medication with a little water and not to take blood thinners. Patient verbalized understanding to report to [facility] on the second floor at 630am and to have a designated .

## 2023-07-12 NOTE — TELEPHONE ENCOUNTER
----- Message from Mindy B Seipel, RN sent at 2023  2:48 PM CDT -----  Regardin/15 Cl  The patient is currently under an internal cardiologist Nataly Hodgson care and requires a clearance Cardiology for their upcoming scheduled Colonoscopy on 9/15/23.     Additional clearances required:  internal   pulmonologist   clearance   Pulmonology Dr. Ridge Bhakta - has appointment to clear on 7/10/23      Notes: pt states just message Dr. Hodgson for cardiac clearance and has appt to see pulmonary on 7/10/23 for clearance

## 2023-07-13 ENCOUNTER — HOSPITAL ENCOUNTER (OUTPATIENT)
Facility: HOSPITAL | Age: 73
Discharge: HOME | End: 2023-07-13
Attending: INTERNAL MEDICINE | Admitting: INTERNAL MEDICINE
Payer: MEDICARE

## 2023-07-13 VITALS
BODY MASS INDEX: 36.82 KG/M2 | WEIGHT: 195 LBS | DIASTOLIC BLOOD PRESSURE: 56 MMHG | HEIGHT: 61 IN | SYSTOLIC BLOOD PRESSURE: 102 MMHG | TEMPERATURE: 98 F | HEART RATE: 63 BPM | RESPIRATION RATE: 20 BRPM | OXYGEN SATURATION: 95 %

## 2023-07-13 DIAGNOSIS — R93.89 ABNORMAL CAT SCAN: ICD-10-CM

## 2023-07-13 DIAGNOSIS — J84.10 CALCIFIED GRANULOMA OF LUNG: ICD-10-CM

## 2023-07-13 DIAGNOSIS — J84.9 INTERSTITIAL LUNG DISEASE: Primary | ICD-10-CM

## 2023-07-13 LAB
ACID FAST MOD KINY STN SPEC: NORMAL
APPEARANCE FLD: NORMAL
BODY FLD TYPE: NORMAL
COLOR FLD: COLORLESS
EOSINOPHIL NFR FLD MANUAL: 1 %
KOH PREP SPEC: NORMAL
LYMPHOCYTES NFR FLD MANUAL: 70 %
MONOS+MACROS NFR FLD MANUAL: 26 %
NEUTROPHILS NFR FLD MANUAL: 3 %
WBC # FLD: 33 /CU MM

## 2023-07-13 PROCEDURE — 88312 SPECIAL STAINS GROUP 1: CPT | Performed by: STUDENT IN AN ORGANIZED HEALTH CARE EDUCATION/TRAINING PROGRAM

## 2023-07-13 PROCEDURE — 63600175 PHARM REV CODE 636 W HCPCS: Performed by: INTERNAL MEDICINE

## 2023-07-13 PROCEDURE — 87206 SMEAR FLUORESCENT/ACID STAI: CPT | Performed by: STUDENT IN AN ORGANIZED HEALTH CARE EDUCATION/TRAINING PROGRAM

## 2023-07-13 PROCEDURE — 87070 CULTURE OTHR SPECIMN AEROBIC: CPT | Performed by: STUDENT IN AN ORGANIZED HEALTH CARE EDUCATION/TRAINING PROGRAM

## 2023-07-13 PROCEDURE — 31624 PR BRONCHOSCOPY,DIAG2STIC W LAVAGE: ICD-10-PCS | Mod: 51,LT,GC, | Performed by: INTERNAL MEDICINE

## 2023-07-13 PROCEDURE — 88112 CYTOPATH CELL ENHANCE TECH: CPT | Mod: 26,,, | Performed by: STUDENT IN AN ORGANIZED HEALTH CARE EDUCATION/TRAINING PROGRAM

## 2023-07-13 PROCEDURE — 88305 TISSUE EXAM BY PATHOLOGIST: ICD-10-PCS | Mod: 26,,, | Performed by: STUDENT IN AN ORGANIZED HEALTH CARE EDUCATION/TRAINING PROGRAM

## 2023-07-13 PROCEDURE — 87205 SMEAR GRAM STAIN: CPT | Performed by: STUDENT IN AN ORGANIZED HEALTH CARE EDUCATION/TRAINING PROGRAM

## 2023-07-13 PROCEDURE — 99153 MOD SED SAME PHYS/QHP EA: CPT | Performed by: INTERNAL MEDICINE

## 2023-07-13 PROCEDURE — 89051 BODY FLUID CELL COUNT: CPT | Performed by: STUDENT IN AN ORGANIZED HEALTH CARE EDUCATION/TRAINING PROGRAM

## 2023-07-13 PROCEDURE — 31628 BRONCHOSCOPY/LUNG BX EACH: CPT | Mod: LT,GC,, | Performed by: INTERNAL MEDICINE

## 2023-07-13 PROCEDURE — 31628 BRONCHOSCOPY/LUNG BX EACH: CPT | Performed by: INTERNAL MEDICINE

## 2023-07-13 PROCEDURE — 88305 TISSUE EXAM BY PATHOLOGIST: CPT | Mod: 26,,, | Performed by: STUDENT IN AN ORGANIZED HEALTH CARE EDUCATION/TRAINING PROGRAM

## 2023-07-13 PROCEDURE — 87102 FUNGUS ISOLATION CULTURE: CPT | Performed by: STUDENT IN AN ORGANIZED HEALTH CARE EDUCATION/TRAINING PROGRAM

## 2023-07-13 PROCEDURE — 88112 CYTOPATH CELL ENHANCE TECH: CPT | Performed by: STUDENT IN AN ORGANIZED HEALTH CARE EDUCATION/TRAINING PROGRAM

## 2023-07-13 PROCEDURE — 27201011 HC FORCEPS DISPOSABLE: Performed by: INTERNAL MEDICINE

## 2023-07-13 PROCEDURE — 31624 DX BRONCHOSCOPE/LAVAGE: CPT | Performed by: INTERNAL MEDICINE

## 2023-07-13 PROCEDURE — 88312 PR  SPECIAL STAINS,GROUP I: ICD-10-PCS | Mod: 26,,, | Performed by: STUDENT IN AN ORGANIZED HEALTH CARE EDUCATION/TRAINING PROGRAM

## 2023-07-13 PROCEDURE — 31628 BRONCHOSCOPY/LUNG BX EACH: CPT

## 2023-07-13 PROCEDURE — 87206 SMEAR FLUORESCENT/ACID STAI: CPT | Mod: 91 | Performed by: STUDENT IN AN ORGANIZED HEALTH CARE EDUCATION/TRAINING PROGRAM

## 2023-07-13 PROCEDURE — 31624 DX BRONCHOSCOPE/LAVAGE: CPT

## 2023-07-13 PROCEDURE — 87015 SPECIMEN INFECT AGNT CONCNTJ: CPT | Performed by: STUDENT IN AN ORGANIZED HEALTH CARE EDUCATION/TRAINING PROGRAM

## 2023-07-13 PROCEDURE — 99152 MOD SED SAME PHYS/QHP 5/>YRS: CPT | Performed by: INTERNAL MEDICINE

## 2023-07-13 PROCEDURE — 88312 SPECIAL STAINS GROUP 1: CPT | Mod: 26,,, | Performed by: STUDENT IN AN ORGANIZED HEALTH CARE EDUCATION/TRAINING PROGRAM

## 2023-07-13 PROCEDURE — 31628 PR BRONCHOSCOPY,TRANSBRONCH BIOPSY: ICD-10-PCS | Mod: LT,GC,, | Performed by: INTERNAL MEDICINE

## 2023-07-13 PROCEDURE — 88305 TISSUE EXAM BY PATHOLOGIST: CPT | Mod: 59 | Performed by: STUDENT IN AN ORGANIZED HEALTH CARE EDUCATION/TRAINING PROGRAM

## 2023-07-13 PROCEDURE — 87116 MYCOBACTERIA CULTURE: CPT | Performed by: STUDENT IN AN ORGANIZED HEALTH CARE EDUCATION/TRAINING PROGRAM

## 2023-07-13 PROCEDURE — 25000003 PHARM REV CODE 250: Performed by: INTERNAL MEDICINE

## 2023-07-13 PROCEDURE — 88112 PR  CYTOPATH, CELL ENHANCE TECH: ICD-10-PCS | Mod: 26,,, | Performed by: STUDENT IN AN ORGANIZED HEALTH CARE EDUCATION/TRAINING PROGRAM

## 2023-07-13 PROCEDURE — 87210 SMEAR WET MOUNT SALINE/INK: CPT | Performed by: STUDENT IN AN ORGANIZED HEALTH CARE EDUCATION/TRAINING PROGRAM

## 2023-07-13 PROCEDURE — 31624 DX BRONCHOSCOPE/LAVAGE: CPT | Mod: 51,LT,GC, | Performed by: INTERNAL MEDICINE

## 2023-07-13 RX ORDER — LIDOCAINE HYDROCHLORIDE 20 MG/ML
INJECTION, SOLUTION INFILTRATION; PERINEURAL CODE/TRAUMA/SEDATION MEDICATION
Status: COMPLETED | OUTPATIENT
Start: 2023-07-13 | End: 2023-07-13

## 2023-07-13 RX ORDER — FENTANYL CITRATE 50 UG/ML
INJECTION, SOLUTION INTRAMUSCULAR; INTRAVENOUS CODE/TRAUMA/SEDATION MEDICATION
Status: COMPLETED | OUTPATIENT
Start: 2023-07-13 | End: 2023-07-13

## 2023-07-13 RX ORDER — MIDAZOLAM HYDROCHLORIDE 5 MG/ML
INJECTION INTRAMUSCULAR; INTRAVENOUS CODE/TRAUMA/SEDATION MEDICATION
Status: COMPLETED | OUTPATIENT
Start: 2023-07-13 | End: 2023-07-13

## 2023-07-13 RX ORDER — LIDOCAINE HYDROCHLORIDE 10 MG/ML
INJECTION INFILTRATION; PERINEURAL CODE/TRAUMA/SEDATION MEDICATION
Status: COMPLETED | OUTPATIENT
Start: 2023-07-13 | End: 2023-07-13

## 2023-07-13 RX ADMIN — MIDAZOLAM 2 MG: 5 INJECTION INTRAMUSCULAR; INTRAVENOUS at 08:07

## 2023-07-13 RX ADMIN — FENTANYL CITRATE 50 MCG: 50 INJECTION, SOLUTION INTRAMUSCULAR; INTRAVENOUS at 08:07

## 2023-07-13 RX ADMIN — LIDOCAINE HYDROCHLORIDE 8 ML: 10 INJECTION, SOLUTION INFILTRATION; PERINEURAL at 08:07

## 2023-07-13 RX ADMIN — LIDOCAINE HYDROCHLORIDE 6 ML: 20 INJECTION, SOLUTION INFILTRATION; PERINEURAL at 08:07

## 2023-07-13 NOTE — ED NOTES
Moderate concious sedation was performed and cardiorespiratory functions were monitored the entire procedure by Sabrina Colin RN.  Sedation began at 821am  and concluded at 851am.

## 2023-07-13 NOTE — INTERVAL H&P NOTE
The patient has been examined and the H&P has been reviewed:     I concur with the findings and no changes have occurred since H&P was written.     Procedure and anesthesia risks, benefits and alternative options discussed and understood by patient/family.     ASA 2  Mallampati 2    Abnormal CT scan. Interstitial Lung disease. Chronic dyspnea.      ETTA Bennett MD  [facility]

## 2023-07-13 NOTE — ED NOTES
Specimens obtained during Bronchoscopy:  BAL RML 120ml nacl inserted with a return of 45ml.  LLL TBBX obtained. Verbal report will be given to [facility] RN at bedside to include documentation charted in procedural sedation documentation.  Patient to be in NPO 1 hour post procedure and place in PO tolerance at 940am.  The patient tolerated the procedure well.

## 2023-07-13 NOTE — DISCHARGE SUMMARY
[facility] - Surgery (2nd Fl)  Discharge Note  Short Stay    Procedure(s) (LRB):  BRONCHOSCOPY with BAL and transbronchial biopsy(N/A)      OUTCOME: Patient tolerated treatment/procedure well without complication and is now ready for discharge.    DISPOSITION: Home or Self Care    FINAL DIAGNOSIS:  Abnormal CAT scan    FOLLOWUP:  Will arrange follow up based on results.     DISCHARGE INSTRUCTIONS:  Return or notify physician for large volume hemoptysis, persistent fevers or chest pain.      TIME SPENT ON DISCHARGE: 15 minutes    ETTA Bennett MD  [employer] [facility] Fellow

## 2023-07-13 NOTE — ED NOTES
H and P updated-yes, patient placed on cardiac monitor, anesthesia Plan:  Conscious sedation, ASA verified-yes, Airway exam performed-yes, Personal or Family history of anesthesia complications-No  Consent signed and witnessed, Sabrina Colin RN

## 2023-07-15 LAB
BACTERIA SPEC AEROBE CULT: NORMAL
BACTERIA SPEC AEROBE CULT: NORMAL
GRAM STN SPEC: NORMAL

## 2023-07-19 LAB
COMMENT: NORMAL
FINAL PATHOLOGIC DIAGNOSIS: NORMAL
Lab: NORMAL
MICROSCOPIC EXAM: NORMAL

## 2023-07-25 ENCOUNTER — PATIENT MESSAGE (OUTPATIENT)
Dept: PULMONOLOGY | Facility: CLINIC | Age: 73
End: 2023-07-25
Payer: MEDICARE

## 2023-07-25 ENCOUNTER — TELEPHONE (OUTPATIENT)
Dept: PRIMARY CARE CLINIC | Facility: CLINIC | Age: 73
End: 2023-07-25
Payer: MEDICARE

## 2023-07-25 ENCOUNTER — PATIENT MESSAGE (OUTPATIENT)
Dept: ELECTROPHYSIOLOGY | Facility: CLINIC | Age: 73
End: 2023-07-25
Payer: MEDICARE

## 2023-07-25 DIAGNOSIS — R73.01 IMPAIRED FASTING GLUCOSE: ICD-10-CM

## 2023-07-25 DIAGNOSIS — E78.2 MIXED HYPERLIPIDEMIA: ICD-10-CM

## 2023-07-25 DIAGNOSIS — I10 BENIGN ESSENTIAL HYPERTENSION: Primary | ICD-10-CM

## 2023-07-25 DIAGNOSIS — N18.31 STAGE 3A CHRONIC KIDNEY DISEASE: ICD-10-CM

## 2023-07-25 NOTE — TELEPHONE ENCOUNTER
Appointment Request   Jesenia Curiel   Sent:  10:14 AM   To: [facility] Clinical Support Staff      Jesenia R. Moisés   MRN: [id number] : 1950   Pt Home: [phone]     Entered: [phone]        Message    Appointment Request From: Jesenia Curiel      With Provider: Sandra Michaud MD [[facility] 65[facility] [place]]      Preferred Date Range: 2023 - 2023      Preferred Times: Any Time      Reason for visit: Office Visit      Comments:   Overall assessment

## 2023-08-07 NOTE — PROGRESS NOTES
Endocrinology Initial Visit  08/07/2023      Subjective:      Patient ID: Jesenia Curiel is a 72 y.o.    Chief Complaint:  Adrenal Adenoma      History of Present Illness  Medical dx include HTN, HLD, ILD, obesity, CKD3, AFib, sick sinus syndrome,  JOSÉ, primary hyperparathyroidism, and osteoporosis.    Referred by PCP for adrenal incidentaloma seen on CT done to evaluate her known interstitial lung disease.    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    Regarding adrenal nodule  Found on CT Chest Without Contrast on 3/27/2023 but not noted on radiology report until repeat CT on 6/16/2023.    Imaging with stable 1.5 cm right adrenal gland nodule.    Also noted 1.8 cm hypoattenuating lesion within the tail of the pancreas with attenuation similar to fluid (2-109) which appears stable to slightly enlarged when compared to prior CT chest.     HU -8 to -10 when done multiple times on multiple CT scans in epic    No dedicated adrenal imaging 2/2 CKD (avoiding contrast) and pacemaker (not MRI compatible)    Current symptoms:  Headache: no  Sweating: no  Palpitations: off and on 2/2 afib   Weakness: no  Panic attacks: no  Pallor: no  Orthostatic hypotension: no  Weight loss: hodan; wt has been stable recently; hx of being overweight with oscillating weight periodically when attempting to lose weight. No unintentional weight loss.  Known history of paroxysmal elevations in blood pressure or heart rate during diagnostic procedures/induction of anesthesia/surgery: no    Denied supraclavicular/dorsocervical fat pads, facial edema/erythema, facial rounding. Does report truncal obesity as well as a long hx of dark facial hair on chin but otherwise denies hirsutism/acne/voice deepening. Also denies purple striae, bruising, skin atrophy, proximal muscle weakness, depression/mood changes, hematuria.      H/o HTN: yes, dx at age 32yo, on Amlodipine 5mg,  "Olmesartan 40mg, Lasix 40mg qd; has been worked up for secondary hypertension multiple times, has been evaluated for hyperaldosteronism twice in the past, most recently in 2021 - negative   H/o bone disease/fracture: she reports osteopenia based on DXA  Hx tib/fib spiral fracture in 1995 after falling off a curb, no fractures since then  H/o DM/glucose intolerance: denies DM; was "prediabetic" when on HCTZ, after stopping that a1c normalized      Last DXA 4/6/2022: low BMD, T score -1.3 total hip and fem neck, FRAX 14%/1.9%      Regarding primary hyperparathyroidism  Episodic, mild hypercalcemia since 2019, recently persistent hyperCa in 10s with inappropriately normal PTH.    Followed by and managed by nephrology who monitors her serum Ca  Has had 24hr urine Ca checked in 2021 -- not elevated     Has CKD and osteoporosis (dx by fragility fx) but pt not interested in surgery and prefers to continue monitoring her labs as nephro has been doing     One kidney stone in her 20s  tib/fib fracture - spiral fracture - fell off a curb- maybe mid-1990s  Last DXA 4/2022 (as above)    No longer on HCTZ. Not on lithium.      Regarding Osteoporosis  Dx by low BMD (osteopenia range) with hx fragility fracture  Never been on tx before, was always told she had osteopenia     ROS:   As above    Objective:     /82   Ht 5' 1" (1.549 m)   Wt 88.8 kg (195 lb 12.3 oz)   BMI 36.99 kg/m²     Body mass index is 36.99 kg/m².  Physical Exam  Vitals reviewed.   Constitutional:       General: She is not in acute distress.     Appearance: She is obese. She is not ill-appearing.   HENT:      Mouth/Throat:      Mouth: Mucous membranes are moist.   Eyes:      Conjunctiva/sclera: Conjunctivae normal.   Neck:      Comments: +mild supraclavicular fullness  No dorsocervical fat pad   Cardiovascular:      Rate and Rhythm: Normal rate.   Pulmonary:      Effort: Pulmonary effort is normal.   Musculoskeletal:         General: No swelling.      " Cervical back: Normal range of motion and neck supple.   Skin:     General: Skin is warm and dry.      Comments: No dark striae  +decreased peripheral fat   Neurological:      Mental Status: She is alert and oriented to person, place, and time.   Psychiatric:         Mood and Affect: Mood normal.         Behavior: Behavior normal.       Lab Review:   Lab Results   Component Value Date    HGBA1C 5.5 03/14/2022     Lab Results   Component Value Date    CHOL 217 (H) 10/11/2022    HDL 49 10/11/2022    LDLCALC 131.2 10/11/2022    TRIG 184 (H) 10/11/2022    CHOLHDL 22.6 10/11/2022     Lab Results   Component Value Date     07/10/2023    K 3.9 07/10/2023     07/10/2023    CO2 25 07/10/2023    GLU 97 07/10/2023    BUN 27 (H) 07/10/2023    CREATININE 1.6 (H) 07/10/2023    CALCIUM 10.6 (H) 07/10/2023    PROT 7.3 10/11/2022    ALBUMIN 4.0 07/10/2023    BILITOT 0.9 10/11/2022    ALKPHOS 98 10/11/2022    AST 22 10/11/2022    ALT 17 10/11/2022    ANIONGAP 10 07/10/2023    ESTGFRAFRICA 52.2 (A) 03/14/2022    EGFRNONAA 45.3 (A) 03/14/2022    TSH 1.111 03/28/2019     Vit D, 25-Hydroxy   Date Value Ref Range Status   03/22/2021 47 30 - 96 ng/mL Final     Comment:     Vitamin D deficiency.........<10 ng/mL                              Vitamin D insufficiency......10-29 ng/mL       Vitamin D sufficiency........> or equal to 30 ng/mL  Vitamin D toxicity............>100 ng/mL       Lab Results   Component Value Date    WBC 6.30 06/15/2023    HGB 14.1 06/15/2023    HCT 43.5 06/15/2023    MCV 95 06/15/2023     06/15/2023     CT Chest Without Contrast 6/16/2023        Assessment and Plan     Adrenal incidentaloma  1.8 cm right adrenal nodule stable on imaging in epic (no access to imaging outside [facility])  HU <10 (-8) -- malignancy and pheo unlikely  Pt has had negative hyperaldosteronism evaluation x2 by nephrology per the pt with most recent in 2021. She is satisfied with this and does not wish to have this repeated.  She does have HTN however BP well controlled on current anti-HTN regimen  She has not been evaluated for subclinical hypercortisolism; she does have supraclavicular fullness and decreased peripheral fat, but otherwise no signs or symptoms of overt Cushings  We will do a 1ng DST now and if normal, plan to repeat annually up for 4-5yrs   Will also check DHEAS to complete workup - although low clinical suspicion for abnormality   She has not had dedicated adrenal imaging -- avoiding contrast 2/2 CKD and unable to get MRI 2/2 non-compatible pacemaker.  She is having frequent noncon CT chest done for pulmonary dz -- so I will not order any additional imaging now. When she follows up in 1yr, if she has not had recent imaging at that time then we will order at that time to monitor for change in size and/or appearance on noncon CT at the very least.    Primary hyperparathyroidism  Primary hyperpara diagnosed and managed by nephro per the pt   On my review of labs Ca mildly increased (10s) with inappropriately normal PTH c/w PHPT  She does have surgical indications (CKD, osteoporosis, hx kidney stone) but she is not interested in surgery and would prefer continued monitoring with nephro as she has been doing which is reasonable given very mildly elevated serum Ca and no hypercalciuria on 24hr urine Ca, however - would recommend treating osteoporosis       Osteoporosis  We discussed dx of osteoporosis based on low BMD/osteopenia on DXA with hx of fragility fracture (1995, tib/fib spiral fx falling off a curb)  We discussed that with CKD we recommend Prolia. We discussed a little bit about prolia, however, she plans to research/read about it and will get back to me with questions and/or a decision if she would like to start it.  Optimize Ca and vit D -- 1200 mg daily of dietary calcium preferred; vit D has been normal in the past and she is on supplementation per nephro   Fall precautions, weight-bearing exercise       Fasting  8am labs (8am cortisol, dex level, DHEAS)  RTC in 1yr      Viridiana Molina MD  [facility], 6th Floor  [address] [place], [address]    Office: [phone]  Fax: [phone]

## 2023-08-08 ENCOUNTER — OFFICE VISIT (OUTPATIENT)
Dept: ENDOCRINOLOGY | Facility: CLINIC | Age: 73
End: 2023-08-08
Payer: MEDICARE

## 2023-08-08 VITALS
HEIGHT: 61 IN | SYSTOLIC BLOOD PRESSURE: 118 MMHG | BODY MASS INDEX: 36.96 KG/M2 | DIASTOLIC BLOOD PRESSURE: 82 MMHG | WEIGHT: 195.75 LBS

## 2023-08-08 DIAGNOSIS — E27.8 ADRENAL INCIDENTALOMA: Primary | ICD-10-CM

## 2023-08-08 DIAGNOSIS — M81.0 OSTEOPOROSIS, UNSPECIFIED OSTEOPOROSIS TYPE, UNSPECIFIED PATHOLOGICAL FRACTURE PRESENCE: ICD-10-CM

## 2023-08-08 DIAGNOSIS — E21.0 PRIMARY HYPERPARATHYROIDISM: ICD-10-CM

## 2023-08-08 PROCEDURE — 99204 OFFICE O/P NEW MOD 45 MIN: CPT | Mod: S$PBB,GC,, | Performed by: STUDENT IN AN ORGANIZED HEALTH CARE EDUCATION/TRAINING PROGRAM

## 2023-08-08 PROCEDURE — 99204 PR OFFICE/OUTPT VISIT, NEW, LEVL IV, 45-59 MIN: ICD-10-PCS | Mod: S$PBB,GC,, | Performed by: STUDENT IN AN ORGANIZED HEALTH CARE EDUCATION/TRAINING PROGRAM

## 2023-08-08 PROCEDURE — 99214 OFFICE O/P EST MOD 30 MIN: CPT | Mod: PBBFAC | Performed by: STUDENT IN AN ORGANIZED HEALTH CARE EDUCATION/TRAINING PROGRAM

## 2023-08-08 PROCEDURE — 99999 PR PBB SHADOW E&M-EST. PATIENT-LVL IV: ICD-10-PCS | Mod: PBBFAC,GC,, | Performed by: STUDENT IN AN ORGANIZED HEALTH CARE EDUCATION/TRAINING PROGRAM

## 2023-08-08 PROCEDURE — 99999 PR PBB SHADOW E&M-EST. PATIENT-LVL IV: CPT | Mod: PBBFAC,GC,, | Performed by: STUDENT IN AN ORGANIZED HEALTH CARE EDUCATION/TRAINING PROGRAM

## 2023-08-08 RX ORDER — DEXAMETHASONE 1 MG/1
1 TABLET ORAL ONCE
Qty: 1 TABLET | Refills: 0 | Status: SHIPPED | OUTPATIENT
Start: 2023-08-08 | End: 2023-08-08

## 2023-08-08 NOTE — ASSESSMENT & PLAN NOTE
Primary hyperpara diagnosed and managed by nephro per the pt   On my review of labs Ca mildly increased (10s) with inappropriately normal PTH c/w PHPT  She does have surgical indications (CKD, osteoporosis, hx kidney stone) but she is not interested in surgery and would prefer continued monitoring with nephro as she has been doing which is reasonable given very mildly elevated serum Ca and no hypercalciuria on 24hr urine Ca, however - would recommend treating osteoporosis

## 2023-08-08 NOTE — ASSESSMENT & PLAN NOTE
1.8 cm right adrenal nodule stable on imaging in epic (no access to imaging outside [facility])  HU <10 (-8) -- malignancy and pheo unlikely  Pt has had negative hyperaldosteronism evaluation x2 by nephrology per the pt with most recent in 2021. She is satisfied with this and does not wish to have this repeated. She does have HTN however BP well controlled on current anti-HTN regimen  She has not been evaluated for subclinical hypercortisolism; she does have supraclavicular fullness and decreased peripheral fat, but otherwise no signs or symptoms of overt Cushings  We will do a 1ng DST now and if normal, plan to repeat annually up for 4-5yrs   Will also check DHEAS to complete workup - although low clinical suspicion for abnormality   She has not had dedicated adrenal imaging -- avoiding contrast 2/2 CKD and unable to get MRI 2/2 non-compatible pacemaker.  She is having frequent noncon CT chest done for pulmonary dz -- so I will not order any additional imaging now. When she follows up in 1yr, if she has not had recent imaging at that time then we will order at that time to monitor for change in size and/or appearance on noncon CT at the very least.

## 2023-08-08 NOTE — PATIENT INSTRUCTIONS
Dexamethasone Suppression Test:    - Take 1 mg tablet of dexamethasone by mouth at exactly 11:00 p.m. the night before your 8:00 a.m. blood test is scheduled  - Have blood drawn exactly at 8:00 a.m. as the timing of the medication and blood draw are very important    - I have sent a prescription for 1 mg of dexamethasone (one dose) to your pharmacy  - A normal response to taking the dexamethasone is a low cortisol level the next day. Do not be alarmed if your cortisol level is marked as abnormally low because that is a normal response    With primary hyperparathyroidism and osteoporosis we would recommend treating with Prolia. After you read about it let us know your thoughts.  For more information on medications: https://www.nof.org/patients/treatment/medicationadherence/    Follow up in 1yr  We will repeat the DST at that time and evaluate if we need to repeat imaging then (if you have recent imaging done near that time  then we will not repeat it)    We will repeat the DXA next year (and include the distal 1/3 radius due to hyperpara)

## 2023-08-08 NOTE — ASSESSMENT & PLAN NOTE
We discussed dx of osteoporosis based on low BMD/osteopenia on DXA with hx of fragility fracture (1995, tib/fib spiral fx falling off a curb)  We discussed that with CKD we recommend Prolia. We discussed a little bit about prolia, however, she plans to research/read about it and will get back to me with questions and/or a decision if she would like to start it.  Optimize Ca and vit D -- 1200 mg daily of dietary calcium preferred; vit D has been normal in the past and she is on supplementation per nephro   Fall precautions, weight-bearing exercise

## 2023-08-09 LAB — FUNGUS SPEC CULT: NORMAL

## 2023-08-16 ENCOUNTER — PATIENT MESSAGE (OUTPATIENT)
Dept: ENDOCRINOLOGY | Facility: CLINIC | Age: 73
End: 2023-08-16
Payer: MEDICARE

## 2023-08-21 DIAGNOSIS — R94.7 ABNORMAL DEXAMETHASONE SUPPRESSION TEST: Primary | ICD-10-CM

## 2023-08-21 DIAGNOSIS — I10 BENIGN ESSENTIAL HYPERTENSION: ICD-10-CM

## 2023-08-21 DIAGNOSIS — E27.8 ADRENAL INCIDENTALOMA: ICD-10-CM

## 2023-08-21 RX ORDER — AMLODIPINE BESYLATE 10 MG/1
10 TABLET ORAL
Qty: 90 TABLET | Refills: 3 | OUTPATIENT
Start: 2023-08-21

## 2023-08-31 LAB
ACID FAST MOD KINY STN SPEC: NORMAL
MYCOBACTERIUM SPEC QL CULT: NORMAL

## 2023-09-05 ENCOUNTER — PATIENT MESSAGE (OUTPATIENT)
Dept: PULMONOLOGY | Facility: CLINIC | Age: 73
End: 2023-09-05
Payer: MEDICARE

## 2023-09-05 DIAGNOSIS — R94.7 ABNORMAL DEXAMETHASONE SUPPRESSION TEST: Primary | ICD-10-CM

## 2023-09-05 RX ORDER — DEXAMETHASONE 1 MG/1
1 TABLET ORAL ONCE
Qty: 1 TABLET | Refills: 0 | Status: SHIPPED | OUTPATIENT
Start: 2023-09-05 | End: 2023-09-29

## 2023-09-06 ENCOUNTER — PATIENT MESSAGE (OUTPATIENT)
Dept: ENDOSCOPY | Facility: HOSPITAL | Age: 73
End: 2023-09-06
Payer: MEDICARE

## 2023-09-07 ENCOUNTER — TELEPHONE (OUTPATIENT)
Dept: ENDOSCOPY | Facility: HOSPITAL | Age: 73
End: 2023-09-07
Payer: MEDICARE

## 2023-09-08 ENCOUNTER — TELEPHONE (OUTPATIENT)
Dept: ENDOSCOPY | Facility: HOSPITAL | Age: 73
End: 2023-09-08
Payer: MEDICARE

## 2023-09-08 DIAGNOSIS — K86.9 PANCREATIC LESION: Primary | ICD-10-CM

## 2023-09-08 NOTE — TELEPHONE ENCOUNTER
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

## 2023-09-09 ENCOUNTER — CLINICAL SUPPORT (OUTPATIENT)
Dept: CARDIOLOGY | Facility: HOSPITAL | Age: 73
End: 2023-09-09
Payer: MEDICARE

## 2023-09-09 DIAGNOSIS — I49.5 SICK SINUS SYNDROME: ICD-10-CM

## 2023-09-09 DIAGNOSIS — I48.91 UNSPECIFIED ATRIAL FIBRILLATION: ICD-10-CM

## 2023-09-09 DIAGNOSIS — Z95.0 PRESENCE OF CARDIAC PACEMAKER: ICD-10-CM

## 2023-09-11 ENCOUNTER — TELEPHONE (OUTPATIENT)
Dept: ENDOSCOPY | Facility: HOSPITAL | Age: 73
End: 2023-09-11
Payer: MEDICARE

## 2023-09-11 NOTE — TELEPHONE ENCOUNTER
----- Message from Deepa Ackerman sent at 9/11/2023  2:18 PM CDT -----  Regarding: Appointment  Contact: [phone]  Calling in regards to missed call from office. Please call and discuss.

## 2023-09-13 ENCOUNTER — PATIENT MESSAGE (OUTPATIENT)
Dept: ENDOSCOPY | Facility: HOSPITAL | Age: 73
End: 2023-09-13
Payer: MEDICARE

## 2023-09-13 ENCOUNTER — TELEPHONE (OUTPATIENT)
Dept: ENDOSCOPY | Facility: HOSPITAL | Age: 73
End: 2023-09-13
Payer: MEDICARE

## 2023-09-13 DIAGNOSIS — R94.7 ABNORMAL DEXAMETHASONE SUPPRESSION TEST: Primary | ICD-10-CM

## 2023-09-13 RX ORDER — DEXAMETHASONE 1 MG/1
1 TABLET ORAL ONCE
Qty: 1 TABLET | Refills: 0 | Status: SHIPPED | OUTPATIENT
Start: 2023-09-13 | End: 2023-09-29

## 2023-09-13 NOTE — TELEPHONE ENCOUNTER
Spoke to Pt to schedule procedure(s) Upper Endoscopy Ultrasound (EUS)       Physician to perform procedure(s) Dr. ABEBA De Los Santos  Date of Procedure (s) 9/19/23  Arrival Time 8:45 AM  Time of Procedure(s) 9:45 AM   Location of Procedure(s) [facility] 2nd floor  Type of Rx Prep sent to patient: N/A  Instructions provided to patient via [facility]    Patient was informed on the following information and verbalized understanding. Screening questionnaire reviewed with patient and complete. If procedure requires anesthesia, a responsible adult needs to be present to accompany the patient home, patient cannot drive after receiving anesthesia. Appointment details are tentative, especially check-in time. Patient will receive a prep-op call 4 days prior to confirm check-in time for procedure. If applicable the patient should contact their pharmacy to verify Rx for procedure prep is ready for pick-up. Patient was advised to call the scheduling department at [phone] if pharmacy states no Rx is available. Patient was advised to call the endoscopy scheduling department if any questions or concerns arise.      [facility]oscopy Scheduling Department

## 2023-09-15 ENCOUNTER — ANESTHESIA EVENT (OUTPATIENT)
Dept: ENDOSCOPY | Facility: HOSPITAL | Age: 73
End: 2023-09-15
Payer: MEDICARE

## 2023-09-15 ENCOUNTER — ANESTHESIA (OUTPATIENT)
Dept: ENDOSCOPY | Facility: HOSPITAL | Age: 73
End: 2023-09-15
Payer: MEDICARE

## 2023-09-15 ENCOUNTER — TELEPHONE (OUTPATIENT)
Dept: ENDOSCOPY | Facility: HOSPITAL | Age: 73
End: 2023-09-15
Payer: MEDICARE

## 2023-09-15 ENCOUNTER — HOSPITAL ENCOUNTER (OUTPATIENT)
Facility: HOSPITAL | Age: 73
Discharge: HOME | End: 2023-09-15
Attending: INTERNAL MEDICINE | Admitting: INTERNAL MEDICINE
Payer: MEDICARE

## 2023-09-15 VITALS
WEIGHT: 195 LBS | RESPIRATION RATE: 16 BRPM | HEIGHT: 61 IN | BODY MASS INDEX: 36.82 KG/M2 | TEMPERATURE: 98 F | DIASTOLIC BLOOD PRESSURE: 56 MMHG | SYSTOLIC BLOOD PRESSURE: 111 MMHG | OXYGEN SATURATION: 97 % | HEART RATE: 95 BPM

## 2023-09-15 DIAGNOSIS — R19.5 POSITIVE FECAL OCCULT BLOOD TEST: ICD-10-CM

## 2023-09-15 PROCEDURE — D9220A PRA ANESTHESIA: ICD-10-PCS | Mod: PT,ANES,, | Performed by: ANESTHESIOLOGY

## 2023-09-15 PROCEDURE — 88305 TISSUE EXAM BY PATHOLOGIST: ICD-10-PCS | Mod: 26,,, | Performed by: STUDENT IN AN ORGANIZED HEALTH CARE EDUCATION/TRAINING PROGRAM

## 2023-09-15 PROCEDURE — 37000008 HC ANESTHESIA 1ST 15 MINUTES: Performed by: INTERNAL MEDICINE

## 2023-09-15 PROCEDURE — 88305 TISSUE EXAM BY PATHOLOGIST: CPT | Mod: 26,,, | Performed by: STUDENT IN AN ORGANIZED HEALTH CARE EDUCATION/TRAINING PROGRAM

## 2023-09-15 PROCEDURE — 27200997: Performed by: INTERNAL MEDICINE

## 2023-09-15 PROCEDURE — 45385 COLONOSCOPY W/LESION REMOVAL: CPT | Mod: PT | Performed by: INTERNAL MEDICINE

## 2023-09-15 PROCEDURE — D9220A PRA ANESTHESIA: ICD-10-PCS | Mod: PT,CRNA,, | Performed by: NURSE ANESTHETIST, CERTIFIED REGISTERED

## 2023-09-15 PROCEDURE — D9220A PRA ANESTHESIA: Mod: PT,CRNA,, | Performed by: NURSE ANESTHETIST, CERTIFIED REGISTERED

## 2023-09-15 PROCEDURE — 88305 TISSUE EXAM BY PATHOLOGIST: CPT | Performed by: STUDENT IN AN ORGANIZED HEALTH CARE EDUCATION/TRAINING PROGRAM

## 2023-09-15 PROCEDURE — 27201089 HC SNARE, DISP (ANY): Performed by: INTERNAL MEDICINE

## 2023-09-15 PROCEDURE — D9220A PRA ANESTHESIA: Mod: PT,ANES,, | Performed by: ANESTHESIOLOGY

## 2023-09-15 PROCEDURE — 45385 PR COLONOSCOPY,REMV LESN,SNARE: ICD-10-PCS | Mod: PT,,, | Performed by: INTERNAL MEDICINE

## 2023-09-15 PROCEDURE — 25000003 PHARM REV CODE 250: Performed by: NURSE ANESTHETIST, CERTIFIED REGISTERED

## 2023-09-15 PROCEDURE — 63600175 PHARM REV CODE 636 W HCPCS: Performed by: NURSE ANESTHETIST, CERTIFIED REGISTERED

## 2023-09-15 PROCEDURE — 45385 COLONOSCOPY W/LESION REMOVAL: CPT | Mod: PT,,, | Performed by: INTERNAL MEDICINE

## 2023-09-15 PROCEDURE — 37000009 HC ANESTHESIA EA ADD 15 MINS: Performed by: INTERNAL MEDICINE

## 2023-09-15 RX ORDER — SODIUM CHLORIDE 9 MG/ML
INJECTION, SOLUTION INTRAVENOUS CONTINUOUS
Status: DISCONTINUED | OUTPATIENT
Start: 2023-09-15 | End: 2023-09-15 | Stop reason: HOSPADM

## 2023-09-15 RX ORDER — LIDOCAINE HYDROCHLORIDE 20 MG/ML
INJECTION, SOLUTION EPIDURAL; INFILTRATION; INTRACAUDAL; PERINEURAL
Status: DISCONTINUED | OUTPATIENT
Start: 2023-09-15 | End: 2023-09-15

## 2023-09-15 RX ORDER — PROPOFOL 10 MG/ML
VIAL (ML) INTRAVENOUS
Status: DISCONTINUED | OUTPATIENT
Start: 2023-09-15 | End: 2023-09-15

## 2023-09-15 RX ORDER — SODIUM CHLORIDE 0.9 % (FLUSH) 0.9 %
10 SYRINGE (ML) INJECTION
Status: DISCONTINUED | OUTPATIENT
Start: 2023-09-15 | End: 2023-09-15 | Stop reason: HOSPADM

## 2023-09-15 RX ADMIN — PROPOFOL 50 MG: 10 INJECTION, EMULSION INTRAVENOUS at 09:09

## 2023-09-15 RX ADMIN — PROPOFOL 100 MCG/KG/MIN: 10 INJECTION, EMULSION INTRAVENOUS at 09:09

## 2023-09-15 RX ADMIN — LIDOCAINE HYDROCHLORIDE 100 MG: 20 INJECTION, SOLUTION EPIDURAL; INFILTRATION; INTRACAUDAL; PERINEURAL at 09:09

## 2023-09-15 RX ADMIN — SODIUM CHLORIDE: 0.9 INJECTION, SOLUTION INTRAVENOUS at 09:09

## 2023-09-15 NOTE — TRANSFER OF CARE
"Anesthesia Transfer of Care Note    Patient: Jesenia Curiel    Procedure(s) Performed: Procedure(s) (LRB):  COLONOSCOPY (N/A)    Patient location: [facility]    Anesthesia Type: MAC    Transport from OR: Transported from OR on 2-3 L/min O2 by NC with adequate spontaneous ventilation    Post pain: adequate analgesia    Post assessment: no apparent anesthetic complications and tolerated procedure well    Post vital signs: stable    Level of consciousness: awake and alert    Nausea/Vomiting: no nausea/vomiting    Complications: none    Transfer of care protocol was followed      Last vitals:   Visit Vitals  /70 (BP Location: Left arm, Patient Position: Lying)   Pulse 75   Temp 36.7 °C (98.1 °F)   Resp 16   Ht 5' 1" (1.549 m)   Wt 88.5 kg (195 lb)   SpO2 97%   Breastfeeding No   BMI 36.84 kg/m²     "

## 2023-09-15 NOTE — PROGRESS NOTES
Discharge instructions reviewed w/ pt and , verbalized understanding. Pt in NADN.No complaints at this time. Tolerated liquids w/ no issues. To be dc'd home w/ family.

## 2023-09-15 NOTE — H&P
[facility] - Endoscopy  History & Physical    Subjective:      Chief Complaint/Reason for Admission:     This is a 72 y.o. female here for evaluation of stool for occult blood positive test last screening colonoscopy about 20 years ago never had colon polyps no family history of colon cancer no family history of advanced colon adenomas polyps no FAP no attenuated FAP no MA P no Cook syndrome nobody with esophageal or stomach cancer nobody with small-bowel cancer nobody with kidney bladder or ureter cancer nobody with ovarian or uterine cancer.  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  She has a pacemaker in place she is followed by Cardiology and being worked up by Pulmonary for some interested in she will lung disease she does have sleep apnea but does not love her CPAP machine she has no shortness of breath when she is at rest she has mild pulmonary hypertension she has some shortness of breath when she exerts herself no dysphagia no odynophagia no GERD symptoms no abdominal pain or early satiety no diarrhea averages 1-2 well-formed bowel movements a day does not take any NSAIDs other than her aspirin no abdominal pain no unintentional weight loss.    Jesenia Curiel is a 72 y.o. female.    Past Medical History:   Diagnosis Date    Allergy     Arthritis     Atrial fibrillation 5/29/2017    CKD (chronic kidney disease) stage 3, GFR 30-59 ml/min 6/26/2017    Disorder of kidney and ureter     GERD (gastroesophageal reflux disease)     Hyperlipidemia     Hypertension     Impaired fasting glucose 11/19/2021    Pre-diabetes 6/3/2017     Past Surgical History:   Procedure Laterality Date    ADENOIDECTOMY      BACK SURGERY  2000    lamenectomy    BREAST SURGERY      BRONCHOSCOPY N/A 7/13/2023    Procedure: BRONCHOSCOPY;  [facility];  Location: [facility] OR [address] [place] [address];  Service: Anesthesiology;  Laterality: N/A;    CARDIAC SURGERY      st loida pacemaker      CATARACT EXTRACTION W/  INTRAOCULAR LENS IMPLANT Right 6/3/2019    Procedure: EXTRACTION, CATARACT, WITH IOL INSERTION;  Surgeon: Raisa Moreno MD;  Location: [facility] OR;  Service: Ophthalmology;  Laterality: Right;  Laser    CATARACT EXTRACTION W/  INTRAOCULAR LENS IMPLANT Left 2019    Procedure: EXTRACTION, CATARACT, WITH IOL INSERTION;  Surgeon: Raisa Moreno MD;  Location: [facility];  Service: Ophthalmology;  Laterality: Left;  LASER ASSISTED     SECTION      ,     EYE SURGERY      FRACTURE SURGERY      HYSTERECTOMY      INSERT / REPLACE / REMOVE PACEMAKER      placement    INSERT / REPLACE / REMOVE PACEMAKER  2014    [employer] Model #[id number] replacement    pace maker      replacement     REVISION OF PROCEDURE INVOLVING PACEMAKER LEAD Left 2022    Procedure: REVISION, ELECTRODE LEAD, CARDIAC PACEMAKER;  Surgeon: Trell Hodgson MD;  Location: [facility] LAB;  Service: Cardiology;  Laterality: Left;  PPM lead Malfx, RA lead revision, SJM, MAC, GP, 3 PREP*dPPM SJM in situ**Contrast prep given*    salpingo opherectomy Bilateral     SPINE SURGERY      TONSILLECTOMY      TUBAL LIGATION       Family History   Problem Relation Age of Onset    Hypertension Mother     Diabetes Mother     Hyperlipidemia Mother     Coronary artery disease Mother     Heart disease Mother     Stroke Mother     Hypertension Father     Diabetes Father     Hyperlipidemia Father     Coronary artery disease Father     Heart disease Father     Coronary artery disease Brother     Heart disease Brother     Hyperthyroidism Brother     Diabetes Paternal Grandmother     Diabetes Paternal Grandfather     Heart attack Neg Hx      Social History     Tobacco Use    Smoking status: Never    Smokeless tobacco: Never   Substance Use Topics    Alcohol use: Yes     Alcohol/week: 10.0 standard drinks of alcohol     Types: 10 Glasses of wine per week    Drug use: No       PTA Medications   Medication Sig    amLODIPine  (NORVASC) 10 MG tablet Take 0.5 tablets (5 mg total) by mouth once daily.    aspirin (ECOTRIN) 81 MG EC tablet Take 1 tablet (81 mg total) by mouth once daily.    atorvastatin (LIPITOR) 80 MG tablet Take 1 tablet (80 mg total) by mouth once daily.    olmesartan (BENICAR) 40 MG tablet Take 1 tablet (40 mg total) by mouth once daily. (Patient taking differently: Take 20 mg by mouth once daily.)    propafenone (RYTHMOL SR) 225 MG Cp12 Take 1 capsule (225 mg total) by mouth every 12 (twelve) hours.    topiramate (TOPAMAX) 25 MG tablet Take 1 tablet twice daily.    bisoprolol (ZEBETA) 5 MG tablet Take 1 tablet (5 mg total) by mouth once daily.    clobetasoL (OLUX) 0.05 % Foam Apply 1 application topically every 30 days.    ERGOCALCIFEROL, VITAMIN D2, (VITAMIN D ORAL) Take 2,000 Units by mouth once daily.    furosemide (LASIX) 40 MG tablet TAKE 1 TABLET BY MOUTH EVERY DAY    GAVILYTE-G 236-22.74-6.74 -5.86 gram suspension Take 4,000 mLs by mouth once.    nitroGLYCERIN (NITROSTAT) 0.4 MG SL tablet Place 1 tablet (0.4 mg total) under the tongue every 5 (five) minutes as needed for Chest pain.    potassium citrate (UROCIT-K) 10 mEq (1,080 mg) TbSR TAKE 1 TABLET BY MOUTH ONCE DAILY     Review of patient's allergies indicates:   Allergen Reactions    Iodinated contrast media Anaphylaxis    Penicillins Anaphylaxis    Allopurinol analogues      Muscle cramps    Ciprofloxacin Rash    Quinolones Rash    Sulfa (sulfonamide antibiotics) Rash    Tetracyclines Rash        Review of Systems   Constitutional:  Negative for fever.       Objective:      Vital Signs (Most Recent)  Temp: 98.1 °F (36.7 °C) (09/15/23 0857)  Pulse: 75 (09/15/23 0857)  Resp: 16 (09/15/23 0857)  BP: 131/70 (09/15/23 0857)  SpO2: 97 % (09/15/23 0857)    Vital Signs Range (Last 24H):  Temp:  [98.1 °F (36.7 °C)]   Pulse:  [75]   Resp:  [16]   BP: (131)/(70)   SpO2:  [97 %]     Physical Exam  Cardiovascular:      Rate and Rhythm: Normal rate.   Pulmonary:       Effort: Pulmonary effort is normal.   Neurological:      Mental Status: She is alert and oriented to person, place, and time.           Assessment:      This is a 72 y.o. female here for evaluation of stool for occult blood positive test last screening colonoscopy about 20 years ago never had colon polyps no family history of colon cancer no family history of advanced colon adenomas polyps no FAP no attenuated FAP no MA P no Cook syndrome nobody with esophageal or stomach cancer nobody with small-bowel cancer nobody with kidney bladder or ureter cancer nobody with ovarian or uterine cancer.  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  She has a pacemaker in place she is followed by Cardiology and being worked up by Pulmonary for some interested in she will lung disease she does have sleep apnea but does not love her CPAP machine she has no shortness of breath when she is at rest she has mild pulmonary hypertension she has some shortness of breath when she exerts herself no dysphagia no odynophagia no GERD symptoms no abdominal pain or early satiety no diarrhea averages 1-2 well-formed bowel movements a day does not take any NSAIDs other than her aspirin no abdominal pain no unintentional weight loss.      Plan:      Colonsocopy

## 2023-09-15 NOTE — ANESTHESIA PREPROCEDURE EVALUATION
09/15/2023  Jesenia Curiel is a 72 y.o., female.  Past Medical History:   Diagnosis Date    Allergy     Arthritis     Atrial fibrillation 2017    CKD (chronic kidney disease) stage 3, GFR 30-59 ml/min 2017    Disorder of kidney and ureter     GERD (gastroesophageal reflux disease)     Hyperlipidemia     Hypertension     Impaired fasting glucose 2021    Pre-diabetes 6/3/2017     Past Surgical History:   Procedure Laterality Date    ADENOIDECTOMY      BACK SURGERY      lamenectomy    BREAST SURGERY      BRONCHOSCOPY N/A 2023    Procedure: BRONCHOSCOPY;  Surgeon: [facility] Diagnostic Provider;  Location: [address];  Service: Anesthesiology;  Laterality: N/A;    CARDIAC SURGERY      st paolo pacemaker     CATARACT EXTRACTION W/  INTRAOCULAR LENS IMPLANT Right 6/3/2019    Procedure: EXTRACTION, CATARACT, WITH IOL INSERTION;  Surgeon: Raisa Moreno MD;  Location: [facility];  Service: Ophthalmology;  Laterality: Right;  Laser    CATARACT EXTRACTION W/  INTRAOCULAR LENS IMPLANT Left 2019    Procedure: EXTRACTION, CATARACT, WITH IOL INSERTION;  Surgeon: Raisa Moreno MD;  Location: [facility];  Service: Ophthalmology;  Laterality: Left;  LASER ASSISTED     SECTION      ,     EYE SURGERY      FRACTURE SURGERY      HYSTERECTOMY      INSERT / REPLACE / REMOVE PACEMAKER      placement    INSERT / REPLACE / REMOVE PACEMAKER  2014    [employer] Model #[id number] replacement    pace maker      replacement     REVISION OF PROCEDURE INVOLVING PACEMAKER LEAD Left 2022    Procedure: REVISION, ELECTRODE LEAD, CARDIAC PACEMAKER;  Surgeon: Trell Hodgson MD;  Location: [facility] EP LAB;  Service: Cardiology;  Laterality: Left;  PPM lead Malfx, RA lead revision, SJM, MAC, GP, 3 PREP*dPPM SJM in situ**Contrast prep given*    salpingo opherectomy  Bilateral 1998    SPINE SURGERY      TONSILLECTOMY  1953    TUBAL LIGATION             Pre-op Assessment    I have reviewed the Patient Summary Reports.    I have reviewed the NPO Status.   I have reviewed the Medications.     Review of Systems  Anesthesia Hx:  No problems with previous Anesthesia  History of prior surgery of interest to airway management or planning: Denies Family Hx of Anesthesia complications.   Denies Personal Hx of Anesthesia complications.   Social:  Non-Smoker    Cardiovascular:   Pacemaker Hypertension NAVA NAVA walking 1/2 block, pacemaker for SSS   Pulmonary:   Shortness of breath Sleep Apnea ILD   Renal/:   Chronic Renal Disease, CKD    Hepatic/GI:   GERD    Endocrine:  Endocrine Normal        Physical Exam  General: Well nourished    Airway:  Mallampati: I   Mouth Opening: Normal  TM Distance: Normal      Dental:  Intact    Chest/Lungs:  Normal Respiratory Rate    Heart:  Rate: Normal        Anesthesia Plan  Type of Anesthesia, risks & benefits discussed:    Anesthesia Type: Gen Natural Airway  Intra-op Monitoring Plan: Standard ASA Monitors  Induction:  IV  Informed Consent: Informed consent signed with the Patient and all parties understand the risks and agree with anesthesia plan.  All questions answered.   ASA Score: 3  Day of Surgery Review of History & Physical: H&P Update referred to the surgeon/provider.    Ready For Surgery From Anesthesia Perspective.     .

## 2023-09-15 NOTE — [employer]OVATION PATIENT INSTRUCTIONS
Discharge Summary/Instructions after an Endoscopic Procedure  Patient Name: Jesenia Curiel  Patient MRN: [id number]  Patient YOB: 1950  Friday, September 15, 2023  Tao Gomez MD  Dear patient,  As a result of recent federal legislation (The Federal Cures Act), you may   receive lab or pathology results from your procedure in your [facility]   account before your physician is able to contact you. Your physician or   their representative will relay the results to you with their   recommendations at their soonest availability.  Thank you,  RESTRICTIONS:  During your procedure today, you received medications for sedation.  These   medications may affect your judgment, balance and coordination.  Therefore,   for 24 hours, you have the following restrictions:   - DO NOT drive a car, operate machinery, make legal/financial decisions,   sign important papers or drink alcohol.    ACTIVITY:  Today: no heavy lifting, straining or running due to procedural   sedation/anesthesia.  The following day: return to full activity including work.  DIET:  Eat and drink normally unless instructed otherwise.     TREATMENT FOR COMMON SIDE EFFECTS:  - Mild abdominal pain, nausea, belching, bloating or excessive gas:  rest,   eat lightly and use a heating pad.  - Sore Throat: treat with throat lozenges and/or gargle with warm salt   water.  - Because air was used during the procedure, expelling large amounts of air   from your rectum or belching is normal.  - If a bowel prep was taken, you may not have a bowel movement for 1-3 days.    This is normal.  SYMPTOMS TO WATCH FOR AND REPORT TO YOUR PHYSICIAN:  1. Abdominal pain or bloating, other than gas cramps.  2. Chest pain.  3. Back pain.  4. Signs of infection such as: chills or fever occurring within 24 hours   after the procedure.  5. Rectal bleeding, which would show as bright red, maroon, or black stools.   (A tablespoon of blood from the rectum is not serious,  especially if   hemorrhoids are present.)  6. Vomiting.  7. Weakness or dizziness.  GO DIRECTLY TO THE NEAREST EMERGENCY ROOM IF YOU HAVE ANY OF THE FOLLOWING:      Difficulty breathing              Chills and/or fever over 101 F   Persistent vomiting and/or vomiting blood   Severe abdominal pain   Severe chest pain   Black, tarry stools   Bleeding- more than one tablespoon   Any other symptom or condition that you feel may need urgent attention  Your doctor recommends these additional instructions:  If any biopsies were taken, your doctors clinic will contact you in 1 to 2   weeks with any results.  - Discharge patient to home.   - Await pathology results.   - Telephone endoscopist for pathology results in 3 weeks.   - Repeat colonoscopy in 7 years for surveillance based on pathology results.     - Return to primary care physician.   - The findings and recommendations were discussed with the patient.  For questions, problems or results please call your physician - Tao Gomez MD at Work:  [phone].  [facility], EMERGENCY ROOM PHONE NUMBER: [phone]  IF A COMPLICATION OR EMERGENCY SITUATION ARISES AND YOU ARE UNABLE TO REACH   YOUR PHYSICIAN - GO DIRECTLY TO THE EMERGENCY ROOM.  Tao Gomez MD  9/15/2023 10:01:26 AM  This report has been verified and signed electronically.  Dear patient,  As a result of recent federal legislation (The Federal Cures Act), you may   receive lab or pathology results from your procedure in your [facility]   account before your physician is able to contact you. Your physician or   their representative will relay the results to you with their   recommendations at their soonest availability.  Thank you,  PROVATION

## 2023-09-18 ENCOUNTER — TELEPHONE (OUTPATIENT)
Dept: ENDOSCOPY | Facility: HOSPITAL | Age: 73
End: 2023-09-18
Payer: MEDICARE

## 2023-09-18 NOTE — ANESTHESIA RELEASE NOTE
"Anesthesia Release from PACU Note    Patient: Jesenia Curiel    Procedure(s) Performed: Procedure(s) (LRB):  COLONOSCOPY (N/A)    Anesthesia type: general    Post pain: Adequate analgesia    Post assessment: no apparent anesthetic complications and tolerated procedure well    Last Vitals:   Visit Vitals  BP (!) 111/56   Pulse 95   Temp 36.5 °C (97.7 °F) (Skin)   Resp 16   Ht 5' 1" (1.549 m)   Wt 88.5 kg (195 lb)   SpO2 97%   Breastfeeding No   BMI 36.84 kg/m²       Post vital signs: stable    Level of consciousness: awake and alert     Nausea/Vomiting: no nausea/no vomiting    Complications: none    Airway Patency: patent    Respiratory: unassisted, spontaneous ventilation, room air    Cardiovascular: stable and blood pressure at baseline    Hydration: euvolemic  "

## 2023-09-18 NOTE — ANESTHESIA POSTPROCEDURE EVALUATION
Anesthesia Post Evaluation    Patient: Jesenia Curiel    Procedure(s) Performed: Procedure(s) (LRB):  COLONOSCOPY (N/A)    Final Anesthesia Type: general      Patient location during evaluation: PACU  Patient participation: Yes- Able to Participate  Level of consciousness: awake and alert  Post-procedure vital signs: reviewed and stable  Pain management: adequate  Airway patency: patent    PONV status at discharge: No PONV  Anesthetic complications: no      Cardiovascular status: hemodynamically stable  Respiratory status: unassisted, spontaneous ventilation and room air  Hydration status: euvolemic  Follow-up not needed.          Vitals Value Taken Time   /56 09/15/23 1034   Temp 36.5 °C (97.7 °F) 09/15/23 1034   Pulse 95 09/15/23 1034   Resp 16 09/15/23 1034   SpO2 97 % 09/15/23 1034         No case tracking events are documented in the log.      Pain/Criss Score: No data recorded

## 2023-09-18 NOTE — TELEPHONE ENCOUNTER
Spoke with patient about arrival time @ 0845.   [facility]    Day Before Procedure 9/18/23      You may have a light evening meal.   No solid food after 7:00 pm.   Continue drinking clear liquids.        Day of the Procedure 9/19/23      You may have water/clear liquids until 4 hours before your procedure or as directed by the scheduling nurse 5:45 AM.   See below for list.     What You CANNOT do:   Do not drink milk or anything colored red.  Do not drink alcohol.  No gum chewing or candy morning of procedure     Liquids That Are OK to Drink:   Water  Sports drinks (Gatorade, Power-Aid)  Coffee or tea (no cream or nondairy creamer)  Clear juices without pulp (apple, white grape)  Gelatin desserts (no fruit or toppings)  Clear soda (sprite, coke, ginger ale)  Chicken broth (until 12 midnight the night before procedure)    Medications: Do not take Insulin or oral diabetic medications the day of the procedure.    Take as prescribed: heart, seizure and blood pressure medication in the morning with a sip of water (less than an ounce).  Take any breathing medications and bring inhalers to hospital with you.     Leave all valuables and jewelry at home. Wear comfortable clothes to procedure to change into hospital gown.   You cannot drive for 24 hours after your procedure because you will receive sedation for your procedure to make you comfortable.    A ride must be provided at discharge.

## 2023-09-19 ENCOUNTER — HOSPITAL ENCOUNTER (OUTPATIENT)
Facility: HOSPITAL | Age: 73
Discharge: HOME | End: 2023-09-19
Attending: INTERNAL MEDICINE | Admitting: INTERNAL MEDICINE
Payer: MEDICARE

## 2023-09-19 ENCOUNTER — ANESTHESIA (OUTPATIENT)
Dept: ENDOSCOPY | Facility: HOSPITAL | Age: 73
End: 2023-09-19
Payer: MEDICARE

## 2023-09-19 ENCOUNTER — ANESTHESIA EVENT (OUTPATIENT)
Dept: ENDOSCOPY | Facility: HOSPITAL | Age: 73
End: 2023-09-19
Payer: MEDICARE

## 2023-09-19 VITALS
OXYGEN SATURATION: 95 % | RESPIRATION RATE: 13 BRPM | TEMPERATURE: 98 F | HEIGHT: 61 IN | SYSTOLIC BLOOD PRESSURE: 100 MMHG | WEIGHT: 195 LBS | BODY MASS INDEX: 36.82 KG/M2 | DIASTOLIC BLOOD PRESSURE: 84 MMHG | HEART RATE: 61 BPM

## 2023-09-19 DIAGNOSIS — K86.2 PANCREAS CYST: ICD-10-CM

## 2023-09-19 DIAGNOSIS — I10 BENIGN ESSENTIAL HYPERTENSION: ICD-10-CM

## 2023-09-19 LAB
FINAL PATHOLOGIC DIAGNOSIS: NORMAL
GROSS: NORMAL
Lab: NORMAL

## 2023-09-19 PROCEDURE — 43259 PR ENDOSCOPIC ULTRASOUND EXAM: ICD-10-PCS | Mod: ,,, | Performed by: INTERNAL MEDICINE

## 2023-09-19 PROCEDURE — 63600175 PHARM REV CODE 636 W HCPCS: Performed by: NURSE ANESTHETIST, CERTIFIED REGISTERED

## 2023-09-19 PROCEDURE — D9220A PRA ANESTHESIA: ICD-10-PCS | Mod: CRNA,,, | Performed by: NURSE ANESTHETIST, CERTIFIED REGISTERED

## 2023-09-19 PROCEDURE — D9220A PRA ANESTHESIA: Mod: CRNA,,, | Performed by: NURSE ANESTHETIST, CERTIFIED REGISTERED

## 2023-09-19 PROCEDURE — 25000003 PHARM REV CODE 250: Performed by: INTERNAL MEDICINE

## 2023-09-19 PROCEDURE — 43259 EGD US EXAM DUODENUM/JEJUNUM: CPT | Mod: ,,, | Performed by: INTERNAL MEDICINE

## 2023-09-19 PROCEDURE — 37000009 HC ANESTHESIA EA ADD 15 MINS: Performed by: INTERNAL MEDICINE

## 2023-09-19 PROCEDURE — D9220A PRA ANESTHESIA: Mod: ANES,,, | Performed by: STUDENT IN AN ORGANIZED HEALTH CARE EDUCATION/TRAINING PROGRAM

## 2023-09-19 PROCEDURE — D9220A PRA ANESTHESIA: ICD-10-PCS | Mod: ANES,,, | Performed by: STUDENT IN AN ORGANIZED HEALTH CARE EDUCATION/TRAINING PROGRAM

## 2023-09-19 PROCEDURE — 37000008 HC ANESTHESIA 1ST 15 MINUTES: Performed by: INTERNAL MEDICINE

## 2023-09-19 PROCEDURE — 25000003 PHARM REV CODE 250: Performed by: NURSE ANESTHETIST, CERTIFIED REGISTERED

## 2023-09-19 PROCEDURE — 43259 EGD US EXAM DUODENUM/JEJUNUM: CPT | Performed by: INTERNAL MEDICINE

## 2023-09-19 RX ORDER — PROPOFOL 10 MG/ML
VIAL (ML) INTRAVENOUS CONTINUOUS PRN
Status: DISCONTINUED | OUTPATIENT
Start: 2023-09-19 | End: 2023-09-19

## 2023-09-19 RX ORDER — SODIUM CHLORIDE 9 MG/ML
INJECTION, SOLUTION INTRAVENOUS CONTINUOUS
Status: DISCONTINUED | OUTPATIENT
Start: 2023-09-19 | End: 2023-09-19 | Stop reason: HOSPADM

## 2023-09-19 RX ORDER — PROPOFOL 10 MG/ML
VIAL (ML) INTRAVENOUS
Status: DISCONTINUED | OUTPATIENT
Start: 2023-09-19 | End: 2023-09-19

## 2023-09-19 RX ORDER — SODIUM CHLORIDE 0.9 % (FLUSH) 0.9 %
10 SYRINGE (ML) INJECTION
Status: DISCONTINUED | OUTPATIENT
Start: 2023-09-19 | End: 2023-09-19 | Stop reason: HOSPADM

## 2023-09-19 RX ORDER — PHENYLEPHRINE HYDROCHLORIDE 10 MG/ML
INJECTION INTRAVENOUS
Status: DISCONTINUED | OUTPATIENT
Start: 2023-09-19 | End: 2023-09-19

## 2023-09-19 RX ORDER — LIDOCAINE HYDROCHLORIDE 20 MG/ML
INJECTION INTRAVENOUS
Status: DISCONTINUED | OUTPATIENT
Start: 2023-09-19 | End: 2023-09-19

## 2023-09-19 RX ADMIN — PROPOFOL 150 MCG/KG/MIN: 10 INJECTION, EMULSION INTRAVENOUS at 09:09

## 2023-09-19 RX ADMIN — SODIUM CHLORIDE: 9 INJECTION, SOLUTION INTRAVENOUS at 09:09

## 2023-09-19 RX ADMIN — PHENYLEPHRINE HYDROCHLORIDE 200 MCG: 10 INJECTION INTRAVENOUS at 09:09

## 2023-09-19 RX ADMIN — PROPOFOL 50 MG: 10 INJECTION, EMULSION INTRAVENOUS at 09:09

## 2023-09-19 RX ADMIN — LIDOCAINE HYDROCHLORIDE 70 MG: 20 INJECTION, SOLUTION INTRAVENOUS at 09:09

## 2023-09-19 NOTE — H&P
Short Stay Endoscopy History and Physical    PCP - Sandra Michaud MD  Referring Physician - Omar Coreas MD  [address] [place] [address]    Procedure - eus  ASA - per anesthesia  Mallampati - per anesthesia  History of Anesthesia problems - no  Family history Anesthesia problems -  no   Plan of anesthesia - General    HPI:  This is a 72 y.o. female here for evaluation of: pancreas cyst    Reflux - no  Dysphagia - no  Abdominal pain - no  Diarrhea - no    ROS:  Constitutional: No fevers, chills, No weight loss  CV: No chest pain  Pulm: No cough, No shortness of breath  Ophtho: No vision changes  GI: see HPI  Derm: No rash    Medical History:  has a past medical history of Allergy, Arthritis, Atrial fibrillation (2017), CKD (chronic kidney disease) stage 3, GFR 30-59 ml/min (2017), Disorder of kidney and ureter, GERD (gastroesophageal reflux disease), Hyperlipidemia, Hypertension, Impaired fasting glucose (2021), and Pre-diabetes (6/3/2017).    Surgical History:  has a past surgical history that includes Hysterectomy; salpingo opherectomy (Bilateral, );  section; Tonsillectomy (); Back surgery (); pace maker (); Insert / replace / remove pacemaker (); Insert / replace / remove pacemaker (2014); Cataract extraction w/  intraocular lens implant (Right, 6/3/2019); Cardiac surgery; Cataract extraction w/  intraocular lens implant (Left, 2019); Adenoidectomy; Eye surgery; Fracture surgery; Spine surgery; Breast surgery; Tubal ligation; Revision of procedure involving pacemaker lead (Left, 2022); Bronchoscopy (N/A, 2023); and Colonoscopy (N/A, 9/15/2023).    Family History: family history includes Coronary artery disease in her brother, father, and mother; Diabetes in her father, mother, paternal grandfather, and paternal grandmother; Heart disease in her brother, father, and mother; Hyperlipidemia in her father and mother; Hypertension in  her father and mother; Hyperthyroidism in her brother; Stroke in her mother..    Social History:  reports that she has never smoked. She has never used smokeless tobacco. She reports current alcohol use of about 5.0 standard drinks of alcohol per week. She reports that she does not use drugs.    Review of patient's allergies indicates:   Allergen Reactions    Iodinated contrast media Anaphylaxis    Penicillins Anaphylaxis    Allopurinol analogues      Muscle cramps    Ciprofloxacin Rash    Quinolones Rash    Sulfa (sulfonamide antibiotics) Rash    Tetracyclines Rash       Medications:   Medications Prior to Admission   Medication Sig Dispense Refill Last Dose    amLODIPine (NORVASC) 10 MG tablet Take 0.5 tablets (5 mg total) by mouth once daily. 90 tablet 3 9/18/2023    aspirin (ECOTRIN) 81 MG EC tablet Take 1 tablet (81 mg total) by mouth once daily.   9/18/2023    atorvastatin (LIPITOR) 80 MG tablet Take 1 tablet (80 mg total) by mouth once daily. 90 tablet 3 9/18/2023    bisoprolol (ZEBETA) 5 MG tablet Take 1 tablet (5 mg total) by mouth once daily. 90 tablet 3 Past Week    clobetasoL (OLUX) 0.05 % Foam Apply 1 application topically every 30 days. 100 g 3 Past Month    ERGOCALCIFEROL, VITAMIN D2, (VITAMIN D ORAL) Take 2,000 Units by mouth once daily.   9/18/2023    furosemide (LASIX) 40 MG tablet TAKE 1 TABLET BY MOUTH EVERY DAY 90 tablet 1 9/18/2023    olmesartan (BENICAR) 40 MG tablet Take 1 tablet (40 mg total) by mouth once daily. (Patient taking differently: Take 20 mg by mouth once daily.) 90 tablet 3 Past Week    potassium citrate (UROCIT-K) 10 mEq (1,080 mg) TbSR TAKE 1 TABLET BY MOUTH ONCE DAILY 90 tablet 3 9/18/2023    propafenone (RYTHMOL SR) 225 MG Cp12 Take 1 capsule (225 mg total) by mouth every 12 (twelve) hours. 180 capsule 3 9/18/2023    topiramate (TOPAMAX) 25 MG tablet Take 1 tablet twice daily. 180 tablet 3 9/18/2023    GAVILYTE-G 236-22.74-6.74 -5.86 gram suspension Take 4,000 mLs by mouth  once.       nitroGLYCERIN (NITROSTAT) 0.4 MG SL tablet Place 1 tablet (0.4 mg total) under the tongue every 5 (five) minutes as needed for Chest pain. 20 tablet 3 Unknown       Physical Exam:    Vital Signs:   Vitals:    09/19/23 0928   BP: 123/60   Pulse: 67   Resp: 18   Temp: 97.9 °F (36.6 °C)       General Appearance: Well appearing in no acute distress    Labs:  Lab Results   Component Value Date    WBC 6.30 06/15/2023    HGB 14.1 06/15/2023    HCT 43.5 06/15/2023     06/15/2023    CHOL 217 (H) 10/11/2022    TRIG 184 (H) 10/11/2022    HDL 49 10/11/2022    ALT 17 10/11/2022    AST 22 10/11/2022     07/10/2023    K 3.9 07/10/2023     07/10/2023    CREATININE 1.6 (H) 07/10/2023    BUN 27 (H) 07/10/2023    CO2 25 07/10/2023    TSH 1.111 03/28/2019    INR 1.0 01/07/2022    HGBA1C 5.5 03/14/2022       I have explained the risks and benefits of this endoscopic procedure to the patient including but not limited to bleeding, inflammation, infection, perforation, and death.      Casimiro De Los Santos MD

## 2023-09-19 NOTE — TRANSFER OF CARE
"Anesthesia Transfer of Care Note    Patient: Jesenia Curiel    Procedure(s) Performed: Procedure(s) (LRB):  ULTRASOUND, UPPER GI TRACT, ENDOSCOPIC (N/A)    Patient location: GI    Anesthesia Type: general    Transport from OR: Transported from OR on room air with adequate spontaneous ventilation    Post pain: adequate analgesia    Post assessment: no apparent anesthetic complications and tolerated procedure well    Post vital signs: stable    Level of consciousness: responds to stimulation and awake    Nausea/Vomiting: no nausea/vomiting    Complications: none    Transfer of care protocol was followed      Last vitals:   Visit Vitals  /60 (BP Location: Left arm, Patient Position: Lying)   Pulse 67   Temp 36.6 °C (97.9 °F) (Skin)   Resp 18   Ht 5' 1" (1.549 m)   Wt 88.5 kg (195 lb)   SpO2 96%   Breastfeeding No   BMI 36.84 kg/m²     "

## 2023-09-19 NOTE — ANESTHESIA POSTPROCEDURE EVALUATION
Anesthesia Post Evaluation    Patient: Jeseina Curiel    Procedure(s) Performed: Procedure(s) (LRB):  ULTRASOUND, UPPER GI TRACT, ENDOSCOPIC (N/A)    Final Anesthesia Type: general      Patient location during evaluation: PACU  Patient participation: Yes- Able to Participate  Level of consciousness: awake and alert  Post-procedure vital signs: reviewed and stable  Pain management: adequate  Airway patency: patent    PONV status at discharge: No PONV  Anesthetic complications: no      Cardiovascular status: stable  Respiratory status: room air  Hydration status: euvolemic  Follow-up not needed.          Vitals Value Taken Time   /59 09/19/23 1022   Temp 36.5 °C (97.7 °F) 09/19/23 1022   Pulse 74 09/19/23 1022   Resp 20 09/19/23 1022   SpO2 98 % 09/19/23 1022         No case tracking events are documented in the log.      Pain/Criss Score: No data recorded

## 2023-09-19 NOTE — ANESTHESIA PREPROCEDURE EVALUATION
09/19/2023  Jesenia Curiel is a 72 y.o., female.      Pre-op Assessment    I have reviewed the Patient Summary Reports.    I have reviewed the NPO Status.   I have reviewed the Medications.     Review of Systems  Anesthesia Hx:  No problems with previous Anesthesia   Denies Personal Hx of Anesthesia complications.   Social:  Non-Smoker    Cardiovascular:   Exercise tolerance: poor Pacemaker Hypertension CAD  Dysrhythmias  hyperlipidemia NAVA    Pulmonary:   Sleep Apnea    Renal/:   Chronic Renal Disease, CKD    Hepatic/GI:   GERD    Neurological:  Neurology Normal    Endocrine:  Endocrine Normal        Physical Exam  General: Well nourished and Cooperative    Airway:  Mallampati: II   Mouth Opening: Normal  TM Distance: Normal  Tongue: Normal  Neck ROM: Normal ROM    Dental:  Intact    Chest/Lungs:  Normal Respiratory Rate        Anesthesia Plan  Type of Anesthesia, risks & benefits discussed:    Anesthesia Type: Gen Natural Airway  Intra-op Monitoring Plan: Standard ASA Monitors  Post Op Pain Control Plan: multimodal analgesia  Induction:  IV  Informed Consent: Informed consent signed with the Patient and all parties understand the risks and agree with anesthesia plan.  All questions answered.   ASA Score: 3  Day of Surgery Review of History & Physical: H&P Update referred to the surgeon/provider.    Ready For Surgery From Anesthesia Perspective.     .

## 2023-09-19 NOTE — PROVATION PATIENT INSTRUCTIONS
Discharge Summary/Instructions after an Endoscopic Procedure  Patient Name: Jesenia Curiel  Patient MRN: [id number]  Patient YOB: 1950  Tuesday, September 19, 2023  Casimiro De Los Santos MD  Dear patient,  As a result of recent federal legislation (The Federal Cures Act), you may   receive lab or pathology results from your procedure in your [facility]   account before your physician is able to contact you. Your physician or   their representative will relay the results to you with their   recommendations at their soonest availability.  Thank you,  Your health is very important to us during the Covid Crisis. Following your   procedure today, you will receive a daily text for 2 weeks asking about   signs or symptoms of Covid 19.  Please respond to this text when you   receive it so we can follow up and keep you as safe as possible.   RESTRICTIONS:  During your procedure today, you received medications for sedation.  These   medications may affect your judgment, balance and coordination.  Therefore,   for 24 hours, you have the following restrictions:   - DO NOT drive a car, operate machinery, make legal/financial decisions,   sign important papers or drink alcohol.    ACTIVITY:  Today: no heavy lifting, straining or running due to procedural   sedation/anesthesia.  The following day: return to full activity including work.  DIET:  Eat and drink normally unless instructed otherwise.     TREATMENT FOR COMMON SIDE EFFECTS:  - Mild abdominal pain, nausea, belching, bloating or excessive gas:  rest,   eat lightly and use a heating pad.  - Sore Throat: treat with throat lozenges and/or gargle with warm salt   water.  - Because air was used during the procedure, expelling large amounts of air   from your rectum or belching is normal.  - If a bowel prep was taken, you may not have a bowel movement for 1-3 days.    This is normal.  SYMPTOMS TO WATCH FOR AND REPORT TO YOUR PHYSICIAN:  1. Abdominal pain or bloating, other  than gas cramps.  2. Chest pain.  3. Back pain.  4. Signs of infection such as: chills or fever occurring within 24 hours   after the procedure.  5. Rectal bleeding, which would show as bright red, maroon, or black stools.   (A tablespoon of blood from the rectum is not serious, especially if   hemorrhoids are present.)  6. Vomiting.  7. Weakness or dizziness.  GO DIRECTLY TO THE NEAREST EMERGENCY ROOM IF YOU HAVE ANY OF THE FOLLOWING:      Difficulty breathing              Chills and/or fever over 101 F   Persistent vomiting and/or vomiting blood   Severe abdominal pain   Severe chest pain   Black, tarry stools   Bleeding- more than one tablespoon   Any other symptom or condition that you feel may need urgent attention  Your doctor recommends these additional instructions:  If any biopsies were taken, your doctors clinic will contact you in 1 to 2   weeks with any results.  - Discharge patient to home.   - Resume previous diet.   - Continue present medications.   - Return to GI clinic in 6 months (virtual visit is fine) to discuss   pancreatic cyst surveillance.  For questions, problems or results please call your physician - Casimiro De Los Santos MD.  EMERGENCY PHONE NUMBER: [phone],  LAB RESULTS: [phone]  IF A COMPLICATION OR EMERGENCY SITUATION ARISES AND YOU ARE UNABLE TO REACH   YOUR PHYSICIAN - GO DIRECTLY TO THE EMERGENCY ROOM.  Casimiro De Los Santos MD  9/19/2023 10:14:36 AM  This report has been verified and signed electronically.  Dear patient,  As a result of recent federal legislation (The Federal Cures Act), you may   receive lab or pathology results from your procedure in your [facility]   account before your physician is able to contact you. Your physician or   their representative will relay the results to you with their   recommendations at their soonest availability.  Thank you,  PROVATION

## 2023-09-21 ENCOUNTER — PATIENT MESSAGE (OUTPATIENT)
Dept: ENDOCRINOLOGY | Facility: CLINIC | Age: 73
End: 2023-09-21
Payer: MEDICARE

## 2023-09-26 ENCOUNTER — LAB VISIT (OUTPATIENT)
Dept: LAB | Facility: HOSPITAL | Age: 73
End: 2023-09-26
Attending: STUDENT IN AN ORGANIZED HEALTH CARE EDUCATION/TRAINING PROGRAM
Payer: MEDICARE

## 2023-09-26 ENCOUNTER — OFFICE VISIT (OUTPATIENT)
Dept: PULMONOLOGY | Facility: CLINIC | Age: 73
End: 2023-09-26
Payer: MEDICARE

## 2023-09-26 VITALS
HEART RATE: 99 BPM | SYSTOLIC BLOOD PRESSURE: 126 MMHG | HEIGHT: 61 IN | WEIGHT: 198.19 LBS | OXYGEN SATURATION: 95 % | BODY MASS INDEX: 37.42 KG/M2 | DIASTOLIC BLOOD PRESSURE: 76 MMHG

## 2023-09-26 DIAGNOSIS — R06.09 DOE (DYSPNEA ON EXERTION): ICD-10-CM

## 2023-09-26 DIAGNOSIS — J84.10 CALCIFIED GRANULOMA OF LUNG: ICD-10-CM

## 2023-09-26 DIAGNOSIS — J84.9 INTERSTITIAL LUNG DISEASE: Primary | ICD-10-CM

## 2023-09-26 DIAGNOSIS — I70.0 AORTIC ATHEROSCLEROSIS: ICD-10-CM

## 2023-09-26 DIAGNOSIS — J84.9 INTERSTITIAL LUNG DISEASE: ICD-10-CM

## 2023-09-26 DIAGNOSIS — N18.31 STAGE 3A CHRONIC KIDNEY DISEASE: ICD-10-CM

## 2023-09-26 DIAGNOSIS — G47.33 OBSTRUCTIVE SLEEP APNEA: ICD-10-CM

## 2023-09-26 LAB — CK SERPL-CCNC: 74 U/L (ref 20–180)

## 2023-09-26 PROCEDURE — 82085 ASSAY OF ALDOLASE: CPT | Performed by: STUDENT IN AN ORGANIZED HEALTH CARE EDUCATION/TRAINING PROGRAM

## 2023-09-26 PROCEDURE — 82550 ASSAY OF CK (CPK): CPT | Performed by: STUDENT IN AN ORGANIZED HEALTH CARE EDUCATION/TRAINING PROGRAM

## 2023-09-26 PROCEDURE — 86235 NUCLEAR ANTIGEN ANTIBODY: CPT | Mod: 59 | Performed by: STUDENT IN AN ORGANIZED HEALTH CARE EDUCATION/TRAINING PROGRAM

## 2023-09-26 PROCEDURE — 99215 OFFICE O/P EST HI 40 MIN: CPT | Mod: S$PBB,,, | Performed by: STUDENT IN AN ORGANIZED HEALTH CARE EDUCATION/TRAINING PROGRAM

## 2023-09-26 PROCEDURE — 36415 COLL VENOUS BLD VENIPUNCTURE: CPT | Performed by: STUDENT IN AN ORGANIZED HEALTH CARE EDUCATION/TRAINING PROGRAM

## 2023-09-26 PROCEDURE — 99999 PR PBB SHADOW E&M-EST. PATIENT-LVL III: CPT | Mod: PBBFAC,,, | Performed by: STUDENT IN AN ORGANIZED HEALTH CARE EDUCATION/TRAINING PROGRAM

## 2023-09-26 PROCEDURE — 86235 NUCLEAR ANTIGEN ANTIBODY: CPT | Performed by: STUDENT IN AN ORGANIZED HEALTH CARE EDUCATION/TRAINING PROGRAM

## 2023-09-26 PROCEDURE — 86225 DNA ANTIBODY NATIVE: CPT | Performed by: STUDENT IN AN ORGANIZED HEALTH CARE EDUCATION/TRAINING PROGRAM

## 2023-09-26 PROCEDURE — 99999 PR PBB SHADOW E&M-EST. PATIENT-LVL III: ICD-10-PCS | Mod: PBBFAC,,, | Performed by: STUDENT IN AN ORGANIZED HEALTH CARE EDUCATION/TRAINING PROGRAM

## 2023-09-26 PROCEDURE — 99215 PR OFFICE/OUTPT VISIT, EST, LEVL V, 40-54 MIN: ICD-10-PCS | Mod: S$PBB,,, | Performed by: STUDENT IN AN ORGANIZED HEALTH CARE EDUCATION/TRAINING PROGRAM

## 2023-09-26 PROCEDURE — 99213 OFFICE O/P EST LOW 20 MIN: CPT | Mod: PBBFAC | Performed by: STUDENT IN AN ORGANIZED HEALTH CARE EDUCATION/TRAINING PROGRAM

## 2023-09-26 NOTE — PROGRESS NOTES
"Subjective:     Reason for visit:  Dyspnea on exertion    Patient ID:  eJsenia Curiel is a 72 y.o. morbidly obese female with CKD, AFib and sick sinus syndrome, HTN, hyperlipidemia, JOSÉ not yet on CPAP    Interval History:  Continuing to have intermittent shortness of breath and persistence of cough but overall no change in symptoms      Additional Pulmonary History:  Childhood Illnesses:  None  Occupational/Environmental:  Retired gastroenterologist  Tobacco/Smoking:  Never    Objective:     Vitals:    09/26/23 1528   BP: 126/76   Pulse: 99   SpO2: 95%   Weight: 89.9 kg (198 lb 3.1 oz)   Height: 5' 1" (1.549 m)         Physical Exam  Vitals and nursing note reviewed.   Constitutional:       General: She is not in acute distress.     Appearance: She is morbidly obese. She is not ill-appearing, toxic-appearing or diaphoretic.   HENT:      Head: Normocephalic and atraumatic.   Eyes:      General: No scleral icterus.     Extraocular Movements: Extraocular movements intact.   Cardiovascular:      Rate and Rhythm: Normal rate and regular rhythm.   Pulmonary:      Effort: No tachypnea, accessory muscle usage, respiratory distress or retractions.   Abdominal:      General: There is no distension.   Skin:     General: Skin is warm and dry.      Coloration: Skin is not jaundiced.      Findings: No rash.   Neurological:      General: No focal deficit present.      Mental Status: Mental status is at baseline.          Personal Diagnostic Review and Interpretation  03/27/2023 CT chest without contrast:  Bibasilar ground-glass with subpleural reticulations but no honeycombing; scattered subcentimeter nodules; RUL calcified granuloma; no lymphadenopathy      Pertinent Studies Reviewed & Interpreted:     Pulmonary Function Tests:   03/31/2023: FEV1 72, FVC 76, FEV1/FVC 73, FEF 25-75 56; no bronchodilator response.  TLC 78, ERV 56.  DLCO 60    6 Minute Walk Tests:   03/31/2023:  243 m (220 m) SpO2 94% on room air at peak " exercise.  Appropriate tachycardic response but drop in systolic blood pressure from 125-118    Echocardiograms:   03/08/2023 dobutamine stress:  Negative for ischemia EF 55%, PASP 37    01/20/2022: EF 53% with segmental WMA and abnormal septal wall motion; PASP 25    Other pertinent laboratories:  03/22/2023 BNP: 167    07/13/2023 Bronchoscopy with BAL and TBBx:  Fungal, AFB and routine culture all negative.  Pathology with chronic inflammation, fibrosis and dense lymphoid infiltrates, likely autoimmune related      Assessment & Plan:       Problem List Items Addressed This Visit          Pulmonary    Calcified granuloma of lung    Overview     RUL calcified granuloma on recent CT chest.  Benign with no need for follow-up.         Interstitial lung disease - Primary    Overview     BOLIVAR weakly positive (1:80) but exhaustive autoimmune/CTD workup otherwise negative.  Follow-up CT imaging with scattered GGOs and bibasilar scarring with fibrosis and mild traction bronchiectasis.  Has appearance similar to hypersensitivity pneumonitis but possible NSIP.  No feather/down bedding, no molds or organic material exposures.  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  Her  has quit smoking.  Does report an allergy to pet dander and is frequently watching her daughter's pet dog.  Bronchoscopy with transbronchial biopsies with CTD-ILD appearance.  Expanding autoimmune laboratories.  If this is negative, we will likely move forward with wedge biopsy         Relevant Orders    ANTI-SCLERODERMA ANTIBODY    CK    ANTI -SSA ANTIBODY    ANTI-SSB ANTIBODY    ANTI-DNA ANTIBODY, DOUBLE-STRANDED    ANTI- JANE-1 ANTIBODY    ANTI-SMITH ANTIBODY    ANTI SM/RNP ANTIBODY    ALDOLASE    PM-Scl Antibody       Cardiac/Vascular    NAVA (dyspnea on exertion)    Overview     Multi factorial issue including morbid obesity deconditioning diastolic heart failure now with some underlying ILD.  Previous echo  with borderline LAE and increasing PASP from 20-37.  Dyspnea did not improve appreciably with diuresis, however CT imaging appears much more clear.  Restrictive physiology on PFTs likely combination of ILD and morbid obesity as evidenced by decreased ERV.  See the section on ILD         Aortic atherosclerosis    Overview     Noted on recent CT.  Currently on ASA statin.  Needs to lose weight.            Renal/    CKD (chronic kidney disease) stage 3, GFR 30-59 ml/min    Overview     Complicates every aspect of patient care.  Renally dose medicines.            Other    Obstructive sleep apnea    Overview     Recent HST with AHI 18 consistent with moderate sleep apnea.  Needs to see sleep medicine.          RETURN TO CLINIC IN 4 WEEKS     Portions of the record may have been created with voice-recognition software. Occasional wrong-word or sound-a-like substitutions may have occurred due to the inherent limitations of voice-recognition software. Read the chart carefully and recognize, using context, where substitutions have occurred.

## 2023-09-27 LAB
ALDOLASE SERPL-CCNC: 2.7 U/L (ref 1.2–7.6)
ANTI SM/RNP ANTIBODY: 0.1 RATIO (ref 0–0.99)
ANTI-SM/RNP INTERPRETATION: NEGATIVE
ANTI-SSA ANTIBODY: 0.09 RATIO (ref 0–0.99)
ANTI-SSA INTERPRETATION: NEGATIVE
ANTI-SSB ANTIBODY: 0.08 RATIO (ref 0–0.99)
ANTI-SSB INTERPRETATION: NEGATIVE
DSDNA AB SER-ACNC: NORMAL [IU]/ML

## 2023-09-28 LAB — ENA JO1 IGG SER-ACNC: <0.2 U

## 2023-09-29 ENCOUNTER — PATIENT MESSAGE (OUTPATIENT)
Dept: CARDIOLOGY | Facility: CLINIC | Age: 73
End: 2023-09-29

## 2023-09-29 ENCOUNTER — OFFICE VISIT (OUTPATIENT)
Dept: PRIMARY CARE CLINIC | Facility: CLINIC | Age: 73
End: 2023-09-29
Payer: MEDICARE

## 2023-09-29 ENCOUNTER — OFFICE VISIT (OUTPATIENT)
Dept: CARDIOLOGY | Facility: CLINIC | Age: 73
End: 2023-09-29
Payer: MEDICARE

## 2023-09-29 VITALS
TEMPERATURE: 98 F | HEIGHT: 61 IN | OXYGEN SATURATION: 97 % | BODY MASS INDEX: 37.2 KG/M2 | DIASTOLIC BLOOD PRESSURE: 82 MMHG | RESPIRATION RATE: 18 BRPM | WEIGHT: 197.06 LBS | SYSTOLIC BLOOD PRESSURE: 132 MMHG | HEART RATE: 65 BPM

## 2023-09-29 DIAGNOSIS — I50.32 CHRONIC DIASTOLIC HEART FAILURE: ICD-10-CM

## 2023-09-29 DIAGNOSIS — I25.10 CORONARY ARTERY CALCIFICATION SEEN ON CAT SCAN: ICD-10-CM

## 2023-09-29 DIAGNOSIS — I49.5 SICK SINUS SYNDROME: ICD-10-CM

## 2023-09-29 DIAGNOSIS — Z95.0 S/P PLACEMENT OF CARDIAC PACEMAKER: ICD-10-CM

## 2023-09-29 DIAGNOSIS — E78.2 MIXED HYPERLIPIDEMIA: ICD-10-CM

## 2023-09-29 DIAGNOSIS — R73.03 PREDIABETES: ICD-10-CM

## 2023-09-29 DIAGNOSIS — N18.31 STAGE 3A CHRONIC KIDNEY DISEASE: ICD-10-CM

## 2023-09-29 DIAGNOSIS — E27.8 ADRENAL INCIDENTALOMA: ICD-10-CM

## 2023-09-29 DIAGNOSIS — G47.33 OBSTRUCTIVE SLEEP APNEA: ICD-10-CM

## 2023-09-29 DIAGNOSIS — J84.9 INTERSTITIAL LUNG DISEASE: ICD-10-CM

## 2023-09-29 DIAGNOSIS — E66.01 MORBID OBESITY: ICD-10-CM

## 2023-09-29 DIAGNOSIS — I70.0 AORTIC ATHEROSCLEROSIS: ICD-10-CM

## 2023-09-29 DIAGNOSIS — J84.9 ILD (INTERSTITIAL LUNG DISEASE): Primary | ICD-10-CM

## 2023-09-29 DIAGNOSIS — I50.32 HEART FAILURE, DIASTOLIC, CHRONIC: ICD-10-CM

## 2023-09-29 DIAGNOSIS — I10 BENIGN ESSENTIAL HYPERTENSION: ICD-10-CM

## 2023-09-29 DIAGNOSIS — I48.91 ATRIAL FIBRILLATION, UNSPECIFIED TYPE: ICD-10-CM

## 2023-09-29 DIAGNOSIS — Z53.20 SCREENING MAMMOGRAPHY DECLINED: ICD-10-CM

## 2023-09-29 DIAGNOSIS — N18.32 STAGE 3B CHRONIC KIDNEY DISEASE: ICD-10-CM

## 2023-09-29 DIAGNOSIS — E21.0 PRIMARY HYPERPARATHYROIDISM: ICD-10-CM

## 2023-09-29 DIAGNOSIS — J47.9 BRONCHIECTASIS WITHOUT COMPLICATION: ICD-10-CM

## 2023-09-29 DIAGNOSIS — R73.01 IMPAIRED FASTING GLUCOSE: ICD-10-CM

## 2023-09-29 DIAGNOSIS — I48.0 PAROXYSMAL ATRIAL FIBRILLATION: Primary | ICD-10-CM

## 2023-09-29 DIAGNOSIS — K86.9 PANCREATIC LESION: ICD-10-CM

## 2023-09-29 DIAGNOSIS — G47.33 OSA (OBSTRUCTIVE SLEEP APNEA): ICD-10-CM

## 2023-09-29 PROCEDURE — 99215 PR OFFICE/OUTPT VISIT, EST, LEVL V, 40-54 MIN: ICD-10-PCS | Mod: S$PBB,,, | Performed by: INTERNAL MEDICINE

## 2023-09-29 PROCEDURE — 99213 OFFICE O/P EST LOW 20 MIN: CPT | Mod: PBBFAC,PN | Performed by: INTERNAL MEDICINE

## 2023-09-29 PROCEDURE — 99215 OFFICE O/P EST HI 40 MIN: CPT | Mod: S$PBB,,, | Performed by: INTERNAL MEDICINE

## 2023-09-29 PROCEDURE — 99999 PR PBB SHADOW E&M-EST. PATIENT-LVL III: CPT | Mod: PBBFAC,,, | Performed by: INTERNAL MEDICINE

## 2023-09-29 PROCEDURE — 99214 OFFICE O/P EST MOD 30 MIN: CPT | Mod: 95,,, | Performed by: INTERNAL MEDICINE

## 2023-09-29 PROCEDURE — 99999 PR PBB SHADOW E&M-EST. PATIENT-LVL III: ICD-10-PCS | Mod: PBBFAC,,, | Performed by: INTERNAL MEDICINE

## 2023-09-29 PROCEDURE — 99214 PR OFFICE/OUTPT VISIT, EST, LEVL IV, 30-39 MIN: ICD-10-PCS | Mod: 95,,, | Performed by: INTERNAL MEDICINE

## 2023-09-29 RX ORDER — OLMESARTAN MEDOXOMIL 40 MG/1
40 TABLET ORAL
COMMUNITY
Start: 2023-09-29 | End: 2023-09-29

## 2023-09-29 RX ORDER — ROSUVASTATIN CALCIUM 40 MG/1
40 TABLET, COATED ORAL NIGHTLY
Qty: 90 TABLET | Refills: 3 | Status: SHIPPED | OUTPATIENT
Start: 2023-09-29 | End: 2024-09-28

## 2023-09-29 RX ORDER — AMLODIPINE BESYLATE 5 MG/1
5 TABLET ORAL DAILY
Qty: 90 TABLET | Refills: 3
Start: 2023-09-29 | End: 2023-10-03 | Stop reason: SDUPTHER

## 2023-09-29 RX ORDER — METOPROLOL SUCCINATE 25 MG/1
12.5 TABLET, EXTENDED RELEASE ORAL DAILY
Qty: 45 TABLET | Refills: 3 | Status: SHIPPED | OUTPATIENT
Start: 2023-09-29 | End: 2024-02-21 | Stop reason: ALTCHOICE

## 2023-09-29 RX ORDER — OLMESARTAN MEDOXOMIL 20 MG/1
20 TABLET ORAL DAILY
Qty: 90 TABLET | Refills: 3 | Status: SHIPPED | OUTPATIENT
Start: 2023-09-29 | End: 2024-02-21 | Stop reason: ALTCHOICE

## 2023-09-29 NOTE — PROGRESS NOTES
INTERNAL MEDICINE ANNUAL VISIT NOTE      CHIEF COMPLAINT     ANNUAL    HPI     Jesenia Curiel is a 72 y.o. C female who presents for annual.  Last seen pt 3/2023  MMG - declines.  Home FITKIT positive 4/5/23. Cscope - 9/15/23 - diverticulosis. 4mm polyp. Nonbleeding internal hemorrhoids. Benign path. Repeat in 7 yrs.  DEXA-4/6/22 osteopenia    Chronic cough and SOB. LOV w/ Dr. Bhakta in pulm 9/26/23. F/u in 4 weeks. So far more esoteric autoimmune testing is neg but PM-Scl abs still pending.  Neg DSE 3/2023  Normal 6MWT 3/2023  Home sleep study 4/3/23 - mod to severe JOSÉ. Couldn't tolerate the CPAP - tried it for 3 weeks.   CT of chest w/o con 6/16/23 - PM in place. Mild calcific aortic atherosclerosis and calcific disease of the aortic annulus. Mild multi vessel coronary a atherosclerosis. Mild biapical fibronodular scarring. Mild traction bronchiectasis. Air trapping. Small hiatal hernia. B renal atrophy. Stable 1.5cm R adrenal gland nodule. 1.8cm lesion at pancreatic tail, slightly enlarged when compared to prior CT of chest. Degen changes of the spine.   Bronchoscopy 7/13/23: cultures negative including AFB.   - Chronic cough with abnormal CT                          - Interstitial lung disease                          - Edema was present throughout the                          tracheobronchial tree.                          - Bronchoalveolar lavage was performed.                          - Transbronchial lung biopsies were performed.    Path w/ chronic ILD as consideration.  May need wedge biopsy.  Started exercising again - start walking and work her way up.     For the pancreatic lesion:  EUS 9/19/23 -   Impression:            - Normal esophagus.                          - Normal stomach.                          - Normal examined duodenum.                          - Three cystic lesions were seen in the pancreatic                          head, pancreatic body and pancreatic tail.                           - No specimens collected.   -->f/u in 6 mo w/ GI    Adrenal incidentaloma - abnormal labs for cortisol. Plan to repeat DST, 24 hr urine cortisol and MN salivary cortisol in 6 mo.   Primary hyperparathyroidism w/ mild hyperCa. Does have osteopenia w/ high FRAX score. PTH 9/22/23 142.8. Ca ok 9/22/23  LOV Dr. Molina w/ Dr. Sparks 8/9/23 - f/u in 1 yr.    Symptomatic AFib  SSS s/p PM 2009 s/p RA lead revision 2022. LOV Dr. JOSE Hodgson 6/29/23. F/u in 6 mo. Plan for PM replacement in the next few mo.  Diastolic HF.   Lasix 40mg daily, amlodipine 5mg daily, propafenone sr 225mg daily, olmesartan 20mg daily, asa 81mg daily (declined anticoag). Bisoprolol 5mg daily is currently on hold.   Checks BP at home and it's 110s-120s usually.     CKD3 - cR BASELINE 1.2-1.7, most recent 9/22/23 1.47, eGFR 37.5  on above meds plus urocit k 10mEq daily. H/o renal stone.  Nephrologist - Dr. Mcgarry.  Not anemic.    HLD/aortic atherosclerosis - atorva 80mg daily previously and changed from atorva 80 to crestor 40 this morning.   .8 w/  9/22/23    Pdm - A1C 5.7 9/22/23    Past Medical History:  Past Medical History:   Diagnosis Date    Allergy     Arthritis     Atrial fibrillation 5/29/2017    CKD (chronic kidney disease) stage 3, GFR 30-59 ml/min 6/26/2017    Disorder of kidney and ureter     GERD (gastroesophageal reflux disease)     Hyperlipidemia     Hypertension     Impaired fasting glucose 11/19/2021    Pre-diabetes 6/3/2017       Past Surgical History:  Past Surgical History:   Procedure Laterality Date    ADENOIDECTOMY      BACK SURGERY  2000    lamenectomy    BREAST SURGERY      BRONCHOSCOPY N/A 7/13/2023    Procedure: BRONCHOSCOPY;  Surgeon: Paul Diagnostic Provider;  Location: [facility] OR [address] [place] [address];  Service: Anesthesiology;  Laterality: N/A;    CARDIAC SURGERY      st loida pacemaker     CATARACT EXTRACTION W/  INTRAOCULAR LENS IMPLANT Right 6/3/2019    Procedure: EXTRACTION, CATARACT, WITH IOL INSERTION;  Surgeon:  Raisa Moreno MD;  Location: [facility] OR;  Service: Ophthalmology;  Laterality: Right;  Laser    CATARACT EXTRACTION W/  INTRAOCULAR LENS IMPLANT Left 2019    Procedure: EXTRACTION, CATARACT, WITH IOL INSERTION;  Surgeon: Raisa Moreno MD;  Location: [facility] OR;  Service: Ophthalmology;  Laterality: Left;  LASER ASSISTED     SECTION      ,     COLONOSCOPY N/A 9/15/2023    Procedure: COLONOSCOPY;  Surgeon: Tao Gomez MD;  Location: [facility] ENDO (2ND FLR);  Service: Endoscopy;  Laterality: N/A;  inst to portal/[employer] pacemaker   2nd floor for airway protection patient with lung disease elevated pulmonary artery pressure pacemaker and fecal occult blood positive stool,   cleared by pulmonary Dr Bhakta-see note on -    ENDOSCOPIC ULTRASOUND OF UPPER GASTROINTESTINAL TRACT N/A 2023    Procedure: ULTRASOUND, UPPER GI TRACT, ENDOSCOPIC;  Surgeon: Casimiro De Los Santos MD;  Location: [facility] ENDO;  Service: Endoscopy;  Laterality: N/A;  23: instructions send via portal. [employer] ppm- GD    EYE SURGERY      FRACTURE SURGERY      HYSTERECTOMY      INSERT / REPLACE / REMOVE PACEMAKER      placement    INSERT / REPLACE / REMOVE PACEMAKER  2014    [employer] Model #[id number] replacement    pace maker      replacement     REVISION OF PROCEDURE INVOLVING PACEMAKER LEAD Left 2022    Procedure: REVISION, ELECTRODE LEAD, CARDIAC PACEMAKER;  Surgeon: Trell Hodgson MD;  Location: [facility] EP LAB;  Service: Cardiology;  Laterality: Left;  PPM lead Malfx, RA lead revision, SJM, MAC, GP, 3 PREP*dPPM SJM in situ**Contrast prep given*    salpingo opherectomy Bilateral     SPINE SURGERY      TONSILLECTOMY      TUBAL LIGATION         Allergies:  Review of patient's allergies indicates:   Allergen Reactions    Iodinated contrast media Anaphylaxis    Penicillins Anaphylaxis    Allopurinol analogues      Muscle cramps    Ciprofloxacin Rash    Quinolones Rash    Sulfa (sulfonamide antibiotics)  Rash    Tetracyclines Rash       Home Medications:  Prior to Admission medications    Medication Sig Start Date End Date Taking? Authorizing Provider   amLODIPine (NORVASC) 10 MG tablet Take 0.5 tablets (5 mg total) by mouth once daily. 6/17/23   Venkatesh Mcgarry MD   aspirin (ECOTRIN) 81 MG EC tablet Take 1 tablet (81 mg total) by mouth once daily. 7/23/18   Trell Hodgson MD   atorvastatin (LIPITOR) 80 MG tablet Take 1 tablet (80 mg total) by mouth once daily. 7/3/23   Hai Gregg MD   bisoprolol (ZEBETA) 5 MG tablet Take 1 tablet (5 mg total) by mouth once daily. 10/11/22   Sandra Michaud MD   clobetasoL (OLUX) 0.05 % Foam Apply 1 application topically every 30 days. 4/14/20   Sandra Michaud MD   ERGOCALCIFEROL, VITAMIN D2, (VITAMIN D ORAL) Take 2,000 Units by mouth once daily.    Provider, Historical   furosemide (LASIX) 40 MG tablet TAKE 1 TABLET BY MOUTH EVERY DAY 9/8/23   Venkatesh Mcgarry MD   nitroGLYCERIN (NITROSTAT) 0.4 MG SL tablet Place 1 tablet (0.4 mg total) under the tongue every 5 (five) minutes as needed for Chest pain. 2/17/23 2/17/24  Paige Lewis PA-C   olmesartan (BENICAR) 40 MG tablet Take 1 tablet (40 mg total) by mouth once daily.  Patient taking differently: Take 20 mg by mouth once daily. 1/4/23   Sandra Michaud MD   potassium citrate (UROCIT-K) 10 mEq (1,080 mg) TbSR TAKE 1 TABLET BY MOUTH ONCE DAILY 5/29/23   Venkatesh Mcgarry MD   propafenone (RYTHMOL SR) 225 MG Cp12 Take 1 capsule (225 mg total) by mouth every 12 (twelve) hours. 4/5/23   Sandra Michaud MD   topiramate (TOPAMAX) 25 MG tablet Take 1 tablet twice daily. 4/3/23   Sandra Michaud MD       Family History:  Family History   Problem Relation Age of Onset    Hypertension Mother     Diabetes Mother     Hyperlipidemia Mother     Coronary artery disease Mother     Heart disease Mother     Stroke Mother     Hypertension Father     Diabetes Father     Hyperlipidemia Father     Coronary artery disease Father     Heart disease Father      "Coronary artery disease Brother     Heart disease Brother     Hyperthyroidism Brother     Diabetes Paternal Grandmother     Diabetes Paternal Grandfather     Heart attack Neg Hx        Social History:  Social History     Tobacco Use    Smoking status: Never    Smokeless tobacco: Never   Substance Use Topics    Alcohol use: Yes     Alcohol/week: 5.0 standard drinks of alcohol     Types: 5 Glasses of wine per week    Drug use: No       Review of Systems:  Review of Systems Comprehensive review of systems otherwise negative. See history/subjective section for more details.    Health Maintainence:   HM reviewed.     PHYSICAL EXAM     /82   Pulse 65   Temp 97.8 °F (36.6 °C) (Oral)   Resp 18   Ht 5' 1" (1.549 m)   Wt 89.4 kg (197 lb 1.5 oz)   SpO2 97%   BMI 37.24 kg/m²     GEN - A+OX4, NAD   HEENT - PERRL, EOMI, OP clear but small. TM normal.   Neck - No thyromegaly or cervical LAD. Does have significant fatty tissue around the neck.   CV - RRR, no m/r. PM in place - not painful.   Chest - CTAB, no wheezing or rhonchi  Abd - S/NT/ND/+BS.   Ext - 2+BDP and radial pulses. No C/C/E.  Neuro - PERRL, EOMI, no nystagmus, eyebrow raise, facial sensation, hearing, m of mastication, smile, palatal raise, shoulder shrug, tongue protrusion symmetric and intact. 5/5 BUE and BLE m strength. Normal gait.   Skin - no rash. Freckles.    LABS     Previous labs reviewed.    ASSESSMENT/PLAN     Jesenia Curile is a 72 y.o. female with  Diagnoses and all orders for this visit:    ILD (interstitial lung disease) - waiting on a few more autoimmune labs. Plan for potential wedge biopsy. F/u w/ Dr. Bhakta. Pt will start walking.     JOSÉ (obstructive sleep apnea) - couldn't tolerate CPAP.     Aortic atherosclerosis - cont crestor (just changed from atorva). Follows w/ Dr. CANDICE Hodgson and has repeat FLP and CMP ordered for Dec.     Mixed hyperlipidemia - as above.     Prediabetes - repeat a1c in Dec. Pt reports under a lot of stress. " Will start walking.   -     Hemoglobin A1C; Future; Expected date: 09/29/2023    Bronchiectasis without complication - as above.     Adrenal incidentaloma - plan for repeat cortisol testing w/ endo in 6 mo.     Pancreatic lesion - plan for GI f/u in 6 mo.     Atrial fibrillation, unspecified type - cont rhythmol. Follows w/ Dr. JOSE Hodgson    Primary hyperparathyroidism - f/u w/ endo. Declines prolia at this time.     Heart failure, diastolic, chronic - euvolemic. Cont BP control along w/ lasix daily.     Stage 3b chronic kidney disease - mildly improved from previous Cr level.     Screening mammography declined    Other orders  -     Discontinue: olmesartan (BENICAR) 40 MG tablet; Take 40 mg by mouth.    Advance Care Planning     Date: 09/29/2023    Living Will  During this visit, I engaged the patient  in the voluntary advance care planning process.  The patient and I reviewed the role for advance directives and their purpose in directing future healthcare if the patient's unable to speak for him/herself.  At this point in time, the patient does have full decision-making capacity.  We discussed different extreme health states that she could experience, and reviewed what kind of medical care she would want in those situations.  The patient communicated that if she were comatose and had little chance of a meaningful recovery, she would not want machines/life-sustaining treatments used. In addition to the above preference, other important end-of-life issues for the patient include  see scanned form . The patient has completed a living will to reflect these preferences and The patient has already designated a healthcare power of  to make decisions on [unfilled] behalf.  I spent a total of 5 minutes engaging the patient in this advance care planning discussion.          Power of   I initiated the process of voluntary advance care planning today and explained the importance of this process to the patient.  I  introduced the concept of advance directives to the patient, as well. Then the patient received detailed information about the importance of designating a Health Care Power of  (HCPOA). She was also instructed to communicate with this person about their wishes for future healthcare, should she become sick and lose decision-making capacity. The patient has previously appointed a HCPOA. After our discussion, the patient has previously decided to complete a HCPOA and has appointed her  spouse and daughters , health care agent:  Sarina Carroll, Humphrey Chavarria  & health care agent number:  see chart  I spent a total time of 5 minutes discussing this issue with the patient.     Flu and COVID vaccine at the local pharmacy.   RTC in 6 months, sooner if needed and depending on labs.    Sandra Michaud MD  Department of Internal Medicine - [facility]  7:31 AM

## 2023-09-29 NOTE — PROGRESS NOTES
Subjective:   Patient ID:  Jesenia Curiel is a 72 y.o. female who presents for follow-up of No chief complaint on file.    The patient location is: home  The chief complaint leading to consultation is: hand    Visit type: audiovisual    Face to Face time with patient: 30min  30 minutes of total time spent on the encounter, which includes face to face time and non-face to face time preparing to see the patient (eg, review of tests), Obtaining and/or reviewing separately obtained history, Documenting clinical information in the electronic or other health record, Independently interpreting results (not separately reported) and communicating results to the patient/family/caregiver, or Care coordination (not separately reported).         Each patient to whom he or she provides medical services by telemedicine is:  (1) informed of the relationship between the physician and patient and the respective role of any other health care provider with respect to management of the patient; and (2) notified that he or she may decline to receive medical services by telemedicine and may withdraw from such care at any time.    Notes:       PROBLEM LIST:  PAF  SSS  PPM  HFpEF  Coronary artery calcification on CT  HTN  HLD  CKD3  IFG  JOSÉ  ILD    HPI 6/17:She recently moved from [place] and presents today to establish care. 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 She has a history of atrial fibrillation and sick sinus syndrome. She had a [employer] pacemaker placed in 2009 and was diagnosed with atrial fibrillation in 2008. She has never required cardioversion. She has maintained sinus rhythm on Propafenone. A stress test in 2009 was normal per her report. Jesenia Curiel denies any chest pain, shortness of breath, PND, orthopnea, palpitations, leg edema, or syncope. She does not exercise on a regular basis. She just started taking Belvique and has lost 5 lbs.    Interval history:She has been diagnosed with ILD and undergoing  evaluation with Pulmonary. She has recently had some changes to her antihypertensive regimen by Nephrology. Her AM systolic BP's were running in the 90's. She was asymptomatic but there was concern of renal hypoperfusion. Her olmesartan and amlodipine doses were cut in half and bisoprolol was stopped. After the changes were made she noted two episodes of afib each lasting 4-5 hours. Her lasix was also increased to 40 mg by Pulmonary which did not improve her shortness of breath but helped slightly with LE edema. Her recent BNP was 70. She denies chest pain or orthopnea. Weight has been stable. BP at goal after recent changes.    ECG 29-JUN-2023 15:07:21: Personally reviewed by me shows atrial paced rhythm with prolonged AV conduction    DSE 3/23:  Summary    The stress echo portion of this study is negative for myocardial ischemia.  The ECG portion of this study is abnormal but not diagnostic for ischemia.  The patient reached the end of the protocol.  There were no arrhythmias during stress.  The left ventricle is normal in size with normal systolic function.  The estimated ejection fraction is 55%.  There is abnormal septal wall motion.  Normal left ventricular diastolic function.  The estimated PA systolic pressure is 37 mmHg.  Normal right ventricular size with normal right ventricular systolic function.  Normal central venous pressure (3 mmHg).    Device interrogation 7/23:  Conclusion    Additional Comments   REMOTE Interrogation Report   Dual Chamber PPM programmed DDDR 60   Presenting egram demonstrates AP/VS   Device fxn WNL   Autocapture algorithms are RA-Monitor/RV-On   RA pacing 99%, RV pacing 6.6%   Atrial arrhythmias: <1% burden, max 14 mins 40 secs   Anticoagulation status: None   Ventricular arrhythmias: None   Battery Status/Longevity: <3 months   F/U via remote interrogation monthly   Report prepared by Nina Cleaning RN    Echo 1/22    Summary    The left ventricle is normal in size with low  normal systolic function.  The estimated ejection fraction is 53%.  There are segmental left ventricular wall motion abnormalities.  There is abnormal septal wall motion.  Normal right ventricular size with normal right ventricular systolic function.  Normal left ventricular diastolic function.  Mild mitral regurgitation.  Normal central venous pressure (3 mmHg).  The estimated PA systolic pressure is 25 mmHg.  Trivial posterior pericardial effusion.          Past Medical History:   Diagnosis Date    Allergy     Arthritis     Atrial fibrillation 2017    Chronic diastolic heart failure 2023    CKD (chronic kidney disease) stage 3, GFR 30-59 ml/min 2017    Coronary artery calcification seen on CAT scan 2023    Disorder of kidney and ureter     GERD (gastroesophageal reflux disease)     Hyperlipidemia     Hypertension     Impaired fasting glucose 2021    Pre-diabetes 6/3/2017       Past Surgical History:   Procedure Laterality Date    ADENOIDECTOMY      BACK SURGERY      lamenectomy    BREAST SURGERY      BRONCHOSCOPY N/A 2023    Procedure: BRONCHOSCOPY;  Surgeon: Paul Diagnostic Provider;  [address];  Service: Anesthesiology;  Laterality: N/A;    CARDIAC SURGERY      st paolo pacemaker     CATARACT EXTRACTION W/  INTRAOCULAR LENS IMPLANT Right 6/3/2019    Procedure: EXTRACTION, CATARACT, WITH IOL INSERTION;  Surgeon: Raisa Moreno MD;  Location: [facility];  Service: Ophthalmology;  Laterality: Right;  Laser    CATARACT EXTRACTION W/  INTRAOCULAR LENS IMPLANT Left 2019    Procedure: EXTRACTION, CATARACT, WITH IOL INSERTION;  Surgeon: Raisa Moreno MD;  Location: [facility];  Service: Ophthalmology;  Laterality: Left;  LASER ASSISTED     SECTION      ,     COLONOSCOPY N/A 9/15/2023    Procedure: COLONOSCOPY;  Surgeon: Tao Gomez MD;  Location: [facility] ENDO (2ND FLR);  Service: Endoscopy;  Laterality: N/A;  inst to portal/[employer] pacemaker   2nd  floor for airway protection patient with lung disease elevated pulmonary artery pressure pacemaker and fecal occult blood positive stool,   cleared by pulmonary Dr Bhakta-see note on 7/11-GT    ENDOSCOPIC ULTRASOUND OF UPPER GASTROINTESTINAL TRACT N/A 9/19/2023    Procedure: ULTRASOUND, UPPER GI TRACT, ENDOSCOPIC;  Surgeon: Casimiro De Los Santos MD;  Location: [facility] ENDO;  Service: Endoscopy;  Laterality: N/A;  9/13/23: instructions send via portal. [employer] Paolo ppm- GD    EYE SURGERY      FRACTURE SURGERY      HYSTERECTOMY      INSERT / REPLACE / REMOVE PACEMAKER  2009    placement    INSERT / REPLACE / REMOVE PACEMAKER  09/22/2014    [employer] Paolo Model #[id number] replacement    pace maker  2009    replacement 2014    REVISION OF PROCEDURE INVOLVING PACEMAKER LEAD Left 1/13/2022    Procedure: REVISION, ELECTRODE LEAD, CARDIAC PACEMAKER;  Surgeon: Trell Hodgson MD;  Location: [facility] EP LAB;  Service: Cardiology;  Laterality: Left;  PPM lead Malfx, RA lead revision, SJM, MAC, GP, 3 PREP*dPPM SJM in situ**Contrast prep given*    salpingo opherectomy Bilateral 1998    SPINE SURGERY      TONSILLECTOMY  1953    TUBAL LIGATION         Social History     Socioeconomic History    Marital status:    Tobacco Use    Smoking status: Never    Smokeless tobacco: Never   Substance and Sexual Activity    Alcohol use: Yes     Alcohol/week: 5.0 standard drinks of alcohol     Types: 5 Glasses of wine per week    Drug use: No    Sexual activity: Not Currently     Partners: Male   Social History Narrative    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 [facility] [facility] [place] [facility] [place] [facility]     Social Determinants of Health     Financial Resource Strain: Low Risk  (9/26/2023)    Overall Financial Resource Strain (CARDIA)     Difficulty of Paying Living Expenses: Not hard at all   Food Insecurity: No Food Insecurity (9/26/2023)    Hunger Vital Sign     Worried About Running Out of Food in the Last Year: Never true     Ran Out of Food in the Last Year: Never  true   Transportation Needs: No Transportation Needs (9/26/2023)    PRAPARE - Transportation     Lack of Transportation (Medical): No     Lack of Transportation (Non-Medical): No   Physical Activity: Inactive (9/26/2023)    Exercise Vital Sign     Days of Exercise per Week: 0 days     Minutes of Exercise per Session: 0 min   Stress: No Stress Concern Present (9/26/2023)    [employer] - Occupational Stress Questionnaire     Feeling of Stress : Not at all   Social Connections: Unknown (9/26/2023)    Social Connection and Isolation Panel [NHANES]     Frequency of Communication with Friends and Family: More than three times a week     Frequency of Social Gatherings with Friends and Family: More than three times a week     Active Member of Clubs or Organizations: No     Attends Club or Organization Meetings: Never     Marital Status:    Housing Stability: Low Risk  (9/26/2023)    Housing Stability Vital Sign     Unable to Pay for Housing in the Last Year: No     Number of Places Lived in the Last Year: 0     Unstable Housing in the Last Year: No       Family History   Problem Relation Age of Onset    Hypertension Mother     Diabetes Mother     Hyperlipidemia Mother     Coronary artery disease Mother     Heart disease Mother     Stroke Mother     Hypertension Father     Diabetes Father     Hyperlipidemia Father     Coronary artery disease Father     Heart disease Father     Coronary artery disease Brother     Heart disease Brother     Hyperthyroidism Brother     Diabetes Paternal Grandmother     Diabetes Paternal Grandfather     Heart attack Neg Hx        Patient's Medications   New Prescriptions    ROSUVASTATIN (CRESTOR) 40 MG TAB    Take 1 tablet (40 mg total) by mouth every evening.   Previous Medications    ASPIRIN (ECOTRIN) 81 MG EC TABLET    Take 1 tablet (81 mg total) by mouth once daily.    CLOBETASOL (OLUX) 0.05 % FOAM    Apply 1 application topically every 30 days.     ERGOCALCIFEROL, VITAMIN D2, (VITAMIN D ORAL)    Take 2,000 Units by mouth once daily.    FUROSEMIDE (LASIX) 40 MG TABLET    TAKE 1 TABLET BY MOUTH EVERY DAY    NITROGLYCERIN (NITROSTAT) 0.4 MG SL TABLET    Place 1 tablet (0.4 mg total) under the tongue every 5 (five) minutes as needed for Chest pain.    POTASSIUM CITRATE (UROCIT-K) 10 MEQ (1,080 MG) TBSR    TAKE 1 TABLET BY MOUTH ONCE DAILY    PROPAFENONE (RYTHMOL SR) 225 MG CP12    Take 1 capsule (225 mg total) by mouth every 12 (twelve) hours.    TOPIRAMATE (TOPAMAX) 25 MG TABLET    Take 1 tablet twice daily.   Modified Medications    Modified Medication Previous Medication    AMLODIPINE (NORVASC) 5 MG TABLET amLODIPine (NORVASC) 10 MG tablet       Take 1 tablet (5 mg total) by mouth once daily.    Take 0.5 tablets (5 mg total) by mouth once daily.    OLMESARTAN (BENICAR) 20 MG TABLET olmesartan (BENICAR) 40 MG tablet       Take 1 tablet (20 mg total) by mouth once daily.    Take 1 tablet (40 mg total) by mouth once daily.   Discontinued Medications    ATORVASTATIN (LIPITOR) 80 MG TABLET    Take 1 tablet (80 mg total) by mouth once daily.    BISOPROLOL (ZEBETA) 5 MG TABLET    Take 1 tablet (5 mg total) by mouth once daily.       Review of Systems   Constitutional: Negative for malaise/fatigue and weight gain.   HENT:  Negative for hearing loss.    Eyes:  Negative for visual disturbance.   Cardiovascular:  Positive for dyspnea on exertion. Negative for chest pain, claudication, leg swelling, near-syncope, orthopnea, palpitations, paroxysmal nocturnal dyspnea and syncope.   Respiratory:  Positive for cough. Negative for shortness of breath, sleep disturbances due to breathing, snoring and wheezing.    Endocrine: Negative for cold intolerance, heat intolerance, polydipsia, polyphagia and polyuria.   Hematologic/Lymphatic: Negative for bleeding problem. Does not bruise/bleed easily.   Skin:  Negative for rash and suspicious lesions.   Musculoskeletal:  Negative for  arthritis, falls, joint pain, muscle weakness and myalgias.   Gastrointestinal:  Negative for abdominal pain, change in bowel habit, constipation, diarrhea, heartburn, hematochezia, melena and nausea.   Genitourinary:  Negative for hematuria and nocturia.   Neurological:  Negative for excessive daytime sleepiness, dizziness, headaches, light-headedness, loss of balance and weakness.   Psychiatric/Behavioral:  Negative for depression. The patient is not nervous/anxious.    Allergic/Immunologic: Negative for environmental allergies.       There were no vitals taken for this visit.    Objective:       Lab Results   Component Value Date     09/22/2023    K 3.6 09/22/2023     09/22/2023    CO2 23 09/22/2023    BUN 23 (H) 09/22/2023    CREATININE 1.47 (H) 09/22/2023     (H) 09/22/2023    HGBA1C 5.7 (H) 09/22/2023    MG 1.9 05/19/2021    AST 29 09/22/2023    ALT 27 09/22/2023    ALBUMIN 3.9 09/22/2023    PROT 7.1 09/22/2023    BILITOT 0.7 09/22/2023    WBC 4.34 09/22/2023    HGB 13.0 09/22/2023    HCT 39.3 09/22/2023    MCV 93 09/22/2023     09/22/2023    INR 1.0 01/07/2022    TSH 1.110 09/22/2023    CHOL 182 09/22/2023    HDL 42 09/22/2023    LDLCALC 104.8 09/22/2023    TRIG 176 (H) 09/22/2023    BNP 70 07/10/2023       Assessment:     1. Paroxysmal atrial fibrillation :  Her CHADS2 Vasc score is 3. She is on propafenone for rhythm control.  She follows with Dr. Trell Hodgson. Given two recent episodes of afib, I would recommend restarting full anticoagulation as she has not had any recent falls. She would like to hold off until she has follow up with Dr. JOSE Hodgson. Since bisoprolol was stopped would recommend starting low dose metoprolol while on Propafenone. Discussed with Dr. JOSE Hodgson.   2. Sick sinus syndrome :s/p ppm   3. S/P placement of cardiac pacemaker :  Two recent episodes of AF.   4. Benign essential hypertension :BP at goal after recent changes.    5. Mixed hyperlipidemia : LDL > 100 on  atorvastatin 80 mg. Coronary calcification noted on recent chest CT. Discussed adding zetia, however she declined at this time. Will change atorvastatin to rosuvastatin 40 gm daily and repeat labs in 3 months.   6. Stage 3a chronic kidney disease : Follows with Nephrology.   7. Impaired fasting glucose : Recommend limiting sugar, alcohol, and simple carbohydrates in the diet.   8.     ILD: Following with pulmonary.  9.     Aortic atherosclerosis: Continue statin therapy.  10.  HFpEF: Recent BNP normal. Will discuss SGLT-2 inhibitor at next visit.  11.  JOSÉ: Not yet on cpap.    Plan:     Diagnoses and all orders for this visit:    Paroxysmal atrial fibrillation    Sick sinus syndrome    S/P placement of cardiac pacemaker    Benign essential hypertension  -     olmesartan (BENICAR) 20 MG tablet; Take 1 tablet (20 mg total) by mouth once daily.  -     amLODIPine (NORVASC) 5 MG tablet; Take 1 tablet (5 mg total) by mouth once daily.    Mixed hyperlipidemia  -     rosuvastatin (CRESTOR) 40 MG Tab; Take 1 tablet (40 mg total) by mouth every evening.  -     Lipid Panel; Future  -     Comprehensive Metabolic Panel; Future    Stage 3a chronic kidney disease    Impaired fasting glucose    Obstructive sleep apnea    Interstitial lung disease    Aortic atherosclerosis    Coronary artery calcification seen on CAT scan    Morbid obesity    Chronic diastolic heart failure        Thank you for allowing me to participate in this patient's care. Please do not hesitate to contact me with any questions or concerns.

## 2023-09-30 LAB — ENA SCL70 AB SER-ACNC: <0.6 U/ML

## 2023-10-02 DIAGNOSIS — J84.9 INTERSTITIAL LUNG DISEASE: Primary | ICD-10-CM

## 2023-10-03 ENCOUNTER — TELEPHONE (OUTPATIENT)
Dept: CARDIOLOGY | Facility: CLINIC | Age: 73
End: 2023-10-03
Payer: MEDICARE

## 2023-10-03 DIAGNOSIS — I10 BENIGN ESSENTIAL HYPERTENSION: ICD-10-CM

## 2023-10-03 DIAGNOSIS — J84.9 INTERSTITIAL LUNG DISEASE: Primary | ICD-10-CM

## 2023-10-03 RX ORDER — AMLODIPINE BESYLATE 5 MG/1
5 TABLET ORAL DAILY
Qty: 90 TABLET | Refills: 3
Start: 2023-10-03 | End: 2024-02-21 | Stop reason: ALTCHOICE

## 2023-10-04 ENCOUNTER — TELEPHONE (OUTPATIENT)
Dept: ELECTROPHYSIOLOGY | Facility: CLINIC | Age: 73
End: 2023-10-04
Payer: MEDICARE

## 2023-10-04 NOTE — TELEPHONE ENCOUNTER
Returned the pt's call on this afternoon and informed her that in the [employer] remote web site auto transmissions are scheduled every 31 days with the next tx scheduled on 10/24/23.  Understanding was verbalized.  Pt appreciated the call.

## 2023-10-04 NOTE — TELEPHONE ENCOUNTER
----- Message from Craig Horton sent at 10/4/2023  2:36 PM CDT -----  Regarding: Monthly  Pt called let staff know that her appts are suppose to be monthly, she only sees December appt. She didn't see any appts for October or November.     Contact @ [phone]    Thanks

## 2023-10-05 ENCOUNTER — PATIENT MESSAGE (OUTPATIENT)
Dept: PULMONOLOGY | Facility: CLINIC | Age: 73
End: 2023-10-05
Payer: MEDICARE

## 2023-10-05 LAB — PM1 AB SER QL: <20 UNITS

## 2023-10-09 ENCOUNTER — HOSPITAL ENCOUNTER (OUTPATIENT)
Dept: PULMONOLOGY | Facility: CLINIC | Age: 73
Discharge: HOME | End: 2023-10-09
Payer: MEDICARE

## 2023-10-09 ENCOUNTER — HOSPITAL ENCOUNTER (OUTPATIENT)
Dept: RADIOLOGY | Facility: HOSPITAL | Age: 73
Discharge: HOME | End: 2023-10-09
Attending: PHYSICIAN ASSISTANT
Payer: MEDICARE

## 2023-10-09 ENCOUNTER — OFFICE VISIT (OUTPATIENT)
Dept: CARDIOTHORACIC SURGERY | Facility: CLINIC | Age: 73
End: 2023-10-09
Payer: MEDICARE

## 2023-10-09 VITALS
HEIGHT: 61 IN | SYSTOLIC BLOOD PRESSURE: 130 MMHG | HEART RATE: 79 BPM | WEIGHT: 197.56 LBS | OXYGEN SATURATION: 97 % | BODY MASS INDEX: 37.3 KG/M2 | DIASTOLIC BLOOD PRESSURE: 75 MMHG

## 2023-10-09 DIAGNOSIS — J84.9 INTERSTITIAL LUNG DISEASE: ICD-10-CM

## 2023-10-09 DIAGNOSIS — D68.9 COAGULOPATHY: Primary | ICD-10-CM

## 2023-10-09 PROCEDURE — 94727 PR PULM FUNCTION TEST BY GAS: ICD-10-PCS | Mod: 26,S$PBB,, | Performed by: INTERNAL MEDICINE

## 2023-10-09 PROCEDURE — 99204 OFFICE O/P NEW MOD 45 MIN: CPT | Mod: S$PBB,,, | Performed by: STUDENT IN AN ORGANIZED HEALTH CARE EDUCATION/TRAINING PROGRAM

## 2023-10-09 PROCEDURE — 94729 PR C02/MEMBANE DIFFUSE CAPACITY: ICD-10-PCS | Mod: 26,S$PBB,, | Performed by: INTERNAL MEDICINE

## 2023-10-09 PROCEDURE — 94060 EVALUATION OF WHEEZING: CPT | Mod: PBBFAC | Performed by: INTERNAL MEDICINE

## 2023-10-09 PROCEDURE — 94727 GAS DIL/WSHOT DETER LNG VOL: CPT | Mod: PBBFAC | Performed by: INTERNAL MEDICINE

## 2023-10-09 PROCEDURE — 94729 DIFFUSING CAPACITY: CPT | Mod: 26,S$PBB,, | Performed by: INTERNAL MEDICINE

## 2023-10-09 PROCEDURE — 94060 EVALUATION OF WHEEZING: CPT | Mod: 26,S$PBB,, | Performed by: INTERNAL MEDICINE

## 2023-10-09 PROCEDURE — 99999 PR PBB SHADOW E&M-EST. PATIENT-LVL V: ICD-10-PCS | Mod: PBBFAC,,, | Performed by: STUDENT IN AN ORGANIZED HEALTH CARE EDUCATION/TRAINING PROGRAM

## 2023-10-09 PROCEDURE — 99204 PR OFFICE/OUTPT VISIT, NEW, LEVL IV, 45-59 MIN: ICD-10-PCS | Mod: S$PBB,,, | Performed by: STUDENT IN AN ORGANIZED HEALTH CARE EDUCATION/TRAINING PROGRAM

## 2023-10-09 PROCEDURE — 71250 CT THORAX DX C-: CPT | Mod: 26,,, | Performed by: RADIOLOGY

## 2023-10-09 PROCEDURE — 99999 PR PBB SHADOW E&M-EST. PATIENT-LVL V: CPT | Mod: PBBFAC,,, | Performed by: STUDENT IN AN ORGANIZED HEALTH CARE EDUCATION/TRAINING PROGRAM

## 2023-10-09 PROCEDURE — 94060 PR EVAL OF BRONCHOSPASM: ICD-10-PCS | Mod: 26,S$PBB,, | Performed by: INTERNAL MEDICINE

## 2023-10-09 PROCEDURE — 71250 CT THORAX DX C-: CPT | Mod: TC

## 2023-10-09 PROCEDURE — 94727 GAS DIL/WSHOT DETER LNG VOL: CPT | Mod: 26,S$PBB,, | Performed by: INTERNAL MEDICINE

## 2023-10-09 PROCEDURE — 94729 DIFFUSING CAPACITY: CPT | Mod: PBBFAC | Performed by: INTERNAL MEDICINE

## 2023-10-09 PROCEDURE — 99215 OFFICE O/P EST HI 40 MIN: CPT | Mod: PBBFAC,25 | Performed by: STUDENT IN AN ORGANIZED HEALTH CARE EDUCATION/TRAINING PROGRAM

## 2023-10-09 PROCEDURE — 71250 CT CHEST WITHOUT CONTRAST: ICD-10-PCS | Mod: 26,,, | Performed by: RADIOLOGY

## 2023-10-09 NOTE — H&P (VIEW-ONLY)
History & Physical    SUBJECTIVE:     History of Present Illness:  72 y.o. female never smoker (BMI: 37.2) with CKD3, AFib and sick sinus syndrome, HTN, hyperlipidemia, and JOSÉ not on CPAP who presents to clinic for evaluation and consideration of surgical biopsy for ILD. Patient followed by Dr. Bhakta, Pulmonology, who has performed extensive workup. Underwent bronchoscopy 23; path: left lower lobe - lung alveolated tissue with mild fibrosis and focally dense lymphoid inflammatory infiltrates. No infectious organisms identified. Negative for dysplasia or malignancy. Today patient reports NAVA. States that she becomes SOB with any sort of exercise including walking 1 flight of stairs or short distances on a flat surface. Denies CP, cough, palpitations, dizziness, syncope.      Occupation: retired gastroenterologist  PSH: pacemaker placement x2 (, ), revision of pacemaker lead (), hysterectomy and b/l salpingo-oopherectomy (), h/o spine surgery (lamenectomy) ???, b/l cataract extraction w/ IOL,  x2, colonoscopy, EUS (), Bronchoscopy ()  Meds: ASA 81, Xarelto    Chief Complaint   Patient presents with    Consult       Review of patient's allergies indicates:   Allergen Reactions    Iodinated contrast media Anaphylaxis    Penicillins Anaphylaxis    Allopurinol analogues      Muscle cramps    Ciprofloxacin Rash    Quinolones Rash    Sulfa (sulfonamide antibiotics) Rash    Tetracyclines Rash       Current Outpatient Medications   Medication Sig Dispense Refill    amLODIPine (NORVASC) 5 MG tablet Take 1 tablet (5 mg total) by mouth once daily. 90 tablet 3    aspirin (ECOTRIN) 81 MG EC tablet Take 1 tablet (81 mg total) by mouth once daily.      clobetasoL (OLUX) 0.05 % Foam Apply 1 application topically every 30 days. 100 g 3    ERGOCALCIFEROL, VITAMIN D2, (VITAMIN D ORAL) Take 2,000 Units by mouth once daily.      furosemide (LASIX) 40 MG tablet TAKE 1 TABLET BY MOUTH EVERY DAY 90  tablet 1    metoprolol succinate (TOPROL-XL) 25 MG 24 hr tablet Take 0.5 tablets (12.5 mg total) by mouth once daily. 45 tablet 3    nitroGLYCERIN (NITROSTAT) 0.4 MG SL tablet Place 1 tablet (0.4 mg total) under the tongue every 5 (five) minutes as needed for Chest pain. 20 tablet 3    olmesartan (BENICAR) 20 MG tablet Take 1 tablet (20 mg total) by mouth once daily. 90 tablet 3    potassium citrate (UROCIT-K) 10 mEq (1,080 mg) TbSR TAKE 1 TABLET BY MOUTH ONCE DAILY 90 tablet 3    propafenone (RYTHMOL SR) 225 MG Cp12 Take 1 capsule (225 mg total) by mouth every 12 (twelve) hours. 180 capsule 3    rivaroxaban (XARELTO) 15 mg Tab Take 1 tablet (15 mg total) by mouth daily with dinner or evening meal. 90 tablet 3    rosuvastatin (CRESTOR) 40 MG Tab Take 1 tablet (40 mg total) by mouth every evening. 90 tablet 3    topiramate (TOPAMAX) 25 MG tablet Take 1 tablet twice daily. 180 tablet 3     No current facility-administered medications for this visit.       Past Medical History:   Diagnosis Date    Allergy     Arthritis     Atrial fibrillation 5/29/2017    Chronic diastolic heart failure 9/29/2023    CKD (chronic kidney disease) stage 3, GFR 30-59 ml/min 6/26/2017    Coronary artery calcification seen on CAT scan 9/29/2023    Disorder of kidney and ureter     GERD (gastroesophageal reflux disease)     Hyperlipidemia     Hypertension     Impaired fasting glucose 11/19/2021    Pre-diabetes 6/3/2017     Past Surgical History:   Procedure Laterality Date    ADENOIDECTOMY      BACK SURGERY  2000    lamenectomy    BREAST SURGERY      BRONCHOSCOPY N/A 7/13/2023    Procedure: BRONCHOSCOPY;  Surgeon: Paul Diagnostic Provider;  Location: [facility] OR [address] [place] [address];  Service: Anesthesiology;  Laterality: N/A;    CARDIAC SURGERY      st loida pacemaker     CATARACT EXTRACTION W/  INTRAOCULAR LENS IMPLANT Right 6/3/2019    Procedure: EXTRACTION, CATARACT, WITH IOL INSERTION;  Surgeon: Raisa Moreno MD;  Location: [facility] OR;  Service:  Ophthalmology;  Laterality: Right;  Laser    CATARACT EXTRACTION W/  INTRAOCULAR LENS IMPLANT Left 2019    Procedure: EXTRACTION, CATARACT, WITH IOL INSERTION;  Surgeon: Raisa Moreno MD;  Location: [facility] OR;  Service: Ophthalmology;  Laterality: Left;  LASER ASSISTED     SECTION      ,     COLONOSCOPY N/A 9/15/2023    Procedure: COLONOSCOPY;  Surgeon: Tao Gomez MD;  Location: [facility] ENDO (2ND FLR);  Service: Endoscopy;  Laterality: N/A;  inst to portal/[employer] pacemaker   2nd floor for airway protection patient with lung disease elevated pulmonary artery pressure pacemaker and fecal occult blood positive stool,   cleared by pulmonary Dr Yony-see note on -GT    ENDOSCOPIC ULTRASOUND OF UPPER GASTROINTESTINAL TRACT N/A 2023    Procedure: ULTRASOUND, UPPER GI TRACT, ENDOSCOPIC;  Surgeon: Casimiro De Los Santos MD;  Location: [facility] ENDO;  Service: Endoscopy;  Laterality: N/A;  23: instructions send via portal. [employer] ppm- GD    EYE SURGERY      FRACTURE SURGERY      HYSTERECTOMY      INSERT / REPLACE / REMOVE PACEMAKER      placement    INSERT / REPLACE / REMOVE PACEMAKER  2014    [employer] Model #[id number] replacement    pace maker      replacement     REVISION OF PROCEDURE INVOLVING PACEMAKER LEAD Left 2022    Procedure: REVISION, ELECTRODE LEAD, CARDIAC PACEMAKER;  Surgeon: Trell Hodgson MD;  Location: [facility] EP LAB;  Service: Cardiology;  Laterality: Left;  PPM lead Malfx, RA lead revision, SJM, MAC, GP, 3 PREP*dPPM SJM in situ**Contrast prep given*    salpingo opherectomy Bilateral     SPINE SURGERY      TONSILLECTOMY      TUBAL LIGATION       Family History   Problem Relation Age of Onset    Hypertension Mother     Diabetes Mother     Hyperlipidemia Mother     Coronary artery disease Mother     Heart disease Mother     Stroke Mother     Hypertension Father     Diabetes Father     Hyperlipidemia Father     Coronary artery disease Father     Heart  "disease Father     Coronary artery disease Brother     Heart disease Brother     Hyperthyroidism Brother     Diabetes Paternal Grandmother     Diabetes Paternal Grandfather     Heart attack Neg Hx      Social History     Tobacco Use    Smoking status: Never    Smokeless tobacco: Never   Substance Use Topics    Alcohol use: Yes     Alcohol/week: 5.0 standard drinks of alcohol     Types: 5 Glasses of wine per week    Drug use: No        Review of Systems:  Review of Systems   Constitutional:  Negative for chills, diaphoresis, fatigue, fever and unexpected weight change.   Respiratory:  Positive for shortness of breath (See HPI). Negative for cough, choking, wheezing and stridor.    Cardiovascular: Negative.  Negative for chest pain, palpitations and leg swelling.   Neurological: Negative.  Negative for dizziness, syncope, weakness and light-headedness.   Hematological: Negative.    Psychiatric/Behavioral: Negative.         OBJECTIVE:     Vital Signs (Most Recent)  Pulse: 79 (10/09/23 1138)  BP: 130/75 (10/09/23 1138)  SpO2: 97 % (10/09/23 1138)  5' 1" (1.549 m)  89.6 kg (197 lb 8.5 oz)     Physical Exam:  Physical Exam  Constitutional:       Appearance: Normal appearance. She is obese.   Eyes:      Extraocular Movements: Extraocular movements intact.   Cardiovascular:      Rate and Rhythm: Normal rate and regular rhythm.      Pulses: Normal pulses.   Pulmonary:      Effort: Pulmonary effort is normal.      Breath sounds: Normal breath sounds.   Abdominal:      General: Abdomen is flat.      Palpations: Abdomen is soft.   Skin:     General: Skin is warm and dry.   Neurological:      General: No focal deficit present.      Mental Status: She is alert and oriented to person, place, and time. Mental status is at baseline.   Psychiatric:         Mood and Affect: Mood normal.         Behavior: Behavior normal.         Thought Content: Thought content normal.         Diagnostic Results:    6MWT 03/31/23:       PFTs 03/31/23: " FEV1 1.37 81.9% DLCO 11.49 60.5%    Chest CT 06/16/23:   Airways: Trachea and bronchi are patent.  Lungs/Pleura: Mild biapical fibronodular scarring.  Interval increase in right upper lobe ground glass opacities (4-93) with adjacent 4 mm solid pulmonary nodule (4-118).  Peripheral and basilar predominant ground-glass opacities with subpleural reticulation and interlobular septal thickening.  Mild traction bronchiectasis.  Increased mosaic attenuation of the bilateral lung bases.  Multiple areas of air trapping on expiratory views.  Additional scattered pulmonary micro nodules, largest measuring 3 mm within the right lower lobe (4-219), unchanged.  No new pulmonary nodules.  No large focal consolidation.  No pleural effusion or pleural thickening.    Chest CT 10/09/23: awaiting final report. Images personally reviewed.     PFTs 10/09/23: FEV1 75% DLCO 51%    Stress ECHO 03/08/23:   The stress echo portion of this study is negative for myocardial ischemia.  The ECG portion of this study is abnormal but not diagnostic for ischemia.  The patient reached the end of the protocol.  There were no arrhythmias during stress.  The left ventricle is normal in size with normal systolic function.  The estimated ejection fraction is 55%.  There is abnormal septal wall motion.  Normal left ventricular diastolic function.  The estimated PA systolic pressure is 37 mmHg.  Normal right ventricular size with normal right ventricular systolic function.  Normal central venous pressure (3 mmHg).       ASSESSMENT/PLAN:     72 y.o. female never smoker (BMI: 37.2) with CKD, AFib and sick sinus syndrome, HTN, hyperlipidemia, and JOSÉ not on CPAP who presents to clinic for evaluation and consideration of surgical biopsy for ILD.  We discussed that although the surgery is straight forward and not technically difficult her severely diminished PFTs do place her at an increased risk for morbidity/mortality, especially pulmonary complications.  Nonetheless we discussed that obtaining more tissue for analysis will allow her pulmonologist to confidently initiate appropriate therapy that may improve her QOL    PLAN:Plan     Discussed feroz-operative course of treatment as well as risks and benefits of surgery.   Scheduled for R VATS wedge biopsy  Obtain consents day of surgery.   Order pre-operative labs including: aPTT, INR, Pre-albumin

## 2023-10-09 NOTE — PROGRESS NOTES
History & Physical    SUBJECTIVE:     History of Present Illness:  72 y.o. female never smoker (BMI: 37.2) with CKD3, AFib and sick sinus syndrome, HTN, hyperlipidemia, and JOSÉ not on CPAP who presents to clinic for evaluation and consideration of surgical biopsy for ILD. Patient followed by Dr. Bhakta, Pulmonology, who has performed extensive workup. Underwent bronchoscopy 23; path: left lower lobe - lung alveolated tissue with mild fibrosis and focally dense lymphoid inflammatory infiltrates. No infectious organisms identified. Negative for dysplasia or malignancy. Today patient reports NAVA. States that she becomes SOB with any sort of exercise including walking 1 flight of stairs or short distances on a flat surface. Denies CP, cough, palpitations, dizziness, syncope.      Occupation: retired gastroenterologist  PSH: pacemaker placement x2 (, ), revision of pacemaker lead (), hysterectomy and b/l salpingo-oopherectomy (), h/o spine surgery (lamenectomy) ???, b/l cataract extraction w/ IOL,  x2, colonoscopy, EUS (), Bronchoscopy ()  Meds: ASA 81, Xarelto    Chief Complaint   Patient presents with    Consult       Review of patient's allergies indicates:   Allergen Reactions    Iodinated contrast media Anaphylaxis    Penicillins Anaphylaxis    Allopurinol analogues      Muscle cramps    Ciprofloxacin Rash    Quinolones Rash    Sulfa (sulfonamide antibiotics) Rash    Tetracyclines Rash       Current Outpatient Medications   Medication Sig Dispense Refill    amLODIPine (NORVASC) 5 MG tablet Take 1 tablet (5 mg total) by mouth once daily. 90 tablet 3    aspirin (ECOTRIN) 81 MG EC tablet Take 1 tablet (81 mg total) by mouth once daily.      clobetasoL (OLUX) 0.05 % Foam Apply 1 application topically every 30 days. 100 g 3    ERGOCALCIFEROL, VITAMIN D2, (VITAMIN D ORAL) Take 2,000 Units by mouth once daily.      furosemide (LASIX) 40 MG tablet TAKE 1 TABLET BY MOUTH EVERY DAY 90  tablet 1    metoprolol succinate (TOPROL-XL) 25 MG 24 hr tablet Take 0.5 tablets (12.5 mg total) by mouth once daily. 45 tablet 3    nitroGLYCERIN (NITROSTAT) 0.4 MG SL tablet Place 1 tablet (0.4 mg total) under the tongue every 5 (five) minutes as needed for Chest pain. 20 tablet 3    olmesartan (BENICAR) 20 MG tablet Take 1 tablet (20 mg total) by mouth once daily. 90 tablet 3    potassium citrate (UROCIT-K) 10 mEq (1,080 mg) TbSR TAKE 1 TABLET BY MOUTH ONCE DAILY 90 tablet 3    propafenone (RYTHMOL SR) 225 MG Cp12 Take 1 capsule (225 mg total) by mouth every 12 (twelve) hours. 180 capsule 3    rivaroxaban (XARELTO) 15 mg Tab Take 1 tablet (15 mg total) by mouth daily with dinner or evening meal. 90 tablet 3    rosuvastatin (CRESTOR) 40 MG Tab Take 1 tablet (40 mg total) by mouth every evening. 90 tablet 3    topiramate (TOPAMAX) 25 MG tablet Take 1 tablet twice daily. 180 tablet 3     No current facility-administered medications for this visit.       Past Medical History:   Diagnosis Date    Allergy     Arthritis     Atrial fibrillation 5/29/2017    Chronic diastolic heart failure 9/29/2023    CKD (chronic kidney disease) stage 3, GFR 30-59 ml/min 6/26/2017    Coronary artery calcification seen on CAT scan 9/29/2023    Disorder of kidney and ureter     GERD (gastroesophageal reflux disease)     Hyperlipidemia     Hypertension     Impaired fasting glucose 11/19/2021    Pre-diabetes 6/3/2017     Past Surgical History:   Procedure Laterality Date    ADENOIDECTOMY      BACK SURGERY  2000    lamenectomy    BREAST SURGERY      BRONCHOSCOPY N/A 7/13/2023    Procedure: BRONCHOSCOPY;  Surgeon: Paul Diagnostic Provider;  Location: [facility] OR [address] [place] [address];  Service: Anesthesiology;  Laterality: N/A;    CARDIAC SURGERY      st loida pacemaker     CATARACT EXTRACTION W/  INTRAOCULAR LENS IMPLANT Right 6/3/2019    Procedure: EXTRACTION, CATARACT, WITH IOL INSERTION;  Surgeon: Raisa Moreno MD;  Location: [facility] OR;  Service:  Ophthalmology;  Laterality: Right;  Laser    CATARACT EXTRACTION W/  INTRAOCULAR LENS IMPLANT Left 2019    Procedure: EXTRACTION, CATARACT, WITH IOL INSERTION;  Surgeon: Raisa Moreno MD;  Location: [facility] [place] OR;  Service: Ophthalmology;  Laterality: Left;  LASER ASSISTED     SECTION      ,     COLONOSCOPY N/A 9/15/2023    Procedure: COLONOSCOPY;  Surgeon: Tao Gomez MD;  Location: [facility] ENDO (2ND FLR);  Service: Endoscopy;  Laterality: N/A;  inst to portal/[employer] pacemaker   2nd floor for airway protection patient with lung disease elevated pulmonary artery pressure pacemaker and fecal occult blood positive stool,   cleared by pulmonary Dr Yony-see note on -GT    ENDOSCOPIC ULTRASOUND OF UPPER GASTROINTESTINAL TRACT N/A 2023    Procedure: ULTRASOUND, UPPER GI TRACT, ENDOSCOPIC;  Surgeon: Casimiro De Los Santos MD;  Location: [facility] ENDO;  Service: Endoscopy;  Laterality: N/A;  23: instructions send via portal. [employer] ppm- GD    EYE SURGERY      FRACTURE SURGERY      HYSTERECTOMY      INSERT / REPLACE / REMOVE PACEMAKER      placement    INSERT / REPLACE / REMOVE PACEMAKER  2014    [employer] Model #[id number] replacement    pace maker      replacement     REVISION OF PROCEDURE INVOLVING PACEMAKER LEAD Left 2022    Procedure: REVISION, ELECTRODE LEAD, CARDIAC PACEMAKER;  Surgeon: Trell Hodgson MD;  Location: [facility] EP LAB;  Service: Cardiology;  Laterality: Left;  PPM lead Malfx, RA lead revision, SJM, MAC, GP, 3 PREP*dPPM SJM in situ**Contrast prep given*    salpingo opherectomy Bilateral     SPINE SURGERY      TONSILLECTOMY      TUBAL LIGATION       Family History   Problem Relation Age of Onset    Hypertension Mother     Diabetes Mother     Hyperlipidemia Mother     Coronary artery disease Mother     Heart disease Mother     Stroke Mother     Hypertension Father     Diabetes Father     Hyperlipidemia Father     Coronary artery disease Father     Heart  "disease Father     Coronary artery disease Brother     Heart disease Brother     Hyperthyroidism Brother     Diabetes Paternal Grandmother     Diabetes Paternal Grandfather     Heart attack Neg Hx      Social History     Tobacco Use    Smoking status: Never    Smokeless tobacco: Never   Substance Use Topics    Alcohol use: Yes     Alcohol/week: 5.0 standard drinks of alcohol     Types: 5 Glasses of wine per week    Drug use: No        Review of Systems:  Review of Systems   Constitutional:  Negative for chills, diaphoresis, fatigue, fever and unexpected weight change.   Respiratory:  Positive for shortness of breath (See HPI). Negative for cough, choking, wheezing and stridor.    Cardiovascular: Negative.  Negative for chest pain, palpitations and leg swelling.   Neurological: Negative.  Negative for dizziness, syncope, weakness and light-headedness.   Hematological: Negative.    Psychiatric/Behavioral: Negative.         OBJECTIVE:     Vital Signs (Most Recent)  Pulse: 79 (10/09/23 1138)  BP: 130/75 (10/09/23 1138)  SpO2: 97 % (10/09/23 1138)  5' 1" (1.549 m)  89.6 kg (197 lb 8.5 oz)     Physical Exam:  Physical Exam  Constitutional:       Appearance: Normal appearance. She is obese.   Eyes:      Extraocular Movements: Extraocular movements intact.   Cardiovascular:      Rate and Rhythm: Normal rate and regular rhythm.      Pulses: Normal pulses.   Pulmonary:      Effort: Pulmonary effort is normal.      Breath sounds: Normal breath sounds.   Abdominal:      General: Abdomen is flat.      Palpations: Abdomen is soft.   Skin:     General: Skin is warm and dry.   Neurological:      General: No focal deficit present.      Mental Status: She is alert and oriented to person, place, and time. Mental status is at baseline.   Psychiatric:         Mood and Affect: Mood normal.         Behavior: Behavior normal.         Thought Content: Thought content normal.         Diagnostic Results:    6MWT 03/31/23:       PFTs 03/31/23: " FEV1 1.37 81.9% DLCO 11.49 60.5%    Chest CT 06/16/23:   Airways: Trachea and bronchi are patent.  Lungs/Pleura: Mild biapical fibronodular scarring.  Interval increase in right upper lobe ground glass opacities (4-93) with adjacent 4 mm solid pulmonary nodule (4-118).  Peripheral and basilar predominant ground-glass opacities with subpleural reticulation and interlobular septal thickening.  Mild traction bronchiectasis.  Increased mosaic attenuation of the bilateral lung bases.  Multiple areas of air trapping on expiratory views.  Additional scattered pulmonary micro nodules, largest measuring 3 mm within the right lower lobe (4-219), unchanged.  No new pulmonary nodules.  No large focal consolidation.  No pleural effusion or pleural thickening.    Chest CT 10/09/23: awaiting final report. Images personally reviewed.     PFTs 10/09/23: FEV1 75% DLCO 51%    Stress ECHO 03/08/23:   The stress echo portion of this study is negative for myocardial ischemia.  The ECG portion of this study is abnormal but not diagnostic for ischemia.  The patient reached the end of the protocol.  There were no arrhythmias during stress.  The left ventricle is normal in size with normal systolic function.  The estimated ejection fraction is 55%.  There is abnormal septal wall motion.  Normal left ventricular diastolic function.  The estimated PA systolic pressure is 37 mmHg.  Normal right ventricular size with normal right ventricular systolic function.  Normal central venous pressure (3 mmHg).       ASSESSMENT/PLAN:     72 y.o. female never smoker (BMI: 37.2) with CKD, AFib and sick sinus syndrome, HTN, hyperlipidemia, and JOSÉ not on CPAP who presents to clinic for evaluation and consideration of surgical biopsy for ILD.  We discussed that although the surgery is straight forward and not technically difficult her severely diminished PFTs do place her at an increased risk for morbidity/mortality, especially pulmonary complications.  Nonetheless we discussed that obtaining more tissue for analysis will allow her pulmonologist to confidently initiate appropriate therapy that may improve her QOL    PLAN:Plan     Discussed feroz-operative course of treatment as well as risks and benefits of surgery.   Scheduled for R VATS wedge biopsy  Obtain consents day of surgery.   Order pre-operative labs including: aPTT, INR, Pre-albumin

## 2023-10-19 ENCOUNTER — TELEPHONE (OUTPATIENT)
Dept: CARDIOTHORACIC SURGERY | Facility: CLINIC | Age: 73
End: 2023-10-19
Payer: MEDICARE

## 2023-10-19 ENCOUNTER — DOCUMENTATION ONLY (OUTPATIENT)
Dept: ELECTROPHYSIOLOGY | Facility: CLINIC | Age: 73
End: 2023-10-19
Payer: MEDICARE

## 2023-10-19 ENCOUNTER — PATIENT MESSAGE (OUTPATIENT)
Dept: GASTROENTEROLOGY | Facility: CLINIC | Age: 73
End: 2023-10-19
Payer: MEDICARE

## 2023-10-19 ENCOUNTER — ANESTHESIA EVENT (OUTPATIENT)
Dept: SURGERY | Facility: HOSPITAL | Age: 73
DRG: 167 | End: 2023-10-19
Payer: MEDICARE

## 2023-10-19 DIAGNOSIS — J84.9 INTERSTITIAL LUNG DISEASE: Primary | ICD-10-CM

## 2023-10-19 NOTE — PROGRESS NOTES
Jesenia your colonoscopy pathology was benign no dysplasia.     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

## 2023-10-19 NOTE — PROGRESS NOTES
Pt has a [employer] Pacemaker.    Pt is going to Surgery for: R Vats     Underlying rhythm: Severe Sinus Bradycardia at last device check    Will need to reprogram Mode to DOO prior to surgery.        Please call [phone] or Cardiology Fellow On Call after hours to have device reprogramming  before surgery.

## 2023-10-19 NOTE — ANESTHESIA PREPROCEDURE EVALUATION
[facility]  Anesthesia Pre-Operative Evaluation        Patient Name: Jesenia Curiel  YOB: 1950  MRN: [id number]    SUBJECTIVE:     Pre-operative Evaluation for Procedure(s) (LRB):  VATS WEDGE (Right)     10/19/2023    Jesenia Curiel is a 73 y.o. female with a PMHx significant for HTN, SSS s/p PPM, CKD, Afib, GERD, JOSÉ, ILD (never smoker) presenting for surgical biopsy for ILD. Noted in Dr. Tiwari's note that patient experiences SOB with any exercise including walking 1 flight of stairs or short distances on flat surfaces.    PFTs 10/09/23: FEV1 75% DLCO 51%    The patient now presents for the above procedure(s).    Previous Airway: None documented.    Patient Active Problem List   Diagnosis    Atrial fibrillation    Benign essential hypertension    Hyperlipidemia    Vitamin D deficiency    Sick sinus syndrome    S/P placement of cardiac pacemaker    Osteopenia of left thigh    CKD (chronic kidney disease) stage 3, GFR 30-59 ml/min    Morbid obesity    Hypercalcemia    Nuclear sclerosis, bilateral    Nuclear sclerotic cataract of left eye    Paraproteinemia    Impaired fasting glucose    NAVA (dyspnea on exertion)    Tortuous aorta    Hypokalemia    Obstructive sleep apnea    Aortic atherosclerosis    Calcified granuloma of lung    Interstitial lung disease    Abnormal CAT scan    Adrenal incidentaloma    Primary hyperparathyroidism    Osteoporosis    Coronary artery calcification seen on CAT scan    Chronic diastolic heart failure       Past Medical History:   Diagnosis Date    Allergy     Arthritis     Atrial fibrillation 5/29/2017    Chronic diastolic heart failure 9/29/2023    CKD (chronic kidney disease) stage 3, GFR 30-59 ml/min 6/26/2017    Coronary artery calcification seen on CAT scan 9/29/2023    Disorder of kidney and ureter     GERD (gastroesophageal reflux disease)     Hyperlipidemia     Hypertension     Impaired fasting  glucose 11/19/2021    Pre-diabetes 6/3/2017       Review of patient's allergies indicates:   Allergen Reactions    Iodinated contrast media Anaphylaxis    Penicillins Anaphylaxis    Allopurinol analogues      Muscle cramps    Ciprofloxacin Rash    Quinolones Rash    Sulfa (sulfonamide antibiotics) Rash    Tetracyclines Rash       Current Outpatient Medications   Medication Instructions    amLODIPine (NORVASC) 5 mg, Oral, Daily    aspirin (ECOTRIN) 81 mg, Oral, Daily    clobetasoL (OLUX) 0.05 % Foam 1 application , Topical (Top), Every 30 days    ERGOCALCIFEROL, VITAMIN D2, (VITAMIN D ORAL) 2,000 Units, Oral, Daily    furosemide (LASIX) 40 mg, Oral    metoprolol succinate (TOPROL-XL) 12.5 mg, Oral, Daily    nitroGLYCERIN (NITROSTAT) 0.4 mg, Sublingual, Every 5 min PRN    olmesartan (BENICAR) 20 mg, Oral, Daily    potassium citrate (UROCIT-K) 10 mEq (1,080 mg) TbSR TAKE 1 TABLET BY MOUTH ONCE DAILY    propafenone (RYTHMOL SR) 225 mg, Oral, Every 12 hours    rivaroxaban (XARELTO) 15 mg, Oral, With dinner    rosuvastatin (CRESTOR) 40 mg, Oral, Nightly    topiramate (TOPAMAX) 25 MG tablet Take 1 tablet twice daily.       Past Surgical History:   Procedure Laterality Date    ADENOIDECTOMY      BACK SURGERY  2000    lamenectomy    BREAST SURGERY      BRONCHOSCOPY N/A 7/13/2023    Procedure: BRONCHOSCOPY;  Surgeon: Paul Diagnostic Provider;  Location: [address];  Service: Anesthesiology;  Laterality: N/A;    CARDIAC SURGERY      st loida pacemaker     CATARACT EXTRACTION W/  INTRAOCULAR LENS IMPLANT Right 6/3/2019    Procedure: EXTRACTION, CATARACT, WITH IOL INSERTION;  Surgeon: Raisa Moreno MD;  Location: [facility] OR;  Service: Ophthalmology;  Laterality: Right;  Laser    CATARACT EXTRACTION W/  INTRAOCULAR LENS IMPLANT Left 9/23/2019    Procedure: EXTRACTION, CATARACT, WITH IOL INSERTION;  Surgeon: Raisa Moreno MD;  Location: [facility];  Service: Ophthalmology;  Laterality: Left;  LASER  ASSISTED     SECTION      1980    COLONOSCOPY N/A 9/15/2023    Procedure: COLONOSCOPY;  Surgeon: Tao Gomez MD;  Location: [facility] ENDO (2ND FLR);  Service: Endoscopy;  Laterality: N/A;  inst to portal/[employer] pacemaker   2nd floor for airway protection patient with lung disease elevated pulmonary artery pressure pacemaker and fecal occult blood positive stool,   cleared by pulmonary Dr Bhakta-see note on -GT    ENDOSCOPIC ULTRASOUND OF UPPER GASTROINTESTINAL TRACT N/A 2023    Procedure: ULTRASOUND, UPPER GI TRACT, ENDOSCOPIC;  Surgeon: Casimiro De Los Santos MD;  Location: [facility] ENDO;  Service: Endoscopy;  Laterality: N/A;  23: instructions send via portal. [employer] ppm- GD    EYE SURGERY      FRACTURE SURGERY      HYSTERECTOMY      INSERT / REPLACE / REMOVE PACEMAKER      placement    INSERT / REPLACE / REMOVE PACEMAKER  2014    [employer] Model #[id number] replacement    pace maker      replacement     REVISION OF PROCEDURE INVOLVING PACEMAKER LEAD Left 2022    Procedure: REVISION, ELECTRODE LEAD, CARDIAC PACEMAKER;  Surgeon: Trell Hodgson MD;  Location: [facility] EP LAB;  Service: Cardiology;  Laterality: Left;  PPM lead Malfx, RA lead revision, SJM, MAC, GP, 3 PREP*dPPM SJM in situ**Contrast prep given*    salpingo opherectomy Bilateral     SPINE SURGERY      TONSILLECTOMY      TUBAL LIGATION         Social History     Substance and Sexual Activity   Drug Use No     Alcohol Use: Not At Risk (2023)    AUDIT-C     Frequency of Alcohol Consumption: 4 or more times a week     Average Number of Drinks: 1 or 2     Frequency of Binge Drinking: Never     Tobacco Use: Low Risk  (10/9/2023)    Patient History     Smoking Tobacco Use: Never     Smokeless Tobacco Use: Never     Passive Exposure: Not on file       OBJECTIVE:     Vital Signs Range (Last 24H):         Significant Labs    Heme Profile  Lab Results   Component Value Date    WBC 4.34  09/22/2023    HGB 13.0 09/22/2023    HCT 39.3 09/22/2023     09/22/2023       Coagulation Studies  Lab Results   Component Value Date    LABPROT 11.1 10/13/2023    INR 1.0 10/13/2023    APTT 25.2 10/13/2023       BMP  Lab Results   Component Value Date     09/22/2023    K 3.6 09/22/2023     09/22/2023    CO2 23 09/22/2023    BUN 23 (H) 09/22/2023    CREATININE 1.47 (H) 09/22/2023    MG 1.9 05/19/2021    PHOS 3.1 09/22/2023    CAION 1.25 01/20/2022       Liver Function Tests  Lab Results   Component Value Date    AST 29 09/22/2023    ALT 27 09/22/2023    ALKPHOS 86 09/22/2023    BILITOT 0.7 09/22/2023    PROT 7.1 09/22/2023    ALBUMIN 3.9 09/22/2023       Lipid Profile  Lab Results   Component Value Date    CHOL 182 09/22/2023    HDL 42 09/22/2023    TRIG 176 (H) 09/22/2023       Endocrine Profile  Lab Results   Component Value Date    HGBA1C 5.7 (H) 09/22/2023    TSH 1.110 09/22/2023         Cardiac Studies    EKG:   Results for orders placed or performed in visit on 02/17/23   IN OFFICE EKG 12-LEAD (to [place])    Collection Time: 02/17/23  8:18 AM    Narrative    Test Reason : R06.09,    Vent. Rate : 060 BPM     Atrial Rate : 326 BPM     P-R Int : 270 ms          QRS Dur : 086 ms      QT Int : 428 ms       P-R-T Axes : -89 055 057 degrees     QTc Int : 428 ms    Atrial-paced rhythm with prolonged AV conduction  Nonspecific ST abnormality  Abnormal ECG  When compared with ECG of 16-MAY-2022 08:47,  Electronic atrial pacemaker has replaced Electronic ventricular pacemaker  Confirmed by AFIA DIA MD ([id number]) on 2/17/2023 9:54:39 AM    Referred By: JOSE CRISTOBAL           Confirmed By:AFIA DIA MD       GAYATHRI  No results found for this or any previous visit.      TTE  Results for orders placed during the hospital encounter of 01/20/22    Echo    Interpretation Summary  · The left ventricle is normal in size with low normal systolic function.  · The estimated ejection fraction is 53%.  · There are  segmental left ventricular wall motion abnormalities.  · There is abnormal septal wall motion.  · Normal right ventricular size with normal right ventricular systolic function.  · Normal left ventricular diastolic function.  · Mild mitral regurgitation.  · Normal central venous pressure (3 mmHg).  · The estimated PA systolic pressure is 25 mmHg.  · Trivial posterior pericardial effusion.    Stress Echo  Results for orders placed during the hospital encounter of 03/08/23    Stress Echo Which stress agent will be used? Pharmacological; Color Flow Doppler? No    Interpretation Summary  · The stress echo portion of this study is negative for myocardial ischemia.  · The ECG portion of this study is abnormal but not diagnostic for ischemia.  · The patient reached the end of the protocol.  · There were no arrhythmias during stress.  · The left ventricle is normal in size with normal systolic function.  · The estimated ejection fraction is 55%.  · There is abnormal septal wall motion.  · Normal left ventricular diastolic function.  · The estimated PA systolic pressure is 37 mmHg.  · Normal right ventricular size with normal right ventricular systolic function.  · Normal central venous pressure (3 mmHg).    Cardiac Device Check  Results for orders placed in visit on 06/29/23    Cardiac device check - In Clinic & Hospital    Narrative  Additional Comments  IN-OFFICE device interrogation and testing performed.  Dual PPM, DDDR 60/125    Presenting egram demonstrates ApVs  Underlying rhythm c/w SB @ 38 bpm w/ 1st degree AVB  Device function: decrease in p waves, was around 1.3-1.4 mV. P waves today in clinic were 0.3 mV, threshold and impedance stable.  RA pacing 97% RV pacing 2.9%  Anticoagulation Status: none  Atrial arrhythmias: AMS x 4, max 36 seconds, likely event was long and device is under sensing AF  Ventricular arrhythmias: none  Battery Status/Longevity: 7.2 months  Reprogramming at this visit: changed RA sensitivity  to 0.2 mV  Rate resp sensor threshold changed from +0.0 ->  -0.5  Follow up in EP clinic today w/ Dr. Hodgson.  Follow up via remote monitoring monthly until RRT triggered.    Report prepared by OMAR Vick RN    Results for orders placed in visit on 09/09/23    Cardiac device check - Remote    Narrative  Additional Comments  REMOTE Interrogation Report  Dual Chamber PPM programmed DDDR 60  Presenting egram demonstrates AP/VS  Device fxn WNL  Aut[employer]ure algorithms are RA-Monitor/RV-On  RA pacing 99%, RV pacing 6.6%  Atrial arrhythmias: <1% burden, max 14 mins 40 secs  Anticoagulation status: None  Ventricular arrhythmias: None  Battery Status/Longevity: <3 months  F/U via remote interrogation monthly  Report prepared by Nina Cleaning RN      ASSESSMENT/PLAN:         Pre-op Assessment    I have reviewed the Patient Summary Reports.     I have reviewed the Nursing Notes. I have reviewed the NPO Status.   I have reviewed the Medications.     Review of Systems  Anesthesia Hx:  No problems with previous Anesthesia   Denies Personal Hx of Anesthesia complications.   Social:  Non-Smoker    Cardiovascular:   Exercise tolerance: poor Pacemaker Hypertension CAD  Dysrhythmias  hyperlipidemia NAVA    Pulmonary:   Sleep Apnea    Renal/:   Chronic Renal Disease, CKD    Hepatic/GI:   GERD    Neurological:  Neurology Normal    Endocrine:  Endocrine Normal        Physical Exam  General: Well nourished and Cooperative    Airway:  Mallampati: II   Mouth Opening: Normal  TM Distance: Normal  Tongue: Normal  Neck ROM: Normal ROM    Dental:  Intact        Anesthesia Plan  Type of Anesthesia, risks & benefits discussed:    Anesthesia Type: Gen ETT  Intra-op Monitoring Plan: Standard ASA Monitors  Post Op Pain Control Plan: multimodal analgesia and IV/PO Opioids PRN  Induction:  IV  Airway Plan: Video and Fiberoptic, Post-Induction  Informed Consent: Informed consent signed with the Patient and all parties understand the risks and agree  with anesthesia plan.  All questions answered.   ASA Score: 3  Day of Surgery Review of History & Physical: H&P Update referred to the surgeon/provider.    Ready For Surgery From Anesthesia Perspective.     .

## 2023-10-19 NOTE — PRE-PROCEDURE INSTRUCTIONS
PreOp Instructions given:   - Verbal medication information (what to hold and what to take)   - NPO guidelines   - Arrival place directions given; time to be given the day before procedure by the   Surgeon's Office 0630 DOSC  - Bathing with antibacterial soap   - Don't wear any jewelry or bring any valuables AM of surgery   - No makeup or moisturizer to face   - No perfume/cologne, powder, lotions or aftershave   Pt. verbalized understanding.   Pt denies any h/o Anesthesia/Sedation complications or side effects.  Pacemaker - [employer] - Device clinic Called - SW - June Ball - Note will be placed in chart*

## 2023-10-20 ENCOUNTER — DOCUMENTATION ONLY (OUTPATIENT)
Dept: ELECTROPHYSIOLOGY | Facility: CLINIC | Age: 73
End: 2023-10-20
Payer: MEDICARE

## 2023-10-20 ENCOUNTER — HOSPITAL ENCOUNTER (INPATIENT)
Facility: HOSPITAL | Age: 73
LOS: 1 days | Discharge: HOME | DRG: 167 | End: 2023-10-21
Attending: STUDENT IN AN ORGANIZED HEALTH CARE EDUCATION/TRAINING PROGRAM | Admitting: STUDENT IN AN ORGANIZED HEALTH CARE EDUCATION/TRAINING PROGRAM
Payer: MEDICARE

## 2023-10-20 ENCOUNTER — ANESTHESIA (OUTPATIENT)
Dept: SURGERY | Facility: HOSPITAL | Age: 73
DRG: 167 | End: 2023-10-20
Payer: MEDICARE

## 2023-10-20 DIAGNOSIS — J84.9 INTERSTITIAL LUNG DISEASE: Primary | ICD-10-CM

## 2023-10-20 DIAGNOSIS — R07.9 CHEST PAIN: ICD-10-CM

## 2023-10-20 DIAGNOSIS — J84.9 ILD (INTERSTITIAL LUNG DISEASE): ICD-10-CM

## 2023-10-20 LAB
ABO + RH BLD: NORMAL
BLD GP AB SCN CELLS X3 SERPL QL: NORMAL
GLUCOSE SERPL-MCNC: 209 MG/DL (ref 70–110)
GRAM STN SPEC: NORMAL
HCO3 UR-SCNC: 19.8 MMOL/L (ref 24–28)
HCT VFR BLD CALC: 35 %PCV (ref 36–54)
PCO2 BLDA: 34.4 MMHG (ref 35–45)
PH SMN: 7.37 [PH] (ref 7.35–7.45)
PO2 BLDA: 60 MMHG (ref 80–100)
POC BE: -6 MMOL/L
POC IONIZED CALCIUM: 1.29 MMOL/L (ref 1.06–1.42)
POC SATURATED O2: 90 % (ref 95–100)
POC TCO2: 21 MMOL/L (ref 23–27)
POTASSIUM BLD-SCNC: 3.8 MMOL/L (ref 3.5–5.1)
SAMPLE: ABNORMAL
SODIUM BLD-SCNC: 139 MMOL/L (ref 136–145)
SPECIMEN OUTDATE: NORMAL

## 2023-10-20 PROCEDURE — 88307 TISSUE EXAM BY PATHOLOGIST: CPT | Mod: 59 | Performed by: PATHOLOGY

## 2023-10-20 PROCEDURE — 63600175 PHARM REV CODE 636 W HCPCS: Performed by: STUDENT IN AN ORGANIZED HEALTH CARE EDUCATION/TRAINING PROGRAM

## 2023-10-20 PROCEDURE — 25000003 PHARM REV CODE 250

## 2023-10-20 PROCEDURE — 88307 TISSUE EXAM BY PATHOLOGIST: CPT | Mod: 26,,, | Performed by: PATHOLOGY

## 2023-10-20 PROCEDURE — 25000003 PHARM REV CODE 250: Performed by: STUDENT IN AN ORGANIZED HEALTH CARE EDUCATION/TRAINING PROGRAM

## 2023-10-20 PROCEDURE — 88307 PR  SURG PATH,LEVEL V: ICD-10-PCS | Mod: 26,,, | Performed by: PATHOLOGY

## 2023-10-20 PROCEDURE — 36620 INSERTION CATHETER ARTERY: CPT | Mod: 59,,, | Performed by: ANESTHESIOLOGY

## 2023-10-20 PROCEDURE — 36000710: Performed by: STUDENT IN AN ORGANIZED HEALTH CARE EDUCATION/TRAINING PROGRAM

## 2023-10-20 PROCEDURE — D9220A PRA ANESTHESIA: Mod: ,,, | Performed by: ANESTHESIOLOGY

## 2023-10-20 PROCEDURE — 36620 ARTERIAL: ICD-10-PCS | Mod: 59,,, | Performed by: ANESTHESIOLOGY

## 2023-10-20 PROCEDURE — 86901 BLOOD TYPING SEROLOGIC RH(D): CPT

## 2023-10-20 PROCEDURE — 87205 SMEAR GRAM STAIN: CPT | Performed by: STUDENT IN AN ORGANIZED HEALTH CARE EDUCATION/TRAINING PROGRAM

## 2023-10-20 PROCEDURE — 87070 CULTURE OTHR SPECIMN AEROBIC: CPT | Performed by: STUDENT IN AN ORGANIZED HEALTH CARE EDUCATION/TRAINING PROGRAM

## 2023-10-20 PROCEDURE — 71000016 HC POSTOP RECOV ADDL HR: Performed by: STUDENT IN AN ORGANIZED HEALTH CARE EDUCATION/TRAINING PROGRAM

## 2023-10-20 PROCEDURE — 71000015 HC POSTOP RECOV 1ST HR: Performed by: STUDENT IN AN ORGANIZED HEALTH CARE EDUCATION/TRAINING PROGRAM

## 2023-10-20 PROCEDURE — 36415 COLL VENOUS BLD VENIPUNCTURE: CPT | Performed by: STUDENT IN AN ORGANIZED HEALTH CARE EDUCATION/TRAINING PROGRAM

## 2023-10-20 PROCEDURE — 37000009 HC ANESTHESIA EA ADD 15 MINS: Performed by: STUDENT IN AN ORGANIZED HEALTH CARE EDUCATION/TRAINING PROGRAM

## 2023-10-20 PROCEDURE — 27201423 OPTIME MED/SURG SUP & DEVICES STERILE SUPPLY: Performed by: STUDENT IN AN ORGANIZED HEALTH CARE EDUCATION/TRAINING PROGRAM

## 2023-10-20 PROCEDURE — 87206 SMEAR FLUORESCENT/ACID STAI: CPT | Mod: 91 | Performed by: STUDENT IN AN ORGANIZED HEALTH CARE EDUCATION/TRAINING PROGRAM

## 2023-10-20 PROCEDURE — 99900035 HC TECH TIME PER 15 MIN (STAT)

## 2023-10-20 PROCEDURE — 32607 PR THORACOSCOPY W/BX INFILTRATE: ICD-10-PCS | Mod: RT,,, | Performed by: STUDENT IN AN ORGANIZED HEALTH CARE EDUCATION/TRAINING PROGRAM

## 2023-10-20 PROCEDURE — 37000008 HC ANESTHESIA 1ST 15 MINUTES: Performed by: STUDENT IN AN ORGANIZED HEALTH CARE EDUCATION/TRAINING PROGRAM

## 2023-10-20 PROCEDURE — C1729 CATH, DRAINAGE: HCPCS | Performed by: STUDENT IN AN ORGANIZED HEALTH CARE EDUCATION/TRAINING PROGRAM

## 2023-10-20 PROCEDURE — 32607 THORACOSCOPY W/BX INFILTRATE: CPT | Mod: RT,,, | Performed by: STUDENT IN AN ORGANIZED HEALTH CARE EDUCATION/TRAINING PROGRAM

## 2023-10-20 PROCEDURE — 27201037 HC PRESSURE MONITORING SET UP

## 2023-10-20 PROCEDURE — 94761 N-INVAS EAR/PLS OXIMETRY MLT: CPT

## 2023-10-20 PROCEDURE — D9220A PRA ANESTHESIA: ICD-10-PCS | Mod: ,,, | Performed by: ANESTHESIOLOGY

## 2023-10-20 PROCEDURE — 27000221 HC OXYGEN, UP TO 24 HOURS

## 2023-10-20 PROCEDURE — 87116 MYCOBACTERIA CULTURE: CPT | Performed by: STUDENT IN AN ORGANIZED HEALTH CARE EDUCATION/TRAINING PROGRAM

## 2023-10-20 PROCEDURE — 36000711: Performed by: STUDENT IN AN ORGANIZED HEALTH CARE EDUCATION/TRAINING PROGRAM

## 2023-10-20 PROCEDURE — 87102 FUNGUS ISOLATION CULTURE: CPT | Performed by: STUDENT IN AN ORGANIZED HEALTH CARE EDUCATION/TRAINING PROGRAM

## 2023-10-20 PROCEDURE — 86920 COMPATIBILITY TEST SPIN: CPT | Performed by: STUDENT IN AN ORGANIZED HEALTH CARE EDUCATION/TRAINING PROGRAM

## 2023-10-20 PROCEDURE — 71000033 HC RECOVERY, INTIAL HOUR: Performed by: STUDENT IN AN ORGANIZED HEALTH CARE EDUCATION/TRAINING PROGRAM

## 2023-10-20 PROCEDURE — 63600175 PHARM REV CODE 636 W HCPCS

## 2023-10-20 PROCEDURE — 87075 CULTR BACTERIA EXCEPT BLOOD: CPT | Performed by: STUDENT IN AN ORGANIZED HEALTH CARE EDUCATION/TRAINING PROGRAM

## 2023-10-20 PROCEDURE — 20600001 HC STEP DOWN PRIVATE ROOM

## 2023-10-20 PROCEDURE — 87015 SPECIMEN INFECT AGNT CONCNTJ: CPT | Mod: 59 | Performed by: STUDENT IN AN ORGANIZED HEALTH CARE EDUCATION/TRAINING PROGRAM

## 2023-10-20 RX ORDER — ASPIRIN 81 MG/1
81 TABLET ORAL DAILY
Status: DISCONTINUED | OUTPATIENT
Start: 2023-10-20 | End: 2023-10-21 | Stop reason: HOSPADM

## 2023-10-20 RX ORDER — LIDOCAINE 50 MG/G
1 PATCH TOPICAL
Status: DISCONTINUED | OUTPATIENT
Start: 2023-10-20 | End: 2023-10-21 | Stop reason: HOSPADM

## 2023-10-20 RX ORDER — HYDROMORPHONE HYDROCHLORIDE 1 MG/ML
0.5 INJECTION, SOLUTION INTRAMUSCULAR; INTRAVENOUS; SUBCUTANEOUS EVERY 6 HOURS PRN
Status: DISCONTINUED | OUTPATIENT
Start: 2023-10-20 | End: 2023-10-21 | Stop reason: HOSPADM

## 2023-10-20 RX ORDER — LIDOCAINE HYDROCHLORIDE 20 MG/ML
INJECTION, SOLUTION EPIDURAL; INFILTRATION; INTRACAUDAL; PERINEURAL
Status: DISCONTINUED | OUTPATIENT
Start: 2023-10-20 | End: 2023-10-20

## 2023-10-20 RX ORDER — ONDANSETRON 2 MG/ML
INJECTION INTRAMUSCULAR; INTRAVENOUS
Status: DISCONTINUED | OUTPATIENT
Start: 2023-10-20 | End: 2023-10-20

## 2023-10-20 RX ORDER — METHOCARBAMOL 500 MG/1
500 TABLET, FILM COATED ORAL 4 TIMES DAILY
Status: DISCONTINUED | OUTPATIENT
Start: 2023-10-20 | End: 2023-10-21 | Stop reason: HOSPADM

## 2023-10-20 RX ORDER — PROPAFENONE HYDROCHLORIDE 225 MG/1
225 CAPSULE, EXTENDED RELEASE ORAL EVERY 12 HOURS
Status: DISCONTINUED | OUTPATIENT
Start: 2023-10-20 | End: 2023-10-20

## 2023-10-20 RX ORDER — BUPIVACAINE HYDROCHLORIDE 2.5 MG/ML
INJECTION, SOLUTION EPIDURAL; INFILTRATION; INTRACAUDAL
Status: DISCONTINUED | OUTPATIENT
Start: 2023-10-20 | End: 2023-10-20 | Stop reason: HOSPADM

## 2023-10-20 RX ORDER — METOCLOPRAMIDE HYDROCHLORIDE 5 MG/ML
5 INJECTION INTRAMUSCULAR; INTRAVENOUS EVERY 6 HOURS PRN
Status: DISCONTINUED | OUTPATIENT
Start: 2023-10-20 | End: 2023-10-21 | Stop reason: HOSPADM

## 2023-10-20 RX ORDER — MUPIROCIN 20 MG/G
1 OINTMENT TOPICAL 2 TIMES DAILY
Status: DISCONTINUED | OUTPATIENT
Start: 2023-10-20 | End: 2023-10-21 | Stop reason: HOSPADM

## 2023-10-20 RX ORDER — AMOXICILLIN 250 MG
1 CAPSULE ORAL 2 TIMES DAILY
Status: DISCONTINUED | OUTPATIENT
Start: 2023-10-20 | End: 2023-10-21 | Stop reason: HOSPADM

## 2023-10-20 RX ORDER — DEXAMETHASONE SODIUM PHOSPHATE 4 MG/ML
INJECTION, SOLUTION INTRA-ARTICULAR; INTRALESIONAL; INTRAMUSCULAR; INTRAVENOUS; SOFT TISSUE
Status: DISCONTINUED | OUTPATIENT
Start: 2023-10-20 | End: 2023-10-20

## 2023-10-20 RX ORDER — AMLODIPINE BESYLATE 5 MG/1
5 TABLET ORAL DAILY
Status: DISCONTINUED | OUTPATIENT
Start: 2023-10-21 | End: 2023-10-21

## 2023-10-20 RX ORDER — FENTANYL CITRATE 50 UG/ML
INJECTION, SOLUTION INTRAMUSCULAR; INTRAVENOUS
Status: DISCONTINUED | OUTPATIENT
Start: 2023-10-20 | End: 2023-10-20

## 2023-10-20 RX ORDER — PROPAFENONE HYDROCHLORIDE 225 MG/1
225 CAPSULE, EXTENDED RELEASE ORAL EVERY 12 HOURS
Status: DISCONTINUED | OUTPATIENT
Start: 2023-10-20 | End: 2023-10-21 | Stop reason: HOSPADM

## 2023-10-20 RX ORDER — ACETAMINOPHEN 325 MG/1
650 TABLET ORAL EVERY 4 HOURS PRN
Status: DISCONTINUED | OUTPATIENT
Start: 2023-10-20 | End: 2023-10-20

## 2023-10-20 RX ORDER — HEPARIN SODIUM 5000 [USP'U]/ML
5000 INJECTION, SOLUTION INTRAVENOUS; SUBCUTANEOUS EVERY 8 HOURS
Status: DISCONTINUED | OUTPATIENT
Start: 2023-10-20 | End: 2023-10-21 | Stop reason: HOSPADM

## 2023-10-20 RX ORDER — ROCURONIUM BROMIDE 10 MG/ML
INJECTION, SOLUTION INTRAVENOUS
Status: DISCONTINUED | OUTPATIENT
Start: 2023-10-20 | End: 2023-10-20

## 2023-10-20 RX ORDER — HALOPERIDOL 5 MG/ML
0.5 INJECTION INTRAMUSCULAR EVERY 10 MIN PRN
Status: DISCONTINUED | OUTPATIENT
Start: 2023-10-20 | End: 2023-10-20 | Stop reason: HOSPADM

## 2023-10-20 RX ORDER — GABAPENTIN 300 MG/1
300 CAPSULE ORAL 3 TIMES DAILY
Status: DISCONTINUED | OUTPATIENT
Start: 2023-10-20 | End: 2023-10-21 | Stop reason: HOSPADM

## 2023-10-20 RX ORDER — METHOCARBAMOL 500 MG/1
500 TABLET, FILM COATED ORAL 4 TIMES DAILY
Status: DISCONTINUED | OUTPATIENT
Start: 2023-10-20 | End: 2023-10-20

## 2023-10-20 RX ORDER — ONDANSETRON 8 MG/1
8 TABLET, ORALLY DISINTEGRATING ORAL EVERY 8 HOURS PRN
Status: DISCONTINUED | OUTPATIENT
Start: 2023-10-20 | End: 2023-10-21 | Stop reason: HOSPADM

## 2023-10-20 RX ORDER — ACETAMINOPHEN 500 MG
1000 TABLET ORAL
Status: COMPLETED | OUTPATIENT
Start: 2023-10-20 | End: 2023-10-20

## 2023-10-20 RX ORDER — SODIUM CHLORIDE 0.9 % (FLUSH) 0.9 %
10 SYRINGE (ML) INJECTION
Status: DISCONTINUED | OUTPATIENT
Start: 2023-10-20 | End: 2023-10-21 | Stop reason: HOSPADM

## 2023-10-20 RX ORDER — CLINDAMYCIN PHOSPHATE 900 MG/50ML
900 INJECTION, SOLUTION INTRAVENOUS
Status: COMPLETED | OUTPATIENT
Start: 2023-10-20 | End: 2023-10-20

## 2023-10-20 RX ORDER — ACETAMINOPHEN 500 MG
1000 TABLET ORAL EVERY 8 HOURS
Status: DISCONTINUED | OUTPATIENT
Start: 2023-10-20 | End: 2023-10-21 | Stop reason: HOSPADM

## 2023-10-20 RX ORDER — PROPOFOL 10 MG/ML
VIAL (ML) INTRAVENOUS
Status: DISCONTINUED | OUTPATIENT
Start: 2023-10-20 | End: 2023-10-20

## 2023-10-20 RX ORDER — LIDOCAINE HYDROCHLORIDE 10 MG/ML
1 INJECTION, SOLUTION EPIDURAL; INFILTRATION; INTRACAUDAL; PERINEURAL ONCE
Status: DISCONTINUED | OUTPATIENT
Start: 2023-10-20 | End: 2023-10-20 | Stop reason: HOSPADM

## 2023-10-20 RX ORDER — HYDROMORPHONE HYDROCHLORIDE 1 MG/ML
0.2 INJECTION, SOLUTION INTRAMUSCULAR; INTRAVENOUS; SUBCUTANEOUS EVERY 5 MIN PRN
Status: COMPLETED | OUTPATIENT
Start: 2023-10-20 | End: 2023-10-20

## 2023-10-20 RX ORDER — OXYCODONE HYDROCHLORIDE 5 MG/1
5 TABLET ORAL EVERY 4 HOURS PRN
Status: DISCONTINUED | OUTPATIENT
Start: 2023-10-20 | End: 2023-10-21 | Stop reason: HOSPADM

## 2023-10-20 RX ORDER — ATORVASTATIN CALCIUM 40 MG/1
80 TABLET, FILM COATED ORAL DAILY
Status: DISCONTINUED | OUTPATIENT
Start: 2023-10-20 | End: 2023-10-21 | Stop reason: HOSPADM

## 2023-10-20 RX ADMIN — ASPIRIN 81 MG: 81 TABLET, COATED ORAL at 02:10

## 2023-10-20 RX ADMIN — SENNOSIDES AND DOCUSATE SODIUM 1 TABLET: 50; 8.6 TABLET ORAL at 08:10

## 2023-10-20 RX ADMIN — ROCURONIUM BROMIDE 20 MG: 10 INJECTION INTRAVENOUS at 10:10

## 2023-10-20 RX ADMIN — CLINDAMYCIN PHOSPHATE 900 MG: 150 INJECTION, SOLUTION INTRAVENOUS at 09:10

## 2023-10-20 RX ADMIN — METHOCARBAMOL 500 MG: 500 TABLET ORAL at 05:10

## 2023-10-20 RX ADMIN — FENTANYL CITRATE 25 MCG: 50 INJECTION INTRAMUSCULAR; INTRAVENOUS at 11:10

## 2023-10-20 RX ADMIN — HYDROMORPHONE HYDROCHLORIDE 0.2 MG: 1 INJECTION, SOLUTION INTRAMUSCULAR; INTRAVENOUS; SUBCUTANEOUS at 12:10

## 2023-10-20 RX ADMIN — HEPARIN SODIUM 5000 UNITS: 5000 INJECTION INTRAVENOUS; SUBCUTANEOUS at 02:10

## 2023-10-20 RX ADMIN — PROPAFENONE 225 MG: 225 CAPSULE, EXTENDED RELEASE ORAL at 08:10

## 2023-10-20 RX ADMIN — OXYCODONE HYDROCHLORIDE 5 MG: 5 TABLET ORAL at 05:10

## 2023-10-20 RX ADMIN — DEXAMETHASONE SODIUM PHOSPHATE 4 MG: 4 INJECTION INTRA-ARTICULAR; INTRALESIONAL; INTRAMUSCULAR; INTRAVENOUS; SOFT TISSUE at 09:10

## 2023-10-20 RX ADMIN — FENTANYL CITRATE 50 MCG: 50 INJECTION INTRAMUSCULAR; INTRAVENOUS at 09:10

## 2023-10-20 RX ADMIN — LIDOCAINE 1 PATCH: 50 PATCH CUTANEOUS at 02:10

## 2023-10-20 RX ADMIN — ONDANSETRON 4 MG: 2 INJECTION INTRAMUSCULAR; INTRAVENOUS at 11:10

## 2023-10-20 RX ADMIN — ATORVASTATIN CALCIUM 80 MG: 40 TABLET, FILM COATED ORAL at 05:10

## 2023-10-20 RX ADMIN — HEPARIN SODIUM 5000 UNITS: 5000 INJECTION INTRAVENOUS; SUBCUTANEOUS at 09:10

## 2023-10-20 RX ADMIN — SODIUM CHLORIDE: 9 INJECTION, SOLUTION INTRAVENOUS at 09:10

## 2023-10-20 RX ADMIN — GABAPENTIN 300 MG: 300 CAPSULE ORAL at 02:10

## 2023-10-20 RX ADMIN — SUGAMMADEX 200 MG: 100 INJECTION, SOLUTION INTRAVENOUS at 12:10

## 2023-10-20 RX ADMIN — HYDROMORPHONE HYDROCHLORIDE 0.2 MG: 1 INJECTION, SOLUTION INTRAMUSCULAR; INTRAVENOUS; SUBCUTANEOUS at 01:10

## 2023-10-20 RX ADMIN — MUPIROCIN 1 G: 20 OINTMENT TOPICAL at 08:10

## 2023-10-20 RX ADMIN — LIDOCAINE HYDROCHLORIDE 40 MG: 20 INJECTION, SOLUTION EPIDURAL; INFILTRATION; INTRACAUDAL at 09:10

## 2023-10-20 RX ADMIN — HYDROMORPHONE HYDROCHLORIDE 0.5 MG: 1 INJECTION, SOLUTION INTRAMUSCULAR; INTRAVENOUS; SUBCUTANEOUS at 08:10

## 2023-10-20 RX ADMIN — OXYCODONE HYDROCHLORIDE 5 MG: 5 TABLET ORAL at 02:10

## 2023-10-20 RX ADMIN — GABAPENTIN 400 MG: 300 CAPSULE ORAL at 08:10

## 2023-10-20 RX ADMIN — GABAPENTIN 300 MG: 300 CAPSULE ORAL at 08:10

## 2023-10-20 RX ADMIN — PROPOFOL 150 MG: 10 INJECTION, EMULSION INTRAVENOUS at 09:10

## 2023-10-20 RX ADMIN — SODIUM CHLORIDE, SODIUM GLUCONATE, SODIUM ACETATE, POTASSIUM CHLORIDE AND MAGNESIUM CHLORIDE: 526; 502; 368; 37; 30 INJECTION, SOLUTION INTRAVENOUS at 09:10

## 2023-10-20 RX ADMIN — ACETAMINOPHEN 1000 MG: 500 TABLET ORAL at 08:10

## 2023-10-20 RX ADMIN — GLYCOPYRROLATE 0.2 MG: 0.2 INJECTION INTRAMUSCULAR; INTRAVENOUS at 09:10

## 2023-10-20 RX ADMIN — METHOCARBAMOL 500 MG: 500 TABLET ORAL at 02:10

## 2023-10-20 RX ADMIN — FENTANYL CITRATE 25 MCG: 50 INJECTION INTRAMUSCULAR; INTRAVENOUS at 12:10

## 2023-10-20 RX ADMIN — METHOCARBAMOL 500 MG: 500 TABLET ORAL at 08:10

## 2023-10-20 RX ADMIN — ACETAMINOPHEN 1000 MG: 500 TABLET ORAL at 09:10

## 2023-10-20 RX ADMIN — ROCURONIUM BROMIDE 50 MG: 10 INJECTION INTRAVENOUS at 09:10

## 2023-10-20 RX ADMIN — HYDROMORPHONE HYDROCHLORIDE 0.2 MG: 1 INJECTION, SOLUTION INTRAMUSCULAR; INTRAVENOUS; SUBCUTANEOUS at 02:10

## 2023-10-20 NOTE — ANESTHESIA PROCEDURE NOTES
Arterial    Diagnosis: ILD    Patient location during procedure: done in OR    Staffing  Authorizing Provider: Navid Yousif MD  Performing Provider: Azeem Glover MD    Staffing  Performed by: Azeem Glover MD  Authorized by: Navid Yousif MD    Anesthesiologist was present at the time of the procedure.    Preanesthetic Checklist  Completed: patient identified, IV checked, site marked, risks and benefits discussed, surgical consent, monitors and equipment checked, pre-op evaluation, timeout performed and anesthesia consent givenArterial  Skin Prep: chlorhexidine gluconate and isopropyl alcohol  Local Infiltration: none  Orientation: left  Location: radial    Catheter Size: 20 G  Catheter placement by Anatomical landmarks. Heme positive aspiration all ports. Insertion Attempts: 1  Assessment  Dressing: secured with tape and tegaderm  Patient: Tolerated well

## 2023-10-20 NOTE — PROGRESS NOTES
Pt has a [employer] Pacemaker.    Pt is going to Surgery for: R Vats    Underlying rhythm: SB 1st degree AVB, 45 bpm        Reprogramming indicated: Mode changed from    DDDR to DOO and Base rate changed from:   60 bpm to 70 bpm        Notified pt's nurse that reprogramming was performed.     Post op: Please call [phone] or Cardiology Fellow On Call after hours to have initial settings restored if reprogramming was performed.

## 2023-10-20 NOTE — TRANSFER OF CARE
"Anesthesia Transfer of Care Note    Patient: Jesenia Curiel    Procedure(s) Performed: Procedure(s) (LRB):  VATS WEDGE (Right)  BRONCHOSCOPY (N/A)  BLOCK, NERVE, INTERCOSTAL, 2 OR MORE    Patient location: PACU    Anesthesia Type: general    Transport from OR: Transported from OR on 6-10 L/min O2 by face mask with adequate spontaneous ventilation    Post pain: adequate analgesia    Post assessment: no apparent anesthetic complications    Post vital signs: stable    Level of consciousness: awake and alert    Nausea/Vomiting: no nausea/vomiting    Complications: none    Transfer of care protocol was followed      Last vitals:   Visit Vitals  BP (!) 111/58   Pulse 70   Temp 36.5 °C (97.7 °F) (Temporal)   Resp 18   Ht 5' 1" (1.549 m)   Wt 88.5 kg (195 lb)   SpO2 100%   Breastfeeding No   BMI 36.84 kg/m²     "

## 2023-10-20 NOTE — PLAN OF CARE
Pt arrived via stretcher from PACU on telemetry admitted to room 1061 pt is alert and oriented X4 express 5/10 for pain will medicate as ordered. Right chest tube patent and intact to suction. Skin check completed.  at bedside. Place heat pack and elevated right arm secondary to line infiltration in PACU.

## 2023-10-20 NOTE — ANESTHESIA PROCEDURE NOTES
Intubation    Date/Time: 10/20/2023 9:15 AM    Performed by: Azeem Glover MD  Authorized by: Navid Yousif MD    Intubation:     Induction:  Intravenous    Intubated:  Postinduction    Mask Ventilation:  Easy mask    Attempts:  1    Attempted By:  CRNA    Method of Intubation:  Direct    Blade:  Sosa 2    Laryngeal View Grade: Grade I - full view of cords      Difficult Airway Encountered?: No      Complications:  None    Airway Device:  Oral endotracheal tube    Airway Device Size:  8.0    Style/Cuff Inflation:  Cuffed (inflated to minimal occlusive pressure)    Placement Verified By:  Capnometry    Complicating Factors:  None    Findings Post-Intubation:  BS equal bilateral and atraumatic/condition of teeth unchanged

## 2023-10-20 NOTE — ANESTHESIA PROCEDURE NOTES
Left 35 Fr Double Lumen Tube Intubation    Date/Time: 10/20/2023 9:40 AM    Performed by: Azeem Glover MD  Authorized by: Navid Yousif MD    Intubation:     Induction:  Intravenous    Intubated:  Postinduction    Mask Ventilation:  N/a    Attempts:  1    Attempted By:  Resident anesthesiologist    Method of Intubation:  Video laryngoscopy    Blade:  Becker 3    Laryngeal View Grade: Grade I - full view of cords      Difficult Airway Encountered?: No      Complications:  None    Airway Device:  Double lumen tube left    Airway Device Size:  35F    Style/Cuff Inflation:  Cuffed (inflated to minimal occlusive pressure)    Placement Verified By:  Capnometry and Fiber optic visualization    Complicating Factors:  None    Findings Post-Intubation:  BS equal bilateral and atraumatic/condition of teeth unchanged

## 2023-10-20 NOTE — PROGRESS NOTES
Pt out of the Surgery.    Cardiac device reprogramming  for pacemaker    Reprogramming indicated: Mode changed from  DOO to DDDR Base rate changed from:  70 bpm to 60 bpm    Pt's nurse notified of changes.

## 2023-10-20 NOTE — NURSING TRANSFER
Nursing Transfer Note      10/20/2023   3:54 PM    Nurse giving handoff:RAYMOND Fatima  Nurse receiving handoff:RAYMOND Staley    Reason patient is being transferred: admit    Transfer To: 1061    Transfer via stretcher    Transfer with cardiac monitoring 3L O2    Transported by desomnd, rn and keenan,rn    Telemetry: Box Number na  Order for Tele Monitor? Yes    Additional Lines: Chest Tube    4eyes on Skin: yes    Medicines sent: yes, statin given to charge nurse    Any special needs or follow-up needed: pain, due meds    Patient belongings transferred with patient: Yes    Chart send with patient: Yes    Notified: spouse    PA paged for orders and from homed reconciliation

## 2023-10-20 NOTE — BRIEF OP NOTE
[facility] - Surgery ([facility])  Brief Operative Note    SUMMARY     Surgery Date: 10/20/2023     Surgeon(s) and Role:     * Anatoly Reyna MD - Primary     * Yimi Serrano MD - Resident - Assisting        Pre-op Diagnosis:  Interstitial lung disease [J84.9]    Post-op Diagnosis:  Post-Op Diagnosis Codes:     * Interstitial lung disease [J84.9]    Procedure(s) (LRB):  VATS WEDGE (Right)  BRONCHOSCOPY (N/A)  BLOCK, NERVE, INTERCOSTAL, 2 OR MORE    Anesthesia: General    Implants:  * No implants in log *    Operative Findings: Wedge resection x 2. Bronchoscopy with no acute abnormality.     Estimated Blood Loss: * No values recorded between 10/20/2023 10:25 AM and 10/20/2023 12:22 PM *    Estimated Blood Loss has been documented.         Specimens:   Specimen (24h ago, onward)       Start     Ordered    10/20/23 1148  Specimen to Pathology, Surgery Pulmonary and Thoracic  Once        Comments: Pre-op Diagnosis: Interstitial lung disease [J84.9]Procedure(s):VATS WEDGEBRONCHOSCOPY Number of specimens: 2Name of specimens: 1. Right Lower Lobe Wedge- Perm.2. Right Middle Lobe Wedge - Perm.     References:    Click here for ordering Quick Tip   Question Answer Comment   Procedure Type: Pulmonary and Thoracic    Specimen Class: Routine/Screening    Which provider would you like to cc? ANATOLY REYNA    Release to patient Immediate        10/20/23 1149                    [id number]

## 2023-10-21 VITALS
HEART RATE: 63 BPM | WEIGHT: 195 LBS | DIASTOLIC BLOOD PRESSURE: 54 MMHG | HEIGHT: 61 IN | SYSTOLIC BLOOD PRESSURE: 92 MMHG | RESPIRATION RATE: 16 BRPM | TEMPERATURE: 97 F | OXYGEN SATURATION: 92 % | BODY MASS INDEX: 36.82 KG/M2

## 2023-10-21 LAB
BASOPHILS # BLD AUTO: 0.03 K/UL (ref 0–0.2)
BASOPHILS NFR BLD: 0.3 % (ref 0–1.9)
CREAT SERPL-MCNC: 1.4 MG/DL (ref 0.5–1.4)
DIFFERENTIAL METHOD: ABNORMAL
EOSINOPHIL # BLD AUTO: 0 K/UL (ref 0–0.5)
EOSINOPHIL NFR BLD: 0 % (ref 0–8)
ERYTHROCYTE [DISTWIDTH] IN BLOOD BY AUTOMATED COUNT: 12.8 % (ref 11.5–14.5)
EST. GFR  (NO RACE VARIABLE): 39.5 ML/MIN/1.73 M^2
HCT VFR BLD AUTO: 34.2 % (ref 37–48.5)
HGB BLD-MCNC: 10.9 G/DL (ref 12–16)
IMM GRANULOCYTES # BLD AUTO: 0.02 K/UL (ref 0–0.04)
IMM GRANULOCYTES NFR BLD AUTO: 0.2 % (ref 0–0.5)
LYMPHOCYTES # BLD AUTO: 0.8 K/UL (ref 1–4.8)
LYMPHOCYTES NFR BLD: 8.2 % (ref 18–48)
MCH RBC QN AUTO: 31.1 PG (ref 27–31)
MCHC RBC AUTO-ENTMCNC: 31.9 G/DL (ref 32–36)
MCV RBC AUTO: 98 FL (ref 82–98)
MONOCYTES # BLD AUTO: 0.8 K/UL (ref 0.3–1)
MONOCYTES NFR BLD: 7.9 % (ref 4–15)
NEUTROPHILS # BLD AUTO: 8.3 K/UL (ref 1.8–7.7)
NEUTROPHILS NFR BLD: 83.4 % (ref 38–73)
NRBC BLD-RTO: 0 /100 WBC
PLATELET # BLD AUTO: 178 K/UL (ref 150–450)
PMV BLD AUTO: 10 FL (ref 9.2–12.9)
RBC # BLD AUTO: 3.5 M/UL (ref 4–5.4)
WBC # BLD AUTO: 9.99 K/UL (ref 3.9–12.7)

## 2023-10-21 PROCEDURE — 36415 COLL VENOUS BLD VENIPUNCTURE: CPT | Performed by: STUDENT IN AN ORGANIZED HEALTH CARE EDUCATION/TRAINING PROGRAM

## 2023-10-21 PROCEDURE — 85025 COMPLETE CBC W/AUTO DIFF WBC: CPT | Performed by: STUDENT IN AN ORGANIZED HEALTH CARE EDUCATION/TRAINING PROGRAM

## 2023-10-21 PROCEDURE — 25000003 PHARM REV CODE 250: Performed by: STUDENT IN AN ORGANIZED HEALTH CARE EDUCATION/TRAINING PROGRAM

## 2023-10-21 PROCEDURE — 63600175 PHARM REV CODE 636 W HCPCS: Performed by: STUDENT IN AN ORGANIZED HEALTH CARE EDUCATION/TRAINING PROGRAM

## 2023-10-21 PROCEDURE — 25000003 PHARM REV CODE 250

## 2023-10-21 PROCEDURE — 82565 ASSAY OF CREATININE: CPT | Performed by: STUDENT IN AN ORGANIZED HEALTH CARE EDUCATION/TRAINING PROGRAM

## 2023-10-21 RX ORDER — GABAPENTIN 300 MG/1
300 CAPSULE ORAL 3 TIMES DAILY
Qty: 90 CAPSULE | Refills: 0 | Status: SHIPPED | OUTPATIENT
Start: 2023-10-21 | End: 2024-02-21 | Stop reason: ALTCHOICE

## 2023-10-21 RX ORDER — OXYCODONE HYDROCHLORIDE 5 MG/1
5 TABLET ORAL EVERY 4 HOURS PRN
Qty: 20 TABLET | Refills: 0 | Status: SHIPPED | OUTPATIENT
Start: 2023-10-21 | End: 2024-02-21 | Stop reason: ALTCHOICE

## 2023-10-21 RX ORDER — METHOCARBAMOL 500 MG/1
500 TABLET, FILM COATED ORAL 4 TIMES DAILY
Qty: 40 TABLET | Refills: 0 | Status: SHIPPED | OUTPATIENT
Start: 2023-10-21 | End: 2023-10-31

## 2023-10-21 RX ADMIN — OXYCODONE HYDROCHLORIDE 5 MG: 5 TABLET ORAL at 02:10

## 2023-10-21 RX ADMIN — OXYCODONE HYDROCHLORIDE 5 MG: 5 TABLET ORAL at 08:10

## 2023-10-21 RX ADMIN — METHOCARBAMOL 500 MG: 500 TABLET ORAL at 08:10

## 2023-10-21 RX ADMIN — ACETAMINOPHEN 1000 MG: 500 TABLET ORAL at 05:10

## 2023-10-21 RX ADMIN — PROPAFENONE 225 MG: 225 CAPSULE, EXTENDED RELEASE ORAL at 08:10

## 2023-10-21 RX ADMIN — HEPARIN SODIUM 5000 UNITS: 5000 INJECTION INTRAVENOUS; SUBCUTANEOUS at 02:10

## 2023-10-21 RX ADMIN — OXYCODONE HYDROCHLORIDE 5 MG: 5 TABLET ORAL at 03:10

## 2023-10-21 RX ADMIN — HEPARIN SODIUM 5000 UNITS: 5000 INJECTION INTRAVENOUS; SUBCUTANEOUS at 05:10

## 2023-10-21 RX ADMIN — ACETAMINOPHEN 1000 MG: 500 TABLET ORAL at 02:10

## 2023-10-21 RX ADMIN — ATORVASTATIN CALCIUM 80 MG: 40 TABLET, FILM COATED ORAL at 08:10

## 2023-10-21 RX ADMIN — GABAPENTIN 300 MG: 300 CAPSULE ORAL at 08:10

## 2023-10-21 RX ADMIN — GABAPENTIN 300 MG: 300 CAPSULE ORAL at 03:10

## 2023-10-21 RX ADMIN — METHOCARBAMOL 500 MG: 500 TABLET ORAL at 02:10

## 2023-10-21 RX ADMIN — SENNOSIDES AND DOCUSATE SODIUM 1 TABLET: 50; 8.6 TABLET ORAL at 08:10

## 2023-10-21 RX ADMIN — MUPIROCIN 1 G: 20 OINTMENT TOPICAL at 08:10

## 2023-10-21 NOTE — PROGRESS NOTES
Patient complaining of new onset pain that radiates from back around both sides of chest. Patient states pain waxes and wanes between a 4 to 7 pain level. Patient states difficulty taking deep breaths.  Surgery intern on-call notified and stated would assess bedside.

## 2023-10-21 NOTE — SUBJECTIVE & OBJECTIVE
Interval History: Did well overnight  AFVSS on minimal supplemental O2 via nasal cannula  Pain controlled    Medications:  Continuous Infusions:  Scheduled Meds:   acetaminophen  1,000 mg Oral Q8H    aspirin  81 mg Oral Daily    atorvastatin  80 mg Oral Daily    gabapentin  300 mg Oral TID    heparin (porcine)  5,000 Units Subcutaneous Q8H    LIDOcaine  1 patch Transdermal Q24H    methocarbamoL  500 mg Oral QID    metoprolol succinate  12.5 mg Oral Daily    mupirocin  1 g Nasal BID    propafenone  225 mg Oral Q12H    senna-docusate 8.6-50 mg  1 tablet Oral BID     PRN Meds:HYDROmorphone, metoclopramide HCl, ondansetron, oxyCODONE, sodium chloride 0.9%     Review of patient's allergies indicates:   Allergen Reactions    Iodinated contrast media Anaphylaxis    Penicillins Anaphylaxis    Allopurinol analogues      Muscle cramps    Ciprofloxacin Rash    Quinolones Rash    Sulfa (sulfonamide antibiotics) Rash    Tetracyclines Rash     Objective:     Vital Signs (Most Recent):  Temp: 97.6 °F (36.4 °C) (10/21/23 0813)  Pulse: 61 (10/21/23 0813)  Resp: 18 (10/21/23 0824)  BP: (!) 104/53 (10/21/23 0813)  SpO2: (!) 90 % (10/21/23 0813) Vital Signs (24h Range):  Temp:  [96 °F (35.6 °C)-97.9 °F (36.6 °C)] 97.6 °F (36.4 °C)  Pulse:  [60-70] 61  Resp:  [8-33] 18  SpO2:  [90 %-100 %] 90 %  BP: ()/(50-65) 104/53     Intake/Output - Last 3 Shifts         10/19 0700  10/20 0659 10/20 0700  10/21 0659 10/21 0700  10/22 0659    P.O.  120     IV Piggyback  50     Total Intake(mL/kg)  170 (1.9)     Urine (mL/kg/hr)  625 (0.3)     Stool  0     Chest Tube  218     Total Output  843     Net  -673            Urine Occurrence  2 x     Stool Occurrence  0 x             SpO2: (!) 90 %        Physical Exam  Vitals and nursing note reviewed.   Constitutional:       Appearance: Normal appearance.   HENT:      Head: Normocephalic.      Mouth/Throat:      Mouth: Mucous membranes are moist.   Eyes:      Extraocular Movements: Extraocular  movements intact.      Pupils: Pupils are equal, round, and reactive to light.   Cardiovascular:      Rate and Rhythm: Normal rate and regular rhythm.      Pulses: Normal pulses.   Abdominal:      General: Abdomen is flat.      Palpations: Abdomen is soft.   Musculoskeletal:         General: Normal range of motion.      Cervical back: Neck supple.   Skin:     General: Skin is warm.      Comments: Right sided incisions c/d/I  CT in place with thin SS output. No airleak   Neurological:      General: No focal deficit present.      Mental Status: She is alert and oriented to person, place, and time.   Psychiatric:         Mood and Affect: Mood normal.         Behavior: Behavior normal.            Significant Labs:  All pertinent labs from the last 24 hours have been reviewed.    Significant Diagnostics:  I have reviewed all pertinent imaging results/findings within the past 24 hours.    VTE Risk Mitigation (From admission, onward)           Ordered     heparin (porcine) injection 5,000 Units  Every 8 hours         10/20/23 1333     IP VTE HIGH RISK PATIENT  Once         10/20/23 1333     Place CURLY hose  Until discontinued         10/20/23 0740     Place sequential compression device  Until discontinued         10/20/23 0740

## 2023-10-21 NOTE — PLAN OF CARE
[facility] GIS  Discharge Final Note    Primary Care Provider: Sandra Michaud MD    Expected Discharge Date: 10/21/2023    Final Discharge Note (most recent)       Final Note - 10/21/23 1613          Final Note    Assessment Type Final Discharge Note     Anticipated Discharge Disposition Home or Self Care     Hospital Resources/Appts/Education Provided Provided patient/caregiver with written discharge plan information        Post-Acute Status    Discharge Delays None known at this time                     Important Message from Medicare             Contact Info       Anatoly Tiwari MD   Specialty: Cardiothoracic Surgery    [address] [facility] [address]   Phone: [phone]       Next Steps: Follow up in 2 week(s)    Instructions: For wound re-check          Pt d/c home with family. No d/c needs reported by medical team at this time.     Reta Carpenter LCSW  Case Management/[facility]  [phone]

## 2023-10-21 NOTE — ASSESSMENT & PLAN NOTE
72 y.o. female never smoker (BMI: 37.2) with CKD, AFib and sick sinus syndrome, HTN, hyperlipidemia, and JOSÉ not on CPAP s/p right VATS, wedge biopsy on 10/20/23 to r/o ILD    Regular diet   Multimodal pain regimen   CT clamp trial and possible removal later  Wean O2 off  Home meds  DVT ppx  Ambulate in halls     Anticipate d/c home this afternoon pending CT clamp trial

## 2023-10-21 NOTE — HOSPITAL COURSE
Patient s/p right VATS, wedge biopsy on 10/20/23 to r/o ILD. Doing well POD 1 and without air leak. Chest tube clamped in AM and repeat CXR in afternoon stable and without pneumo. Chest tube removed without issue. Patient pain well controlled, on RA, ambulating, and voiding appropriately. Deemed okay for discharge and will follow up in clinic in 2 weeks.

## 2023-10-21 NOTE — DISCHARGE SUMMARY
[facility]  General Surgery  Discharge Summary      Patient Name: Jesenia Curiel  MRN: [id number]  Admission Date: 10/20/2023  Hospital Length of Stay: 1 days  Discharge Date and Time: 10/21/2023  4:54 PM  Attending Physician: No att. providers found   Discharging Provider: Gi Reece MD  Primary Care Provider: Sandra Michaud MD    HPI:   No notes on file    Procedure(s) (LRB):  VATS WEDGE (Right)  BRONCHOSCOPY (N/A)  BLOCK, NERVE, INTERCOSTAL, 2 OR MORE      Indwelling Lines/Drains at time of discharge:   Lines/Drains/Airways     None               Hospital Course: Patient s/p right VATS, wedge biopsy on 10/20/23 to r/o ILD. Doing well POD 1 and without air leak. Chest tube clamped in AM and repeat CXR in afternoon stable and without pneumo. Chest tube removed without issue. Patient pain well controlled, on RA, ambulating, and voiding appropriately. Deemed okay for discharge and will follow up in clinic in 2 weeks.        Goals of Care Treatment Preferences:  Code Status: Full Code    Health care agent: Sarina Carroll and Andrea Nolan  [facility] agent number: see chart    Living Will: Yes              Consults:     Significant Diagnostic Studies: Radiology: X-Ray: CXR: X-Ray Chest 1 View (CXR):   Results for orders placed or performed during the hospital encounter of 10/20/23   X-Ray Chest 1 View    Narrative    EXAMINATION:  XR CHEST 1 VIEW    CLINICAL HISTORY:  Chest tube clamp trial;    TECHNIQUE:  Single frontal view of the chest was performed.    COMPARISON:  10/21/2023    FINDINGS:  Cardiac pacemaker noted in the left chest.  Note made of interstitial prominence seen in the lower lung zones.  No significant pleural effusion.  No pneumothorax.  Cardiac silhouette is enlarged.      Impression    As above.      Electronically signed by: King Smith MD  Date:    10/21/2023  Time:    12:44       Pending Diagnostic Studies:     Procedure Component Value Units Date/Time    Specimen to Pathology,  Surgery Pulmonary and Thoracic [[id number]] Collected: 10/20/23 1149    Order Status: Sent Lab Status: In process Updated: 10/20/23 1149    Specimen: Tissue     X-Ray Chest AP Portable [[id number]]     Order Status: Sent Lab Status: No result         Final Active Diagnoses:    Diagnosis Date Noted POA    PRINCIPAL PROBLEM:  Interstitial lung disease [J84.9] 07/10/2023 Yes      Problems Resolved During this Admission:      Discharged Condition: good    Disposition: Home or Self Care    Follow Up:   Follow-up Information     Anatoly Tiwari MD Follow up in 2 week(s).    Specialty: Cardiothoracic Surgery  Why: For wound re-check  Contact information:  Lynette Wright dmitriy  [facility] [id number] [phone]                       Patient Instructions:      Diet Adult Regular     Other restrictions (specify):   Order Comments: Please no heavy lifting/pushing/pulling. Do not lift anything heavier than a gallon of milk (about 10 pounds).   Please no driving while on narcotic pain medication.   Please limit raising right arm above head.     You have been prescribed a narcotic pain medication to help with post-operative pain. Please wean over the next few days. You may alternate tylenol and ibuprofen instead.   Please make sure to take 1 capful of Miralax per day to help with narcotic associated constipation.     Keep dressing in place for 48 hours. Then remove and may change as needed. There are steri strips in place. Please do not remove until you are seen in clinic.  Showers are okay 48 hours after chest tube dressing is removed. Please no baths or soaking.     You have no diet restrictions.    Please follow up in clinic with Dr. Tiwari in 2 weeks.     Notify your health care provider if you experience any of the following:  increased confusion or weakness     Notify your health care provider if you experience any of the following:  persistent dizziness, light-headedness, or visual disturbances     Notify your health  care provider if you experience any of the following:  worsening rash     Notify your health care provider if you experience any of the following:  severe persistent headache     Notify your health care provider if you experience any of the following:  difficulty breathing or increased cough     Notify your health care provider if you experience any of the following:  redness, tenderness, or signs of infection (pain, swelling, redness, odor or green/yellow discharge around incision site)     Notify your health care provider if you experience any of the following:  severe uncontrolled pain     Notify your health care provider if you experience any of the following:  persistent nausea and vomiting or diarrhea     Notify your health care provider if you experience any of the following:  temperature >100.4     Remove dressing in 48 hours     Medications:  Reconciled Home Medications:      Medication List      START taking these medications    gabapentin 300 MG capsule  Commonly known as: NEURONTIN  Take 1 capsule (300 mg total) by mouth 3 (three) times daily.     methocarbamoL 500 MG Tab  Commonly known as: ROBAXIN  Take 1 tablet (500 mg total) by mouth 4 (four) times daily. for 10 days     oxyCODONE 5 MG immediate release tablet  Commonly known as: ROXICODONE  Take 1 tablet (5 mg total) by mouth every 4 (four) hours as needed for Pain.        CHANGE how you take these medications    rosuvastatin 40 MG Tab  Commonly known as: CRESTOR  Take 1 tablet (40 mg total) by mouth every evening.  What changed: when to take this        CONTINUE taking these medications    amLODIPine 5 MG tablet  Commonly known as: NORVASC  Take 1 tablet (5 mg total) by mouth once daily.     aspirin 81 MG EC tablet  Commonly known as: ECOTRIN  Take 1 tablet (81 mg total) by mouth once daily.     clobetasoL 0.05 % Foam  Commonly known as: OLUX  Apply 1 application topically every 30 days.     furosemide 40 MG tablet  Commonly known as: LASIX  TAKE 1  TABLET BY MOUTH EVERY DAY     metoprolol succinate 25 MG 24 hr tablet  Commonly known as: TOPROL-XL  Take 0.5 tablets (12.5 mg total) by mouth once daily.     nitroGLYCERIN 0.4 MG SL tablet  Commonly known as: NITROSTAT  Place 1 tablet (0.4 mg total) under the tongue every 5 (five) minutes as needed for Chest pain.     olmesartan 20 MG tablet  Commonly known as: BENICAR  Take 1 tablet (20 mg total) by mouth once daily.     potassium citrate 10 mEq (1,080 mg) Tbsr  Commonly known as: UROCIT-K  TAKE 1 TABLET BY MOUTH ONCE DAILY     propafenone 225 MG Cp12  Commonly known as: RYTHMOL SR  Take 1 capsule (225 mg total) by mouth every 12 (twelve) hours.     rivaroxaban 15 mg Tab  Commonly known as: XARELTO  Take 1 tablet (15 mg total) by mouth daily with dinner or evening meal.     topiramate 25 MG tablet  Commonly known as: TOPAMAX  Take 1 tablet twice daily.     VITAMIN D ORAL  Take 2,000 Units by mouth once daily.              Gi Reece MD  General Surgery  [facility]

## 2023-10-21 NOTE — PLAN OF CARE
[facility]  Initial Discharge Assessment       Primary Care Provider: Sandra Michaud MD    Admission Diagnosis: Interstitial lung disease [J84.9]  ILD (interstitial lung disease) [J84.9]    Admission Date: 10/20/2023  Expected Discharge Date:     Transition of Care Barriers: None    Payor: MEDICARE / Plan: MEDICARE PART A & B / Product Type: Government /     Extended Emergency Contact Information  Primary Emergency Contact: Glen Curiel  Address: [address] [place] [address] [facility]  Home Phone: [phone]  Mobile Phone: [phone]  Relation: Spouse    Discharge Plan A: Home with family  Discharge Plan B: Home      [id number] - [place], [place] - [address] [facility]  [address]  [facility] [address]  Phone: [phone] Fax: [phone]    [id number] - [facility] [place] [address] [place] [address] AT [facility] [address] [place] [address]  Phone: [phone] Fax: [phone]      Initial Assessment (most recent)       Adult Discharge Assessment - 10/21/23 1028          Discharge Assessment    Assessment Type Discharge Planning Assessment     Confirmed/corrected address, phone number and insurance Yes     Confirmed Demographics Correct on Facesheet     Source of Information patient     When was your last doctors appointment? 10/02/23     Does patient/caregiver understand observation status Yes     Communicated HERMINIO with patient/caregiver Yes     Reason For Admission See chart     People in Home spouse     Do you expect to return to your current living situation? Yes     Do you have help at home or someone to help you manage your care at home? Yes     Who are your caregiver(s) and their phone number(s)? see demo.     Prior to hospitilization cognitive status: Alert/Oriented     Current cognitive status: Alert/Oriented     Home Accessibility wheelchair accessible     Home Layout Able to live on 1st floor      Equipment Currently Used at Home none     Readmission within 30 days? No     Patient currently being followed by outpatient case management? No     Do you currently have service(s) that help you manage your care at home? No     Do you take prescription medications? Yes     Do you have prescription coverage? Yes     Coverage medicare A and B     Do you have any problems affording any of your prescribed medications? No     Is the patient taking medications as prescribed? yes     Who is going to help you get home at discharge? Spouse     How do you get to doctors appointments? car, drives self     Are you on dialysis? No     Do you take coumadin? No     DME Needed Upon Discharge  none     Discharge Plan discussed with: Patient     Transition of Care Barriers None     Discharge Plan A Home with family     Discharge Plan B Home                          Spoke to pt. Pt lives at home with spouse. Post hospital  stay spouse will be pt support person and pt. has transportation at d/c with spouse. There have been no hospitalizations within the last 30 days per pt. Verified pt PCP and preferred pharmacy. Pt stated not on Coumadin and is not receiving dialysis. All questions answered regarding case management/ discharge planning , pt verbalized understanding. Discharge booklet with SW contact information given to pt.     Reta Carpenter LCSW  Case Management/[facility]  [phone]

## 2023-10-21 NOTE — NURSING
[facility] Plan of Care Note  Dx Interstitial lung disease    Shift Events none    Goals of Care: drain chest tube, pain management, ambulation    Neuro: a&ox4     Vital Signs: VSS     Respiratory: RA, 1Ln/c at night     Diet: regular    Is patient tolerating current diet? yes    GTTS: none    Urine Output/Bowel Movement: 300 no BM    Drains/Tubes/Tube Feeds (include total output/shift): chest tube 128 output    Lines: LFA 18ga      Accuchecks:none    Skin: intact, bruising chest tube incision    Fall Risk Score: 13    Activity level? Independent     Any scheduled procedures? none    Any safety concerns? Fall risk    Other: none

## 2023-10-21 NOTE — PROGRESS NOTES
[facility] - [facility]  Thoracic Surgery  Progress Note    Subjective:     History of Present Illness:  No notes on file    Post-Op Info:  Procedure(s) (LRB):  VATS WEDGE (Right)  BRONCHOSCOPY (N/A)  BLOCK, NERVE, INTERCOSTAL, 2 OR MORE   1 Day Post-Op     Interval History: Did well overnight  AFVSS on minimal supplemental O2 via nasal cannula  Pain controlled    Medications:  Continuous Infusions:  Scheduled Meds:   acetaminophen  1,000 mg Oral Q8H    aspirin  81 mg Oral Daily    atorvastatin  80 mg Oral Daily    gabapentin  300 mg Oral TID    heparin (porcine)  5,000 Units Subcutaneous Q8H    LIDOcaine  1 patch Transdermal Q24H    methocarbamoL  500 mg Oral QID    metoprolol succinate  12.5 mg Oral Daily    mupirocin  1 g Nasal BID    propafenone  225 mg Oral Q12H    senna-docusate 8.6-50 mg  1 tablet Oral BID     PRN Meds:HYDROmorphone, metoclopramide HCl, ondansetron, oxyCODONE, sodium chloride 0.9%     Review of patient's allergies indicates:   Allergen Reactions    Iodinated contrast media Anaphylaxis    Penicillins Anaphylaxis    Allopurinol analogues      Muscle cramps    Ciprofloxacin Rash    Quinolones Rash    Sulfa (sulfonamide antibiotics) Rash    Tetracyclines Rash     Objective:     Vital Signs (Most Recent):  Temp: 97.6 °F (36.4 °C) (10/21/23 0813)  Pulse: 61 (10/21/23 0813)  Resp: 18 (10/21/23 0824)  BP: (!) 104/53 (10/21/23 0813)  SpO2: (!) 90 % (10/21/23 0813) Vital Signs (24h Range):  Temp:  [96 °F (35.6 °C)-97.9 °F (36.6 °C)] 97.6 °F (36.4 °C)  Pulse:  [60-70] 61  Resp:  [8-33] 18  SpO2:  [90 %-100 %] 90 %  BP: ()/(50-65) 104/53     Intake/Output - Last 3 Shifts         10/19 0700  10/20 0659 10/20 0700  10/21 0659 10/21 0700  10/22 0659    P.O.  120     IV Piggyback  50     Total Intake(mL/kg)  170 (1.9)     Urine (mL/kg/hr)  625 (0.3)     Stool  0     Chest Tube  218     Total Output  843     Net  -673            Urine Occurrence  2 x     Stool Occurrence  0 x              SpO2: (!) 90 %        Physical Exam  Vitals and nursing note reviewed.   Constitutional:       Appearance: Normal appearance.   HENT:      Head: Normocephalic.      Mouth/Throat:      Mouth: Mucous membranes are moist.   Eyes:      Extraocular Movements: Extraocular movements intact.      Pupils: Pupils are equal, round, and reactive to light.   Cardiovascular:      Rate and Rhythm: Normal rate and regular rhythm.      Pulses: Normal pulses.   Abdominal:      General: Abdomen is flat.      Palpations: Abdomen is soft.   Musculoskeletal:         General: Normal range of motion.      Cervical back: Neck supple.   Skin:     General: Skin is warm.      Comments: Right sided incisions c/d/I  CT in place with thin SS output. No airleak   Neurological:      General: No focal deficit present.      Mental Status: She is alert and oriented to person, place, and time.   Psychiatric:         Mood and Affect: Mood normal.         Behavior: Behavior normal.            Significant Labs:  All pertinent labs from the last 24 hours have been reviewed.    Significant Diagnostics:  I have reviewed all pertinent imaging results/findings within the past 24 hours.    VTE Risk Mitigation (From admission, onward)           Ordered     heparin (porcine) injection 5,000 Units  Every 8 hours         10/20/23 1333     IP VTE HIGH RISK PATIENT  Once         10/20/23 1333     Place CURLY hose  Until discontinued         10/20/23 0740     Place sequential compression device  Until discontinued         10/20/23 0740                  Assessment/Plan:     * Interstitial lung disease  72 y.o. female never smoker (BMI: 37.2) with CKD, AFib and sick sinus syndrome, HTN, hyperlipidemia, and JOSÉ not on CPAP s/p right VATS, wedge biopsy on 10/20/23 to r/o ILD    Regular diet   Multimodal pain regimen   CT clamp trial and possible removal later  Wean O2 off  Home meds  DVT ppx  Ambulate in halls     Anticipate d/c home this afternoon pending CT clamp  trial         Yomi Barrera MD  Thoracic Surgery  [facility]

## 2023-10-21 NOTE — ANESTHESIA POSTPROCEDURE EVALUATION
Anesthesia Post Evaluation    Patient: Jesenia Curiel    Procedure(s) Performed: Procedure(s) (LRB):  VATS WEDGE (Right)  BRONCHOSCOPY (N/A)  BLOCK, NERVE, INTERCOSTAL, 2 OR MORE    Final Anesthesia Type: general      Patient location during evaluation: floor  Patient participation: Yes- Able to Participate  Level of consciousness: awake and alert and oriented  Post-procedure vital signs: reviewed and stable  Pain management: adequate  Airway patency: patent    PONV status at discharge: No PONV  Anesthetic complications: no      Cardiovascular status: hemodynamically stable  Respiratory status: spontaneous ventilation  Hydration status: euvolemic  Follow-up not needed.          Vitals Value Taken Time   BP 92/54 10/21/23 1126   Temp 36.3 °C (97.4 °F) 10/21/23 1126   Pulse 63 10/21/23 1126   Resp 18 10/21/23 1126   SpO2 92 % 10/21/23 1126         Event Time   Out of Recovery 13:00:00         Pain/Criss Score: Pain Rating Prior to Med Admin: 5 (10/21/2023  8:24 AM)  Pain Rating Post Med Admin: 4 (10/21/2023  6:13 AM)  Criss Score: 10 (10/20/2023  7:45 PM)

## 2023-10-21 NOTE — PLAN OF CARE
Patient discharged to home. Discharge instructions verbally given and hard copy provided to patient and spouse. Prescription delivered to bedside. Removed 20 g IV with cath tip in place. Patient tolerated IV removal well with bleeding controlled. Patient remains free of falls with no acute pain or distress noted. Patient left floor via wheelchair.

## 2023-10-22 ENCOUNTER — HOSPITAL ENCOUNTER (INPATIENT)
Facility: HOSPITAL | Age: 73
LOS: 4 days | Discharge: HOME | DRG: 683 | End: 2023-10-26
Attending: EMERGENCY MEDICINE | Admitting: EMERGENCY MEDICINE
Payer: MEDICARE

## 2023-10-22 DIAGNOSIS — N17.9 AKI (ACUTE KIDNEY INJURY): ICD-10-CM

## 2023-10-22 DIAGNOSIS — J84.9 INTERSTITIAL LUNG DISEASE: ICD-10-CM

## 2023-10-22 DIAGNOSIS — I50.32 CHRONIC DIASTOLIC HEART FAILURE: ICD-10-CM

## 2023-10-22 DIAGNOSIS — E87.1 HYPONATREMIA: Primary | ICD-10-CM

## 2023-10-22 DIAGNOSIS — R00.0 TACHYCARDIA: ICD-10-CM

## 2023-10-22 DIAGNOSIS — R53.1 WEAKNESS: ICD-10-CM

## 2023-10-22 DIAGNOSIS — I48.91 A-FIB: ICD-10-CM

## 2023-10-22 DIAGNOSIS — I49.9 ARRHYTHMIA: ICD-10-CM

## 2023-10-22 PROBLEM — E87.20 METABOLIC ACIDOSIS: Status: ACTIVE | Noted: 2023-10-22

## 2023-10-22 LAB
ALBUMIN SERPL BCP-MCNC: 3.2 G/DL (ref 3.5–5.2)
ALLENS TEST: NORMAL
ALP SERPL-CCNC: 71 U/L (ref 55–135)
ALT SERPL W/O P-5'-P-CCNC: 19 U/L (ref 10–44)
AMORPH CRY UR QL COMP ASSIST: ABNORMAL
ANION GAP SERPL CALC-SCNC: 13 MMOL/L (ref 8–16)
APTT PPP: 23.6 SEC (ref 21–32)
AST SERPL-CCNC: 38 U/L (ref 10–40)
BACTERIA #/AREA URNS AUTO: ABNORMAL /HPF
BASOPHILS # BLD AUTO: 0.05 K/UL (ref 0–0.2)
BASOPHILS # BLD AUTO: 0.05 K/UL (ref 0–0.2)
BASOPHILS NFR BLD: 0.4 % (ref 0–1.9)
BASOPHILS NFR BLD: 0.4 % (ref 0–1.9)
BILIRUB SERPL-MCNC: 0.9 MG/DL (ref 0.1–1)
BILIRUB UR QL STRIP: NEGATIVE
BNP SERPL-MCNC: 213 PG/ML (ref 0–99)
BUN SERPL-MCNC: 34 MG/DL (ref 8–23)
CALCIUM SERPL-MCNC: 9.1 MG/DL (ref 8.7–10.5)
CHLORIDE SERPL-SCNC: 98 MMOL/L (ref 95–110)
CLARITY UR REFRACT.AUTO: ABNORMAL
CO2 SERPL-SCNC: 14 MMOL/L (ref 23–29)
COLOR UR AUTO: YELLOW
CREAT SERPL-MCNC: 2.5 MG/DL (ref 0.5–1.4)
DIFFERENTIAL METHOD: ABNORMAL
DIFFERENTIAL METHOD: ABNORMAL
EOSINOPHIL # BLD AUTO: 0.1 K/UL (ref 0–0.5)
EOSINOPHIL # BLD AUTO: 0.1 K/UL (ref 0–0.5)
EOSINOPHIL NFR BLD: 0.4 % (ref 0–8)
EOSINOPHIL NFR BLD: 0.6 % (ref 0–8)
ERYTHROCYTE [DISTWIDTH] IN BLOOD BY AUTOMATED COUNT: 13.1 % (ref 11.5–14.5)
ERYTHROCYTE [DISTWIDTH] IN BLOOD BY AUTOMATED COUNT: 13.2 % (ref 11.5–14.5)
EST. GFR  (NO RACE VARIABLE): 19.7 ML/MIN/1.73 M^2
GLUCOSE SERPL-MCNC: 89 MG/DL (ref 70–110)
GLUCOSE UR QL STRIP: NEGATIVE
HCT VFR BLD AUTO: 33.6 % (ref 37–48.5)
HCT VFR BLD AUTO: 33.9 % (ref 37–48.5)
HCV AB SERPL QL IA: NORMAL
HGB BLD-MCNC: 11 G/DL (ref 12–16)
HGB BLD-MCNC: 11 G/DL (ref 12–16)
HGB UR QL STRIP: ABNORMAL
HIV 1+2 AB+HIV1 P24 AG SERPL QL IA: NORMAL
HYALINE CASTS UR QL AUTO: 1 /LPF
IMM GRANULOCYTES # BLD AUTO: 0.08 K/UL (ref 0–0.04)
IMM GRANULOCYTES # BLD AUTO: 0.08 K/UL (ref 0–0.04)
IMM GRANULOCYTES NFR BLD AUTO: 0.7 % (ref 0–0.5)
IMM GRANULOCYTES NFR BLD AUTO: 0.7 % (ref 0–0.5)
KETONES UR QL STRIP: NEGATIVE
LDH SERPL L TO P-CCNC: 1.02 MMOL/L (ref 0.5–2.2)
LEUKOCYTE ESTERASE UR QL STRIP: NEGATIVE
LYMPHOCYTES # BLD AUTO: 1.1 K/UL (ref 1–4.8)
LYMPHOCYTES # BLD AUTO: 1.2 K/UL (ref 1–4.8)
LYMPHOCYTES NFR BLD: 10.4 % (ref 18–48)
LYMPHOCYTES NFR BLD: 9.1 % (ref 18–48)
MAGNESIUM SERPL-MCNC: 2.1 MG/DL (ref 1.6–2.6)
MCH RBC QN AUTO: 31 PG (ref 27–31)
MCH RBC QN AUTO: 31.2 PG (ref 27–31)
MCHC RBC AUTO-ENTMCNC: 32.4 G/DL (ref 32–36)
MCHC RBC AUTO-ENTMCNC: 32.7 G/DL (ref 32–36)
MCV RBC AUTO: 95 FL (ref 82–98)
MCV RBC AUTO: 96 FL (ref 82–98)
MICROSCOPIC COMMENT: ABNORMAL
MONOCYTES # BLD AUTO: 1.3 K/UL (ref 0.3–1)
MONOCYTES # BLD AUTO: 1.3 K/UL (ref 0.3–1)
MONOCYTES NFR BLD: 11 % (ref 4–15)
MONOCYTES NFR BLD: 11.5 % (ref 4–15)
NEUTROPHILS # BLD AUTO: 8.6 K/UL (ref 1.8–7.7)
NEUTROPHILS # BLD AUTO: 9.3 K/UL (ref 1.8–7.7)
NEUTROPHILS NFR BLD: 76.6 % (ref 38–73)
NEUTROPHILS NFR BLD: 78.2 % (ref 38–73)
NITRITE UR QL STRIP: NEGATIVE
NRBC BLD-RTO: 0 /100 WBC
NRBC BLD-RTO: 0 /100 WBC
OSMOLALITY SERPL: 287 MOSM/KG (ref 275–295)
OSMOLALITY UR: 209 MOSM/KG (ref 50–1200)
PH UR STRIP: 5 [PH] (ref 5–8)
PLATELET # BLD AUTO: 190 K/UL (ref 150–450)
PLATELET # BLD AUTO: 197 K/UL (ref 150–450)
PMV BLD AUTO: 10 FL (ref 9.2–12.9)
PMV BLD AUTO: 10 FL (ref 9.2–12.9)
POCT GLUCOSE: 130 MG/DL (ref 70–110)
POTASSIUM SERPL-SCNC: 4.3 MMOL/L (ref 3.5–5.1)
PROT SERPL-MCNC: 7 G/DL (ref 6–8.4)
PROT UR QL STRIP: ABNORMAL
RBC # BLD AUTO: 3.53 M/UL (ref 4–5.4)
RBC # BLD AUTO: 3.55 M/UL (ref 4–5.4)
RBC #/AREA URNS AUTO: 4 /HPF (ref 0–4)
SAMPLE: NORMAL
SITE: NORMAL
SODIUM SERPL-SCNC: 125 MMOL/L (ref 136–145)
SODIUM UR-SCNC: <10 MMOL/L (ref 20–250)
SP GR UR STRIP: 1.01 (ref 1–1.03)
SQUAMOUS #/AREA URNS AUTO: 1 /HPF
TROPONIN I SERPL DL<=0.01 NG/ML-MCNC: 0.02 NG/ML (ref 0–0.03)
URN SPEC COLLECT METH UR: ABNORMAL
WBC # BLD AUTO: 11.23 K/UL (ref 3.9–12.7)
WBC # BLD AUTO: 11.92 K/UL (ref 3.9–12.7)
WBC #/AREA URNS AUTO: 4 /HPF (ref 0–5)

## 2023-10-22 PROCEDURE — 80053 COMPREHEN METABOLIC PANEL: CPT

## 2023-10-22 PROCEDURE — 93010 ELECTROCARDIOGRAM REPORT: CPT | Mod: ,,, | Performed by: INTERNAL MEDICINE

## 2023-10-22 PROCEDURE — 93005 ELECTROCARDIOGRAM TRACING: CPT

## 2023-10-22 PROCEDURE — 85730 THROMBOPLASTIN TIME PARTIAL: CPT | Performed by: STUDENT IN AN ORGANIZED HEALTH CARE EDUCATION/TRAINING PROGRAM

## 2023-10-22 PROCEDURE — 25000003 PHARM REV CODE 250: Performed by: STUDENT IN AN ORGANIZED HEALTH CARE EDUCATION/TRAINING PROGRAM

## 2023-10-22 PROCEDURE — 84443 ASSAY THYROID STIM HORMONE: CPT | Performed by: STUDENT IN AN ORGANIZED HEALTH CARE EDUCATION/TRAINING PROGRAM

## 2023-10-22 PROCEDURE — 84439 ASSAY OF FREE THYROXINE: CPT | Performed by: STUDENT IN AN ORGANIZED HEALTH CARE EDUCATION/TRAINING PROGRAM

## 2023-10-22 PROCEDURE — 99900035 HC TECH TIME PER 15 MIN (STAT)

## 2023-10-22 PROCEDURE — 87389 HIV-1 AG W/HIV-1&-2 AB AG IA: CPT | Performed by: PHYSICIAN ASSISTANT

## 2023-10-22 PROCEDURE — 94761 N-INVAS EAR/PLS OXIMETRY MLT: CPT

## 2023-10-22 PROCEDURE — 83880 ASSAY OF NATRIURETIC PEPTIDE: CPT

## 2023-10-22 PROCEDURE — 83605 ASSAY OF LACTIC ACID: CPT

## 2023-10-22 PROCEDURE — 86803 HEPATITIS C AB TEST: CPT | Performed by: PHYSICIAN ASSISTANT

## 2023-10-22 PROCEDURE — 27000221 HC OXYGEN, UP TO 24 HOURS

## 2023-10-22 PROCEDURE — 84300 ASSAY OF URINE SODIUM: CPT

## 2023-10-22 PROCEDURE — 21400001 HC TELEMETRY ROOM

## 2023-10-22 PROCEDURE — 85025 COMPLETE CBC W/AUTO DIFF WBC: CPT | Performed by: STUDENT IN AN ORGANIZED HEALTH CARE EDUCATION/TRAINING PROGRAM

## 2023-10-22 PROCEDURE — 87040 BLOOD CULTURE FOR BACTERIA: CPT | Mod: 59

## 2023-10-22 PROCEDURE — 93010 ELECTROCARDIOGRAM REPORT: CPT | Mod: 76,,, | Performed by: INTERNAL MEDICINE

## 2023-10-22 PROCEDURE — 83935 ASSAY OF URINE OSMOLALITY: CPT

## 2023-10-22 PROCEDURE — 99285 EMERGENCY DEPT VISIT HI MDM: CPT | Mod: 25

## 2023-10-22 PROCEDURE — 83735 ASSAY OF MAGNESIUM: CPT

## 2023-10-22 PROCEDURE — 83930 ASSAY OF BLOOD OSMOLALITY: CPT

## 2023-10-22 PROCEDURE — 84484 ASSAY OF TROPONIN QUANT: CPT

## 2023-10-22 PROCEDURE — 85025 COMPLETE CBC W/AUTO DIFF WBC: CPT | Mod: 91

## 2023-10-22 PROCEDURE — 63600175 PHARM REV CODE 636 W HCPCS: Performed by: STUDENT IN AN ORGANIZED HEALTH CARE EDUCATION/TRAINING PROGRAM

## 2023-10-22 PROCEDURE — 93010 EKG 12-LEAD: ICD-10-PCS | Mod: ,,, | Performed by: INTERNAL MEDICINE

## 2023-10-22 PROCEDURE — 80069 RENAL FUNCTION PANEL: CPT | Performed by: STUDENT IN AN ORGANIZED HEALTH CARE EDUCATION/TRAINING PROGRAM

## 2023-10-22 PROCEDURE — 81001 URINALYSIS AUTO W/SCOPE: CPT

## 2023-10-22 RX ORDER — ATORVASTATIN CALCIUM 40 MG/1
80 TABLET, FILM COATED ORAL DAILY
Status: DISCONTINUED | OUTPATIENT
Start: 2023-10-23 | End: 2023-10-26 | Stop reason: HOSPADM

## 2023-10-22 RX ORDER — HEPARIN SODIUM,PORCINE/D5W 25000/250
0-40 INTRAVENOUS SOLUTION INTRAVENOUS CONTINUOUS
Status: DISCONTINUED | OUTPATIENT
Start: 2023-10-22 | End: 2023-10-23

## 2023-10-22 RX ORDER — PROPAFENONE HYDROCHLORIDE 225 MG/1
225 CAPSULE, EXTENDED RELEASE ORAL EVERY 12 HOURS
Status: DISCONTINUED | OUTPATIENT
Start: 2023-10-22 | End: 2023-10-26 | Stop reason: HOSPADM

## 2023-10-22 RX ORDER — AMOXICILLIN 250 MG
1 CAPSULE ORAL 2 TIMES DAILY
Status: DISCONTINUED | OUTPATIENT
Start: 2023-10-22 | End: 2023-10-26 | Stop reason: HOSPADM

## 2023-10-22 RX ORDER — IBUPROFEN 200 MG
16 TABLET ORAL
Status: DISCONTINUED | OUTPATIENT
Start: 2023-10-22 | End: 2023-10-26 | Stop reason: HOSPADM

## 2023-10-22 RX ORDER — ACETAMINOPHEN 500 MG
1000 TABLET ORAL EVERY 8 HOURS
Status: DISCONTINUED | OUTPATIENT
Start: 2023-10-22 | End: 2023-10-26 | Stop reason: HOSPADM

## 2023-10-22 RX ORDER — INSULIN ASPART 100 [IU]/ML
0-5 INJECTION, SOLUTION INTRAVENOUS; SUBCUTANEOUS
Status: DISCONTINUED | OUTPATIENT
Start: 2023-10-22 | End: 2023-10-26 | Stop reason: HOSPADM

## 2023-10-22 RX ORDER — ASPIRIN 81 MG/1
81 TABLET ORAL DAILY
Status: DISCONTINUED | OUTPATIENT
Start: 2023-10-23 | End: 2023-10-26 | Stop reason: HOSPADM

## 2023-10-22 RX ORDER — METOCLOPRAMIDE HYDROCHLORIDE 5 MG/ML
5 INJECTION INTRAMUSCULAR; INTRAVENOUS EVERY 6 HOURS PRN
Status: DISCONTINUED | OUTPATIENT
Start: 2023-10-22 | End: 2023-10-22

## 2023-10-22 RX ORDER — MUPIROCIN 20 MG/G
1 OINTMENT TOPICAL 2 TIMES DAILY
Status: DISCONTINUED | OUTPATIENT
Start: 2023-10-22 | End: 2023-10-26 | Stop reason: HOSPADM

## 2023-10-22 RX ORDER — HYDROMORPHONE HYDROCHLORIDE 1 MG/ML
0.5 INJECTION, SOLUTION INTRAMUSCULAR; INTRAVENOUS; SUBCUTANEOUS EVERY 6 HOURS PRN
Status: DISCONTINUED | OUTPATIENT
Start: 2023-10-22 | End: 2023-10-26 | Stop reason: HOSPADM

## 2023-10-22 RX ORDER — OXYCODONE HYDROCHLORIDE 5 MG/1
5 TABLET ORAL EVERY 4 HOURS PRN
Status: DISCONTINUED | OUTPATIENT
Start: 2023-10-22 | End: 2023-10-26 | Stop reason: HOSPADM

## 2023-10-22 RX ORDER — HEPARIN SODIUM 5000 [USP'U]/ML
5000 INJECTION, SOLUTION INTRAVENOUS; SUBCUTANEOUS EVERY 8 HOURS
Status: DISCONTINUED | OUTPATIENT
Start: 2023-10-22 | End: 2023-10-22

## 2023-10-22 RX ORDER — METHOCARBAMOL 500 MG/1
500 TABLET, FILM COATED ORAL 4 TIMES DAILY
Status: DISCONTINUED | OUTPATIENT
Start: 2023-10-22 | End: 2023-10-26 | Stop reason: HOSPADM

## 2023-10-22 RX ORDER — LIDOCAINE 50 MG/G
1 PATCH TOPICAL
Status: DISCONTINUED | OUTPATIENT
Start: 2023-10-22 | End: 2023-10-26 | Stop reason: HOSPADM

## 2023-10-22 RX ORDER — SODIUM CHLORIDE 9 MG/ML
INJECTION, SOLUTION INTRAVENOUS CONTINUOUS
Status: DISCONTINUED | OUTPATIENT
Start: 2023-10-22 | End: 2023-10-23

## 2023-10-22 RX ORDER — SODIUM CHLORIDE 0.9 % (FLUSH) 0.9 %
10 SYRINGE (ML) INJECTION
Status: DISCONTINUED | OUTPATIENT
Start: 2023-10-22 | End: 2023-10-26 | Stop reason: HOSPADM

## 2023-10-22 RX ORDER — SODIUM CHLORIDE 0.9 % (FLUSH) 0.9 %
10 SYRINGE (ML) INJECTION
Status: DISCONTINUED | OUTPATIENT
Start: 2023-10-22 | End: 2023-10-22

## 2023-10-22 RX ORDER — ONDANSETRON 8 MG/1
8 TABLET, ORALLY DISINTEGRATING ORAL EVERY 8 HOURS PRN
Status: DISCONTINUED | OUTPATIENT
Start: 2023-10-22 | End: 2023-10-26 | Stop reason: HOSPADM

## 2023-10-22 RX ADMIN — SENNOSIDES AND DOCUSATE SODIUM 1 TABLET: 50; 8.6 TABLET ORAL at 10:10

## 2023-10-22 RX ADMIN — HEPARIN SODIUM 5000 UNITS: 5000 INJECTION INTRAVENOUS; SUBCUTANEOUS at 10:10

## 2023-10-22 RX ADMIN — LIDOCAINE 5% 1 PATCH: 700 PATCH TOPICAL at 10:10

## 2023-10-22 RX ADMIN — ACETAMINOPHEN 1000 MG: 500 TABLET ORAL at 10:10

## 2023-10-22 RX ADMIN — SODIUM CHLORIDE: 9 INJECTION, SOLUTION INTRAVENOUS at 11:10

## 2023-10-22 RX ADMIN — MUPIROCIN 1 G: 20 OINTMENT TOPICAL at 10:10

## 2023-10-22 NOTE — ED NOTES
The patient had a biopsy and chest tube last week. Was taken out and sent home. Fall on Saturday and since then weak. Slid out of chair today and could not get up. Ems called and reports bp systolic in 80s. Given liter of fluids with improvement to systolic in 100s. No chest pain. + sob. + cough. Both are chronic. Back pain since fall. Hx of pacemaker and SSS. + bruising present to arms. Pt not normally on oxygen but pulse ox was reportedly low and ems placed on nasal cannula.

## 2023-10-22 NOTE — ED PROVIDER NOTES
Encounter Date: 10/22/2023       History     Chief Complaint   Patient presents with    Weakness     Pt with lung biopsy here and discharge on Sat, fell at home Sat due to weakness, today still weak and slid out of bed and family found hypotensive     Patient is a 73-year-old female with past medical history of hypertension, AFib, CKD, arthrosclerosis, GERD and interstitial lung disease that presents to the emergency department with weakness after a fall on Saturday afternoon 10/21.  Patient states that she was walking down the hallway fell and landed on her back in the back of her head.  Ever since then she is had significant weakness to the point where she can not walk can not get out of bed.  Slid out of her chair today and her  and her were not able to get off the ground.  As I they called 911.  She is no chest pain.  She has a baseline shortness of breath without worsening.  She has a chronic cough that has not worsened either.  She denies fevers, denies any leg pain or swelling.  Denies abdominal pain, nausea or vomiting.    No other symptoms at this time.    The history is provided by the patient, a relative and medical records.     Review of patient's allergies indicates:   Allergen Reactions    Iodinated contrast media Anaphylaxis    Penicillins Anaphylaxis    Allopurinol analogues      Muscle cramps    Ciprofloxacin Rash    Quinolones Rash    Sulfa (sulfonamide antibiotics) Rash    Tetracyclines Rash     Past Medical History:   Diagnosis Date    Allergy     Arthritis     Atrial fibrillation 5/29/2017    Chronic diastolic heart failure 9/29/2023    CKD (chronic kidney disease) stage 3, GFR 30-59 ml/min 6/26/2017    Coronary artery calcification seen on CAT scan 9/29/2023    Disorder of kidney and ureter     GERD (gastroesophageal reflux disease)     Hyperlipidemia     Hypertension     Impaired fasting glucose 11/19/2021    Pre-diabetes 6/3/2017     Past Surgical History:   Procedure Laterality Date     ADENOIDECTOMY      BACK SURGERY      lamenectomy    BREAST SURGERY      BRONCHOSCOPY N/A 2023    Procedure: BRONCHOSCOPY;  Surgeon: [facility] Diagnostic Provider;  Location: [facility] OR 2ND FLR;  Service: Anesthesiology;  Laterality: N/A;    CARDIAC SURGERY      [employer] pacemaker     CATARACT EXTRACTION W/  INTRAOCULAR LENS IMPLANT Right 6/3/2019    Procedure: EXTRACTION, CATARACT, WITH IOL INSERTION;  Surgeon: Raisa Moreno MD;  Location: [facility] OR;  Service: Ophthalmology;  Laterality: Right;  Laser    CATARACT EXTRACTION W/  INTRAOCULAR LENS IMPLANT Left 2019    Procedure: EXTRACTION, CATARACT, WITH IOL INSERTION;  Surgeon: Raisa Moreno MD;  Location: [facility] OR;  Service: Ophthalmology;  Laterality: Left;  LASER ASSISTED     SECTION      ,     COLONOSCOPY N/A 9/15/2023    Procedure: COLONOSCOPY;  Surgeon: Tao Gomez MD;  Location: [facility] ENDO (2ND FLR);  Service: Endoscopy;  Laterality: N/A;  inst to portal/[employer] pacemaker   2nd floor for airway protection patient with lung disease elevated pulmonary artery pressure pacemaker and fecal occult blood positive stool,   cleared by pulmonary Dr Yony-see note on -GT    ENDOSCOPIC ULTRASOUND OF UPPER GASTROINTESTINAL TRACT N/A 2023    Procedure: ULTRASOUND, UPPER GI TRACT, ENDOSCOPIC;  Surgeon: Casimiro De Los Santos MD;  Location: [facility] ENDO;  Service: Endoscopy;  Laterality: N/A;  23: instructions send via portal. [employer] ppm- GD    EYE SURGERY      FRACTURE SURGERY      HYSTERECTOMY      INSERT / REPLACE / REMOVE PACEMAKER      placement    INSERT / REPLACE / REMOVE PACEMAKER  2014    [employer] Model #[id number] replacement    pace maker      replacement     REVISION OF PROCEDURE INVOLVING PACEMAKER LEAD Left 2022    Procedure: REVISION, ELECTRODE LEAD, CARDIAC PACEMAKER;  Surgeon: Trell Hodgson MD;  Location: [facility] EP LAB;  Service: Cardiology;  Laterality: Left;  PPM lead Malfx, RA lead revision, SJM, MAC,  GP, 3 PREP*dPPM SJM in situ**Contrast prep given*    salpingo opherectomy Bilateral 1998    SPINE SURGERY      TONSILLECTOMY  1953    TUBAL LIGATION       Family History   Problem Relation Age of Onset    Hypertension Mother     Diabetes Mother     Hyperlipidemia Mother     Coronary artery disease Mother     Heart disease Mother     Stroke Mother     Hypertension Father     Diabetes Father     Hyperlipidemia Father     Coronary artery disease Father     Heart disease Father     Coronary artery disease Brother     Heart disease Brother     Hyperthyroidism Brother     Diabetes Paternal Grandmother     Diabetes Paternal Grandfather     Heart attack Neg Hx      Social History     Tobacco Use    Smoking status: Never    Smokeless tobacco: Never   Substance Use Topics    Alcohol use: Yes     Alcohol/week: 5.0 standard drinks of alcohol     Types: 5 Glasses of wine per week    Drug use: No     Review of Systems  ROS negative except as noted in HPI    Physical Exam     Initial Vitals [10/22/23 1707]   BP Pulse Resp Temp SpO2   (!) 83/59 65 20 98.7 °F (37.1 °C) (!) 93 %      MAP       --         Physical Exam    Nursing note and vitals reviewed.  Constitutional: She is not diaphoretic. She is cooperative. She appears toxic. She appears ill. No distress.   HENT:   Head: Normocephalic.   Right Ear: External ear normal.   Left Ear: External ear normal.   Nose: Nose normal.   Mouth/Throat: Mucous membranes are pale and dry. No oropharyngeal exudate.   Neck: Neck supple. No thyromegaly present. No JVD present.   Cardiovascular:  Normal rate, regular rhythm, normal heart sounds and intact distal pulses.           No murmur heard.  Pulmonary/Chest: Effort normal. No accessory muscle usage. No tachypnea. No respiratory distress. She has decreased breath sounds. She has rales in the right lower field and the left lower field.   Musculoskeletal:      Cervical back: Neck supple.     Neurological: She is alert.           ED Course    Procedures  Labs Reviewed   CBC W/ AUTO DIFFERENTIAL - Abnormal; Notable for the following components:       Result Value    RBC 3.55 (*)     Hemoglobin 11.0 (*)     Hematocrit 33.9 (*)     Immature Granulocytes 0.7 (*)     Gran # (ANC) 9.3 (*)     Immature Grans (Abs) 0.08 (*)     Mono # 1.3 (*)     Gran % 78.2 (*)     Lymph % 9.1 (*)     All other components within normal limits   COMPREHENSIVE METABOLIC PANEL - Abnormal; Notable for the following components:    Sodium 125 (*)     CO2 14 (*)     BUN 34 (*)     Creatinine 2.5 (*)     Albumin 3.2 (*)     eGFR 19.7 (*)     All other components within normal limits   URINALYSIS, REFLEX TO URINE CULTURE - Abnormal; Notable for the following components:    Appearance, UA Cloudy (*)     Protein, UA 1+ (*)     Occult Blood UA 2+ (*)     All other components within normal limits    Narrative:     Specimen Source->Urine   B-TYPE NATRIURETIC PEPTIDE - Abnormal; Notable for the following components:     (*)     All other components within normal limits   SODIUM, URINE, RANDOM - Abnormal; Notable for the following components:    Sodium, Urine <10 (*)     All other components within normal limits    Narrative:     Specimen Source->Urine   URINALYSIS MICROSCOPIC - Abnormal; Notable for the following components:    Bacteria Many (*)     Amorphous, UA Many (*)     All other components within normal limits    Narrative:     Specimen Source->Urine   CULTURE, BLOOD   CULTURE, BLOOD   HIV 1 / 2 ANTIBODY    Narrative:     Release to patient->Immediate   HEPATITIS C ANTIBODY    Narrative:     Release to patient->Immediate   MAGNESIUM   TROPONIN I   OSMOLALITY, URINE RANDOM    Narrative:     Specimen Source->Urine   ISTAT LACTATE   POCT GLUCOSE MONITORING CONTINUOUS     EKG Readings: (Independently Interpreted)   ST depressions in V2, V3.  Posterior EKG obtained given depressions which did not demonstrate any ST elevations in V7 through V9.  No STEMI       Imaging Results               X-Ray Chest AP Portable (Final result)  Result time 10/22/23 18:50:57      Final result by Eriberto Yeboah MD (10/22/23 18:50:57)                   Impression:      As above.      Electronically signed by: Eriberto Yeboah MD  Date:    10/22/2023  Time:    18:50               Narrative:    EXAMINATION:  XR CHEST AP PORTABLE    CLINICAL HISTORY:  Weakness    TECHNIQUE:  Single frontal view of the chest was performed.    COMPARISON:  CT thorax 10/22/2023, chest radiograph 10/21/23    FINDINGS:  Monitoring leads overlie the chest.  Patient is slightly rotated.  Resolution is limited by body habitus with underpenetration.    Right basilar platelike scarring versus atelectasis with opacification obscuring the diaphragm and costophrenic angle consistent with small right pleural effusion and associated right basilar atelectasis better demonstrated on cross-sectional imaging performed earlier same day.  No consolidation or sizable pleural effusion on the left.  No consolidation at the imaged upper lung zones.  No definite pneumothorax.    Cardiomediastinal silhouette is midline and stable.  Cardiac device is unchanged.    Osseous structures are stable without acute process seen.                                       CT Chest Without Contrast (Final result)  Result time 10/22/23 19:19:31      Final result by Eriberto Yeboah MD (10/22/23 19:19:31)                   Impression:      Trace residual right pneumothorax and fat stranding with subcutaneous emphysema involving the right lateral chest wall in this patient with recent chest tube removal.    Persistent basilar fibrotic changes poorly evaluated due to superimposed subsegmental atelectasis.    Stable cystic lesions in the pancreas.    Stable right adrenal gland nodule.    Other findings above.    Electronically signed by resident: Femi Donahue  Date:    10/22/2023  Time:    18:24    Electronically signed by: Eriberto Yeboah MD  Date:    10/22/2023  Time:    19:19                Narrative:    EXAMINATION:  CT CHEST WITHOUT CONTRAST    CLINICAL HISTORY:  Chest trauma, blunt;    TECHNIQUE:  Axial images, sagittal and coronal reformations were obtained from the thoracic inlet to the lung bases. Contrast was not administered.    COMPARISON:  CT chest 10/09/2023, 06/16/2023    FINDINGS:  LINES/TUBES/DEVICES: Cardiac conduction device left upper chest wall with leads terminating within the heart.    SOFT TISSUES: There is fat stranding and subcutaneous emphysema involving the right lateral chest wall, presumably related to recent chest tube removal.    HEART AND MEDIASTINUM: No mediastinal or hilar lymphadenopathy. Heart is borderline enlarged.  No pericardial effusion.  The main pulmonary artery is normal in size.    PLEURA: Trace right pneumothorax.  Trace right pleural effusion.    LUNGS AND AIRWAYS: Central airways are patent.  Linear radiopaque structure in the right upper and lower lobes with mild adjacent consolidation, uncertain etiology, presumably related to recent VATS procedure (see axial series 4, images 168 and 231).  Numerous linear opacities in the bilateral lower lobes likely subsegmental atelectasis.  There are persistent subpleural reticular opacities at the lung bases, difficult to further evaluate due to superimposed atelectasis.  Previous pulmonary nodules are poorly visualized.    UPPER ABDOMEN: Small hiatal hernia.  Stable subcentimeter hypodensity right hepatic lobe, too small to characterize.  Stable 1.7 cm right adrenal gland nodule.  Stable cystic lesions within the pancreas, largest 1.6 cm in the pancreatic tail.    BONES: No fracture or focal osseous lesion.                                       CT Head Without Contrast (Final result)  Result time 10/22/23 18:28:27      Final result by Eribetro Yeboah MD (10/22/23 18:28:27)                   Impression:      No acute intracranial findings.    Changes of chronic small vessel disease.    Electronically signed  by resident: Femi Donahue  Date:    10/22/2023  Time:    18:16    Electronically signed by: Eriberto Yeboah MD  Date:    10/22/2023  Time:    18:28               Narrative:    EXAMINATION:  CT HEAD WITHOUT CONTRAST    CLINICAL HISTORY:  Head trauma, minor (Age >= 65y);    TECHNIQUE:  Low dose axial CT images obtained throughout the head without the use of intravenous contrast.  Axial, sagittal and coronal reconstructions were performed.    COMPARISON:  None.    FINDINGS:  Mild supratentorial white matter hypoattenuation, nonspecific, likely represents chronic small vessel disease.  No intracranial hemorrhage.  No mass effect. No evidence of acute infarction.    No extra-axial blood or fluid collections identified.    Age-related mild generalized cerebral volume loss without distortion by mass effect or acute hydrocephalus.  Skull base atherosclerotic vascular calcifications noted.    No fracture or focal osseous lesion. Mastoid air cells and paranasal sinuses are well aerated. Extracranial soft tissues are unremarkable.                                       Medications   mupirocin 2 % ointment 1 g (1 g Nasal Given 10/22/23 2249)   ondansetron disintegrating tablet 8 mg (has no administration in time range)   sodium chloride 0.9% flush 10 mL (has no administration in time range)   oxyCODONE immediate release tablet 5 mg (has no administration in time range)   HYDROmorphone injection 0.5 mg (has no administration in time range)   senna-docusate 8.6-50 mg per tablet 1 tablet (1 tablet Oral Given 10/22/23 2247)   aspirin EC tablet 81 mg (has no administration in time range)   atorvastatin tablet 80 mg (has no administration in time range)   methocarbamoL tablet 500 mg (500 mg Oral Not Given 10/22/23 2200)   acetaminophen tablet 1,000 mg (1,000 mg Oral Given 10/22/23 2247)   LIDOcaine 5 % patch 1 patch (1 patch Transdermal Patch Applied 10/22/23 2251)   metoprolol succinate (TOPROL-XL) 24 hr split tablet 12.5 mg (has no  administration in time range)   propafenone 12 hr capsule 225 mg (has no administration in time range)   glucose chewable tablet 16 g (has no administration in time range)   insulin aspart U-100 pen 0-5 Units (has no administration in time range)   dextrose 10% bolus 125 mL 125 mL (has no administration in time range)   dextrose 10% bolus 250 mL 250 mL (has no administration in time range)   0.9%  NaCl infusion ( Intravenous New Bag 10/22/23 2303)   heparin 25,000 units in dextrose 5% (100 units/ml) IV bolus from bag INITIAL BOLUS (0 Units Intravenous Hold 10/22/23 2300)   heparin 25,000 units in dextrose 5% 250 mL (100 units/mL) infusion LOW INTENSITY nomogram - [facility] (0 Units/kg/hr × 66.8 kg (Adjusted) Intravenous Hold 10/22/23 2300)   heparin 25,000 units in dextrose 5% (100 units/ml) IV bolus from bag - ADDITIONAL PRN BOLUS - 60 units/kg (has no administration in time range)   heparin 25,000 units in dextrose 5% (100 units/ml) IV bolus from bag - ADDITIONAL PRN BOLUS - 30 units/kg (has no administration in time range)   sodium chloride 0.9% bolus 500 mL 500 mL (has no administration in time range)     Medical Decision Making  Patient is a 73-year-old female with past medical history of hypertension, AFib, CKD, arthrosclerosis, GERD and interstitial lung disease that presents to the emergency department with weakness after a fall on Saturday afternoon 10/21.    On initial evaluation patient was hypotensive however not tachycardic.  She was also hypoxic to 88% on room air and placed on oxygen.  She was in no acute distress but with had severe weakness in both her ability to talk and move her body.  Denied any active symptoms other than pain from where she fell yesterday evening.    Differential:  Considering sequela of trauma such as subdural or intracranial hemorrhage.  Also considering infection as cause of weakness, considering infection such as pneumonia or other complication of her chest tube.  Also considering  electrolyte abnormalities given poor oral intake after her procedure.  Doubt neurologic etiology shows CVA or TIA given normal neurologic exam without any focal findings throughout entire course of illness so far.  Patient has no confusion, aphasia or other deficit.  Considering symptomatic anemia given her pale skin and weakness.    Workup to include basic labs, blood cultures, lactic acid.  We will obtain EKG, CT head and CT chest.    Workup described in ED course demonstrating hyponatremia and BARBARA likely cause of her symptoms.  We will send additional urine studies and urine osmoles.  CT head and chest were negative for sequela of trauma.  CT chest was consistent with findings related to her previous procedure earlier this week.  No evidence of leukocytosis or other infection.    We will admit to hospital medicine given symptomatic hyponatremia needing further workup and treatment.  Additionally patient will need to be weaned off her oxygen requirement.    Amount and/or Complexity of Data Reviewed  Labs: ordered. Decision-making details documented in ED Course.  Radiology: ordered.    Risk  Decision regarding hospitalization.              Attending Attestation:   Physician Attestation Statement for Resident:  As the supervising MD   Physician Attestation Statement: I have personally seen and examined this patient.   I agree with the above history.  -:   As the supervising MD I agree with the above PE.     As the supervising MD I agree with the above treatment, course, plan, and disposition.    I have reviewed and agree with the residents interpretation of the following: lab data, x-rays and EKG.         Attending Critical Care:   Critical Care Times:   Direct Patient Care (initial evaluation, reassessments, and time considering the case)................................................................15 minutes.   Ordering, Reviewing, and Interpreting Diagnostic  Studies...............................................................................................................5 minutes.   Documentation..................................................................................................................................................................................5 minutes.   ==============================================================  Total Critical Care Time - exclusive of procedural time: 25 minutes.  ==============================================================  Critical care was necessary to treat or prevent imminent or life-threatening deterioration of the following conditions: metabolic crisis.   Critical care was time spent personally by me on the following activities: re-evaluation of patient's conition, interpretation of cardiac measurements, evaluation of patient's response to treatment, ordering and performing treatments and interventions, development of treatment plan with patient or relative, ordering lab, x-rays, and/or EKG, review of old charts, examination of patient and obtaining history from patient or relative.   Critical Care Condition: potentially life-threatening       Attending ED Notes:   STAFF ATTENDING PHYSICIAN NOTE:  I have individually/jointly evaluated Jesenia Curiel and discussed their ED management with the resident physician. I have also reviewed their notes, assessments, and procedures documented.  I was present during all critical portions of any procedure(s) performed on Jesenia Curiel.   ____________________  Gerard Calloway MD, [facility]  Emergency Medicine Staff        ED Course as of 10/23/23 0045   Sun Oct 22, 2023   2046 Comprehensive metabolic panel(!)  Hyponatremia and BARBARA consistent with etiology of her weakness   [TK]   2046 CBC auto differential(!)  Hgb at baseline, no Leukocytosis which is reassuring in setting of symptoms, and CT chest.  [TK]      ED Course User Index  [TK] Carroll Castaneda, DO                     Clinical Impression:   Final diagnoses:  [R53.1] Weakness  [E87.1] Hyponatremia (Primary)        ED Disposition Condition    Admit                 Carroll Castaneda DO  Resident  10/23/23 0045       Richard Calloway MD  10/26/23 1159

## 2023-10-23 ENCOUNTER — PATIENT OUTREACH (OUTPATIENT)
Dept: ADMINISTRATIVE | Facility: CLINIC | Age: 73
End: 2023-10-23
Payer: MEDICARE

## 2023-10-23 ENCOUNTER — TELEPHONE (OUTPATIENT)
Dept: PRIMARY CARE CLINIC | Facility: CLINIC | Age: 73
End: 2023-10-23
Payer: MEDICARE

## 2023-10-23 LAB
ALBUMIN SERPL BCP-MCNC: 2.5 G/DL (ref 3.5–5.2)
ALBUMIN SERPL BCP-MCNC: 2.6 G/DL (ref 3.5–5.2)
ALBUMIN SERPL BCP-MCNC: 2.6 G/DL (ref 3.5–5.2)
ALBUMIN SERPL BCP-MCNC: 3 G/DL (ref 3.5–5.2)
ALP SERPL-CCNC: 61 U/L (ref 55–135)
ALT SERPL W/O P-5'-P-CCNC: 16 U/L (ref 10–44)
ANION GAP SERPL CALC-SCNC: 10 MMOL/L (ref 8–16)
ANION GAP SERPL CALC-SCNC: 10 MMOL/L (ref 8–16)
ANION GAP SERPL CALC-SCNC: 14 MMOL/L (ref 8–16)
ANION GAP SERPL CALC-SCNC: 9 MMOL/L (ref 8–16)
APTT PPP: 29.9 SEC (ref 21–32)
AST SERPL-CCNC: 25 U/L (ref 10–40)
BACTERIA SPEC AEROBE CULT: NO GROWTH
BACTERIA SPEC AEROBE CULT: NO GROWTH
BASOPHILS # BLD AUTO: 0.03 K/UL (ref 0–0.2)
BASOPHILS NFR BLD: 0.4 % (ref 0–1.9)
BILIRUB SERPL-MCNC: 0.6 MG/DL (ref 0.1–1)
BUN SERPL-MCNC: 30 MG/DL (ref 8–23)
BUN SERPL-MCNC: 33 MG/DL (ref 8–23)
CALCIUM SERPL-MCNC: 8.2 MG/DL (ref 8.7–10.5)
CALCIUM SERPL-MCNC: 8.4 MG/DL (ref 8.7–10.5)
CALCIUM SERPL-MCNC: 8.4 MG/DL (ref 8.7–10.5)
CALCIUM SERPL-MCNC: 9 MG/DL (ref 8.7–10.5)
CHLORIDE SERPL-SCNC: 103 MMOL/L (ref 95–110)
CHLORIDE SERPL-SCNC: 103 MMOL/L (ref 95–110)
CHLORIDE SERPL-SCNC: 104 MMOL/L (ref 95–110)
CHLORIDE SERPL-SCNC: 96 MMOL/L (ref 95–110)
CK SERPL-CCNC: 613 U/L (ref 20–180)
CO2 SERPL-SCNC: 13 MMOL/L (ref 23–29)
CO2 SERPL-SCNC: 13 MMOL/L (ref 23–29)
CO2 SERPL-SCNC: 18 MMOL/L (ref 23–29)
CO2 SERPL-SCNC: 18 MMOL/L (ref 23–29)
CORTIS SERPL-MCNC: 13 UG/DL (ref 4.3–22.4)
CREAT SERPL-MCNC: 2.1 MG/DL (ref 0.5–1.4)
CREAT SERPL-MCNC: 2.1 MG/DL (ref 0.5–1.4)
CREAT SERPL-MCNC: 2.2 MG/DL (ref 0.5–1.4)
CREAT SERPL-MCNC: 2.6 MG/DL (ref 0.5–1.4)
DIFFERENTIAL METHOD: ABNORMAL
EOSINOPHIL # BLD AUTO: 0.2 K/UL (ref 0–0.5)
EOSINOPHIL NFR BLD: 1.9 % (ref 0–8)
ERYTHROCYTE [DISTWIDTH] IN BLOOD BY AUTOMATED COUNT: 13 % (ref 11.5–14.5)
EST. GFR  (NO RACE VARIABLE): 18.8 ML/MIN/1.73 M^2
EST. GFR  (NO RACE VARIABLE): 23 ML/MIN/1.73 M^2
EST. GFR  (NO RACE VARIABLE): 24.3 ML/MIN/1.73 M^2
EST. GFR  (NO RACE VARIABLE): 24.3 ML/MIN/1.73 M^2
GLUCOSE SERPL-MCNC: 106 MG/DL (ref 70–110)
GLUCOSE SERPL-MCNC: 117 MG/DL (ref 70–110)
GLUCOSE SERPL-MCNC: 94 MG/DL (ref 70–110)
GLUCOSE SERPL-MCNC: 95 MG/DL (ref 70–110)
HCT VFR BLD AUTO: 30.1 % (ref 37–48.5)
HGB BLD-MCNC: 9.8 G/DL (ref 12–16)
IMM GRANULOCYTES # BLD AUTO: 0.03 K/UL (ref 0–0.04)
IMM GRANULOCYTES NFR BLD AUTO: 0.4 % (ref 0–0.5)
LYMPHOCYTES # BLD AUTO: 1.2 K/UL (ref 1–4.8)
LYMPHOCYTES NFR BLD: 14.2 % (ref 18–48)
MAGNESIUM SERPL-MCNC: 2.1 MG/DL (ref 1.6–2.6)
MCH RBC QN AUTO: 31.4 PG (ref 27–31)
MCHC RBC AUTO-ENTMCNC: 32.6 G/DL (ref 32–36)
MCV RBC AUTO: 97 FL (ref 82–98)
MONOCYTES # BLD AUTO: 1 K/UL (ref 0.3–1)
MONOCYTES NFR BLD: 12.4 % (ref 4–15)
NEUTROPHILS # BLD AUTO: 5.9 K/UL (ref 1.8–7.7)
NEUTROPHILS NFR BLD: 70.7 % (ref 38–73)
NRBC BLD-RTO: 0 /100 WBC
PHOSPHATE SERPL-MCNC: 2.9 MG/DL (ref 2.7–4.5)
PHOSPHATE SERPL-MCNC: 3.1 MG/DL (ref 2.7–4.5)
PHOSPHATE SERPL-MCNC: 3.2 MG/DL (ref 2.7–4.5)
PHOSPHATE SERPL-MCNC: 3.3 MG/DL (ref 2.7–4.5)
PLATELET # BLD AUTO: 175 K/UL (ref 150–450)
PMV BLD AUTO: 10.2 FL (ref 9.2–12.9)
POCT GLUCOSE: 101 MG/DL (ref 70–110)
POCT GLUCOSE: 107 MG/DL (ref 70–110)
POCT GLUCOSE: 114 MG/DL (ref 70–110)
POTASSIUM SERPL-SCNC: 3.1 MMOL/L (ref 3.5–5.1)
POTASSIUM SERPL-SCNC: 3.3 MMOL/L (ref 3.5–5.1)
POTASSIUM SERPL-SCNC: 3.3 MMOL/L (ref 3.5–5.1)
POTASSIUM SERPL-SCNC: 3.4 MMOL/L (ref 3.5–5.1)
PROT SERPL-MCNC: 5.4 G/DL (ref 6–8.4)
RBC # BLD AUTO: 3.12 M/UL (ref 4–5.4)
SODIUM SERPL-SCNC: 126 MMOL/L (ref 136–145)
SODIUM SERPL-SCNC: 126 MMOL/L (ref 136–145)
SODIUM SERPL-SCNC: 128 MMOL/L (ref 136–145)
SODIUM SERPL-SCNC: 131 MMOL/L (ref 136–145)
SODIUM SERPL-SCNC: 132 MMOL/L (ref 136–145)
T4 FREE SERPL-MCNC: 1.07 NG/DL (ref 0.71–1.51)
TSH SERPL DL<=0.005 MIU/L-ACNC: 0.51 UIU/ML (ref 0.4–4)
WBC # BLD AUTO: 8.37 K/UL (ref 3.9–12.7)

## 2023-10-23 PROCEDURE — 27000221 HC OXYGEN, UP TO 24 HOURS

## 2023-10-23 PROCEDURE — 25000003 PHARM REV CODE 250: Performed by: STUDENT IN AN ORGANIZED HEALTH CARE EDUCATION/TRAINING PROGRAM

## 2023-10-23 PROCEDURE — 94761 N-INVAS EAR/PLS OXIMETRY MLT: CPT

## 2023-10-23 PROCEDURE — 84100 ASSAY OF PHOSPHORUS: CPT | Performed by: STUDENT IN AN ORGANIZED HEALTH CARE EDUCATION/TRAINING PROGRAM

## 2023-10-23 PROCEDURE — 99223 1ST HOSP IP/OBS HIGH 75: CPT | Mod: FS,,, | Performed by: HOSPITALIST

## 2023-10-23 PROCEDURE — 36415 COLL VENOUS BLD VENIPUNCTURE: CPT | Performed by: NURSE PRACTITIONER

## 2023-10-23 PROCEDURE — 63600175 PHARM REV CODE 636 W HCPCS: Performed by: STUDENT IN AN ORGANIZED HEALTH CARE EDUCATION/TRAINING PROGRAM

## 2023-10-23 PROCEDURE — 83735 ASSAY OF MAGNESIUM: CPT | Performed by: STUDENT IN AN ORGANIZED HEALTH CARE EDUCATION/TRAINING PROGRAM

## 2023-10-23 PROCEDURE — 80069 RENAL FUNCTION PANEL: CPT | Performed by: STUDENT IN AN ORGANIZED HEALTH CARE EDUCATION/TRAINING PROGRAM

## 2023-10-23 PROCEDURE — 85025 COMPLETE CBC W/AUTO DIFF WBC: CPT | Performed by: STUDENT IN AN ORGANIZED HEALTH CARE EDUCATION/TRAINING PROGRAM

## 2023-10-23 PROCEDURE — 83874 ASSAY OF MYOGLOBIN: CPT | Performed by: NURSE PRACTITIONER

## 2023-10-23 PROCEDURE — 21400001 HC TELEMETRY ROOM

## 2023-10-23 PROCEDURE — 82533 TOTAL CORTISOL: CPT | Performed by: STUDENT IN AN ORGANIZED HEALTH CARE EDUCATION/TRAINING PROGRAM

## 2023-10-23 PROCEDURE — 36415 COLL VENOUS BLD VENIPUNCTURE: CPT | Performed by: STUDENT IN AN ORGANIZED HEALTH CARE EDUCATION/TRAINING PROGRAM

## 2023-10-23 PROCEDURE — 80053 COMPREHEN METABOLIC PANEL: CPT | Performed by: STUDENT IN AN ORGANIZED HEALTH CARE EDUCATION/TRAINING PROGRAM

## 2023-10-23 PROCEDURE — 99223 PR INITIAL HOSPITAL CARE,LEVL III: ICD-10-PCS | Mod: FS,,, | Performed by: HOSPITALIST

## 2023-10-23 PROCEDURE — 84295 ASSAY OF SERUM SODIUM: CPT | Performed by: STUDENT IN AN ORGANIZED HEALTH CARE EDUCATION/TRAINING PROGRAM

## 2023-10-23 PROCEDURE — 82550 ASSAY OF CK (CPK): CPT | Performed by: NURSE PRACTITIONER

## 2023-10-23 PROCEDURE — 85730 THROMBOPLASTIN TIME PARTIAL: CPT | Performed by: STUDENT IN AN ORGANIZED HEALTH CARE EDUCATION/TRAINING PROGRAM

## 2023-10-23 RX ORDER — HEPARIN SODIUM 5000 [USP'U]/ML
5000 INJECTION, SOLUTION INTRAVENOUS; SUBCUTANEOUS EVERY 8 HOURS
Status: DISCONTINUED | OUTPATIENT
Start: 2023-10-23 | End: 2023-10-26 | Stop reason: HOSPADM

## 2023-10-23 RX ORDER — POTASSIUM CHLORIDE 20 MEQ/1
40 TABLET, EXTENDED RELEASE ORAL ONCE
Status: COMPLETED | OUTPATIENT
Start: 2023-10-23 | End: 2023-10-23

## 2023-10-23 RX ADMIN — SENNOSIDES AND DOCUSATE SODIUM 1 TABLET: 50; 8.6 TABLET ORAL at 09:10

## 2023-10-23 RX ADMIN — MUPIROCIN 1 G: 20 OINTMENT TOPICAL at 08:10

## 2023-10-23 RX ADMIN — METHOCARBAMOL 500 MG: 500 TABLET ORAL at 08:10

## 2023-10-23 RX ADMIN — ATORVASTATIN CALCIUM 80 MG: 40 TABLET, FILM COATED ORAL at 08:10

## 2023-10-23 RX ADMIN — SENNOSIDES AND DOCUSATE SODIUM 1 TABLET: 50; 8.6 TABLET ORAL at 08:10

## 2023-10-23 RX ADMIN — SODIUM BICARBONATE: 84 INJECTION, SOLUTION INTRAVENOUS at 05:10

## 2023-10-23 RX ADMIN — POTASSIUM CHLORIDE 40 MEQ: 1500 TABLET, EXTENDED RELEASE ORAL at 11:10

## 2023-10-23 RX ADMIN — PROPAFENONE HYDROCHLORIDE 225 MG: 225 CAPSULE, EXTENDED RELEASE ORAL at 09:10

## 2023-10-23 RX ADMIN — SODIUM CHLORIDE 500 ML: 9 INJECTION, SOLUTION INTRAVENOUS at 01:10

## 2023-10-23 RX ADMIN — SODIUM CHLORIDE: 9 INJECTION, SOLUTION INTRAVENOUS at 08:10

## 2023-10-23 RX ADMIN — PROPAFENONE HYDROCHLORIDE 225 MG: 225 CAPSULE, EXTENDED RELEASE ORAL at 08:10

## 2023-10-23 RX ADMIN — METOPROLOL SUCCINATE 12.5 MG: 25 TABLET, EXTENDED RELEASE ORAL at 08:10

## 2023-10-23 RX ADMIN — ASPIRIN 81 MG: 81 TABLET, COATED ORAL at 08:10

## 2023-10-23 RX ADMIN — HEPARIN SODIUM 5000 UNITS: 5000 INJECTION INTRAVENOUS; SUBCUTANEOUS at 09:10

## 2023-10-23 RX ADMIN — POTASSIUM CHLORIDE 40 MEQ: 1500 TABLET, EXTENDED RELEASE ORAL at 04:10

## 2023-10-23 RX ADMIN — ACETAMINOPHEN 1000 MG: 500 TABLET ORAL at 05:10

## 2023-10-23 RX ADMIN — ACETAMINOPHEN 1000 MG: 500 TABLET ORAL at 02:10

## 2023-10-23 NOTE — ASSESSMENT & PLAN NOTE
Patient has hyponatremia which is controlled,We will aim to correct the sodium by 4-6mEq in 24 hours. We will monitor sodium Every 6 hours. The hyponatremia is due to Dehydration/hypovolemia. We will obtain the following studies: Urine sodium, urine osmolality, serum osmolality, AM cortisol or TSH, T4. We will treat the hyponatremia with IV fluids as follows: NaCl 100ml/hr for 24 hrs and reassess. The patient's sodium results have been reviewed and are listed below.  Recent Labs   Lab 10/23/23  1136   *       Nephrology is following, appreciate assistance.

## 2023-10-23 NOTE — CONSULTS
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  Ever since then she has had significant weakness to the point where she can not walk or get out of bed.  Slid out of her chair yesterday and her  was not able to get off the ground so he called EMS. She denies fevers, chest pain, palpitations, dizziness, orthopnea, N/V/D, abdominal pain. She has baseline shortness of breath without worsening and is not on home oxygen. Placed on NC here for O2 sats 88-90 at rest. She has a chronic cough that has not worsened. Patient was recently admitted for right VATS, wedge biopsy on 10/20/23 to r/o ILD and assess for possible causes. She was doing well and discharged after chest tube removal. Sodium at discharge was 140 with Cr 1.47. In the ED yesterday, Na 125 and Cr 2.5. HCO3 23>14. Patient has had poor appetite, but has been drinking water regularly. Nephrology was consulted for hyponatremia and BARBARA.        Past Medical History:   Diagnosis Date    Allergy     Arthritis     Atrial fibrillation 5/29/2017    Chronic diastolic heart failure 9/29/2023    CKD (chronic kidney disease) stage 3, GFR 30-59 ml/min 6/26/2017    Coronary artery calcification seen on CAT scan 9/29/2023    Disorder of kidney  and ureter     GERD (gastroesophageal reflux disease)     Hyperlipidemia     Hypertension     Impaired fasting glucose 2021    Pre-diabetes 6/3/2017       Past Surgical History:   Procedure Laterality Date    ADENOIDECTOMY      BACK SURGERY      lamenectomy    BREAST SURGERY      BRONCHOSCOPY N/A 2023    Procedure: BRONCHOSCOPY;  Surgeon: Paul Diagnostic Provider;  Location: [facility] OR 2ND FLR;  Service: Anesthesiology;  Laterality: N/A;    BRONCHOSCOPY N/A 10/20/2023    Procedure: BRONCHOSCOPY;  Surgeon: Anatoly Tiwari MD;  Location: [facility] 2ND FLR;  Service: Cardiothoracic;  Laterality: N/A;    CARDIAC SURGERY      [employer] pacemaker     CATARACT EXTRACTION W/  INTRAOCULAR LENS IMPLANT Right 6/3/2019    Procedure: EXTRACTION, CATARACT, WITH IOL INSERTION;  Surgeon: Raisa Moreno MD;  Location: [facility];  Service: Ophthalmology;  Laterality: Right;  Laser    CATARACT EXTRACTION W/  INTRAOCULAR LENS IMPLANT Left 2019    Procedure: EXTRACTION, CATARACT, WITH IOL INSERTION;  Surgeon: Raisa Moreno MD;  Location: [facility];  Service: Ophthalmology;  Laterality: Left;  LASER ASSISTED     SECTION      1980    COLONOSCOPY N/A 9/15/2023    Procedure: COLONOSCOPY;  Surgeon: Tao Gomez MD;  Location: [facility] (2ND FLR);  Service: Endoscopy;  Laterality: N/A;  inst to portal/[employer] pacemaker   2nd floor for airway protection patient with lung disease elevated pulmonary artery pressure pacemaker and fecal occult blood positive stool,   cleared by pulmonary Dr Bhakta-see note on -GT    ENDOSCOPIC ULTRASOUND OF UPPER GASTROINTESTINAL TRACT N/A 2023    Procedure: ULTRASOUND, UPPER GI TRACT, ENDOSCOPIC;  Surgeon: Casimiro De Los Santos MD;  Location: [facility] ENDO;  Service: Endoscopy;  Laterality: N/A;  23: instructions send via portal. [employer] ppm- GD    EYE SURGERY      FRACTURE SURGERY      HYSTERECTOMY      INJECTION OF ANESTHETIC AGENT AROUND MULTIPLE INTERCOSTAL NERVES   10/20/2023    Procedure: BLOCK, NERVE, INTERCOSTAL, 2 OR MORE;  Surgeon: Anatoly Tiwari MD;  Location: [facility] OR [address] [place] [address];  Service: Cardiothoracic;;    INSERT / REPLACE / REMOVE PACEMAKER  2009    placement    INSERT / REPLACE / REMOVE PACEMAKER  09/22/2014    [employer] Model #[id number] replacement    pace maker  2009    replacement 2014    REVISION OF PROCEDURE INVOLVING PACEMAKER LEAD Left 1/13/2022    Procedure: REVISION, ELECTRODE LEAD, CARDIAC PACEMAKER;  Surgeon: Trell Hodgson MD;  Location: [facility] EP LAB;  Service: Cardiology;  Laterality: Left;  PPM lead Malfx, RA lead revision, SJM, MAC, GP, 3 PREP*dPPM SJM in situ**Contrast prep given*    salpingo opherectomy Bilateral 1998    SPINE SURGERY      TONSILLECTOMY  1953    TUBAL LIGATION      VIDEO-ASSISTED THORACOSCOPIC SURGERY (VATS) Right 10/20/2023    Procedure: VATS WEDGE;  Surgeon: Anatoly Tiwari MD;  Location: [facility] OR [address] [place] [address];  Service: Cardiothoracic;  Laterality: Right;       Review of patient's allergies indicates:   Allergen Reactions    Iodinated contrast media Anaphylaxis    Penicillins Anaphylaxis    Allopurinol analogues      Muscle cramps    Ciprofloxacin Rash    Quinolones Rash    Sulfa (sulfonamide antibiotics) Rash    Tetracyclines Rash     Current Facility-Administered Medications   Medication Frequency    0.9%  NaCl infusion Continuous    acetaminophen tablet 1,000 mg Q8H    aspirin EC tablet 81 mg Daily    atorvastatin tablet 80 mg Daily    dextrose 10% bolus 125 mL 125 mL PRN    dextrose 10% bolus 250 mL 250 mL PRN    glucose chewable tablet 16 g PRN    heparin 25,000 units in dextrose 5% (100 units/ml) IV bolus from bag - ADDITIONAL PRN BOLUS - 30 units/kg PRN    heparin 25,000 units in dextrose 5% (100 units/ml) IV bolus from bag - ADDITIONAL PRN BOLUS - 60 units/kg PRN    heparin 25,000 units in dextrose 5% (100 units/ml) IV bolus from bag INITIAL BOLUS Once    heparin 25,000 units in dextrose 5% 250 mL (100 units/mL) infusion LOW  INTENSITY nomogram - [facility] Continuous    HYDROmorphone injection 0.5 mg Q6H PRN    insulin aspart U-100 pen 0-5 Units QID (AC + HS) PRN    LIDOcaine 5 % patch 1 patch Q24H    methocarbamoL tablet 500 mg QID    metoprolol succinate (TOPROL-XL) 24 hr split tablet 12.5 mg Daily    mupirocin 2 % ointment 1 g BID    ondansetron disintegrating tablet 8 mg Q8H PRN    oxyCODONE immediate release tablet 5 mg Q4H PRN    potassium chloride SA CR tablet 40 mEq Once    propafenone 12 hr capsule 225 mg Q12H    senna-docusate 8.6-50 mg per tablet 1 tablet BID    sodium chloride 0.9% flush 10 mL PRN     Family History       Problem Relation (Age of Onset)    Coronary artery disease Mother, Father, Brother    Diabetes Mother, Father, Paternal Grandmother, Paternal Grandfather    Heart disease Mother, Father, Brother    Hyperlipidemia Mother, Father    Hypertension Mother, Father    Hyperthyroidism Brother    Stroke Mother          Tobacco Use    Smoking status: Never    Smokeless tobacco: Never   Substance and Sexual Activity    Alcohol use: Yes     Alcohol/week: 5.0 standard drinks of alcohol     Types: 5 Glasses of wine per week    Drug use: No    Sexual activity: Not Currently     Partners: Male     Review of Systems   Constitutional: Negative.    HENT: Negative.     Eyes: Negative.    Respiratory:  Positive for shortness of breath.    Cardiovascular: Negative.    Gastrointestinal: Negative.    Genitourinary:  Positive for decreased urine volume.   Musculoskeletal: Negative.    Skin: Negative.    Neurological:  Positive for dizziness and weakness.   Psychiatric/Behavioral: Negative.       Objective:     Vital Signs (Most Recent):  Temp: 97.7 °F (36.5 °C) (10/23/23 1131)  Pulse: 65 (10/23/23 1507)  Resp: 18 (10/23/23 1131)  BP: (!) 107/58 (10/23/23 1131)  SpO2: 95 % (10/23/23 1131) Vital Signs (24h Range):  Temp:  [97.5 °F (36.4 °C)-98.7 °F (37.1 °C)] 97.7 °F (36.5 °C)  Pulse:  [] 65  Resp:  [18-20] 18  SpO2:  [88 %-97 %] 95  %  BP: ()/(51-64) 107/58     Weight: 95.3 kg (210 lb 1.6 oz) (10/22/23 2230)  Body mass index is 39.72 kg/m².  Body surface area is 2.02 meters squared.    I/O last 3 completed shifts:  In: -   Out: 1050 [Urine:1050]     Physical Exam  Constitutional:       Appearance: Normal appearance. She is obese.   HENT:      Head: Normocephalic and atraumatic.      Nose: Nose normal.   Eyes:      Conjunctiva/sclera: Conjunctivae normal.   Cardiovascular:      Rate and Rhythm: Tachycardia present. Rhythm irregular.   Pulmonary:      Effort: Pulmonary effort is normal.   Abdominal:      General: Abdomen is flat.   Musculoskeletal:      Cervical back: Normal range of motion.      Right lower leg: No edema.      Left lower leg: No edema.   Skin:     General: Skin is warm and dry.   Neurological:      General: No focal deficit present.      Mental Status: She is alert and oriented to person, place, and time.   Psychiatric:         Mood and Affect: Mood normal.         Behavior: Behavior normal.          Significant Labs:  CBC:   Recent Labs   Lab 10/23/23  0612   WBC 8.37   RBC 3.12*   HGB 9.8*   HCT 30.1*      MCV 97   MCH 31.4*   MCHC 32.6     CMP:   Recent Labs   Lab 10/23/23  0612 10/23/23  1136   GLU 95  94 106   CALCIUM 8.4*  8.4* 8.2*   ALBUMIN 2.6*  2.6* 2.5*   PROT 5.4*  --    *  126* 131*   K 3.3*  3.3* 3.1*   CO2 13*  13* 18*     103 104   BUN 33*  33* 30*   CREATININE 2.1*  2.2* 2.1*   ALKPHOS 61  --    ALT 16  --    AST 25  --    BILITOT 0.6  --      All labs within the past 24 hours have been reviewed.      Assessment/Plan:     Renal/  * Hyponatremia  Hyponatremia likely 2/2 dehydration. Review of urine/serum osmo, and other pertinent labs, support diagnosis of hypovolemic hyponatremia.     Plan/Recommendations:     -Recommend 1L/day PO fluid restriction   -Trend serum sodium every 6hrs   -Call nephrology on-call if sodium corrects 8 or more mmol/L in a 24hr period     BARBARA (acute  kidney injury)  Baseline Cr of 1.3. Cr up to 2.1 at time of nephrology consultation; currently trending down with administration of IVF's. Suspect BARBARA 2/2 prerenal causes/dehydration. Patient still producing adequate urine.     Plan/Recommendations:     -Recommend isotonic bicarb gtt at 100ml/hr x 10hrs (150mEq Bicarb in 1L D5W)   -Will obtain CPK and urine myoglobin to rule out rhabdomyolysis   -Transfuse for Hg <7.0  -Keep MAP >65  -Renally dose meds/avoid nephrotoxic medications           Thank you for your consult. I will follow-up with patient. Please contact us if you have any additional questions.    West Wong, NP  Nephrology  [facility] ([facility]

## 2023-10-23 NOTE — ASSESSMENT & PLAN NOTE
Patient has hyponatremia which is controlled,We will aim to correct the sodium by 4-6mEq in 24 hours. We will monitor sodium Every 6 hours. The hyponatremia is due to Dehydration/hypovolemia. We will obtain the following studies: Urine sodium, urine osmolality, serum osmolality, AM cortisol or TSH, T4. We will treat the hyponatremia with IV fluids as follows: NaCl 100ml/hr for 24 hrs and reassess. The patient's sodium results have been reviewed and are listed below.  Recent Labs   Lab 10/22/23  1826   *

## 2023-10-23 NOTE — ASSESSMENT & PLAN NOTE
Patient with acute kidney injury/acute renal failure likely due to pre-renal azotemia due to dehydration BARBARA is currently stable. Baseline creatinine 1.2-1.3 - Labs reviewed- Renal function/electrolytes with Estimated Creatinine Clearance: 24.8 mL/min (A) (based on SCr of 2.1 mg/dL (H)). according to latest data. Monitor urine output and serial BMP and adjust therapy as needed. Avoid nephrotoxins and renally dose meds for GFR listed above.    -starting isotonic bicarb to correct BARBARA, metabolic acidosis and hyponatremia  -hold nephrotoxic medications  -renally dose medications  -trend sodium Q6 overnight

## 2023-10-23 NOTE — ASSESSMENT & PLAN NOTE
Baseline Cr of 1.3. Cr up to 2.1 at time of nephrology consultation; currently trending down with administration of IVF's. Suspect BARBARA prerenal cause/dehydration. Patient still producing adequate urine.     Plan/Recommendations:     -Recommend isotonic bicarb gtt at 100ml/hr x 10hrs (150mEq Bicarb in 1L D5W)   -Will obtain CPK and urine myoglobin to rule out rhabdomyolysis   -Transfuse for Hg <7.0  -Keep MAP >65  -Renally dose meds/avoid nephrotoxic medications

## 2023-10-23 NOTE — ASSESSMENT & PLAN NOTE
Patient with acute kidney injury/acute renal failure likely due to pre-renal azotemia due to dehydration BARBARA is currently stable. Baseline creatinine 1.2-1.3 - Labs reviewed- Renal function/electrolytes with Estimated Creatinine Clearance: 20.8 mL/min (A) (based on SCr of 2.5 mg/dL (H)). according to latest data. Monitor urine output and serial BMP and adjust therapy as needed. Avoid nephrotoxins and renally dose meds for GFR listed above.    -gentle IVF to correct BARBARA, metabolic acidosis and hyponatremia  -hold nephrotoxic medications  -renally dose medications  -if not improving consider nephrology evaluation  -trend renal function Q6 overnight

## 2023-10-23 NOTE — OP NOTE
Thoracic Surgery Operative Report       Date: 10/20/23      Preoperative Diagnosis: Interstitial lung disease      Postoperative Diagnosis: Interstitial lung disease      Procedure Performed:   Flexible Bronchoscopy    2.   Right video assisted thoracoscopy  3.   Right middle, and lower lobe wedge resection for tissue and culture  4.   Intercostal nerve blocks T4-T9      Intraoperative Findings: No evidence of dense fibrosis. No pleural effusion. No lung or pleural nodularity      Surgeon: Anatoly Tiwari M.D.     Resident: Zach Geller M.D.      Anesthesia Type: General Anesthesia.       Estimated Blood Loss: 50 mL.       Intravenous Fluid: Per anesthesia record      Specimens:   Right lower lobe wedge resection for permanent and culture panel  Right middle lobe wedge resection for permanent and culture panel    Drains: 24 [place] Vel Drain chest tube       Complications: None.       Disposition: The patient tolerated the operation well, was extubated, and transported the postanesthesia care unit in stable condition.       Indication for the procedure:    Mrs. Curiel is a 73 year/old woman, who presented with evidence of interstitial lung disease.   In coordination with the patients pulmonologist, after perioperative testing and appropriate staging, she was recommended a thorascopic lung biopsy. Risk, benefits, and alternatives were discussed with the patient, who wishes to proceed.      Description of the operation: The patient was transferred to the operating room and Intravenous lines and an arterial line were placed the anesthesia team. The patient was intubated with a single-lumen endotracheal tube by anesthesia. General anesthesia was induced. The patient received 2 g of IV antibiotics prior to the incision for antimicrobial prophylaxis. Compression boots were placed in the lower extremities with DVT prophylaxis. After a time out, a flexible bronchoscopy was performed. The flexible bronchoscope was  advanced through the endotracheal tube advanced to the olya. The olya appeared to be sharp. The right mainstem bronchus was within normal limits. The right upper lobe bronchial orifice was normal. The bronchus intermedius was normal. The right middle lobe and right lower lobe bronchial orifices were normal. Left mainstem bronchus was entered and was normal. The left upper lobe and left lower lobe bronchial orifices were normal. No secretions were noted. The patient was then intubated with a double-lumen endotracheal tube and positioned in the left lateral decubitus position with the right chest facing upward.       The right chest was prepped and draped in the standard sterile surgical fashion. An incision was made in the posterior axillary line at the 7th intercostal space. Soft tissue was dissected with the bovie. Ventilation was held, chest was entered and a 12mm port was inserted. Camera was inserted confirming no injury to underlying tissue and insufflation was obtained.  A second 12mm port was placed in the posterior to the first under direct visualization.      The lung tissue was then visually inspected and looked normal, however there was evidence of fibrosis given the lack of comliance/collapse, despite appropriate position of the double lumen tube, which was confirmed by the anesthesiologist. The patients saturations were also in the low 80s with single lung ventilation. Due to the poor visualization and poor saturations, obtaining a biopsy of the posteromedial lower lobe, which appeared most diseased on CT scan was unable to be performed. As such using a grasper for assistance, 2 sequential wedge lung biopsies were taken from the right middle and right lower lobes using the Endo-ANDRAE 45 mm purple load stapler.  Wedge resections were removed individually through the anterior port.  Of note the middle lobe did have a parenchymal lymph node which was removed in situ as well.  The staple lines were  hemostatic. Ex vivo, wedge biopsies had a small section sharply transected for and sent for culture while the remaining tissue was sent for permanent pathologic analysis.    Next, 0.50% Marcain with epinephrine was then injected in the T4-10 interspaces under direct thoracoscopic guidances.Once completed a 24 Fr escobar tube was inserted through the anterior most incision.  Ports were removed and the lungs were re-expanded under direct visualization.  Incisions were closed in multiple layers with 0 vicryl & 2-0 vicryl in muscle and deep dermal layers and a running subcuticular 4-0 Monocryl for skin.  Chest tube was secured using an 0 Ethibond suture.  Mastisol and steri strips were used to cover skin incisions.     The sponge and instrument counts were correct x 2. I  was present for the entire procedure and participated in the entire procedure. There were no intraoperative complications. The patient was extubated and transported to postanesthesia care unit in stable condition.

## 2023-10-23 NOTE — PLAN OF CARE
Problem: Adult Inpatient Plan of Care  Goal: Absence of Hospital-Acquired Illness or Injury  Outcome: Ongoing, Progressing  Goal: Optimal Comfort and Wellbeing  Outcome: Ongoing, Progressing     Problem: Fluid and Electrolyte Imbalance (Acute Kidney Injury/Impairment)  Goal: Fluid and Electrolyte Balance  Outcome: Ongoing, Progressing     Problem: Oral Intake Inadequate (Acute Kidney Injury/Impairment)  Goal: Optimal Nutrition Intake  Outcome: Ongoing, Progressing     Problem: Renal Function Impairment (Acute Kidney Injury/Impairment)  Goal: Effective Renal Function  Outcome: Ongoing, Progressing     Problem: Fall Injury Risk  Goal: Absence of Fall and Fall-Related Injury  Outcome: Ongoing, Progressing

## 2023-10-23 NOTE — ASSESSMENT & PLAN NOTE
Patient with Paroxysmal (<7 days) atrial fibrillation which is controlled currently with propafenone, toprol. Patient is currently in atrial fibrillation with rates 80-110s. [id number] Score: 3. Anticoagulation indicated and had discussed xarelto but has not started due to recent biopsy with plans to discuss in clinic with Dr. Hodgson.    -continue propafenone 225 BID, toprol daily  -patient would like to discuss with primary cardiologist on starting xarelto. Will start on heparin gtt  -CT head negative for any bleeding after fall  -HR regular this AM. Planning on getting EKG as well. She is ok with heparin subq, but did not want the heparin gtt. Patient reports that she was asked to stop A/C per surgery and cardiology. It seems that she comfortable discussing A/C with them.

## 2023-10-23 NOTE — SUBJECTIVE & OBJECTIVE
Past Medical History:   Diagnosis Date    Allergy     Arthritis     Atrial fibrillation 2017    Chronic diastolic heart failure 2023    CKD (chronic kidney disease) stage 3, GFR 30-59 ml/min 2017    Coronary artery calcification seen on CAT scan 2023    Disorder of kidney and ureter     GERD (gastroesophageal reflux disease)     Hyperlipidemia     Hypertension     Impaired fasting glucose 2021    Pre-diabetes 6/3/2017     Past Surgical History:   Procedure Laterality Date    ADENOIDECTOMY      BACK SURGERY      lamenectomy    BREAST SURGERY      BRONCHOSCOPY N/A 2023    Procedure: BRONCHOSCOPY;  Surgeon: [facility];  Location: [facility] 2ND FLR;  Service: Anesthesiology;  Laterality: N/A;    CARDIAC SURGERY      [employer] pacemaker     CATARACT EXTRACTION W/  INTRAOCULAR LENS IMPLANT Right 6/3/2019    Procedure: EXTRACTION, CATARACT, WITH IOL INSERTION;  Surgeon: Raisa Moreno MD;  Location: [facility];  Service: Ophthalmology;  Laterality: Right;  Laser    CATARACT EXTRACTION W/  INTRAOCULAR LENS IMPLANT Left 2019    Procedure: EXTRACTION, CATARACT, WITH IOL INSERTION;  Surgeon: Raisa Moreno MD;  Location: [facility];  Service: Ophthalmology;  Laterality: Left;  LASER ASSISTED     SECTION      1980    COLONOSCOPY N/A 9/15/2023    Procedure: COLONOSCOPY;  Surgeon: Tao Gomez MD;  Location: [facility] (2ND FLR);  Service: Endoscopy;  Laterality: N/A;  inst to portal/[employer] pacemaker   2nd floor for airway protection patient with lung disease elevated pulmonary artery pressure pacemaker and fecal occult blood positive stool,   cleared by pulmonary Dr Bhakta-see note on -GT    ENDOSCOPIC ULTRASOUND OF UPPER GASTROINTESTINAL TRACT N/A 2023    Procedure: ULTRASOUND, UPPER GI TRACT, ENDOSCOPIC;  Surgeon: Casimiro De Los Santos MD;  Location: [facility] ENDO;  Service: Endoscopy;  Laterality: N/A;  23: instructions send via portal. [employer] ppm- GD     EYE SURGERY      FRACTURE SURGERY      HYSTERECTOMY      INSERT / REPLACE / REMOVE PACEMAKER  2009    placement    INSERT / REPLACE / REMOVE PACEMAKER  09/22/2014    [employer] Model #[id number] replacement    pace maker  2009    replacement 2014    REVISION OF PROCEDURE INVOLVING PACEMAKER LEAD Left 1/13/2022    Procedure: REVISION, ELECTRODE LEAD, CARDIAC PACEMAKER;  Surgeon: Trell Hodgson MD;  Location: [facility] EP LAB;  Service: Cardiology;  Laterality: Left;  PPM lead Malfx, RA lead revision, SJM, MAC, GP, 3 PREP*dPPM SJM in situ**Contrast prep given*    salpingo opherectomy Bilateral 1998    SPINE SURGERY      TONSILLECTOMY  1953    TUBAL LIGATION         Review of patient's allergies indicates:   Allergen Reactions    Iodinated contrast media Anaphylaxis    Penicillins Anaphylaxis    Allopurinol analogues      Muscle cramps    Ciprofloxacin Rash    Quinolones Rash    Sulfa (sulfonamide antibiotics) Rash    Tetracyclines Rash       No current facility-administered medications on file prior to encounter.     Current Outpatient Medications on File Prior to Encounter   Medication Sig    amLODIPine (NORVASC) 5 MG tablet Take 1 tablet (5 mg total) by mouth once daily.    aspirin (ECOTRIN) 81 MG EC tablet Take 1 tablet (81 mg total) by mouth once daily.    furosemide (LASIX) 40 MG tablet TAKE 1 TABLET BY MOUTH EVERY DAY    metoprolol succinate (TOPROL-XL) 25 MG 24 hr tablet Take 0.5 tablets (12.5 mg total) by mouth once daily.    olmesartan (BENICAR) 20 MG tablet Take 1 tablet (20 mg total) by mouth once daily.    clobetasoL (OLUX) 0.05 % Foam Apply 1 application topically every 30 days.    ERGOCALCIFEROL, VITAMIN D2, (VITAMIN D ORAL) Take 2,000 Units by mouth once daily.    gabapentin (NEURONTIN) 300 MG capsule Take 1 capsule (300 mg total) by mouth 3 (three) times daily.    methocarbamoL (ROBAXIN) 500 MG Tab Take 1 tablet (500 mg total) by mouth 4 (four) times daily. for 10 days    nitroGLYCERIN (NITROSTAT) 0.4 MG SL  tablet Place 1 tablet (0.4 mg total) under the tongue every 5 (five) minutes as needed for Chest pain.    oxyCODONE (ROXICODONE) 5 MG immediate release tablet Take 1 tablet (5 mg total) by mouth every 4 (four) hours as needed for Pain.    potassium citrate (UROCIT-K) 10 mEq (1,080 mg) TbSR TAKE 1 TABLET BY MOUTH ONCE DAILY    propafenone (RYTHMOL SR) 225 MG Cp12 Take 1 capsule (225 mg total) by mouth every 12 (twelve) hours.    rivaroxaban (XARELTO) 15 mg Tab Take 1 tablet (15 mg total) by mouth daily with dinner or evening meal.    rosuvastatin (CRESTOR) 40 MG Tab Take 1 tablet (40 mg total) by mouth every evening. (Patient taking differently: Take 40 mg by mouth once daily.)    topiramate (TOPAMAX) 25 MG tablet Take 1 tablet twice daily.     Family History       Problem Relation (Age of Onset)    Coronary artery disease Mother, Father, Brother    Diabetes Mother, Father, Paternal Grandmother, Paternal Grandfather    Heart disease Mother, Father, Brother    Hyperlipidemia Mother, Father    Hypertension Mother, Father    Hyperthyroidism Brother    Stroke Mother          Tobacco Use    Smoking status: Never    Smokeless tobacco: Never   Substance and Sexual Activity    Alcohol use: Yes     Alcohol/week: 5.0 standard drinks of alcohol     Types: 5 Glasses of wine per week    Drug use: No    Sexual activity: Not Currently     Partners: Male     Review of Systems   Constitutional:  Negative for activity change, appetite change, chills, diaphoresis, fatigue, fever and unexpected weight change.   HENT:  Negative for congestion and sore throat.    Eyes:  Negative for visual disturbance.   Respiratory:  Positive for shortness of breath. Negative for chest tightness.    Cardiovascular:  Negative for chest pain, palpitations and leg swelling.   Gastrointestinal:  Negative for abdominal distention, abdominal pain, blood in stool, constipation, diarrhea, nausea and vomiting.   Genitourinary:  Negative for dysuria and hematuria.    Musculoskeletal:  Negative for back pain.   Skin:  Negative for rash.   Neurological:  Positive for dizziness and weakness. Negative for tremors, seizures, facial asymmetry, speech difficulty, light-headedness and headaches.   Psychiatric/Behavioral:  Negative for agitation and confusion.      Objective:     Vital Signs (Most Recent):  Temp: 97.6 °F (36.4 °C) (10/22/23 2227)  Pulse: 91 (10/22/23 2227)  Resp: 19 (10/22/23 2227)  BP: (!) 109/52 (10/22/23 2227)  SpO2: 97 % (10/22/23 2227) Vital Signs (24h Range):  Temp:  [97.5 °F (36.4 °C)-98.7 °F (37.1 °C)] 97.6 °F (36.4 °C)  Pulse:  [] 91  Resp:  [19-20] 19  SpO2:  [88 %-97 %] 97 %  BP: ()/(51-64) 109/52     Weight: 95.3 kg (210 lb 1.6 oz)  Body mass index is 39.72 kg/m².    Physical Exam  Vitals and nursing note reviewed.   Constitutional:       General: She is not in acute distress.     Appearance: Normal appearance. She is not diaphoretic.   HENT:      Head: Normocephalic and atraumatic.   Eyes:      General: No scleral icterus.     Extraocular Movements: Extraocular movements intact.      Conjunctiva/sclera: Conjunctivae normal.      Pupils: Pupils are equal, round, and reactive to light.   Cardiovascular:      Rate and Rhythm: Tachycardia present. Rhythm irregular.      Heart sounds: No murmur heard.     Comments: Atrial fibrillation 80-110s  Pulmonary:      Effort: Pulmonary effort is normal. No respiratory distress.      Breath sounds: Normal breath sounds. No wheezing.   Abdominal:      General: There is no distension.      Palpations: Abdomen is soft.      Tenderness: There is no abdominal tenderness. There is no guarding.      Comments: Mild abdominal pain over site of recent chest tube on R   Skin:     General: Skin is warm.   Neurological:      Mental Status: She is alert.     Significant Labs:   All pertinent labs within the past 24 hours have been reviewed.  BMP:   Recent Labs   Lab 10/22/23  1826   GLU 89   *   K 4.3   CL 98   CO2  14*   BUN 34*   CREATININE 2.5*   CALCIUM 9.1   MG 2.1   CBC:   Recent Labs   Lab 10/21/23  0236 10/22/23  1826   WBC 9.99 11.92   HGB 10.9* 11.0*   HCT 34.2* 33.9*    190     Significant Imaging: I have reviewed all pertinent imaging results/findings within the past 24 hours.

## 2023-10-23 NOTE — ASSESSMENT & PLAN NOTE
Follows with Dr. Bhakta. BOLIVAR weakly positive (1:80) but exhaustive autoimmune/CTD workup otherwise negative.  Follow-up CT imaging with scattered GGOs and bibasilar scarring with fibrosis and mild traction bronchiectasis. Has appearance similar to hypersensitivity pneumonitis but possible NSIP.  No feather/down bedding, no molds or organic material exposures.  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  Her  has quit smoking.  Does report an allergy to pet dander and is frequently watching her daughter's pet dog.  Bronchoscopy with transbronchial biopsies with CTD-ILD appearance.  Expanding autoimmune laboratories that were negative. Next underwent recent wedge biopsy with pending pathology.

## 2023-10-23 NOTE — H&P
[facility] - Intensive Care ([address] Medicine  History & Physical    Patient Name: Jesenia Curiel  MRN: [id number]  Patient Class: IP- Inpatient  Admission Date: 10/22/2023  Attending Physician: Eriberto Pastrana MD   Primary Care Provider: Sandra Michaud MD      Patient information was obtained from patient, spouse/SO and ER records.     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      Patient told the ER that she was walking down the hallway fell and landed on her back in the back of her head.  Ever since then she is had significant weakness to the point where she can not walk can not get out of bed.  Slid out of her chair today and her  and her were not able to get off the ground, so they called EMS. She denies fevers, chest pain, palpitations, dizziness, orthopnea, N/V/D, abdominal pain. She has baseline shortness of breath without worsening and is not on home oxygen. Placed on NC here for O2 sats 88-90 at rest. She has a chronic cough that has not worsened.      Patient was recently admitted for right VATS, wedge biopsy on 10/20/23 to r/o ILD and assess for possible causes. She was doing well and discharged after chest tube removal without any post-procedure issues. Sodium at discharge was 140 with Cr 1.47. In the ED today, Na 125 and Cr 2.5. HCO3 23>14. Patient has had slightly reduced PO intake. No post procedure bleeding.     She reports her fall was  slumping against her bed and then going to the ground. Did not lose consciousness. No visual disturbances but was too weak to stand up and called EMS. She had not started anticoagulation for her Afib yet with this procedure pending. She missed her dose of propafenone this morning. Denied any SOB, CP, palpitations and reports normally can tell when she is in Afib. She is currently in Afib with rates 80-110s with stable BP and asymptomatic. Could be related to missed dose of propafenone or volume depletion.       Past Medical History:   Diagnosis Date    Allergy     Arthritis     Atrial fibrillation 2017    Chronic diastolic heart failure 2023    CKD (chronic kidney disease) stage 3, GFR 30-59 ml/min 2017    Coronary artery calcification seen on CAT scan 2023    Disorder of kidney and ureter     GERD (gastroesophageal reflux disease)     Hyperlipidemia     Hypertension     Impaired fasting glucose 2021    Pre-diabetes 6/3/2017     Past Surgical History:   Procedure Laterality Date    ADENOIDECTOMY      BACK SURGERY      lamenectomy    BREAST SURGERY      BRONCHOSCOPY N/A 2023    Procedure: BRONCHOSCOPY;  Surgeon: Paul Diagnostic Provider;  [address];  Service: Anesthesiology;  Laterality: N/A;    CARDIAC SURGERY      st [employer] pacemaker     CATARACT EXTRACTION W/  INTRAOCULAR LENS IMPLANT Right 6/3/2019    Procedure: EXTRACTION, CATARACT, WITH IOL INSERTION;  Surgeon: Raisa Moreno MD;  Location: [facility];  Service: Ophthalmology;  Laterality: Right;  Laser    CATARACT EXTRACTION W/  INTRAOCULAR LENS IMPLANT Left 2019    Procedure: EXTRACTION, CATARACT, WITH IOL INSERTION;  Surgeon: Raisa Moreno MD;  Location: [facility] OR;  Service: Ophthalmology;  Laterality: Left;  LASER ASSISTED     SECTION      1980    COLONOSCOPY N/A 9/15/2023    Procedure: COLONOSCOPY;  Surgeon: Tao Gomez MD;  Location: [facility] ENDO (2ND FLR);  Service:  Endoscopy;  Laterality: N/A;  inst to portal/[employer] pacemaker   2nd floor for airway protection patient with lung disease elevated pulmonary artery pressure pacemaker and fecal occult blood positive stool,   cleared by pulmonary Dr Bhakta-see note on 7/11-GT    ENDOSCOPIC ULTRASOUND OF UPPER GASTROINTESTINAL TRACT N/A 9/19/2023    Procedure: ULTRASOUND, UPPER GI TRACT, ENDOSCOPIC;  Surgeon: Casimiro De Los Santos MD;  Location: [facility];  Service: Endoscopy;  Laterality: N/A;  9/13/23: instructions send via portal. [employer] ppm- GD    EYE SURGERY      FRACTURE SURGERY      HYSTERECTOMY      INSERT / REPLACE / REMOVE PACEMAKER  2009    placement    INSERT / REPLACE / REMOVE PACEMAKER  09/22/2014    [employer] Model #[id number] replacement    pace maker  2009    replacement 2014    REVISION OF PROCEDURE INVOLVING PACEMAKER LEAD Left 1/13/2022    Procedure: REVISION, ELECTRODE LEAD, CARDIAC PACEMAKER;  Surgeon: Trell Hodgson MD;  Location: [facility] EP LAB;  Service: Cardiology;  Laterality: Left;  PPM lead Malfx, RA lead revision, SJM, MAC, GP, 3 PREP*dPPM SJM in situ**Contrast prep given*    salpingo opherectomy Bilateral 1998    SPINE SURGERY      TONSILLECTOMY  1953    TUBAL LIGATION         Review of patient's allergies indicates:   Allergen Reactions    Iodinated contrast media Anaphylaxis    Penicillins Anaphylaxis    Allopurinol analogues      Muscle cramps    Ciprofloxacin Rash    Quinolones Rash    Sulfa (sulfonamide antibiotics) Rash    Tetracyclines Rash       No current facility-administered medications on file prior to encounter.     Current Outpatient Medications on File Prior to Encounter   Medication Sig    amLODIPine (NORVASC) 5 MG tablet Take 1 tablet (5 mg total) by mouth once daily.    aspirin (ECOTRIN) 81 MG EC tablet Take 1 tablet (81 mg total) by mouth once daily.    furosemide (LASIX) 40 MG tablet TAKE 1 TABLET BY MOUTH EVERY DAY    metoprolol succinate (TOPROL-XL) 25 MG 24 hr tablet  Take 0.5 tablets (12.5 mg total) by mouth once daily.    olmesartan (BENICAR) 20 MG tablet Take 1 tablet (20 mg total) by mouth once daily.    clobetasoL (OLUX) 0.05 % Foam Apply 1 application topically every 30 days.    ERGOCALCIFEROL, VITAMIN D2, (VITAMIN D ORAL) Take 2,000 Units by mouth once daily.    gabapentin (NEURONTIN) 300 MG capsule Take 1 capsule (300 mg total) by mouth 3 (three) times daily.    methocarbamoL (ROBAXIN) 500 MG Tab Take 1 tablet (500 mg total) by mouth 4 (four) times daily. for 10 days    nitroGLYCERIN (NITROSTAT) 0.4 MG SL tablet Place 1 tablet (0.4 mg total) under the tongue every 5 (five) minutes as needed for Chest pain.    oxyCODONE (ROXICODONE) 5 MG immediate release tablet Take 1 tablet (5 mg total) by mouth every 4 (four) hours as needed for Pain.    potassium citrate (UROCIT-K) 10 mEq (1,080 mg) TbSR TAKE 1 TABLET BY MOUTH ONCE DAILY    propafenone (RYTHMOL SR) 225 MG Cp12 Take 1 capsule (225 mg total) by mouth every 12 (twelve) hours.    rivaroxaban (XARELTO) 15 mg Tab Take 1 tablet (15 mg total) by mouth daily with dinner or evening meal.    rosuvastatin (CRESTOR) 40 MG Tab Take 1 tablet (40 mg total) by mouth every evening. (Patient taking differently: Take 40 mg by mouth once daily.)    topiramate (TOPAMAX) 25 MG tablet Take 1 tablet twice daily.     Family History       Problem Relation (Age of Onset)    Coronary artery disease Mother, Father, Brother    Diabetes Mother, Father, Paternal Grandmother, Paternal Grandfather    Heart disease Mother, Father, Brother    Hyperlipidemia Mother, Father    Hypertension Mother, Father    Hyperthyroidism Brother    Stroke Mother          Tobacco Use    Smoking status: Never    Smokeless tobacco: Never   Substance and Sexual Activity    Alcohol use: Yes     Alcohol/week: 5.0 standard drinks of alcohol     Types: 5 Glasses of wine per week    Drug use: No    Sexual activity: Not Currently     Partners: Male     Review of  Systems   Constitutional:  Negative for activity change, appetite change, chills, diaphoresis, fatigue, fever and unexpected weight change.   HENT:  Negative for congestion and sore throat.    Eyes:  Negative for visual disturbance.   Respiratory:  Positive for shortness of breath. Negative for chest tightness.    Cardiovascular:  Negative for chest pain, palpitations and leg swelling.   Gastrointestinal:  Negative for abdominal distention, abdominal pain, blood in stool, constipation, diarrhea, nausea and vomiting.   Genitourinary:  Negative for dysuria and hematuria.   Musculoskeletal:  Negative for back pain.   Skin:  Negative for rash.   Neurological:  Positive for dizziness and weakness. Negative for tremors, seizures, facial asymmetry, speech difficulty, light-headedness and headaches.   Psychiatric/Behavioral:  Negative for agitation and confusion.      Objective:     Vital Signs (Most Recent):  Temp: 97.6 °F (36.4 °C) (10/22/23 2227)  Pulse: 91 (10/22/23 2227)  Resp: 19 (10/22/23 2227)  BP: (!) 109/52 (10/22/23 2227)  SpO2: 97 % (10/22/23 2227) Vital Signs (24h Range):  Temp:  [97.5 °F (36.4 °C)-98.7 °F (37.1 °C)] 97.6 °F (36.4 °C)  Pulse:  [] 91  Resp:  [19-20] 19  SpO2:  [88 %-97 %] 97 %  BP: ()/(51-64) 109/52     Weight: 95.3 kg (210 lb 1.6 oz)  Body mass index is 39.72 kg/m².    Physical Exam  Vitals and nursing note reviewed.   Constitutional:       General: She is not in acute distress.     Appearance: Normal appearance. She is not diaphoretic.   HENT:      Head: Normocephalic and atraumatic.   Eyes:      General: No scleral icterus.     Extraocular Movements: Extraocular movements intact.      Conjunctiva/sclera: Conjunctivae normal.      Pupils: Pupils are equal, round, and reactive to light.   Cardiovascular:      Rate and Rhythm: Tachycardia present. Rhythm irregular.      Heart sounds: No murmur heard.     Comments: Atrial fibrillation 80-110s  Pulmonary:      Effort: Pulmonary effort  is normal. No respiratory distress.      Breath sounds: Normal breath sounds. No wheezing.   Abdominal:      General: There is no distension.      Palpations: Abdomen is soft.      Tenderness: There is no abdominal tenderness. There is no guarding.      Comments: Mild abdominal pain over site of recent chest tube on R   Skin:     General: Skin is warm.   Neurological:      Mental Status: She is alert.     Significant Labs:   All pertinent labs within the past 24 hours have been reviewed.  BMP:   Recent Labs   Lab 10/22/23  1826   GLU 89   *   K 4.3   CL 98   CO2 14*   BUN 34*   CREATININE 2.5*   CALCIUM 9.1   MG 2.1   CBC:   Recent Labs   Lab 10/21/23  0236 10/22/23  1826   WBC 9.99 11.92   HGB 10.9* 11.0*   HCT 34.2* 33.9*    190     Significant Imaging: I have reviewed all pertinent imaging results/findings within the past 24 hours.    Assessment/Plan:     * Hyponatremia  Patient has hyponatremia which is controlled,We will aim to correct the sodium by 4-6mEq in 24 hours. We will monitor sodium Every 6 hours. The hyponatremia is due to Dehydration/hypovolemia. We will obtain the following studies: Urine sodium, urine osmolality, serum osmolality, AM cortisol or TSH, T4. We will treat the hyponatremia with IV fluids as follows: NaCl 100ml/hr for 24 hrs and reassess. The patient's sodium results have been reviewed and are listed below.  Recent Labs   Lab 10/22/23  1826   *       Weakness  Likely secondary to BARBARA and symptomatic hyponatremia  CT head unremarkable after fall at home  Afib may be contributing, see Atrial fibrillation for management      Metabolic acidosis  Likely secondary to BARBARA. Trend and monitor for improvement with IVF.      BARBARA (acute kidney injury)  Patient with acute kidney injury/acute renal failure likely due to pre-renal azotemia due to dehydration BARBARA is currently stable. Baseline creatinine 1.2-1.3 - Labs reviewed- Renal function/electrolytes with Estimated Creatinine  Clearance: 20.8 mL/min (A) (based on SCr of 2.5 mg/dL (H)). according to latest data. Monitor urine output and serial BMP and adjust therapy as needed. Avoid nephrotoxins and renally dose meds for GFR listed above.    -gentle IVF to correct BARBARA, metabolic acidosis and hyponatremia  -hold nephrotoxic medications  -renally dose medications  -if not improving consider nephrology evaluation  -trend renal function Q6 overnight    Chronic diastolic heart failure  -monitor closely with IVF for hyponatremia. No signs of volume overload with recent poor PO intake and symptomatic hyponatremia.      Interstitial lung disease  Follows with Dr. Bhakta. OBLIVAR weakly positive (1:80) but exhaustive autoimmune/CTD workup otherwise negative.  Follow-up CT imaging with scattered GGOs and bibasilar scarring with fibrosis and mild traction bronchiectasis. Has appearance similar to hypersensitivity pneumonitis but possible NSIP.  No feather/down bedding, no molds or organic material exposures.  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  Her  has quit smoking.  Does report an allergy to pet dander and is frequently watching her daughter's pet dog.  Bronchoscopy with transbronchial biopsies with CTD-ILD appearance.  Expanding autoimmune laboratories that were negative. Next underwent recent wedge biopsy with pending pathology.      Atrial fibrillation  Patient with Paroxysmal (<7 days) atrial fibrillation which is controlled currently with propafenone, toprol. Patient is currently in atrial fibrillation with rates 80-110s. [id number] Score: 3. Anticoagulation indicated and had discussed xarelto but has not started due to recent biopsy with plans to discuss in clinic with Dr. Hodgson.    -continue propafenone 225 BID, toprol daily  -patient would like to discuss with primary cardiologist on starting xarelto. Will start on heparin gtt  -CT head negative for any bleeding after fall  -discussed  possible cardiology consult in AM and possible GAYATHRI/DCCV prior to discharge if patient remains in Afib      VTE Risk Mitigation (From admission, onward)         Ordered     heparin 25,000 units in dextrose 5% (100 units/ml) IV bolus from bag - ADDITIONAL PRN BOLUS - 60 units/kg  As needed (PRN)        Question:  Heparin Infusion Adjustment (DO NOT MODIFY ANSWER)  Answer:  [email or web address]\epic\Images\Pharmacy\HeparinInfusions\heparin LOW INTENSITY nomogram for [facility] [id number]    10/22/23 2259     heparin 25,000 units in dextrose 5% (100 units/ml) IV bolus from bag - ADDITIONAL PRN BOLUS - 30 units/kg  As needed (PRN)        Question:  Heparin Infusion Adjustment (DO NOT MODIFY ANSWER)  Answer:  [email or web address]\epic\Images\Pharmacy\HeparinInfusions\heparin LOW INTENSITY nomogram for [facility] [id number]    10/22/23 2259     heparin 25,000 units in dextrose 5% (100 units/ml) IV bolus from bag INITIAL BOLUS  Once        Question:  Heparin Infusion Adjustment (DO NOT MODIFY ANSWER)  Answer:  [email or web address]\epic\Images\Pharmacy\HeparinInfusions\heparin LOW INTENSITY nomogram for [facility] [id number]    10/22/23 2259     heparin 25,000 units in dextrose 5% 250 mL (100 units/mL) infusion LOW INTENSITY nomogram - [facility]  Continuous        Question:  Begin at (units/kg/hr)  Answer:  12    10/22/23 2259     Place CURLY hose  Until discontinued         10/22/23 2149     Place sequential compression device  Until discontinued         10/22/23 2149     IP VTE HIGH RISK PATIENT  Once         10/22/23 2149     Place sequential compression device  Until discontinued         10/22/23 2149                  Kameron Dong MD  Department of Hospital Medicine  [facility] - Intensive Care ([place])

## 2023-10-23 NOTE — ASSESSMENT & PLAN NOTE
Hyponatremia likely 2/2 dehydration. Review of urine/serum osmo, and other pertinent labs, support diagnosis of hypovolemic hyponatremia. Can consider possible component of water intoxication as well.     Plan/Recommendations:     -Recommend 1L/day fluid restriction   -Trend serum sodium every 6hrs   -Call nephrology on-call if sodium corrects 8 or more mmol/L in a 24hr period

## 2023-10-23 NOTE — ED NOTES
Assumed care of patient. Patient is alert and resting comfortably in bed in NAD. BP, cardiac, and O2 monitoring continued. Family member at bedside. Patient updated on plan of care, pt denies any needs or complaints at this time. Bed locked in lowest position, side rails up x2, call light within reach. Warm blanket provided. VSS. Will continue to monitor.

## 2023-10-23 NOTE — HPI
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  Ever since then she has had significant weakness to the point where she can not walk or get out of bed.  Slid out of her chair yesterday and her  was not able to get off the ground so he called EMS. She denies fevers, chest pain, palpitations, dizziness, orthopnea, N/V/D, abdominal pain. She has baseline shortness of breath without worsening and is not on home oxygen. Placed on NC here for O2 sats 88-90 at rest. She has a chronic cough that has not worsened. Patient was recently admitted for right VATS, wedge biopsy on 10/20/23 to r/o ILD and assess for possible causes. She was doing well and discharged after chest tube removal. Sodium at discharge was 140 with Cr 1.47. In the ED yesterday, Na 125 and Cr 2.5. HCO3 23>14. Patient has had poor appetite, but has been drinking water regularly. Nephrology was consulted for hyponatremia and BARBARA.

## 2023-10-23 NOTE — SUBJECTIVE & OBJECTIVE
Past Medical History:   Diagnosis Date    Allergy     Arthritis     Atrial fibrillation 2017    Chronic diastolic heart failure 2023    CKD (chronic kidney disease) stage 3, GFR 30-59 ml/min 2017    Coronary artery calcification seen on CAT scan 2023    Disorder of kidney and ureter     GERD (gastroesophageal reflux disease)     Hyperlipidemia     Hypertension     Impaired fasting glucose 2021    Pre-diabetes 6/3/2017       Past Surgical History:   Procedure Laterality Date    ADENOIDECTOMY      BACK SURGERY      lamenectomy    BREAST SURGERY      BRONCHOSCOPY N/A 2023    Procedure: BRONCHOSCOPY;  Surgeon: [facility] Diagnostic Provider;  [facility];  Service: Anesthesiology;  Laterality: N/A;    BRONCHOSCOPY N/A 10/20/2023    Procedure: BRONCHOSCOPY;  Surgeon: Anatoly Tiwari MD;  Location: [facility] OR [address] [place] [address];  Service: Cardiothoracic;  Laterality: N/A;    CARDIAC SURGERY      [employer] pacemaker     CATARACT EXTRACTION W/  INTRAOCULAR LENS IMPLANT Right 6/3/2019    Procedure: EXTRACTION, CATARACT, WITH IOL INSERTION;  Surgeon: Raisa Moreno MD;  Location: [facility];  Service: Ophthalmology;  Laterality: Right;  Laser    CATARACT EXTRACTION W/  INTRAOCULAR LENS IMPLANT Left 2019    Procedure: EXTRACTION, CATARACT, WITH IOL INSERTION;  Surgeon: Raisa Moreno MD;  Location: [facility] OR;  Service: Ophthalmology;  Laterality: Left;  LASER ASSISTED     SECTION      1980    COLONOSCOPY N/A 9/15/2023    Procedure: COLONOSCOPY;  Surgeon: Tao Gomez MD;  [facility] ([facility]  Service: Endoscopy;  Laterality: N/A;  inst to portal/[employer] pacemaker   2nd floor for airway protection patient with lung disease elevated pulmonary artery pressure pacemaker and fecal occult blood positive stool,   cleared by pulmonary Dr Bhakta-see note on -GT    ENDOSCOPIC ULTRASOUND OF UPPER GASTROINTESTINAL TRACT N/A 2023    Procedure: ULTRASOUND, UPPER GI  TRACT, ENDOSCOPIC;  Surgeon: Casimiro De Los Santos MD;  Location: [facility] ENDO;  Service: Endoscopy;  Laterality: N/A;  9/13/23: instructions send via portal. [employer] ppm- GD    EYE SURGERY      FRACTURE SURGERY      HYSTERECTOMY      INJECTION OF ANESTHETIC AGENT AROUND MULTIPLE INTERCOSTAL NERVES  10/20/2023    Procedure: BLOCK, NERVE, INTERCOSTAL, 2 OR MORE;  Surgeon: Anatoly Tiwari MD;  Location: [facility] OR [address] [place] [address];  Service: Cardiothoracic;;    INSERT / REPLACE / REMOVE PACEMAKER  2009    placement    INSERT / REPLACE / REMOVE PACEMAKER  09/22/2014    [employer] Model #[id number] replacement    pace maker  2009    replacement 2014    REVISION OF PROCEDURE INVOLVING PACEMAKER LEAD Left 1/13/2022    Procedure: REVISION, ELECTRODE LEAD, CARDIAC PACEMAKER;  Surgeon: Trell Hodgson MD;  Location: [facility] EP LAB;  Service: Cardiology;  Laterality: Left;  PPM lead Malfx, RA lead revision, SJM, MAC, GP, 3 PREP*dPPM SJM in situ**Contrast prep given*    salpingo opherectomy Bilateral 1998    SPINE SURGERY      TONSILLECTOMY  1953    TUBAL LIGATION      VIDEO-ASSISTED THORACOSCOPIC SURGERY (VATS) Right 10/20/2023    Procedure: VATS WEDGE;  Surgeon: Anatoly Tiwari MD;  Location: [facility] OR [address] [place] [address];  Service: Cardiothoracic;  Laterality: Right;       Review of patient's allergies indicates:   Allergen Reactions    Iodinated contrast media Anaphylaxis    Penicillins Anaphylaxis    Allopurinol analogues      Muscle cramps    Ciprofloxacin Rash    Quinolones Rash    Sulfa (sulfonamide antibiotics) Rash    Tetracyclines Rash     Current Facility-Administered Medications   Medication Frequency    0.9%  NaCl infusion Continuous    acetaminophen tablet 1,000 mg Q8H    aspirin EC tablet 81 mg Daily    atorvastatin tablet 80 mg Daily    dextrose 10% bolus 125 mL 125 mL PRN    dextrose 10% bolus 250 mL 250 mL PRN    glucose chewable tablet 16 g PRN    heparin 25,000 units in dextrose 5% (100 units/ml) IV bolus from bag - ADDITIONAL PRN BOLUS - 30  units/kg PRN    heparin 25,000 units in dextrose 5% (100 units/ml) IV bolus from bag - ADDITIONAL PRN BOLUS - 60 units/kg PRN    heparin 25,000 units in dextrose 5% (100 units/ml) IV bolus from bag INITIAL BOLUS Once    heparin 25,000 units in dextrose 5% 250 mL (100 units/mL) infusion LOW INTENSITY nomogram - [facility] Continuous    HYDROmorphone injection 0.5 mg Q6H PRN    insulin aspart U-100 pen 0-5 Units QID (AC + HS) PRN    LIDOcaine 5 % patch 1 patch Q24H    methocarbamoL tablet 500 mg QID    metoprolol succinate (TOPROL-XL) 24 hr split tablet 12.5 mg Daily    mupirocin 2 % ointment 1 g BID    ondansetron disintegrating tablet 8 mg Q8H PRN    oxyCODONE immediate release tablet 5 mg Q4H PRN    potassium chloride SA CR tablet 40 mEq Once    propafenone 12 hr capsule 225 mg Q12H    senna-docusate 8.6-50 mg per tablet 1 tablet BID    sodium chloride 0.9% flush 10 mL PRN     Family History       Problem Relation (Age of Onset)    Coronary artery disease Mother, Father, Brother    Diabetes Mother, Father, Paternal Grandmother, Paternal Grandfather    Heart disease Mother, Father, Brother    Hyperlipidemia Mother, Father    Hypertension Mother, Father    Hyperthyroidism Brother    Stroke Mother          Tobacco Use    Smoking status: Never    Smokeless tobacco: Never   Substance and Sexual Activity    Alcohol use: Yes     Alcohol/week: 5.0 standard drinks of alcohol     Types: 5 Glasses of wine per week    Drug use: No    Sexual activity: Not Currently     Partners: Male     Review of Systems   Constitutional: Negative.    HENT: Negative.     Eyes: Negative.    Respiratory:  Positive for shortness of breath.    Cardiovascular: Negative.    Gastrointestinal: Negative.    Genitourinary:  Positive for decreased urine volume.   Musculoskeletal: Negative.    Skin: Negative.    Neurological:  Positive for dizziness and weakness.   Psychiatric/Behavioral: Negative.       Objective:     Vital Signs (Most Recent):  Temp: 97.7 °F  (36.5 °C) (10/23/23 1131)  Pulse: 65 (10/23/23 1507)  Resp: 18 (10/23/23 1131)  BP: (!) 107/58 (10/23/23 1131)  SpO2: 95 % (10/23/23 1131) Vital Signs (24h Range):  Temp:  [97.5 °F (36.4 °C)-98.7 °F (37.1 °C)] 97.7 °F (36.5 °C)  Pulse:  [] 65  Resp:  [18-20] 18  SpO2:  [88 %-97 %] 95 %  BP: ()/(51-64) 107/58     Weight: 95.3 kg (210 lb 1.6 oz) (10/22/23 2230)  Body mass index is 39.72 kg/m².  Body surface area is 2.02 meters squared.    I/O last 3 completed shifts:  In: -   Out: 1050 [Urine:1050]     Physical Exam  Constitutional:       Appearance: Normal appearance. She is obese.   HENT:      Head: Normocephalic and atraumatic.      Nose: Nose normal.   Eyes:      Conjunctiva/sclera: Conjunctivae normal.   Cardiovascular:      Rate and Rhythm: Tachycardia present. Rhythm irregular.   Pulmonary:      Effort: Pulmonary effort is normal.   Abdominal:      General: Abdomen is flat.   Musculoskeletal:      Cervical back: Normal range of motion.      Right lower leg: No edema.      Left lower leg: No edema.   Skin:     General: Skin is warm and dry.   Neurological:      General: No focal deficit present.      Mental Status: She is alert and oriented to person, place, and time.   Psychiatric:         Mood and Affect: Mood normal.         Behavior: Behavior normal.          Significant Labs:  CBC:   Recent Labs   Lab 10/23/23  0612   WBC 8.37   RBC 3.12*   HGB 9.8*   HCT 30.1*      MCV 97   MCH 31.4*   MCHC 32.6     CMP:   Recent Labs   Lab 10/23/23  0612 10/23/23  1136   GLU 95  94 106   CALCIUM 8.4*  8.4* 8.2*   ALBUMIN 2.6*  2.6* 2.5*   PROT 5.4*  --    *  126* 131*   K 3.3*  3.3* 3.1*   CO2 13*  13* 18*     103 104   BUN 33*  33* 30*   CREATININE 2.1*  2.2* 2.1*   ALKPHOS 61  --    ALT 16  --    AST 25  --    BILITOT 0.6  --      All labs within the past 24 hours have been reviewed.

## 2023-10-23 NOTE — ED NOTES
Telemetry Verification   Patient placed on Telemetry Box  Verified with War Room  Box # [id number]   Monitor Tech    Rate 93   Rhythm A.fib

## 2023-10-23 NOTE — PROGRESS NOTES
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      Patient told the ER that she was walking down the hallway fell and landed on her back in the back of her head.  Ever since then she is had significant weakness to the point where she can not walk can not get out of bed.  Slid out of her chair today and her  and her were not able to get off the ground, so they called EMS. She denies fevers, chest pain, palpitations, dizziness, orthopnea, N/V/D, abdominal pain. She has baseline shortness of breath without worsening and is not on home oxygen. Placed on NC here for O2 sats 88-90 at rest. She has a chronic cough that has not worsened.      Patient was recently admitted for right VATS, wedge biopsy on 10/20/23 to r/o ILD and assess for possible causes. She was doing well and discharged after chest tube removal without any post-procedure issues. Sodium at discharge was 140 with Cr 1.47. In the ED today, Na 125 and Cr 2.5. HCO3 23>14. Patient has had slightly reduced PO intake. No post procedure bleeding.     She reports her fall was slumping against her bed and then going to the ground. Did not lose consciousness. No visual disturbances but was too weak to stand up and called EMS. She had not started anticoagulation for her Afib yet with this procedure pending. She missed her dose of propafenone this morning. Denied  any SOB, CP, palpitations and reports normally can tell when she is in Afib. She is currently in Afib with rates 80-110s with stable BP and asymptomatic. Could be related to missed dose of propafenone or volume depletion.       Overview/Hospital Course:  Patient presented with BARBARA, hyponatremia.  Etiology is unknown, most likely dehydration.  Patient was hypotensive on arrival as well.  Today, patient reports that she is feeling significantly improved from yesterday.  Checked outpatient again this afternoon and she was sleeping.  Nephrology consulted, appreciate assistance.  Patient's sodium continues to trend up as well as her creatinine is trending down.  Discussed anticoagulation with the patient, she would like subcutaneous heparin at this point.  She would like to discuss anticoagulation with her cardiologist.      Interval History: see above      Objective:     Vital Signs (Most Recent):  Temp: 97.9 °F (36.6 °C) (10/23/23 1643)  Pulse: 63 (10/23/23 1643)  Resp: 17 (10/23/23 1643)  BP: (!) 107/59 (10/23/23 1643)  SpO2: (!) 92 % (10/23/23 1643) Vital Signs (24h Range):  Temp:  [97.5 °F (36.4 °C)-98.7 °F (37.1 °C)] 97.9 °F (36.6 °C)  Pulse:  [] 63  Resp:  [17-20] 17  SpO2:  [88 %-97 %] 92 %  BP: ()/(51-64) 107/59     Weight: 95.3 kg (210 lb 1.6 oz)  Body mass index is 39.72 kg/m².    Intake/Output Summary (Last 24 hours) at 10/23/2023 1646  Last data filed at 10/23/2023 1200  Gross per 24 hour   Intake 240 ml   Output 1050 ml   Net -810 ml         Physical Exam  Constitutional:       Appearance: Normal appearance.   HENT:      Head: Normocephalic and atraumatic.      Nose: Nose normal.   Eyes:      Conjunctiva/sclera: Conjunctivae normal.   Cardiovascular:      Rate and Rhythm: Normal rate and regular rhythm.   Pulmonary:      Effort: Pulmonary effort is normal.   Abdominal:      General: Abdomen is flat.   Musculoskeletal:      Cervical back: Normal range of motion.      Right lower leg: No edema.       Left lower leg: No edema.   Skin:     General: Skin is warm and dry.   Neurological:      General: No focal deficit present.      Mental Status: She is alert and oriented to person, place, and time.   Psychiatric:         Mood and Affect: Mood normal.         Behavior: Behavior normal.             Significant Labs: All pertinent labs within the past 24 hours have been reviewed.    Significant Imaging: I have reviewed all pertinent imaging results/findings within the past 24 hours.      Assessment/Plan:      * Hyponatremia  Patient has hyponatremia which is controlled,We will aim to correct the sodium by 4-6mEq in 24 hours. We will monitor sodium Every 6 hours. The hyponatremia is due to Dehydration/hypovolemia. We will obtain the following studies: Urine sodium, urine osmolality, serum osmolality, AM cortisol or TSH, T4. We will treat the hyponatremia with IV fluids as follows: NaCl 100ml/hr for 24 hrs and reassess. The patient's sodium results have been reviewed and are listed below.  Recent Labs   Lab 10/23/23  1136   *       Nephrology is following, appreciate assistance.     Weakness  Likely secondary to BARBARA and symptomatic hyponatremia  CT head unremarkable after fall at home  Afib may be contributing, see Atrial fibrillation for management      Metabolic acidosis  Likely secondary to BARBARA. Trend and monitor for improvement with IVF.      BARBARA (acute kidney injury)  Patient with acute kidney injury/acute renal failure likely due to pre-renal azotemia due to dehydration BARBARA is currently stable. Baseline creatinine 1.2-1.3 - Labs reviewed- Renal function/electrolytes with Estimated Creatinine Clearance: 24.8 mL/min (A) (based on SCr of 2.1 mg/dL (H)). according to latest data. Monitor urine output and serial BMP and adjust therapy as needed. Avoid nephrotoxins and renally dose meds for GFR listed above.    -starting isotonic bicarb to correct BARBARA, metabolic acidosis and hyponatremia  -hold nephrotoxic  medications  -renally dose medications  -trend sodium Q6 overnight    Chronic diastolic heart failure  -monitor closely with IVF for hyponatremia. No signs of volume overload with recent poor PO intake and symptomatic hyponatremia.      Interstitial lung disease  Follows with Dr. Bhakta. BOLIVAR weakly positive (1:80) but exhaustive autoimmune/CTD workup otherwise negative.  Follow-up CT imaging with scattered GGOs and bibasilar scarring with fibrosis and mild traction bronchiectasis. Has appearance similar to hypersensitivity pneumonitis but possible NSIP.  No feather/down bedding, no molds or organic material exposures.  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  Her  has quit smoking.  Does report an allergy to pet dander and is frequently watching her daughter's pet dog.  Bronchoscopy with transbronchial biopsies with CTD-ILD appearance.  Expanding autoimmune laboratories that were negative. Next underwent recent wedge biopsy with pending pathology.      Atrial fibrillation  Patient with Paroxysmal (<7 days) atrial fibrillation which is controlled currently with propafenone, toprol. Patient is currently in atrial fibrillation with rates 80-110s. [id number] Score: 3. Anticoagulation indicated and had discussed xarelto but has not started due to recent biopsy with plans to discuss in clinic with Dr. Hodgson.    -continue propafenone 225 BID, toprol daily  -patient would like to discuss with primary cardiologist on starting xarelto. Will start on heparin gtt  -CT head negative for any bleeding after fall  -HR regular this AM. Planning on getting EKG as well. She is ok with heparin subq, but did not want the heparin gtt. Patient reports that she was asked to stop A/C per surgery and cardiology. It seems that she comfortable discussing A/C with them.       VTE Risk Mitigation (From admission, onward)         Ordered     heparin (porcine) injection 5,000 Units  Every 8 hours          10/23/23 1534     Place CURLY hose  Until discontinued         10/22/23 2149     Place sequential compression device  Until discontinued         10/22/23 2149     IP VTE HIGH RISK PATIENT  Once         10/22/23 2149     Place sequential compression device  Until discontinued         10/22/23 2149                Discharge Planning   HERMINIO:      Code Status: Full Code   Is the patient medically ready for discharge?:     Reason for patient still in hospital (select all that apply): Patient trending condition and Treatment  Discharge Plan A: Home with family                  Martine Corona DO  Department of Hospital Medicine   [facility] - Intensive Care ([place]-16)

## 2023-10-23 NOTE — NURSING
Nurses Note -- 4 Eyes      10/23/2023   12:37 AM      Skin assessed during: Admit      [x] No Altered Skin Integrity Present    [x]Prevention Measures Documented      [] Yes- Altered Skin Integrity Present or Discovered   [] LDA Added if Not in Epic (Describe Wound)   [] New Altered Skin Integrity was Present on Admit and Documented in LDA   [] Wound Image Taken    Wound Care Consulted? No    Attending Nurse:  Fernando Calero RN/Staff Member:  Evie

## 2023-10-23 NOTE — HPI
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      Patient told the ER that she was walking down the hallway fell and landed on her back in the back of her head.  Ever since then she is had significant weakness to the point where she can not walk can not get out of bed.  Slid out of her chair today and her  and her were not able to get off the ground, so they called EMS. She denies fevers, chest pain, palpitations, dizziness, orthopnea, N/V/D, abdominal pain. She has baseline shortness of breath without worsening and is not on home oxygen. Placed on NC here for O2 sats 88-90 at rest. She has a chronic cough that has not worsened.      Patient was recently admitted for right VATS, wedge biopsy on 10/20/23 to r/o ILD and assess for possible causes. She was doing well and discharged after chest tube removal without any post-procedure issues. Sodium at discharge was 140 with Cr 1.47. In the ED today, Na 125 and Cr 2.5. HCO3 23>14. Patient has had slightly reduced PO intake. No post procedure bleeding.     She reports her fall was slumping against her bed and then going to the ground. Did not lose consciousness. No visual disturbances but was too weak to stand up and called EMS. She had not started anticoagulation for her Afib yet with this procedure pending. She missed her dose of propafenone this morning. Denied any SOB, CP, palpitations and reports normally can tell when she is in Afib. She is currently in Afib with rates 80-110s with stable BP and asymptomatic. Could be related to missed dose of propafenone or volume depletion.

## 2023-10-23 NOTE — ASSESSMENT & PLAN NOTE
Likely secondary to BARBARA and symptomatic hyponatremia  CT head unremarkable after fall at home  Afib may be contributing, see Atrial fibrillation for management

## 2023-10-23 NOTE — ASSESSMENT & PLAN NOTE
-monitor closely with IVF for hyponatremia. No signs of volume overload with recent poor PO intake and symptomatic hyponatremia.

## 2023-10-23 NOTE — SUBJECTIVE & OBJECTIVE
Interval History: see above      Objective:     Vital Signs (Most Recent):  Temp: 97.9 °F (36.6 °C) (10/23/23 1643)  Pulse: 63 (10/23/23 1643)  Resp: 17 (10/23/23 1643)  BP: (!) 107/59 (10/23/23 1643)  SpO2: (!) 92 % (10/23/23 1643) Vital Signs (24h Range):  Temp:  [97.5 °F (36.4 °C)-98.7 °F (37.1 °C)] 97.9 °F (36.6 °C)  Pulse:  [] 63  Resp:  [17-20] 17  SpO2:  [88 %-97 %] 92 %  BP: ()/(51-64) 107/59     Weight: 95.3 kg (210 lb 1.6 oz)  Body mass index is 39.72 kg/m².    Intake/Output Summary (Last 24 hours) at 10/23/2023 1646  Last data filed at 10/23/2023 1200  Gross per 24 hour   Intake 240 ml   Output 1050 ml   Net -810 ml         Physical Exam  Constitutional:       Appearance: Normal appearance.   HENT:      Head: Normocephalic and atraumatic.      Nose: Nose normal.   Eyes:      Conjunctiva/sclera: Conjunctivae normal.   Cardiovascular:      Rate and Rhythm: Normal rate and regular rhythm.   Pulmonary:      Effort: Pulmonary effort is normal.   Abdominal:      General: Abdomen is flat.   Musculoskeletal:      Cervical back: Normal range of motion.      Right lower leg: No edema.      Left lower leg: No edema.   Skin:     General: Skin is warm and dry.   Neurological:      General: No focal deficit present.      Mental Status: She is alert and oriented to person, place, and time.   Psychiatric:         Mood and Affect: Mood normal.         Behavior: Behavior normal.             Significant Labs: All pertinent labs within the past 24 hours have been reviewed.    Significant Imaging: I have reviewed all pertinent imaging results/findings within the past 24 hours.   Leona

## 2023-10-23 NOTE — PLAN OF CARE
[facility] ([place] [address])  Initial Discharge Assessment       Primary Care Provider: Sandra Michaud MD    Admission Diagnosis: Hyponatremia [E87.1]  Interstitial lung disease [J84.9]  Weakness [R53.1]  BARBARA (acute kidney injury) [N17.9]    Admission Date: 10/22/2023  Expected Discharge Date:     Transition of Care Barriers: (P) None    Payor: MEDICARE / Plan: MEDICARE PART A & B / Product Type: Government /     Extended Emergency Contact Information  Primary Emergency Contact: Glen Curiel  Address: [address] [place] [address] [facility]  Home Phone: [phone]  Mobile Phone: [phone]  Relation: Spouse    Discharge Plan A: (P) Home with family  Discharge Plan B: (P) Home      [id number] [address] [facility]  [address] [facility] [address]  Phone: [phone] Fax: [phone]    [id number] [address] AT [place] [address]  Phone: [phone] Fax: [phone]      Initial Assessment (most recent)       Adult Discharge Assessment - 10/23/23 1237          Discharge Assessment    Assessment Type Discharge Planning Assessment (P)      Confirmed/corrected address, phone number and insurance Yes (P)      Confirmed Demographics Correct on Facesheet (P)      Source of Information patient;family (P)      Communicated HERMINIO with patient/caregiver Date not available/Unable to determine (P)      People in Home spouse (P)      Do you expect to return to your current living situation? Yes (P)      Do you have help at home or someone to help you manage your care at home? Yes (P)      Who are your caregiver(s) and their phone number(s)? Glen Curiel (Spouse)   [phone] (P)      Prior to hospitilization cognitive status: No Deficits (P)      Current cognitive status: No Deficits (P)      Home Accessibility wheelchair accessible (P)      Home  Layout Able to live on 1st floor (P)      Equipment Currently Used at Home none (P)      Readmission within 30 days? No (P)      Patient currently being followed by outpatient case management? No (P)      Do you currently have service(s) that help you manage your care at home? No (P)      Do you take prescription medications? Yes (P)      Do you have prescription coverage? Yes (P)      Coverage MEDICARE - MEDICARE PART A & B (P)      Do you have any problems affording any of your prescribed medications? No (P)      Is the patient taking medications as prescribed? yes (P)      Who is going to help you get home at discharge? Glen Curiel (Spouse)   [phone] (P)      How do you get to doctors appointments? family or friend will provide (P)      Are you on dialysis? No (P)      Do you take coumadin? No (P)      DME Needed Upon Discharge  none (P)      Discharge Plan discussed with: Spouse/sig other;Patient (P)      Name(s) and Number(s) Glen Curiel (Spouse)   [phone] (P)      Transition of Care Barriers None (P)      Discharge Plan A Home with family (P)      Discharge Plan B Home (P)         Physical Activity    On average, how many days per week do you engage in moderate to strenuous exercise (like a brisk walk)? 0 days (P)      On average, how many minutes do you engage in exercise at this level? 0 min (P)         Financial Resource Strain    How hard is it for you to pay for the very basics like food, housing, medical care, and heating? Not hard at all (P)         Housing Stability    In the last 12 months, was there a time when you were not able to pay the mortgage or rent on time? No (P)      In the last 12 months, how many places have you lived? 1 (P)      In the last 12 months, was there a time when you did not have a steady place to sleep or slept in a shelter (including now)? No (P)         Transportation Needs    In the past 12 months, has lack of transportation kept you from medical appointments  or from getting medications? No (P)      In the past 12 months, has lack of transportation kept you from meetings, work, or from getting things needed for daily living? No (P)         Food Insecurity    Within the past 12 months, you worried that your food would run out before you got the money to buy more. Never true (P)      Within the past 12 months, the food you bought just didn't last and you didn't have money to get more. Never true (P)         Stress    Do you feel stress - tense, restless, nervous, or anxious, or unable to sleep at night because your mind is troubled all the time - these days? Only a little (P)         Social Connections    In a typical week, how many times do you talk on the phone with family, friends, or neighbors? More than three times a week (P)      How often do you get together with friends or relatives? More than three times a week (P)      How often do you attend Gnosticism or Mormon services? Never (P)      Do you belong to any clubs or organizations such as Gnosticism groups, unions, fraternal or athletic groups, or school groups? No (P)      How often do you attend meetings of the clubs or organizations you belong to? Never (P)      Are you , , , , never , or living with a partner?  (P)         Alcohol Use    Q1: How often do you have a drink containing alcohol? 2-3 times a week (P)      Q2: How many drinks containing alcohol do you have on a typical day when you are drinking? 1 or 2 (P)         OTHER    Name(s) of People in Home Glen Curiel (Spouse)   [phone] (P)                  Discharge Plan A and Plan B have been determined by review of patient's clinical status, future medical and therapeutic needs, and coverage/benefits for post-acute care in coordination with multidisciplinary team members.                       CHALINO Davis, SW  [facility]  Case Management  Ext. [phone]

## 2023-10-24 LAB
ALBUMIN SERPL BCP-MCNC: 2.7 G/DL (ref 3.5–5.2)
ALP SERPL-CCNC: 64 U/L (ref 55–135)
ALT SERPL W/O P-5'-P-CCNC: 21 U/L (ref 10–44)
ANION GAP SERPL CALC-SCNC: 11 MMOL/L (ref 8–16)
ANISOCYTOSIS BLD QL SMEAR: SLIGHT
AST SERPL-CCNC: 26 U/L (ref 10–40)
BASOPHILS # BLD AUTO: 0.05 K/UL (ref 0–0.2)
BASOPHILS NFR BLD: 0.7 % (ref 0–1.9)
BILIRUB SERPL-MCNC: 0.6 MG/DL (ref 0.1–1)
BLD PROD TYP BPU: NORMAL
BLD PROD TYP BPU: NORMAL
BLOOD UNIT EXPIRATION DATE: NORMAL
BLOOD UNIT EXPIRATION DATE: NORMAL
BLOOD UNIT TYPE CODE: 5100
BLOOD UNIT TYPE CODE: 5100
BLOOD UNIT TYPE: NORMAL
BLOOD UNIT TYPE: NORMAL
BUN SERPL-MCNC: 27 MG/DL (ref 8–23)
BURR CELLS BLD QL SMEAR: ABNORMAL
CALCIUM SERPL-MCNC: 9.4 MG/DL (ref 8.7–10.5)
CHLORIDE SERPL-SCNC: 108 MMOL/L (ref 95–110)
CK SERPL-CCNC: 533 U/L (ref 20–180)
CO2 SERPL-SCNC: 18 MMOL/L (ref 23–29)
CODING SYSTEM: NORMAL
CODING SYSTEM: NORMAL
CREAT SERPL-MCNC: 2.1 MG/DL (ref 0.5–1.4)
CROSSMATCH INTERPRETATION: NORMAL
CROSSMATCH INTERPRETATION: NORMAL
DIFFERENTIAL METHOD: ABNORMAL
DISPENSE STATUS: NORMAL
DISPENSE STATUS: NORMAL
EOSINOPHIL # BLD AUTO: 0.2 K/UL (ref 0–0.5)
EOSINOPHIL NFR BLD: 3.2 % (ref 0–8)
ERYTHROCYTE [DISTWIDTH] IN BLOOD BY AUTOMATED COUNT: 13.2 % (ref 11.5–14.5)
EST. GFR  (NO RACE VARIABLE): 24.3 ML/MIN/1.73 M^2
GLUCOSE SERPL-MCNC: 115 MG/DL (ref 70–110)
HCT VFR BLD AUTO: 31.2 % (ref 37–48.5)
HGB BLD-MCNC: 10.3 G/DL (ref 12–16)
HYPOCHROMIA BLD QL SMEAR: ABNORMAL
IMM GRANULOCYTES # BLD AUTO: 0.07 K/UL (ref 0–0.04)
IMM GRANULOCYTES NFR BLD AUTO: 1 % (ref 0–0.5)
LYMPHOCYTES # BLD AUTO: 0.6 K/UL (ref 1–4.8)
LYMPHOCYTES NFR BLD: 7.9 % (ref 18–48)
MAGNESIUM SERPL-MCNC: 2.1 MG/DL (ref 1.6–2.6)
MCH RBC QN AUTO: 31.5 PG (ref 27–31)
MCHC RBC AUTO-ENTMCNC: 33 G/DL (ref 32–36)
MCV RBC AUTO: 95 FL (ref 82–98)
MONOCYTES # BLD AUTO: 0.8 K/UL (ref 0.3–1)
MONOCYTES NFR BLD: 11 % (ref 4–15)
NEUTROPHILS # BLD AUTO: 5.3 K/UL (ref 1.8–7.7)
NEUTROPHILS NFR BLD: 76.2 % (ref 38–73)
NRBC BLD-RTO: 0 /100 WBC
NUM UNITS TRANS PACKED RBC: NORMAL
NUM UNITS TRANS PACKED RBC: NORMAL
OVALOCYTES BLD QL SMEAR: ABNORMAL
PHOSPHATE SERPL-MCNC: 2.2 MG/DL (ref 2.7–4.5)
PLATELET # BLD AUTO: 164 K/UL (ref 150–450)
PMV BLD AUTO: ABNORMAL FL (ref 9.2–12.9)
POIKILOCYTOSIS BLD QL SMEAR: SLIGHT
POLYCHROMASIA BLD QL SMEAR: ABNORMAL
POTASSIUM SERPL-SCNC: 3.9 MMOL/L (ref 3.5–5.1)
PROT SERPL-MCNC: 5.9 G/DL (ref 6–8.4)
RBC # BLD AUTO: 3.27 M/UL (ref 4–5.4)
SODIUM SERPL-SCNC: 134 MMOL/L (ref 136–145)
SODIUM SERPL-SCNC: 136 MMOL/L (ref 136–145)
SODIUM SERPL-SCNC: 137 MMOL/L (ref 136–145)
SODIUM SERPL-SCNC: 141 MMOL/L (ref 136–145)
SPHEROCYTES BLD QL SMEAR: ABNORMAL
WBC # BLD AUTO: 6.94 K/UL (ref 3.9–12.7)

## 2023-10-24 PROCEDURE — 83735 ASSAY OF MAGNESIUM: CPT | Performed by: STUDENT IN AN ORGANIZED HEALTH CARE EDUCATION/TRAINING PROGRAM

## 2023-10-24 PROCEDURE — 85025 COMPLETE CBC W/AUTO DIFF WBC: CPT | Performed by: STUDENT IN AN ORGANIZED HEALTH CARE EDUCATION/TRAINING PROGRAM

## 2023-10-24 PROCEDURE — 93010 EKG 12-LEAD: ICD-10-PCS | Mod: ,,, | Performed by: INTERNAL MEDICINE

## 2023-10-24 PROCEDURE — 99231 PR SUBSEQUENT HOSPITAL CARE,LEVL I: ICD-10-PCS | Mod: ,,, | Performed by: INTERNAL MEDICINE

## 2023-10-24 PROCEDURE — 93010 ELECTROCARDIOGRAM REPORT: CPT | Mod: ,,, | Performed by: INTERNAL MEDICINE

## 2023-10-24 PROCEDURE — 63600175 PHARM REV CODE 636 W HCPCS: Performed by: STUDENT IN AN ORGANIZED HEALTH CARE EDUCATION/TRAINING PROGRAM

## 2023-10-24 PROCEDURE — 36415 COLL VENOUS BLD VENIPUNCTURE: CPT | Performed by: STUDENT IN AN ORGANIZED HEALTH CARE EDUCATION/TRAINING PROGRAM

## 2023-10-24 PROCEDURE — 84295 ASSAY OF SERUM SODIUM: CPT | Performed by: STUDENT IN AN ORGANIZED HEALTH CARE EDUCATION/TRAINING PROGRAM

## 2023-10-24 PROCEDURE — 80053 COMPREHEN METABOLIC PANEL: CPT | Performed by: STUDENT IN AN ORGANIZED HEALTH CARE EDUCATION/TRAINING PROGRAM

## 2023-10-24 PROCEDURE — 84100 ASSAY OF PHOSPHORUS: CPT | Performed by: STUDENT IN AN ORGANIZED HEALTH CARE EDUCATION/TRAINING PROGRAM

## 2023-10-24 PROCEDURE — 93005 ELECTROCARDIOGRAM TRACING: CPT

## 2023-10-24 PROCEDURE — 82550 ASSAY OF CK (CPK): CPT | Performed by: STUDENT IN AN ORGANIZED HEALTH CARE EDUCATION/TRAINING PROGRAM

## 2023-10-24 PROCEDURE — 21400001 HC TELEMETRY ROOM

## 2023-10-24 PROCEDURE — 99231 SBSQ HOSP IP/OBS SF/LOW 25: CPT | Mod: ,,, | Performed by: INTERNAL MEDICINE

## 2023-10-24 PROCEDURE — 25000003 PHARM REV CODE 250: Performed by: STUDENT IN AN ORGANIZED HEALTH CARE EDUCATION/TRAINING PROGRAM

## 2023-10-24 RX ADMIN — HEPARIN SODIUM 5000 UNITS: 5000 INJECTION INTRAVENOUS; SUBCUTANEOUS at 09:10

## 2023-10-24 RX ADMIN — ATORVASTATIN CALCIUM 80 MG: 40 TABLET, FILM COATED ORAL at 10:10

## 2023-10-24 RX ADMIN — PROPAFENONE HYDROCHLORIDE 225 MG: 225 CAPSULE, EXTENDED RELEASE ORAL at 10:10

## 2023-10-24 RX ADMIN — ACETAMINOPHEN 1000 MG: 500 TABLET ORAL at 05:10

## 2023-10-24 RX ADMIN — METHOCARBAMOL 500 MG: 500 TABLET ORAL at 09:10

## 2023-10-24 RX ADMIN — HEPARIN SODIUM 5000 UNITS: 5000 INJECTION INTRAVENOUS; SUBCUTANEOUS at 05:10

## 2023-10-24 RX ADMIN — MUPIROCIN 1 G: 20 OINTMENT TOPICAL at 09:10

## 2023-10-24 RX ADMIN — SENNOSIDES AND DOCUSATE SODIUM 1 TABLET: 50; 8.6 TABLET ORAL at 09:10

## 2023-10-24 RX ADMIN — ACETAMINOPHEN 1000 MG: 500 TABLET ORAL at 09:10

## 2023-10-24 RX ADMIN — METOPROLOL SUCCINATE 12.5 MG: 25 TABLET, EXTENDED RELEASE ORAL at 10:10

## 2023-10-24 RX ADMIN — ACETAMINOPHEN 1000 MG: 500 TABLET ORAL at 01:10

## 2023-10-24 RX ADMIN — ASPIRIN 81 MG: 81 TABLET, COATED ORAL at 10:10

## 2023-10-24 NOTE — PROGRESS NOTES
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  Ever since then she has had significant weakness to the point where she can not walk or get out of bed.  Slid out of her chair yesterday and her  was not able to get off the ground so he called EMS. She denies fevers, chest pain, palpitations, dizziness, orthopnea, N/V/D, abdominal pain. She has baseline shortness of breath without worsening and is not on home oxygen. Placed on NC here for O2 sats 88-90 at rest. She has a chronic cough that has not worsened. Patient was recently admitted for right VATS, wedge biopsy on 10/20/23 to r/o ILD and assess for possible causes. She was doing well and discharged after chest tube removal. Sodium at discharge was 140 with Cr 1.47. In the ED yesterday, Na 125 and Cr 2.5. HCO3 23>14. Patient has had poor appetite, but has been drinking water regularly. Nephrology was consulted for hyponatremia and BARBARA.        Interval History: Na improved, however Cr did not. Patient SOB better. CK unimpressive     Review of patient's allergies indicates:   Allergen Reactions    Iodinated contrast media Anaphylaxis    Penicillins Anaphylaxis    Allopurinol analogues      Muscle cramps    Ciprofloxacin Rash    Quinolones Rash    Sulfa (sulfonamide antibiotics) Rash    Tetracyclines Rash     Current Facility-Administered Medications   Medication Frequency    acetaminophen tablet  1,000 mg Q8H    aspirin EC tablet 81 mg Daily    atorvastatin tablet 80 mg Daily    dextrose 10% bolus 125 mL 125 mL PRN    dextrose 10% bolus 250 mL 250 mL PRN    glucose chewable tablet 16 g PRN    heparin (porcine) injection 5,000 Units Q8H    HYDROmorphone injection 0.5 mg Q6H PRN    insulin aspart U-100 pen 0-5 Units QID (AC + HS) PRN    LIDOcaine 5 % patch 1 patch Q24H    methocarbamoL tablet 500 mg QID    metoprolol succinate (TOPROL-XL) 24 hr split tablet 12.5 mg Daily    mupirocin 2 % ointment 1 g BID    ondansetron disintegrating tablet 8 mg Q8H PRN    oxyCODONE immediate release tablet 5 mg Q4H PRN    propafenone 12 hr capsule 225 mg Q12H    senna-docusate 8.6-50 mg per tablet 1 tablet BID    sodium chloride 0.9% flush 10 mL PRN       Objective:     Vital Signs (Most Recent):  Temp: 97.9 °F (36.6 °C) (10/24/23 1134)  Pulse: 62 (10/24/23 1134)  Resp: 17 (10/24/23 1134)  BP: (!) 122/59 (10/24/23 1134)  SpO2: (!) 94 % (10/24/23 1134) Vital Signs (24h Range):  Temp:  [97.4 °F (36.3 °C)-98.8 °F (37.1 °C)] 97.9 °F (36.6 °C)  Pulse:  [59-68] 62  Resp:  [17-18] 17  SpO2:  [92 %-95 %] 94 %  BP: ()/(50-60) 122/59     Weight: 95 kg (209 lb 7 oz) (10/24/23 0000)  Body mass index is 40.9 kg/m².  Body surface area is 2.01 meters squared.    I/O last 3 completed shifts:  In: 590 [P.O.:590]  Out: 2700 [Urine:2700]     Physical Exam  Constitutional:       Appearance: Normal appearance. She is obese.   HENT:      Head: Normocephalic and atraumatic.      Nose: Nose normal.   Eyes:      Conjunctiva/sclera: Conjunctivae normal.   Cardiovascular:      Rate and Rhythm: Tachycardia present. Rhythm irregular.   Pulmonary:      Effort: Pulmonary effort is normal.   Abdominal:      General: Abdomen is flat.   Musculoskeletal:      Cervical back: Normal range of motion.      Right lower leg: No edema.      Left lower leg: No edema.   Skin:     General: Skin is warm and dry.   Neurological:      General: No focal deficit  present.      Mental Status: She is alert and oriented to person, place, and time.   Psychiatric:         Mood and Affect: Mood normal.         Behavior: Behavior normal.          Significant Labs:  All labs within the past 24 hours have been reviewed.     Significant Imaging:  Labs: Reviewed      Assessment/Plan:     Renal/  * Hyponatremia  IMPROVED  Hyponatremia likely 2/2 dehydration. Review of urine/serum osmo, and other pertinent labs, support diagnosis of hypovolemic hyponatremia. Can consider possible component of water intoxication as well. Given improvement with IVF, hyponatremia is more likely hypovolemic hyponatremia     Plan/Recommendations:     - can remove fluid restriction     BARBARA (acute kidney injury)  Baseline Cr of 1.3. Cr up to 2.1 at time of nephrology consultation; currently trending down with administration of IVF's. Suspect BARBARA prerenal cause/dehydration. Patient still producing adequate urine. CPK unimpressive and does not indicate rhabdomyolysis. S/p IVF hyponatremia improved but Cr did not indicating this is not a pre-renal BARBARA.     Ddx: possible intra-renal BARBARA    Plan/Recommendations:     - will perform urine microscopy to rule out ATN   -Transfuse for Hg <7.0  -Keep MAP >65  -Renally dose meds/avoid nephrotoxic medications           Thank you for your consult. I will follow-up with patient. Please contact us if you have any additional questions.    Gracia Amaya, DO  Nephrology  [facility] ([place] [address])    ATTENDING PHYSICIAN ATTESTATION  I have personally verified the history and examined the patient. I thoroughly reviewed the demographic, clinical, laboratorial and imaging information available in medical records. I agree with the assessment and recommendations provided by the subspecialty resident who was under my supervision.

## 2023-10-24 NOTE — PROGRESS NOTES
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      Patient told the ER that she was walking down the hallway fell and landed on her back in the back of her head.  Ever since then she is had significant weakness to the point where she can not walk can not get out of bed.  Slid out of her chair today and her  and her were not able to get off the ground, so they called EMS. She denies fevers, chest pain, palpitations, dizziness, orthopnea, N/V/D, abdominal pain. She has baseline shortness of breath without worsening and is not on home oxygen. Placed on NC here for O2 sats 88-90 at rest. She has a chronic cough that has not worsened.      Patient was recently admitted for right VATS, wedge biopsy on 10/20/23 to r/o ILD and assess for possible causes. She was doing well and discharged after chest tube removal without any post-procedure issues. Sodium at discharge was 140 with Cr 1.47. In the ED today, Na 125 and Cr 2.5. HCO3 23>14. Patient has had slightly reduced PO intake. No post procedure bleeding.     She reports her fall was slumping against her bed and then going to the ground. Did not lose consciousness. No visual disturbances but was too weak to stand up and called EMS. She had not started anticoagulation for her Afib yet with this procedure pending. She missed her dose of propafenone this morning. Denied  any SOB, CP, palpitations and reports normally can tell when she is in Afib. She is currently in Afib with rates 80-110s with stable BP and asymptomatic. Could be related to missed dose of propafenone or volume depletion.       Overview/Hospital Course:  Patient presented with BARBARA, hyponatremia.  Etiology is unknown, most likely dehydration.  Patient was hypotensive on arrival as well.  Today, patient reports that she is feeling significantly improved from yesterday.  Checked outpatient again this afternoon and she was sleeping.  Nephrology consulted, appreciate assistance.  Patient's sodium continues to trend up as well as her creatinine is trending down.  Discussed anticoagulation with the patient, she would like subcutaneous heparin at this point.  She would like to discuss anticoagulation with her cardiologist.    10/24:  Patient is stable, no events overnight.  Continues to be a new cannula.  She does not wear oxygen at home however after discharge from recent VATS, she was requiring oxygen.  Patient needs home O2 eval tomorrow morning and would likely need home oxygen.  She is currently stable at 3 L. sodium is correcting, creatinine seems to be stable at 2.1.  Appreciate Nephrology support      Interval History: see above      Objective:     Vital Signs (Most Recent):  Temp: 97.2 °F (36.2 °C) (10/24/23 1511)  Pulse: 92 (10/24/23 1514)  Resp: 18 (10/24/23 1511)  BP: (!) 117/59 (10/24/23 1511)  SpO2: (!) 94 % (10/24/23 1511) Vital Signs (24h Range):  Temp:  [97.2 °F (36.2 °C)-98.8 °F (37.1 °C)] 97.2 °F (36.2 °C)  Pulse:  [59-92] 92  Resp:  [17-18] 18  SpO2:  [93 %-95 %] 94 %  BP: ()/(50-60) 117/59     Weight: 95 kg (209 lb 7 oz)  Body mass index is 40.9 kg/m².    Intake/Output Summary (Last 24 hours) at 10/24/2023 1804  Last data filed at 10/24/2023 1630  Gross per 24 hour   Intake 1234 ml   Output 2500 ml   Net -1266 ml         Physical Exam  Constitutional:       Appearance: Normal appearance.   HENT:       Head: Normocephalic and atraumatic.      Nose: Nose normal.   Eyes:      Conjunctiva/sclera: Conjunctivae normal.   Cardiovascular:      Rate and Rhythm: Normal rate and regular rhythm.   Pulmonary:      Effort: Pulmonary effort is normal.   Abdominal:      General: Abdomen is flat.   Musculoskeletal:      Cervical back: Normal range of motion.      Right lower leg: No edema.      Left lower leg: No edema.   Skin:     General: Skin is warm and dry.   Neurological:      General: No focal deficit present.      Mental Status: She is alert and oriented to person, place, and time.   Psychiatric:         Mood and Affect: Mood normal.         Behavior: Behavior normal.             Significant Labs: All pertinent labs within the past 24 hours have been reviewed.    Significant Imaging: I have reviewed all pertinent imaging results/findings within the past 24 hours.      Assessment/Plan:      * Hyponatremia  Patient has hyponatremia which is controlled,We will aim to correct the sodium by 4-6mEq in 24 hours. We will monitor sodium Every 6 hours. The hyponatremia is due to Dehydration/hypovolemia. We will obtain the following studies: Urine sodium, urine osmolality, serum osmolality, AM cortisol or TSH, T4. We will treat the hyponatremia with IV fluids as follows: NaCl 100ml/hr for 24 hrs and reassess. The patient's sodium results have been reviewed and are listed below.  Recent Labs   Lab 10/24/23  1109          Nephrology is following, appreciate assistance. Correcting with IVF    Weakness  Likely secondary to BARBARA and symptomatic hyponatremia  CT head unremarkable after fall at home  Afib may be contributing, see Atrial fibrillation for management      Metabolic acidosis  Likely secondary to BARBARA. Trend and monitor for improvement with IVF.      BARBARA (acute kidney injury)  Patient with acute kidney injury/acute renal failure likely due to pre-renal azotemia due to dehydration BARBARA is currently stable. Baseline  creatinine 1.2-1.3 - Labs reviewed- Renal function/electrolytes with Estimated Creatinine Clearance: 24.2 mL/min (A) (based on SCr of 2.1 mg/dL (H)). according to latest data. Monitor urine output and serial BMP and adjust therapy as needed. Avoid nephrotoxins and renally dose meds for GFR listed above.    -starting isotonic bicarb to correct BARBARA, metabolic acidosis and hyponatremia  -hold nephrotoxic medications  -renally dose medications  -trend sodium Q6 overnight    Chronic diastolic heart failure  -monitor closely with IVF for hyponatremia. No signs of volume overload with recent poor PO intake and symptomatic hyponatremia.      Interstitial lung disease  Follows with Dr. Bhakta. BOLIVAR weakly positive (1:80) but exhaustive autoimmune/CTD workup otherwise negative.  Follow-up CT imaging with scattered GGOs and bibasilar scarring with fibrosis and mild traction bronchiectasis. Has appearance similar to hypersensitivity pneumonitis but possible NSIP.  No feather/down bedding, no molds or organic material exposures.  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  Her  has quit smoking.  Does report an allergy to pet dander and is frequently watching her daughter's pet dog.  Bronchoscopy with transbronchial biopsies with CTD-ILD appearance.  Expanding autoimmune laboratories that were negative. Next underwent recent wedge biopsy with pending pathology.      Atrial fibrillation  Patient with Paroxysmal (<7 days) atrial fibrillation which is controlled currently with propafenone, toprol. Patient is currently in atrial fibrillation with rates 80-110s. [id number] Score: 3. Anticoagulation indicated and had discussed xarelto but has not started due to recent biopsy with plans to discuss in clinic with Dr. Hodgson.    -continue propafenone 225 BID, toprol daily  -patient would like to discuss with primary cardiologist on starting xarelto. Will start on heparin gtt  -CT head negative  for any bleeding after fall  -HR regular this AM. Planning on getting EKG as well. She is ok with heparin subq, but did not want the heparin gtt. Patient reports that she was asked to stop A/C per surgery and cardiology. It seems that she comfortable discussing A/C with them.       VTE Risk Mitigation (From admission, onward)         Ordered     heparin (porcine) injection 5,000 Units  Every 8 hours         10/23/23 1534     Place CURLY hose  Until discontinued         10/22/23 2149     Place sequential compression device  Until discontinued         10/22/23 2149     IP VTE HIGH RISK PATIENT  Once         10/22/23 2149     Place sequential compression device  Until discontinued         10/22/23 2149                Discharge Planning   HERMINIO: 10/25/2023     Code Status: Full Code   Is the patient medically ready for discharge?:     Reason for patient still in hospital (select all that apply): Patient trending condition, Laboratory test and Treatment  Discharge Plan A: Home with family                  Martine Corona DO  Department of Hospital Medicine   [facility] ([place]

## 2023-10-24 NOTE — ASSESSMENT & PLAN NOTE
Baseline Cr of 1.3. Cr up to 2.1 at time of nephrology consultation; currently trending down with administration of IVF's. Suspect BARBARA prerenal cause/dehydration. Patient still producing adequate urine. CPK unimpressive and does not indicate rhabdomyolysis. S/p IVF hyponatremia improved but Cr did not indicating this is not a pre-renal BARBARA.     Ddx: possible intra-renal BARBARA    Plan/Recommendations:     - will perform urine microscopy to rule out ATN   -Transfuse for Hg <7.0  -Keep MAP >65  -Renally dose meds/avoid nephrotoxic medications

## 2023-10-24 NOTE — SUBJECTIVE & OBJECTIVE
Interval History: see above      Objective:     Vital Signs (Most Recent):  Temp: 97.2 °F (36.2 °C) (10/24/23 1511)  Pulse: 92 (10/24/23 1514)  Resp: 18 (10/24/23 1511)  BP: (!) 117/59 (10/24/23 1511)  SpO2: (!) 94 % (10/24/23 1511) Vital Signs (24h Range):  Temp:  [97.2 °F (36.2 °C)-98.8 °F (37.1 °C)] 97.2 °F (36.2 °C)  Pulse:  [59-92] 92  Resp:  [17-18] 18  SpO2:  [93 %-95 %] 94 %  BP: ()/(50-60) 117/59     Weight: 95 kg (209 lb 7 oz)  Body mass index is 40.9 kg/m².    Intake/Output Summary (Last 24 hours) at 10/24/2023 1804  Last data filed at 10/24/2023 1630  Gross per 24 hour   Intake 1234 ml   Output 2500 ml   Net -1266 ml         Physical Exam  Constitutional:       Appearance: Normal appearance.   HENT:      Head: Normocephalic and atraumatic.      Nose: Nose normal.   Eyes:      Conjunctiva/sclera: Conjunctivae normal.   Cardiovascular:      Rate and Rhythm: Normal rate and regular rhythm.   Pulmonary:      Effort: Pulmonary effort is normal.   Abdominal:      General: Abdomen is flat.   Musculoskeletal:      Cervical back: Normal range of motion.      Right lower leg: No edema.      Left lower leg: No edema.   Skin:     General: Skin is warm and dry.   Neurological:      General: No focal deficit present.      Mental Status: She is alert and oriented to person, place, and time.   Psychiatric:         Mood and Affect: Mood normal.         Behavior: Behavior normal.             Significant Labs: All pertinent labs within the past 24 hours have been reviewed.    Significant Imaging: I have reviewed all pertinent imaging results/findings within the past 24 hours.

## 2023-10-24 NOTE — ASSESSMENT & PLAN NOTE
Patient with acute kidney injury/acute renal failure likely due to pre-renal azotemia due to dehydration BARBARA is currently stable. Baseline creatinine 1.2-1.3 - Labs reviewed- Renal function/electrolytes with Estimated Creatinine Clearance: 24.2 mL/min (A) (based on SCr of 2.1 mg/dL (H)). according to latest data. Monitor urine output and serial BMP and adjust therapy as needed. Avoid nephrotoxins and renally dose meds for GFR listed above.    -starting isotonic bicarb to correct BARBARA, metabolic acidosis and hyponatremia  -hold nephrotoxic medications  -renally dose medications  -trend sodium Q6 overnight

## 2023-10-24 NOTE — PLAN OF CARE
Problem: Adult Inpatient Plan of Care  Goal: Plan of Care Review  Outcome: Ongoing, Progressing     Problem: Fluid and Electrolyte Imbalance (Acute Kidney Injury/Impairment)  Goal: Fluid and Electrolyte Balance  Outcome: Ongoing, Progressing     Problem: Renal Function Impairment (Acute Kidney Injury/Impairment)  Goal: Effective Renal Function  Intervention: Monitor and Support Renal Function  Flowsheets (Taken 10/24/2023 1840)  Medication Review/Management: medications reviewed  Pt AAOX4,  BUN improved from 33 to 27,  NA+ improved from 134, to 141., awaiting biopsy results, educated on incentive spirometer. Pt refused BS check. Jame mercedes placed.

## 2023-10-24 NOTE — PLAN OF CARE
Problem: Adult Inpatient Plan of Care  Goal: Plan of Care Review  Outcome: Ongoing, Progressing  Goal: Patient-Specific Goal (Individualized)  Outcome: Ongoing, Progressing  Goal: Absence of Hospital-Acquired Illness or Injury  Outcome: Ongoing, Progressing  Goal: Optimal Comfort and Wellbeing  Outcome: Ongoing, Progressing  Goal: Readiness for Transition of Care  Outcome: Ongoing, Progressing     Problem: Oral Intake Inadequate (Acute Kidney Injury/Impairment)  Goal: Optimal Nutrition Intake  Outcome: Ongoing, Progressing     Problem: Fall Injury Risk  Goal: Absence of Fall and Fall-Related Injury  Outcome: Ongoing, Progressing

## 2023-10-24 NOTE — SUBJECTIVE & OBJECTIVE
Interval History: Na improved, however Cr did not. Patient SOB better. CK unimpressive     Review of patient's allergies indicates:   Allergen Reactions    Iodinated contrast media Anaphylaxis    Penicillins Anaphylaxis    Allopurinol analogues      Muscle cramps    Ciprofloxacin Rash    Quinolones Rash    Sulfa (sulfonamide antibiotics) Rash    Tetracyclines Rash     Current Facility-Administered Medications   Medication Frequency    acetaminophen tablet 1,000 mg Q8H    aspirin EC tablet 81 mg Daily    atorvastatin tablet 80 mg Daily    dextrose 10% bolus 125 mL 125 mL PRN    dextrose 10% bolus 250 mL 250 mL PRN    glucose chewable tablet 16 g PRN    heparin (porcine) injection 5,000 Units Q8H    HYDROmorphone injection 0.5 mg Q6H PRN    insulin aspart U-100 pen 0-5 Units QID (AC + HS) PRN    LIDOcaine 5 % patch 1 patch Q24H    methocarbamoL tablet 500 mg QID    metoprolol succinate (TOPROL-XL) 24 hr split tablet 12.5 mg Daily    mupirocin 2 % ointment 1 g BID    ondansetron disintegrating tablet 8 mg Q8H PRN    oxyCODONE immediate release tablet 5 mg Q4H PRN    propafenone 12 hr capsule 225 mg Q12H    senna-docusate 8.6-50 mg per tablet 1 tablet BID    sodium chloride 0.9% flush 10 mL PRN       Objective:     Vital Signs (Most Recent):  Temp: 97.9 °F (36.6 °C) (10/24/23 1134)  Pulse: 62 (10/24/23 1134)  Resp: 17 (10/24/23 1134)  BP: (!) 122/59 (10/24/23 1134)  SpO2: (!) 94 % (10/24/23 1134) Vital Signs (24h Range):  Temp:  [97.4 °F (36.3 °C)-98.8 °F (37.1 °C)] 97.9 °F (36.6 °C)  Pulse:  [59-68] 62  Resp:  [17-18] 17  SpO2:  [92 %-95 %] 94 %  BP: ()/(50-60) 122/59     Weight: 95 kg (209 lb 7 oz) (10/24/23 0000)  Body mass index is 40.9 kg/m².  Body surface area is 2.01 meters squared.    I/O last 3 completed shifts:  In: 590 [P.O.:590]  Out: 2700 [Urine:2700]     Physical Exam  Constitutional:       Appearance: Normal appearance. She is obese.   HENT:      Head: Normocephalic and atraumatic.      Nose: Nose  normal.   Eyes:      Conjunctiva/sclera: Conjunctivae normal.   Cardiovascular:      Rate and Rhythm: Tachycardia present. Rhythm irregular.   Pulmonary:      Effort: Pulmonary effort is normal.   Abdominal:      General: Abdomen is flat.   Musculoskeletal:      Cervical back: Normal range of motion.      Right lower leg: No edema.      Left lower leg: No edema.   Skin:     General: Skin is warm and dry.   Neurological:      General: No focal deficit present.      Mental Status: She is alert and oriented to person, place, and time.   Psychiatric:         Mood and Affect: Mood normal.         Behavior: Behavior normal.          Significant Labs:  All labs within the past 24 hours have been reviewed.     Significant Imaging:  Labs: Reviewed

## 2023-10-24 NOTE — ASSESSMENT & PLAN NOTE
Patient has hyponatremia which is controlled,We will aim to correct the sodium by 4-6mEq in 24 hours. We will monitor sodium Every 6 hours. The hyponatremia is due to Dehydration/hypovolemia. We will obtain the following studies: Urine sodium, urine osmolality, serum osmolality, AM cortisol or TSH, T4. We will treat the hyponatremia with IV fluids as follows: NaCl 100ml/hr for 24 hrs and reassess. The patient's sodium results have been reviewed and are listed below.  Recent Labs   Lab 10/24/23  1109          Nephrology is following, appreciate assistance. Correcting with IVF

## 2023-10-24 NOTE — ASSESSMENT & PLAN NOTE
IMPROVED  Hyponatremia likely 2/2 dehydration. Review of urine/serum osmo, and other pertinent labs, support diagnosis of hypovolemic hyponatremia. Can consider possible component of water intoxication as well. Given improvement with IVF, hyponatremia is more likely hypovolemic hyponatremia     Plan/Recommendations:     - can remove fluid restriction

## 2023-10-25 ENCOUNTER — DOCUMENTATION ONLY (OUTPATIENT)
Dept: CARDIOLOGY | Facility: HOSPITAL | Age: 73
End: 2023-10-25
Payer: MEDICARE

## 2023-10-25 ENCOUNTER — DOCUMENTATION ONLY (OUTPATIENT)
Dept: ELECTROPHYSIOLOGY | Facility: CLINIC | Age: 73
End: 2023-10-25
Payer: MEDICARE

## 2023-10-25 LAB
ALBUMIN SERPL BCP-MCNC: 2.7 G/DL (ref 3.5–5.2)
ALP SERPL-CCNC: 71 U/L (ref 55–135)
ALT SERPL W/O P-5'-P-CCNC: 26 U/L (ref 10–44)
ANION GAP SERPL CALC-SCNC: 13 MMOL/L (ref 8–16)
AST SERPL-CCNC: 35 U/L (ref 10–40)
BASOPHILS # BLD AUTO: 0.08 K/UL (ref 0–0.2)
BASOPHILS NFR BLD: 0.9 % (ref 0–1.9)
BILIRUB SERPL-MCNC: 0.8 MG/DL (ref 0.1–1)
BUN SERPL-MCNC: 20 MG/DL (ref 8–23)
CALCIUM SERPL-MCNC: 9.3 MG/DL (ref 8.7–10.5)
CHLORIDE SERPL-SCNC: 111 MMOL/L (ref 95–110)
CO2 SERPL-SCNC: 16 MMOL/L (ref 23–29)
CREAT SERPL-MCNC: 1.6 MG/DL (ref 0.5–1.4)
DIFFERENTIAL METHOD: ABNORMAL
EOSINOPHIL # BLD AUTO: 0.4 K/UL (ref 0–0.5)
EOSINOPHIL NFR BLD: 3.9 % (ref 0–8)
ERYTHROCYTE [DISTWIDTH] IN BLOOD BY AUTOMATED COUNT: 13.7 % (ref 11.5–14.5)
EST. GFR  (NO RACE VARIABLE): 33.6 ML/MIN/1.73 M^2
GLUCOSE SERPL-MCNC: 96 MG/DL (ref 70–110)
HCT VFR BLD AUTO: 33.2 % (ref 37–48.5)
HGB BLD-MCNC: 11 G/DL (ref 12–16)
IMM GRANULOCYTES # BLD AUTO: 0.07 K/UL (ref 0–0.04)
IMM GRANULOCYTES NFR BLD AUTO: 0.8 % (ref 0–0.5)
LYMPHOCYTES # BLD AUTO: 0.9 K/UL (ref 1–4.8)
LYMPHOCYTES NFR BLD: 10.3 % (ref 18–48)
MAGNESIUM SERPL-MCNC: 1.9 MG/DL (ref 1.6–2.6)
MCH RBC QN AUTO: 31.6 PG (ref 27–31)
MCHC RBC AUTO-ENTMCNC: 33.1 G/DL (ref 32–36)
MCV RBC AUTO: 95 FL (ref 82–98)
MONOCYTES # BLD AUTO: 1 K/UL (ref 0.3–1)
MONOCYTES NFR BLD: 11.5 % (ref 4–15)
MYOGLOBIN UR-MCNC: 208 MCG/L
NEUTROPHILS # BLD AUTO: 6.5 K/UL (ref 1.8–7.7)
NEUTROPHILS NFR BLD: 72.6 % (ref 38–73)
NRBC BLD-RTO: 0 /100 WBC
PHOSPHATE SERPL-MCNC: 1.8 MG/DL (ref 2.7–4.5)
PLATELET # BLD AUTO: 267 K/UL (ref 150–450)
PMV BLD AUTO: 10.3 FL (ref 9.2–12.9)
POTASSIUM SERPL-SCNC: 4.1 MMOL/L (ref 3.5–5.1)
PROT SERPL-MCNC: 6.3 G/DL (ref 6–8.4)
RBC # BLD AUTO: 3.48 M/UL (ref 4–5.4)
SODIUM SERPL-SCNC: 140 MMOL/L (ref 136–145)
WBC # BLD AUTO: 8.97 K/UL (ref 3.9–12.7)

## 2023-10-25 PROCEDURE — 99223 PR INITIAL HOSPITAL CARE,LEVL III: ICD-10-PCS | Mod: GC,,, | Performed by: STUDENT IN AN ORGANIZED HEALTH CARE EDUCATION/TRAINING PROGRAM

## 2023-10-25 PROCEDURE — 25000003 PHARM REV CODE 250: Performed by: STUDENT IN AN ORGANIZED HEALTH CARE EDUCATION/TRAINING PROGRAM

## 2023-10-25 PROCEDURE — 99233 SBSQ HOSP IP/OBS HIGH 50: CPT | Mod: ,,, | Performed by: INTERNAL MEDICINE

## 2023-10-25 PROCEDURE — 99233 PR SUBSEQUENT HOSPITAL CARE,LEVL III: ICD-10-PCS | Mod: ,,, | Performed by: INTERNAL MEDICINE

## 2023-10-25 PROCEDURE — 93005 ELECTROCARDIOGRAM TRACING: CPT

## 2023-10-25 PROCEDURE — 93010 ELECTROCARDIOGRAM REPORT: CPT | Mod: ,,, | Performed by: INTERNAL MEDICINE

## 2023-10-25 PROCEDURE — 21400001 HC TELEMETRY ROOM

## 2023-10-25 PROCEDURE — 93010 EKG 12-LEAD: ICD-10-PCS | Mod: ,,, | Performed by: INTERNAL MEDICINE

## 2023-10-25 PROCEDURE — 63600175 PHARM REV CODE 636 W HCPCS: Performed by: STUDENT IN AN ORGANIZED HEALTH CARE EDUCATION/TRAINING PROGRAM

## 2023-10-25 PROCEDURE — 80053 COMPREHEN METABOLIC PANEL: CPT | Performed by: STUDENT IN AN ORGANIZED HEALTH CARE EDUCATION/TRAINING PROGRAM

## 2023-10-25 PROCEDURE — 84100 ASSAY OF PHOSPHORUS: CPT | Performed by: STUDENT IN AN ORGANIZED HEALTH CARE EDUCATION/TRAINING PROGRAM

## 2023-10-25 PROCEDURE — 85025 COMPLETE CBC W/AUTO DIFF WBC: CPT | Performed by: STUDENT IN AN ORGANIZED HEALTH CARE EDUCATION/TRAINING PROGRAM

## 2023-10-25 PROCEDURE — 36415 COLL VENOUS BLD VENIPUNCTURE: CPT | Performed by: STUDENT IN AN ORGANIZED HEALTH CARE EDUCATION/TRAINING PROGRAM

## 2023-10-25 PROCEDURE — 83735 ASSAY OF MAGNESIUM: CPT | Performed by: STUDENT IN AN ORGANIZED HEALTH CARE EDUCATION/TRAINING PROGRAM

## 2023-10-25 PROCEDURE — 99223 1ST HOSP IP/OBS HIGH 75: CPT | Mod: GC,,, | Performed by: STUDENT IN AN ORGANIZED HEALTH CARE EDUCATION/TRAINING PROGRAM

## 2023-10-25 RX ADMIN — ACETAMINOPHEN 1000 MG: 500 TABLET ORAL at 09:10

## 2023-10-25 RX ADMIN — ATORVASTATIN CALCIUM 80 MG: 40 TABLET, FILM COATED ORAL at 10:10

## 2023-10-25 RX ADMIN — PROPAFENONE HYDROCHLORIDE 225 MG: 225 CAPSULE, EXTENDED RELEASE ORAL at 10:10

## 2023-10-25 RX ADMIN — ACETAMINOPHEN 1000 MG: 500 TABLET ORAL at 06:10

## 2023-10-25 RX ADMIN — METOPROLOL SUCCINATE 12.5 MG: 25 TABLET, EXTENDED RELEASE ORAL at 10:10

## 2023-10-25 RX ADMIN — HEPARIN SODIUM 5000 UNITS: 5000 INJECTION INTRAVENOUS; SUBCUTANEOUS at 06:10

## 2023-10-25 RX ADMIN — PROPAFENONE HYDROCHLORIDE 225 MG: 225 CAPSULE, EXTENDED RELEASE ORAL at 08:10

## 2023-10-25 NOTE — HPI
This is a 73 years old pleasant female with known history of paroxysmal atrial fibrillation, sick sinus syndrome status post [employer] dual-chamber pacemaker implanted 2009 with gen change 2014, CKD 3B, half pack, obstructive sleep apnea, coronary calcium on CT, interstitial lung disease.    Patient came to the hospital on 10/21 after a fall without syncope.  She had lung biopsy on 10/20 via video assisted thoracoscopy on the right side for evaluation of interstitial lung disease.  She says she became profoundly weak for half a day and her blood pressure on home monitoring was 60/30.  She had a fall backwards on the back of her head and back but she remembers the whole event.  She denies having any mechanical issues and says not sure how it happened.  Upon arrival, she was found to have hyponatremia with sodium 128 and acute kidney injury with creatinine 2.6, her baseline is 1.2.  She was hydrated with IV fluids and her hyponatremia has resolved with current sodium 140.  Her BARBARA is also improving and creatinine is now down to 1.6.  She is currently doing oral hydration only.    Patient was found to be in atrial fibrillation upon arrival.  Device interrogation shows 8.4% atrial fibrillation burden since 10/20/2023.  Before that her overall AFib burden was less than 1%.  Patient says when she goes into AFib at home, she gets palpitations and she takes an extra dose of Toprol-XL 12.5 that is spontaneously converts into sinus with an hour typically.  She stopped her anticoagulation in 2018 after having 2 falls.  She has no history of gastrointestinal bleeding.  Her home AFib regimen includes propafenone 225 mcg b.i.d. and Toprol-XL 12.5 mg daily.  She has been taking daily aspirin 81 daily in the setting of coronary calcium on CT scan.     Patient has had downward trend in her quality of life for the past year with a diagnosis of interstitial lung disease.  She says she has been getting hypoxic more easily recently.   Currently she is on 3.5 liters/minute oxygen via nasal cannula.  She was ambulated on room air and after 2 minutes of walk her oxygen dropped to 85%.  She will be going home on home oxygen.

## 2023-10-25 NOTE — PROGRESS NOTES
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  Ever since then she has had significant weakness to the point where she can not walk or get out of bed.  Slid out of her chair yesterday and her  was not able to get off the ground so he called EMS. She denies fevers, chest pain, palpitations, dizziness, orthopnea, N/V/D, abdominal pain. She has baseline shortness of breath without worsening and is not on home oxygen. Placed on NC here for O2 sats 88-90 at rest. She has a chronic cough that has not worsened. Patient was recently admitted for right VATS, wedge biopsy on 10/20/23 to r/o ILD and assess for possible causes. She was doing well and discharged after chest tube removal. Sodium at discharge was 140 with Cr 1.47. In the ED yesterday, Na 125 and Cr 2.5. HCO3 23>14. Patient has had poor appetite, but has been drinking water regularly. Nephrology was consulted for hyponatremia and BARBARA.        Interval History: NAEON. Cr and Na improving     Review of patient's allergies indicates:   Allergen Reactions    Iodinated contrast media Anaphylaxis    Penicillins Anaphylaxis    Allopurinol analogues      Muscle cramps    Ciprofloxacin Rash    Quinolones Rash    Sulfa (sulfonamide antibiotics) Rash    Tetracyclines Rash     Current Facility-Administered Medications   Medication Frequency    acetaminophen tablet 1,000 mg Q8H    aspirin EC tablet 81  mg Daily    atorvastatin tablet 80 mg Daily    dextrose 10% bolus 125 mL 125 mL PRN    dextrose 10% bolus 250 mL 250 mL PRN    glucose chewable tablet 16 g PRN    heparin (porcine) injection 5,000 Units Q8H    HYDROmorphone injection 0.5 mg Q6H PRN    insulin aspart U-100 pen 0-5 Units QID (AC + HS) PRN    LIDOcaine 5 % patch 1 patch Q24H    methocarbamoL tablet 500 mg QID    metoprolol succinate (TOPROL-XL) 24 hr split tablet 12.5 mg Daily    mupirocin 2 % ointment 1 g BID    ondansetron disintegrating tablet 8 mg Q8H PRN    oxyCODONE immediate release tablet 5 mg Q4H PRN    propafenone 12 hr capsule 225 mg Q12H    senna-docusate 8.6-50 mg per tablet 1 tablet BID    sodium chloride 0.9% flush 10 mL PRN       Objective:     Vital Signs (Most Recent):  Temp: 98.9 °F (37.2 °C) (10/25/23 1214)  Pulse: (!) 112 (10/25/23 1214)  Resp: 18 (10/25/23 1214)  BP: 129/71 (10/25/23 1214)  SpO2: 96 % (10/25/23 1214) Vital Signs (24h Range):  Temp:  [97.9 °F (36.6 °C)-98.9 °F (37.2 °C)] 98.9 °F (37.2 °C)  Pulse:  [] 112  Resp:  [18-20] 18  SpO2:  [92 %-97 %] 96 %  BP: (118-133)/(59-76) 129/71     Weight: 95 kg (209 lb 7 oz) (10/24/23 0000)  Body mass index is 40.9 kg/m².  Body surface area is 2.01 meters squared.    I/O last 3 completed shifts:  In: 1234 [P.O.:1234]  Out: 2500 [Urine:2500]     Physical Exam  Constitutional:       Appearance: Normal appearance. She is obese.   HENT:      Head: Normocephalic and atraumatic.      Nose: Nose normal.   Eyes:      Conjunctiva/sclera: Conjunctivae normal.   Cardiovascular:      Rate and Rhythm: Normal rate. Rhythm irregular.   Pulmonary:      Effort: Pulmonary effort is normal.   Abdominal:      General: Abdomen is flat.   Musculoskeletal:      Cervical back: Normal range of motion.      Right lower leg: No edema.      Left lower leg: No edema.   Skin:     General: Skin is warm and dry.   Neurological:      General: No focal deficit present.      Mental Status: She  is alert and oriented to person, place, and time.   Psychiatric:         Mood and Affect: Mood normal.         Behavior: Behavior normal.          Significant Labs:  All labs within the past 24 hours have been reviewed.     Significant Imaging:  Labs: Reviewed    Assessment/Plan:     Renal/  * Hyponatremia  IMPROVED  Hyponatremia likely 2/2 dehydration. Review of urine/serum osmo, and other pertinent labs, support diagnosis of hypovolemic hyponatremia. Can consider possible component of water intoxication as well. Given improvement with IVF, hyponatremia is more likely hypovolemic hyponatremia     Plan/Recommendations:     - can remove fluid restriction     Metabolic acidosis  Possibly from early ATN     - discharge on 650 mg bicarbonate tabs daily for 7 days     BARBARA (acute kidney injury)  Baseline Cr of 1.3. Cr up to 2.1 at time of nephrology consultation; currently trending down with administration of IVF's. Suspect BARBARA prerenal cause/dehydration. Patient still producing adequate urine. CPK unimpressive and does not indicate rhabdomyolysis. S/p IVF hyponatremia improved but Cr did not indicating this is not a pre-renal BARBARA.     Ddx: pre-renal BARBARA complicated by possible early ATN which has now resolved.     Plan/Recommendations:     - improvement in Cr after IVF  -Transfuse for Hg <7.0  -Keep MAP >65  -Renally dose meds/avoid nephrotoxic medications           Thank you for your consult. I will sign off. Please contact us if you have any additional questions.    Gracia Amaya, DO  Nephrology  [facility] ([place])

## 2023-10-25 NOTE — PLAN OF CARE
Follow up with Sandra Michaud MD  CH[facility] called to schedule pcp f/u, Symone sent message to nurse to call patient with appt [phone] [phone]

## 2023-10-25 NOTE — TELEPHONE ENCOUNTER
Symone Rainey [facility] Staff  Caller: [phone] patient (Today, 10:44 AM)  Patient needs a Hosp follow up appt with their PCP only.       When is the next available appointment:      Symptoms:  Hosp Follow up     Discharge date:10/25/203     Needs to be seen by:11/1/2023     Would you prefer an answer via [employer]?: call       Comments:

## 2023-10-25 NOTE — PROGRESS NOTES
Cardiac device interrogation and/or reprogramming completed by industry representative, Mora PICKERING with [employer]; please refer to report located in the Media tab.

## 2023-10-25 NOTE — CONSULTS
[facility] ([place] [address])  Cardiac Electrophysiology  Consult Note    Admission Date: 10/22/2023  Code Status: Full Code   Attending Provider: Martine Corona DO  Consulting Provider: Froylan Hernandez MD  Principal Problem:Atrial fibrillation with RVR    Inpatient consult to Electrophysiology  Consult performed by: Froylan Hernandez MD  Consult ordered by: Martine Corona DO        Subjective:     Chief Complaint:  paroxysmal afib with RVR     HPI:   This is a 73 years old pleasant female with known history of paroxysmal atrial fibrillation, sick sinus syndrome status post [employer] [place] dual-chamber pacemaker implanted 2009 with gen change 2014, CKD 3B, half pack, obstructive sleep apnea, coronary calcium on CT, interstitial lung disease.    Patient came to the hospital on 10/21 after a fall without syncope.  She had lung biopsy on 10/20 via video assisted thoracoscopy on the right side for evaluation of interstitial lung disease.  She says she became profoundly weak for half a day and her blood pressure on home monitoring was 60/30.  She had a fall backwards on the back of her head and back but she remembers the whole event.  She denies having any mechanical issues and says not sure how it happened.  Upon arrival, she was found to have hyponatremia with sodium 128 and acute kidney injury with creatinine 2.6, her baseline is 1.2.  She was hydrated with IV fluids and her hyponatremia has resolved with current sodium 140.  Her BARBARA is also improving and creatinine is now down to 1.6.  She is currently doing oral hydration only.    Patient was found to be in atrial fibrillation upon arrival.  Device interrogation shows 8.4% atrial fibrillation burden since 10/20/2023.  Before that her overall AFib burden was less than 1%.  Patient says when she goes into AFib at home, she gets palpitations and she takes an extra dose of Toprol-XL 12.5 that is spontaneously converts into sinus with an hour  typically.  She stopped her anticoagulation in 2018 after having 2 falls.  She has no history of gastrointestinal bleeding.  Her home AFib regimen includes propafenone 225 mcg b.i.d. and Toprol-XL 12.5 mg daily.  She has been taking daily aspirin 81 daily in the setting of coronary calcium on CT scan.     Patient has had downward trend in her quality of life for the past year with a diagnosis of interstitial lung disease.  She says she has been getting hypoxic more easily recently.  Currently she is on 3.5 liters/minute oxygen via nasal cannula.  She was ambulated on room air and after 2 minutes of walk her oxygen dropped to 85%.  She will be going home on home oxygen.      Past Medical History:   Diagnosis Date    Allergy     Arthritis     Atrial fibrillation 5/29/2017    Chronic diastolic heart failure 9/29/2023    CKD (chronic kidney disease) stage 3, GFR 30-59 ml/min 6/26/2017    Coronary artery calcification seen on CAT scan 9/29/2023    Disorder of kidney and ureter     GERD (gastroesophageal reflux disease)     Hyperlipidemia     Hypertension     Impaired fasting glucose 11/19/2021    Pre-diabetes 6/3/2017       Past Surgical History:   Procedure Laterality Date    ADENOIDECTOMY      BACK SURGERY  2000    lamenectomy    BREAST SURGERY      BRONCHOSCOPY N/A 7/13/2023    Procedure: BRONCHOSCOPY;  Sur[facility]: [facility];  Location: [address];  Service: Anesthesiology;  Laterality: N/A;    BRONCHOSCOPY N/A 10/20/2023    Procedure: BRONCHOSCOPY;  Surgeon: Anatoly Tiwari MD;  Location: [address];  Service: Cardiothoracic;  Laterality: N/A;    CARDIAC SURGERY      st loida pacemaker     CATARACT EXTRACTION W/  INTRAOCULAR LENS IMPLANT Right 6/3/2019    Procedure: EXTRACTION, CATARACT, WITH IOL INSERTION;  Surgeon: Raisa Moreno MD;  Location: [facility];  Service: Ophthalmology;  Laterality: Right;  Laser    CATARACT EXTRACTION W/  INTRAOCULAR LENS IMPLANT Left 9/23/2019    Procedure:  EXTRACTION, CATARACT, WITH IOL INSERTION;  Surgeon: Raisa Moreno MD;  Location: [facility];  Service: Ophthalmology;  Laterality: Left;  LASER ASSISTED     SECTION      1980    COLONOSCOPY N/A 9/15/2023    Procedure: COLONOSCOPY;  Surgeon: Tao Gomez MD;  Location: [facility] ENDO (2ND FLR);  Service: Endoscopy;  Laterality: N/A;  inst to portal/[employer] pacemaker   2nd floor for airway protection patient with lung disease elevated pulmonary artery pressure pacemaker and fecal occult blood positive stool,   cleared by pulmonary Dr Yony-see note on     ENDOSCOPIC ULTRASOUND OF UPPER GASTROINTESTINAL TRACT N/A 2023    Procedure: ULTRASOUND, UPPER GI TRACT, ENDOSCOPIC;  Surgeon: Casimiro De Los Santos MD;  Location: [facility] ENDO;  Service: Endoscopy;  Laterality: N/A;  23: instructions send via portal. [employer] ppm- GD    EYE SURGERY      FRACTURE SURGERY      HYSTERECTOMY      INJECTION OF ANESTHETIC AGENT AROUND MULTIPLE INTERCOSTAL NERVES  10/20/2023    Procedure: BLOCK, NERVE, INTERCOSTAL, 2 OR MORE;  Surgeon: Anatoly Tiwari MD;  [address];  Service: Cardiothoracic;;    INSERT / REPLACE / REMOVE PACEMAKER      placement    INSERT / REPLACE / REMOVE PACEMAKER  2014    [employer] Paolo Model #[id number] replacement    pace maker      replacement     REVISION OF PROCEDURE INVOLVING PACEMAKER LEAD Left 2022    Procedure: REVISION, ELECTRODE LEAD, CARDIAC PACEMAKER;  Surgeon: Trell Hodgson MD;  Location: [facility] EP LAB;  Service: Cardiology;  Laterality: Left;  PPM lead Malfx, RA lead revision, SJM, MAC, GP, 3 PREP*dPPM SJM in situ**Contrast prep given*    salpingo opherectomy Bilateral     SPINE SURGERY      TONSILLECTOMY      TUBAL LIGATION      VIDEO-ASSISTED THORACOSCOPIC SURGERY (VATS) Right 10/20/2023    Procedure: VATS WEDGE;  Surgeon: Anatoly Tiwari MD;  Location: [address];  Service: Cardiothoracic;  Laterality: Right;       Review of  patient's allergies indicates:   Allergen Reactions    Iodinated contrast media Anaphylaxis    Penicillins Anaphylaxis    Allopurinol analogues      Muscle cramps    Ciprofloxacin Rash    Quinolones Rash    Sulfa (sulfonamide antibiotics) Rash    Tetracyclines Rash       No current facility-administered medications on file prior to encounter.     Current Outpatient Medications on File Prior to Encounter   Medication Sig    amLODIPine (NORVASC) 5 MG tablet Take 1 tablet (5 mg total) by mouth once daily.    aspirin (ECOTRIN) 81 MG EC tablet Take 1 tablet (81 mg total) by mouth once daily.    furosemide (LASIX) 40 MG tablet TAKE 1 TABLET BY MOUTH EVERY DAY    metoprolol succinate (TOPROL-XL) 25 MG 24 hr tablet Take 0.5 tablets (12.5 mg total) by mouth once daily.    olmesartan (BENICAR) 20 MG tablet Take 1 tablet (20 mg total) by mouth once daily.    clobetasoL (OLUX) 0.05 % Foam Apply 1 application topically every 30 days.    ERGOCALCIFEROL, VITAMIN D2, (VITAMIN D ORAL) Take 2,000 Units by mouth once daily.    gabapentin (NEURONTIN) 300 MG capsule Take 1 capsule (300 mg total) by mouth 3 (three) times daily.    methocarbamoL (ROBAXIN) 500 MG Tab Take 1 tablet (500 mg total) by mouth 4 (four) times daily. for 10 days    nitroGLYCERIN (NITROSTAT) 0.4 MG SL tablet Place 1 tablet (0.4 mg total) under the tongue every 5 (five) minutes as needed for Chest pain.    oxyCODONE (ROXICODONE) 5 MG immediate release tablet Take 1 tablet (5 mg total) by mouth every 4 (four) hours as needed for Pain.    potassium citrate (UROCIT-K) 10 mEq (1,080 mg) TbSR TAKE 1 TABLET BY MOUTH ONCE DAILY    propafenone (RYTHMOL SR) 225 MG Cp12 Take 1 capsule (225 mg total) by mouth every 12 (twelve) hours.    rivaroxaban (XARELTO) 15 mg Tab Take 1 tablet (15 mg total) by mouth daily with dinner or evening meal.    rosuvastatin (CRESTOR) 40 MG Tab Take 1 tablet (40 mg total) by mouth every evening. (Patient taking differently: Take 40 mg by mouth  once daily.)    topiramate (TOPAMAX) 25 MG tablet Take 1 tablet twice daily.     Family History       Problem Relation (Age of Onset)    Coronary artery disease Mother, Father, Brother    Diabetes Mother, Father, Paternal Grandmother, Paternal Grandfather    Heart disease Mother, Father, Brother    Hyperlipidemia Mother, Father    Hypertension Mother, Father    Hyperthyroidism Brother    Stroke Mother          Tobacco Use    Smoking status: Never    Smokeless tobacco: Never   Substance and Sexual Activity    Alcohol use: Yes     Alcohol/week: 5.0 standard drinks of alcohol     Types: 5 Glasses of wine per week    Drug use: No    Sexual activity: Not Currently     Partners: Male     Review of Systems   Constitutional: Positive for malaise/fatigue. Negative for fever.   HENT:  Negative for ear pain.    Eyes:  Negative for double vision.   Cardiovascular:  Positive for dyspnea on exertion, irregular heartbeat and palpitations. Negative for chest pain.   Respiratory:  Negative for shortness of breath.    Endocrine: Negative for heat intolerance.   Hematologic/Lymphatic: Negative for adenopathy.   Skin:  Negative for dry skin.   Musculoskeletal:  Negative for falls.   Gastrointestinal:  Negative for anorexia.   Genitourinary:  Negative for flank pain.   Neurological:  Negative for disturbances in coordination.   Psychiatric/Behavioral:  Negative for depression.      Objective:     Vital Signs (Most Recent):  Temp: 98.4 °F (36.9 °C) (10/25/23 1553)  Pulse: (!) 160 (10/25/23 1732)  Resp: 18 (10/25/23 1553)  BP: 139/78 (10/25/23 1553)  SpO2: 95 % (10/25/23 1553) Vital Signs (24h Range):  Temp:  [97.9 °F (36.6 °C)-98.9 °F (37.2 °C)] 98.4 °F (36.9 °C)  Pulse:  [] 160  Resp:  [18-20] 18  SpO2:  [92 %-97 %] 95 %  BP: (118-139)/(59-78) 139/78       Weight: 95 kg (209 lb 7 oz)  Body mass index is 40.9 kg/m².    SpO2: 95 %        Physical Exam  Constitutional:       Appearance: She is obese.   HENT:      Head: Normocephalic.  "  Eyes:      Pupils: Pupils are equal, round, and reactive to light.   Cardiovascular:      Rate and Rhythm: Tachycardia present. Rhythm irregular.   Abdominal:      Palpations: Abdomen is soft.   Skin:     General: Skin is warm.   Neurological:      General: No focal deficit present.      Mental Status: She is alert.   Psychiatric:         Mood and Affect: Mood normal.            Significant Labs: ABG: No results for input(s): "PH", "PCO2", "HCO3", "POCSATURATED", "BE" in the last 48 hours., Blood Culture: No results for input(s): "LABBLOO" in the last 48 hours., BMP:   Recent Labs   Lab 10/24/23  0419 10/24/23  1109 10/25/23  0622   *  --  96     136 141 140   K 3.9  --  4.1     --  111*   CO2 18*  --  16*   BUN 27*  --  20   CREATININE 2.1*  --  1.6*   CALCIUM 9.4  --  9.3   MG 2.1  --  1.9   , CMP:   Recent Labs   Lab 10/24/23  0419 10/24/23  1109 10/25/23  0622     136 141 140   K 3.9  --  4.1     --  111*   CO2 18*  --  16*   *  --  96   BUN 27*  --  20   CREATININE 2.1*  --  1.6*   CALCIUM 9.4  --  9.3   PROT 5.9*  --  6.3   ALBUMIN 2.7*  --  2.7*   BILITOT 0.6  --  0.8   ALKPHOS 64  --  71   AST 26  --  35   ALT 21  --  26   ANIONGAP 11  --  13   , CBC:   Recent Labs   Lab 10/24/23  0419 10/25/23  0622   WBC 6.94 8.97   HGB 10.3* 11.0*   HCT 31.2* 33.2*    267   , and INR: No results for input(s): "INR", "PROTIME" in the last 48 hours.    Imaging Results              X-Ray Chest AP Portable (Final result)  Result time 10/22/23 18:50:57      Final result by Eriberto Yeboah MD (10/22/23 18:50:57)                   Impression:      As above.      Electronically signed by: Eriberto Yeboah MD  Date:    10/22/2023  Time:    18:50               Narrative:    EXAMINATION:  XR CHEST AP PORTABLE    CLINICAL HISTORY:  Weakness    TECHNIQUE:  Single frontal view of the chest was performed.    COMPARISON:  CT thorax 10/22/2023, chest radiograph " 10/21/23    FINDINGS:  Monitoring leads overlie the chest.  Patient is slightly rotated.  Resolution is limited by body habitus with underpenetration.    Right basilar platelike scarring versus atelectasis with opacification obscuring the diaphragm and costophrenic angle consistent with small right pleural effusion and associated right basilar atelectasis better demonstrated on cross-sectional imaging performed earlier same day.  No consolidation or sizable pleural effusion on the left.  No consolidation at the imaged upper lung zones.  No definite pneumothorax.    Cardiomediastinal silhouette is midline and stable.  Cardiac device is unchanged.    Osseous structures are stable without acute process seen.                                       CT Chest Without Contrast (Final result)  Result time 10/22/23 19:19:31      Final result by Eriberto Yeboha MD (10/22/23 19:19:31)                   Impression:      Trace residual right pneumothorax and fat stranding with subcutaneous emphysema involving the right lateral chest wall in this patient with recent chest tube removal.    Persistent basilar fibrotic changes poorly evaluated due to superimposed subsegmental atelectasis.    Stable cystic lesions in the pancreas.    Stable right adrenal gland nodule.    Other findings above.    Electronically signed by resident: Femi Donahue  Date:    10/22/2023  Time:    18:24    Electronically signed by: Eriberto Yeboah MD  Date:    10/22/2023  Time:    19:19               Narrative:    EXAMINATION:  CT CHEST WITHOUT CONTRAST    CLINICAL HISTORY:  Chest trauma, blunt;    TECHNIQUE:  Axial images, sagittal and coronal reformations were obtained from the thoracic inlet to the lung bases. Contrast was not administered.    COMPARISON:  CT chest 10/09/2023, 06/16/2023    FINDINGS:  LINES/TUBES/DEVICES: Cardiac conduction device left upper chest wall with leads terminating within the heart.    SOFT TISSUES: There is fat stranding and  subcutaneous emphysema involving the right lateral chest wall, presumably related to recent chest tube removal.    HEART AND MEDIASTINUM: No mediastinal or hilar lymphadenopathy. Heart is borderline enlarged.  No pericardial effusion.  The main pulmonary artery is normal in size.    PLEURA: Trace right pneumothorax.  Trace right pleural effusion.    LUNGS AND AIRWAYS: Central airways are patent.  Linear radiopaque structure in the right upper and lower lobes with mild adjacent consolidation, uncertain etiology, presumably related to recent VATS procedure (see axial series 4, images 168 and 231).  Numerous linear opacities in the bilateral lower lobes likely subsegmental atelectasis.  There are persistent subpleural reticular opacities at the lung bases, difficult to further evaluate due to superimposed atelectasis.  Previous pulmonary nodules are poorly visualized.    UPPER ABDOMEN: Small hiatal hernia.  Stable subcentimeter hypodensity right hepatic lobe, too small to characterize.  Stable 1.7 cm right adrenal gland nodule.  Stable cystic lesions within the pancreas, largest 1.6 cm in the pancreatic tail.    BONES: No fracture or focal osseous lesion.                                       CT Head Without Contrast (Final result)  Result time 10/22/23 18:28:27      Final result by Eriberto Yeboah MD (10/22/23 18:28:27)                   Impression:      No acute intracranial findings.    Changes of chronic small vessel disease.    Electronically signed by resident: Femi Donahue  Date:    10/22/2023  Time:    18:16    Electronically signed by: Eriberto Yeboah MD  Date:    10/22/2023  Time:    18:28               Narrative:    EXAMINATION:  CT HEAD WITHOUT CONTRAST    CLINICAL HISTORY:  Head trauma, minor (Age >= 65y);    TECHNIQUE:  Low dose axial CT images obtained throughout the head without the use of intravenous contrast.  Axial, sagittal and coronal reconstructions were  performed.    COMPARISON:  None.    FINDINGS:  Mild supratentorial white matter hypoattenuation, nonspecific, likely represents chronic small vessel disease.  No intracranial hemorrhage.  No mass effect. No evidence of acute infarction.    No extra-axial blood or fluid collections identified.    Age-related mild generalized cerebral volume loss without distortion by mass effect or acute hydrocephalus.  Skull base atherosclerotic vascular calcifications noted.    No fracture or focal osseous lesion. Mastoid air cells and paranasal sinuses are well aerated. Extracranial soft tissues are unremarkable.                                         CHADS2 for A-FIB Stroke Risk  Age?: 65-74 years old  CHF history: Yes  HTN history: Yes  Female?: Yes      Assessment and Plan:     * Atrial fibrillation with RVR  #Paroxysmal atrial fibrillation with RVR    -known history of paroxysmal atrial fibrillation, sick sinus syndrome status post [place] dual-chamber pacemaker implanted 2009 with gen change 2014  -follows with Dr Hodgson for EP care  -Patient came to the hospital on 10/21 after a fall without syncope  -has had downward trend in her quality of life for the past year with a diagnosis of interstitial lung disease  -presented with profound weakness, Na 118 and Cr 2.6 (baseline 1.2)  -Na normalized, Cr now 1.6  -in paroxysms of afib that alternate with sinus     Home regimen: Propafenone 225 bid, toprol xl 12.5 (uses prn extra toprol xl at home with CV in one hour typically)  CHADVASC: 4 (gender, age, HFpEF and HTN), A/C was stopped in Xarelto after 2 falls to lower risk of bleeding, she is a retired GI doc    Recommendations  -no indication for DCCV as afib is paroxysmal with multiple sponteneous conversions daily  -device interrogation done, afib burden 8.2 % since 10/20 which was <1% before, rec'd anticoagulation with eliquis 5 bid (instead of xarelto with CKD), had lenghthy discussion, patient would want to stay off A/C for  now with falls history (had 2 falls in 2018 and 1 fall last week in 2023)  -tele shows afib RVR runs alternating with SB needing AP-VS rhythm (at 4:30 pm today, she had SVT run x 10 min with rate 161 with sudden drop to AP-VS 60 bpm afterwards)  -continue propafenone 225 bid and toprol xl 12.5  -treat underlying pulmonary condition, biopsy form lung awaited, her overall condition has deteriorated since ILD diagnosis, currently oxygen dependent 3.5 lpm and will go home on home oxygen  -we had long discussion about JASMYNE occluder device as well, she will think about it, and f/up with Dr Hodgson with that if interested, currently she is handling pulmonary issues which hopefully will improve    Thank you for your consult. I will sign off. Please contact us if you have any additional questions.    Froylan Hernandez MD  Cardiac Electrophysiology  [facility] ([place])

## 2023-10-25 NOTE — SUBJECTIVE & OBJECTIVE
Interval History: NAEON. Cr and Na improving     Review of patient's allergies indicates:   Allergen Reactions    Iodinated contrast media Anaphylaxis    Penicillins Anaphylaxis    Allopurinol analogues      Muscle cramps    Ciprofloxacin Rash    Quinolones Rash    Sulfa (sulfonamide antibiotics) Rash    Tetracyclines Rash     Current Facility-Administered Medications   Medication Frequency    acetaminophen tablet 1,000 mg Q8H    aspirin EC tablet 81 mg Daily    atorvastatin tablet 80 mg Daily    dextrose 10% bolus 125 mL 125 mL PRN    dextrose 10% bolus 250 mL 250 mL PRN    glucose chewable tablet 16 g PRN    heparin (porcine) injection 5,000 Units Q8H    HYDROmorphone injection 0.5 mg Q6H PRN    insulin aspart U-100 pen 0-5 Units QID (AC + HS) PRN    LIDOcaine 5 % patch 1 patch Q24H    methocarbamoL tablet 500 mg QID    metoprolol succinate (TOPROL-XL) 24 hr split tablet 12.5 mg Daily    mupirocin 2 % ointment 1 g BID    ondansetron disintegrating tablet 8 mg Q8H PRN    oxyCODONE immediate release tablet 5 mg Q4H PRN    propafenone 12 hr capsule 225 mg Q12H    senna-docusate 8.6-50 mg per tablet 1 tablet BID    sodium chloride 0.9% flush 10 mL PRN       Objective:     Vital Signs (Most Recent):  Temp: 98.9 °F (37.2 °C) (10/25/23 1214)  Pulse: (!) 112 (10/25/23 1214)  Resp: 18 (10/25/23 1214)  BP: 129/71 (10/25/23 1214)  SpO2: 96 % (10/25/23 1214) Vital Signs (24h Range):  Temp:  [97.9 °F (36.6 °C)-98.9 °F (37.2 °C)] 98.9 °F (37.2 °C)  Pulse:  [] 112  Resp:  [18-20] 18  SpO2:  [92 %-97 %] 96 %  BP: (118-133)/(59-76) 129/71     Weight: 95 kg (209 lb 7 oz) (10/24/23 0000)  Body mass index is 40.9 kg/m².  Body surface area is 2.01 meters squared.    I/O last 3 completed shifts:  In: 1234 [P.O.:1234]  Out: 2500 [Urine:2500]     Physical Exam  Constitutional:       Appearance: Normal appearance. She is obese.   HENT:      Head: Normocephalic and atraumatic.      Nose: Nose normal.   Eyes:      Conjunctiva/sclera:  Conjunctivae normal.   Cardiovascular:      Rate and Rhythm: Normal rate. Rhythm irregular.   Pulmonary:      Effort: Pulmonary effort is normal.   Abdominal:      General: Abdomen is flat.   Musculoskeletal:      Cervical back: Normal range of motion.      Right lower leg: No edema.      Left lower leg: No edema.   Skin:     General: Skin is warm and dry.   Neurological:      General: No focal deficit present.      Mental Status: She is alert and oriented to person, place, and time.   Psychiatric:         Mood and Affect: Mood normal.         Behavior: Behavior normal.          Significant Labs:  All labs within the past 24 hours have been reviewed.     Significant Imaging:  Labs: Reviewed

## 2023-10-25 NOTE — PLAN OF CARE
[facility] ([facility]) approved home oxygen & rolling walker - will deliver portable tank & walker to bedside shortly - will complete oxygen delivery once patient home - PCP office will contact patient to schedule hospital follow up -  will provide transportation home     [facility] ([facility])  Discharge Final Note    Primary Care Provider: Sandra Michaud MD    Expected Discharge Date: 10/25/2023    Final Discharge Note (most recent)       Final Note - 10/25/23 1316          Final Note    Assessment Type Final Discharge Note     Anticipated Discharge Disposition Home or Self Care     What phone number can be called within the next 1-3 days to see how you are doing after discharge? [phone]     Hospital Resources/Appts/Education Provided Provided patient/caregiver with written discharge plan information;Appointments scheduled and added to AVS;Post-Acute resouces added to AVS        Post-Acute Status    Post-Acute Authorization HME     HME Status Pending Delivery     Discharge Delays Home Medical Equipment (Insurance, Delivery)                     Important Message from Medicare  Important Message from Medicare regarding Discharge Appeal Rights: Given to patient/caregiver, Explained to patient/caregiver, Signed/date by patient/caregiver     Date IMM was signed: 10/25/23  Time IMM was signed: 0817    Contact Info       Sandra Michaud MD   Specialty: Internal Medicine   Relationship: PCP - General    [address] [place] [address]   Phone: [phone]       Next Steps: Follow up    Instructions: [facility] called to schedule pcp f/u, Symone sent message to nurse to call patient with appt

## 2023-10-25 NOTE — PROGRESS NOTES
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      Patient told the ER that she was walking down the hallway fell and landed on her back in the back of her head.  Ever since then she is had significant weakness to the point where she can not walk can not get out of bed.  Slid out of her chair today and her  and her were not able to get off the ground, so they called EMS. She denies fevers, chest pain, palpitations, dizziness, orthopnea, N/V/D, abdominal pain. She has baseline shortness of breath without worsening and is not on home oxygen. Placed on NC here for O2 sats 88-90 at rest. She has a chronic cough that has not worsened.      Patient was recently admitted for right VATS, wedge biopsy on 10/20/23 to r/o ILD and assess for possible causes. She was doing well and discharged after chest tube removal without any post-procedure issues. Sodium at discharge was 140 with Cr 1.47. In the ED today, Na 125 and Cr 2.5. HCO3 23>14. Patient has had slightly reduced PO intake. No post procedure bleeding.     She reports her fall was slumping against her bed and then going to the ground. Did not lose consciousness. No visual disturbances but was too weak to stand up and called EMS. She had not started anticoagulation for her Afib yet with this procedure pending. She missed her dose of propafenone this morning. Denied  "any SOB, CP, palpitations and reports normally can tell when she is in Afib. She is currently in Afib with rates 80-110s with stable BP and asymptomatic. Could be related to missed dose of propafenone or volume depletion.       Overview/Hospital Course:  Patient presented with BARBARA, hyponatremia.  Etiology is unknown, most likely dehydration.  Patient was hypotensive on arrival as well.  Today, patient reports that she is feeling significantly improved from yesterday.  Checked outpatient again this afternoon and she was sleeping.  Nephrology consulted, appreciate assistance.  Patient's sodium continues to trend up as well as her creatinine is trending down.  Discussed anticoagulation with the patient, she would like subcutaneous heparin at this point.  She would like to discuss anticoagulation with her cardiologist.    10/24:  Patient is stable, no events overnight.  Continues to be a new cannula.  She does not wear oxygen at home however after discharge from recent VATS, she was requiring oxygen.  Patient needs home O2 eval tomorrow morning and would likely need home oxygen.  She is currently stable at 3 L. sodium is correcting, creatinine seems to be stable at 2.1.  Appreciate Nephrology support.    10/25: Hyponatremia resolved, and BARBARA resolving. However, she was tachycardic and irregular on exam. I obtained EKG which revealed Afib with RVR. She is on propafenone. Consulted EP cardiology. Following recs and appreciate assistance. Patient is s/p VATS (10/20), reached out to surgeon as pt is reporting that she was asked to stay off of anticoagulant. Arranged with  home oxygen. Once patient is ready to go home, she should have it. She started needing O2 after her VATS surgery and has slowly trended down (oxygen demand). Currently at 3-4 liter.s       Interval History: patient did report diarrhea yesterday, and one small bm "with little diarrhea but significantly improved than yesterday". She has been on " laxatives. Continue to monitor for now.       Objective:     Vital Signs (Most Recent):  Temp: 98.4 °F (36.9 °C) (10/25/23 1553)  Pulse: 66 (10/25/23 1553)  Resp: 18 (10/25/23 1553)  BP: 139/78 (10/25/23 1553)  SpO2: 95 % (10/25/23 1553) Vital Signs (24h Range):  Temp:  [97.9 °F (36.6 °C)-98.9 °F (37.2 °C)] 98.4 °F (36.9 °C)  Pulse:  [] 66  Resp:  [18-20] 18  SpO2:  [92 %-97 %] 95 %  BP: (118-139)/(59-78) 139/78     Weight: 95 kg (209 lb 7 oz)  Body mass index is 40.9 kg/m².    Intake/Output Summary (Last 24 hours) at 10/25/2023 1721  Last data filed at 10/25/2023 1247  Gross per 24 hour   Intake 462 ml   Output 450 ml   Net 12 ml         Physical Exam  Constitutional:       Appearance: Normal appearance.   HENT:      Head: Normocephalic and atraumatic.      Nose: Nose normal.   Eyes:      Conjunctiva/sclera: Conjunctivae normal.   Cardiovascular:      Rate and Rhythm: Tachycardia present. Rhythm irregular.   Pulmonary:      Effort: Pulmonary effort is normal.   Abdominal:      General: Abdomen is flat.   Musculoskeletal:      Cervical back: Normal range of motion.      Right lower leg: No edema.      Left lower leg: No edema.   Skin:     General: Skin is warm and dry.   Neurological:      General: No focal deficit present.      Mental Status: She is alert and oriented to person, place, and time.   Psychiatric:         Mood and Affect: Mood normal.         Behavior: Behavior normal.             Significant Labs: All pertinent labs within the past 24 hours have been reviewed.    Significant Imaging: I have reviewed all pertinent imaging results/findings within the past 24 hours.      Assessment/Plan:      * Hyponatremia  Patient has hyponatremia which is controlled,We will aim to correct the sodium by 4-6mEq in 24 hours. We will monitor sodium Every 6 hours. The hyponatremia is due to Dehydration/hypovolemia. We will obtain the following studies: Urine sodium, urine osmolality, serum osmolality, AM cortisol or  TSH, T4. We will treat the hyponatremia with IV fluids as follows: NaCl 100ml/hr for 24 hrs and reassess. The patient's sodium results have been reviewed and are listed below.  Recent Labs   Lab 10/24/23  1109          Nephrology is following, appreciate assistance. Correcting with IVF    Weakness  Likely secondary to BARBARA and symptomatic hyponatremia  CT head unremarkable after fall at home  Afib may be contributing, see Atrial fibrillation for management      Metabolic acidosis  Likely secondary to BARBARA. Trend and monitor for improvement with IVF.      BARBARA (acute kidney injury)  Patient with acute kidney injury/acute renal failure likely due to pre-renal azotemia due to dehydration BARBARA is currently stable. Baseline creatinine 1.2-1.3 - Labs reviewed- Renal function/electrolytes with Estimated Creatinine Clearance: 24.2 mL/min (A) (based on SCr of 2.1 mg/dL (H)). according to latest data. Monitor urine output and serial BMP and adjust therapy as needed. Avoid nephrotoxins and renally dose meds for GFR listed above.    -starting isotonic bicarb to correct BARBARA, metabolic acidosis and hyponatremia  -hold nephrotoxic medications  -renally dose medications  -trend sodium Q6 overnight    Chronic diastolic heart failure  -monitor closely with IVF for hyponatremia. No signs of volume overload with recent poor PO intake and symptomatic hyponatremia.      Interstitial lung disease  Follows with Dr. Bhakta. BOLIVAR weakly positive (1:80) but exhaustive autoimmune/CTD workup otherwise negative.  Follow-up CT imaging with scattered GGOs and bibasilar scarring with fibrosis and mild traction bronchiectasis. Has appearance similar to hypersensitivity pneumonitis but possible NSIP.  No feather/down bedding, no molds or organic material exposures.  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  Her  has quit smoking.  Does report an allergy to pet dander and is frequently watching her  daughter's pet dog.  Bronchoscopy with transbronchial biopsies with CTD-ILD appearance.  Expanding autoimmune laboratories that were negative. Next underwent recent wedge biopsy with pending pathology.      Atrial fibrillation  Patient with Paroxysmal (<7 days) atrial fibrillation which is controlled currently with propafenone, toprol. Patient is currently in atrial fibrillation with rates 80-110s. [id number] Score: 3. Anticoagulation indicated and had discussed xarelto but has not started due to recent biopsy with plans to discuss in clinic with Dr. Hodgson.    -continue propafenone 225 BID, toprol daily  -patient would like to discuss with primary cardiologist on starting xarelto. Will start on heparin gtt  -CT head negative for any bleeding after fall  -HR regular this AM. Planning on getting EKG as well. She is ok with heparin subq, but did not want the heparin gtt. Patient reports that she was asked to stop A/C per surgery and cardiology. It seems that she comfortable discussing A/C with them.     Went into afib with RVR today. EP consulted, appreciate assistance.       VTE Risk Mitigation (From admission, onward)         Ordered     heparin (porcine) injection 5,000 Units  Every 8 hours         10/23/23 1534     Place CURLY hose  Until discontinued         10/22/23 2149     Place sequential compression device  Until discontinued         10/22/23 2149     IP VTE HIGH RISK PATIENT  Once         10/22/23 2149     Place sequential compression device  Until discontinued         10/22/23 2149                Discharge Planning   HERMINIO: 10/26/2023     Code Status: Full Code   Is the patient medically ready for discharge?:     Reason for patient still in hospital (select all that apply): Patient new problem, Patient trending condition, Treatment and Consult recommendations  Discharge Plan A: Home with family   Discharge Delays: (!) Home Medical Equipment (Insurance, Delivery)              Martine Corona DO  Department of  [facility]   [facility] - Intensive Care ([place]-[facility])

## 2023-10-25 NOTE — ASSESSMENT & PLAN NOTE
Baseline Cr of 1.3. Cr up to 2.1 at time of nephrology consultation; currently trending down with administration of IVF's. Suspect BARBARA prerenal cause/dehydration. Patient still producing adequate urine. CPK unimpressive and does not indicate rhabdomyolysis. S/p IVF hyponatremia improved but Cr did not indicating this is not a pre-renal BARBARA.     Ddx: pre-renal BARBARA complicated by possible early ATN which has now resolved.     Plan/Recommendations:     - improvement in Cr after IVF  -Transfuse for Hg <7.0  -Keep MAP >65  -Renally dose meds/avoid nephrotoxic medications

## 2023-10-25 NOTE — PLAN OF CARE
CHW met with patient/family at bedside. Patient experience rounding completed and reviewed the following.     Do you know your discharge plan? Yes   If yes, what is the plan? (Home, with spouse)     Have you discussed your needs and preferences with your SW/CM? Yes     If you are discharging home, do you have help at home? Yes   Do you think you will need help additional at home at discharge? No     Do you currently have difficulty keeping up with bills, affording medicine or buying food?  No    Assigned SW/CM notified of any patient/family needs or concerns. Appropriate resources provided to address patient's needs.  No needs/ concerns

## 2023-10-25 NOTE — ASSESSMENT & PLAN NOTE
#Paroxysmal atrial fibrillation with RVR    -known history of paroxysmal atrial fibrillation, sick sinus syndrome status post [place] dual-chamber pacemaker implanted 2009 with gen change 2014  -follows with Dr Hodgson for EP care  -Patient came to the hospital on 10/21 after a fall without syncope  -has had downward trend in her quality of life for the past year with a diagnosis of interstitial lung disease  -presented with profound weakness, Na 118 and Cr 2.6 (baseline 1.2)  -Na normalized, Cr now 1.6  -in paroxysms of afib that alternate with sinus     Home regimen: Propafenone 225 bid, toprol xl 12.5 (uses prn extra toprol xl at home with CV in one hour typically)  CHADVASC: 4 (gender, age, HFpEF and HTN), A/C was stopped in [facility] after 2 falls to lower risk of bleeding, she is a retired GI doc    Recommendations  -no indication for DCCV as afib is paroxysmal with multiple sponteneous conversions daily  -device interrogation done, afib burden 8.2 % since 10/20 which was <1% before  -tele shows afib RVR runs alternating with SB needing AP-VS rhythm (at 4:30 pm today, she had SVT run x 10 min with rate 161 with sudden drop to AP-VS 60 bpm afterwards)  -A/C is recommended if possible, had a detailed discussion about it with the pt, she will think about it (had 2 falls in 2018 and 1 fall last week in 2023)  -continue propafenone 225 bid and toprol xl 12.5  -treat underlying pulmonary condition, biopsy form lung awaited, her overall condition has deteriorated since ILD diagnosis, currently oxygen dependent 3.5 lpm and will go home on home oxygen    Please await attending's attestation for final plan

## 2023-10-25 NOTE — SUBJECTIVE & OBJECTIVE
"Interval History: patient did report diarrhea yesterday, and one small bm "with little diarrhea but significantly improved than yesterday". She has been on laxatives. Continue to monitor for now.       Objective:     Vital Signs (Most Recent):  Temp: 98.4 °F (36.9 °C) (10/25/23 1553)  Pulse: 66 (10/25/23 1553)  Resp: 18 (10/25/23 1553)  BP: 139/78 (10/25/23 1553)  SpO2: 95 % (10/25/23 1553) Vital Signs (24h Range):  Temp:  [97.9 °F (36.6 °C)-98.9 °F (37.2 °C)] 98.4 °F (36.9 °C)  Pulse:  [] 66  Resp:  [18-20] 18  SpO2:  [92 %-97 %] 95 %  BP: (118-139)/(59-78) 139/78     Weight: 95 kg (209 lb 7 oz)  Body mass index is 40.9 kg/m².    Intake/Output Summary (Last 24 hours) at 10/25/2023 1721  Last data filed at 10/25/2023 1247  Gross per 24 hour   Intake 462 ml   Output 450 ml   Net 12 ml         Physical Exam  Constitutional:       Appearance: Normal appearance.   HENT:      Head: Normocephalic and atraumatic.      Nose: Nose normal.   Eyes:      Conjunctiva/sclera: Conjunctivae normal.   Cardiovascular:      Rate and Rhythm: Tachycardia present. Rhythm irregular.   Pulmonary:      Effort: Pulmonary effort is normal.   Abdominal:      General: Abdomen is flat.   Musculoskeletal:      Cervical back: Normal range of motion.      Right lower leg: No edema.      Left lower leg: No edema.   Skin:     General: Skin is warm and dry.   Neurological:      General: No focal deficit present.      Mental Status: She is alert and oriented to person, place, and time.   Psychiatric:         Mood and Affect: Mood normal.         Behavior: Behavior normal.             Significant Labs: All pertinent labs within the past 24 hours have been reviewed.    Significant Imaging: I have reviewed all pertinent imaging results/findings within the past 24 hours.  "

## 2023-10-25 NOTE — ASSESSMENT & PLAN NOTE
Patient with Paroxysmal (<7 days) atrial fibrillation which is controlled currently with propafenone, toprol. Patient is currently in atrial fibrillation with rates 80-110s. [id number] Score: 3. Anticoagulation indicated and had discussed xarelto but has not started due to recent biopsy with plans to discuss in clinic with Dr. Hodgson.    -continue propafenone 225 BID, toprol daily  -patient would like to discuss with primary cardiologist on starting xarelto. Will start on heparin gtt  -CT head negative for any bleeding after fall  -HR regular this AM. Planning on getting EKG as well. She is ok with heparin subq, but did not want the heparin gtt. Patient reports that she was asked to stop A/C per surgery and cardiology. It seems that she comfortable discussing A/C with them.     Went into afib with RVR today. EP consulted, appreciate assistance.

## 2023-10-25 NOTE — SUBJECTIVE & OBJECTIVE
Past Medical History:   Diagnosis Date    Allergy     Arthritis     Atrial fibrillation 2017    Chronic diastolic heart failure 2023    CKD (chronic kidney disease) stage 3, GFR 30-59 ml/min 2017    Coronary artery calcification seen on CAT scan 2023    Disorder of kidney and ureter     GERD (gastroesophageal reflux disease)     Hyperlipidemia     Hypertension     Impaired fasting glucose 2021    Pre-diabetes 6/3/2017       Past Surgical History:   Procedure Laterality Date    ADENOIDECTOMY      BACK SURGERY      lamenectomy    BREAST SURGERY      BRONCHOSCOPY N/A 2023    Procedure: BRONCHOSCOPY;  Surgeon: [facility] Diagnostic Provider;  [facility];  Service: Anesthesiology;  Laterality: N/A;    BRONCHOSCOPY N/A 10/20/2023    Procedure: BRONCHOSCOPY;  Surgeon: Anatoly Tiwari MD;  Location: [facility] OR [address] [place] [address];  Service: Cardiothoracic;  Laterality: N/A;    CARDIAC SURGERY      [employer] pacemaker     CATARACT EXTRACTION W/  INTRAOCULAR LENS IMPLANT Right 6/3/2019    Procedure: EXTRACTION, CATARACT, WITH IOL INSERTION;  Surgeon: Raisa Moreno MD;  Location: [facility];  Service: Ophthalmology;  Laterality: Right;  Laser    CATARACT EXTRACTION W/  INTRAOCULAR LENS IMPLANT Left 2019    Procedure: EXTRACTION, CATARACT, WITH IOL INSERTION;  Surgeon: Raisa Moreno MD;  Location: [facility] OR;  Service: Ophthalmology;  Laterality: Left;  LASER ASSISTED     SECTION      1980    COLONOSCOPY N/A 9/15/2023    Procedure: COLONOSCOPY;  Surgeon: Tao Gomez MD;  [facility]  Service: Endoscopy;  Laterality: N/A;  inst to portal/[employer] pacemaker   2nd floor for airway protection patient with lung disease elevated pulmonary artery pressure pacemaker and fecal occult blood positive stool,   cleared by pulmonary Dr Bhakta-see note on -GT    ENDOSCOPIC ULTRASOUND OF UPPER GASTROINTESTINAL TRACT N/A 2023    Procedure: ULTRASOUND, UPPER GI  TRACT, ENDOSCOPIC;  Surgeon: Casimiro De Los Santos MD;  Location: [facility] ENDO;  Service: Endoscopy;  Laterality: N/A;  9/13/23: instructions send via portal. [employer] ppm- GD    EYE SURGERY      FRACTURE SURGERY      HYSTERECTOMY      INJECTION OF ANESTHETIC AGENT AROUND MULTIPLE INTERCOSTAL NERVES  10/20/2023    Procedure: BLOCK, NERVE, INTERCOSTAL, 2 OR MORE;  Surgeon: Anatoly Tiwari MD;  Location: [facility] OR [address] [place] [address];  Service: Cardiothoracic;;    INSERT / REPLACE / REMOVE PACEMAKER  2009    placement    INSERT / REPLACE / REMOVE PACEMAKER  09/22/2014    [employer] Model #[id number] replacement    pace maker  2009    replacement 2014    REVISION OF PROCEDURE INVOLVING PACEMAKER LEAD Left 1/13/2022    Procedure: REVISION, ELECTRODE LEAD, CARDIAC PACEMAKER;  Surgeon: Trell Hodgson MD;  Location: [facility] EP LAB;  Service: Cardiology;  Laterality: Left;  PPM lead Malfx, RA lead revision, SJM, MAC, GP, 3 PREP*dPPM SJM in situ**Contrast prep given*    salpingo opherectomy Bilateral 1998    SPINE SURGERY      TONSILLECTOMY  1953    TUBAL LIGATION      VIDEO-ASSISTED THORACOSCOPIC SURGERY (VATS) Right 10/20/2023    Procedure: VATS WEDGE;  Surgeon: Anatoly Tiwari MD;  Location: [facility] OR [address] [place] [address];  Service: Cardiothoracic;  Laterality: Right;       Review of patient's allergies indicates:   Allergen Reactions    Iodinated contrast media Anaphylaxis    Penicillins Anaphylaxis    Allopurinol analogues      Muscle cramps    Ciprofloxacin Rash    Quinolones Rash    Sulfa (sulfonamide antibiotics) Rash    Tetracyclines Rash       No current facility-administered medications on file prior to encounter.     Current Outpatient Medications on File Prior to Encounter   Medication Sig    amLODIPine (NORVASC) 5 MG tablet Take 1 tablet (5 mg total) by mouth once daily.    aspirin (ECOTRIN) 81 MG EC tablet Take 1 tablet (81 mg total) by mouth once daily.    furosemide (LASIX) 40 MG tablet TAKE 1 TABLET BY MOUTH EVERY DAY    metoprolol succinate  (TOPROL-XL) 25 MG 24 hr tablet Take 0.5 tablets (12.5 mg total) by mouth once daily.    olmesartan (BENICAR) 20 MG tablet Take 1 tablet (20 mg total) by mouth once daily.    clobetasoL (OLUX) 0.05 % Foam Apply 1 application topically every 30 days.    ERGOCALCIFEROL, VITAMIN D2, (VITAMIN D ORAL) Take 2,000 Units by mouth once daily.    gabapentin (NEURONTIN) 300 MG capsule Take 1 capsule (300 mg total) by mouth 3 (three) times daily.    methocarbamoL (ROBAXIN) 500 MG Tab Take 1 tablet (500 mg total) by mouth 4 (four) times daily. for 10 days    nitroGLYCERIN (NITROSTAT) 0.4 MG SL tablet Place 1 tablet (0.4 mg total) under the tongue every 5 (five) minutes as needed for Chest pain.    oxyCODONE (ROXICODONE) 5 MG immediate release tablet Take 1 tablet (5 mg total) by mouth every 4 (four) hours as needed for Pain.    potassium citrate (UROCIT-K) 10 mEq (1,080 mg) TbSR TAKE 1 TABLET BY MOUTH ONCE DAILY    propafenone (RYTHMOL SR) 225 MG Cp12 Take 1 capsule (225 mg total) by mouth every 12 (twelve) hours.    rivaroxaban (XARELTO) 15 mg Tab Take 1 tablet (15 mg total) by mouth daily with dinner or evening meal.    rosuvastatin (CRESTOR) 40 MG Tab Take 1 tablet (40 mg total) by mouth every evening. (Patient taking differently: Take 40 mg by mouth once daily.)    topiramate (TOPAMAX) 25 MG tablet Take 1 tablet twice daily.     Family History       Problem Relation (Age of Onset)    Coronary artery disease Mother, Father, Brother    Diabetes Mother, Father, Paternal Grandmother, Paternal Grandfather    Heart disease Mother, Father, Brother    Hyperlipidemia Mother, Father    Hypertension Mother, Father    Hyperthyroidism Brother    Stroke Mother          Tobacco Use    Smoking status: Never    Smokeless tobacco: Never   Substance and Sexual Activity    Alcohol use: Yes     Alcohol/week: 5.0 standard drinks of alcohol     Types: 5 Glasses of wine per week    Drug use: No    Sexual activity: Not Currently     Partners: Male  "    Review of Systems   Constitutional: Positive for malaise/fatigue. Negative for fever.   HENT:  Negative for ear pain.    Eyes:  Negative for double vision.   Cardiovascular:  Positive for dyspnea on exertion, irregular heartbeat and palpitations. Negative for chest pain.   Respiratory:  Negative for shortness of breath.    Endocrine: Negative for heat intolerance.   Hematologic/Lymphatic: Negative for adenopathy.   Skin:  Negative for dry skin.   Musculoskeletal:  Negative for falls.   Gastrointestinal:  Negative for anorexia.   Genitourinary:  Negative for flank pain.   Neurological:  Negative for disturbances in coordination.   Psychiatric/Behavioral:  Negative for depression.      Objective:     Vital Signs (Most Recent):  Temp: 98.4 °F (36.9 °C) (10/25/23 1553)  Pulse: (!) 160 (10/25/23 1732)  Resp: 18 (10/25/23 1553)  BP: 139/78 (10/25/23 1553)  SpO2: 95 % (10/25/23 1553) Vital Signs (24h Range):  Temp:  [97.9 °F (36.6 °C)-98.9 °F (37.2 °C)] 98.4 °F (36.9 °C)  Pulse:  [] 160  Resp:  [18-20] 18  SpO2:  [92 %-97 %] 95 %  BP: (118-139)/(59-78) 139/78       Weight: 95 kg (209 lb 7 oz)  Body mass index is 40.9 kg/m².    SpO2: 95 %        Physical Exam  Constitutional:       Appearance: She is obese.   HENT:      Head: Normocephalic.   Eyes:      Pupils: Pupils are equal, round, and reactive to light.   Cardiovascular:      Rate and Rhythm: Tachycardia present. Rhythm irregular.   Abdominal:      Palpations: Abdomen is soft.   Skin:     General: Skin is warm.   Neurological:      General: No focal deficit present.      Mental Status: She is alert.   Psychiatric:         Mood and Affect: Mood normal.            Significant Labs: ABG: No results for input(s): "PH", "PCO2", "HCO3", "POCSATURATED", "BE" in the last 48 hours., Blood Culture: No results for input(s): "LABBLOO" in the last 48 hours., BMP:   Recent Labs   Lab 10/24/23  0419 10/24/23  1109 10/25/23  0622   *  --  96     136 141 140   K " "3.9  --  4.1     --  111*   CO2 18*  --  16*   BUN 27*  --  20   CREATININE 2.1*  --  1.6*   CALCIUM 9.4  --  9.3   MG 2.1  --  1.9   , CMP:   Recent Labs   Lab 10/24/23  0419 10/24/23  1109 10/25/23  0622     136 141 140   K 3.9  --  4.1     --  111*   CO2 18*  --  16*   *  --  96   BUN 27*  --  20   CREATININE 2.1*  --  1.6*   CALCIUM 9.4  --  9.3   PROT 5.9*  --  6.3   ALBUMIN 2.7*  --  2.7*   BILITOT 0.6  --  0.8   ALKPHOS 64  --  71   AST 26  --  35   ALT 21  --  26   ANIONGAP 11  --  13   , CBC:   Recent Labs   Lab 10/24/23  0419 10/25/23  0622   WBC 6.94 8.97   HGB 10.3* 11.0*   HCT 31.2* 33.2*    267   , and INR: No results for input(s): "INR", "PROTIME" in the last 48 hours.    Imaging Results              X-Ray Chest AP Portable (Final result)  Result time 10/22/23 18:50:57      Final result by Eriberto Yeboah MD (10/22/23 18:50:57)                   Impression:      As above.      Electronically signed by: Eriberto Yeboah MD  Date:    10/22/2023  Time:    18:50               Narrative:    EXAMINATION:  XR CHEST AP PORTABLE    CLINICAL HISTORY:  Weakness    TECHNIQUE:  Single frontal view of the chest was performed.    COMPARISON:  CT thorax 10/22/2023, chest radiograph 10/21/23    FINDINGS:  Monitoring leads overlie the chest.  Patient is slightly rotated.  Resolution is limited by body habitus with underpenetration.    Right basilar platelike scarring versus atelectasis with opacification obscuring the diaphragm and costophrenic angle consistent with small right pleural effusion and associated right basilar atelectasis better demonstrated on cross-sectional imaging performed earlier same day.  No consolidation or sizable pleural effusion on the left.  No consolidation at the imaged upper lung zones.  No definite pneumothorax.    Cardiomediastinal silhouette is midline and stable.  Cardiac device is unchanged.    Osseous structures are stable without acute process seen.      "                                  CT Chest Without Contrast (Final result)  Result time 10/22/23 19:19:31      Final result by Eriberto Yeboah MD (10/22/23 19:19:31)                   Impression:      Trace residual right pneumothorax and fat stranding with subcutaneous emphysema involving the right lateral chest wall in this patient with recent chest tube removal.    Persistent basilar fibrotic changes poorly evaluated due to superimposed subsegmental atelectasis.    Stable cystic lesions in the pancreas.    Stable right adrenal gland nodule.    Other findings above.    Electronically signed by resident: Femi Donahue  Date:    10/22/2023  Time:    18:24    Electronically signed by: Eriberto Yeboah MD  Date:    10/22/2023  Time:    19:19               Narrative:    EXAMINATION:  CT CHEST WITHOUT CONTRAST    CLINICAL HISTORY:  Chest trauma, blunt;    TECHNIQUE:  Axial images, sagittal and coronal reformations were obtained from the thoracic inlet to the lung bases. Contrast was not administered.    COMPARISON:  CT chest 10/09/2023, 06/16/2023    FINDINGS:  LINES/TUBES/DEVICES: Cardiac conduction device left upper chest wall with leads terminating within the heart.    SOFT TISSUES: There is fat stranding and subcutaneous emphysema involving the right lateral chest wall, presumably related to recent chest tube removal.    HEART AND MEDIASTINUM: No mediastinal or hilar lymphadenopathy. Heart is borderline enlarged.  No pericardial effusion.  The main pulmonary artery is normal in size.    PLEURA: Trace right pneumothorax.  Trace right pleural effusion.    LUNGS AND AIRWAYS: Central airways are patent.  Linear radiopaque structure in the right upper and lower lobes with mild adjacent consolidation, uncertain etiology, presumably related to recent VATS procedure (see axial series 4, images 168 and 231).  Numerous linear opacities in the bilateral lower lobes likely subsegmental atelectasis.  There are persistent subpleural  reticular opacities at the lung bases, difficult to further evaluate due to superimposed atelectasis.  Previous pulmonary nodules are poorly visualized.    UPPER ABDOMEN: Small hiatal hernia.  Stable subcentimeter hypodensity right hepatic lobe, too small to characterize.  Stable 1.7 cm right adrenal gland nodule.  Stable cystic lesions within the pancreas, largest 1.6 cm in the pancreatic tail.    BONES: No fracture or focal osseous lesion.                                       CT Head Without Contrast (Final result)  Result time 10/22/23 18:28:27      Final result by Eriberto Yeboah MD (10/22/23 18:28:27)                   Impression:      No acute intracranial findings.    Changes of chronic small vessel disease.    Electronically signed by resident: Femi Donahue  Date:    10/22/2023  Time:    18:16    Electronically signed by: Eriberto Yeboah MD  Date:    10/22/2023  Time:    18:28               Narrative:    EXAMINATION:  CT HEAD WITHOUT CONTRAST    CLINICAL HISTORY:  Head trauma, minor (Age >= 65y);    TECHNIQUE:  Low dose axial CT images obtained throughout the head without the use of intravenous contrast.  Axial, sagittal and coronal reconstructions were performed.    COMPARISON:  None.    FINDINGS:  Mild supratentorial white matter hypoattenuation, nonspecific, likely represents chronic small vessel disease.  No intracranial hemorrhage.  No mass effect. No evidence of acute infarction.    No extra-axial blood or fluid collections identified.    Age-related mild generalized cerebral volume loss without distortion by mass effect or acute hydrocephalus.  Skull base atherosclerotic vascular calcifications noted.    No fracture or focal osseous lesion. Mastoid air cells and paranasal sinuses are well aerated. Extracranial soft tissues are unremarkable.

## 2023-10-25 NOTE — TELEPHONE ENCOUNTER
Bahman Wallace MD [facility] Staff; P Nurse Michaud  This patient had an elective surgery and will have f/u with the surgeon.  We don't have anything to contribute in this case so she doesn't need a hosp f/u appt unless she has a specific need to see us for something.

## 2023-10-25 NOTE — PROGRESS NOTES
Home Oxygen Evaluation    Date Performed: 10/25/2023    1) Patient's Home O2 Sat on room air, while at rest: 93%        If O2 sats on room air at rest are 88% or below, patient qualifies. No additional testing needed. Document N/A in steps 2 and 3. If 89% or above, complete steps 2.      2) Patient's O2 Sat on room air while exercisin%        If O2 sats on room air while exercising remain 89% or above patient does not qualify, no further testing needed Document N/A in step 3. If O2 sats on room air while exercising are 88% or below, continue to step 3.      3) Patient's O2 Sat while exercising on O2: 96% at 3 LPM         (Must show improvement from #2 for patients to qualify)    If O2 sats improve on oxygen, patient qualifies for portable oxygen. If not, the patient does not qualify.

## 2023-10-26 VITALS
TEMPERATURE: 98 F | WEIGHT: 209.44 LBS | SYSTOLIC BLOOD PRESSURE: 137 MMHG | RESPIRATION RATE: 18 BRPM | HEART RATE: 68 BPM | DIASTOLIC BLOOD PRESSURE: 63 MMHG | OXYGEN SATURATION: 99 % | HEIGHT: 60 IN | BODY MASS INDEX: 41.12 KG/M2

## 2023-10-26 PROCEDURE — 25000003 PHARM REV CODE 250: Performed by: STUDENT IN AN ORGANIZED HEALTH CARE EDUCATION/TRAINING PROGRAM

## 2023-10-26 RX ORDER — SODIUM BICARBONATE 650 MG/1
650 TABLET ORAL DAILY
Qty: 7 TABLET | Refills: 0 | Status: SHIPPED | OUTPATIENT
Start: 2023-10-26 | End: 2024-02-21 | Stop reason: ALTCHOICE

## 2023-10-26 RX ADMIN — METOPROLOL SUCCINATE 12.5 MG: 25 TABLET, EXTENDED RELEASE ORAL at 10:10

## 2023-10-26 RX ADMIN — PROPAFENONE HYDROCHLORIDE 225 MG: 225 CAPSULE, EXTENDED RELEASE ORAL at 10:10

## 2023-10-26 RX ADMIN — ATORVASTATIN CALCIUM 80 MG: 40 TABLET, FILM COATED ORAL at 10:10

## 2023-10-26 NOTE — PLAN OF CARE
[facility] - Int[facility] Care ([facility])  Discharge Reassessment    Primary Care Provider: Sandra Michaud MD    Expected Discharge Date: 10/26/2023    Reassessment (most recent)       Discharge Reassessment - 10/26/23 1545          Discharge Reassessment    Assessment Type Discharge Planning Reassessment (P)      Did the patient's condition or plan change since previous assessment? No (P)      Discharge Plan discussed with: Patient;Spouse/sig other (P)      Name(s) and Number(s) Glen Curiel (Spouse)   [phone] (P)      Communicated HERMINIO with patient/caregiver Yes (P)      Discharge Plan A Home with family (P)      Discharge Plan B Home (P)      DME Needed Upon Discharge  oxygen;walker, rolling (P)      Transition of Care Barriers None (P)         Post-Acute Status    Post-Acute Authorization HME (P)      HME Status Set-up Complete/Auth obtained (P)      Coverage MEDICARE - MEDICARE PART A & B (P)                                    CHALINO Davis, LMSW  [facility]  Case Management  Ext. [phone]

## 2023-10-26 NOTE — PLAN OF CARE
Problem: Adult Inpatient Plan of Care  Goal: Plan of Care Review  Outcome: Ongoing, Progressing  Goal: Patient-Specific Goal (Individualized)  Outcome: Ongoing, Progressing  Goal: Absence of Hospital-Acquired Illness or Injury  Outcome: Ongoing, Progressing  Goal: Optimal Comfort and Wellbeing  Outcome: Ongoing, Progressing  Goal: Readiness for Transition of Care  Outcome: Ongoing, Progressing     Problem: Fluid and Electrolyte Imbalance (Acute Kidney Injury/Impairment)  Goal: Fluid and Electrolyte Balance  Outcome: Ongoing, Progressing     Problem: Oral Intake Inadequate (Acute Kidney Injury/Impairment)  Goal: Optimal Nutrition Intake  Outcome: Ongoing, Progressing     Problem: Renal Function Impairment (Acute Kidney Injury/Impairment)  Goal: Effective Renal Function  Outcome: Ongoing, Progressing     Problem: Fall Injury Risk  Goal: Absence of Fall and Fall-Related Injury  Outcome: Ongoing, Progressing     Problem: Bariatric Environmental Safety  Goal: Safety Maintained with Care  Outcome: Ongoing, Progressing

## 2023-10-26 NOTE — PLAN OF CARE
Problem: Adult Inpatient Plan of Care  Goal: Plan of Care Review  Outcome: Met       Pt is AAOX4 , pt educated on discharge meds and follow up appointment, pt educated on the use of home O2,  medications delivered at bedside seting up home supplies order, home O2 will be delivered to patient. pt voices understanding, no complaints at this time,  pt transpoted via wheelchair accompanied by spouse on 3L of 02 per NC.

## 2023-10-26 NOTE — PLAN OF CARE
Problem: Adult Inpatient Plan of Care  Goal: Plan of Care Review  Outcome: Ongoing, Progressing     Problem: Fluid and Electrolyte Imbalance (Acute Kidney Injury/Impairment)  Goal: Fluid and Electrolyte Balance  Outcome: Ongoing, Progressing     Problem: Renal Function Impairment (Acute Kidney Injury/Impairment)  Goal: Effective Renal Function  Outcome: Ongoing, Progressing     Problem: Fall Injury Risk  Goal: Absence of Fall and Fall-Related Injury  Outcome: Ongoing, Progressing  Intervention: Identify and Manage Contributors  Flowsheets (Taken 10/25/2023 1924)  Self-Care Promotion: independence encouraged  Medication Review/Management: medications reviewed

## 2023-10-27 ENCOUNTER — PATIENT OUTREACH (OUTPATIENT)
Dept: ADMINISTRATIVE | Facility: CLINIC | Age: 73
End: 2023-10-27
Payer: MEDICARE

## 2023-10-27 LAB
BACTERIA BLD CULT: NORMAL
BACTERIA BLD CULT: NORMAL
BACTERIA SPEC ANAEROBE CULT: NORMAL
BACTERIA SPEC ANAEROBE CULT: NORMAL

## 2023-10-27 NOTE — PLAN OF CARE
[facility] - Intensive Care ([facility])  Discharge Final Note    Primary Care Provider: Sandra Michaud MD    Expected Discharge Date: 10/26/2023    Final Discharge Note (most recent)       Final Note - 10/27/23 0923          Final Note    Assessment Type Final Discharge Note     Anticipated Discharge Disposition Home or Self Care     What phone number can be called within the next 1-3 days to see how you are doing after discharge? [phone]        Post-Acute Status    Post-Acute Authorization HME   Home oxygen. Rolling walker.    HME Status Set-up Complete/Auth obtained     Coverage MEDICARE - MEDICARE PART A & B                     Important Message from Medicare  Important Message from Medicare regarding Discharge Appeal Rights: Given to patient/caregiver, Explained to patient/caregiver, Signed/date by patient/caregiver     Date IMM was signed: 10/25/23  Time IMM was signed: 0817    Contact Info       Sandra Michaud MD   Specialty: Internal Medicine   Relationship: PCP - General    [address]  [place] [address]   Phone: [phone]       Next Steps: Follow up    Instructions: CHW called to schedule pcp f/uSymone sent message to nurse to call patient with appt            Patient dc home w/ HME (home oxygen and RW).                Frank Carroll, MSW, LMSW  [facility]  Case Management  Ext. [phone]

## 2023-10-27 NOTE — ASSESSMENT & PLAN NOTE
Follows with Dr. Bhakta. BOLIVAR weakly positive (1:80) but exhaustive autoimmune/CTD workup otherwise negative.  Follow-up CT imaging with scattered GGOs and bibasilar scarring with fibrosis and mild traction bronchiectasis. Has appearance similar to hypersensitivity pneumonitis but possible NSIP.  No feather/down bedding, no molds or organic material exposures.  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  Her  has quit smoking.  Does report an allergy to pet dander and is frequently watching her daughter's pet dog.  Bronchoscopy with transbronchial biopsies with CTD-ILD appearance.  Expanding autoimmune laboratories that were negative. Next underwent recent wedge biopsy with pending pathology.    - f/u pulm

## 2023-10-27 NOTE — PLAN OF CARE
[facility] - Intensive Care ([facility])  Discharge Final Note    Primary Care Provider: Sandra Michaud MD    Expected Discharge Date: 10/26/2023    Final Discharge Note (most recent)       Final Note - 10/27/23 0923          Final Note    Assessment Type Final Discharge Note (P)      Anticipated Discharge Disposition Home or Self Care (P)      What phone number can be called within the next 1-3 days to see how you are doing after discharge? [phone] (P)         Post-Acute Status    Post-Acute Authorization HME (P)    Home oxygen. Rolling walker.    HME Status Set-up Complete/Auth obtained (P)      Coverage MEDICARE - MEDICARE PART A & B (P)                      Important Message from Medicare  Important Message from Medicare regarding Discharge Appeal Rights: Given to patient/caregiver, Explained to patient/caregiver, Signed/date by patient/caregiver     Date IMM was signed: 10/25/23  Time IMM was signed: 0817    Contact Info       Sandra Michaud MD   Specialty: Internal Medicine   Relationship: PCP - General    [address] [place] [address]   Phone: [phone]       Next Steps: Follow up    Instructions: CHW called to schedule pcp f/uSymone sent message to nurse to call patient with appt

## 2023-10-27 NOTE — DISCHARGE SUMMARY
[facility] - Intensive Care ([place] [address])  [facility] Medicine  Discharge Summary      Patient Name: Jesenia Curiel  MRN: [id number]  CORRINA: [id number]  Patient Class: IP- Inpatient  Admission Date: 10/22/2023  Hospital Length of Stay: 4 days  Discharge Date and Time: 10/26/2023  5:32 PM  Attending Physician: Hermelinda att. 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      Patient told the ER that she was walking down the hallway fell and landed on her back in the back of her head.  Ever since then she is had significant weakness to the point where she can not walk can not get out of bed.  Slid out of her chair today and her  and her were not able to get off the ground, so they called EMS. She denies fevers, chest pain, palpitations, dizziness, orthopnea, N/V/D, abdominal pain. She has baseline shortness of breath without worsening and is not on home oxygen. Placed on NC here for O2 sats 88-90 at rest. She has a chronic cough that has not worsened.      Patient was recently admitted for right VATS, wedge biopsy on 10/20/23 to r/o ILD and assess for possible causes. She was doing well and discharged after chest tube removal without any post-procedure issues. Sodium at discharge was 140 with Cr 1.47. In the ED today, Na 125 and Cr 2.5. HCO3 23>14. Patient has had slightly reduced PO intake. No post procedure bleeding.     She reports her fall was slumping against her bed and then going to the ground. Did not lose consciousness. No visual disturbances but was too weak to  stand up and called EMS. She had not started anticoagulation for her Afib yet with this procedure pending. She missed her dose of propafenone this morning. Denied any SOB, CP, palpitations and reports normally can tell when she is in Afib. She is currently in Afib with rates 80-110s with stable BP and asymptomatic. Could be related to missed dose of propafenone or volume depletion.       * No surgery found *      Hospital Course:   Patient presented with BARBARA, hyponatremia, hypotension.  Likely 2/2 dehydration, improved with IVF. Nephrology consulted for BARBARA, improved with IVF recommend f/u at discharge.   Patient also with new oxygen requirement since recent VATS but does not wear oxygen at home. Home oxygen set up and delivered prior to discharge.   Irregular tachycardia on exam and EKG revealed Afib with RVR. EP consulted and recommend cont home meds and no cardioversion as she spontaneously and intermittently converts. Patient declines anticoagulation given her risk of falls.   Hyponatremia and BARBARA improved at discharge recommend supplemental O2 at discharge and f/u with her cardiologist and nephrologist.        Goals of Care Treatment Preferences:  Code Status: Full Code    Health care agent: Sarina Carroll and Andrea Nolan  [facility] agent number: see chart    Living Will: Yes              Consults:   Consults (From admission, onward)        Status Ordering Provider     Inpatient consult to Electrophysiology  Once        Provider:  (Not yet assigned)    DAVIDA Clancy     Inpatient consult to Nephrology  Once        Provider:  (Not yet assigned)    DAVIDA Clancy          Pulmonary  Interstitial lung disease  Follows with Dr. Bhakta. BOLIVAR weakly positive (1:80) but exhaustive autoimmune/CTD workup otherwise negative.  Follow-up CT imaging with scattered GGOs and bibasilar scarring with fibrosis and mild traction bronchiectasis. Has appearance similar to hypersensitivity  pneumonitis but possible NSIP.  No feather/down bedding, no molds or organic material exposures.  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  Her  has quit smoking.  Does report an allergy to pet dander and is frequently watching her daughter's pet dog.  Bronchoscopy with transbronchial biopsies with CTD-ILD appearance.  Expanding autoimmune laboratories that were negative. Next underwent recent wedge biopsy with pending pathology.    - f/u pulm    Cardiac/Vascular  * Atrial fibrillation with RVR  Patient with Paroxysmal (<7 days) atrial fibrillation which is controlled currently with propafenone, toprol. Patient is currently in atrial fibrillation with rates 80-110s. [id number] Score: 3. Anticoagulation indicated and had discussed xarelto but has not started due to recent biopsy with plans to discuss in clinic with Dr. Hodgson.    -continue propafenone 225 BID, toprol daily  -CT head negative for any bleeding after fall  -HR regular this AM. Planning on getting EKG as well. She is ok with heparin subq, but did not want the heparin gtt. Patient reports that she was asked to stop A/C per surgery and cardiology. It seems that she comfortable discussing A/C with them.     Went into afib with RVR today. EP consulted, appreciate assistance.   - EP do not recommend DCCV as patient spontaneously and intermittently converts, they recommend cont home meds.   - long discussion about risk/benefit of AC with patient and she declines AC given her risk of falls, she is aware of the risk  - F/U with EP at discharge     Chronic diastolic heart failure  -monitor closely with IVF for hyponatremia. No signs of volume overload with recent poor PO intake and symptomatic hyponatremia.      S/P placement of cardiac pacemaker  - noted, recommend f/u with EP      Renal/  Metabolic acidosis  Likely secondary to BARBARA. Trend and monitor for improvement with IVF.    - improving cont bicarb 1 week and  "f/u renal      BARBARA (acute kidney injury)  Patient with acute kidney injury/acute renal failure likely due to pre-renal azotemia due to dehydration BARBARA is currently stable. Baseline creatinine 1.2-1.3 - Labs reviewed- Renal function/electrolytes with Estimated Creatinine Clearance: 31.8 mL/min (A) (based on SCr of 1.6 mg/dL (H)). according to latest data. Monitor urine output and serial BMP and adjust therapy as needed. Avoid nephrotoxins and renally dose meds for GFR listed above.    -starting isotonic bicarb to correct BARBARA, metabolic acidosis and hyponatremia, renal recs to cont bicarb for 1 week at discharge   -hold nephrotoxic medications  -renally dose medications    - BARBARA improved at discharge and patient to f/u with nephrology     Hyponatremia  Patient has hyponatremia which is controlled,We will aim to correct the sodium by 4-6mEq in 24 hours. We will monitor sodium Every 6 hours. The hyponatremia is due to Dehydration/hypovolemia. We will obtain the following studies: Urine sodium, urine osmolality, serum osmolality, AM cortisol or TSH, T4. We will treat the hyponatremia with IV fluids as follows: NaCl 100ml/hr for 24 hrs and reassess. The patient's sodium results have been reviewed and are listed below.  No results for input(s): "NA" in the last 24 hours.    Nephrology is following, appreciate assistance. Correcting with IVF    - resolved at discharge     CKD (chronic kidney disease) stage 3, GFR 30-59 ml/min  Creatine stable for now. BMP reviewed- noted Estimated Creatinine Clearance: 31.8 mL/min (A) (based on SCr of 1.6 mg/dL (H)). according to latest data. Based on current GFR, CKD stage is stage 3 - GFR 30-59.  Monitor UOP and serial BMP and adjust therapy as needed. Renally dose meds. Avoid nephrotoxic medications and procedures.    - BARBARA resolved and back to baseline Cr  - recommend nephrology f/u     Other  Weakness  Likely secondary to BARBARA and symptomatic hyponatremia  CT head unremarkable after " "fall at home  Afib may be contributing, see Atrial fibrillation for management        Final Active Diagnoses:    Diagnosis Date Noted POA    PRINCIPAL PROBLEM:  Atrial fibrillation with RVR [I48.91] 05/29/2017 Yes    Hyponatremia [E87.1] 10/22/2023 Yes    BARBARA (acute kidney injury) [N17.9] 10/22/2023 Yes    Metabolic acidosis [E87.20] 10/22/2023 Yes    Weakness [R53.1] 10/22/2023 Yes    Chronic diastolic heart failure [I50.32] 09/29/2023 Yes    Interstitial lung disease [J84.9] 07/10/2023 Yes    CKD (chronic kidney disease) stage 3, GFR 30-59 ml/min [N18.30] 06/26/2017 Yes    S/P placement of cardiac pacemaker [Z95.0] 05/29/2017 Yes      Problems Resolved During this Admission:       Discharged Condition: stable    Disposition: Home or Self Care    Follow Up:   Follow-up Information     Sandra Michaud MD Follow up.    Specialty: Internal Medicine  Why: [facility] called to schedule pcp f/uSymone sent message to nurse to call patient with appt  Contact information:  [address] [place] [address]  [phone]                       Patient Instructions:      OXYGEN FOR HOME USE     Order Specific Question Answer Comments   Liter Flow 3    Duration Continuous    Qualifying Test Performed at: Activity    Oxygen saturation at rest 93    Oxygen saturation with activity 83    Oxygen saturation with activity on oxygen 96    Portable mode: continuous    Route nasal cannula    Device: home concentrator with portable tanks    Length of need (in months): 99 mos    Patient condition with qualifying saturation CHF    Height: 5' (1.524 m)    Weight: 95 kg (209 lb 7 oz)    Alternative treatment measures have been tried or considered and deemed clinically ineffective. Yes      WALKER FOR HOME USE     Order Specific Question Answer Comments   Type of Walker: Adult (5'4"-6'6")    With wheels? Yes    Height: 5' (1.524 m)    Weight: 95 kg (209 lb 7 oz)    Length of need (1-99 months): 99    Does patient have medical equipment at " home? none    Please check all that apply: Patient's condition impairs ambulation.      Ambulatory referral/consult to Cardiac Electrophysiology   Standing Status: Future   Referral Priority: Urgent Referral Type: Consultation   Referral Reason: Specialty Services Required   Referred to Provider: AMINTA OSCAR Requested Specialty: Cardiology   Number of Visits Requested: 1     Ambulatory referral/consult to Pulmonology   Standing Status: Future   Referral Priority: Urgent Referral Type: Consultation   Referral Reason: Specialty Services Required   Referred to Provider: DARLIN RODAS Requested Specialty: Pulmonary Disease   Number of Visits Requested: 1     ACCEPT - Ambulatory referral/consult to [facility] Failure [facility]   Standing Status: Future   Referral Priority: Routine Referral Type: Consultation   Referral Reason: Specialty Services Required   Requested Specialty: Cardiology   Number of Visits Requested: 1     Diet Adult Regular     Notify your health care provider if you experience any of the following:  temperature >100.4     Notify your health care provider if you experience any of the following:  difficulty breathing or increased cough     Activity as tolerated       Significant Diagnostic Studies: Labs: All labs within the past 24 hours have been reviewed    Pending Diagnostic Studies:     None         Medications:  Reconciled Home Medications:      Medication List      START taking these medications    sodium bicarbonate 650 MG tablet  Take 1 tablet (650 mg total) by mouth once daily. for 7 days        CHANGE how you take these medications    rosuvastatin 40 MG Tab  Commonly known as: CRESTOR  Take 1 tablet (40 mg total) by mouth every evening.  What changed: when to take this        CONTINUE taking these medications    amLODIPine 5 MG tablet  Commonly known as: NORVASC  Take 1 tablet (5 mg total) by mouth once daily.     aspirin 81 MG EC tablet  Commonly known as: ECOTRIN  Take 1  tablet (81 mg total) by mouth once daily.     clobetasoL 0.05 % Foam  Commonly known as: OLUX  Apply 1 application topically every 30 days.     furosemide 40 MG tablet  Commonly known as: LASIX  TAKE 1 TABLET BY MOUTH EVERY DAY     gabapentin 300 MG capsule  Commonly known as: NEURONTIN  Take 1 capsule (300 mg total) by mouth 3 (three) times daily.     methocarbamoL 500 MG Tab  Commonly known as: ROBAXIN  Take 1 tablet (500 mg total) by mouth 4 (four) times daily. for 10 days     metoprolol succinate 25 MG 24 hr tablet  Commonly known as: TOPROL-XL  Take 0.5 tablets (12.5 mg total) by mouth once daily.     nitroGLYCERIN 0.4 MG SL tablet  Commonly known as: NITROSTAT  Place 1 tablet (0.4 mg total) under the tongue every 5 (five) minutes as needed for Chest pain.     olmesartan 20 MG tablet  Commonly known as: BENICAR  Take 1 tablet (20 mg total) by mouth once daily.     oxyCODONE 5 MG immediate release tablet  Commonly known as: ROXICODONE  Take 1 tablet (5 mg total) by mouth every 4 (four) hours as needed for Pain.     potassium citrate 10 mEq (1,080 mg) Tbsr  Commonly known as: UROCIT-K  TAKE 1 TABLET BY MOUTH ONCE DAILY     propafenone 225 MG Cp12  Commonly known as: RYTHMOL SR  Take 1 capsule (225 mg total) by mouth every 12 (twelve) hours.     topiramate 25 MG tablet  Commonly known as: TOPAMAX  Take 1 tablet twice daily.     VITAMIN D ORAL  Take 2,000 Units by mouth once daily.        STOP taking these medications    rivaroxaban 15 mg Tab  Commonly known as: XARELTO            Indwelling Lines/Drains at time of discharge:   Lines/Drains/Airways     None                 Time spent on the discharge of patient: 40 minutes         Phyllis Egan MD  Department of Hospital Medicine  [facility] Intensive Care ([place]-16)

## 2023-10-27 NOTE — ASSESSMENT & PLAN NOTE
Likely secondary to BARBARA. Trend and monitor for improvement with IVF.    - improving cont bicarb 1 week and f/u renal

## 2023-10-27 NOTE — TELEPHONE ENCOUNTER
Antonella Galeano LPN [facility] Staff 53 minutes ago (10:20 AM)     RB  Please forward this important TCC information to your provider in order to maximize the post discharge care delivery of this patient.     C3 nurse spoke with Jesenia Curiel  for a TCC post hospital discharge follow up call. The patient does not have a scheduled HOSFU appointment with Sandra Michaud MD    within 5-7 days post hospital discharge date 10/26/2023. C3 nurse was unable to schedule HOSFU appointment in [facility].   Please contact patient and schedule follow up appointment using HOSFU visit type on or before 11/02/2023.     Respectfully,   Antonella Galeano LPN     [facility]     [email or web address]       Please do not reply to this message, as this inbox is not routinely monitored.      Antonella Galeano LPN 1 hour ago (10:13 AM)     RB     C3 nurse spoke with Jesenia Curiel  for a TCC post hospital discharge follow up call. The patient does not have a scheduled HOSFU appointment with Sandra Michaud MD   within 5-7 days post hospital discharge date 10/26/2023. C3 nurse was unable to schedule HOSFU appointment in [facility].  Please contact patient and schedule follow up appointment using HOSFU visit type on or before 11/02/2023.     Declined NP and Home NP scheduling.         Note      Antonella Galeano LPN Gibbons, Junette Rae [phone] 1 hour ago (10:03 AM)     Antonella Galeano LPN Gibbons, Junette Rae [phone]

## 2023-10-27 NOTE — PROGRESS NOTES
C3 nurse spoke with Jesenia Curiel  for a TCC post hospital discharge follow up call. The patient does not have a scheduled HOSFU appointment with Sandra Michaud MD   within 5-7 days post hospital discharge date 10/26/2023. C3 nurse was unable to schedule HOSFU appointment in [facility].  Please contact patient and schedule follow up appointment using HOSFU visit type on or before 11/02/2023.    Declined NP and Home NP scheduling.

## 2023-10-27 NOTE — ASSESSMENT & PLAN NOTE
Creatine stable for now. BMP reviewed- noted Estimated Creatinine Clearance: 31.8 mL/min (A) (based on SCr of 1.6 mg/dL (H)). according to latest data. Based on current GFR, CKD stage is stage 3 - GFR 30-59.  Monitor UOP and serial BMP and adjust therapy as needed. Renally dose meds. Avoid nephrotoxic medications and procedures.    - BARBARA resolved and back to baseline Cr  - recommend nephrology f/u

## 2023-10-27 NOTE — ASSESSMENT & PLAN NOTE
Patient with Paroxysmal (<7 days) atrial fibrillation which is controlled currently with propafenone, toprol. Patient is currently in atrial fibrillation with rates 80-110s. [id number] Score: 3. Anticoagulation indicated and had discussed xarelto but has not started due to recent biopsy with plans to discuss in clinic with Dr. Hodgson.    -continue propafenone 225 BID, toprol daily  -CT head negative for any bleeding after fall  -HR regular this AM. Planning on getting EKG as well. She is ok with heparin subq, but did not want the heparin gtt. Patient reports that she was asked to stop A/C per surgery and cardiology. It seems that she comfortable discussing A/C with them.     Went into afib with RVR today. EP consulted, appreciate assistance.   - EP do not recommend DCCV as patient spontaneously and intermittently converts, they recommend cont home meds.   - long discussion about risk/benefit of AC with patient and she declines AC given her risk of falls, she is aware of the risk  - F/U with EP at discharge

## 2023-10-27 NOTE — ASSESSMENT & PLAN NOTE
"Patient has hyponatremia which is controlled,We will aim to correct the sodium by 4-6mEq in 24 hours. We will monitor sodium Every 6 hours. The hyponatremia is due to Dehydration/hypovolemia. We will obtain the following studies: Urine sodium, urine osmolality, serum osmolality, AM cortisol or TSH, T4. We will treat the hyponatremia with IV fluids as follows: NaCl 100ml/hr for 24 hrs and reassess. The patient's sodium results have been reviewed and are listed below.  No results for input(s): "NA" in the last 24 hours.    Nephrology is following, appreciate assistance. Correcting with IVF    - resolved at discharge   "

## 2023-10-27 NOTE — ASSESSMENT & PLAN NOTE
Patient with acute kidney injury/acute renal failure likely due to pre-renal azotemia due to dehydration BARBARA is currently stable. Baseline creatinine 1.2-1.3 - Labs reviewed- Renal function/electrolytes with Estimated Creatinine Clearance: 31.8 mL/min (A) (based on SCr of 1.6 mg/dL (H)). according to latest data. Monitor urine output and serial BMP and adjust therapy as needed. Avoid nephrotoxins and renally dose meds for GFR listed above.    -starting isotonic bicarb to correct BARBARA, metabolic acidosis and hyponatremia, renal recs to cont bicarb for 1 week at discharge   -hold nephrotoxic medications  -renally dose medications    - BARBARA improved at discharge and patient to f/u with nephrology

## 2023-10-30 ENCOUNTER — TELEPHONE (OUTPATIENT)
Dept: ELECTROPHYSIOLOGY | Facility: CLINIC | Age: 73
End: 2023-10-30
Payer: MEDICARE

## 2023-10-30 LAB
FINAL PATHOLOGIC DIAGNOSIS: NORMAL
GROSS: NORMAL
Lab: NORMAL

## 2023-10-30 NOTE — TELEPHONE ENCOUNTER
Alert transmission received on 10/28/23 for long AF episode with poor rate control.  Not currently on OACs  S/P R Vats procedure on 10/19/23 and ER visit on 10/27/23 for a fall.and having AF with RVR, EP consulted, pt declined anticoagulation  given her risk of falls and continued current medication.  In the ER she spontaneously and intermittently converted to NSR.      Current episode ongoing for 23 hours but no onset or offset seen, possibly on going longer.    Overall burden:  <1% since 2014 and 44% since 10/26/23    Max duration seen: 23 hrs 14 minutes, no onset or offset seen    Most recent episode: 10/29/23    Ventricular rates:  Needs improvement    Anticoagulation status:  None    Patient symptoms: palpitations, has improved today    Pt taking propafenone 225 mg BID and Toprol-XL 12.5 mg QD.  Patent stated she stopped taking the Toprol XL on 10/29/23 and restarted taking her  Bisoprolol 5 mg QD since her Bp has improved.  Pt missed dose of propafenone on 10/27/23    Recent Bp 137/63

## 2023-10-30 NOTE — TELEPHONE ENCOUNTER
Message  Received: 3 days ago  Bahman Wallace MD  P [facility] Staff; P Nurse Keily  She needs a hospital f/u visit.     Madeleine Hopkins, Bahman Brown MD; Fabrice South MA  Patient was contacted. Stated she did not need f/u with PCP at this time. She will call if anything further is needed from PCP office.           Previous Messages       ----- Message -----   From: Bahman Wallace MD   Sent: 10/27/2023   5:39 PM CDT   To: [facility] Staff; Nurse Michaud     She needs a hospital f/u visit.

## 2023-10-31 ENCOUNTER — PATIENT MESSAGE (OUTPATIENT)
Dept: PULMONOLOGY | Facility: CLINIC | Age: 73
End: 2023-10-31
Payer: MEDICARE

## 2023-10-31 ENCOUNTER — PATIENT MESSAGE (OUTPATIENT)
Dept: NEPHROLOGY | Facility: CLINIC | Age: 73
End: 2023-10-31
Payer: MEDICARE

## 2023-10-31 DIAGNOSIS — J84.9 INTERSTITIAL LUNG DISEASE: Primary | ICD-10-CM

## 2023-10-31 DIAGNOSIS — N18.32 STAGE 3B CHRONIC KIDNEY DISEASE: Primary | ICD-10-CM

## 2023-10-31 NOTE — TELEPHONE ENCOUNTER
Merlin alert: Ongoing AF UPDATE    Per presenting egram 10/31/2023, AF episode suspicious for FFRWOS  Maryann is out of the office until Thursday.   Would you still like to increase rhythmol?    Will also FWD to lidia Mcleod RN if you would like to review printed egrams she can assist.    Please advise.

## 2023-10-31 NOTE — PHYSICIAN QUERY
PT Name: Jesenia Curiel  MR #: [id number]    DOCUMENTATION CLARIFICATION     CDS/: Nataly Redd RN               Contact information: [email or web address]  This form is a permanent document in the medical record.     Query Date: October 31, 2023    By submitting this query, we are merely seeking further clarification of documentation.  Please utilize your independent clinical judgment when addressing the question(s) below.    The medical record contains the following:  Pathology Findings Location in Medical Record   Final Pathologic Diagnosis   1.  Lung, right lower lobe, wedge resection:   - Chronic interstitial pneumonitis with NSIP-like injury pattern   - No evidence of malignancy   - See comment     2. Lung, right middle lobe, wedge resection:   - Chronic interstitial pneumonitis with NSIP-like injury pattern   - No evidence of malignancy   - See comment     COMMENT:   There is a patchy fibrosis with temporal homogeneity and cellular inflammatory infiltrates which in some areas demonstrate bronchiolar centric arrangement.  Focal peripheral microscopic honeycombing is seen.  No definitive granulomas are identified.     Differential diagnosis of this case is broad and considerations include NSIP-like injury in the setting of: hypersensitivity pneumonitis, connective tissue disease, infection, or drug effect; or idiopathic NSIP.  Recommend correlation with clinical   history and lab studies.       Preoperative Diagnosis: Interstitial lung disease      Postoperative Diagnosis: Interstitial lung disease      Procedure Performed:   Flexible Bronchoscopy    2.   Right video assisted thoracoscopy  3.   Right middle, and lower lobe wedge resection for tissue and culture  4.   Intercostal nerve blocks T4-T9      Intraoperative Findings: No evidence of dense fibrosis. No pleural effusion. No lung or pleural nodularity       Surgical Pathology Report 10/20                                             Op Note  10/20       Please clarify the pathology findings.    [  ] Pathology findings noted above are ruled in/confirmed as diagnoses     [x  ] Pathology findings noted above are not confirmed as diagnoses     [  ] Pathology findings noted above are incidental     [  ] Other diagnosis (please specify): ___________       Please document in your progress notes daily for the duration of treatment until resolved and include in your discharge summary.    Form No. [id number]

## 2023-11-01 ENCOUNTER — TELEPHONE (OUTPATIENT)
Dept: TRANSPLANT | Facility: CLINIC | Age: 73
End: 2023-11-01
Payer: MEDICARE

## 2023-11-01 DIAGNOSIS — J84.9 INTERSTITIAL LUNG DISEASE: Primary | ICD-10-CM

## 2023-11-01 NOTE — TELEPHONE ENCOUNTER
"Received the plan of care for the ALD referral from Dr. Kapoor.  Communicated with the patient via portal messages. Her appointments have been scheduled on Monday, 11/13/23 with her approval.     ----- Message from Caroline Kapoor MD sent at 11/1/2023 12:59 PM CDT -----  Regarding: RE: ALD Referral  ALD routine  Testing: modified 6mwt with lap testing on RA      ----- Message -----  From: Allison Hooper RN  Sent: 11/1/2023  11:44 AM CDT  To: Caroline Kapoor MD  Subject: ALD Referral                                     Advanced Lung Disease (ALD) Clinic Referral Note    Referral from:     Lung diagnosis: ILD (10/20/23 - right VATS with wedge biopsy done --- pathology read as chronic interstitial pneumonitis with NSIP-like injury pattern)    Age: 73 y.o.    Height/Weight/BMI:  5' 0" / 95 kg / 40.9 kg/m²    Smoking history: Social History    Tobacco Use      Smoking status: Never      Smokeless tobacco: Never    PFT date: 10/09/2023  FVC 1.89 (77.5%) FEV1 1.42 (75%) TLC 3.04 (68.4%) DLCO 9.68 (51%) based on Hgb = 13  Spirometry remains unimproved following bronchodilator.    6 MWT date: 03/31/2023  Distance = 800 feet  Sat 94% on room air with exercise    CXR date: 10/25/2023  Impression: No pneumothorax.    Chest CT date: 10/22/2023  Trace residual right pneumothorax and fat stranding with subcutaneous emphysema involving the right lateral chest wall in this patient with recent chest tube removal.  Persistent basilar fibrotic changes poorly evaluated due to superimposed subsegmental atelectasis.      Echo date: 03/08/2023  (Dobutamine stress echo)  · The stress echo portion of this study is negative for myocardial ischemia.  · The ECG portion of this study is abnormal but not diagnostic for ischemia.  · The patient reached the end of the protocol.  · There were no arrhythmias during stress.  · The left ventricle is normal in size with normal systolic function.  · The estimated ejection fraction is " 55%.  · There is abnormal septal wall motion.  · Normal left ventricular diastolic function.  · The estimated PA systolic pressure is 37 mmHg.  · Normal right ventricular size with normal right ventricular systolic function.  · Normal central venous pressure (3 mmHg).         Other pertinent medical history: retired GI physician with PMHx significant for hypertension, AFib not on anticoagulation, SSS s/p PPM 2009, CKD, arthrosclerosis, GERD    Recommendations?

## 2023-11-03 NOTE — PROGRESS NOTES
Subjective     Patient ID: Jesenia Curiel is a 73 y.o. female.    Chief Complaint: No chief complaint on file.    Diagnosis: Interstitial Lung Disease    Pre-operative therapy: none    Procedure(s) and date(s): 10/20/23 - Right VATS wedge biopsy     Pathology:    Lung, right lower lobe, wedge resection:   Chronic interstitial pneumonitis with NSIP-like injury pattern   - No evidence of malignancy   - See comment   Lung, right middle lobe, wedge resection:   - Chronic interstitial pneumonitis with NSIP-like injury pattern   - No evidence of malignancy   - See comment     COMMENT:   There is a patchy fibrosis with temporal homogeneity and cellular inflammatory infiltrates which in some areas demonstrate bronchiolar centric arrangement.  Focal peripheral microscopic honeycombing is seen.  No definitive granulomas are identified.        Post-operative therapy: none    HPI  73 y.o. female who presents to clinic for 2 week follow up s/p R VATS wedge biopsy. Tolerated procedure well. Discharged on POD1; however, patient was readmitted 10/22 following a fall at home.  Was found to have hyponatremia due to volume overload? Patient was eventually d/c home on home O2.  Today patient reports she is improving from most recent admission and states she is able to move about her home without oxygen; has been diuresing well and has lost 5 pounds since discharge.  However, she does report that prolonged exertion necessitates O2 use. Pain controlled, no complaints. Denies fever, chills, diaphoresis, cough, unintentional weight loss, syncope, lightheadedness.     Review of Systems   Constitutional:  Positive for fatigue. Negative for chills, diaphoresis, fever and unexpected weight change.   Respiratory:  Positive for shortness of breath. Negative for cough, wheezing and stridor.    Cardiovascular: Negative.  Negative for chest pain, palpitations, leg swelling and claudication.   Neurological: Negative.  Negative for dizziness,  "syncope, weakness and light-headedness.   Hematological: Negative.    Psychiatric/Behavioral: Negative.            Objective     /85 (BP Location: Left arm, Patient Position: Sitting)   Pulse 68   Ht 5' (1.524 m)   Wt 95 kg (209 lb 7 oz)   SpO2 96%   BMI 40.90 kg/m²     Physical Exam  Constitutional:       Appearance: Normal appearance. She is obese.   Eyes:      Extraocular Movements: Extraocular movements intact.   Pulmonary:      Effort: Pulmonary effort is normal.      Breath sounds: Normal breath sounds.   Abdominal:      General: Abdomen is flat.      Palpations: Abdomen is soft.   Skin:     General: Skin is warm and dry.      Comments: Incision(s) healing well.    Neurological:      General: No focal deficit present.      Mental Status: She is alert and oriented to person, place, and time. Mental status is at baseline.   Psychiatric:         Attention and Perception: Attention normal.         Mood and Affect: Mood normal. Affect is tearful.         Behavior: Behavior normal.         Thought Content: Thought content normal.         Diagnostics:    CXR 11/06/23: Cardiac size is mildly enlarged pacemaker is present.  Patient has a moderate right pleural effusion or pleural thickening with little loss of volume at the right lung base.  Left lung is clear.  No vascular congestion is noted.          Assessment and Plan   73 y.o. female who presents to clinic for 2 week follow up s/p R VATS wedge biopsy.   Patient concerned about the pathology report not being "Definitive." She expressed that she may want a second opinion on the pathology report. I explained that NSIP is a misnomer and does have specific characteristics and specific treatments. Patient was reassured    Plan:  Will follow-up with advanced lung disease team regarding planned treatment  O2 wean per pulmonology  RTC as needed for post-operative complaints        "

## 2023-11-06 ENCOUNTER — CLINICAL SUPPORT (OUTPATIENT)
Dept: CARDIOLOGY | Facility: HOSPITAL | Age: 73
End: 2023-11-06
Attending: INTERNAL MEDICINE
Payer: MEDICARE

## 2023-11-06 ENCOUNTER — TELEPHONE (OUTPATIENT)
Dept: ELECTROPHYSIOLOGY | Facility: CLINIC | Age: 73
End: 2023-11-06
Payer: MEDICARE

## 2023-11-06 ENCOUNTER — HOSPITAL ENCOUNTER (OUTPATIENT)
Dept: RADIOLOGY | Facility: HOSPITAL | Age: 73
Discharge: HOME | End: 2023-11-06
Attending: PHYSICIAN ASSISTANT
Payer: MEDICARE

## 2023-11-06 ENCOUNTER — OFFICE VISIT (OUTPATIENT)
Dept: CARDIOTHORACIC SURGERY | Facility: CLINIC | Age: 73
End: 2023-11-06
Payer: MEDICARE

## 2023-11-06 VITALS
HEIGHT: 60 IN | SYSTOLIC BLOOD PRESSURE: 107 MMHG | BODY MASS INDEX: 41.12 KG/M2 | HEART RATE: 68 BPM | WEIGHT: 209.44 LBS | OXYGEN SATURATION: 96 % | DIASTOLIC BLOOD PRESSURE: 85 MMHG

## 2023-11-06 DIAGNOSIS — N18.32 STAGE 3B CHRONIC KIDNEY DISEASE: Primary | ICD-10-CM

## 2023-11-06 DIAGNOSIS — J84.9 INTERSTITIAL LUNG DISEASE: ICD-10-CM

## 2023-11-06 DIAGNOSIS — I48.91 ATRIAL FIBRILLATION, UNSPECIFIED TYPE: Primary | ICD-10-CM

## 2023-11-06 DIAGNOSIS — Z95.0 PRESENCE OF CARDIAC PACEMAKER: ICD-10-CM

## 2023-11-06 DIAGNOSIS — I47.19 ATRIAL TACHYCARDIA: ICD-10-CM

## 2023-11-06 DIAGNOSIS — I49.8 OTHER CARDIAC ARRHYTHMIA: ICD-10-CM

## 2023-11-06 PROCEDURE — 93280 PM DEVICE PROGR EVAL DUAL: CPT | Mod: NTX

## 2023-11-06 PROCEDURE — 99213 OFFICE O/P EST LOW 20 MIN: CPT | Mod: PBBFAC,25,NTX | Performed by: STUDENT IN AN ORGANIZED HEALTH CARE EDUCATION/TRAINING PROGRAM

## 2023-11-06 PROCEDURE — 93280 CARDIAC DEVICE CHECK - IN CLINIC & HOSPITAL: ICD-10-PCS | Mod: 26,NTX,, | Performed by: INTERNAL MEDICINE

## 2023-11-06 PROCEDURE — 71046 X-RAY EXAM CHEST 2 VIEWS: CPT | Mod: TC,NTX

## 2023-11-06 PROCEDURE — 99024 POSTOP FOLLOW-UP VISIT: CPT | Mod: NTX,POP,, | Performed by: STUDENT IN AN ORGANIZED HEALTH CARE EDUCATION/TRAINING PROGRAM

## 2023-11-06 PROCEDURE — 99024 PR POST-OP FOLLOW-UP VISIT: ICD-10-PCS | Mod: NTX,POP,, | Performed by: STUDENT IN AN ORGANIZED HEALTH CARE EDUCATION/TRAINING PROGRAM

## 2023-11-06 PROCEDURE — 93005 ELECTROCARDIOGRAM TRACING: CPT | Mod: PBBFAC,NTX | Performed by: INTERNAL MEDICINE

## 2023-11-06 PROCEDURE — 99999 PR PBB SHADOW E&M-EST. PATIENT-LVL III: CPT | Mod: PBBFAC,TXP,, | Performed by: STUDENT IN AN ORGANIZED HEALTH CARE EDUCATION/TRAINING PROGRAM

## 2023-11-06 PROCEDURE — 71046 XR CHEST PA AND LATERAL: ICD-10-PCS | Mod: 26,NTX,, | Performed by: RADIOLOGY

## 2023-11-06 PROCEDURE — 99999 PR PBB SHADOW E&M-EST. PATIENT-LVL III: ICD-10-PCS | Mod: PBBFAC,TXP,, | Performed by: STUDENT IN AN ORGANIZED HEALTH CARE EDUCATION/TRAINING PROGRAM

## 2023-11-06 PROCEDURE — 71046 X-RAY EXAM CHEST 2 VIEWS: CPT | Mod: 26,NTX,, | Performed by: RADIOLOGY

## 2023-11-06 PROCEDURE — 93010 ELECTROCARDIOGRAM REPORT: CPT | Mod: S$PBB,NTX,, | Performed by: INTERNAL MEDICINE

## 2023-11-06 PROCEDURE — 93280 PM DEVICE PROGR EVAL DUAL: CPT | Mod: 26,NTX,, | Performed by: INTERNAL MEDICINE

## 2023-11-06 PROCEDURE — 93010 RHYTHM STRIP: ICD-10-PCS | Mod: S$PBB,NTX,, | Performed by: INTERNAL MEDICINE

## 2023-11-06 RX ORDER — PROPAFENONE HYDROCHLORIDE 325 MG/1
325 CAPSULE, EXTENDED RELEASE ORAL EVERY 12 HOURS
Qty: 180 CAPSULE | Refills: 3 | Status: SHIPPED | OUTPATIENT
Start: 2023-11-06 | End: 2024-11-05

## 2023-11-06 NOTE — TELEPHONE ENCOUNTER
Spoke with  Curiel regarding via telephone regarding  Dr Hodgson's recommendations to start Xarelto 15 mg daily with evening meals. Patient was hesitant but ultimately agreed to starting Xarelto this evening. Plan to reassess atrial arrhythmia via [facility] in one week, then will discuss possible DCCV in the near future. She was agreeable and appreciated the call.

## 2023-11-07 LAB
OHS CV AF BURDEN PERCENT: 99
OHS CV DC REMOTE DEVICE TYPE: NORMAL
OHS CV RV PACING PERCENT: 15 %

## 2023-11-13 ENCOUNTER — OFFICE VISIT (OUTPATIENT)
Dept: TRANSPLANT | Facility: CLINIC | Age: 73
End: 2023-11-13
Payer: MEDICARE

## 2023-11-13 ENCOUNTER — HOSPITAL ENCOUNTER (OUTPATIENT)
Dept: PULMONOLOGY | Facility: CLINIC | Age: 73
Discharge: HOME | End: 2023-11-13
Payer: MEDICARE

## 2023-11-13 VITALS
DIASTOLIC BLOOD PRESSURE: 55 MMHG | BODY MASS INDEX: 37 KG/M2 | HEIGHT: 61 IN | HEIGHT: 61 IN | TEMPERATURE: 98 F | WEIGHT: 196 LBS | HEART RATE: 65 BPM | BODY MASS INDEX: 37 KG/M2 | WEIGHT: 196 LBS | OXYGEN SATURATION: 93 % | SYSTOLIC BLOOD PRESSURE: 102 MMHG

## 2023-11-13 DIAGNOSIS — N17.9 AKI (ACUTE KIDNEY INJURY): ICD-10-CM

## 2023-11-13 DIAGNOSIS — G47.33 OSA (OBSTRUCTIVE SLEEP APNEA): ICD-10-CM

## 2023-11-13 DIAGNOSIS — J84.9 INTERSTITIAL PULMONARY DISEASE, UNSPECIFIED: ICD-10-CM

## 2023-11-13 DIAGNOSIS — J84.9 INTERSTITIAL LUNG DISEASE: Primary | ICD-10-CM

## 2023-11-13 DIAGNOSIS — J84.9 INTERSTITIAL LUNG DISEASE: ICD-10-CM

## 2023-11-13 DIAGNOSIS — I48.91 ATRIAL FIBRILLATION WITH RVR: ICD-10-CM

## 2023-11-13 DIAGNOSIS — R06.09 DOE (DYSPNEA ON EXERTION): ICD-10-CM

## 2023-11-13 PROCEDURE — 99214 OFFICE O/P EST MOD 30 MIN: CPT | Mod: 25,S$PBB,NTX,ICN | Performed by: INTERNAL MEDICINE

## 2023-11-13 PROCEDURE — 99215 OFFICE O/P EST HI 40 MIN: CPT | Mod: 25,PBBFAC,NTX | Performed by: INTERNAL MEDICINE

## 2023-11-13 PROCEDURE — 99214 PR OFFICE/OUTPT VISIT, EST, LEVL IV, 30-39 MIN: ICD-10-PCS | Mod: 25,S$PBB,NTX,ICN | Performed by: INTERNAL MEDICINE

## 2023-11-13 PROCEDURE — 94618 PULMONARY STRESS TESTING: ICD-10-PCS | Mod: 26,S$PBB,NTX, | Performed by: INTERNAL MEDICINE

## 2023-11-13 PROCEDURE — 99999 PR PBB SHADOW E&M-EST. PATIENT-LVL V: CPT | Mod: PBBFAC,TXP,, | Performed by: INTERNAL MEDICINE

## 2023-11-13 PROCEDURE — 94618 PULMONARY STRESS TESTING: CPT | Mod: PBBFAC,NTX | Performed by: INTERNAL MEDICINE

## 2023-11-13 PROCEDURE — 99999 PR PBB SHADOW E&M-EST. PATIENT-LVL V: ICD-10-PCS | Mod: PBBFAC,TXP,, | Performed by: INTERNAL MEDICINE

## 2023-11-13 PROCEDURE — 94618 PULMONARY STRESS TESTING: CPT | Mod: 26,S$PBB,NTX, | Performed by: INTERNAL MEDICINE

## 2023-11-13 NOTE — PROCEDURES
Jesenia Curiel is a 73 y.o.  female patient, who presents for a 6 minute walk test ordered by MD Emelia.  The diagnosis is Interstitial Lung Disease; NSIP.  The patient's BMI is 37.1 kg/m2.  Predicted distance (lower limit of normal) is 215.08 meters.      Test Results:    The test was completed without stopping. The total time walked was 360 seconds. During walking, the patient reported:  Dyspnea, Dizziness, Other (Comment) (weakness). The patient used a wheelchair for assistance during testing.     11/13/2023---------Distance: 205.74 meters (675 feet)     Lap Walk Time O2 Sat % Supplemental Oxygen Heart Rate Blood Pressure Drew Scale   Pre-exercise  (Resting) 0 0 95 % Room Air 70 bpm 131/64 mmHg 2   During Exercise 1 112 sec 94 % Room Air 77 bpm     During Exercise 2 218 sec 95 % Room Air 81 bpm     During Exercise 3 320 sec 95 % Room Air 94 bpm     End of Exercise  360 sec 92 % Room Air 91 bpm 123/58 mmHg 5-6   Post-exercise  (Recovery)   95 % Room Air  80 bpm       Recovery Time: 78 seconds    Performing nurse/tech: [employer] RRT      PREVIOUS STUDY:   03/31/2023---------Distance: 243.84 meters (800 feet)       O2 Sat % Supplemental Oxygen Heart Rate Blood Pressure Drew Scale   Pre-exercise  (Resting) 94 % Room Air 77 bpm 125/66 mmHg 0   During Exercise 94 % Room Air 101 bpm 118/65 mmHg 3   Post-exercise  (Recovery) 96 % Room Air  82 bpm          CLINICAL INTERPRETATION:  Six minute walk distance is 205.74 meters (675 feet) with heavy dyspnea.  During exercise, there was desaturation while breathing room air.  Blood pressure remained stable and Heart rate increased significantly with walking.  The patient reported non-pulmonary symptoms during exercise.  Significant exercise impairment is likely due to subjective symptoms.  The patient did complete the study, walking 360 seconds of the 360 second test.  Since the previous study in March 2023, exercise capacity may be somewhat worse.  Based upon  age and body mass index, exercise capacity is less than predicted.

## 2023-11-13 NOTE — PROGRESS NOTES
ADVANCED LUNG DISEASE INITIAL EVALUATION                                                                                                                                           Reason for Visit:  ILD    Referring Physician: Ridge Bhakta MD    History of Present Illness: Jesenia Curiel is a 73 y.o. female who is on  0 at present, previously on 3 L of oxygen.  She is on no assisted ventilation.  Her New York Heart Association Class is III and a Karnofsky score of 70% - Cares for self: Unable to carry on normal activity or active work. She is not diabetic.    Here for evaluation of ILD.  Has CKD, AFIB, Sick Sinus Syndrome with pacemaker, HTN, hyperlipidemia, and JOSÉ (could not tolerate CPAP).    Had right VATS wedge biopsy on 10/23/23 that showed NSIP.  She presently has shortness of breath with exertion, but feels as if she is improving.  Has AFib (on Xeralto and Rhythmol) which she feels is contributing to her breathing issues.  Has chronic, intermittent dry cough.  Denies any GERD or sinus issues.  Never smoked.  Is a retired gastroenterologist.  Had a weakly positive BOLIVAR, but otherwise negative autoimmune/CTD workup.  Chest CT shows scatterd GGOs and bibasilar scarring with fibrosis and mild traction bronchiectasis.  Denies any known exposures.  Most recent Echo showed EF of 55% with PA systolic of 37.  Also has sick sinus syndrome with Pacemaker--is followed closely by cardiology.  Spirometry is normal with mild restrictive lung volumes.  DLCO is moderately decreased.  Admits to a sedentary lifestyle.  Was recently discharged for heart failure on 3L NC oxygen.  Has been diuresing well since discharged and feel that she is getting better.  Denies shortness of breath at rest.      Past Medical History:   Diagnosis Date    Allergy     Arthritis     Atrial fibrillation 5/29/2017    Chronic diastolic heart failure 9/29/2023    CKD (chronic kidney disease) stage 3, GFR 30-59 ml/min 6/26/2017    Coronary  artery calcification seen on CAT scan 2023    Disorder of kidney and ureter     GERD (gastroesophageal reflux disease)     Hyperlipidemia     Hypertension     Impaired fasting glucose 2021    Pre-diabetes 6/3/2017       Past Surgical History:   Procedure Laterality Date    ADENOIDECTOMY      BACK SURGERY      lamenectomy    BREAST SURGERY      BRONCHOSCOPY N/A 2023    Procedure: BRONCHOSCOPY;  Surgeon: Paul Diagnostic Provider;  Location: [facility] OR 2ND FLR;  Service: Anesthesiology;  Laterality: N/A;    BRONCHOSCOPY N/A 10/20/2023    Procedure: BRONCHOSCOPY;  Surgeon: Anatoly Tiwari MD;  [address];  Service: Cardiothoracic;  Laterality: N/A;    CARDIAC SURGERY      [facility] pacemaker     CATARACT EXTRACTION W/  INTRAOCULAR LENS IMPLANT Right 6/3/2019    Procedure: EXTRACTION, CATARACT, WITH IOL INSERTION;  Surgeon: Raisa Moreno MD;  Location: [facility];  Service: Ophthalmology;  Laterality: Right;  Laser    CATARACT EXTRACTION W/  INTRAOCULAR LENS IMPLANT Left 2019    Procedure: EXTRACTION, CATARACT, WITH IOL INSERTION;  Surgeon: Raisa Moreno MD;  Location: [facility] OR;  Service: Ophthalmology;  Laterality: Left;  LASER ASSISTED     SECTION      1980    COLONOSCOPY N/A 9/15/2023    Procedure: COLONOSCOPY;  Surgeon: Tao Gomez MD;  Location: [facility] (2ND FLR);  Service: Endoscopy;  Laterality: N/A;  inst to portal/[employer] pacemaker   2nd floor for airway protection patient with lung disease elevated pulmonary artery pressure pacemaker and fecal occult blood positive stool,   cleared by pulmonary Dr Bhakta-see note on -GT    ENDOSCOPIC ULTRASOUND OF UPPER GASTROINTESTINAL TRACT N/A 2023    Procedure: ULTRASOUND, UPPER GI TRACT, ENDOSCOPIC;  Surgeon: Casimiro De Los Santos MD;  Location: [facility] ENDO;  Service: Endoscopy;  Laterality: N/A;  23: instructions send via portal. [facility] ppm- GD    EYE SURGERY      FRACTURE SURGERY      HYSTERECTOMY       INJECTION OF ANESTHETIC AGENT AROUND MULTIPLE INTERCOSTAL NERVES  10/20/2023    Procedure: BLOCK, NERVE, INTERCOSTAL, 2 OR MORE;  Surgeon: Anatoly Tiwari MD;  Location: [facility] OR [address] [place] [address];  Service: Cardiothoracic;;    INSERT / REPLACE / REMOVE PACEMAKER  2009    placement    INSERT / REPLACE / REMOVE PACEMAKER  09/22/2014    [employer] Model #[id number] replacement    pace maker  2009    replacement 2014    REVISION OF PROCEDURE INVOLVING PACEMAKER LEAD Left 1/13/2022    Procedure: REVISION, ELECTRODE LEAD, CARDIAC PACEMAKER;  Surgeon: Trell Hodgson MD;  Location: [facility] EP LAB;  Service: Cardiology;  Laterality: Left;  PPM lead Malfx, RA lead revision, SJM, MAC, GP, 3 PREP*dPPM SJM in situ**Contrast prep given*    salpingo opherectomy Bilateral 1998    SPINE SURGERY      TONSILLECTOMY  1953    TUBAL LIGATION      VIDEO-ASSISTED THORACOSCOPIC SURGERY (VATS) Right 10/20/2023    Procedure: VATS WEDGE;  Surgeon: Anatoly Tiwari MD;  Location: [facility] OR [address] [place] [address];  Service: Cardiothoracic;  Laterality: Right;       Allergies: Iodinated contrast media, Penicillins, Allopurinol analogues, Ciprofloxacin, Quinolones, Sulfa (sulfonamide antibiotics), and Tetracyclines    Current Outpatient Medications   Medication Sig    amLODIPine (NORVASC) 5 MG tablet Take 1 tablet (5 mg total) by mouth once daily.    clobetasoL (OLUX) 0.05 % Foam Apply 1 application topically every 30 days.    ERGOCALCIFEROL, VITAMIN D2, (VITAMIN D ORAL) Take 2,000 Units by mouth once daily.    furosemide (LASIX) 40 MG tablet TAKE 1 TABLET BY MOUTH EVERY DAY    nitroGLYCERIN (NITROSTAT) 0.4 MG SL tablet Place 1 tablet (0.4 mg total) under the tongue every 5 (five) minutes as needed for Chest pain.    olmesartan (BENICAR) 20 MG tablet Take 1 tablet (20 mg total) by mouth once daily.    potassium citrate (UROCIT-K) 10 mEq (1,080 mg) TbSR TAKE 1 TABLET BY MOUTH ONCE DAILY    propafenone (RYTHMOL SR) 325 MG Cp12 Take 1 capsule (325 mg total) by mouth every 12  (twelve) hours.    rosuvastatin (CRESTOR) 40 MG Tab Take 1 tablet (40 mg total) by mouth every evening. (Patient taking differently: Take 40 mg by mouth once daily.)    topiramate (TOPAMAX) 25 MG tablet Take 1 tablet twice daily.    aspirin (ECOTRIN) 81 MG EC tablet Take 1 tablet (81 mg total) by mouth once daily. (Patient not taking: Reported on 11/13/2023)    gabapentin (NEURONTIN) 300 MG capsule Take 1 capsule (300 mg total) by mouth 3 (three) times daily. (Patient not taking: Reported on 10/27/2023)    metoprolol succinate (TOPROL-XL) 25 MG 24 hr tablet Take 0.5 tablets (12.5 mg total) by mouth once daily. (Patient not taking: Reported on 11/13/2023)    oxyCODONE (ROXICODONE) 5 MG immediate release tablet Take 1 tablet (5 mg total) by mouth every 4 (four) hours as needed for Pain. (Patient not taking: Reported on 10/27/2023)    sodium bicarbonate 650 MG tablet Take 1 tablet (650 mg total) by mouth once daily. for 7 days     No current facility-administered medications for this visit.       Immunization History   Administered Date(s) Administered    COVID-19, MRNA, LN-S, PF (Pfizer) (Gray Cap) 04/12/2022    COVID-19, MRNA, LN-S, PF (Pfizer) (Purple Cap) 01/12/2021, 02/02/2021, 10/01/2021    COVID-19, mRNA, LNP-S, PF, gian-sucrose, 30 mcg/0.3 mL (Pfizer 2023 Ages 12+) 10/06/2023    COVID-19, mRNA, LNP-S, bivalent booster, PF (PFIZER OMICRON) 11/21/2022    Influenza (FLUAD) - Quadrivalent - Adjuvanted - PF *Preferred* (65+) 10/01/2021, 10/11/2022    Influenza - High Dose - PF (65 years and older) 11/20/2015, 01/05/2018, 11/09/2018    Influenza - Quadrivalent 11/21/2016    Pneumococcal Conjugate - 13 Valent 11/20/2015    Pneumococcal Polysaccharide - 23 Valent 11/21/2016     Family History:    Family History   Problem Relation Age of Onset    Hypertension Mother     Diabetes Mother     Hyperlipidemia Mother     Coronary artery disease Mother     Heart disease Mother     Stroke Mother     Hypertension Father      Diabetes Father     Hyperlipidemia Father     Coronary artery disease Father     Heart disease Father     Coronary artery disease Brother     Heart disease Brother     Hyperthyroidism Brother     Diabetes Paternal Grandmother     Diabetes Paternal Grandfather     Heart attack Neg Hx      Social History     Substance and Sexual Activity   Alcohol Use Not Currently    Alcohol/week: 5.0 standard drinks of alcohol    Types: 5 Glasses of wine per week      Social History     Substance and Sexual Activity   Drug Use No      Social History     Socioeconomic History    Marital status:    Tobacco Use    Smoking status: Never     Passive exposure: Past    Smokeless tobacco: Never   Substance and Sexual Activity    Alcohol use: Not Currently     Alcohol/week: 5.0 standard drinks of alcohol     Types: 5 Glasses of wine per week    Drug use: No    Sexual activity: Not Currently     Partners: Male   Social History Narrative    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     Social Determinants of Health     Financial Resource Strain: Low Risk  (10/23/2023)    Overall Financial Resource Strain (CARDIA)     Difficulty of Paying Living Expenses: Not hard at all   Food Insecurity: No Food Insecurity (10/23/2023)    Hunger Vital Sign     Worried About Running Out of Food in the Last Year: Never true     Ran Out of Food in the Last Year: Never true   Transportation Needs: No Transportation Needs (10/23/2023)    PRAPARE - Transportation     Lack of Transportation (Medical): No     Lack of Transportation (Non-Medical): No   Physical Activity: Inactive (10/23/2023)    Exercise Vital Sign     Days of Exercise per Week: 0 days     Minutes of Exercise per Session: 0 min   Stress: No Stress Concern Present (10/23/2023)    [employer] - Occupational Stress Questionnaire     Feeling of Stress : Only a little   Social Connections: Moderately Isolated (10/23/2023)    Social  "Connection and Isolation Panel [NHANES]     Frequency of Communication with Friends and Family: More than three times a week     Frequency of Social Gatherings with Friends and Family: More than three times a week     Attends Christian Services: Never     Active Member of Clubs or Organizations: No     Attends Club or Organization Meetings: Never     Marital Status:    Housing Stability: Low Risk  (10/23/2023)    Housing Stability Vital Sign     Unable to Pay for Housing in the Last Year: No     Number of Places Lived in the Last Year: 1     Unstable Housing in the Last Year: No     Review of Systems   Constitutional:  Positive for malaise/fatigue.   HENT: Negative.     Respiratory:  Positive for cough and shortness of breath.    Gastrointestinal:  Negative for abdominal pain, heartburn and vomiting.   Skin: Negative.    Endo/Heme/Allergies:  Bruises/bleeds easily.     Vitals  BP (!) 102/55 (BP Location: Left arm, Patient Position: Sitting, BP Method: Large (Automatic))   Pulse 65   Temp 98.2 °F (36.8 °C) (Oral)   Ht 5' 1" (1.549 m)   Wt 88.9 kg (196 lb)   SpO2 (!) 93% Comment: room air  BMI 37.03 kg/m²   Physical Exam  Constitutional:       Appearance: Normal appearance. She is obese.   HENT:      Head: Normocephalic and atraumatic.   Eyes:      Extraocular Movements: Extraocular movements intact.   Pulmonary:      Effort: Pulmonary effort is normal. No respiratory distress.      Breath sounds: No stridor. No wheezing, rhonchi or rales.   Chest:      Chest wall: No tenderness.   Musculoskeletal:      Right lower leg: No edema.      Left lower leg: No edema.   Skin:     General: Skin is warm and dry.   Neurological:      Mental Status: She is alert and oriented to person, place, and time.   Psychiatric:         Mood and Affect: Mood normal.         Behavior: Behavior normal.         Thought Content: Thought content normal.         Judgment: Judgment normal.         Labs:  No visits with results within 7 " Day(s) from this visit.   Latest known visit with results is:   Lab Visit on 11/06/2023   Component Date Value    Glucose 11/06/2023 108     Sodium 11/06/2023 142     Potassium 11/06/2023 4.3     Chloride 11/06/2023 107     CO2 11/06/2023 26     BUN 11/06/2023 21     Calcium 11/06/2023 10.6 (H)     Creatinine 11/06/2023 1.7 (H)     Albumin 11/06/2023 3.4 (L)     Phosphorus 11/06/2023 2.4 (L)     eGFR 11/06/2023 31.3 (A)     Anion Gap 11/06/2023 9     WBC 11/06/2023 6.88     RBC 11/06/2023 3.78 (L)     Hemoglobin 11/06/2023 11.5 (L)     Hematocrit 11/06/2023 36.1 (L)     MCV 11/06/2023 96     MCH 11/06/2023 30.4     MCHC 11/06/2023 31.9 (L)     RDW 11/06/2023 12.6     Platelets 11/06/2023 316     MPV 11/06/2023 9.7     Immature Granulocytes 11/06/2023 0.3     Gran # (ANC) 11/06/2023 4.3     Immature Grans (Abs) 11/06/2023 0.02     Lymph # 11/06/2023 1.3     Mono # 11/06/2023 0.5     Eos # 11/06/2023 0.7 (H)     Baso # 11/06/2023 0.12     nRBC 11/06/2023 0     Gran % 11/06/2023 62.3     Lymph % 11/06/2023 19.0     Mono % 11/06/2023 7.0     Eosinophil % 11/06/2023 9.7 (H)     Basophil % 11/06/2023 1.7     Differential Method 11/06/2023 Automated     BNP 11/06/2023 453 (H)            11/13/2023     2:24 PM   Pulmonary Function Tests   FVC 1.89 liters   FEV1 1.42 liters   TLC (liters) 3.04 liters   DLCO (ml/mmHg sec) 9.68 ml/mmHg sec   FVC% 77.5   FEV1% 75   FEF 25-75 1.05   FEF 25-75% 64.5   TLC% 68.4   RV 1.14   RV% 58   DLCO% 51         11/13/2023     1:37 PM 3/31/2023    12:23 PM   6MW   6MWT Status completed without stopping completed without stopping   Patient Reported Dyspnea;Dizziness;Other (Comment) Dyspnea   Was O2 used? No No   6MW Distance walked (feet) 675 feet 800 feet   Distance walked (meters) 205.74 meters 243.84 meters   Did patient stop? No No   Oxygen Saturation 95 % 94 %   Supplemental Oxygen Room Air Room Air   Heart Rate 70 bpm 77 bpm   Blood Pressure 131/64 125/66   Drew Dyspnea Rating  light  nothing at all   Oxygen Saturation 94 % 94 %   Supplemental Oxygen Room Air Room Air   Heart Rate 77 bpm 101 bpm   Blood Pressure 123/58 118/65   Drew Dyspnea Rating  heavy moderate   Recovery Time (seconds) 78 seconds 58 seconds   Oxygen Saturation 95 % 96 %   Supplemental Oxygen Room Air Room Air   Heart Rate 80 bpm 82 bpm       Imaging:  Results for orders placed during the hospital encounter of 11/06/23    X-Ray Chest PA And Lateral    Narrative  EXAMINATION:  XR CHEST PA AND LATERAL    CLINICAL HISTORY:  Interstitial pulmonary disease, unspecified    TECHNIQUE:  PA and lateral views of the chest were performed.    COMPARISON:  10/25/2023    FINDINGS:  Cardiac size is mildly enlarged pacemaker is present.  Patient has a moderate right pleural effusion or pleural thickening with little loss of volume at the right lung base.  Left lung is clear.  No vascular congestion is noted.    Impression  See above      Electronically signed by: Jefferson Garcia MD  Date:    11/06/2023  Time:    08:42    Results for orders placed during the hospital encounter of 04/06/22    DXA Bone Density Spine And Hip    Narrative  EXAMINATION:  DEXA BONE DENSITY SPINE HIP    CLINICAL HISTORY:  Asymptomatic menopausal state.72 y/o female with a history of fractured left tibia and fibula at 69 y/o.  She had surgical menopause at 47 y/o.  She is taking Vit D supplements.  She exercise regularly and does not smoke.    TECHNIQUE:  DXA specification: Main [place] [employer] Horizon A ([id number])    Bone Mineral Density scanning was performed over the hip and lumbar spine.    Review of the images confirms satisfactory positioning and technique.    COMPARISON:  Comparison study done on 11/12/2018.    Lumbar spine:    BMD 1.062 g/cm2 and T-score 0.1.    Total Hip:           BMD 0.869 g/cm2 and T-score -0.6.    Distal 1/3 radius: Not applicable    FINDINGS:  Lumbar spine (L1-L4):              BMD is 1.025 g/cm2, T-score is -0.2, and Z-score is  2.0.    Total hip:                                BMD is 0.782 g/cm2, T-score is -1.3, and Z-score is 0.3.    Femoral neck:                          BMD is 0.706 g/cm2, T-score is -1.3, and Z-score is 0.6.    Distal 1/3 radius:                      Not applicable    FRAX:    14% risk of a major osteoporotic fracture in the next 10 years.    1.9% risk of hip fracture in the next 10 years.    Impression  *Low bone mass (Osteopenia); FRAX calculations do not support treatment as osteoporosis  *Compared with previous DXA, BMD at the lumbar spine has declined by 3.6%, and the BMD at the total hip has declined by 10%.    RECOMMENDATIONS:  *Daily calcium intake 9334-2751 mg, dietary sources preferred; Vitamin D 6709-6304 IU daily.  *Weight bearing exercise and fall precautions.  *If prior fracture was a result of low trauma, would consider treatment with bisphosphonate or denosumab.  *Repeat BMD in 2 years    EXPLANATION OF RESULTS:  T-score compares these results to the average bone density of a 20-29 year-old of the same gender.    Z-score compares this result to the average bone density to people of the same age, gender, and race.    The amounts indicate the number of standard deviations above or below the mean.    * Osteoporosis is generally defined as having a T-score between less than or equal to -2.5.    * Low bone mass (osteopenia) is generally defined as having a T-score between -1.0 and -2.5.    * The normal range is generally defined as having a T-score greater than or equal to -1.0.    * Calculated FRAX scores for fracture risk prediction may not be accurate in the setting of certain clinical factors such as pharmacologic therapy for osteoporosis, prior fragility fractures, high dose glucocorticoid use.      Electronically signed by: Ayse Olivera MD  Date:    04/14/2022  Time:    07:12    Results for orders placed or performed during the hospital encounter of 10/22/23 (from the past 2160 hour(s))   CT Chest  Without Contrast    Narrative    EXAMINATION:  CT CHEST WITHOUT CONTRAST    CLINICAL HISTORY:  Chest trauma, blunt;    TECHNIQUE:  Axial images, sagittal and coronal reformations were obtained from the thoracic inlet to the lung bases. Contrast was not administered.    COMPARISON:  CT chest 10/09/2023, 06/16/2023    FINDINGS:  LINES/TUBES/DEVICES: Cardiac conduction device left upper chest wall with leads terminating within the heart.    SOFT TISSUES: There is fat stranding and subcutaneous emphysema involving the right lateral chest wall, presumably related to recent chest tube removal.    HEART AND MEDIASTINUM: No mediastinal or hilar lymphadenopathy. Heart is borderline enlarged.  No pericardial effusion.  The main pulmonary artery is normal in size.    PLEURA: Trace right pneumothorax.  Trace right pleural effusion.    LUNGS AND AIRWAYS: Central airways are patent.  Linear radiopaque structure in the right upper and lower lobes with mild adjacent consolidation, uncertain etiology, presumably related to recent VATS procedure (see axial series 4, images 168 and 231).  Numerous linear opacities in the bilateral lower lobes likely subsegmental atelectasis.  There are persistent subpleural reticular opacities at the lung bases, difficult to further evaluate due to superimposed atelectasis.  Previous pulmonary nodules are poorly visualized.    UPPER ABDOMEN: Small hiatal hernia.  Stable subcentimeter hypodensity right hepatic lobe, too small to characterize.  Stable 1.7 cm right adrenal gland nodule.  Stable cystic lesions within the pancreas, largest 1.6 cm in the pancreatic tail.    BONES: No fracture or focal osseous lesion.      Impression    Trace residual right pneumothorax and fat stranding with subcutaneous emphysema involving the right lateral chest wall in this patient with recent chest tube removal.    Persistent basilar fibrotic changes poorly evaluated due to superimposed subsegmental  atelectasis.    Stable cystic lesions in the pancreas.    Stable right adrenal gland nodule.    Other findings above.    Electronically signed by resident: Femi Donahue  Date:    10/22/2023  Time:    18:24    Electronically signed by: Eriberto Yeboah MD  Date:    10/22/2023  Time:    19:19   CT Head Without Contrast    Narrative    EXAMINATION:  CT HEAD WITHOUT CONTRAST    CLINICAL HISTORY:  Head trauma, minor (Age >= 65y);    TECHNIQUE:  Low dose axial CT images obtained throughout the head without the use of intravenous contrast.  Axial, sagittal and coronal reconstructions were performed.    COMPARISON:  None.    FINDINGS:  Mild supratentorial white matter hypoattenuation, nonspecific, likely represents chronic small vessel disease.  No intracranial hemorrhage.  No mass effect. No evidence of acute infarction.    No extra-axial blood or fluid collections identified.    Age-related mild generalized cerebral volume loss without distortion by mass effect or acute hydrocephalus.  Skull base atherosclerotic vascular calcifications noted.    No fracture or focal osseous lesion. Mastoid air cells and paranasal sinuses are well aerated. Extracranial soft tissues are unremarkable.      Impression    No acute intracranial findings.    Changes of chronic small vessel disease.    Electronically signed by resident: Femi Donahue  Date:    10/22/2023  Time:    18:16    Electronically signed by: Eriberto Yeboah MD  Date:    10/22/2023  Time:    18:28           Cardiodiagnostics:  Results for orders placed during the hospital encounter of 01/20/22    Echo    Interpretation Summary  · The left ventricle is normal in size with low normal systolic function.  · The estimated ejection fraction is 53%.  · There are segmental left ventricular wall motion abnormalities.  · There is abnormal septal wall motion.  · Normal right ventricular size with normal right ventricular systolic function.  · Normal left ventricular diastolic function.  · Mild  mitral regurgitation.  · Normal central venous pressure (3 mmHg).  · The estimated PA systolic pressure is 25 mmHg.  · Trivial posterior pericardial        PATHOLOGY    1.  Lung, right lower lobe, wedge resection:   - Chronic interstitial pneumonitis with NSIP-like injury pattern   - No evidence of malignancy   - See comment     2. Lung, right middle lobe, wedge resection:   - Chronic interstitial pneumonitis with NSIP-like injury pattern   - No evidence of malignancy           Assessment:  1. Interstitial lung disease    2. NAVA (dyspnea on exertion)    3. Atrial fibrillation with RVR    4. BARBARA (acute kidney injury)    5. JOSÉ (obstructive sleep apnea)      Plan: CTD workup negative.  Wedge biopsy shows NSIP.  Patient with heart failure on 3LNC, but 6MWT today on room air showed no desaturations.  However, it was sub-optimal distance.  Spirometry normal with mildly decreased lung volumes and moderately decreased DLCO.  Patient has JOSÉ but could not tolerate CPAP.   Will check hi-res chest CT.       Continue current treatment for atrial fib and sick sinus syndrome.  Continue follow up with cardiology    2.    Patient has sleep apnea, but states that she could not tolerate CPAP.  Informed that there are different mask/cannula options to consider.    3.    Shortness of breath is likely multi factorial, including morbid obesity, deconditioning, diastolic heart failure and underlying ILD.  Previous echo with EF of 53% and PASP of 37.  Restrictive physiology on PFTs likely combination of ILD and morbid obesity.  Encouraged to increase activity as tolerated.  Will place order for pulmonary rehab.     4.   No desaturations on 6MWT, however it was sub-optimal distance.  Instructed to keep using her supplemental oxygen with exertion.  Will recheck on next visit on room air.    5.  Discussed the use of anti-fibrotics and offered Ofev.  Patient would like to defer at this time.    6.   RTC in 6 weeks with full PFTs, 6MWT on room  air, and hi-resolution chest CT with supine, prone, inspiratory, and expiratory views.      Bay Sosa NP  Advanced Lung Disease

## 2023-11-13 NOTE — LETTER
November 13, 2023                     [facility] 1st Fl  [address]  [facility] [address]  Phone: [phone]   Patient: Jesenia Curiel   MR Number: [id number]   YOB: 1950   Date of Visit: 11/13/2023       Dear      Thank you for referring Jesenia Curiel to me for evaluation. Attached you will find relevant portions of my assessment and plan of care.    If you have questions, please do not hesitate to call me. I look forward to following Jesenia Curiel along with you.    Sincerely,    Caroline Kapoor MD    Enclosure    If you would like to receive this communication electronically, please contact [email or web address] or [phone] to request [employer] Link access.    [employer] Link is a tool which provides read-only access to select patient information with whom you have a relationship. Its easy to use and provides real time access to review your patients record including encounter summaries, notes, results, and demographic information.    If you feel you have received this communication in error or would no longer like to receive these types of communications, please e-mail [email or web address]

## 2023-11-14 ENCOUNTER — TELEPHONE (OUTPATIENT)
Dept: ELECTROPHYSIOLOGY | Facility: CLINIC | Age: 73
End: 2023-11-14
Payer: MEDICARE

## 2023-11-14 ENCOUNTER — TELEPHONE (OUTPATIENT)
Dept: TRANSPLANT | Facility: CLINIC | Age: 73
End: 2023-11-14
Payer: MEDICARE

## 2023-11-14 NOTE — TELEPHONE ENCOUNTER
Update fron previous encounter.    11/14/2023 presenting gram Ap/ w/ FFRWOS    Longest AF recorded was on 11/13/2023 for 3 hours 13 mins, avail egram c/w FFRWOS and intrinsic A in refractory- not c/w AF.  All other avail egrams not c/w AF.    Please advise at patient has restarted Xarelto with the possibility of needing DCCV.

## 2023-11-14 NOTE — TELEPHONE ENCOUNTER
Phoned patient to notify her that Dr. Hodgson stated she could stop taking Xarelto (see phone message from EMELI Ballard, RN)  SJM PPM presenting rhythm today shows an AP- rhythm.  Pt thanked me for the call.

## 2023-11-14 NOTE — TELEPHONE ENCOUNTER
ESTHELA contacted pt via telephone to confirm pt's preference on pulmonary rehab location. Pt states she would like orders to be sent to [facility] (ph: [phone], fx: [phone]). ESTHELA in communication w/ ABEBA PROCTOR following and remains available.         ----- Message from Allison Hooper RN sent at 11/14/2023 10:11 AM CST -----  Hi. Sorry for the duplicate if [place] already sent this referral to you but just wanted to make sure you received the info. Not sure which facility she will want to use. She lives in [place], [place]. Thanks!

## 2023-11-15 ENCOUNTER — PATIENT MESSAGE (OUTPATIENT)
Dept: TRANSPLANT | Facility: CLINIC | Age: 73
End: 2023-11-15
Payer: MEDICARE

## 2023-11-20 LAB
FUNGUS SPEC CULT: NORMAL
FUNGUS SPEC CULT: NORMAL

## 2023-11-21 ENCOUNTER — HOSPITAL ENCOUNTER (OUTPATIENT)
Dept: PULMONOLOGY | Facility: OTHER | Age: 73
Discharge: HOME | End: 2023-11-21
Attending: INTERNAL MEDICINE
Payer: MEDICARE

## 2023-11-21 DIAGNOSIS — J84.9 ILD (INTERSTITIAL LUNG DISEASE): Primary | ICD-10-CM

## 2023-11-21 DIAGNOSIS — J84.9 INTERSTITIAL LUNG DISEASE: ICD-10-CM

## 2023-11-28 ENCOUNTER — HOSPITAL ENCOUNTER (OUTPATIENT)
Dept: PULMONOLOGY | Facility: OTHER | Age: 73
Discharge: HOME | End: 2023-11-28
Attending: INTERNAL MEDICINE
Payer: MEDICARE

## 2023-11-28 PROCEDURE — G0238 OTH RESP PROC, INDIV: HCPCS | Mod: NTX

## 2023-11-28 PROCEDURE — G0237 THERAPEUTIC PROCD STRG ENDUR: HCPCS | Mod: NTX

## 2023-11-30 ENCOUNTER — HOSPITAL ENCOUNTER (OUTPATIENT)
Dept: PULMONOLOGY | Facility: OTHER | Age: 73
Discharge: HOME | End: 2023-11-30
Attending: INTERNAL MEDICINE
Payer: MEDICARE

## 2023-11-30 PROCEDURE — G0238 OTH RESP PROC, INDIV: HCPCS | Mod: NTX

## 2023-11-30 PROCEDURE — G0237 THERAPEUTIC PROCD STRG ENDUR: HCPCS | Mod: TXP

## 2023-12-05 ENCOUNTER — HOSPITAL ENCOUNTER (OUTPATIENT)
Dept: PULMONOLOGY | Facility: OTHER | Age: 73
Discharge: HOME | End: 2023-12-05
Attending: INTERNAL MEDICINE
Payer: MEDICARE

## 2023-12-05 PROCEDURE — G0237 THERAPEUTIC PROCD STRG ENDUR: HCPCS | Mod: TXP

## 2023-12-05 PROCEDURE — G0238 OTH RESP PROC, INDIV: HCPCS | Mod: NTX

## 2023-12-07 ENCOUNTER — HOSPITAL ENCOUNTER (OUTPATIENT)
Dept: PULMONOLOGY | Facility: OTHER | Age: 73
Discharge: HOME | End: 2023-12-07
Attending: INTERNAL MEDICINE
Payer: MEDICARE

## 2023-12-07 PROCEDURE — G0237 THERAPEUTIC PROCD STRG ENDUR: HCPCS | Mod: NTX

## 2023-12-07 PROCEDURE — G0238 OTH RESP PROC, INDIV: HCPCS | Mod: NTX

## 2023-12-08 LAB
ACID FAST MOD KINY STN SPEC: NORMAL
ACID FAST MOD KINY STN SPEC: NORMAL
MYCOBACTERIUM SPEC QL CULT: NORMAL
MYCOBACTERIUM SPEC QL CULT: NORMAL

## 2023-12-12 ENCOUNTER — HOSPITAL ENCOUNTER (OUTPATIENT)
Dept: PULMONOLOGY | Facility: OTHER | Age: 73
Discharge: HOME | End: 2023-12-12
Attending: INTERNAL MEDICINE
Payer: MEDICARE

## 2023-12-12 ENCOUNTER — CLINICAL SUPPORT (OUTPATIENT)
Dept: CARDIOLOGY | Facility: HOSPITAL | Age: 73
End: 2023-12-12
Attending: INTERNAL MEDICINE
Payer: MEDICARE

## 2023-12-12 ENCOUNTER — CLINICAL SUPPORT (OUTPATIENT)
Dept: CARDIOLOGY | Facility: HOSPITAL | Age: 73
End: 2023-12-12

## 2023-12-12 ENCOUNTER — PATIENT MESSAGE (OUTPATIENT)
Dept: ELECTROPHYSIOLOGY | Facility: CLINIC | Age: 73
End: 2023-12-12
Payer: MEDICARE

## 2023-12-12 DIAGNOSIS — I49.8 OTHER SPECIFIED CARDIAC ARRHYTHMIAS: ICD-10-CM

## 2023-12-12 DIAGNOSIS — Z95.0 PRESENCE OF CARDIAC PACEMAKER: ICD-10-CM

## 2023-12-12 PROCEDURE — G0238 OTH RESP PROC, INDIV: HCPCS | Mod: NTX

## 2023-12-12 PROCEDURE — G0237 THERAPEUTIC PROCD STRG ENDUR: HCPCS | Mod: NTX

## 2023-12-12 PROCEDURE — 93294 REM INTERROG EVL PM/LDLS PM: CPT | Mod: NTX,,, | Performed by: INTERNAL MEDICINE

## 2023-12-14 ENCOUNTER — HOSPITAL ENCOUNTER (OUTPATIENT)
Dept: PULMONOLOGY | Facility: OTHER | Age: 73
Discharge: HOME | End: 2023-12-14
Attending: INTERNAL MEDICINE
Payer: MEDICARE

## 2023-12-14 PROCEDURE — G0238 OTH RESP PROC, INDIV: HCPCS | Mod: NTX

## 2023-12-14 PROCEDURE — G0237 THERAPEUTIC PROCD STRG ENDUR: HCPCS | Mod: NTX

## 2023-12-19 ENCOUNTER — HOSPITAL ENCOUNTER (OUTPATIENT)
Dept: PULMONOLOGY | Facility: OTHER | Age: 73
Discharge: HOME | End: 2023-12-19
Attending: INTERNAL MEDICINE
Payer: MEDICARE

## 2023-12-19 PROCEDURE — G0238 OTH RESP PROC, INDIV: HCPCS | Mod: TXP

## 2023-12-19 PROCEDURE — G0237 THERAPEUTIC PROCD STRG ENDUR: HCPCS | Mod: NTX

## 2023-12-21 ENCOUNTER — HOSPITAL ENCOUNTER (OUTPATIENT)
Dept: PULMONOLOGY | Facility: OTHER | Age: 73
Discharge: HOME | End: 2023-12-21
Attending: INTERNAL MEDICINE
Payer: MEDICARE

## 2023-12-21 PROCEDURE — G0237 THERAPEUTIC PROCD STRG ENDUR: HCPCS | Mod: TXP

## 2023-12-21 PROCEDURE — G0238 OTH RESP PROC, INDIV: HCPCS | Mod: TXP

## 2023-12-27 ENCOUNTER — PATIENT MESSAGE (OUTPATIENT)
Dept: CARDIOLOGY | Facility: CLINIC | Age: 73
End: 2023-12-27
Payer: MEDICARE

## 2023-12-27 ENCOUNTER — TELEPHONE (OUTPATIENT)
Dept: CARDIOLOGY | Facility: CLINIC | Age: 73
End: 2023-12-27
Payer: MEDICARE

## 2023-12-27 DIAGNOSIS — I48.91 ATRIAL FIBRILLATION WITH RVR: Primary | ICD-10-CM

## 2023-12-27 DIAGNOSIS — I10 BENIGN ESSENTIAL HYPERTENSION: ICD-10-CM

## 2023-12-27 DIAGNOSIS — I50.32 CHRONIC DIASTOLIC HEART FAILURE: ICD-10-CM

## 2023-12-27 NOTE — TELEPHONE ENCOUNTER
BP running low at home despite stopping antihypertensive medications. Lasix had been increased by Pulmonary earlier. Will check BNP/BMP and echo.

## 2023-12-28 ENCOUNTER — TELEPHONE (OUTPATIENT)
Dept: CARDIOLOGY | Facility: CLINIC | Age: 73
End: 2023-12-28
Payer: MEDICARE

## 2023-12-28 DIAGNOSIS — I50.32 CHRONIC DIASTOLIC HEART FAILURE: Primary | ICD-10-CM

## 2023-12-28 NOTE — TELEPHONE ENCOUNTER
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 Take 40 mEq of potassium today and recheck BMP next week.

## 2023-12-29 ENCOUNTER — TELEPHONE (OUTPATIENT)
Dept: TRANSPLANT | Facility: CLINIC | Age: 73
End: 2023-12-29
Payer: MEDICARE

## 2023-12-30 DIAGNOSIS — N18.32 STAGE 3B CHRONIC KIDNEY DISEASE: Primary | ICD-10-CM

## 2024-01-01 NOTE — HOSPITAL COURSE
Problem: Discharge Planning  Goal: Discharge to home or other facility with appropriate resources  Outcome: Adequate for Discharge     Problem: Thermoregulation - [place]/Pediatrics  Goal: Maintains normal body temperature  Outcome: Adequate for Discharge     Problem: Pain -   Goal: Displays adequate comfort level or baseline comfort level  Outcome: Adequate for Discharge     Problem: Safety -   Goal: Free from fall injury  Outcome: Adequate for Discharge     Problem: Normal [place]  Goal:  experiences normal transition  Outcome: Adequate for Discharge  Goal: Total Weight Loss Less than 10% of birth weight  Outcome: Adequate for Discharge      Patient presented with BARBARA, hyponatremia, hypotension.  Likely 2/2 dehydration, improved with IVF. Nephrology consulted for BARBARA, improved with IVF recommend f/u at discharge.   Patient also with new oxygen requirement since recent VATS but does not wear oxygen at home. Home oxygen set up and delivered prior to discharge.   Irregular tachycardia on exam and EKG revealed Afib with RVR. EP consulted and recommend cont home meds and no cardioversion as she spontaneously and intermittently converts. Patient declines anticoagulation given her risk of falls.   Hyponatremia and BARBARA improved at discharge recommend supplemental O2 at discharge and f/u with her cardiologist and nephrologist.

## 2024-01-04 ENCOUNTER — HOSPITAL ENCOUNTER (OUTPATIENT)
Dept: RADIOLOGY | Facility: HOSPITAL | Age: 74
Discharge: HOME | End: 2024-01-04
Attending: NURSE PRACTITIONER
Payer: MEDICARE

## 2024-01-04 ENCOUNTER — HOSPITAL ENCOUNTER (OUTPATIENT)
Dept: PULMONOLOGY | Facility: CLINIC | Age: 74
Discharge: HOME | End: 2024-01-04
Payer: MEDICARE

## 2024-01-04 ENCOUNTER — OFFICE VISIT (OUTPATIENT)
Dept: TRANSPLANT | Facility: CLINIC | Age: 74
End: 2024-01-04
Payer: MEDICARE

## 2024-01-04 VITALS
SYSTOLIC BLOOD PRESSURE: 137 MMHG | OXYGEN SATURATION: 95 % | TEMPERATURE: 98 F | BODY MASS INDEX: 37.25 KG/M2 | DIASTOLIC BLOOD PRESSURE: 68 MMHG | HEIGHT: 61 IN | WEIGHT: 197.31 LBS | HEART RATE: 70 BPM

## 2024-01-04 VITALS — BODY MASS INDEX: 37.25 KG/M2 | WEIGHT: 197.31 LBS | HEIGHT: 61 IN

## 2024-01-04 DIAGNOSIS — E66.9 CLASS 2 OBESITY WITH BODY MASS INDEX (BMI) OF 37.0 TO 37.9 IN ADULT, UNSPECIFIED OBESITY TYPE, UNSPECIFIED WHETHER SERIOUS COMORBIDITY PRESENT: ICD-10-CM

## 2024-01-04 DIAGNOSIS — J84.9 INTERSTITIAL PULMONARY DISEASE, UNSPECIFIED: ICD-10-CM

## 2024-01-04 DIAGNOSIS — R06.09 DOE (DYSPNEA ON EXERTION): ICD-10-CM

## 2024-01-04 DIAGNOSIS — N17.9 AKI (ACUTE KIDNEY INJURY): ICD-10-CM

## 2024-01-04 DIAGNOSIS — I48.91 ATRIAL FIBRILLATION WITH RVR: ICD-10-CM

## 2024-01-04 DIAGNOSIS — J84.9 INTERSTITIAL LUNG DISEASE: ICD-10-CM

## 2024-01-04 DIAGNOSIS — G47.33 OSA (OBSTRUCTIVE SLEEP APNEA): ICD-10-CM

## 2024-01-04 DIAGNOSIS — J67.9 HYPERSENSITIVITY PNEUMONITIS: ICD-10-CM

## 2024-01-04 DIAGNOSIS — J84.9 INTERSTITIAL LUNG DISEASE: Primary | ICD-10-CM

## 2024-01-04 DIAGNOSIS — I50.32 CHRONIC DIASTOLIC HEART FAILURE: ICD-10-CM

## 2024-01-04 PROCEDURE — 94010 BREATHING CAPACITY TEST: CPT | Mod: 26,S$PBB,59,NTX | Performed by: INTERNAL MEDICINE

## 2024-01-04 PROCEDURE — 94618 PULMONARY STRESS TESTING: CPT | Mod: 26,S$PBB,NTX, | Performed by: INTERNAL MEDICINE

## 2024-01-04 PROCEDURE — 94618 PULMONARY STRESS TESTING: CPT | Mod: PBBFAC,NTX | Performed by: INTERNAL MEDICINE

## 2024-01-04 PROCEDURE — 71250 CT THORAX DX C-: CPT | Mod: TC,NTX

## 2024-01-04 PROCEDURE — 99214 OFFICE O/P EST MOD 30 MIN: CPT | Mod: 25,S$PBB,NTX, | Performed by: NURSE PRACTITIONER

## 2024-01-04 PROCEDURE — 71250 CT THORAX DX C-: CPT | Mod: 26,NTX,, | Performed by: RADIOLOGY

## 2024-01-04 PROCEDURE — 94729 DIFFUSING CAPACITY: CPT | Mod: 26,S$PBB,NTX, | Performed by: INTERNAL MEDICINE

## 2024-01-04 PROCEDURE — 94727 GAS DIL/WSHOT DETER LNG VOL: CPT | Mod: PBBFAC,NTX | Performed by: INTERNAL MEDICINE

## 2024-01-04 PROCEDURE — 94729 DIFFUSING CAPACITY: CPT | Mod: PBBFAC,NTX | Performed by: INTERNAL MEDICINE

## 2024-01-04 PROCEDURE — 99213 OFFICE O/P EST LOW 20 MIN: CPT | Mod: PBBFAC,25,NTX | Performed by: NURSE PRACTITIONER

## 2024-01-04 PROCEDURE — 99999 PR PBB SHADOW E&M-EST. PATIENT-LVL III: CPT | Mod: PBBFAC,TXP,, | Performed by: NURSE PRACTITIONER

## 2024-01-04 PROCEDURE — 94010 BREATHING CAPACITY TEST: CPT | Mod: PBBFAC,NTX | Performed by: INTERNAL MEDICINE

## 2024-01-04 PROCEDURE — 94727 GAS DIL/WSHOT DETER LNG VOL: CPT | Mod: 26,S$PBB,NTX, | Performed by: INTERNAL MEDICINE

## 2024-01-04 RX ORDER — MONTELUKAST SODIUM 10 MG/1
10 TABLET ORAL NIGHTLY
Qty: 30 TABLET | Refills: 0 | Status: SHIPPED | OUTPATIENT
Start: 2024-01-04 | End: 2024-01-29 | Stop reason: SDUPTHER

## 2024-01-04 RX ORDER — BUDESONIDE, GLYCOPYRROLATE, AND FORMOTEROL FUMARATE 160; 9; 4.8 UG/1; UG/1; UG/1
2 AEROSOL, METERED RESPIRATORY (INHALATION) 2 TIMES DAILY
Qty: 5.9 G | Refills: 11 | Status: SHIPPED | OUTPATIENT
Start: 2024-01-04

## 2024-01-04 NOTE — LETTER
January 4, 2024                     [facility] 1st Fl  [address]  [facility] [address]  Phone: [phone]   Patient: Jesenia Curiel   MR Number: [id number]   YOB: 1950   Date of Visit: 1/4/2024       Dear      Thank you for referring Jesenia Curiel to me for evaluation. Attached you will find relevant portions of my assessment and plan of care.    If you have questions, please do not hesitate to call me. I look forward to following Jesenia Curile along with you.    Sincerely,    Bay Sosa, NP    Enclosure    If you would like to receive this communication electronically, please contact [email or web address] or [phone] to request [employer] Link access.    [employer] Link is a tool which provides read-only access to select patient information with whom you have a relationship. Its easy to use and provides real time access to review your patients record including encounter summaries, notes, results, and demographic information.    If you feel you have received this communication in error or would no longer like to receive these types of communications, please e-mail [email or web address]

## 2024-01-04 NOTE — PROCEDURES
Jesenia Curiel is a 73 y.o.  female patient, who presents for a 6 minute walk test ordered by SALENA Sosa.  The diagnosis is Interstitial Lung Disease; NSIP.  The patient's BMI is 37.3 kg/m2.  Predicted distance (lower limit of normal) is 213.83 meters.      Test Results:    The test was completed with stops. The patient stopped 1 time for a total of 30 seconds. The total time walked was 330 seconds. During walking, the patient reported:  Dyspnea. The patient used no assistive devices  during testing.     01/04/2024---------Distance: 274.32 meters (900 feet)     Lap Walk Time O2 Sat % Supplemental Oxygen Heart Rate Blood Pressure Drew Scale Comment   Pre-exercise  (Resting) 0 0 96 % Room Air 75 bpm 149/67 mmHg 2    During Exercise 1 70 sec 96 % Room Air 124 bpm      During Exercise 2 132 sec 94 % Room Air 128 bpm   Patient rested 30 seconds.   During Exercise 3 230 sec 94 % Room Air 119 bpm      During Exercise 4 318 sec 93 % Room Air 119 bpm      End of Exercise  360 sec 92 % Room Air 118 bpm 130/64 mmHg 4    Post-exercise  (Recovery)   95 % Room Air  89 bpm        Recovery Time: 82 seconds    Performing nurse/tech: Estopinal RRT      PREVIOUS STUDY:   11/13/2023---------Distance: 205.74 meters (675 feet)       Lap Walk Time O2 Sat % Supplemental Oxygen Heart Rate Blood Pressure Drew Scale   Pre-exercise  (Resting) 0 0 95 % Room Air 70 bpm 131/64 mmHg 2   During Exercise 1 112 sec 94 % Room Air 77 bpm       During Exercise 2 218 sec 95 % Room Air 81 bpm       During Exercise 3 320 sec 95 % Room Air 94 bpm       End of Exercise   360 sec 92 % Room Air 91 bpm 123/58 mmHg 5-6   Post-exercise  (Recovery)     95 % Room Air  80 bpm           CLINICAL INTERPRETATION:  Six minute walk distance is 274.32 meters (900 feet) with somewhat heavy dyspnea.  During exercise, there was desaturation while breathing room air.  Blood pressure remained stable and Heart rate increased significantly with walking.  The  patient did not report non-pulmonary symptoms during exercise.  Significant exercise impairment is likely due to subjective symptoms.  The patient did complete the study, walking 330 seconds of the 360 second test.  Since the previous study in November 2023, exercise capacity is significantly improved.  Based upon age and body mass index, exercise capacity may be normal.

## 2024-01-08 ENCOUNTER — TELEPHONE (OUTPATIENT)
Dept: ELECTROPHYSIOLOGY | Facility: CLINIC | Age: 74
End: 2024-01-08
Payer: MEDICARE

## 2024-01-08 ENCOUNTER — PATIENT MESSAGE (OUTPATIENT)
Dept: TRANSPLANT | Facility: CLINIC | Age: 74
End: 2024-01-08
Payer: MEDICARE

## 2024-01-08 NOTE — TELEPHONE ENCOUNTER
Returned the pt's call on this afternoon and informed her the max duration of AF recorded was 22 secs.  Pt was also informed that her [employer] Paolo PPM has reached DEVORAH based on the battery voltage. On 1/7/24 scheduled remote transmission battery voltage 2.59V (DEVORAH @ 2.60V).  Message has been sent to Device RN (Frank) who will forward a message to Dr. Hodgson and his nurse. Let the pt know that she should receive a call from Dr. Hodgson's nurse sometime early next week to discuss scheduling the generator change.  Understanding was verbalized.  Pt appreciated the call.

## 2024-01-08 NOTE — TELEPHONE ENCOUNTER
The patients' CIED has reached ELECTIVE REPLACEMENT.     Current Device  and Model:  Abbott Accent  PPM    Date of DEVORAH, if known:  no date given, DEVORAH based on voltage    Battery Voltage (if available):  2.59V    Pacemaker Dependent:  No    Anticoagulation Status:  NONE    Last EF: 55%    Model of Leads:    Any known lead recalls:  No    Leads MRI compatible:   No, RV lead is NOT compatible.    Any history of treated ventricular arrhythmias (ATP or shocks)? No    Any history of device treated atrial arrhythmias (atrial ATP)?  No      *RN coordinator notified for scheduling; device coordinator notified to contact patient

## 2024-01-08 NOTE — TELEPHONE ENCOUNTER
----- Message from Krys Howell MA sent at 1/8/2024  9:52 AM CST -----  Regarding: FW: Please call    ----- Message -----  From: Craig Horton  Sent: 1/8/2024   9:35 AM CST  To: Rema Cai Staff  Subject: Please call                                      Morning,     Pt thinks she was in Afib and would like to confirm.    Contact [phone]    Thanks

## 2024-01-09 ENCOUNTER — PATIENT MESSAGE (OUTPATIENT)
Dept: ELECTROPHYSIOLOGY | Facility: CLINIC | Age: 74
End: 2024-01-09
Payer: MEDICARE

## 2024-01-09 ENCOUNTER — HOSPITAL ENCOUNTER (OUTPATIENT)
Dept: PULMONOLOGY | Facility: OTHER | Age: 74
Discharge: HOME | End: 2024-01-09
Attending: INTERNAL MEDICINE
Payer: MEDICARE

## 2024-01-09 DIAGNOSIS — I49.5 SICK SINUS SYNDROME: ICD-10-CM

## 2024-01-09 DIAGNOSIS — I48.19 PERSISTENT ATRIAL FIBRILLATION: ICD-10-CM

## 2024-01-09 DIAGNOSIS — Z45.010 ELECTIVE REPLACEMENT INDICATED FOR CARDIAC PACEMAKER BATTERY AT END OF LIFESPAN: Primary | ICD-10-CM

## 2024-01-09 PROCEDURE — G0237 THERAPEUTIC PROCD STRG ENDUR: HCPCS | Mod: TXP

## 2024-01-09 PROCEDURE — G0238 OTH RESP PROC, INDIV: HCPCS | Mod: TXP

## 2024-01-10 ENCOUNTER — PATIENT MESSAGE (OUTPATIENT)
Dept: TRANSPLANT | Facility: CLINIC | Age: 74
End: 2024-01-10
Payer: MEDICARE

## 2024-01-11 ENCOUNTER — PATIENT MESSAGE (OUTPATIENT)
Dept: ELECTROPHYSIOLOGY | Facility: CLINIC | Age: 74
End: 2024-01-11
Payer: MEDICARE

## 2024-01-11 ENCOUNTER — TELEPHONE (OUTPATIENT)
Dept: TRANSPLANT | Facility: CLINIC | Age: 74
End: 2024-01-11
Payer: MEDICARE

## 2024-01-11 ENCOUNTER — HOSPITAL ENCOUNTER (OUTPATIENT)
Dept: PULMONOLOGY | Facility: OTHER | Age: 74
Discharge: HOME | End: 2024-01-11
Attending: INTERNAL MEDICINE
Payer: MEDICARE

## 2024-01-11 DIAGNOSIS — J84.9 INTERSTITIAL LUNG DISEASE: Primary | ICD-10-CM

## 2024-01-11 DIAGNOSIS — Z00.00 ENCOUNTER FOR MEDICARE ANNUAL WELLNESS EXAM: ICD-10-CM

## 2024-01-11 PROCEDURE — G0238 OTH RESP PROC, INDIV: HCPCS | Mod: NTX

## 2024-01-11 PROCEDURE — G0237 THERAPEUTIC PROCD STRG ENDUR: HCPCS | Mod: TXP

## 2024-01-11 RX ORDER — MYCOPHENOLATE MOFETIL 500 MG/1
TABLET ORAL
Qty: 110 TABLET | Refills: 6 | Status: SHIPPED | OUTPATIENT
Start: 2024-01-11

## 2024-01-11 NOTE — TELEPHONE ENCOUNTER
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 Shore and to discuss Dr. Shore's plan.  Will start  mg BID x7 days, then increase to 1000 mg BID thereafter.  Rx sent in to [facility] on [address] per patient's request. 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  Asked her to inform me of the exact date that she starts so that we can order labs. She acknowledged that she would.  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  Offered to Rx Trelegy, which she declined.  She will continue Singulair.  Will continue to follow.

## 2024-01-12 ENCOUNTER — TELEPHONE (OUTPATIENT)
Dept: TRANSPLANT | Facility: CLINIC | Age: 74
End: 2024-01-12
Payer: MEDICARE

## 2024-01-12 DIAGNOSIS — R06.09 DOE (DYSPNEA ON EXERTION): Primary | ICD-10-CM

## 2024-01-12 NOTE — TELEPHONE ENCOUNTER
ESTHELA contacted pt's oxygen provider [facility] (ph: [phone]) re: picking up pt's DME, as pt no longer needs oxygen supplies. ESTHELA in communication w/ ABEBA Charles. Confirmed d/c orders entered. ESTHELA following and remains available.

## 2024-01-16 ENCOUNTER — HOSPITAL ENCOUNTER (OUTPATIENT)
Dept: PULMONOLOGY | Facility: OTHER | Age: 74
Discharge: HOME | End: 2024-01-16
Attending: INTERNAL MEDICINE
Payer: MEDICARE

## 2024-01-16 PROCEDURE — G0237 THERAPEUTIC PROCD STRG ENDUR: HCPCS | Mod: NTX

## 2024-01-16 PROCEDURE — G0238 OTH RESP PROC, INDIV: HCPCS | Mod: TXP

## 2024-01-18 ENCOUNTER — HOSPITAL ENCOUNTER (OUTPATIENT)
Dept: PULMONOLOGY | Facility: OTHER | Age: 74
Discharge: HOME | End: 2024-01-18
Attending: INTERNAL MEDICINE
Payer: MEDICARE

## 2024-01-18 PROCEDURE — G0237 THERAPEUTIC PROCD STRG ENDUR: HCPCS | Mod: TXP

## 2024-01-18 PROCEDURE — G0238 OTH RESP PROC, INDIV: HCPCS | Mod: TXP

## 2024-01-22 ENCOUNTER — TELEPHONE (OUTPATIENT)
Dept: ENDOSCOPY | Facility: HOSPITAL | Age: 74
End: 2024-01-22
Payer: MEDICARE

## 2024-01-22 NOTE — TELEPHONE ENCOUNTER
Spoke to pt. [facility] scheduled per Dr. De Los Santos last procedure note. Pt verbalizes understanding.

## 2024-01-23 ENCOUNTER — HOSPITAL ENCOUNTER (OUTPATIENT)
Dept: PULMONOLOGY | Facility: OTHER | Age: 74
Discharge: HOME | End: 2024-01-23
Attending: INTERNAL MEDICINE
Payer: MEDICARE

## 2024-01-23 PROCEDURE — G0237 THERAPEUTIC PROCD STRG ENDUR: HCPCS | Mod: NTX

## 2024-01-23 PROCEDURE — G0238 OTH RESP PROC, INDIV: HCPCS | Mod: TXP

## 2024-01-25 ENCOUNTER — ANESTHESIA EVENT (OUTPATIENT)
Dept: MEDSURG UNIT | Facility: HOSPITAL | Age: 74
End: 2024-01-25
Payer: MEDICARE

## 2024-01-25 ENCOUNTER — HOSPITAL ENCOUNTER (OUTPATIENT)
Dept: PULMONOLOGY | Facility: OTHER | Age: 74
Discharge: HOME | End: 2024-01-25
Attending: INTERNAL MEDICINE
Payer: MEDICARE

## 2024-01-25 ENCOUNTER — TELEPHONE (OUTPATIENT)
Dept: ELECTROPHYSIOLOGY | Facility: CLINIC | Age: 74
End: 2024-01-25
Payer: MEDICARE

## 2024-01-25 PROCEDURE — G0238 OTH RESP PROC, INDIV: HCPCS | Mod: NTX

## 2024-01-25 PROCEDURE — G0237 THERAPEUTIC PROCD STRG ENDUR: HCPCS | Mod: NTX

## 2024-01-25 NOTE — TELEPHONE ENCOUNTER
Spoke to Patient    CONFIRMED procedure arrival time of 1:00 PM    Reiterated instructions including:  -Directions to check in desk  -NPO after midnight night prior to procedure  -Pre-procedure LABS reviewed  -Confirmed presence of implanted device/stimulator - dcPPM SJM in situ  -Confirmed no fever, cough, or shortness of breath in the past 30 days  -Do not wear mascara day of procedure  -Bathe night prior and morning prior to procedure with Hibiclens solution or an antibacterial soap  -Reviewed current visitor policy    Patient verbalized understanding of above and appreciated the call.

## 2024-01-26 ENCOUNTER — HOSPITAL ENCOUNTER (OUTPATIENT)
Facility: HOSPITAL | Age: 74
Discharge: HOME | End: 2024-01-26
Attending: INTERNAL MEDICINE | Admitting: INTERNAL MEDICINE
Payer: MEDICARE

## 2024-01-26 ENCOUNTER — ANESTHESIA (OUTPATIENT)
Dept: MEDSURG UNIT | Facility: HOSPITAL | Age: 74
End: 2024-01-26
Payer: MEDICARE

## 2024-01-26 VITALS
TEMPERATURE: 98 F | HEART RATE: 65 BPM | DIASTOLIC BLOOD PRESSURE: 70 MMHG | RESPIRATION RATE: 18 BRPM | SYSTOLIC BLOOD PRESSURE: 133 MMHG | OXYGEN SATURATION: 95 %

## 2024-01-26 DIAGNOSIS — Z45.010 ELECTIVE REPLACEMENT INDICATED FOR CARDIAC PACEMAKER BATTERY AT END OF LIFESPAN: ICD-10-CM

## 2024-01-26 DIAGNOSIS — I48.19 PERSISTENT ATRIAL FIBRILLATION: ICD-10-CM

## 2024-01-26 DIAGNOSIS — I49.9 ARRHYTHMIA: ICD-10-CM

## 2024-01-26 DIAGNOSIS — Z95.9 CARDIAC DEVICE IN SITU: Primary | ICD-10-CM

## 2024-01-26 DIAGNOSIS — I49.5 SICK SINUS SYNDROME: ICD-10-CM

## 2024-01-26 PROCEDURE — 25000003 PHARM REV CODE 250: Mod: NTX | Performed by: INTERNAL MEDICINE

## 2024-01-26 PROCEDURE — 33228 REMV&REPLC PM GEN DUAL LEAD: CPT | Mod: NTX | Performed by: INTERNAL MEDICINE

## 2024-01-26 PROCEDURE — 63600175 PHARM REV CODE 636 W HCPCS: Mod: TXP | Performed by: NURSE PRACTITIONER

## 2024-01-26 PROCEDURE — 33228 REMV&REPLC PM GEN DUAL LEAD: CPT | Mod: NTX,,, | Performed by: INTERNAL MEDICINE

## 2024-01-26 PROCEDURE — 63600175 PHARM REV CODE 636 W HCPCS: Mod: JZ,JG,NTX | Performed by: INTERNAL MEDICINE

## 2024-01-26 PROCEDURE — C1785 PMKR, DUAL, RATE-RESP: HCPCS | Mod: TXP | Performed by: INTERNAL MEDICINE

## 2024-01-26 PROCEDURE — 93010 ELECTROCARDIOGRAM REPORT: CPT | Mod: NTX,,, | Performed by: INTERNAL MEDICINE

## 2024-01-26 PROCEDURE — 37000009 HC ANESTHESIA EA ADD 15 MINS: Mod: NTX | Performed by: INTERNAL MEDICINE

## 2024-01-26 PROCEDURE — D9220A PRA ANESTHESIA: Mod: ANES,NTX,, | Performed by: ANESTHESIOLOGY

## 2024-01-26 PROCEDURE — 25000003 PHARM REV CODE 250: Mod: NTX | Performed by: NURSE ANESTHETIST, CERTIFIED REGISTERED

## 2024-01-26 PROCEDURE — 25000003 PHARM REV CODE 250: Mod: NTX | Performed by: NURSE PRACTITIONER

## 2024-01-26 PROCEDURE — 37000008 HC ANESTHESIA 1ST 15 MINUTES: Mod: TXP | Performed by: INTERNAL MEDICINE

## 2024-01-26 PROCEDURE — 27201423 OPTIME MED/SURG SUP & DEVICES STERILE SUPPLY: Mod: NTX | Performed by: INTERNAL MEDICINE

## 2024-01-26 PROCEDURE — 63600175 PHARM REV CODE 636 W HCPCS: Mod: TXP | Performed by: NURSE ANESTHETIST, CERTIFIED REGISTERED

## 2024-01-26 PROCEDURE — D9220A PRA ANESTHESIA: Mod: CRNA,NTX,, | Performed by: NURSE ANESTHETIST, CERTIFIED REGISTERED

## 2024-01-26 PROCEDURE — 93005 ELECTROCARDIOGRAM TRACING: CPT | Mod: TXP

## 2024-01-26 PROCEDURE — 93005 ELECTROCARDIOGRAM TRACING: CPT | Mod: NTX,59

## 2024-01-26 DEVICE — PULSE GENERATOR
Type: IMPLANTABLE DEVICE | Site: CHEST | Status: FUNCTIONAL
Brand: ASSURITY MRI™

## 2024-01-26 RX ORDER — DEXMEDETOMIDINE HYDROCHLORIDE 100 UG/ML
INJECTION, SOLUTION INTRAVENOUS
Status: DISCONTINUED | OUTPATIENT
Start: 2024-01-26 | End: 2024-01-26

## 2024-01-26 RX ORDER — ACETAMINOPHEN 325 MG/1
650 TABLET ORAL EVERY 4 HOURS PRN
Status: DISCONTINUED | OUTPATIENT
Start: 2024-01-26 | End: 2024-01-26 | Stop reason: HOSPADM

## 2024-01-26 RX ORDER — LIDOCAINE HYDROCHLORIDE 20 MG/ML
INJECTION, SOLUTION INFILTRATION; PERINEURAL
Status: DISCONTINUED | OUTPATIENT
Start: 2024-01-26 | End: 2024-01-26 | Stop reason: HOSPADM

## 2024-01-26 RX ORDER — ACETAMINOPHEN AND CODEINE PHOSPHATE 300; 30 MG/1; MG/1
1 TABLET ORAL EVERY 4 HOURS PRN
Qty: 10 TABLET | Refills: 0 | Status: SHIPPED | OUTPATIENT
Start: 2024-01-26 | End: 2024-02-21 | Stop reason: ALTCHOICE

## 2024-01-26 RX ORDER — SODIUM CHLORIDE 0.9 % (FLUSH) 0.9 %
3 SYRINGE (ML) INJECTION
Status: DISCONTINUED | OUTPATIENT
Start: 2024-01-26 | End: 2024-01-26 | Stop reason: HOSPADM

## 2024-01-26 RX ORDER — FENTANYL CITRATE 50 UG/ML
INJECTION, SOLUTION INTRAMUSCULAR; INTRAVENOUS
Status: DISCONTINUED | OUTPATIENT
Start: 2024-01-26 | End: 2024-01-26

## 2024-01-26 RX ORDER — BUPIVACAINE HYDROCHLORIDE 2.5 MG/ML
INJECTION, SOLUTION EPIDURAL; INFILTRATION; INTRACAUDAL
Status: DISCONTINUED | OUTPATIENT
Start: 2024-01-26 | End: 2024-01-26 | Stop reason: HOSPADM

## 2024-01-26 RX ORDER — SODIUM CHLORIDE 0.9 G/100ML
IRRIGANT IRRIGATION
Status: DISCONTINUED | OUTPATIENT
Start: 2024-01-26 | End: 2024-01-26 | Stop reason: HOSPADM

## 2024-01-26 RX ORDER — VANCOMYCIN HYDROCHLORIDE 1 G/20ML
INJECTION, POWDER, LYOPHILIZED, FOR SOLUTION INTRAVENOUS
Status: DISCONTINUED | OUTPATIENT
Start: 2024-01-26 | End: 2024-01-26 | Stop reason: HOSPADM

## 2024-01-26 RX ORDER — PROPOFOL 10 MG/ML
VIAL (ML) INTRAVENOUS CONTINUOUS PRN
Status: DISCONTINUED | OUTPATIENT
Start: 2024-01-26 | End: 2024-01-26

## 2024-01-26 RX ORDER — CLINDAMYCIN HYDROCHLORIDE 300 MG/1
300 CAPSULE ORAL 3 TIMES DAILY
Qty: 15 CAPSULE | Refills: 0 | Status: SHIPPED | OUTPATIENT
Start: 2024-01-26 | End: 2024-02-21 | Stop reason: ALTCHOICE

## 2024-01-26 RX ADMIN — VANCOMYCIN HYDROCHLORIDE 1000 MG: 1 INJECTION, POWDER, LYOPHILIZED, FOR SOLUTION INTRAVENOUS at 04:01

## 2024-01-26 RX ADMIN — DEXMEDETOMIDINE 8 MCG: 100 INJECTION, SOLUTION, CONCENTRATE INTRAVENOUS at 04:01

## 2024-01-26 RX ADMIN — SODIUM CHLORIDE: 0.9 INJECTION, SOLUTION INTRAVENOUS at 04:01

## 2024-01-26 RX ADMIN — DEXMEDETOMIDINE 4 MCG: 100 INJECTION, SOLUTION, CONCENTRATE INTRAVENOUS at 04:01

## 2024-01-26 RX ADMIN — PROPOFOL 20 MG: 10 INJECTION, EMULSION INTRAVENOUS at 04:01

## 2024-01-26 RX ADMIN — FENTANYL CITRATE 100 MCG: 50 INJECTION, SOLUTION INTRAMUSCULAR; INTRAVENOUS at 04:01

## 2024-01-26 RX ADMIN — PROPOFOL 25 MCG/KG/MIN: 10 INJECTION, EMULSION INTRAVENOUS at 04:01

## 2024-01-26 RX ADMIN — SODIUM CHLORIDE 0.2 MCG/KG/MIN: 9 INJECTION, SOLUTION INTRAVENOUS at 04:01

## 2024-01-26 NOTE — Clinical Note
The left chest was prepped. The site was prepped with ChloraPrep and Ioban. The site was clipped. The patient was draped.

## 2024-01-26 NOTE — Clinical Note
There was an existing generator. The generator was removed. The leads were disconnected. The generator was returned to . The generator was returned due to DEVORAH/EOL

## 2024-01-26 NOTE — H&P
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 Previously underwent RA lead revision for noise 1/2022. Device recently reached [facility] and she was scheduled for generator change. She presents today for the aforementioned procedure.       Presenting EKG: Atrial paced rhythm with intermittent V pacing    CHADS-VASc: 3  Anticoagulants: None  Antiarrhythmics: Propafenone 325 BID   LV EF: 55% (TTE 3/2023)  Pertinent labs: Hgb 10.8, INR 1.0, Cr 2.0         Medications - Outpatient  Prior to Admission medications    Medication Sig Start Date End Date Taking? Authorizing Provider   aspirin (ECOTRIN) 81 MG EC tablet Take 1 tablet (81 mg total) by mouth once daily. 7/23/18  Yes Trell Hodgson MD   clobetasoL (OLUX) 0.05 % Foam Apply 1 application topically every 30 days. 4/14/20  Yes Sandra Michaud MD   ERGOCALCIFEROL, VITAMIN D2, (VITAMIN D ORAL) Take 2,000 Units by mouth once daily.   Yes Provider, Historical   furosemide (LASIX) 40 MG tablet TAKE 1 TABLET BY MOUTH EVERY DAY 9/8/23  Yes Venkatesh Mcgarry MD   montelukast (SINGULAIR) 10 mg tablet Take 1 tablet (10 mg total) by mouth every evening. 1/4/24 2/3/24 Yes My Shore,    potassium citrate (UROCIT-K) 10 mEq (1,080 mg) TbSR TAKE 1 TABLET BY MOUTH ONCE DAILY 5/29/23  Yes Venkatesh Mcgarry MD   propafenone (RYTHMOL SR) 325 MG Cp12 Take 1 capsule (325 mg total) by mouth every 12 (twelve) hours. 11/6/23 11/5/24 Yes Trlel Hodgson MD   rosuvastatin (CRESTOR) 40 MG Tab Take 1 tablet (40 mg total) by mouth every  evening.  Patient taking differently: Take 40 mg by mouth once daily. 9/29/23 9/28/24 Yes Verna Hodgson MD   topiramate (TOPAMAX) 25 MG tablet Take 1 tablet twice daily. 4/3/23  Yes Sandra Michaud MD   amLODIPine (NORVASC) 5 MG tablet Take 1 tablet (5 mg total) by mouth once daily. 10/3/23   Verna Hodgson MD   budesonide-glycopyr-formoterol (BREZTRI AEROSPHERE) 160-9-4.8 mcg/actuation HFAA Inhale 2 puffs into the lungs 2 (two) times a day. 1/4/24   My Shore,    gabapentin (NEURONTIN) 300 MG capsule Take 1 capsule (300 mg total) by mouth 3 (three) times daily.  Patient not taking: Reported on 10/27/2023 10/21/23 11/20/23  Gi Reece MD   metoprolol succinate (TOPROL-XL) 25 MG 24 hr tablet Take 0.5 tablets (12.5 mg total) by mouth once daily.  Patient not taking: Reported on 11/13/2023 9/29/23 9/28/24  Verna Hodgson MD   mycophenolate (CELLCEPT) 500 mg Tab Take 1 tablet (500 mg) BID x 7 days, then increase to 2 tablets (1000 mg) BID thereafter 1/11/24   Bay Sosa NP   nitroGLYCERIN (NITROSTAT) 0.4 MG SL tablet Place 1 tablet (0.4 mg total) under the tongue every 5 (five) minutes as needed for Chest pain.  Patient not taking: Reported on 1/4/2024 2/17/23 2/17/24  Paige Lewis, DOMINIQUE   olmesartan (BENICAR) 20 MG tablet Take 1 tablet (20 mg total) by mouth once daily.  Patient not taking: Reported on 1/4/2024 9/29/23   Verna Hodgson MD   oxyCODONE (ROXICODONE) 5 MG immediate release tablet Take 1 tablet (5 mg total) by mouth every 4 (four) hours as needed for Pain.  Patient not taking: Reported on 10/27/2023 10/21/23   Gi Reece MD   sodium bicarbonate 650 MG tablet Take 1 tablet (650 mg total) by mouth once daily. for 7 days 10/26/23 11/2/23  Phyllis Egan MD         Medications - Current  Scheduled Meds:  Continuous Infusions:  PRN Meds:.vancomycin (VANCOCIN) IV (PEDS and ADULTS)      Allergies  Review of patient's allergies indicates:   Allergen Reactions     Iodinated contrast media Anaphylaxis    Penicillins Anaphylaxis    Allopurinol analogues      Muscle cramps    Ciprofloxacin Rash    Quinolones Rash    Sulfa (sulfonamide antibiotics) Rash    Tetracyclines Rash         Past Medical History  Past Medical History:   Diagnosis Date    Allergy     Arthritis     Atrial fibrillation 2017    Chronic diastolic heart failure 2023    CKD (chronic kidney disease) stage 3, GFR 30-59 ml/min 2017    Coronary artery calcification seen on CAT scan 2023    Disorder of kidney and ureter     GERD (gastroesophageal reflux disease)     Hyperlipidemia     Hypertension     Impaired fasting glucose 2021    Pre-diabetes 6/3/2017         Past Surgical History  Past Surgical History:   Procedure Laterality Date    ADENOIDECTOMY      BACK SURGERY      lamenectomy    BREAST SURGERY      BRONCHOSCOPY N/A 2023    Procedure: BRONCHOSCOPY;  Surgeon: [facility] Diagnostic Provider;  Location: [facility] OR 2ND FLR;  Service: Anesthesiology;  Laterality: N/A;    BRONCHOSCOPY N/A 10/20/2023    Procedure: BRONCHOSCOPY;  Surgeon: Anatoly Tiwari MD;  [facility];  Service: Cardiothoracic;  Laterality: N/A;    CARDIAC SURGERY      st paolo pacemaker     CATARACT EXTRACTION W/  INTRAOCULAR LENS IMPLANT Right 6/3/2019    Procedure: EXTRACTION, CATARACT, WITH IOL INSERTION;  Surgeon: Raisa Moreno MD;  Location: [facility] OR;  Service: Ophthalmology;  Laterality: Right;  Laser    CATARACT EXTRACTION W/  INTRAOCULAR LENS IMPLANT Left 2019    Procedure: EXTRACTION, CATARACT, WITH IOL INSERTION;  Surgeon: Raisa Moreno MD;  Location: [facility] OR;  Service: Ophthalmology;  Laterality: Left;  LASER ASSISTED     SECTION      1980    COLONOSCOPY N/A 9/15/2023    Procedure: COLONOSCOPY;  Surgeon: Tao Gomez MD;  Location: [facility] (2ND FLR);  Service: Endoscopy;  Laterality: N/A;  inst to portal/St. Paolo pacemaker   2nd floor for airway protection patient  with lung disease elevated pulmonary artery pressure pacemaker and fecal occult blood positive stool,   cleared by pulmonary Dr Bhakta-see note on 7/11-GT    ENDOSCOPIC ULTRASOUND OF UPPER GASTROINTESTINAL TRACT N/A 9/19/2023    Procedure: ULTRASOUND, UPPER GI TRACT, ENDOSCOPIC;  Surgeon: Casimiro De Los Santos MD;  Location: [facility] ENDO;  Service: Endoscopy;  Laterality: N/A;  9/13/23: instructions send via portal. St Paolo ppm- GD    EYE SURGERY      FRACTURE SURGERY      HYSTERECTOMY      INJECTION OF ANESTHETIC AGENT AROUND MULTIPLE INTERCOSTAL NERVES  10/20/2023    Procedure: BLOCK, NERVE, INTERCOSTAL, 2 OR MORE;  Surgeon: Anatoly Tiwari MD;  Location: [facility] OR [address] [place] [address];  Service: Cardiothoracic;;    INSERT / REPLACE / REMOVE PACEMAKER  2009    placement    INSERT / REPLACE / REMOVE PACEMAKER  09/22/2014    St Paolo Model #[id number] replacement    pace maker  2009    replacement 2014    REVISION OF PROCEDURE INVOLVING PACEMAKER LEAD Left 1/13/2022    Procedure: REVISION, ELECTRODE LEAD, CARDIAC PACEMAKER;  Surgeon: Trell Hodgson MD;  Location: [facility] EP LAB;  Service: Cardiology;  Laterality: Left;  PPM lead Malfx, RA lead revision, SJM, MAC, GP, 3 PREP*dPPM SJM in situ**Contrast prep given*    salpingo opherectomy Bilateral 1998    SPINE SURGERY      TONSILLECTOMY  1953    TUBAL LIGATION      VIDEO-ASSISTED THORACOSCOPIC SURGERY (VATS) Right 10/20/2023    Procedure: VATS WEDGE;  Surgeon: Anatoly Tiwari MD;  Location: [facility] OR [address] [place] [address];  Service: Cardiothoracic;  Laterality: Right;         Social History  Social History     Socioeconomic History    Marital status:    Tobacco Use    Smoking status: Never     Passive exposure: Past    Smokeless tobacco: Never   Substance and Sexual Activity    Alcohol use: Not Currently     Alcohol/week: 5.0 standard drinks of alcohol     Types: 5 Glasses of wine per week    Drug use: No    Sexual activity: Not Currently     Partners: Male   Social History Narrative    Lives w/  . Retired gastroenterologist[place]     Social Determinants of Health     Financial Resource Strain: Low Risk  (10/23/2023)    Overall Financial Resource Strain (CARDIA)     Difficulty of Paying Living Expenses: Not hard at all   Food Insecurity: No Food Insecurity (10/23/2023)    Hunger Vital Sign     Worried About Running Out of Food in the Last Year: Never true     Ran Out of Food in the Last Year: Never true   Transportation Needs: No Transportation Needs (10/23/2023)    PRAPARE - Transportation     Lack of Transportation (Medical): No     Lack of Transportation (Non-Medical): No   Physical Activity: Inactive (10/23/2023)    Exercise Vital Sign     Days of Exercise per Week: 0 days     Minutes of Exercise per Session: 0 min   Stress: No Stress Concern Present (10/23/2023)    [employer] - Occupational Stress Questionnaire     Feeling of Stress : Only a little   Social Connections: Moderately Isolated (10/23/2023)    Social Connection and Isolation Panel [NHANES]     Frequency of Communication with Friends and Family: More than three times a week     Frequency of Social Gatherings with Friends and Family: More than three times a week     Attends [employer]ism Services: Never     Active Member of Clubs or Organizations: No     Attends Club or Organization Meetings: Never     Marital Status:    Housing Stability: Low Risk  (10/23/2023)    Housing Stability Vital Sign     Unable to Pay for Housing in the Last Year: No     Number of Places Lived in the Last Year: 1     Unstable Housing in the Last Year: No         ROS  10 point ROS performed and negative except as stated in HPI     Physical Examination         Vital Signs             24 Hour VS Range       No intake or output data in the 24 hours ending 01/26/24 1348        Physical Exam:   Constitutional: no acute distress  HEENT: NCAT, EOMI, no scleral icterus  Cardiovascular: Regular rate and  "rhythm  Pulmonary: Normal respiratory effort   Abdomen: nontender, non-distended   Neuro: alert and oriented, no focal deficits  Extremities: warm, no edema   MSK: no deformities  Integument: intact, no rashes       Data       No results for input(s): "WBC", "HGB", "HCT", "PLT" in the last 168 hours.     No results for input(s): "PROTIME", "INR" in the last 168 hours.     No results for input(s): "NA", "K", "CL", "CO2", "BUN", "CREATININE", "ANIONGAP", "CALCIUM" in the last 168 hours.     No results for input(s): "PROT", "ALBUMIN", "BILITOT", "ALKPHOS", "AST", "ALT" in the last 168 hours.     No results for input(s): "TROPONINI" in the last 168 hours.     BNP (pg/mL)   Date Value   12/28/2023 127 (H)   11/06/2023 453 (H)   10/22/2023 213 (H)   07/10/2023 70   06/15/2023 128 (H)       No results for input(s): "LABBLOO" in the last 168 hours.         Assessment & Plan   73 year old female with PMH of atrial fibrillation, sinus node dysfunction s/p DC PPM implanted 2009 who presents for generator change.     Sinus node dysfunction/Sick sinus syndrome:   Risks/benefits/alternatives discussed with patient and she agrees to proceed. Consents signed.   -To EP lab for generator change with Dr. Rema Samson MD  [facility]   Cardiovascular Disease PGY-VI     "

## 2024-01-26 NOTE — PROGRESS NOTES
Patient admitted to recovery see [facility] for complete assessment pacu bcg's maintained safety measures verified patient instructed on pain scale andpatient verbalized understanding. Called for EKG and it was done and in chart. Called patient's  and updated on patien t location with the permission of patient.

## 2024-01-26 NOTE — ANESTHESIA PREPROCEDURE EVALUATION
01/26/2024  Jesenia Curiel is a 73 y.o., female.  Normal bivent func  Generator change  Pre-op Assessment    I have reviewed the Patient Summary Reports.       I have reviewed the Medications.     Review of Systems  Anesthesia Hx:   History of prior surgery of interest to airway management or planning:            Denies Personal Hx of Anesthesia complications.                    Cardiovascular:    Pacemaker Hypertension   CAD    Dysrhythmias atrial fibrillation      hyperlipidemia NAVA   Nl BiV Fxn  SSS                         Pulmonary:        Sleep Apnea                Renal/:  Chronic Renal Disease                Hepatic/GI:     GERD                 Physical Exam  General: Well nourished    Airway:  Mallampati: III / II  Mouth Opening: Normal  TM Distance: Normal  Tongue: Normal    Chest/Lungs:  Normal Respiratory Rate    Heart:  Rate: Normal        Anesthesia Plan  Type of Anesthesia, risks & benefits discussed:    Anesthesia Type: Gen Natural Airway  Intra-op Monitoring Plan: Standard ASA Monitors  Post Op Pain Control Plan: multimodal analgesia  Induction:  IV  Informed Consent: Informed consent signed with the Patient and all parties understand the risks and agree with anesthesia plan.  All questions answered.   ASA Score: 3  Day of Surgery Review of History & Physical: H&P Update referred to the surgeon/provider.  Anesthesia Plan Notes: NPO confirmed.  Sleep apnea noted    Ready For Surgery From Anesthesia Perspective.     .

## 2024-01-26 NOTE — TRANSFER OF CARE
Anesthesia Transfer of Care Note    Patient: Jesenia Curiel    Procedure(s) Performed: Procedure(s) (LRB):  REPLACEMENT, PACEMAKER GENERATOR (Left)    Patient location: [facility]    Anesthesia Type: general    Transport from OR: Transported from OR on 6-10 L/min O2 by face mask with adequate spontaneous ventilation    Post pain: adequate analgesia    Post assessment: no apparent anesthetic complications    Post vital signs: stable    Level of consciousness: awake and alert    Nausea/Vomiting: no nausea/vomiting    Complications: none    Transfer of care protocol was followed      Last vitals: Visit Vitals  Breastfeeding No    BP - 116/56  HR - 62

## 2024-01-27 NOTE — NURSING
Pt ambulated around unit and to restroom. Tolerated walk well.No c/o pain or discomfort at this time, resp even and unlabored. Aquacel/pressure dressing to groin site is CDI. No active bleeding. No hematoma noted.

## 2024-01-27 NOTE — NURSING
Off unit via wheelchair for discharge home.  Pt accompanied by spouse and transporter.  Dressing to left upper chest remains clean dry and intact.  Pt and pt spouse verbalized an understanding of d/c instructions.  Iv d/c'd with cath tip intact.  Pt received RX from pharmacy per spouse who picked them up.

## 2024-01-27 NOTE — NURSING TRANSFER
Nursing Transfer Note      1/26/2024   6:48 PM    Nurse giving handoff:caleb epstein   Nurse receiving handoff:noble sscu rn     Reason patient is being transferred: d/c critieria met     Transfer To: [facility]    Transfer via stretcher    Transfer with cardiac monitoring    Transported by caleb epstein     Transfer Vital Signs:  Blood Pressure:see epic   Heart Rate:room air   O2:room air   Temperature:see epic   Respirations:see epic     Telemetry: in pacu   Order for Tele Monitor? No    Additional Lines: no    4eyes on Skin: yes in recovery     Medicines sent: none     Any special needs or follow-up needed: monitor left chest site     Patient belongings transferred with patient: no     Chart send with patient: yes     Notified: reported to noble epstein     Patient reassessed at: see epic  (date, time)  1  Upon arrival to floor: to room no complaints no distress noted.

## 2024-01-27 NOTE — NURSING
Received report from RAYMOND Osborn. Patient s/p Generator change, AAOx4. VSS, no c/o pain or discomfort at this time, resp even and unlabored. Pressure dressing/ Aquacel to left chest is CDI. No active bleeding. No hematoma noted. Post procedure protocol reviewed with patient and patient's . Understanding verbalized.  at bedside. Nurse call bell within reach.

## 2024-01-27 NOTE — DISCHARGE SUMMARY
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 Previously underwent RA lead revision for noise 1/2022. Device recently reached [facility] and she was scheduled for generator change. She presents today for the aforementioned procedure.         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 Hodgson. She tolerated the procedure well with no immediate complications. She completed bed rest and ambulated without difficulty. She was discharged with clindamycin x5 days for infection prophylaxis. Instructed to remove pressure bandage the morning following discharge and keep Aquacel Ag until device clinic follow up. She was discharged home in stable condition.       Follow up:   Follow up with Dr. Hodgson in 3 months     Disposition:   Home or Self Care         Medication List        START taking these medications      clindamycin 300 MG capsule  Commonly known as: CLEOCIN  Take 1 capsule (300 mg total) by mouth 3 (three) times daily. for 5 days            CHANGE how you take these medications      rosuvastatin 40 MG Tab  Commonly known as: CRESTOR  Take 1 tablet (40 mg total) by mouth every evening.  What changed: when to take this            CONTINUE taking these medications      amLODIPine 5 MG tablet  Commonly known as: NORVASC  Take 1 tablet (5 mg total) by mouth once daily.      aspirin 81 MG EC tablet  Commonly known as: ECOTRIN  Take 1 tablet (81 mg total) by mouth once daily.     BREZTRI AEROSPHERE 160-9-4.8 mcg/actuation [facility]  Generic drug: budesonide-glycopyr-formoterol  Inhale 2 puffs into the lungs 2 (two) times a day.     clobetasoL 0.05 % Foam  Commonly known as: OLUX  Apply 1 application topically every 30 days.     furosemide 40 MG tablet  Commonly known as: LASIX  TAKE 1 TABLET BY MOUTH EVERY DAY     gabapentin 300 MG capsule  Commonly known as: NEURONTIN  Take 1 capsule (300 mg total) by mouth 3 (three) times daily.     montelukast 10 mg tablet  Commonly known as: SINGULAIR  Take 1 tablet (10 mg total) by mouth every evening.     mycophenolate 500 mg Tab  Commonly known as: CELLCEPT  Take 1 tablet (500 mg) BID x 7 days, then increase to 2 tablets (1000 mg) BID thereafter     potassium citrate 10 mEq (1,080 mg) Tbsr  Commonly known as: UROCIT-K  TAKE 1 TABLET BY MOUTH ONCE DAILY     propafenone 325 MG Cp12  Commonly known as: RYTHMOL SR  Take 1 capsule (325 mg total) by mouth every 12 (twelve) hours.     sodium bicarbonate 650 MG tablet  Take 1 tablet (650 mg total) by mouth once daily. for 7 days     topiramate 25 MG tablet  Commonly known as: TOPAMAX  Take 1 tablet twice daily.     VITAMIN D ORAL            ASK your doctor about these medications      metoprolol succinate 25 MG 24 hr tablet  Commonly known as: TOPROL-XL  Take 0.5 tablets (12.5 mg total) by mouth once daily.     nitroGLYCERIN 0.4 MG SL tablet  Commonly known as: NITROSTAT  Place 1 tablet (0.4 mg total) under the tongue every 5 (five) minutes as needed for Chest pain.     olmesartan 20 MG tablet  Commonly known as: BENICAR  Take 1 tablet (20 mg total) by mouth once daily.     oxyCODONE 5 MG immediate release tablet  Commonly known as: ROXICODONE  Take 1 tablet (5 mg total) by mouth every 4 (four) hours as needed for Pain.               Where to Get Your Medications        These medications were sent to [facility] [place] [id number] [facility] [id number]      Hours: Mon-Fri 7a-7p, Sat-Sun 10a-4p Phone: [phone]   clindamycin 300 MG capsule           Gerry Samson MD  [facility]  Cardiovascular Disease, PGY-VI

## 2024-01-29 DIAGNOSIS — J67.9 HYPERSENSITIVITY PNEUMONITIS: ICD-10-CM

## 2024-01-29 DIAGNOSIS — J84.9 INTERSTITIAL LUNG DISEASE: Primary | ICD-10-CM

## 2024-01-29 PROBLEM — N17.9 AKI (ACUTE KIDNEY INJURY): Status: RESOLVED | Noted: 2023-10-22 | Resolved: 2024-01-29

## 2024-01-29 RX ORDER — MONTELUKAST SODIUM 10 MG/1
10 TABLET ORAL NIGHTLY
Qty: 30 TABLET | Refills: 11 | Status: SHIPPED | OUTPATIENT
Start: 2024-01-29

## 2024-01-29 NOTE — ANESTHESIA POSTPROCEDURE EVALUATION
Anesthesia Post Evaluation    Patient: Jesenia Curiel    Procedure(s) Performed: Procedure(s) (LRB):  REPLACEMENT, PACEMAKER GENERATOR (Left)    Final Anesthesia Type: general      Patient location during evaluation: PACU  Patient participation: Yes- Able to Participate  Level of consciousness: awake and alert  Post-procedure vital signs: reviewed and stable  Pain management: adequate  Airway patency: patent    PONV status at discharge: No PONV  Anesthetic complications: no      Cardiovascular status: blood pressure returned to baseline  Respiratory status: unassisted  Hydration status: euvolemic  Follow-up not needed.              Vitals Value Taken Time   /70 01/26/24 1900   Temp 36.5 °C (97.7 °F) 01/26/24 1830   Pulse 65 01/26/24 1900   Resp 18 01/26/24 1900   SpO2 95 % 01/26/24 1900         No case tracking events are documented in the log.      Pain/Criss Score: No data recorded

## 2024-02-01 ENCOUNTER — PATIENT MESSAGE (OUTPATIENT)
Dept: TRANSPLANT | Facility: CLINIC | Age: 74
End: 2024-02-01
Payer: MEDICARE

## 2024-02-02 ENCOUNTER — CLINICAL SUPPORT (OUTPATIENT)
Dept: CARDIOLOGY | Facility: HOSPITAL | Age: 74
End: 2024-02-02
Attending: INTERNAL MEDICINE
Payer: MEDICARE

## 2024-02-02 DIAGNOSIS — Z45.010 ELECTIVE REPLACEMENT INDICATED FOR CARDIAC PACEMAKER BATTERY AT END OF LIFESPAN: ICD-10-CM

## 2024-02-02 DIAGNOSIS — I49.8 OTHER SPECIFIED CARDIAC ARRHYTHMIAS: ICD-10-CM

## 2024-02-02 DIAGNOSIS — I49.5 SICK SINUS SYNDROME: ICD-10-CM

## 2024-02-02 DIAGNOSIS — Z95.0 PRESENCE OF CARDIAC PACEMAKER: ICD-10-CM

## 2024-02-02 DIAGNOSIS — I48.19 PERSISTENT ATRIAL FIBRILLATION: ICD-10-CM

## 2024-02-02 PROCEDURE — 93280 PM DEVICE PROGR EVAL DUAL: CPT | Mod: TXP

## 2024-02-02 PROCEDURE — 93280 PM DEVICE PROGR EVAL DUAL: CPT | Mod: 26,NTX,, | Performed by: INTERNAL MEDICINE

## 2024-02-04 DIAGNOSIS — E66.01 SEVERE OBESITY (BMI 35.0-39.9) WITH COMORBIDITY: ICD-10-CM

## 2024-02-04 DIAGNOSIS — R93.89 ABNORMAL CT OF THE CHEST: ICD-10-CM

## 2024-02-04 DIAGNOSIS — R06.09 DOE (DYSPNEA ON EXERTION): ICD-10-CM

## 2024-02-05 ENCOUNTER — HOSPITAL ENCOUNTER (OUTPATIENT)
Dept: CARDIOLOGY | Facility: HOSPITAL | Age: 74
Discharge: HOME | End: 2024-02-05
Attending: INTERNAL MEDICINE
Payer: MEDICARE

## 2024-02-05 VITALS
DIASTOLIC BLOOD PRESSURE: 70 MMHG | HEIGHT: 61 IN | BODY MASS INDEX: 37.19 KG/M2 | WEIGHT: 197 LBS | SYSTOLIC BLOOD PRESSURE: 133 MMHG | HEART RATE: 60 BPM

## 2024-02-05 DIAGNOSIS — I10 BENIGN ESSENTIAL HYPERTENSION: ICD-10-CM

## 2024-02-05 DIAGNOSIS — I48.91 ATRIAL FIBRILLATION WITH RVR: ICD-10-CM

## 2024-02-05 LAB
ASCENDING AORTA: 3.6 CM
AV INDEX (PROSTH): 0.66
AV MEAN GRADIENT: 5 MMHG
AV PEAK GRADIENT: 9 MMHG
AV VALVE AREA BY VELOCITY RATIO: 2.23 CM²
AV VALVE AREA: 2.32 CM²
AV VELOCITY RATIO: 0.63
BSA FOR ECHO PROCEDURE: 1.96 M2
CV ECHO LV RWT: 0.55 CM
DOP CALC AO PEAK VEL: 1.47 M/S
DOP CALC AO VTI: 31.86 CM
DOP CALC LVOT AREA: 3.5 CM2
DOP CALC LVOT DIAMETER: 2.12 CM
DOP CALC LVOT PEAK VEL: 0.93 M/S
DOP CALC LVOT STROKE VOLUME: 73.95 CM3
DOP CALCLVOT PEAK VEL VTI: 20.96 CM
E/E' RATIO: 12.63 M/S
ECHO LV POSTERIOR WALL: 1.21 CM (ref 0.6–1.1)
EJECTION FRACTION: 55 %
FRACTIONAL SHORTENING: 30 % (ref 28–44)
INTERVENTRICULAR SEPTUM: 0.9 CM (ref 0.6–1.1)
IVRT: 77.07 MSEC
LA MAJOR: 5.39 CM
LA MINOR: 5.37 CM
LA WIDTH: 4.23 CM
LEFT ATRIUM SIZE: 3.32 CM
LEFT ATRIUM VOLUME INDEX MOD: 45.4 ML/M2
LEFT ATRIUM VOLUME INDEX: 34.2 ML/M2
LEFT ATRIUM VOLUME MOD: 85.41 CM3
LEFT ATRIUM VOLUME: 64.22 CM3
LEFT INTERNAL DIMENSION IN SYSTOLE: 3.11 CM (ref 2.1–4)
LEFT VENTRICLE DIASTOLIC VOLUME INDEX: 47.19 ML/M2
LEFT VENTRICLE DIASTOLIC VOLUME: 88.71 ML
LEFT VENTRICLE MASS INDEX: 85 G/M2
LEFT VENTRICLE SYSTOLIC VOLUME INDEX: 20.4 ML/M2
LEFT VENTRICLE SYSTOLIC VOLUME: 38.36 ML
LEFT VENTRICULAR INTERNAL DIMENSION IN DIASTOLE: 4.42 CM (ref 3.5–6)
LEFT VENTRICULAR MASS: 160.42 G
LV LATERAL E/E' RATIO: 11.22 M/S
LV SEPTAL E/E' RATIO: 14.43 M/S
MV A" WAVE DURATION": 35.39 MSEC
MV PEAK E VEL: 1.01 M/S
OHS CV AF BURDEN PERCENT: 9
OHS CV DC REMOTE DEVICE TYPE: NORMAL
OHS CV ICD SHOCK: NO
OHS CV RV PACING PERCENT: 57 %
PISA TR MAX VEL: 3.12 M/S
PULM VEIN S/D RATIO: 0.61
PV PEAK D VEL: 0.69 M/S
PV PEAK S VEL: 0.42 M/S
RA MAJOR: 3.87 CM
RA PRESSURE ESTIMATED: 3 MMHG
RA WIDTH: 2.99 CM
RIGHT VENTRICULAR END-DIASTOLIC DIMENSION: 2.88 CM
RV TB RVSP: 6 MMHG
SINUS: 3.1 CM
STJ: 2.66 CM
TDI LATERAL: 0.09 M/S
TDI SEPTAL: 0.07 M/S
TDI: 0.08 M/S
TR MAX PG: 39 MMHG
TRICUSPID ANNULAR PLANE SYSTOLIC EXCURSION: 1.88 CM
TV REST PULMONARY ARTERY PRESSURE: 42 MMHG
Z-SCORE OF LEFT VENTRICULAR DIMENSION IN END DIASTOLE: -1.72
Z-SCORE OF LEFT VENTRICULAR DIMENSION IN END SYSTOLE: -0.32

## 2024-02-05 PROCEDURE — 93306 TTE W/DOPPLER COMPLETE: CPT | Mod: TXP

## 2024-02-05 PROCEDURE — 93306 TTE W/DOPPLER COMPLETE: CPT | Mod: 26,NTX,, | Performed by: INTERNAL MEDICINE

## 2024-02-05 RX ORDER — TOPIRAMATE 25 MG/1
TABLET ORAL
Qty: 180 TABLET | Refills: 3 | Status: SHIPPED | OUTPATIENT
Start: 2024-02-05

## 2024-02-05 RX ORDER — FUROSEMIDE 40 MG/1
40 TABLET ORAL
Qty: 90 TABLET | Refills: 1 | Status: SHIPPED | OUTPATIENT
Start: 2024-02-05 | End: 2024-02-21

## 2024-02-05 NOTE — TELEPHONE ENCOUNTER
Outpatient Medication Detail     Disp Refills Start End LAZARO   topiramate (TOPAMAX) 25 MG tablet 180 tablet 3 4/3/2023 -- No   Sig: Take 1 tablet twice daily.   Sent to pharmacy as: topiramate (TOPAMAX) 25 MG tablet   Class: Normal   Order: [id number]   Date/Time Signed: 4/3/2023 15:11       E-Prescribing Status: Receipt confirmed by pharmacy (4/3/2023  3:12 PM CDT)     
RTC in 6 months, sooner if needed and depending on labs.     Sandra Michaud MD  Department of Internal Medicine - [facility]  7:31 AM         Electronically signed by Sandra Michaud MD at 9/29/2023 11:31 AM     
no

## 2024-02-06 ENCOUNTER — HOSPITAL ENCOUNTER (OUTPATIENT)
Dept: PULMONOLOGY | Facility: OTHER | Age: 74
Discharge: HOME | End: 2024-02-06
Attending: INTERNAL MEDICINE
Payer: MEDICARE

## 2024-02-06 ENCOUNTER — PATIENT MESSAGE (OUTPATIENT)
Dept: CARDIOLOGY | Facility: CLINIC | Age: 74
End: 2024-02-06
Payer: MEDICARE

## 2024-02-06 PROCEDURE — G0238 OTH RESP PROC, INDIV: HCPCS | Mod: TXP

## 2024-02-06 PROCEDURE — G0237 THERAPEUTIC PROCD STRG ENDUR: HCPCS | Mod: NTX

## 2024-02-08 ENCOUNTER — HOSPITAL ENCOUNTER (OUTPATIENT)
Dept: PULMONOLOGY | Facility: OTHER | Age: 74
Discharge: HOME | End: 2024-02-08
Attending: INTERNAL MEDICINE
Payer: MEDICARE

## 2024-02-08 PROCEDURE — G0238 OTH RESP PROC, INDIV: HCPCS | Mod: NTX

## 2024-02-08 PROCEDURE — G0237 THERAPEUTIC PROCD STRG ENDUR: HCPCS | Mod: TXP

## 2024-02-12 ENCOUNTER — TELEPHONE (OUTPATIENT)
Dept: ELECTROPHYSIOLOGY | Facility: CLINIC | Age: 74
End: 2024-02-12
Payer: MEDICARE

## 2024-02-12 LAB
OHS CV AF BURDEN PERCENT: 16
OHS CV DC REMOTE DEVICE TYPE: NORMAL
OHS CV RV PACING PERCENT: 44 %

## 2024-02-15 ENCOUNTER — HOSPITAL ENCOUNTER (OUTPATIENT)
Dept: PULMONOLOGY | Facility: OTHER | Age: 74
Discharge: HOME | End: 2024-02-15
Attending: INTERNAL MEDICINE
Payer: MEDICARE

## 2024-02-15 PROCEDURE — G0237 THERAPEUTIC PROCD STRG ENDUR: HCPCS | Mod: NTX

## 2024-02-15 PROCEDURE — G0238 OTH RESP PROC, INDIV: HCPCS | Mod: TXP

## 2024-02-19 ENCOUNTER — PATIENT MESSAGE (OUTPATIENT)
Dept: TRANSPLANT | Facility: CLINIC | Age: 74
End: 2024-02-19
Payer: MEDICARE

## 2024-02-19 DIAGNOSIS — J84.9 INTERSTITIAL LUNG DISEASE: Primary | ICD-10-CM

## 2024-02-20 ENCOUNTER — HOSPITAL ENCOUNTER (OUTPATIENT)
Dept: PULMONOLOGY | Facility: OTHER | Age: 74
Discharge: HOME | End: 2024-02-20
Attending: INTERNAL MEDICINE
Payer: MEDICARE

## 2024-02-20 PROCEDURE — G0238 OTH RESP PROC, INDIV: HCPCS | Mod: TXP

## 2024-02-20 PROCEDURE — G0237 THERAPEUTIC PROCD STRG ENDUR: HCPCS | Mod: TXP

## 2024-02-21 ENCOUNTER — OFFICE VISIT (OUTPATIENT)
Dept: NEPHROLOGY | Facility: CLINIC | Age: 74
End: 2024-02-21
Payer: MEDICARE

## 2024-02-21 VITALS
HEART RATE: 81 BPM | BODY MASS INDEX: 36.46 KG/M2 | SYSTOLIC BLOOD PRESSURE: 130 MMHG | WEIGHT: 193.13 LBS | HEIGHT: 61 IN | OXYGEN SATURATION: 95 % | DIASTOLIC BLOOD PRESSURE: 84 MMHG

## 2024-02-21 DIAGNOSIS — D89.2 PARAPROTEINEMIA: ICD-10-CM

## 2024-02-21 DIAGNOSIS — R06.09 DOE (DYSPNEA ON EXERTION): ICD-10-CM

## 2024-02-21 DIAGNOSIS — I10 BENIGN ESSENTIAL HYPERTENSION: ICD-10-CM

## 2024-02-21 DIAGNOSIS — N18.32 STAGE 3B CHRONIC KIDNEY DISEASE: Primary | ICD-10-CM

## 2024-02-21 DIAGNOSIS — E66.01 MORBID OBESITY: ICD-10-CM

## 2024-02-21 PROCEDURE — 99213 OFFICE O/P EST LOW 20 MIN: CPT | Mod: PBBFAC | Performed by: INTERNAL MEDICINE

## 2024-02-21 PROCEDURE — 99999 PR PBB SHADOW E&M-EST. PATIENT-LVL III: CPT | Mod: PBBFAC,,, | Performed by: INTERNAL MEDICINE

## 2024-02-21 PROCEDURE — 99215 OFFICE O/P EST HI 40 MIN: CPT | Mod: S$PBB,,, | Performed by: INTERNAL MEDICINE

## 2024-02-21 RX ORDER — BISOPROLOL FUMARATE 5 MG/1
5 TABLET, FILM COATED ORAL DAILY
COMMUNITY
End: 2024-04-01 | Stop reason: SDUPTHER

## 2024-02-21 RX ORDER — FUROSEMIDE 20 MG/1
20 TABLET ORAL DAILY
Start: 2024-02-21

## 2024-02-21 NOTE — PROGRESS NOTES
REASON FOR CONSULT/CHIEF COMPLAIN: Chronic Kidney Disease    REFERRING PHYSICIAN: Sandra Michaud MD    HISTORY OF PRESENT ILLNESS: 73 y.o. female  patient was referred here for abnormal renal function.   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 She had one episode of calcium urate stone at her very young age. She did use some NSAID's in the remote past and suffered few BARBARA's.   Has been having dyspnea on exertion for which she has been seeing cardiology and pulmonary. Denied any issues with urination including dysuria, hematuria, urgency, hesitancy, nocturia, incomplete voiding. Denied chest pain, nausea, vomiting, abd pain, nausea, vomiting, diarrhea, shortness of breath, pedal edema, Orthopnea, PND.    ROS:  General: negative for chills, or fatigue  Respiratory: No cough, or wheezing  Cardiovascular: No chest pain or dyspnea   Gastrointestinal: No abdominal pain, change in bowel habits      PAST MEDICAL HISTORY:  Past Medical History:   Diagnosis Date    Allergy     Arthritis     Atrial fibrillation 5/29/2017    Chronic diastolic heart failure 9/29/2023    CKD (chronic kidney disease) stage 3, GFR 30-59 ml/min 6/26/2017    Coronary artery calcification seen on CAT scan 9/29/2023    Disorder of kidney and ureter     GERD (gastroesophageal reflux disease)     Hyperlipidemia     Hypertension     Impaired fasting glucose 11/19/2021    Pre-diabetes 6/3/2017       PAST SURGICAL HISTORY:  Past Surgical History:   Procedure Laterality Date    ADENOIDECTOMY      BACK SURGERY  2000    lamenectomy    BREAST SURGERY      BRONCHOSCOPY N/A 7/13/2023    Procedure: BRONCHOSCOPY;  Surgeon: Paul Diagnostic Provider;  Location: [facility] OR [address] [place] [address];  Service: Anesthesiology;  Laterality: N/A;    BRONCHOSCOPY N/A 10/20/2023    Procedure: BRONCHOSCOPY;  Surgeon: Anatoly Tiwari MD;  Location: [facility] OR [address] [place] [address];  Service: Cardiothoracic;  Laterality: N/A;    CARDIAC SURGERY       [employer] pacemaker     CATARACT EXTRACTION W/  INTRAOCULAR LENS IMPLANT Right 6/3/2019    Procedure: EXTRACTION, CATARACT, WITH IOL INSERTION;  Surgeon: Raisa Moreno MD;  Location: [facility] OR;  Service: Ophthalmology;  Laterality: Right;  Laser    CATARACT EXTRACTION W/  INTRAOCULAR LENS IMPLANT Left 2019    Procedure: EXTRACTION, CATARACT, WITH IOL INSERTION;  Surgeon: Raisa Moreno MD;  Location: [facility] OR;  Service: Ophthalmology;  Laterality: Left;  LASER ASSISTED     SECTION      ,     COLONOSCOPY N/A 9/15/2023    Procedure: COLONOSCOPY;  Surgeon: Tao Gomez MD;  Location: [facility] (2ND FLR);  Service: Endoscopy;  Laterality: N/A;  inst to portal/[employer] pacemaker   2nd floor for airway protection patient with lung disease elevated pulmonary artery pressure pacemaker and fecal occult blood positive stool,   cleared by pulmonary Dr Yony-see note on     ENDOSCOPIC ULTRASOUND OF UPPER GASTROINTESTINAL TRACT N/A 2023    Procedure: ULTRASOUND, UPPER GI TRACT, ENDOSCOPIC;  Surgeon: Casimiro De Los Santos MD;  Location: [facility] ENDO;  Service: Endoscopy;  Laterality: N/A;  23: instructions send via portal. [employer] ppm- GD    EYE SURGERY      FRACTURE SURGERY      HYSTERECTOMY      INJECTION OF ANESTHETIC AGENT AROUND MULTIPLE INTERCOSTAL NERVES  10/20/2023    Procedure: BLOCK, NERVE, INTERCOSTAL, 2 OR MORE;  Surgeon: Anatoly Tiwari MD;  Location: [facility] OR 2ND FLR;  Service: Cardiothoracic;;    INSERT / REPLACE / REMOVE PACEMAKER      placement    INSERT / REPLACE / REMOVE PACEMAKER  2014    [employer] Model #[id number] replacement    pace maker      replacement     REPLACEMENT OF PACEMAKER GENERATOR Left 2024    Procedure: REPLACEMENT, PACEMAKER GENERATOR;  Surgeon: Trell Hodgson MD;  Location: [facility] EP LAB;  Service: Cardiology;  Laterality: Left;  DEVORAH, PPM gen chg, SJM, MAC, GP, 3prep, *SJM dcPPM in situ*    REVISION OF PROCEDURE INVOLVING PACEMAKER LEAD  Left 1/13/2022    Procedure: REVISION, ELECTRODE LEAD, CARDIAC PACEMAKER;  Surgeon: Trell Hodgson MD;  Location: [facility] EP LAB;  Service: Cardiology;  Laterality: Left;  PPM lead Malfx, RA lead revision, SJM, MAC, GP, 3 PREP*dPPM SJM in situ**Contrast prep given*    salpingo opherectomy Bilateral 1998    SPINE SURGERY      TONSILLECTOMY  1953    TUBAL LIGATION      VIDEO-ASSISTED THORACOSCOPIC SURGERY (VATS) Right 10/20/2023    Procedure: VATS WEDGE;  Surgeon: Anatoly Tiwari MD;  Location: [facility] OR [facility] FLR;  Service: Cardiothoracic;  Laterality: Right;       FAMILY HISTORY:   Family History   Problem Relation Age of Onset    Hypertension Mother     Diabetes Mother     Hyperlipidemia Mother     Coronary artery disease Mother     Heart disease Mother     Stroke Mother     Hypertension Father     Diabetes Father     Hyperlipidemia Father     Coronary artery disease Father     Heart disease Father     Coronary artery disease Brother     Heart disease Brother     Hyperthyroidism Brother     Diabetes Paternal Grandmother     Diabetes Paternal Grandfather     Heart attack Neg Hx        SOCIAL HISTORY:  Social History     Socioeconomic History    Marital status:    Tobacco Use    Smoking status: Never     Passive exposure: Past    Smokeless tobacco: Never   Substance and Sexual Activity    Alcohol use: Not Currently     Alcohol/week: 5.0 standard drinks of alcohol     Types: 5 Glasses of wine per week    Drug use: No    Sexual activity: Not Currently     Partners: Male   Social History Narrative    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 Walks daily along the [facility].     Social Determinants of Health     Financial Resource Strain: Low Risk  (10/23/2023)    Overall Financial Resource Strain (CARDIA)     Difficulty of Paying Living Expenses: Not hard at all   Food Insecurity: No Food Insecurity (10/23/2023)    Hunger Vital Sign     Worried About Running Out of Food in the Last Year: Never true     Ran Out of  Food in the Last Year: Never true   Transportation Needs: No Transportation Needs (10/23/2023)    PRAPARE - Transportation     Lack of Transportation (Medical): No     Lack of Transportation (Non-Medical): No   Physical Activity: Inactive (10/23/2023)    Exercise Vital Sign     Days of Exercise per Week: 0 days     Minutes of Exercise per Session: 0 min   Stress: No Stress Concern Present (10/23/2023)    [employer] - Occupational Stress Questionnaire     Feeling of Stress : Only a little   Social Connections: Moderately Isolated (10/23/2023)    Social Connection and Isolation Panel [NHANES]     Frequency of Communication with Friends and Family: More than three times a week     Frequency of Social Gatherings with Friends and Family: More than three times a week     Attends Jewish Services: Never     Active Member of Clubs or Organizations: No     Attends Club or Organization Meetings: Never     Marital Status:    Housing Stability: Low Risk  (10/23/2023)    Housing Stability Vital Sign     Unable to Pay for Housing in the Last Year: No     Number of Places Lived in the Last Year: 1     Unstable Housing in the Last Year: No       ALLERGIES:  Review of patient's allergies indicates:   Allergen Reactions    Iodinated contrast media Anaphylaxis    Penicillins Anaphylaxis    Allopurinol analogues      Muscle cramps    Ciprofloxacin Rash    Quinolones Rash    Sulfa (sulfonamide antibiotics) Rash    Tetracyclines Rash       MEDICATIONS:    Current Outpatient Medications:     amLODIPine (NORVASC) 5 MG tablet, Take 1 tablet (5 mg total) by mouth once daily., Disp: 90 tablet, Rfl: 3    aspirin (ECOTRIN) 81 MG EC tablet, Take 1 tablet (81 mg total) by mouth once daily., Disp: , Rfl:     budesonide-glycopyr-formoterol (BREZTRI AEROSPHERE) 160-9-4.8 mcg/actuation HFAA, Inhale 2 puffs into the lungs 2 (two) times a day., Disp: 5.9 g, Rfl: 11    clobetasoL (OLUX) 0.05 % Foam, Apply 1  application topically every 30 days., Disp: 100 g, Rfl: 3    ERGOCALCIFEROL, VITAMIN D2, (VITAMIN D ORAL), Take 2,000 Units by mouth once daily., Disp: , Rfl:     furosemide (LASIX) 40 MG tablet, TAKE 1 TABLET BY MOUTH EVERY DAY, Disp: 90 tablet, Rfl: 1    montelukast (SINGULAIR) 10 mg tablet, Take 1 tablet (10 mg total) by mouth every evening., Disp: 30 tablet, Rfl: 11    mycophenolate (CELLCEPT) 500 mg Tab, Take 1 tablet (500 mg) BID x 7 days, then increase to 2 tablets (1000 mg) BID thereafter, Disp: 110 tablet, Rfl: 6    potassium citrate (UROCIT-K) 10 mEq (1,080 mg) TbSR, TAKE 1 TABLET BY MOUTH ONCE DAILY, Disp: 90 tablet, Rfl: 3    propafenone (RYTHMOL SR) 325 MG Cp12, Take 1 capsule (325 mg total) by mouth every 12 (twelve) hours., Disp: 180 capsule, Rfl: 3    rosuvastatin (CRESTOR) 40 MG Tab, Take 1 tablet (40 mg total) by mouth every evening. (Patient taking differently: Take 40 mg by mouth once daily.), Disp: 90 tablet, Rfl: 3    topiramate (TOPAMAX) 25 MG tablet, TAKE 1 TABLET TWICE A DAY, Disp: 180 tablet, Rfl: 3    gabapentin (NEURONTIN) 300 MG capsule, Take 1 capsule (300 mg total) by mouth 3 (three) times daily. (Patient not taking: Reported on 10/27/2023), Disp: 90 capsule, Rfl: 0    metoprolol succinate (TOPROL-XL) 25 MG 24 hr tablet, Take 0.5 tablets (12.5 mg total) by mouth once daily. (Patient not taking: Reported on 11/13/2023), Disp: 45 tablet, Rfl: 3    nitroGLYCERIN (NITROSTAT) 0.4 MG SL tablet, Place 1 tablet (0.4 mg total) under the tongue every 5 (five) minutes as needed for Chest pain. (Patient not taking: Reported on 1/4/2024), Disp: 20 tablet, Rfl: 3    olmesartan (BENICAR) 20 MG tablet, Take 1 tablet (20 mg total) by mouth once daily. (Patient not taking: Reported on 1/4/2024), Disp: 90 tablet, Rfl: 3    oxyCODONE (ROXICODONE) 5 MG immediate release tablet, Take 1 tablet (5 mg total) by mouth every 4 (four) hours as needed for Pain. (Patient not taking: Reported on 10/27/2023), Disp:  20 tablet, Rfl: 0    sodium bicarbonate 650 MG tablet, Take 1 tablet (650 mg total) by mouth once daily. for 7 days, Disp: 7 tablet, Rfl: 0  No current facility-administered medications for this visit.    Facility-Administered Medications Ordered in Other Visits:     vancomycin (VANCOCIN) 1,000 mg in dextrose 5 % (D5W) 250 mL IVPB (Vial-Mate), 1,000 mg, Intravenous, On Call Procedure, Martha Jasmine NP, 1,000 mg at 01/26/24 1640   Medication List with Changes/Refills   Current Medications    AMLODIPINE (NORVASC) 5 MG TABLET    Take 1 tablet (5 mg total) by mouth once daily.    ASPIRIN (ECOTRIN) 81 MG EC TABLET    Take 1 tablet (81 mg total) by mouth once daily.    BUDESONIDE-GLYCOPYR-FORMOTEROL (BREZTRI AEROSPHERE) 160-9-4.8 MCG/ACTUATION HFAA    Inhale 2 puffs into the lungs 2 (two) times a day.    CLOBETASOL (OLUX) 0.05 % FOAM    Apply 1 application topically every 30 days.    ERGOCALCIFEROL, VITAMIN D2, (VITAMIN D ORAL)    Take 2,000 Units by mouth once daily.    FUROSEMIDE (LASIX) 40 MG TABLET    TAKE 1 TABLET BY MOUTH EVERY DAY    GABAPENTIN (NEURONTIN) 300 MG CAPSULE    Take 1 capsule (300 mg total) by mouth 3 (three) times daily.    METOPROLOL SUCCINATE (TOPROL-XL) 25 MG 24 HR TABLET    Take 0.5 tablets (12.5 mg total) by mouth once daily.    MONTELUKAST (SINGULAIR) 10 MG TABLET    Take 1 tablet (10 mg total) by mouth every evening.    MYCOPHENOLATE (CELLCEPT) 500 MG TAB    Take 1 tablet (500 mg) BID x 7 days, then increase to 2 tablets (1000 mg) BID thereafter    NITROGLYCERIN (NITROSTAT) 0.4 MG SL TABLET    Place 1 tablet (0.4 mg total) under the tongue every 5 (five) minutes as needed for Chest pain.    OLMESARTAN (BENICAR) 20 MG TABLET    Take 1 tablet (20 mg total) by mouth once daily.    OXYCODONE (ROXICODONE) 5 MG IMMEDIATE RELEASE TABLET    Take 1 tablet (5 mg total) by mouth every 4 (four) hours as needed for Pain.    POTASSIUM CITRATE (UROCIT-K) 10 MEQ (1,080 MG) TBSR    TAKE 1 TABLET BY  "MOUTH ONCE DAILY    PROPAFENONE (RYTHMOL SR) 325 MG CP12    Take 1 capsule (325 mg total) by mouth every 12 (twelve) hours.    ROSUVASTATIN (CRESTOR) 40 MG TAB    Take 1 tablet (40 mg total) by mouth every evening.    SODIUM BICARBONATE 650 MG TABLET    Take 1 tablet (650 mg total) by mouth once daily. for 7 days    TOPIRAMATE (TOPAMAX) 25 MG TABLET    TAKE 1 TABLET TWICE A DAY        PHYSICAL EXAM:  /84   Pulse 81   Ht 5' 1" (1.549 m)   Wt 87.6 kg (193 lb 2 oz)   SpO2 95%   BMI 36.49 kg/m²     General: No distress, No fever or chills  Lungs:Clear to auscultation bilaterally, No Crackles  Heart: Regular rate and rhythm, no murmur, gallops or rubs  Abdomen: Soft, no tenderness, bowel sounds normal  Extremities: Atraumatic, no edema in LE  Skin: Turgor normal. No rashes or ulcers  Neurologic: No focal weakness, oriented.  Dialysis Access: Non applicable        LABS:  Lab Results   Component Value Date    HGB 11.8 (L) 02/16/2024        Lab Results   Component Value Date    CREATININE 1.58 (H) 02/16/2024       Prot/Creat Ratio, Urine   Date Value Ref Range Status   02/16/2024 1.09 (H) 0.00 - 0.20 Final   06/15/2023 Unable to calculate 0.00 - 0.20 Final   04/05/2021 Unable to calculate 0.00 - 0.20 Final       Lab Results   Component Value Date     02/16/2024    K 3.7 02/16/2024    CO2 29 02/16/2024       last PTH   Lab Results   Component Value Date    .2 (H) 02/16/2024    CALCIUM 9.9 02/16/2024    CAION 1.25 01/20/2022    PHOS 3.4 02/16/2024       Lab Results   Component Value Date    HGBA1C 5.9 (H) 12/28/2023       Lab Results   Component Value Date    LDLCALC 86.2 12/28/2023         Creatinine, Urine   Date Value Ref Range Status   02/16/2024 57.0 15.0 - 325.0 mg/dL Final   06/15/2023 30.0 15.0 - 325.0 mg/dL Final   05/26/2021 82.0 15.0 - 325.0 mg/dL Final     Comment:     The random urine reference ranges provided were established   for 24 hour urine collections.  No reference ranges exist " for  random urine specimens.  Correlate clinically.         Protein, Urine Random   Date Value Ref Range Status   02/16/2024 62 (H) 0 - 15 mg/dL Final   06/15/2023 <7 0 - 15 mg/dL Final   04/05/2021 <7 0 - 15 mg/dL Final     Comment:     The random urine reference ranges provided were established   for 24 hour urine collections.  No reference ranges exist for  random urine specimens.  Correlate clinically.         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 2021 it has bumped to 2.0, now fluctuating between 1.2-1.5 mg/dl (Fluctuates based on her volume status). But cystatin C estimated eGFR is higher than creatinine eGFR). Urine microscopy done by me during initial visits did not show any RBC/casts. Urine analysis has been negative for proteinuria or hematuria till 2023. Renal ultrasound from march 2021 showed 10.8 and 9.9 cm kidneys. . CKD due to combination of age related nephron loss, HTN, previous BARBARA and NSAID use. Serological work was positive for monoclonal protein and was evaluated by he onc.   Hypokalemia due to diuretic improved with potassium 10 meq daily. Acid Base and hemoglobin in acceptable range. On topamax for weight loss but no acidosis noted with current dose.   Did work up for borderline hypercalcemia and low phosphate. Vitamin D, Vitamin 1,25 Vitamin D, PTHrP, FGF 23 are in acceptable range. 24 hour urine showed low calcium excretion despite high normal PTH. This could go along with Familial hypocalciuria hypercalcemia/due to HCTZ? . Switched HCTZ to lasix.    - Lasix dose cut down to 20 mg daily. Agree.  - Blood pressures are really soft,  goal systolic 110-130. Continue to stop amlodipine and olmesartan.  - Proteinuria new onset vs resurfaced due to discontinuation of olmesartan? - will monitor for now and reintroduce ARB vs SGLT2 inhibitor.  - Continue potassium 10 meq (switched to potassium citrate to compensate Topamax use) daily for lasix  induced hypokalemia.  - Avoid NSAIDs intake  - Discussed about CKD, low sodium, and low animal protein diet.  - Discontinued Allopurinol previously due to side effects    RTC in 3-4 months.    Diagnosis and plan of care explained to the patient.  Pt Verbalized understanding. Answered all questions.  Thanks for allowing me to participate in the care of this patient.     9:02 PM    Venkatesh Mcgarry MD  Nephrology & Critical Care

## 2024-02-22 ENCOUNTER — HOSPITAL ENCOUNTER (OUTPATIENT)
Dept: PULMONOLOGY | Facility: OTHER | Age: 74
Discharge: HOME | End: 2024-02-22
Attending: INTERNAL MEDICINE
Payer: MEDICARE

## 2024-02-22 PROCEDURE — G0238 OTH RESP PROC, INDIV: HCPCS | Mod: TXP

## 2024-02-22 PROCEDURE — G0237 THERAPEUTIC PROCD STRG ENDUR: HCPCS | Mod: TXP

## 2024-02-27 ENCOUNTER — HOSPITAL ENCOUNTER (OUTPATIENT)
Dept: PULMONOLOGY | Facility: OTHER | Age: 74
Discharge: HOME | End: 2024-02-27
Attending: INTERNAL MEDICINE
Payer: MEDICARE

## 2024-02-27 PROCEDURE — G0237 THERAPEUTIC PROCD STRG ENDUR: HCPCS | Mod: NTX

## 2024-02-27 PROCEDURE — G0238 OTH RESP PROC, INDIV: HCPCS | Mod: TXP

## 2024-02-29 ENCOUNTER — HOSPITAL ENCOUNTER (OUTPATIENT)
Dept: PULMONOLOGY | Facility: OTHER | Age: 74
Discharge: HOME | End: 2024-02-29
Attending: INTERNAL MEDICINE
Payer: MEDICARE

## 2024-02-29 PROCEDURE — G0237 THERAPEUTIC PROCD STRG ENDUR: HCPCS | Mod: NTX

## 2024-02-29 PROCEDURE — G0238 OTH RESP PROC, INDIV: HCPCS | Mod: TXP

## 2024-03-03 ENCOUNTER — CLINICAL SUPPORT (OUTPATIENT)
Dept: CARDIOLOGY | Facility: HOSPITAL | Age: 74
End: 2024-03-03
Attending: INTERNAL MEDICINE
Payer: MEDICARE

## 2024-03-03 DIAGNOSIS — Z95.0 PRESENCE OF CARDIAC PACEMAKER: ICD-10-CM

## 2024-03-03 DIAGNOSIS — I49.8 OTHER SPECIFIED CARDIAC ARRHYTHMIAS: ICD-10-CM

## 2024-03-03 DIAGNOSIS — I49.5 SICK SINUS SYNDROME: ICD-10-CM

## 2024-03-05 ENCOUNTER — HOSPITAL ENCOUNTER (OUTPATIENT)
Dept: PULMONOLOGY | Facility: OTHER | Age: 74
Discharge: HOME | End: 2024-03-05
Attending: INTERNAL MEDICINE
Payer: MEDICARE

## 2024-03-05 PROCEDURE — G0237 THERAPEUTIC PROCD STRG ENDUR: HCPCS | Mod: TXP

## 2024-03-05 PROCEDURE — G0238 OTH RESP PROC, INDIV: HCPCS | Mod: TXP

## 2024-03-07 ENCOUNTER — HOSPITAL ENCOUNTER (OUTPATIENT)
Dept: PULMONOLOGY | Facility: OTHER | Age: 74
Discharge: HOME | End: 2024-03-07
Attending: INTERNAL MEDICINE
Payer: MEDICARE

## 2024-03-07 PROCEDURE — G0238 OTH RESP PROC, INDIV: HCPCS | Mod: NTX

## 2024-03-07 PROCEDURE — G0237 THERAPEUTIC PROCD STRG ENDUR: HCPCS | Mod: NTX

## 2024-03-08 LAB
OHS CV AF BURDEN PERCENT: < 1
OHS CV DC REMOTE DEVICE TYPE: NORMAL
OHS CV ICD SHOCK: NO
OHS CV RV PACING PERCENT: 11 %

## 2024-03-14 ENCOUNTER — HOSPITAL ENCOUNTER (OUTPATIENT)
Dept: PULMONOLOGY | Facility: OTHER | Age: 74
Discharge: HOME | End: 2024-03-14
Attending: INTERNAL MEDICINE
Payer: MEDICARE

## 2024-03-14 PROCEDURE — G0238 OTH RESP PROC, INDIV: HCPCS | Mod: TXP

## 2024-03-14 PROCEDURE — G0237 THERAPEUTIC PROCD STRG ENDUR: HCPCS | Mod: NTX

## 2024-03-14 PROCEDURE — 27000221 HC OXYGEN, UP TO 24 HOURS: Mod: TXP

## 2024-03-19 ENCOUNTER — HOSPITAL ENCOUNTER (OUTPATIENT)
Dept: PULMONOLOGY | Facility: OTHER | Age: 74
Discharge: HOME | End: 2024-03-19
Attending: INTERNAL MEDICINE
Payer: MEDICARE

## 2024-03-19 PROCEDURE — G0238 OTH RESP PROC, INDIV: HCPCS | Mod: NTX

## 2024-03-19 PROCEDURE — G0237 THERAPEUTIC PROCD STRG ENDUR: HCPCS | Mod: NTX

## 2024-03-21 ENCOUNTER — HOSPITAL ENCOUNTER (OUTPATIENT)
Dept: PULMONOLOGY | Facility: OTHER | Age: 74
Discharge: HOME | End: 2024-03-21
Attending: INTERNAL MEDICINE
Payer: MEDICARE

## 2024-03-21 PROCEDURE — G0238 OTH RESP PROC, INDIV: HCPCS | Mod: TXP

## 2024-03-21 PROCEDURE — G0237 THERAPEUTIC PROCD STRG ENDUR: HCPCS | Mod: NTX

## 2024-03-26 ENCOUNTER — HOSPITAL ENCOUNTER (OUTPATIENT)
Dept: PULMONOLOGY | Facility: OTHER | Age: 74
Discharge: HOME | End: 2024-03-26
Attending: INTERNAL MEDICINE
Payer: MEDICARE

## 2024-03-26 PROCEDURE — G0238 OTH RESP PROC, INDIV: HCPCS | Mod: TXP

## 2024-03-26 PROCEDURE — G0237 THERAPEUTIC PROCD STRG ENDUR: HCPCS | Mod: NTX

## 2024-03-28 ENCOUNTER — HOSPITAL ENCOUNTER (OUTPATIENT)
Dept: PULMONOLOGY | Facility: OTHER | Age: 74
Discharge: HOME | End: 2024-03-28
Attending: INTERNAL MEDICINE
Payer: MEDICARE

## 2024-03-28 PROCEDURE — G0237 THERAPEUTIC PROCD STRG ENDUR: HCPCS | Mod: TXP

## 2024-03-28 PROCEDURE — G0238 OTH RESP PROC, INDIV: HCPCS | Mod: NTX

## 2024-03-29 ENCOUNTER — PATIENT MESSAGE (OUTPATIENT)
Dept: PRIMARY CARE CLINIC | Facility: CLINIC | Age: 74
End: 2024-03-29
Payer: MEDICARE

## 2024-04-01 RX ORDER — BISOPROLOL FUMARATE 5 MG/1
5 TABLET, FILM COATED ORAL DAILY
Qty: 90 TABLET | Refills: 3 | Status: SHIPPED | OUTPATIENT
Start: 2024-04-01

## 2024-04-02 ENCOUNTER — OFFICE VISIT (OUTPATIENT)
Dept: TRANSPLANT | Facility: CLINIC | Age: 74
End: 2024-04-02
Payer: MEDICARE

## 2024-04-02 ENCOUNTER — HOSPITAL ENCOUNTER (OUTPATIENT)
Dept: PULMONOLOGY | Facility: CLINIC | Age: 74
Discharge: HOME | End: 2024-04-02
Payer: MEDICARE

## 2024-04-02 VITALS
OXYGEN SATURATION: 96 % | WEIGHT: 186.31 LBS | BODY MASS INDEX: 35.18 KG/M2 | HEART RATE: 65 BPM | SYSTOLIC BLOOD PRESSURE: 133 MMHG | HEIGHT: 61 IN | TEMPERATURE: 99 F | DIASTOLIC BLOOD PRESSURE: 62 MMHG

## 2024-04-02 VITALS — HEIGHT: 61 IN | BODY MASS INDEX: 36.47 KG/M2 | WEIGHT: 193.19 LBS

## 2024-04-02 DIAGNOSIS — J84.9 INTERSTITIAL LUNG DISEASE: ICD-10-CM

## 2024-04-02 DIAGNOSIS — R06.09 DOE (DYSPNEA ON EXERTION): ICD-10-CM

## 2024-04-02 DIAGNOSIS — I05.9 MITRAL VALVE DISEASE: ICD-10-CM

## 2024-04-02 DIAGNOSIS — J84.9 INTERSTITIAL LUNG DISEASE: Primary | ICD-10-CM

## 2024-04-02 DIAGNOSIS — J67.9 HYPERSENSITIVITY PNEUMONITIS: ICD-10-CM

## 2024-04-02 PROCEDURE — 94618 PULMONARY STRESS TESTING: CPT | Mod: PBBFAC,NTX | Performed by: INTERNAL MEDICINE

## 2024-04-02 PROCEDURE — 94010 BREATHING CAPACITY TEST: CPT | Mod: 26,59,S$PBB,NTX | Performed by: INTERNAL MEDICINE

## 2024-04-02 PROCEDURE — 94729 DIFFUSING CAPACITY: CPT | Mod: 26,S$PBB,NTX, | Performed by: INTERNAL MEDICINE

## 2024-04-02 PROCEDURE — 94010 BREATHING CAPACITY TEST: CPT | Mod: PBBFAC,NTX | Performed by: INTERNAL MEDICINE

## 2024-04-02 PROCEDURE — 94727 GAS DIL/WSHOT DETER LNG VOL: CPT | Mod: PBBFAC,NTX | Performed by: INTERNAL MEDICINE

## 2024-04-02 PROCEDURE — 94727 GAS DIL/WSHOT DETER LNG VOL: CPT | Mod: 26,S$PBB,NTX, | Performed by: INTERNAL MEDICINE

## 2024-04-02 PROCEDURE — 99999 PR PBB SHADOW E&M-EST. PATIENT-LVL III: CPT | Mod: PBBFAC,TXP,, | Performed by: INTERNAL MEDICINE

## 2024-04-02 PROCEDURE — 99213 OFFICE O/P EST LOW 20 MIN: CPT | Mod: PBBFAC,TXP,25 | Performed by: INTERNAL MEDICINE

## 2024-04-02 PROCEDURE — 99214 OFFICE O/P EST MOD 30 MIN: CPT | Mod: 25,S$PBB,NTX, | Performed by: INTERNAL MEDICINE

## 2024-04-02 PROCEDURE — 94729 DIFFUSING CAPACITY: CPT | Mod: PBBFAC,NTX | Performed by: INTERNAL MEDICINE

## 2024-04-02 PROCEDURE — 94618 PULMONARY STRESS TESTING: CPT | Mod: 26,S$PBB,NTX, | Performed by: INTERNAL MEDICINE

## 2024-04-02 NOTE — LETTER
April 2, 2024                     [facility] 1st Fl  [address]  [facility] [address]  Phone: [phone]   Patient: Jesenia Curiel   MR Number: [id number]   YOB: 1950   Date of Visit: 4/2/2024       Dear      Thank you for referring Jesenia Curiel to me for evaluation. Attached you will find relevant portions of my assessment and plan of care.    If you have questions, please do not hesitate to call me. I look forward to following Jesenia Curiel along with you.    Sincerely,    [place], DO    Enclosure    If you would like to receive this communication electronically, please contact [email or web address] or [phone] to request [employer] Link access.    [employer] Link is a tool which provides read-only access to select patient information with whom you have a relationship. Its easy to use and provides real time access to review your patients record including encounter summaries, notes, results, and demographic information.    If you feel you have received this communication in error or would no longer like to receive these types of communications, please e-mail [email or web address]

## 2024-04-02 NOTE — PROGRESS NOTES
ADVANCED LUNG DISEASE FOLLOW UP VISIT                                                                                                                                            Reason for Visit:  ILD    Referring Physician: Bay Sosa NP    History of Present Illness: Jesenia Curiel is a 73 y.o. female who is on  0 at present, previously on 3 L of oxygen.  She is on no assisted ventilation.  Her New [place] Heart Association Class is III and a Karnofsky score of 70% - Cares for self: Unable to carry on normal activity or active work. She is not diabetic.    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  Since her last visit she reports that she feels better and her NAVA is improved.  However, she is having GI intolerance from her MMF on 1000mg BID.  Denies GERD symptoms or chronic cough.  Renal function and volume status are controlled with no lower extremity edema.  She is concerned regarding her TTE and the presence of mitral regurg, dilated LA, and elevated ePAP.  She was previously trialed on inhaler therapy but she felt worse on Breztri.       HISTORY FROM INITIAL VISIT:  ILD.  Has CKD, AFIB, Sick Sinus Syndrome with pacemaker, HTN, hyperlipidemia, and JOSÉ (could not tolerate CPAP).    Had right VATS wedge biopsy on 10/23/23 that showed NSIP.  She presently has shortness of breath with exertion, but feels as if she is improving.  Has AFib (on Xeralto and Rhythmol) which she feels is contributing to her breathing issues.  Has chronic, intermittent dry cough.  Denies any GERD or sinus issues.  Never smoked.  Is a retired gastroenterologist.  Had a weakly positive BOLIVAR, but otherwise negative autoimmune/CTD workup.  Chest CT shows scatterd GGOs and bibasilar scarring with fibrosis and mild traction bronchiectasis.  Denies any known exposures.  Most recent Echo showed EF of 55% with PA systolic of 37.  Also has sick sinus syndrome with Pacemaker--is followed closely by cardiology.  Spirometry is normal  with mild restrictive lung volumes.  DLCO is moderately decreased.  Admits to a sedentary lifestyle.  Was recently discharged for heart failure on 3L NC oxygen.  Has been diuresing well since discharged and feel that she is getting better.  Denies shortness of breath at rest.      Past Medical History:   Diagnosis Date    Allergy     Arthritis     Atrial fibrillation 2017    Chronic diastolic heart failure 2023    CKD (chronic kidney disease) stage 3, GFR 30-59 ml/min 2017    Coronary artery calcification seen on CAT scan 2023    Disorder of kidney and ureter     GERD (gastroesophageal reflux disease)     Hyperlipidemia     Hypertension     Impaired fasting glucose 2021    Pre-diabetes 6/3/2017       Past Surgical History:   Procedure Laterality Date    ADENOIDECTOMY      BACK SURGERY      lamenectomy    BREAST SURGERY      BRONCHOSCOPY N/A 2023    Procedure: BRONCHOSCOPY;  Surgeon: Paul Diagnostic Provider;  [facility];  Service: Anesthesiology;  Laterality: N/A;    BRONCHOSCOPY N/A 10/20/2023    Procedure: BRONCHOSCOPY;  Surgeon: Anatoly Tiwari MD;  [facility];  Service: Cardiothoracic;  Laterality: N/A;    CARDIAC SURGERY      [employer] pacemaker     CATARACT EXTRACTION W/  INTRAOCULAR LENS IMPLANT Right 6/3/2019    Procedure: EXTRACTION, CATARACT, WITH IOL INSERTION;  Surgeon: Raisa Moreno MD;  Location: [facility] OR;  Service: Ophthalmology;  Laterality: Right;  Laser    CATARACT EXTRACTION W/  INTRAOCULAR LENS IMPLANT Left 2019    Procedure: EXTRACTION, CATARACT, WITH IOL INSERTION;  Surgeon: Raisa Moreno MD;  Location: [facility] OR;  Service: Ophthalmology;  Laterality: Left;  LASER ASSISTED     SECTION      1980    COLONOSCOPY N/A 9/15/2023    Procedure: COLONOSCOPY;  Surgeon: Tao Gomez MD;  Location: [facility] ENDO (2ND FLR);  Service: Endoscopy;  Laterality: N/A;  inst to portal/[employer] pacemaker   2nd floor for airway  protection patient with lung disease elevated pulmonary artery pressure pacemaker and fecal occult blood positive stool,   cleared by pulmonary Dr Bhakta-see note on 7/11-GT    ENDOSCOPIC ULTRASOUND OF UPPER GASTROINTESTINAL TRACT N/A 9/19/2023    Procedure: ULTRASOUND, UPPER GI TRACT, ENDOSCOPIC;  Surgeon: Casimiro De Los Santos MD;  Location: [facility] ENDO;  Service: Endoscopy;  Laterality: N/A;  9/13/23: instructions send via portal. [employer] ppm- GD    EYE SURGERY      FRACTURE SURGERY      HYSTERECTOMY      INJECTION OF ANESTHETIC AGENT AROUND MULTIPLE INTERCOSTAL NERVES  10/20/2023    Procedure: BLOCK, NERVE, INTERCOSTAL, 2 OR MORE;  Surgeon: Anatoly Tiwari MD;  Location: [facility] OR [address] [place] [address];  Service: Cardiothoracic;;    INSERT / REPLACE / REMOVE PACEMAKER  2009    placement    INSERT / REPLACE / REMOVE PACEMAKER  09/22/2014    [employer] Model #[id number] replacement    pace maker  2009    replacement 2014    REPLACEMENT OF PACEMAKER GENERATOR Left 1/26/2024    Procedure: REPLACEMENT, PACEMAKER GENERATOR;  Surgeon: Trell Hodgson MD;  Location: [facility] EP LAB;  Service: Cardiology;  Laterality: Left;  DEVORAH, PPM gen chg, SJM, MAC, GP, 3prep, *SJM dcPPM in situ*    REVISION OF PROCEDURE INVOLVING PACEMAKER LEAD Left 1/13/2022    Procedure: REVISION, ELECTRODE LEAD, CARDIAC PACEMAKER;  Surgeon: Trell Hodgson MD;  Location: [facility] EP LAB;  Service: Cardiology;  Laterality: Left;  PPM lead Malfx, RA lead revision, SJM, MAC, GP, 3 PREP*dPPM SJM in situ**Contrast prep given*    salpingo opherectomy Bilateral 1998    SPINE SURGERY      TONSILLECTOMY  1953    TUBAL LIGATION      VIDEO-ASSISTED THORACOSCOPIC SURGERY (VATS) Right 10/20/2023    Procedure: VATS WEDGE;  Surgeon: Anatoly Tiwari MD;  [facility];  Service: Cardiothoracic;  Laterality: Right;       Allergies: Iodinated contrast media, Penicillins, Allopurinol analogues, Ciprofloxacin, Quinolones, Sulfa (sulfonamide antibiotics), and Tetracyclines    Current  Outpatient Medications   Medication Sig    aspirin (ECOTRIN) 81 MG EC tablet Take 1 tablet (81 mg total) by mouth once daily.    bisoprolol (ZEBETA) 5 MG tablet Take 1 tablet (5 mg total) by mouth once daily.    clobetasoL (OLUX) 0.05 % Foam Apply 1 application topically every 30 days.    ERGOCALCIFEROL, VITAMIN D2, (VITAMIN D ORAL) Take 2,000 Units by mouth once daily.    furosemide (LASIX) 20 MG tablet Take 1 tablet (20 mg total) by mouth once daily.    montelukast (SINGULAIR) 10 mg tablet Take 1 tablet (10 mg total) by mouth every evening.    mycophenolate (CELLCEPT) 500 mg Tab Take 1 tablet (500 mg) BID x 7 days, then increase to 2 tablets (1000 mg) BID thereafter    potassium citrate (UROCIT-K) 10 mEq (1,080 mg) TbSR TAKE 1 TABLET BY MOUTH ONCE DAILY    propafenone (RYTHMOL SR) 325 MG Cp12 Take 1 capsule (325 mg total) by mouth every 12 (twelve) hours.    rosuvastatin (CRESTOR) 40 MG Tab Take 1 tablet (40 mg total) by mouth every evening. (Patient taking differently: Take 40 mg by mouth once daily.)    topiramate (TOPAMAX) 25 MG tablet TAKE 1 TABLET TWICE A DAY    nitroGLYCERIN (NITROSTAT) 0.4 MG SL tablet Place 1 tablet (0.4 mg total) under the tongue every 5 (five) minutes as needed for Chest pain. (Patient not taking: Reported on 1/4/2024)     No current facility-administered medications for this visit.     Facility-Administered Medications Ordered in Other Visits   Medication    vancomycin (VANCOCIN) 1,000 mg in dextrose 5 % (D5W) 250 mL IVPB (Vial-Mate)       Immunization History   Administered Date(s) Administered    COVID-19, MRNA, LN-S, PF (Pfizer) (Gray Cap) 04/12/2022    COVID-19, MRNA, LN-S, PF (Pfizer) (Purple Cap) 01/12/2021, 02/02/2021, 10/01/2021    COVID-19, mRNA, LNP-S, PF, gian-sucrose, 30 mcg/0.3 mL (Pfizer 2023 Ages 12+) 10/06/2023    COVID-19, mRNA, LNP-S, bivalent booster, PF (PFIZER OMICRON) 11/21/2022    Influenza (FLUAD) - Quadrivalent - Adjuvanted - PF *Preferred* (65+) 10/01/2021,  10/11/2022    Influenza - High Dose - PF (65 years and older) 11/20/2015, 01/05/2018, 11/09/2018    Influenza - Quadrivalent 11/21/2016    Pneumococcal Conjugate - 13 Valent 11/20/2015    Pneumococcal Polysaccharide - 23 Valent 11/21/2016     Family History:    Family History   Problem Relation Age of Onset    Hypertension Mother     Diabetes Mother     Hyperlipidemia Mother     Coronary artery disease Mother     Heart disease Mother     Stroke Mother     Hypertension Father     Diabetes Father     Hyperlipidemia Father     Coronary artery disease Father     Heart disease Father     Coronary artery disease Brother     Heart disease Brother     Hyperthyroidism Brother     Diabetes Paternal Grandmother     Diabetes Paternal Grandfather     Heart attack Neg Hx      Social History     Substance and Sexual Activity   Alcohol Use Not Currently    Alcohol/week: 5.0 standard drinks of alcohol    Types: 5 Glasses of wine per week      Social History     Substance and Sexual Activity   Drug Use No      Social History     Socioeconomic History    Marital status:    Tobacco Use    Smoking status: Never     Passive exposure: Past    Smokeless tobacco: Never   Substance and Sexual Activity    Alcohol use: Not Currently     Alcohol/week: 5.0 standard drinks of alcohol     Types: 5 Glasses of wine per week    Drug use: No    Sexual activity: Not Currently     Partners: Male   Social History Narrative    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 Walks daily along the [facility].     Social Determinants of Health     Financial Resource Strain: Low Risk  (10/23/2023)    Overall Financial Resource Strain (CARDIA)     Difficulty of Paying Living Expenses: Not hard at all   Food Insecurity: No Food Insecurity (10/23/2023)    Hunger Vital Sign     Worried About Running Out of Food in the Last Year: Never true     Ran Out of Food in the Last Year: Never true   Transportation Needs: No Transportation Needs (10/23/2023)    PRAPARE -  "Transportation     Lack of Transportation (Medical): No     Lack of Transportation (Non-Medical): No   Physical Activity: Inactive (10/23/2023)    Exercise Vital Sign     Days of Exercise per Week: 0 days     Minutes of Exercise per Session: 0 min   Stress: No Stress Concern Present (10/23/2023)    [employer] [place] [employer] - Occupational Stress Questionnaire     Feeling of Stress : Only a little   Social Connections: Moderately Isolated (10/23/2023)    Social Connection and Isolation Panel [NHANES]     Frequency of Communication with Friends and Family: More than three times a week     Frequency of Social Gatherings with Friends and Family: More than three times a week     Attends [employer] Services: Never     Active Member of Clubs or Organizations: No     Attends Club or Organization Meetings: Never     Marital Status:    Housing Stability: Low Risk  (10/23/2023)    Housing Stability Vital Sign     Unable to Pay for Housing in the Last Year: No     Number of Places Lived in the Last Year: 1     Unstable Housing in the Last Year: No     Review of Systems   Constitutional:  Negative for malaise/fatigue.   HENT: Negative.     Respiratory:  Positive for shortness of breath (improving). Negative for cough.    Gastrointestinal:  Negative for abdominal pain, heartburn and vomiting.   Skin: Negative.    Endo/Heme/Allergies:  Bruises/bleeds easily.     Vitals  /62 (BP Location: Left arm, Patient Position: Sitting, BP Method: Large (Automatic))   Pulse 65   Temp 98.5 °F (36.9 °C) (Oral)   Ht 5' 1" (1.549 m)   Wt 84.5 kg (186 lb 4.6 oz)   SpO2 96% Comment: room air  BMI 35.20 kg/m²   Physical Exam  Constitutional:       Appearance: Normal appearance. She is obese.   HENT:      Head: Normocephalic and atraumatic.   Eyes:      Extraocular Movements: Extraocular movements intact.   Pulmonary:      Effort: Pulmonary effort is normal. No respiratory distress.      Breath sounds: No stridor. No " wheezing, rhonchi or rales.   Chest:      Chest wall: No tenderness.   Musculoskeletal:      Right lower leg: No edema.      Left lower leg: No edema.   Skin:     General: Skin is warm and dry.   Neurological:      Mental Status: She is alert and oriented to person, place, and time.   Psychiatric:         Mood and Affect: Mood normal.         Behavior: Behavior normal.         Thought Content: Thought content normal.         Judgment: Judgment normal.         Labs:  Lab Visit on 04/02/2024   Component Date Value    Protein, Urine Random 04/02/2024 101 (H)     Creatinine, Urine 04/02/2024 92.0     Prot/Creat Ratio, Urine 04/02/2024 1.10 (H)    Lab Visit on 04/02/2024   Component Date Value    WBC 04/02/2024 6.88     RBC 04/02/2024 4.14     Hemoglobin 04/02/2024 11.5 (L)     Hematocrit 04/02/2024 37.2     MCV 04/02/2024 90     MCH 04/02/2024 27.8     MCHC 04/02/2024 30.9 (L)     RDW 04/02/2024 13.2     Platelets 04/02/2024 300     MPV 04/02/2024 9.4     Immature Granulocytes 04/02/2024 0.4     Gran # (ANC) 04/02/2024 5.0     Immature Grans (Abs) 04/02/2024 0.03     Lymph # 04/02/2024 1.2     Mono # 04/02/2024 0.4     Eos # 04/02/2024 0.1     Baso # 04/02/2024 0.07     nRBC 04/02/2024 0     Gran % 04/02/2024 73.1 (H)     Lymph % 04/02/2024 17.9 (L)     Mono % 04/02/2024 6.1     Eosinophil % 04/02/2024 1.5     Basophil % 04/02/2024 1.0     Differential Method 04/02/2024 Automated     Protein, Serum 04/02/2024 6.8     CPK 04/02/2024 69            4/2/2024    11:44 AM 1/4/2024    11:31 AM 11/13/2023     2:24 PM   Pulmonary Function Tests   FVC 1.8 liters 1.53 liters 1.89 liters   FEV1 1.29 liters 1.07 liters 1.42 liters   TLC (liters) 2.77 liters 2.34 liters 3.04 liters   DLCO (ml/mmHg sec) 9.2 ml/mmHg sec 8.1 ml/mmHg sec 9.68 ml/mmHg sec   FVC% 74 62.7 77.5   FEV1% 68 56.8 75   FEF 25-75 0.83 0.65 1.05   FEF 25-75% 51 40.2 64.5   TLC% 62 52.7 68.4   RV 0.98 0.76 1.14   RV% 49 38.1 58   DLCO% 49 24.57 51         4/2/2024     11:11 AM 1/4/2024    10:49 AM 11/13/2023     1:37 PM 3/31/2023    12:23 PM   6MW   6MWT Status completed without stopping completed with stops completed without stopping completed without stopping   Patient Reported Dyspnea Dyspnea Dyspnea;Dizziness;Other (Comment) Dyspnea   Was O2 used? No No No No   6MW Distance walked (feet) 900 feet 900 feet 675 feet 800 feet   Distance walked (meters) 274.32 meters 274.32 meters 205.74 meters 243.84 meters   Did patient stop? No No No No   Oxygen Saturation 96 % 96 % 95 % 94 %   Supplemental Oxygen Room Air Room Air Room Air Room Air   Heart Rate 65 bpm 75 bpm 70 bpm 77 bpm   Blood Pressure 120/64 149/67 131/64 125/66   Drew Dyspnea Rating  very light light light nothing at all   Oxygen Saturation 95 % 92 % 94 % 94 %   Supplemental Oxygen Room Air Room Air Room Air Room Air   Heart Rate 97 bpm 118 bpm 77 bpm 101 bpm   Blood Pressure 137/67 130/64 123/58 118/65   Drew Dyspnea Rating  somewhat heavy somewhat heavy heavy moderate   Recovery Time (seconds) 71 seconds 82 seconds 78 seconds 58 seconds   Oxygen Saturation 98 % 95 % 95 % 96 %   Supplemental Oxygen Room Air Room Air Room Air Room Air   Heart Rate 74 bpm 89 bpm 80 bpm 82 bpm       Imaging:  Results for orders placed during the hospital encounter of 11/06/23    X-Ray Chest PA And Lateral    Narrative  EXAMINATION:  XR CHEST PA AND LATERAL    CLINICAL HISTORY:  Interstitial pulmonary disease, unspecified    TECHNIQUE:  PA and lateral views of the chest were performed.    COMPARISON:  10/25/2023    FINDINGS:  Cardiac size is mildly enlarged pacemaker is present.  Patient has a moderate right pleural effusion or pleural thickening with little loss of volume at the right lung base.  Left lung is clear.  No vascular congestion is noted.    Impression  See above      Electronically signed by: Jefferson Garcia MD  Date:    11/06/2023  Time:    08:42    Results for orders placed during the hospital encounter of  04/06/22    DXA Bone Density Spine And Hip    Narrative  EXAMINATION:  DEXA BONE DENSITY SPINE HIP    CLINICAL HISTORY:  Asymptomatic menopausal state.72 y/o female with a history of fractured left tibia and fibula at 69 y/o.  She had surgical menopause at 49 y/o.  She is taking Vit D supplements.  She exercise regularly and does not smoke.    TECHNIQUE:  DXA specification: Main [place] [employer] Horizon A ([id number])    Bone Mineral Density scanning was performed over the hip and lumbar spine.    Review of the images confirms satisfactory positioning and technique.    COMPARISON:  Comparison study done on 11/12/2018.    Lumbar spine:    BMD 1.062 g/cm2 and T-score 0.1.    Total Hip:           BMD 0.869 g/cm2 and T-score -0.6.    Distal 1/3 radius: Not applicable    FINDINGS:  Lumbar spine (L1-L4):              BMD is 1.025 g/cm2, T-score is -0.2, and Z-score is 2.0.    Total hip:                                BMD is 0.782 g/cm2, T-score is -1.3, and Z-score is 0.3.    Femoral neck:                          BMD is 0.706 g/cm2, T-score is -1.3, and Z-score is 0.6.    Distal 1/3 radius:                      Not applicable    FRAX:    14% risk of a major osteoporotic fracture in the next 10 years.    1.9% risk of hip fracture in the next 10 years.    Impression  *Low bone mass (Osteopenia); FRAX calculations do not support treatment as osteoporosis  *Compared with previous DXA, BMD at the lumbar spine has declined by 3.6%, and the BMD at the total hip has declined by 10%.    RECOMMENDATIONS:  *Daily calcium intake 4970-2382 mg, dietary sources preferred; Vitamin D 2648-9828 IU daily.  *Weight bearing exercise and fall precautions.  *If prior fracture was a result of low trauma, would consider treatment with bisphosphonate or denosumab.  *Repeat BMD in 2 years    EXPLANATION OF RESULTS:  T-score compares these results to the average bone density of a 20-29 year-old of the same gender.    Z-score compares this result to  "the average bone density to people of the same age, gender, and race.    The amounts indicate the number of standard deviations above or below the mean.    * Osteoporosis is generally defined as having a T-score between less than or equal to -2.5.    * Low bone mass (osteopenia) is generally defined as having a T-score between -1.0 and -2.5.    * The normal range is generally defined as having a T-score greater than or equal to -1.0.    * Calculated FRAX scores for fracture risk prediction may not be accurate in the setting of certain clinical factors such as pharmacologic therapy for osteoporosis, prior fragility fractures, high dose glucocorticoid use.      Electronically signed by: Ayse Olivera MD  Date:    04/14/2022  Time:    07:12    Results for orders placed or performed during the hospital encounter of 01/04/24 (from the past 2160 hour(s))   CT Chest Without Contrast    Narrative    EXAMINATION:  CT CHEST WITHOUT CONTRAST    CLINICAL HISTORY:  "Interstitial lung disease;"    COMPARISON:  10/22/2023    TECHNIQUE:  High-resolution CT of the chest with volumetric data acquisition was obtained without intravenous contrast. Sagittal and coronal multiplanar reconstructions were performed.  Expiratory phase on prone position was performed.  Lack of IV contrast material limits the assessment of mediastinal and abdominal structures.    FINDINGS:  Lungs and large airways: Improvement of the previously seen linear radiopaque density in the right upper and lower lobes with adjacent consolidation in this patient with the history of VATS procedure (axial image 181 and 246 series 4).  Minimal interlobular septal thickening in the subpleural area of the bilateral lower lobes, right greater than left, better visualized on the prone series (series 8).  No evidence of traction bronchiectasis or honeycombing pattern.  The minimal linear atelectasis in the lingula.  The trachea and main bronchi are patent    Pleura: Small right " pleural effusion.  No evidence of left pleural effusion.  No pneumothorax noted in the present study.    Heart and pericardium: Cardiac silhouette is in the upper limits of normal in size.  No evidence of pericardial effusion.  The left-sided cardiac device with tips in right atrium and right ventricle.    Mediastinum and alex: Subcentimeter lymph nodes in mediastinum.  No evidence of mediastinal or hilar lymphadenopathy according to the CT criteria.    Chest wall and lower neck: Thyroid gland is normal in size.  No evidence of lymphadenopathy in the axillary region.  Interval resolution of the subcutaneous emphysema and fat stranding in the right lateral chest wall.  New localized fluid collection measuring 6 x 3 cm in the right lateral chest wall.  The large fat containing mass with tiny calcification measuring 12.2 x 5.6 cm in the left posterior chest wall is again noted and grossly unchanged.    Vessels: Left-sided aortic arch.  Aorta is normal in caliber.  The aortic, supraaortic and coronary atheromatous calcifications.  Main pulmonary and right and left pulmonary arteries are normal in size.    Bones: Unchanged    Upper abdomen: Small hiatal hernia.  The liver is normal in size with subcentimeter focal hypodensity in the right hepatic lobe, too small to characterize.  The grossly stable 1.7 cm nodule in the right adrenal gland.  The multiple cystic lesions within the pancreas, the largest measuring 1.7 cm again noted and grossly unchanged.  Left adrenal gland is normal in size.  Spleen is normal in size with no evidence of focal lesion.  The nonobstructing small left renal stone.      Impression    1. Minimal interlobular septal thickening in the subpleural area of the bilateral lower lobes with no evidence of traction bronchiectasis could represent a early interstitial lung disease.  No evidence of pulmonary fibrosis.  2. Improvement of the previously seen linear radiopaque density with chest and  consolidation areas in the right upper and lower lobes.  3. Small right pleural effusion with no evidence of ipsilateral pneumothorax in the present study.  4. Interval resolution of the fat stranding and subcutaneous emphysema in the right lateral chest wall.  5. New localized fluid collection measuring 6 x 3 cm in the right lateral chest wall may represent a hematoma.  6. Grossly stable large fat containing mass with tiny calcification measuring 12.2 x 5.6 cm in the left posterior chest wall may represent lipoma.  7. Additional findings.  Please see the above discussion.      Electronically signed by: Scott Quispe  Date:    01/07/2024  Time:    13:47           Cardiodiagnostics:  Results for orders placed during the hospital encounter of 01/20/22    Echo    Interpretation Summary  · The left ventricle is normal in size with low normal systolic function.  · The estimated ejection fraction is 53%.  · There are segmental left ventricular wall motion abnormalities.  · There is abnormal septal wall motion.  · Normal right ventricular size with normal right ventricular systolic function.  · Normal left ventricular diastolic function.  · Mild mitral regurgitation.  · Normal central venous pressure (3 mmHg).  · The estimated PA systolic pressure is 25 mmHg.  · Trivial posterior pericardial        PATHOLOGY    1.  Lung, right lower lobe, wedge resection:   - Chronic interstitial pneumonitis with NSIP-like injury pattern   - No evidence of malignancy   - See comment     2. Lung, right middle lobe, wedge resection:   - Chronic interstitial pneumonitis with NSIP-like injury pattern   - No evidence of malignancy           Assessment:  1. Interstitial lung disease    2. Hypersensitivity pneumonitis    3. Mitral valve disease        Plan: CTD workup negative.  Wedge biopsy shows NSIP, but likely HP.  Chest CT with GGO and mosaic attenuation.  Patient with chronic diastolic heart failure.  On room air.  Patient has JOSÉ but  could not tolerate CPAP.   Symptomatically, reports that she is feeling better with improvement in her shortness of breath.       Continue mycophenolate.  Decrease to 500mg BID due to GI side effects.  Will reassess in 3 months with PFTs.  If any changes, will consider increasing to 750mg BID vs changing to myfortic.  Did not tolerate Breztri.  Continue with singulair.      2.    Shortness of breath improving, but is likely multi factorial.  Echo with increase in ePAP to 47 mmHg and evidence of mitral valve disease.  Explained that the majority of her issue with elevated ePAP is from WHO group 2 PH.  She does have lung disease but her spirometry and symptoms are stable so if there's a component of WHO group 3, it has not changed and therefore would not be the cause of her elevation of her PA pressures.  She is on diuretics and follows closely with cards.       3.   No desaturations on 6MWT with adequate distance today.        4.   RTC in 3 months with PFTs only.      [place] D.O.  Pulmonary/Critical Care and Lung Transplantation  [facility]

## 2024-04-02 NOTE — PROCEDURES
Jesenia Curiel is a 73 y.o.  female patient, who presents for a 6 minute walk test ordered by MD Ian.  The diagnosis is Interstitial Lung Disease; NSIP.  The patient's BMI is 36.5 kg/m2.  Predicted distance (lower limit of normal) is 218.82 meters.      Test Results:    The test was completed without stopping. The total time walked was 360 seconds. During walking, the patient reported:  Dyspnea. The patient used no assistive devices during testing.     04/02/2024---------Distance: 274.32 meters (900 feet)     Lap Walk Time O2 Sat % Supplemental Oxygen Heart Rate Blood Pressure Drew Scale   Pre-exercise  (Resting) 0 0 96 % Room Air 65 bpm 120/64 mmHg 1   During Exercise 1 73 sec 94 % Room Air 98 bpm     During Exercise 2 149 sec 94 % Room Air 99 bpm     During Exercise 3 232 sec 95 % Room Air 98 bpm     During Exercise 4 318 sec 95 % Room Air 99 bpm     End of Exercise  360 sec 95 % Room Air 97 bpm 137/67 mmHg 4   Post-exercise  (Recovery)   98 % Room Air  74 bpm       Recovery Time: 71 seconds    Performing nurse/tech: RUTH Schulz      PREVIOUS STUDY:   01/04/2024---------Distance: 274.32 meters (900 feet)       Lap Walk Time O2 Sat % Supplemental Oxygen Heart Rate Blood Pressure Drew Scale Comment   Pre-exercise  (Resting) 0 0 96 % Room Air 75 bpm 149/67 mmHg 2     During Exercise 1 70 sec 96 % Room Air 124 bpm         During Exercise 2 132 sec 94 % Room Air 128 bpm     Patient rested 30 seconds.   During Exercise 3 230 sec 94 % Room Air 119 bpm         During Exercise 4 318 sec 93 % Room Air 119 bpm         End of Exercise   360 sec 92 % Room Air 118 bpm 130/64 mmHg 4     Post-exercise  (Recovery)     95 % Room Air  89 bpm             CLINICAL INTERPRETATION:  Six minute walk distance is 274.32 meters (900 feet) with somewhat heavy dyspnea.  During exercise, there was no significant desaturation while breathing room air.  Blood pressure remained stable and Heart rate increased significantly with  walking.  The patient did not report non-pulmonary symptoms during exercise.  Significant exercise impairment is likely due to subjective symptoms.  The patient did complete the study, walking 360 seconds of the 360 second test.  Since the previous study in January 2024, exercise capacity is unchanged.  Based upon age and body mass index, exercise capacity may be normal.

## 2024-04-04 ENCOUNTER — HOSPITAL ENCOUNTER (OUTPATIENT)
Dept: PULMONOLOGY | Facility: OTHER | Age: 74
Discharge: HOME | End: 2024-04-04
Attending: INTERNAL MEDICINE
Payer: MEDICARE

## 2024-04-04 PROCEDURE — G0238 OTH RESP PROC, INDIV: HCPCS | Mod: TXP

## 2024-04-04 PROCEDURE — G0237 THERAPEUTIC PROCD STRG ENDUR: HCPCS | Mod: TXP

## 2024-04-06 ENCOUNTER — PATIENT MESSAGE (OUTPATIENT)
Dept: NEPHROLOGY | Facility: CLINIC | Age: 74
End: 2024-04-06
Payer: MEDICARE

## 2024-04-08 DIAGNOSIS — N18.32 STAGE 3B CHRONIC KIDNEY DISEASE: Primary | ICD-10-CM

## 2024-04-09 ENCOUNTER — HOSPITAL ENCOUNTER (OUTPATIENT)
Dept: PULMONOLOGY | Facility: OTHER | Age: 74
Discharge: HOME | End: 2024-04-09
Attending: INTERNAL MEDICINE
Payer: MEDICARE

## 2024-04-09 PROCEDURE — G0238 OTH RESP PROC, INDIV: HCPCS | Mod: NTX

## 2024-04-09 PROCEDURE — G0237 THERAPEUTIC PROCD STRG ENDUR: HCPCS | Mod: NTX

## 2024-04-11 ENCOUNTER — HOSPITAL ENCOUNTER (OUTPATIENT)
Dept: PULMONOLOGY | Facility: OTHER | Age: 74
Discharge: HOME | End: 2024-04-11
Attending: INTERNAL MEDICINE
Payer: MEDICARE

## 2024-04-11 PROCEDURE — G0238 OTH RESP PROC, INDIV: HCPCS | Mod: TXP

## 2024-04-11 PROCEDURE — G0237 THERAPEUTIC PROCD STRG ENDUR: HCPCS | Mod: NTX

## 2024-04-16 ENCOUNTER — HOSPITAL ENCOUNTER (OUTPATIENT)
Dept: PULMONOLOGY | Facility: OTHER | Age: 74
Discharge: HOME | End: 2024-04-16
Attending: INTERNAL MEDICINE
Payer: MEDICARE

## 2024-04-16 PROCEDURE — G0238 OTH RESP PROC, INDIV: HCPCS | Mod: TXP

## 2024-04-16 PROCEDURE — G0237 THERAPEUTIC PROCD STRG ENDUR: HCPCS | Mod: TXP

## 2024-04-17 ENCOUNTER — OFFICE VISIT (OUTPATIENT)
Dept: GASTROENTEROLOGY | Facility: CLINIC | Age: 74
End: 2024-04-17
Payer: MEDICARE

## 2024-04-17 DIAGNOSIS — K86.2 PANCREAS CYST: Primary | ICD-10-CM

## 2024-04-17 PROCEDURE — 99213 OFFICE O/P EST LOW 20 MIN: CPT | Mod: 95,NTX,, | Performed by: INTERNAL MEDICINE

## 2024-04-17 NOTE — PROGRESS NOTES
The patient location is: home  The chief complaint leading to consultation is: There were no encounter diagnoses.    Visit type: Virtual visit with synchronous audio and video  Total time spent with patient:   minutes  Each patient to whom he or she provides medical services by telemedicine is:  (1) informed of the relationship between the physician and patient and the respective role of any other health care provider with respect to management of the patient; and (2) notified that he or she may decline to receive medical services by telemedicine and may withdraw from such care at any time.    Notes:           [facility] Telemedicine Consultation Note    Reason for Consult:  There were no encounter diagnoses.    PCP:   Sandra Michaud MD:  No referring provider defined for this encounter.    Initial History of Present Illness (HPI):  This is a 73 y.o. female here for evaluation of pancreatic cyst.  She had no pancreatic cyst for the last year 1 is approximately 1.7cm in size.  They were stable in size via a CT scan in 2024.  She has had some complications related to her pulmonary fibrosis.  She has had GI distress related to CellCept dosage changes.    Abdominal pain - no  Reflux - no  Dysphagia - no   Bowel habits - normal  GI bleeding - none  NSAID usage - none        ROS:      Medical History:  has a past medical history of Allergy, Arthritis, Atrial fibrillation (2017), Chronic diastolic heart failure (2023), CKD (chronic kidney disease) stage 3, GFR 30-59 ml/min (2017), Coronary artery calcification seen on CAT scan (2023), Disorder of kidney and ureter, GERD (gastroesophageal reflux disease), Hyperlipidemia, Hypertension, Impaired fasting glucose (2021), and Pre-diabetes (6/3/2017).    Surgical History:  has a past surgical history that includes Hysterectomy; salpingo opherectomy (Bilateral, );  section; Tonsillectomy (); Back  surgery (2000); pace maker (2009); Insert / replace / remove pacemaker (2009); Insert / replace / remove pacemaker (09/22/2014); Cataract extraction w/  intraocular lens implant (Right, 6/3/2019); Cardiac surgery; Cataract extraction w/  intraocular lens implant (Left, 9/23/2019); Adenoidectomy; Eye surgery; Fracture surgery; Spine surgery; Breast surgery; Tubal ligation; Revision of procedure involving pacemaker lead (Left, 1/13/2022); Bronchoscopy (N/A, 7/13/2023); Colonoscopy (N/A, 9/15/2023); Endoscopic ultrasound of upper gastrointestinal tract (N/A, 9/19/2023); Video-assisted thoracoscopic surgery (VATS) (Right, 10/20/2023); Bronchoscopy (N/A, 10/20/2023); Injection of anesthetic agent around multiple intercostal nerves (10/20/2023); and Replacement of pacemaker generator (Left, 1/26/2024).    Family History: family history includes Coronary artery disease in her brother, father, and mother; Diabetes in her father, mother, paternal grandfather, and paternal grandmother; Heart disease in her brother, father, and mother; Hyperlipidemia in her father and mother; Hypertension in her father and mother; Hyperthyroidism in her brother; Stroke in her mother..     Social History:  reports that she has never smoked. She has been exposed to tobacco smoke. She has never used smokeless tobacco. She reports that she does not currently use alcohol after a past usage of about 5.0 standard drinks of alcohol per week. She reports that she does not use drugs.    Review of patient's allergies indicates:   Allergen Reactions    Iodinated contrast media Anaphylaxis    Penicillins Anaphylaxis    Allopurinol analogues      Muscle cramps    Ciprofloxacin Rash    Quinolones Rash    Sulfa (sulfonamide antibiotics) Rash    Tetracyclines Rash       Medication List with Changes/Refills   Current Medications    ASPIRIN (ECOTRIN) 81 MG EC TABLET    Take 1 tablet (81 mg total) by mouth once daily.    BISOPROLOL (ZEBETA) 5 MG TABLET    Take 1  "tablet (5 mg total) by mouth once daily.    CLOBETASOL (OLUX) 0.05 % FOAM    Apply 1 application topically every 30 days.    ERGOCALCIFEROL, VITAMIN D2, (VITAMIN D ORAL)    Take 2,000 Units by mouth once daily.    FUROSEMIDE (LASIX) 20 MG TABLET    Take 1 tablet (20 mg total) by mouth once daily.    MONTELUKAST (SINGULAIR) 10 MG TABLET    Take 1 tablet (10 mg total) by mouth every evening.    MYCOPHENOLATE (CELLCEPT) 500 MG TAB    Take 1 tablet (500 mg) BID x 7 days, then increase to 2 tablets (1000 mg) BID thereafter    NITROGLYCERIN (NITROSTAT) 0.4 MG SL TABLET    Place 1 tablet (0.4 mg total) under the tongue every 5 (five) minutes as needed for Chest pain.    POTASSIUM CITRATE (UROCIT-K) 10 MEQ (1,080 MG) TBSR    TAKE 1 TABLET BY MOUTH ONCE DAILY    PROPAFENONE (RYTHMOL SR) 325 MG CP12    Take 1 capsule (325 mg total) by mouth every 12 (twelve) hours.    ROSUVASTATIN (CRESTOR) 40 MG TAB    Take 1 tablet (40 mg total) by mouth every evening.    TOPIRAMATE (TOPAMAX) 25 MG TABLET    TAKE 1 TABLET TWICE A DAY         Objective Findings:    Deferred for teleconsult      Labs:  Lab Results   Component Value Date    WBC 6.88 04/02/2024    HGB 11.5 (L) 04/02/2024    HCT 37.2 04/02/2024     04/02/2024    CHOL 155 12/28/2023    TRIG 139 12/28/2023    HDL 41 12/28/2023    ALT 18 12/28/2023    AST 25 12/28/2023     02/16/2024    K 3.7 02/16/2024     02/16/2024    CREATININE 1.58 (H) 02/16/2024    BUN 24 (H) 02/16/2024    CO2 29 02/16/2024    TSH 0.506 10/22/2023    INR 1.0 01/19/2024    HGBA1C 5.9 (H) 12/28/2023       No results found for: "HPYLORINTERP"  No results found for: "HPYLORIANTIG"        Imaging:    Endoscopy:          Assessment:  No diagnosis found.       Recommendations:  1. Given her recent imaging showing stability in the size of her pancreatic cyst and her ongoing issues with interstitial fibrosis, It is reasonable to return to clinic in 1 year to discuss further surveillance.  We will " schedule her for a virtual visit in 1 year      No follow-ups on file.      Order summary:         Thank you so much for allowing me to participate in the care of Jesenia De Los Santos MD

## 2024-04-20 ENCOUNTER — PATIENT MESSAGE (OUTPATIENT)
Dept: GASTROENTEROLOGY | Facility: CLINIC | Age: 74
End: 2024-04-20
Payer: MEDICARE

## 2024-04-23 ENCOUNTER — HOSPITAL ENCOUNTER (OUTPATIENT)
Dept: PULMONOLOGY | Facility: OTHER | Age: 74
Discharge: HOME | End: 2024-04-23
Attending: INTERNAL MEDICINE
Payer: MEDICARE

## 2024-04-23 PROCEDURE — G0238 OTH RESP PROC, INDIV: HCPCS | Mod: NTX

## 2024-04-23 PROCEDURE — G0237 THERAPEUTIC PROCD STRG ENDUR: HCPCS | Mod: TXP

## 2024-04-25 ENCOUNTER — HOSPITAL ENCOUNTER (OUTPATIENT)
Dept: PULMONOLOGY | Facility: OTHER | Age: 74
Discharge: HOME | End: 2024-04-25
Attending: INTERNAL MEDICINE
Payer: MEDICARE

## 2024-04-25 PROCEDURE — G0238 OTH RESP PROC, INDIV: HCPCS | Mod: NTX

## 2024-04-25 PROCEDURE — G0237 THERAPEUTIC PROCD STRG ENDUR: HCPCS | Mod: TXP

## 2024-04-26 ENCOUNTER — PATIENT MESSAGE (OUTPATIENT)
Dept: NEPHROLOGY | Facility: CLINIC | Age: 74
End: 2024-04-26
Payer: MEDICARE

## 2024-04-29 DIAGNOSIS — J84.9 INTERSTITIAL LUNG DISEASE: Primary | ICD-10-CM

## 2024-04-30 ENCOUNTER — HOSPITAL ENCOUNTER (OUTPATIENT)
Dept: PULMONOLOGY | Facility: OTHER | Age: 74
Discharge: HOME | End: 2024-04-30
Attending: INTERNAL MEDICINE
Payer: MEDICARE

## 2024-04-30 VITALS — WEIGHT: 185 LBS | BODY MASS INDEX: 34.93 KG/M2 | HEIGHT: 61 IN

## 2024-04-30 DIAGNOSIS — J84.9 ILD (INTERSTITIAL LUNG DISEASE): Primary | ICD-10-CM

## 2024-04-30 PROCEDURE — G0237 THERAPEUTIC PROCD STRG ENDUR: HCPCS | Mod: TXP

## 2024-04-30 PROCEDURE — G0238 OTH RESP PROC, INDIV: HCPCS | Mod: NTX

## 2024-04-30 PROCEDURE — 94618 PULMONARY STRESS TESTING: CPT | Mod: NTX

## 2024-04-30 NOTE — PROGRESS NOTES
"[facility] ([place])  Six Minute Walk     SUMMARY     Ordering Provider: Caroline Kapoor      Performing nurse/tech/RT: VIOLETA Sommers RRT  Diagnosis: Interstitial Lung Disease  Height: 5' 1" (154.9 cm)  Weight: 83.9 kg (185 lb)  BMI (Calculated): 35   Patient Race:             Phase Oxygen Assessment Supplemental O2 Heart   Rate Blood Pressure Drew Dyspnea Scale Rating   Resting 95 % Room Air 62 bpm 134/78 2   Exercise        Minute        1           2           3           4           5           6  94 % Room Air 110 bpm 129/74 5-6   Recovery        Minute        1           2           3           4 97 % Room Air 60 bpm 130/70 2     Six Minute Walk Summary  6MWT Status: completed without stopping  Patient Reported: No complaints  Distance: 925 ft  Previous Distance: 900 ft     Interpretation:                                                     Predicted Distance Meters (Calculated): 373.95 meters                        "

## 2024-05-03 ENCOUNTER — OFFICE VISIT (OUTPATIENT)
Dept: PRIMARY CARE CLINIC | Facility: CLINIC | Age: 74
End: 2024-05-03
Payer: MEDICARE

## 2024-05-03 VITALS
SYSTOLIC BLOOD PRESSURE: 125 MMHG | BODY MASS INDEX: 34.72 KG/M2 | OXYGEN SATURATION: 96 % | WEIGHT: 183.88 LBS | HEIGHT: 61 IN | TEMPERATURE: 98 F | DIASTOLIC BLOOD PRESSURE: 72 MMHG | HEART RATE: 71 BPM

## 2024-05-03 DIAGNOSIS — Z95.0 PACEMAKER: ICD-10-CM

## 2024-05-03 DIAGNOSIS — J67.9 HYPERSENSITIVITY PNEUMONITIS: Primary | ICD-10-CM

## 2024-05-03 DIAGNOSIS — I70.0 AORTIC ATHEROSCLEROSIS: ICD-10-CM

## 2024-05-03 DIAGNOSIS — I49.5 SSS (SICK SINUS SYNDROME): ICD-10-CM

## 2024-05-03 DIAGNOSIS — I50.32 CHRONIC HEART FAILURE WITH PRESERVED EJECTION FRACTION: ICD-10-CM

## 2024-05-03 DIAGNOSIS — N18.32 STAGE 3B CHRONIC KIDNEY DISEASE: ICD-10-CM

## 2024-05-03 DIAGNOSIS — I27.20 PULMONARY HYPERTENSION: ICD-10-CM

## 2024-05-03 DIAGNOSIS — E27.8 ADRENAL INCIDENTALOMA: ICD-10-CM

## 2024-05-03 DIAGNOSIS — K86.2 PANCREATIC CYST: ICD-10-CM

## 2024-05-03 DIAGNOSIS — I48.0 PAROXYSMAL ATRIAL FIBRILLATION: ICD-10-CM

## 2024-05-03 DIAGNOSIS — D84.821 DRUG-INDUCED IMMUNODEFICIENCY: ICD-10-CM

## 2024-05-03 DIAGNOSIS — E21.3 HYPERPARATHYROIDISM: ICD-10-CM

## 2024-05-03 DIAGNOSIS — Z79.899 DRUG-INDUCED IMMUNODEFICIENCY: ICD-10-CM

## 2024-05-03 DIAGNOSIS — F32.A DEPRESSION, UNSPECIFIED DEPRESSION TYPE: ICD-10-CM

## 2024-05-03 PROBLEM — E87.1 HYPONATREMIA: Status: RESOLVED | Noted: 2023-10-22 | Resolved: 2024-05-03

## 2024-05-03 PROBLEM — E87.6 HYPOKALEMIA: Status: RESOLVED | Noted: 2022-06-07 | Resolved: 2024-05-03

## 2024-05-03 PROBLEM — E83.52 HYPERCALCEMIA: Status: RESOLVED | Noted: 2018-07-26 | Resolved: 2024-05-03

## 2024-05-03 PROCEDURE — 99213 OFFICE O/P EST LOW 20 MIN: CPT | Mod: PBBFAC,PN,TXP | Performed by: INTERNAL MEDICINE

## 2024-05-03 PROCEDURE — 99999 PR PBB SHADOW E&M-EST. PATIENT-LVL III: CPT | Mod: PBBFAC,TXP,, | Performed by: INTERNAL MEDICINE

## 2024-05-03 PROCEDURE — 99214 OFFICE O/P EST MOD 30 MIN: CPT | Mod: S$PBB,NTX,, | Performed by: INTERNAL MEDICINE

## 2024-05-03 NOTE — Clinical Note
Dr. Mcgarry, I just saw Dr. Curiel today. Due to the CKD3 with proteinuria and her HFpEF, what are your thoughts about jardiance? She wanted me to discuss it with you first.  Thanks, Sandra

## 2024-05-03 NOTE — PROGRESS NOTES
Subjective:     HPI     Cardiologist: Verna Hodgson MD    I had the pleasure of seeing Jesenia Curiel in follow-up for her history of atrial fibrillation. Last seen in 6/2023. 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 [place] dual chamber pacemaker implantation in 5/2009 (gen change 9/2014). She has been on Rythmol since 2009, which has significantly brought down her arrhythmia burden (episodes used to last for hours but now last seconds to minutes). At her 2017 office visit, she was found to have a <1% AF burden, with the longest episode lasting <90 seconds. At her 7/2018 visit, her device recorded no mode-switch episodes. Xarelto was stopped at that visit. At her last visit in 92020 no mode-switch episodes were seen. Ms. Curiel described episodes of regular tachycardia at 110 bpm range, which would occur once monthly and last several minutes. We discussed option of event monitor but she wanted to hold the course.    An echo done in 9/2019 showed an EF of 55%, and grade 2 DD.    At her 10/2021 visit a device check showed intermittent atrial lead noise. In 1/2022 an RA lead revision was performed.    At her 5/2022 visit, Ms. Curiel described mild pocket discomfort, particularly when she would sleep on her side at night. Device check showed 9 AMS episodes, longest 46 seconds.    At 6/2023 visit device check showed AMS <1% (likely undersensing). Regarding AF episodes, 4 hour episodes on 6/25/2023 between 3-7 am. Pt described NAVA with minimal exertion. Stress echo unremarkable. Per pt, CT chest consistent with diastolic heart failure, although aggressive diuresis had not improved her symptoms. At that visit, accelerometer adjusted.    Anticoagulation was stopped in late 2023 after she suffered a fall following lung biopsy.    Pacemaker generator change in 1/2024. Echo in 2/2024 showed EF 55-60%.    I reviewed today's device  interrogation which shows stable device/lead function, RA pacing 99%, RV pacing 4.8%, AF burden 1% (max duration 35 minutes on 3/12/2024), battery 8.3 years.     Today in the office Ms. Curiel has NAVA, and was recently started on cellcept for hypersensitivity pneumonitis. PFTs have improved, along with some improvement in NAVA.    My interpretation of today's ECG is AV pacing at 77 bpm.    Review of Systems   Constitutional: Positive for malaise/fatigue. Negative for decreased appetite, weight gain and weight loss.   HENT:  Negative for sore throat.    Eyes:  Negative for blurred vision.   Cardiovascular:  Positive for dyspnea on exertion. Negative for chest pain, irregular heartbeat, leg swelling, near-syncope, orthopnea, palpitations, paroxysmal nocturnal dyspnea and syncope.   Respiratory:  Negative for shortness of breath.    Skin:  Negative for rash.   Musculoskeletal:  Negative for arthritis.   Gastrointestinal:  Negative for abdominal pain.   Neurological:  Negative for focal weakness.   Psychiatric/Behavioral:  Negative for altered mental status.         Objective:    Physical Exam  Vitals reviewed.   Constitutional:       General: She is not in acute distress.     Appearance: She is well-developed.   HENT:      Head: Normocephalic and atraumatic.   Eyes:      General: No scleral icterus.     Conjunctiva/sclera: Conjunctivae normal.      Pupils: Pupils are equal, round, and reactive to light.   Neck:      Thyroid: No thyromegaly.      Vascular: No JVD.   Cardiovascular:      Rate and Rhythm: Normal rate and regular rhythm.      Pulses: Intact distal pulses.      Heart sounds: Normal heart sounds. No murmur heard.     No friction rub. No gallop.   Pulmonary:      Effort: Pulmonary effort is normal. No respiratory distress.      Breath sounds: Normal breath sounds. No rales.   Abdominal:      General: Bowel sounds are normal. There is no distension.      Palpations: Abdomen is soft.   Musculoskeletal:       Cervical back: Normal range of motion and neck supple.   Skin:     General: Skin is warm and dry.   Neurological:      Mental Status: She is alert and oriented to person, place, and time.   Psychiatric:         Behavior: Behavior normal.         Assessment:       1. Sick sinus syndrome    2. Aortic atherosclerosis    3. S/P placement of cardiac pacemaker    4. Stage 3b chronic kidney disease    5. Atrial fibrillation with RVR         Plan:       In summary, Jesenia Curiel is a 73 year old female with sick sinus syndrome, pacemaker implantation, status-post RA lead revision in 2022, and symptomatic PAF.     Her AF has been controlled for many years on Rythmol, with the longest episode of AF over the past year lasting several minutes. Briefly discussed starting anticoagulation (CHADS2-VASc of 3) but given ongoing issues with NAVA she would prefer to hold the course.    With regard to anticoagulation, given her very low risk of CVA given <1% AF burden, I do not think anticoagulation is necessary. However, pts preference is to be on anticoagulation. Will start eliqusi 5 mg bid. She has had melena in the past but had a recent negative GI work-up. She will stop this med if she has recurrent falls or bleeding.    Thank you for allowing me to participate in the care of this patient. Please do not hesitate to call me with any questions or concerns.

## 2024-05-03 NOTE — PROGRESS NOTES
Subjective:       Patient ID: Jesenia Curiel is a 73 y.o. female.    Chief Complaint: follow up on new dx    HPI  MMG - declines.  Home FITKIT positive 4/5/23. Cscope - 9/15/23 - diverticulosis. 4mm polyp. Nonbleeding internal hemorrhoids. Benign path. Repeat in 7 yrs.  DEXA-4/6/22 osteopenia     Chronic cough and SOB. LOV w/ Dr. Bhakta in pulm 9/26/23. F/u in 4 weeks. So far more esoteric autoimmune testing is neg but PM-Scl abs still pending.  Neg DSE 3/2023  Normal 6MWT 3/2023  Home sleep study 4/3/23 - mod to severe JOSÉ. Couldn't tolerate the CPAP - tried it for 3 weeks.   CT of chest w/o con 6/16/23 - PM in place. Mild calcific aortic atherosclerosis and calcific disease of the aortic annulus. Mild multi vessel coronary a atherosclerosis. Mild biapical fibronodular scarring. Mild traction bronchiectasis. Air trapping. Small hiatal hernia. B renal atrophy. Stable 1.5cm R adrenal gland nodule. 1.8cm lesion at pancreatic tail, slightly enlarged when compared to prior CT of chest. Degen changes of the spine.   Bronchoscopy 7/13/23: cultures negative including AFB.   - Chronic cough with abnormal CT                          - Interstitial lung disease                          - Edema was present throughout the                          tracheobronchial tree.                          - Bronchoalveolar lavage was performed.                          - Transbronchial lung biopsies were performed.               Path w/ chronic ILD as consideration.  Had lung bx w/ CTS Dr. Tiwari 10/20/23 - path w/ chronic interstitial pneumonitis w/ NSIP-like injury pattern. No malignancy.    After the biopsy, fell at home. Went back to ED. Hospi from 10/22 through 10/26/23 for hyponatremia. Likely due to dehydration. Na and Cr improved w/ IVF. At discharge, required home O2.      Followed up w/ Dr. Tiwari 11/14/23 and referred to advanced lung disease. F/u prn.    6MWT w/o hypoxia 11/13/23 on RA but suboptimal distance.   Seen by  transplant NP and Dr. Kapoor 11/15/23. Referred to pulm rehab. Cont O2 w/ exertion.    Repeat 6MWT 1/4/24 w/ improved exercise capacity  CT chest 1/4/24 -   1. Minimal interlobular septal thickening in the subpleural area of the bilateral lower lobes with no evidence of traction bronchiectasis could represent a early interstitial lung disease.  No evidence of pulmonary fibrosis.  2. Improvement of the previously seen linear radiopaque density with chest and consolidation areas in the right upper and lower lobes.  3. Small right pleural effusion with no evidence of ipsilateral pneumothorax in the present study.  4. Interval resolution of the fat stranding and subcutaneous emphysema in the right lateral chest wall.  5. New localized fluid collection measuring 6 x 3 cm in the right lateral chest wall may represent a hematoma.  6. Grossly stable large fat containing mass with tiny calcification measuring 12.2 x 5.6 cm in the left posterior chest wall may represent lipoma.  7. Additional findings.  Please see the above discussion.  PFTs -   Followed up w/ [facility] S[facility] NP and Dr. Shore 1/5/24. Dx w/ hypersensitivity pneumonitis. Started on breztri and singulair as pt declined steroid.   DMARD panel done. Started on mycophenylate 500mg BID x 7 days and then 1000mg BID. Stopped on breztri per pt request and continued on singulair.    Repeat 6MWT 4/2/24 unchanged from Jan.   Followed w/ Dr. Shore again 4/2/24. Decreased MMF to 500mg BID due to GI side effects - nausea and diarrhea. This resolved on the 500mg BID. F/u in 6 mo.     S/p pulm rehab recently and felt much benefit from that.   SOB has improved but there is some limitations. Doesn't do much at home. Able to go up a flight of stairs slowly. Slow treadmill. Lifting 3lbs weights to build up on repetitions. Wasn't eating so lost 12lbs.   Went to grocery store for the first time in a yr - managed to do it but was exhausted when she was done.      dx w/  ureteral cancer recently.   Grandchildren are graduating but won't be able to make it to that.   Does feel depressed. Anhedonia.     For the pancreatic lesion:  EUS 9/19/23 -   Impression:            - Normal esophagus.                          - Normal stomach.                          - Normal examined duodenum.                          - Three cystic lesions were seen in the pancreatic                          head, pancreatic body and pancreatic tail.                          - No specimens collected.   -->f/u in 6 mo w/ GI    Had virtual visit 4/19/24 w/ Dr. De Los Santos. Stable size of cyst. F/u in 1 yr.     Adrenal incidentaloma.   Primary hyperparathyroidism w/ mild hyperCa. Does have osteopenia w/ high FRAX score. PTH 9/22/23 142.8. Ca ok 9/22/23  LOV Dr. Molina w/ Dr. Sparks 8/9/23 - f/u in 1 yr.     Symptomatic AFib  SSS s/p PM 2009 s/p RA lead revision 2022. LOV Dr. JOSE Hodgson 6/29/23. F/u in 6 mo.   S/p PM generator change 1/26/24.  On propafenone 325mg BID, lasix 20mg daily, bisoprolol 5mg daily  Diastolic HF. ARB stopped due to soft BP.   TTE 2/5/24 -     Left Ventricle: The left ventricle is normal in size. Ventricular mass is normal. Mildly increased wall thickness. There is concentric remodeling. Normal wall motion. There is normal systolic function with a visually estimated ejection fraction of 55 - 60%. Ejection fraction by visual approximation is 55%. There is normal diastolic function.    Right Ventricle: Normal right ventricular cavity size. Wall thickness is normal. Right ventricle wall motion  is normal. Systolic function is normal. Pacemaker lead present in the ventricle.    Left Atrium: Left atrium is mild -moderately dilated.    Aortic Valve: There is mild aortic valve sclerosis. There is mild annular calcification present.    Mitral Valve: There is moderate regurgitation.    Aorta: Aortic root is normal in size measuring 3.10 cm. Ascending aorta is  upper-normal measuring 3.60 cm.    Pulmonary  "Artery: There is mild pulmonary hypertension. The estimated pulmonary artery systolic pressure is 42 mmHg.    IVC/SVC: Normal venous pressure at 3 mmHg.    Pericardium: There is a trivial posterior effusion.  Checks BP at home and it's 110s-120s usually.   Had drop in Hct and so has been taking iron w/ improvement of Hct. Worried about full anticoagulation as she doesn't want whatever that was causing the guaic positive stool to bleed and caused dec low H/H which would in turn warrant a repeat full GI work up.      CKD3 - cR BASELINE 1.2-2, most recent 4/6/24 1.8, eGFR 29.2  on above meds plus urocit k 10mEq daily. H/o renal stone.  Nephrologist - Dr. Mcgarry - LOV 2/21/24  On lasix 20mg daily and K 10mEq daily. Off of BP meds. Monitor bp and consider reintroducing ARB or SGLT2-.  Prot/Cr 1.1 4/2/24.  PTH 2/16/24 110.2     HLD/aortic atherosclerosis - atorva 80mg daily previously and changed from atorva 80 to crestor 40  12/28/23 LDL 86.2<--104.8     Pdm - A1C 5.9 12/28/23    Review of Systems  Comprehensive review of systems otherwise negative. See history/subjective section for more details.    Objective:      Physical Exam    /72   Pulse 71   Temp 98.4 °F (36.9 °C) (Oral)   Ht 5' 1" (1.549 m)   Wt 83.4 kg (183 lb 13.8 oz)   SpO2 96%   BMI 34.74 kg/m²     GEN - A+OX4, NAD   HEENT - PERRL, EOMI, OP clear. MMM.   Neck - No cervical LAD.   CV - RRR, no m/r. L PM in place.   Chest - CTAB, no wheezing or rhonchi. Normal work of breathing.   Abd - S/NT/ND/+BS.   Ext - 2+BDP and radial pulses. No C/C/E.  Neuro - PERRL, EOMI, no nystagmus, eyebrow raise, facial sensation, hearing, m of mastication, smile, palatal raise, shoulder shrug, tongue protrusion symmetric and intact. 5/5 BUE and BLE strength. Sensation to light touch intact throughout. 1+ B patellar DTRs. Normal gait.   LN - No axillary LAD appreciated.  Skin - No rash.    Previous labs and imaging reviewed.     Assessment/Plan     Diagnoses and all " orders for this visit:    Hypersensitivity pneumonitis - currently on cellcept and singulair. Pt is anxious to know whether the 500mg BID is controlling the condition as she couldn't tolerate the 1000mg BID due to diarrhea and nausea. F/u w/ Dr. Shore.     Drug-induced immunodeficiency - no acute infections. Getting periodic CBC.     Depression, unspecified depression type - will send via portal PHQ9 and GAD7 questionnaire. Declines LCSW and meds at this time. Will let me know if she changes her mind. Reports has been managing this all her life.     Pancreatic cyst - stable. F/u w/ Dr. De Los Santos.     Adrenal incidentaloma - f/u w/ endo in August.     Hyperparathyroidism - not hypercalcemic. F/u w/ Dr. Hall and Dr. Sparks.     Paroxysmal atrial fibrillation - long discussion about anticoag. Wants the stroke prevention but doesn't want risk of GIB and the potential work up if she was to have dec in crit. Discussed close serial CBCs for a few weeks if starting anticoag. Discussed Watchman. She'll have to think through the options. Will be seeing Dr. JOSE Hodgson soon.     SSS (sick sinus syndrome) s/p Pacemaker - generator change this yr. No issues.     Pulmonary hypertension - underlying HP and also HFpEF. Also w/ untreated JOSÉ as she couldn't tolerate CPAP.     Chronic heart failure with preserved ejection fraction - euvolemic. BP at goal.     Aortic atherosclerosis - cont statin.     Stage 3b chronic kidney disease - follows w/ nephro. Sent message to Dr. Mcgarry in regards to starting her on jardiance given CKD w/ proteinuria and HF.     Advance Care Planning     Date: 05/03/2024    Living Will  During this visit, I engaged the patient  in the voluntary advance care planning process.  The patient and I reviewed the role for advance directives and their purpose in directing future healthcare if the patient's unable to speak for him/herself.  At this point in time, the patient does have full decision-making capacity.   We discussed different extreme health states that she could experience, and reviewed what kind of medical care she would want in those situations.  The patient communicated that if she were comatose and had little chance of a meaningful recovery, she would not want machines/life-sustaining treatments used. In addition to the above preference, other important end-of-life issues for the patient include  see document . The patient has completed a living will to reflect these preferences and The patient has already designated a healthcare power of  to make decisions on her behalf.  I spent a total of 3 minutes engaging the patient in this advance care planning discussion.          Power of   I initiated the process of voluntary advance care planning today and explained the importance of this process to the patient.  I introduced the concept of advance directives to the patient, as well. Then the patient received detailed information about the importance of designating a Health Care Power of  (HCPOA). She was also instructed to communicate with this person about their wishes for future healthcare, should she become sick and lose decision-making capacity. The patient has previously appointed a HCPOA. After our discussion, the patient has decided to complete a HCPOA and has appointed her   and daughters , health care agent:  Glen Curiel, Sarina Mcarthur, and Frank Chavarria  & health care agent number:  [phone]  I spent a total time of 1 minutes discussing this issue with the patient.       Declines all vaccinations at this time.     Follow up in about 3 months (around 8/3/2024).      Sandra Michaud MD  Department of Internal Medicine - [facility]  9:54 AM

## 2024-05-06 ENCOUNTER — OFFICE VISIT (OUTPATIENT)
Dept: ELECTROPHYSIOLOGY | Facility: CLINIC | Age: 74
End: 2024-05-06
Payer: MEDICARE

## 2024-05-06 ENCOUNTER — CLINICAL SUPPORT (OUTPATIENT)
Dept: CARDIOLOGY | Facility: HOSPITAL | Age: 74
End: 2024-05-06
Attending: INTERNAL MEDICINE
Payer: MEDICARE

## 2024-05-06 ENCOUNTER — HOSPITAL ENCOUNTER (OUTPATIENT)
Dept: CARDIOLOGY | Facility: CLINIC | Age: 74
Discharge: HOME | End: 2024-05-06
Attending: INTERNAL MEDICINE
Payer: MEDICARE

## 2024-05-06 VITALS
HEIGHT: 61 IN | BODY MASS INDEX: 34.75 KG/M2 | WEIGHT: 184.06 LBS | SYSTOLIC BLOOD PRESSURE: 120 MMHG | HEART RATE: 66 BPM | DIASTOLIC BLOOD PRESSURE: 83 MMHG

## 2024-05-06 DIAGNOSIS — I70.0 AORTIC ATHEROSCLEROSIS: ICD-10-CM

## 2024-05-06 DIAGNOSIS — I49.8 OTHER CARDIAC ARRHYTHMIA: ICD-10-CM

## 2024-05-06 DIAGNOSIS — N18.32 STAGE 3B CHRONIC KIDNEY DISEASE: ICD-10-CM

## 2024-05-06 DIAGNOSIS — I48.91 ATRIAL FIBRILLATION WITH RVR: ICD-10-CM

## 2024-05-06 DIAGNOSIS — Z95.0 S/P PLACEMENT OF CARDIAC PACEMAKER: ICD-10-CM

## 2024-05-06 DIAGNOSIS — I49.5 SICK SINUS SYNDROME: Primary | ICD-10-CM

## 2024-05-06 LAB
OHS QRS DURATION: 186 MS
OHS QTC CALCULATION: 484 MS

## 2024-05-06 PROCEDURE — 93005 ELECTROCARDIOGRAM TRACING: CPT | Mod: PBBFAC,NTX | Performed by: INTERNAL MEDICINE

## 2024-05-06 PROCEDURE — 93010 ELECTROCARDIOGRAM REPORT: CPT | Mod: S$PBB,NTX,, | Performed by: INTERNAL MEDICINE

## 2024-05-06 PROCEDURE — 99213 OFFICE O/P EST LOW 20 MIN: CPT | Mod: PBBFAC,25,NTX | Performed by: INTERNAL MEDICINE

## 2024-05-06 PROCEDURE — 99214 OFFICE O/P EST MOD 30 MIN: CPT | Mod: S$PBB,NTX,, | Performed by: INTERNAL MEDICINE

## 2024-05-06 PROCEDURE — 93280 PM DEVICE PROGR EVAL DUAL: CPT | Mod: NTX

## 2024-05-06 PROCEDURE — 99999 PR PBB SHADOW E&M-EST. PATIENT-LVL III: CPT | Mod: PBBFAC,TXP,, | Performed by: INTERNAL MEDICINE

## 2024-05-06 PROCEDURE — 93280 PM DEVICE PROGR EVAL DUAL: CPT | Mod: 26,NTX,, | Performed by: INTERNAL MEDICINE

## 2024-05-11 ENCOUNTER — PATIENT MESSAGE (OUTPATIENT)
Dept: CARDIOLOGY | Facility: CLINIC | Age: 74
End: 2024-05-11
Payer: MEDICARE

## 2024-05-14 LAB
OHS CV AF BURDEN PERCENT: < 1
OHS CV DC REMOTE DEVICE TYPE: NORMAL
OHS CV RV PACING PERCENT: 4.8 %

## 2024-05-20 DIAGNOSIS — N18.32 STAGE 3B CHRONIC KIDNEY DISEASE: ICD-10-CM

## 2024-05-20 RX ORDER — POTASSIUM CITRATE 10 MEQ/1
10 TABLET, EXTENDED RELEASE ORAL
Qty: 90 TABLET | Refills: 3 | Status: SHIPPED | OUTPATIENT
Start: 2024-05-20

## 2024-06-20 ENCOUNTER — CLINICAL SUPPORT (OUTPATIENT)
Dept: CARDIOLOGY | Facility: HOSPITAL | Age: 74
End: 2024-06-20
Attending: INTERNAL MEDICINE
Payer: MEDICARE

## 2024-06-20 ENCOUNTER — CLINICAL SUPPORT (OUTPATIENT)
Dept: CARDIOLOGY | Facility: HOSPITAL | Age: 74
End: 2024-06-20
Payer: MEDICARE

## 2024-06-20 DIAGNOSIS — I49.5 SICK SINUS SYNDROME: ICD-10-CM

## 2024-06-20 DIAGNOSIS — Z95.0 PRESENCE OF CARDIAC PACEMAKER: ICD-10-CM

## 2024-06-20 PROCEDURE — 93294 REM INTERROG EVL PM/LDLS PM: CPT | Mod: NTX,,, | Performed by: INTERNAL MEDICINE

## 2024-06-26 LAB
OHS CV AF BURDEN PERCENT: < 1
OHS CV DC REMOTE DEVICE TYPE: NORMAL
OHS CV ICD SHOCK: NO
OHS CV RV PACING PERCENT: 23 %

## 2024-07-03 ENCOUNTER — TELEPHONE (OUTPATIENT)
Dept: PRIMARY CARE CLINIC | Facility: CLINIC | Age: 74
End: 2024-07-03
Payer: MEDICARE

## 2024-07-11 ENCOUNTER — OFFICE VISIT (OUTPATIENT)
Dept: TRANSPLANT | Facility: CLINIC | Age: 74
End: 2024-07-11
Payer: MEDICARE

## 2024-07-11 ENCOUNTER — HOSPITAL ENCOUNTER (OUTPATIENT)
Dept: PULMONOLOGY | Facility: CLINIC | Age: 74
Discharge: HOME | End: 2024-07-11
Payer: MEDICARE

## 2024-07-11 ENCOUNTER — CLINICAL SUPPORT (OUTPATIENT)
Dept: CARDIOLOGY | Facility: HOSPITAL | Age: 74
End: 2024-07-11
Attending: INTERNAL MEDICINE
Payer: MEDICARE

## 2024-07-11 VITALS
BODY MASS INDEX: 34.75 KG/M2 | TEMPERATURE: 98 F | RESPIRATION RATE: 16 BRPM | HEIGHT: 61 IN | SYSTOLIC BLOOD PRESSURE: 128 MMHG | DIASTOLIC BLOOD PRESSURE: 66 MMHG | WEIGHT: 184.06 LBS

## 2024-07-11 DIAGNOSIS — J84.9 INTERSTITIAL LUNG DISEASE: ICD-10-CM

## 2024-07-11 DIAGNOSIS — J84.9 INTERSTITIAL LUNG DISEASE: Primary | ICD-10-CM

## 2024-07-11 DIAGNOSIS — I49.5 SICK SINUS SYNDROME: ICD-10-CM

## 2024-07-11 DIAGNOSIS — I49.8 OTHER CARDIAC ARRHYTHMIA: ICD-10-CM

## 2024-07-11 DIAGNOSIS — J67.9 HYPERSENSITIVITY PNEUMONITIS: ICD-10-CM

## 2024-07-11 LAB
DLCO SINGLE BREATH LLN: 13.1
DLCO SINGLE BREATH PRE REF: 55.2 %
DLCO SINGLE BREATH REF: 18.84
DLCOC SBVA LLN: 2.64
DLCOC SBVA REF: 4.25
DLCOC SINGLE BREATH LLN: 13.1
DLCOC SINGLE BREATH REF: 18.84
DLCOCSBVAULN: 5.85
DLCOCSINGLEBREATHULN: 24.57
DLCOSINGLEBREATHULN: 24.57
DLCOSINGLEBREATHZSCORE: -2.42
DLCOVA LLN: 2.64
DLCOVA PRE REF: 92.7 %
DLCOVA PRE: 3.94 ML/(MIN*MMHG*L) (ref 2.64–5.85)
DLCOVA REF: 4.25
DLCOVAULN: 5.85
ERVN2 LLN: -16449.44
ERVN2 PRE REF: 33.8 %
ERVN2 PRE: 0.19 L (ref -16449.44–16450.56)
ERVN2 REF: 0.56
ERVN2ULN: ABNORMAL
FEF 25 75 LLN: 0.94
FEF 25 75 PRE REF: 31.1 %
FEF 25 75 REF: 2.34
FEV05 LLN: 0.65
FEV05 REF: 1.5
FEV1 FVC LLN: 64
FEV1 FVC PRE REF: 89.8 %
FEV1 FVC REF: 78
FEV1 LLN: 1.34
FEV1 PRE REF: 68.2 %
FEV1 REF: 1.87
FEV1FVCZSCORE: -0.97
FEV1ZSCORE: -1.83
FRCN2 LLN: 1.72
FRCN2 PRE REF: 42.1 %
FRCN2 REF: 2.54
FRCN2ULN: 3.37
FVC LLN: 1.73
FVC PRE REF: 75.3 %
FVC REF: 2.42
FVCZSCORE: -1.43
ICN2REF: 1.61
IVC PRE: 1.86 L (ref 1.73–3.15)
IVC SINGLE BREATH LLN: 1.73
IVC SINGLE BREATH PRE REF: 76.7 %
IVC SINGLE BREATH REF: 2.42
IVCSINGLEBREATHULN: 3.15
LLN IC N2: -16448.39
PEF LLN: 3.32
PEF PRE REF: 107.2 %
PEF REF: 4.88
PHYSICIAN COMMENT: ABNORMAL
PRE DLCO: 10.41 ML/(MIN*MMHG) (ref 13.1–24.57)
PRE FEF 25 75: 0.73 L/S (ref 0.94–3.74)
PRE FET 100: 6.06 SEC
PRE FEV05 REF: 69.2 %
PRE FEV1 FVC: 70.05 % (ref 63.98–90.06)
PRE FEV1: 1.28 L (ref 1.34–2.38)
PRE FEV5: 1.04 L (ref 0.65–2.36)
PRE FRC N2: 1.07 L (ref 1.72–3.37)
PRE FVC: 1.82 L (ref 1.73–3.15)
PRE IC N2: 1.67 L (ref -16448.39–16451.61)
PRE PEF: 5.23 L/S (ref 3.32–6.43)
PRE REF IC N2: 103.9 %
RVN2 LLN: 1.41
RVN2 PRE REF: 44.4 %
RVN2 PRE: 0.88 L (ref 1.41–2.56)
RVN2 REF: 1.99
RVN2TLCN2 LLN: 34.53
RVN2TLCN2 PRE REF: 73 %
RVN2TLCN2 PRE: 32.21 % (ref 34.53–53.71)
RVN2TLCN2 REF: 44.12
RVN2TLCN2ULN: 53.71
RVN2ULN: 2.56
TLCN2 LLN: 3.45
TLCN2 PRE REF: 61.8 %
TLCN2 PRE: 2.74 L (ref 3.45–5.42)
TLCN2 REF: 4.44
TLCN2ULN: 5.42
TLCN2ZSCORE: -2.83
ULN IC N2: ABNORMAL
VA PRE: 2.64 L (ref 4.29–4.29)
VA SINGLE BREATH LLN: 4.29
VA SINGLE BREATH PRE REF: 61.7 %
VA SINGLE BREATH REF: 4.29
VASINGLEBREATHULN: 4.29
VCMAXN2 LLN: 1.73
VCMAXN2 PRE REF: 76.7 %
VCMAXN2 PRE: 1.86 L (ref 1.73–3.15)
VCMAXN2 REF: 2.42
VCMAXN2ULN: 3.15

## 2024-07-11 PROCEDURE — 99999 PR PBB SHADOW E&M-EST. PATIENT-LVL III: CPT | Mod: PBBFAC,TXP,, | Performed by: INTERNAL MEDICINE

## 2024-07-11 PROCEDURE — 93280 PM DEVICE PROGR EVAL DUAL: CPT | Mod: TXP

## 2024-07-11 PROCEDURE — 99213 OFFICE O/P EST LOW 20 MIN: CPT | Mod: PBBFAC,25,TXP | Performed by: INTERNAL MEDICINE

## 2024-07-11 NOTE — LETTER
July 11, 2024                      [facility] 1st Fl  [address]  [facility] [address]  Phone: [phone]   Patient: Jesenia Curiel   MR Number: [id number]   YOB: 1950   Date of Visit: 7/11/2024       Dear       Thank you for referring Jesenia Curiel to me for evaluation. Attached you will find relevant portions of my assessment and plan of care.    If you have questions, please do not hesitate to call me. I look forward to following Jesenia Curiel along with you.    Sincerely,    My Shore, DO    Enclosure    If you would like to receive this communication electronically, please contact [email or web address] or [phone] to request [employer] Link access.    [employer] Link is a tool which provides read-only access to select patient information with whom you have a relationship. Its easy to use and provides real time access to review your patients record including encounter summaries, notes, results, and demographic information.    If you feel you have received this communication in error or would no longer like to receive these types of communications, please e-mail [email or web address]

## 2024-07-11 NOTE — PROGRESS NOTES
ADVANCED LUNG DISEASE FOLLOW UP VISIT                                                                                                                                            Reason for Visit:  ILD    Referring Physician: Bay Sosa NP    History of Present Illness: Jesenia Curiel is a 73 y.o. female who is on  0 at present, previously on 3 L of oxygen.  She is on no assisted ventilation.  Her New [place] Heart Association Class is III and a Karnofsky score of 70% - Cares for self: Unable to carry on normal activity or active work. She is not diabetic.    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  Since her last visit, she had been doing very well and then developed some vague diarrhea and fatigue.  Did notice some increasing dyspnea but no cough or productive sputum.  She feels like this has been improving and she is back to 90% of what she was.  Takes MMF 500mg BID and is tolerating well.  Follows with EP for sick sinus syndrome and pacemaker.  Seen  today due to a pacemaker alert; told everything was good.  Does not take inhaler therapy but does take singulair.  Overall, remains stable from a pulmonary standpoint.        HISTORY FROM INITIAL VISIT:  ILD.  Has CKD, AFIB, Sick Sinus Syndrome with pacemaker, HTN, hyperlipidemia, and JOSÉ (could not tolerate CPAP).    Had right VATS wedge biopsy on 10/23/23 that showed NSIP.  She presently has shortness of breath with exertion, but feels as if she is improving.  Has AFib (on Xeralto and Rhythmol) which she feels is contributing to her breathing issues.  Has chronic, intermittent dry cough.  Denies any GERD or sinus issues.  Never smoked.  Is a retired gastroenterologist.  Had a weakly positive BOLIVAR, but otherwise negative autoimmune/CTD workup.  Chest CT shows scatterd GGOs and bibasilar scarring with fibrosis and mild traction bronchiectasis.  Denies any known exposures.  Most recent Echo showed EF of 55% with PA systolic of 37.  Also has sick sinus  syndrome with Pacemaker--is followed closely by cardiology.  Spirometry is normal with mild restrictive lung volumes.  DLCO is moderately decreased.  Admits to a sedentary lifestyle.  Was recently discharged for heart failure on 3L NC oxygen.  Has been diuresing well since discharged and feel that she is getting better.  Denies shortness of breath at rest.      Past Medical History:   Diagnosis Date    Allergy     Arthritis     Atrial fibrillation 2017    Chronic diastolic heart failure 2023    CKD (chronic kidney disease) stage 3, GFR 30-59 ml/min 2017    Coronary artery calcification seen on CAT scan 2023    Disorder of kidney and ureter     GERD (gastroesophageal reflux disease)     Hyperlipidemia     Hypertension     Impaired fasting glucose 2021    Pre-diabetes 6/3/2017       Past Surgical History:   Procedure Laterality Date    ADENOIDECTOMY      BACK SURGERY      lamenectomy    BREAST SURGERY      BRONCHOSCOPY N/A 2023    Procedure: BRONCHOSCOPY;  Surgeon: Paul Diagnostic Provider;  Location: [facility] OR [address] [place] [address];  Service: Anesthesiology;  Laterality: N/A;    BRONCHOSCOPY N/A 10/20/2023    Procedure: BRONCHOSCOPY;  Surgeon: Anatoly Tiwari MD;  [facility];  Service: Cardiothoracic;  Laterality: N/A;    CARDIAC SURGERY      st loida pacemaker     CATARACT EXTRACTION W/  INTRAOCULAR LENS IMPLANT Right 6/3/2019    Procedure: EXTRACTION, CATARACT, WITH IOL INSERTION;  Surgeon: Raisa Moreno MD;  Location: [facility];  Service: Ophthalmology;  Laterality: Right;  Laser    CATARACT EXTRACTION W/  INTRAOCULAR LENS IMPLANT Left 2019    Procedure: EXTRACTION, CATARACT, WITH IOL INSERTION;  Surgeon: Raisa Moreno MD;  Location: [facility];  Service: Ophthalmology;  Laterality: Left;  LASER ASSISTED     SECTION      1980    COLONOSCOPY N/A 9/15/2023    Procedure: COLONOSCOPY;  Surgeon: Tao Gomez MD;  Location: [facility] ENDO ([facility]  Service:  Endoscopy;  Laterality: N/A;  inst to portal/[employer] pacemaker   2nd floor for airway protection patient with lung disease elevated pulmonary artery pressure pacemaker and fecal occult blood positive stool,   cleared by pulmonary Dr Bhakta-see note on 7/11-GT    ENDOSCOPIC ULTRASOUND OF UPPER GASTROINTESTINAL TRACT N/A 9/19/2023    Procedure: ULTRASOUND, UPPER GI TRACT, ENDOSCOPIC;  Surgeon: Casimiro De Los Santos MD;  Location: [facility];  Service: Endoscopy;  Laterality: N/A;  9/13/23: instructions send via portal. [employer] ppm- GD    EYE SURGERY      FRACTURE SURGERY      HYSTERECTOMY      INJECTION OF ANESTHETIC AGENT AROUND MULTIPLE INTERCOSTAL NERVES  10/20/2023    Procedure: BLOCK, NERVE, INTERCOSTAL, 2 OR MORE;  Surgeon: Anatoly Tiwari MD;  Location: [address];  Service: Cardiothoracic;;    INSERT / REPLACE / REMOVE PACEMAKER  2009    placement    INSERT / REPLACE / REMOVE PACEMAKER  09/22/2014    [employer] Model #[id number] replacement    pace maker  2009    replacement 2014    REPLACEMENT OF PACEMAKER GENERATOR Left 1/26/2024    Procedure: REPLACEMENT, PACEMAKER GENERATOR;  Surgeon: Trell Hodgson MD;  Location: [facility] EP LAB;  Service: Cardiology;  Laterality: Left;  DEVORAH, PPM gen chg, SJM, MAC, GP, 3prep, *SJM dcPPM in situ*    REVISION OF PROCEDURE INVOLVING PACEMAKER LEAD Left 1/13/2022    Procedure: REVISION, ELECTRODE LEAD, CARDIAC PACEMAKER;  Surgeon: Trell Hodgson MD;  Location: [facility] EP LAB;  Service: Cardiology;  Laterality: Left;  PPM lead Malfx, RA lead revision, SJM, MAC, GP, 3 PREP*dPPM SJM in situ**Contrast prep given*    salpingo opherectomy Bilateral 1998    SPINE SURGERY      TONSILLECTOMY  1953    TUBAL LIGATION      VIDEO-ASSISTED THORACOSCOPIC SURGERY (VATS) Right 10/20/2023    Procedure: VATS WEDGE;  Surgeon: Anatoly Tiwari MD;  Location: [facility] OR [address] [place] [address];  Service: Cardiothoracic;  Laterality: Right;       Allergies: Iodinated contrast media, Penicillins, Allopurinol analogues,  Ciprofloxacin, Quinolones, Sulfa (sulfonamide antibiotics), and Tetracyclines    Current Outpatient Medications   Medication Sig    apixaban (ELIQUIS) 5 mg Tab Take 1 tablet (5 mg total) by mouth 2 (two) times daily.    aspirin (ECOTRIN) 81 MG EC tablet Take 1 tablet (81 mg total) by mouth once daily.    bisoprolol (ZEBETA) 5 MG tablet Take 1 tablet (5 mg total) by mouth once daily.    clobetasoL (OLUX) 0.05 % Foam Apply 1 application topically every 30 days.    ERGOCALCIFEROL, VITAMIN D2, (VITAMIN D ORAL) Take 2,000 Units by mouth once daily.    furosemide (LASIX) 20 MG tablet Take 1 tablet (20 mg total) by mouth once daily.    montelukast (SINGULAIR) 10 mg tablet Take 1 tablet (10 mg total) by mouth every evening.    mycophenolate (CELLCEPT) 500 mg Tab Take 1 tablet (500 mg) BID x 7 days, then increase to 2 tablets (1000 mg) BID thereafter    nitroGLYCERIN (NITROSTAT) 0.4 MG SL tablet Place 1 tablet (0.4 mg total) under the tongue every 5 (five) minutes as needed for Chest pain.    potassium citrate (UROCIT-K) 10 mEq (1,080 mg) TbSR TAKE 1 TABLET BY MOUTH EVERY DAY    propafenone (RYTHMOL SR) 325 MG Cp12 Take 1 capsule (325 mg total) by mouth every 12 (twelve) hours.    rosuvastatin (CRESTOR) 40 MG Tab Take 1 tablet (40 mg total) by mouth every evening. (Patient taking differently: Take 40 mg by mouth once daily.)    topiramate (TOPAMAX) 25 MG tablet TAKE 1 TABLET TWICE A DAY     No current facility-administered medications for this visit.     Facility-Administered Medications Ordered in Other Visits   Medication    vancomycin (VANCOCIN) 1,000 mg in dextrose 5 % (D5W) 250 mL IVPB (Vial-Mate)       Immunization History   Administered Date(s) Administered    COVID-19, MRNA, LN-S, PF (Pfizer) (Gray Cap) 04/12/2022    COVID-19, MRNA, LN-S, PF (Pfizer) (Purple Cap) 01/12/2021, 02/02/2021, 10/01/2021    COVID-19, mRNA, LNP-S, bivalent booster, PF (PFIZER OMICRON) 11/21/2022    Influenza (FLUAD) - Quadrivalent -  Adjuvanted - PF *Preferred* (65+) 10/01/2021, 10/11/2022    Influenza - High Dose - PF (65 years and older) 11/20/2015, 01/05/2018, 11/09/2018    Influenza - Quadrivalent 11/21/2016    Pneumococcal Conjugate - 13 Valent 11/20/2015    Pneumococcal Polysaccharide - 23 Valent 11/21/2016     Family History:    Family History   Problem Relation Name Age of Onset    Hypertension Mother 62     Diabetes Mother 62     Hyperlipidemia Mother 62     Coronary artery disease Mother 62     Heart disease Mother 62     Stroke Mother 62     Hypertension Father 50     Diabetes Father 50     Hyperlipidemia Father 50     Coronary artery disease Father 50     Heart disease Father 50     Coronary artery disease Brother 64     Heart disease Brother 64     Hyperthyroidism Brother 64     Diabetes Paternal Grandmother Mejia     Diabetes Paternal Grandfather Sigfrid     Heart attack Neg Hx       Social History     Substance and Sexual Activity   Alcohol Use Not Currently    Alcohol/week: 5.0 standard drinks of alcohol    Types: 5 Glasses of wine per week      Social History     Substance and Sexual Activity   Drug Use No      Social History     Socioeconomic History    Marital status:    Tobacco Use    Smoking status: Never     Passive exposure: Past    Smokeless tobacco: Never   Substance and Sexual Activity    Alcohol use: Not Currently     Alcohol/week: 5.0 standard drinks of alcohol     Types: 5 Glasses of wine per week    Drug use: No    Sexual activity: Not Currently     Partners: Male   Social History Narrative    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 Walks daily along the [facility].     Social Determinants of Health     Financial Resource Strain: Low Risk  (4/16/2024)    Overall Financial Resource Strain (CARDIA)     Difficulty of Paying Living Expenses: Not hard at all   Food Insecurity: No Food Insecurity (4/16/2024)    Hunger Vital Sign     Worried About Running Out of Food in the Last Year: Never true     Ran Out of  "Food in the Last Year: Never true   Transportation Needs: No Transportation Needs (4/16/2024)    PRAPARE - Transportation     Lack of Transportation (Medical): No     Lack of Transportation (Non-Medical): No   Physical Activity: Insufficiently Active (4/16/2024)    Exercise Vital Sign     Days of Exercise per Week: 3 days     Minutes of Exercise per Session: 20 min   Stress: No Stress Concern Present (4/16/2024)    [employer] [place] - Occupational Stress Questionnaire     Feeling of Stress : Only a little   Housing Stability: Low Risk  (10/23/2023)    Housing Stability Vital Sign     Unable to Pay for Housing in the Last Year: No     Number of Places Lived in the Last Year: 1     Unstable Housing in the Last Year: No     Review of Systems   Constitutional:  Negative for malaise/fatigue.   HENT: Negative.     Respiratory:  Positive for shortness of breath (improving). Negative for cough.    Gastrointestinal:  Negative for abdominal pain, heartburn and vomiting.   Skin: Negative.    Endo/Heme/Allergies:  Bruises/bleeds easily.     Vitals  /66 (BP Location: Left arm, Patient Position: Sitting, BP Method: Medium (Automatic))   Temp 98 °F (36.7 °C) (Oral)   Resp 16   Ht 5' 1" (1.549 m)   Wt 83.5 kg (184 lb 1.4 oz)   BMI 34.78 kg/m²   Physical Exam  Constitutional:       Appearance: Normal appearance. She is obese.   HENT:      Head: Normocephalic and atraumatic.   Eyes:      Extraocular Movements: Extraocular movements intact.   Pulmonary:      Effort: Pulmonary effort is normal. No respiratory distress.      Breath sounds: No stridor. No wheezing, rhonchi or rales.   Chest:      Chest wall: No tenderness.   Musculoskeletal:      Right lower leg: No edema.      Left lower leg: No edema.   Skin:     General: Skin is warm and dry.   Neurological:      Mental Status: She is alert and oriented to person, place, and time.   Psychiatric:         Mood and Affect: Mood normal.         Behavior: " Behavior normal.         Thought Content: Thought content normal.         Judgment: Judgment normal.         Labs:  Lab Visit on 07/11/2024   Component Date Value    WBC 07/11/2024 6.63     RBC 07/11/2024 4.39     Hemoglobin 07/11/2024 12.4     Hematocrit 07/11/2024 40.1     MCV 07/11/2024 91     MCH 07/11/2024 28.2     MCHC 07/11/2024 30.9 (L)     RDW 07/11/2024 13.2     Platelets 07/11/2024 251     MPV 07/11/2024 9.7     Immature Granulocytes 07/11/2024 0.5     Gran # (ANC) 07/11/2024 4.7     Immature Grans (Abs) 07/11/2024 0.03     Lymph # 07/11/2024 1.2     Mono # 07/11/2024 0.5     Eos # 07/11/2024 0.2     Baso # 07/11/2024 0.06     nRBC 07/11/2024 0     Gran % 07/11/2024 70.5     Lymph % 07/11/2024 18.3     Mono % 07/11/2024 6.8     Eosinophil % 07/11/2024 3.0     Basophil % 07/11/2024 0.9     Differential Method 07/11/2024 Automated    Hospital Outpatient Visit on 07/11/2024   Component Date Value    Pre FVC 07/11/2024 1.82     PRE FEV5 07/11/2024 1.04     Pre FEV1 07/11/2024 1.28 (L)     Pre FEV1 FVC 07/11/2024 70.05     Pre FEF 25 75 07/11/2024 0.73 (L)     Pre PEF 07/11/2024 5.23     Pre  07/11/2024 6.06     Pre DLCO 07/11/2024 10.41 (L)     DLCOVA PRE 07/11/2024 3.94     VA PRE 07/11/2024 2.64 (L)     IVC PRE 07/11/2024 1.86     TLCN2 PRE 07/11/2024 2.74 (L)     VCMAXN2 PRE 07/11/2024 1.86     PRE IC N2 07/11/2024 1.67     Pre FRC N2 07/11/2024 1.07 (L)     ERVN2 PRE 07/11/2024 0.19     RVN2 PRE 07/11/2024 0.88 (L)     [id number] PRE 07/11/2024 32.21 (L)     FVC Ref 07/11/2024 2.42     FVC LLN 07/11/2024 1.73     FVC Pre Ref 07/11/2024 75.3     FEV05 REF 07/11/2024 1.50     FEV05 LLN 07/11/2024 0.65     PRE FEV05 REF 07/11/2024 69.2     FEV1 Ref 07/11/2024 1.87     FEV1 LLN 07/11/2024 1.34     FEV1 Pre Ref 07/11/2024 68.2     FEV1 FVC Ref 07/11/2024 78     FEV1 FVC LLN 07/11/2024 64     FEV1 FVC Pre Ref 07/11/2024 89.8     FEF 25 75 Ref 07/11/2024 2.34     FEF 25 75 LLN 07/11/2024 0.94     FEF 25 75  Pre Ref 07/11/2024 31.1     PEF Ref 07/11/2024 4.88     PEF LLN 07/11/2024 3.32     PEF Pre Ref 07/11/2024 107.2     TLCN2 Ref 07/11/2024 4.44     TLCN2 LLN 07/11/2024 3.45     TLC N2 ULN 07/11/2024 5.42     TLCN2 Pre Ref 07/11/2024 61.8     VCMAXN2 Ref 07/11/2024 2.42     VCMAXN2 LLN 07/11/2024 1.73     VCMAX N2 ULN 07/11/2024 3.15     VCMAXN2 Pre Ref 07/11/2024 76.7     VZG0CAN 07/11/2024 1.61     LLN IC N2 07/11/2024 -46144.39     ULN IC N2 07/11/2024 44834.61     PRE REF IC N2 07/11/2024 103.9     FRCN2 Ref 07/11/2024 2.54     FRCN2 LLN 07/11/2024 1.72     FRC N2 ULN 07/11/2024 3.37     FRCN2 Pre Ref 07/11/2024 42.1     ERVN2 Ref 07/11/2024 0.56     ERVN2 LLN 07/11/2024 -44944.44     ERV N2 ULN 07/11/2024 40596.56     ERVN2 Pre Ref 07/11/2024 33.8     RVN2 Ref 07/11/2024 1.99     RVN2 LLN 07/11/2024 1.41     RV N2 ULN 07/11/2024 2.56     RVN2 Pre Ref 07/11/2024 44.4     AAC8VVVL3 Ref 07/11/2024 44.12     BPL0OLMW4 LLN 07/11/2024 34.53     RV N2 TLC N2 ULN 07/11/2024 53.71     YUS9OKKS4 Pre Ref 07/11/2024 73.0     DLCO Single Breath Ref 07/11/2024 18.84     DLCO Single Breath LLN 07/11/2024 13.10     DLCO SINGLEBREATH ULN 07/11/2024 24.57     DLCO Single Breath Pre R* 07/11/2024 55.2     DLCOc Single Breath Ref 07/11/2024 18.84     DLCOc Single Breath LLN 07/11/2024 13.10     DLCOC SINGLEBREATH ULN 07/11/2024 24.57     DLCOVA Ref 07/11/2024 4.25     DLCOVA LLN 07/11/2024 2.64     DLCOVA ULN 07/11/2024 5.85     DLCOVA Pre Ref 07/11/2024 92.7     DLCOc SBVA Ref 07/11/2024 4.25     DLCOc SBVA LLN 07/11/2024 2.64     DLCOCSBVA ULN 07/11/2024 5.85     VA Single Breath Ref 07/11/2024 4.29     VA Single Breath LLN 07/11/2024 4.29     VA SINGLEBREATH ULN 07/11/2024 4.29     VA Single Breath Pre Ref 07/11/2024 61.7     IVC Single Breath Ref 07/11/2024 2.42     IVC Single Breath LLN 07/11/2024 1.73     IVC SINGLEBREATH ULN 07/11/2024 3.15     IVC Single Breath Pre Ref 07/11/2024 76.7     FVCZSCORE 07/11/2024 -1.43      [id number] 07/11/2024 -1.83     FKI2IJMAEAUYH 07/11/2024 -0.97     WTRB6MFCQJL 07/11/2024 -2.83     DLCOSINGLEBREATHZSCORE 07/11/2024 -2.42            7/11/2024    12:03 PM 4/2/2024    11:44 AM 1/4/2024    11:31 AM 11/13/2023     2:24 PM   Pulmonary Function Tests   FVC 1.82 liters 1.8 liters 1.53 liters 1.89 liters   FEV1 1.28 liters 1.29 liters 1.07 liters 1.42 liters   TLC (liters) 2.74 liters 2.77 liters 2.34 liters 3.04 liters   DLCO (ml/mmHg sec) 10.41 ml/mmHg sec 9.2 ml/mmHg sec 8.1 ml/mmHg sec 9.68 ml/mmHg sec   FVC% 75 74 62.7 77.5   FEV1% 68 68 56.8 75   FEF 25-75 0.73 0.83 0.65 1.05   FEF 25-75% 31 51 40.2 64.5   TLC% 61 62 52.7 68.4   RV 0.88 0.98 0.76 1.14   RV% 44 49 38.1 58   DLCO% 55 49 24.57 51         4/30/2024    11:20 AM 4/2/2024    11:11 AM 1/4/2024    10:49 AM 11/13/2023     1:37 PM 3/31/2023    12:23 PM   6MW   6MWT Status completed without stopping completed without stopping completed with stops completed without stopping completed without stopping   Patient Reported No complaints Dyspnea Dyspnea Dyspnea;Dizziness;Other (Comment) Dyspnea   Was O2 used? No No No No No   6MW Distance walked (feet) 905 feet 900 feet 900 feet 675 feet 800 feet   Distance walked (meters) 275.84 meters 274.32 meters 274.32 meters 205.74 meters 243.84 meters   Did patient stop? No No No No No   Oxygen Saturation 95 % 96 % 96 % 95 % 94 %   Supplemental Oxygen Room Air Room Air Room Air Room Air Room Air   Heart Rate 62 bpm 65 bpm 75 bpm 70 bpm 77 bpm   Blood Pressure 134/78 120/64 149/67 131/64 125/66   Drew Dyspnea Rating  light very light light light nothing at all   Oxygen Saturation 94 % 95 % 92 % 94 % 94 %   Supplemental Oxygen Room Air Room Air Room Air Room Air Room Air   Heart Rate 110 bpm 97 bpm 118 bpm 77 bpm 101 bpm   Blood Pressure 129/74 137/67 130/64 123/58 118/65   Drew Dyspnea Rating  heavy somewhat heavy somewhat heavy heavy moderate   Recovery Time (seconds) 180 seconds 71 seconds 82 seconds 78  seconds 58 seconds   Oxygen Saturation 97 % 98 % 95 % 95 % 96 %   Supplemental Oxygen Room Air Room Air Room Air Room Air Room Air   Heart Rate 60 bpm 74 bpm 89 bpm 80 bpm 82 bpm       Imaging:  Results for orders placed during the hospital encounter of 11/06/23    X-Ray Chest PA And Lateral    Narrative  EXAMINATION:  XR CHEST PA AND LATERAL    CLINICAL HISTORY:  Interstitial pulmonary disease, unspecified    TECHNIQUE:  PA and lateral views of the chest were performed.    COMPARISON:  10/25/2023    FINDINGS:  Cardiac size is mildly enlarged pacemaker is present.  Patient has a moderate right pleural effusion or pleural thickening with little loss of volume at the right lung base.  Left lung is clear.  No vascular congestion is noted.    Impression  See above      Electronically signed by: Jefferson Garcia MD  Date:    11/06/2023  Time:    08:42    Results for orders placed during the hospital encounter of 04/06/22    DXA Bone Density Spine And Hip    Narrative  EXAMINATION:  DEXA BONE DENSITY SPINE HIP    CLINICAL HISTORY:  Asymptomatic menopausal state.70 y/o female with a history of fractured left tibia and fibula at 67 y/o.  She had surgical menopause at 49 y/o.  She is taking Vit D supplements.  She exercise regularly and does not smoke.    TECHNIQUE:  DXA specification: [facility] [employer] Horizon A ([id number])    Bone Mineral Density scanning was performed over the hip and lumbar spine.    Review of the images confirms satisfactory positioning and technique.    COMPARISON:  Comparison study done on 11/12/2018.    Lumbar spine:    BMD 1.062 g/cm2 and T-score 0.1.    Total Hip:           BMD 0.869 g/cm2 and T-score -0.6.    Distal 1/3 radius: Not applicable    FINDINGS:  Lumbar spine (L1-L4):              BMD is 1.025 g/cm2, T-score is -0.2, and Z-score is 2.0.    Total hip:                                BMD is 0.782 g/cm2, T-score is -1.3, and Z-score is 0.3.    Femoral neck:                          BMD  is 0.706 g/cm2, T-score is -1.3, and Z-score is 0.6.    Distal 1/3 radius:                      Not applicable    FRAX:    14% risk of a major osteoporotic fracture in the next 10 years.    1.9% risk of hip fracture in the next 10 years.    Impression  *Low bone mass (Osteopenia); FRAX calculations do not support treatment as osteoporosis  *Compared with previous DXA, BMD at the lumbar spine has declined by 3.6%, and the BMD at the total hip has declined by 10%.    RECOMMENDATIONS:  *Daily calcium intake 1044-2139 mg, dietary sources preferred; Vitamin D 9938-7467 IU daily.  *Weight bearing exercise and fall precautions.  *If prior fracture was a result of low trauma, would consider treatment with bisphosphonate or denosumab.  *Repeat BMD in 2 years    EXPLANATION OF RESULTS:  T-score compares these results to the average bone density of a 20-29 year-old of the same gender.    Z-score compares this result to the average bone density to people of the same age, gender, and race.    The amounts indicate the number of standard deviations above or below the mean.    * Osteoporosis is generally defined as having a T-score between less than or equal to -2.5.    * Low bone mass (osteopenia) is generally defined as having a T-score between -1.0 and -2.5.    * The normal range is generally defined as having a T-score greater than or equal to -1.0.    * Calculated FRAX scores for fracture risk prediction may not be accurate in the setting of certain clinical factors such as pharmacologic therapy for osteoporosis, prior fragility fractures, high dose glucocorticoid use.      Electronically signed by: Ayse Olivera MD  Date:    04/14/2022  Time:    07:12    No results found for this or any previous visit (from the past 2160 hour(s)).          Cardiodiagnostics:  Results for orders placed during the hospital encounter of 01/20/22    Echo    Interpretation Summary  · The left ventricle is normal in size with low normal systolic  function.  · The estimated ejection fraction is 53%.  · There are segmental left ventricular wall motion abnormalities.  · There is abnormal septal wall motion.  · Normal right ventricular size with normal right ventricular systolic function.  · Normal left ventricular diastolic function.  · Mild mitral regurgitation.  · Normal central venous pressure (3 mmHg).  · The estimated PA systolic pressure is 25 mmHg.  · Trivial posterior pericardial        PATHOLOGY    1.  Lung, right lower lobe, wedge resection:   - Chronic interstitial pneumonitis with NSIP-like injury pattern   - No evidence of malignancy   - See comment     2. Lung, right middle lobe, wedge resection:   - Chronic interstitial pneumonitis with NSIP-like injury pattern   - No evidence of malignancy           Assessment:  1. Interstitial lung disease    2. Hypersensitivity pneumonitis    3. Sick sinus syndrome          Plan: CTD workup negative.  Wedge biopsy shows NSIP, but likely HP.  Chest CT with GGO and mosaic attenuation.  Patient with chronic diastolic heart failure.  On room air.  Patient has JOSÉ but could not tolerate CPAP.   Symptomatically, reports that she is feeling better with improvement in her shortness of breath.       Continue mycophenolate.  Currently on 500mg BID due to GI side effects; tolerating well with stable PFTs.  Did not tolerate Breztri.  Continue with singulair.      2.    Shortness of breath improving, but is likely multi factorial.  Echo with increase in ePAP to 47 mmHg and evidence of mitral valve disease.  Explained that the majority of her issue with elevated ePAP is from WHO group 2 PH.  She does have lung disease but her spirometry and symptoms are stable so if there's a component of WHO group 3, it has not changed and therefore would not be the cause of her elevation of her PA pressures.  She is on diuretics and follows closely with cards.  Has a pacemaker for sick sinus syndrome          3.   RTC in 3 months with  PFTs only.      My Shore D.O.  Pulmonary/Critical Care and Lung Transplantation  [facility]

## 2024-08-16 ENCOUNTER — PATIENT MESSAGE (OUTPATIENT)
Dept: PRIMARY CARE CLINIC | Facility: CLINIC | Age: 74
End: 2024-08-16
Payer: MEDICARE

## 2024-08-18 ENCOUNTER — PATIENT MESSAGE (OUTPATIENT)
Dept: PRIMARY CARE CLINIC | Facility: CLINIC | Age: 74
End: 2024-08-18
Payer: MEDICARE

## 2024-08-19 ENCOUNTER — TELEPHONE (OUTPATIENT)
Dept: PRIMARY CARE CLINIC | Facility: CLINIC | Age: 74
End: 2024-08-19

## 2024-08-19 ENCOUNTER — HOSPITAL ENCOUNTER (INPATIENT)
Facility: HOSPITAL | Age: 74
LOS: 1 days | Discharge: HOME | DRG: 398 | End: 2024-08-21
Attending: EMERGENCY MEDICINE | Admitting: SURGERY
Payer: MEDICARE

## 2024-08-19 ENCOUNTER — ANESTHESIA EVENT (OUTPATIENT)
Dept: SURGERY | Facility: HOSPITAL | Age: 74
DRG: 398 | End: 2024-08-19
Payer: MEDICARE

## 2024-08-19 ENCOUNTER — OFFICE VISIT (OUTPATIENT)
Dept: PRIMARY CARE CLINIC | Facility: CLINIC | Age: 74
End: 2024-08-19
Payer: MEDICARE

## 2024-08-19 ENCOUNTER — HOSPITAL ENCOUNTER (OUTPATIENT)
Dept: RADIOLOGY | Facility: OTHER | Age: 74
Discharge: HOME | End: 2024-08-19
Attending: INTERNAL MEDICINE
Payer: MEDICARE

## 2024-08-19 VITALS
WEIGHT: 180.75 LBS | DIASTOLIC BLOOD PRESSURE: 78 MMHG | HEART RATE: 82 BPM | RESPIRATION RATE: 16 BRPM | BODY MASS INDEX: 34.13 KG/M2 | OXYGEN SATURATION: 97 % | SYSTOLIC BLOOD PRESSURE: 142 MMHG | HEIGHT: 61 IN

## 2024-08-19 DIAGNOSIS — N18.32 STAGE 3B CHRONIC KIDNEY DISEASE: ICD-10-CM

## 2024-08-19 DIAGNOSIS — Z95.0 S/P PLACEMENT OF CARDIAC PACEMAKER: ICD-10-CM

## 2024-08-19 DIAGNOSIS — K35.80 ACUTE APPENDICITIS, UNSPECIFIED ACUTE APPENDICITIS TYPE: ICD-10-CM

## 2024-08-19 DIAGNOSIS — I50.32 CHRONIC DIASTOLIC HEART FAILURE: ICD-10-CM

## 2024-08-19 DIAGNOSIS — Z86.79 HISTORY OF ATRIAL FIBRILLATION: ICD-10-CM

## 2024-08-19 DIAGNOSIS — J84.9 INTERSTITIAL LUNG DISEASE: ICD-10-CM

## 2024-08-19 DIAGNOSIS — I10 BENIGN ESSENTIAL HYPERTENSION: ICD-10-CM

## 2024-08-19 DIAGNOSIS — K35.80 ACUTE APPENDICITIS, UNSPECIFIED ACUTE APPENDICITIS TYPE: Primary | ICD-10-CM

## 2024-08-19 DIAGNOSIS — K35.30 ACUTE APPENDICITIS WITH LOCALIZED PERITONITIS, WITHOUT PERFORATION, ABSCESS, OR GANGRENE: Primary | ICD-10-CM

## 2024-08-19 DIAGNOSIS — I48.0 PAROXYSMAL A-FIB: ICD-10-CM

## 2024-08-19 DIAGNOSIS — R10.31 RIGHT LOWER QUADRANT PAIN: ICD-10-CM

## 2024-08-19 LAB
ALBUMIN SERPL BCP-MCNC: 3.6 G/DL (ref 3.5–5.2)
ALP SERPL-CCNC: 73 U/L (ref 55–135)
ALT SERPL W/O P-5'-P-CCNC: 10 U/L (ref 10–44)
ANION GAP SERPL CALC-SCNC: 9 MMOL/L (ref 8–16)
AST SERPL-CCNC: 20 U/L (ref 10–40)
BACTERIA #/AREA URNS AUTO: NORMAL /HPF
BASOPHILS # BLD AUTO: 0.06 K/UL (ref 0–0.2)
BASOPHILS NFR BLD: 0.6 % (ref 0–1.9)
BILIRUB SERPL-MCNC: 0.6 MG/DL (ref 0.1–1)
BILIRUB UR QL STRIP: NEGATIVE
BUN SERPL-MCNC: 19 MG/DL (ref 8–23)
CALCIUM SERPL-MCNC: 10.7 MG/DL (ref 8.7–10.5)
CHLORIDE SERPL-SCNC: 107 MMOL/L (ref 95–110)
CLARITY UR REFRACT.AUTO: CLEAR
CO2 SERPL-SCNC: 23 MMOL/L (ref 23–29)
COLOR UR AUTO: COLORLESS
CREAT SERPL-MCNC: 2.1 MG/DL (ref 0.5–1.4)
DIFFERENTIAL METHOD BLD: ABNORMAL
EOSINOPHIL # BLD AUTO: 0.4 K/UL (ref 0–0.5)
EOSINOPHIL NFR BLD: 3.6 % (ref 0–8)
ERYTHROCYTE [DISTWIDTH] IN BLOOD BY AUTOMATED COUNT: 13.6 % (ref 11.5–14.5)
EST. GFR  (NO RACE VARIABLE): 24.3 ML/MIN/1.73 M^2
GLUCOSE SERPL-MCNC: 126 MG/DL (ref 70–110)
GLUCOSE UR QL STRIP: NEGATIVE
HCT VFR BLD AUTO: 37.7 % (ref 37–48.5)
HGB BLD-MCNC: 11.5 G/DL (ref 12–16)
HGB UR QL STRIP: ABNORMAL
HYALINE CASTS UR QL AUTO: 0 /LPF
IMM GRANULOCYTES # BLD AUTO: 0.02 K/UL (ref 0–0.04)
IMM GRANULOCYTES NFR BLD AUTO: 0.2 % (ref 0–0.5)
INR PPP: 1.1 (ref 0.8–1.2)
KETONES UR QL STRIP: NEGATIVE
LEUKOCYTE ESTERASE UR QL STRIP: NEGATIVE
LYMPHOCYTES # BLD AUTO: 0.8 K/UL (ref 1–4.8)
LYMPHOCYTES NFR BLD: 7.8 % (ref 18–48)
MCH RBC QN AUTO: 28.4 PG (ref 27–31)
MCHC RBC AUTO-ENTMCNC: 30.5 G/DL (ref 32–36)
MCV RBC AUTO: 93 FL (ref 82–98)
MICROSCOPIC COMMENT: NORMAL
MONOCYTES # BLD AUTO: 0.6 K/UL (ref 0.3–1)
MONOCYTES NFR BLD: 5.9 % (ref 4–15)
NEUTROPHILS # BLD AUTO: 8.7 K/UL (ref 1.8–7.7)
NEUTROPHILS NFR BLD: 81.9 % (ref 38–73)
NITRITE UR QL STRIP: NEGATIVE
NRBC BLD-RTO: 0 /100 WBC
PH UR STRIP: 7 [PH] (ref 5–8)
PLATELET # BLD AUTO: 225 K/UL (ref 150–450)
PMV BLD AUTO: 10 FL (ref 9.2–12.9)
POTASSIUM SERPL-SCNC: 4.1 MMOL/L (ref 3.5–5.1)
PROT SERPL-MCNC: 7.4 G/DL (ref 6–8.4)
PROT UR QL STRIP: ABNORMAL
PROTHROMBIN TIME: 11.8 SEC (ref 9–12.5)
RBC # BLD AUTO: 4.05 M/UL (ref 4–5.4)
RBC #/AREA URNS AUTO: 0 /HPF (ref 0–4)
SODIUM SERPL-SCNC: 139 MMOL/L (ref 136–145)
SP GR UR STRIP: 1.01 (ref 1–1.03)
SQUAMOUS #/AREA URNS AUTO: 0 /HPF
TROPONIN I SERPL DL<=0.01 NG/ML-MCNC: <0.006 NG/ML (ref 0–0.03)
URN SPEC COLLECT METH UR: ABNORMAL
WBC # BLD AUTO: 10.58 K/UL (ref 3.9–12.7)
WBC #/AREA URNS AUTO: 1 /HPF (ref 0–5)

## 2024-08-19 PROCEDURE — 99214 OFFICE O/P EST MOD 30 MIN: CPT | Mod: S$PBB,NTX,, | Performed by: INTERNAL MEDICINE

## 2024-08-19 PROCEDURE — 85610 PROTHROMBIN TIME: CPT | Mod: NTX | Performed by: EMERGENCY MEDICINE

## 2024-08-19 PROCEDURE — 99999 PR PBB SHADOW E&M-EST. PATIENT-LVL V: CPT | Mod: PBBFAC,TXP,, | Performed by: INTERNAL MEDICINE

## 2024-08-19 PROCEDURE — 84484 ASSAY OF TROPONIN QUANT: CPT | Mod: NTX | Performed by: EMERGENCY MEDICINE

## 2024-08-19 PROCEDURE — 87040 BLOOD CULTURE FOR BACTERIA: CPT | Mod: 59,NTX | Performed by: STUDENT IN AN ORGANIZED HEALTH CARE EDUCATION/TRAINING PROGRAM

## 2024-08-19 PROCEDURE — 80053 COMPREHEN METABOLIC PANEL: CPT | Mod: NTX | Performed by: STUDENT IN AN ORGANIZED HEALTH CARE EDUCATION/TRAINING PROGRAM

## 2024-08-19 PROCEDURE — 25000003 PHARM REV CODE 250: Mod: NTX

## 2024-08-19 PROCEDURE — G0378 HOSPITAL OBSERVATION PER HR: HCPCS | Mod: NTX

## 2024-08-19 PROCEDURE — 74176 CT ABD & PELVIS W/O CONTRAST: CPT | Mod: TC,TXP

## 2024-08-19 PROCEDURE — 99285 EMERGENCY DEPT VISIT HI MDM: CPT | Mod: 25,27,NTX

## 2024-08-19 PROCEDURE — 93010 ELECTROCARDIOGRAM REPORT: CPT | Mod: NTX,,, | Performed by: INTERNAL MEDICINE

## 2024-08-19 PROCEDURE — 63600175 PHARM REV CODE 636 W HCPCS: Mod: NTX

## 2024-08-19 PROCEDURE — 99215 OFFICE O/P EST HI 40 MIN: CPT | Mod: PBBFAC,PN,TXP | Performed by: INTERNAL MEDICINE

## 2024-08-19 PROCEDURE — 74176 CT ABD & PELVIS W/O CONTRAST: CPT | Mod: 26,NTX,, | Performed by: RADIOLOGY

## 2024-08-19 PROCEDURE — 99223 1ST HOSP IP/OBS HIGH 75: CPT | Mod: AI,57,NTX,ICN | Performed by: SURGERY

## 2024-08-19 PROCEDURE — 93005 ELECTROCARDIOGRAM TRACING: CPT | Mod: NTX

## 2024-08-19 PROCEDURE — 81001 URINALYSIS AUTO W/SCOPE: CPT | Mod: NTX | Performed by: STUDENT IN AN ORGANIZED HEALTH CARE EDUCATION/TRAINING PROGRAM

## 2024-08-19 PROCEDURE — 85025 COMPLETE CBC W/AUTO DIFF WBC: CPT | Mod: NTX | Performed by: STUDENT IN AN ORGANIZED HEALTH CARE EDUCATION/TRAINING PROGRAM

## 2024-08-19 RX ORDER — TALC
6 POWDER (GRAM) TOPICAL NIGHTLY PRN
Status: DISCONTINUED | OUTPATIENT
Start: 2024-08-19 | End: 2024-08-21 | Stop reason: HOSPADM

## 2024-08-19 RX ORDER — SODIUM CHLORIDE, SODIUM LACTATE, POTASSIUM CHLORIDE, CALCIUM CHLORIDE 600; 310; 30; 20 MG/100ML; MG/100ML; MG/100ML; MG/100ML
INJECTION, SOLUTION INTRAVENOUS CONTINUOUS
Status: DISCONTINUED | OUTPATIENT
Start: 2024-08-19 | End: 2024-08-20

## 2024-08-19 RX ORDER — PROPAFENONE HYDROCHLORIDE 325 MG/1
325 CAPSULE, EXTENDED RELEASE ORAL EVERY 12 HOURS
Status: DISCONTINUED | OUTPATIENT
Start: 2024-08-19 | End: 2024-08-21 | Stop reason: HOSPADM

## 2024-08-19 RX ORDER — METRONIDAZOLE 500 MG/100ML
500 INJECTION, SOLUTION INTRAVENOUS
Status: DISCONTINUED | OUTPATIENT
Start: 2024-08-19 | End: 2024-08-21

## 2024-08-19 RX ORDER — OXYCODONE HYDROCHLORIDE 5 MG/1
5 TABLET ORAL EVERY 4 HOURS PRN
Status: DISCONTINUED | OUTPATIENT
Start: 2024-08-19 | End: 2024-08-21 | Stop reason: HOSPADM

## 2024-08-19 RX ORDER — CIPROFLOXACIN 2 MG/ML
400 INJECTION, SOLUTION INTRAVENOUS
Status: DISCONTINUED | OUTPATIENT
Start: 2024-08-20 | End: 2024-08-20

## 2024-08-19 RX ORDER — METOPROLOL TARTRATE 50 MG/1
50 TABLET ORAL 2 TIMES DAILY
Status: DISCONTINUED | OUTPATIENT
Start: 2024-08-19 | End: 2024-08-21 | Stop reason: HOSPADM

## 2024-08-19 RX ORDER — ACETAMINOPHEN 325 MG/1
650 TABLET ORAL EVERY 6 HOURS PRN
Status: DISCONTINUED | OUTPATIENT
Start: 2024-08-19 | End: 2024-08-20

## 2024-08-19 RX ORDER — LIDOCAINE HYDROCHLORIDE 10 MG/ML
1 INJECTION, SOLUTION EPIDURAL; INFILTRATION; INTRACAUDAL; PERINEURAL ONCE AS NEEDED
Status: DISCONTINUED | OUTPATIENT
Start: 2024-08-19 | End: 2024-08-21 | Stop reason: HOSPADM

## 2024-08-19 RX ORDER — SODIUM CHLORIDE 0.9 % (FLUSH) 0.9 %
10 SYRINGE (ML) INJECTION
Status: DISCONTINUED | OUTPATIENT
Start: 2024-08-19 | End: 2024-08-21 | Stop reason: HOSPADM

## 2024-08-19 RX ORDER — TOPIRAMATE 25 MG/1
25 TABLET ORAL 2 TIMES DAILY
Status: DISCONTINUED | OUTPATIENT
Start: 2024-08-19 | End: 2024-08-21 | Stop reason: HOSPADM

## 2024-08-19 RX ORDER — MONTELUKAST SODIUM 10 MG/1
10 TABLET ORAL NIGHTLY
Status: DISCONTINUED | OUTPATIENT
Start: 2024-08-19 | End: 2024-08-21 | Stop reason: HOSPADM

## 2024-08-19 RX ORDER — ONDANSETRON 8 MG/1
8 TABLET, ORALLY DISINTEGRATING ORAL EVERY 8 HOURS PRN
Status: DISCONTINUED | OUTPATIENT
Start: 2024-08-19 | End: 2024-08-21 | Stop reason: HOSPADM

## 2024-08-19 RX ADMIN — MONTELUKAST 10 MG: 10 TABLET, FILM COATED ORAL at 08:08

## 2024-08-19 RX ADMIN — SODIUM CHLORIDE, POTASSIUM CHLORIDE, SODIUM LACTATE AND CALCIUM CHLORIDE: 600; 310; 30; 20 INJECTION, SOLUTION INTRAVENOUS at 08:08

## 2024-08-19 RX ADMIN — METOPROLOL TARTRATE 50 MG: 50 TABLET, FILM COATED ORAL at 08:08

## 2024-08-19 RX ADMIN — TOPIRAMATE 25 MG: 25 TABLET, FILM COATED ORAL at 08:08

## 2024-08-19 NOTE — PROGRESS NOTES
"Subjective:       Patient ID: Jesenia Curiel is a 73 y.o. female.    Chief Complaint: Abdominal Pain    HPI urgent visit (new problem)    Yesterday, RLQ pain that started suddenly - 4-5 of 10. A little nauseous. She's having bowel movements and also passing gas helped w/ the pain. She was palpating the area. Reports lower than appendix area and did have some rebound. She was in [place], [place] and so didn't want to go to the ER. Was keeping herself NPO except for liquids just in case something needed to be done. Slight chills when she went to bed and also woke up soaking in sweats. Doesn't feel like she has a fever. Woke up this AM and feels better - pain dec to 1-2. No nausea/vomiting. Chronic diarrhea from cellcept for chronic interstitial pneumonitis. Had yeast infection under breasts fairly recently. Breathing is better.     Review of Systems  Comprehensive review of systems otherwise negative. See history/subjective section for more details.    Objective:      Physical Exam    BP (!) 142/78 (BP Location: Left arm, Patient Position: Sitting, BP Method: Medium (Manual))   Pulse 82   Resp 16   Ht 5' 1" (1.549 m)   Wt 82 kg (180 lb 12.4 oz)   SpO2 97%   BMI 34.16 kg/m²     GEN - A+OX4, NAD   HEENT - PERRL, EOMI, OP clear. MMM.   Neck - No cervical LAD.   CV - RRR, no m/r. L PM in place.   Chest - CTAB, no wheezing or rhonchi. Normal work of breathing.   Abd - S/ND/+BS. Periumbilical and RLQ pain on palpation w/ guarding.   Ext - 2+BDP and radial pulses. No LE edema.     Previous labs reviewed.     Assessment/Plan     Jesenia was seen today for abdominal pain.    Diagnoses and all orders for this visit:    Right lower quadrant pain  -     CBC Auto Differential; Future  -     Comprehensive Metabolic Panel; Future  -     Cancel: CT Abdomen Pelvis  Without Contrast; Future  -     CT Abdomen Pelvis  Without Contrast; Future    Immunocompromised on cellcept.    Follow up if symptoms worsen or fail to improve. " Keep appt on Friday. Phone review of CT scan. Scheduled at [facility] at 1245.      Sandra Michaud MD  Department of Internal Medicine - [facility]  11:41 AM    Addendum:  CT of A/P w/ early acute appendicitis. Symptoms started yesterday.   Allergic to PCN, quinolones. On cellcept for chronic interstitial pneumonitis. Spoke w/ pt. To ER.     Sandra Michaud MD  Department of Internal Medicine - [facility]  3:17 PM

## 2024-08-19 NOTE — ADDENDUM NOTE
Addended by: JOSE CRISTOBAL on: 8/19/2024 03:17 PM     Modules accepted: Orders, Level of Service

## 2024-08-19 NOTE — FIRST PROVIDER EVALUATION
"Medical screening exam completed.  I have conducted a focused provider triage encounter, findings are as follows:    Brief history of present illness:  Here for RLQ abdominal pain. CT at [facility] c/f early appendicitis. Sent to the ED for evaluation.    Vitals:    08/19/24 1608   BP: 131/65   Pulse: 62   Resp: 18   Temp: 98.5 °F (36.9 °C)   TempSrc: Oral   SpO2: 96%   Weight: 81.6 kg (180 lb)   Height: 5' 1" (1.549 m)       Pertinent physical exam:  Awake and alert, NAD, non-toxic, ambulatory    Brief workup plan:  Labs    Preliminary workup initiated; this workup will be continued and followed by the physician or advanced practice provider that is assigned to the patient when roomed.   "

## 2024-08-19 NOTE — ED TRIAGE NOTES
Arrives to ED via POV with RLQ pain since yesterday, had CT done, CT read as early acute appendicitis. Pt endorses continued RLQ pain. Last time pt ate is 1300 today.

## 2024-08-19 NOTE — ED PROVIDER NOTES
Encounter Date: 8/19/2024       History     Chief Complaint   Patient presents with    Abnormal Lab     Ct today has appendicitis      HPI  Jesenia is a 73 y.o. F with PMH of CHF, afib, CKD, hld, htn, on cellcept for chronic interstitial pneumonitis.  Presents with RLQ pain that started yesterday, some nausea.  Has had chills and night sweats last night.  Has chronic diarrhea.  Labs done as an outpatient today showed white blood cell count of 10.5 with absolute neutrophil count of 8.7.  Creatinine elevated similar to baseline.  CT abdomen showed possible early appendicitis.     Review of patient's allergies indicates:   Allergen Reactions    Iodinated contrast media Anaphylaxis    Penicillins Anaphylaxis    Allopurinol analogues      Muscle cramps    Ciprofloxacin Rash    Quinolones Rash    Sulfa (sulfonamide antibiotics) Rash    Tetracyclines Rash     Past Medical History:   Diagnosis Date    Allergy     Arthritis     Atrial fibrillation 5/29/2017    Chronic diastolic heart failure 9/29/2023    CKD (chronic kidney disease) stage 3, GFR 30-59 ml/min 6/26/2017    Coronary artery calcification seen on CAT scan 9/29/2023    Disorder of kidney and ureter     GERD (gastroesophageal reflux disease)     Hyperlipidemia     Hypertension     Impaired fasting glucose 11/19/2021    Pre-diabetes 6/3/2017     Past Surgical History:   Procedure Laterality Date    ADENOIDECTOMY      BACK SURGERY  2000    lamenectomy    BREAST SURGERY      BRONCHOSCOPY N/A 7/13/2023    Procedure: BRONCHOSCOPY;  Surgeon: Paul Diagnostic Provider;  Location: [facility] OR [address] [place] [address];  Service: Anesthesiology;  Laterality: N/A;    BRONCHOSCOPY N/A 10/20/2023    Procedure: BRONCHOSCOPY;  Surgeon: Anatoly Tiwari MD;  Location: [facility] OR [address] [place] [address];  Service: Cardiothoracic;  Laterality: N/A;    CARDIAC SURGERY      st loida pacemaker     CATARACT EXTRACTION W/  INTRAOCULAR LENS IMPLANT Right 6/3/2019    Procedure: EXTRACTION, CATARACT, WITH IOL INSERTION;  Surgeon:  Stroke RF  A1c 6.1  SSI    Raisa Moreno MD;  Location: [facility] OR;  Service: Ophthalmology;  Laterality: Right;  Laser    CATARACT EXTRACTION W/  INTRAOCULAR LENS IMPLANT Left 2019    Procedure: EXTRACTION, CATARACT, WITH IOL INSERTION;  Surgeon: Raisa Moreno MD;  Location: [facility] OR;  Service: Ophthalmology;  Laterality: Left;  LASER ASSISTED     SECTION      ,     COLONOSCOPY N/A 9/15/2023    Procedure: COLONOSCOPY;  Surgeon: Tao Gomez MD;  Location: [facility] ENDO (2ND FLR);  Service: Endoscopy;  Laterality: N/A;  inst to portal/[employer] pacemaker   2nd floor for airway protection patient with lung disease elevated pulmonary artery pressure pacemaker and fecal occult blood positive stool,   cleared by pulmonary Dr Bhakta-see note on -    ENDOSCOPIC ULTRASOUND OF UPPER GASTROINTESTINAL TRACT N/A 2023    Procedure: ULTRASOUND, UPPER GI TRACT, ENDOSCOPIC;  Surgeon: Casimiro De Los Santos MD;  Location: [facility] ENDO;  Service: Endoscopy;  Laterality: N/A;  23: instructions send via portal. [employer] ppm- GD    EYE SURGERY      FRACTURE SURGERY      HYSTERECTOMY      INJECTION OF ANESTHETIC AGENT AROUND MULTIPLE INTERCOSTAL NERVES  10/20/2023    Procedure: BLOCK, NERVE, INTERCOSTAL, 2 OR MORE;  Surgeon: Anatoly Tiwari MD;  Location: [facility] OR [facility] FLR;  Service: Cardiothoracic;;    INSERT / REPLACE / REMOVE PACEMAKER      placement    INSERT / REPLACE / REMOVE PACEMAKER  2014    [employer] Model #[id number] replacement    LAPAROSCOPIC APPENDECTOMY N/A 2024    Procedure: APPENDECTOMY, LAPAROSCOPIC;  Surgeon: Siddhartha Muñoz MD;  Location: [facility] OR [facility] FLR;  Service: General;  Laterality: N/A;    pace maker      replacement     REPLACEMENT OF PACEMAKER GENERATOR Left 2024    Procedure: REPLACEMENT, PACEMAKER GENERATOR;  Surgeon: Trell Hodgson MD;  Location: [facility] EP LAB;  Service: Cardiology;  Laterality: Left;  DEVORAH, PPM gen chg, SJM, MAC, GP, 3prep, *SJM dcPPM in situ*    REVISION OF  PROCEDURE INVOLVING PACEMAKER LEAD Left 1/13/2022    Procedure: REVISION, ELECTRODE LEAD, CARDIAC PACEMAKER;  Surgeon: Trell Hodgson MD;  Location: [facility] EP LAB;  Service: Cardiology;  Laterality: Left;  PPM lead Malfx, RA lead revision, SJM, MAC, GP, 3 PREP*dPPM SJM in situ**Contrast prep given*    salpingo opherectomy Bilateral 1998    SPINE SURGERY      TONSILLECTOMY  1953    TUBAL LIGATION      VIDEO-ASSISTED THORACOSCOPIC SURGERY (VATS) Right 10/20/2023    Procedure: VATS WEDGE;  Surgeon: Anatoly Tiwari MD;  [facility];  Service: Cardiothoracic;  Laterality: Right;     Family History   Problem Relation Name Age of Onset    Hypertension Mother 62     Diabetes Mother 62     Hyperlipidemia Mother 62     Coronary artery disease Mother 62     Heart disease Mother 62     Stroke Mother 62     Hypertension Father 50     Diabetes Father 50     Hyperlipidemia Father 50     Coronary artery disease Father 50     Heart disease Father 50     Coronary artery disease Brother 64     Heart disease Brother 64     Hyperthyroidism Brother 64     Diabetes Paternal Grandmother Mejia     Diabetes Paternal Grandfather Sigfrid     Heart attack Neg Hx       Social History     Tobacco Use    Smoking status: Never     Passive exposure: Past    Smokeless tobacco: Never   Substance Use Topics    Alcohol use: Not Currently     Alcohol/week: 5.0 standard drinks of alcohol     Types: 5 Glasses of wine per week    Drug use: No     Review of Systems    Physical Exam     Initial Vitals [08/19/24 1608]   BP Pulse Resp Temp SpO2   131/65 62 18 98.5 °F (36.9 °C) 96 %      MAP       --         Physical Exam  General: Awake and alert, well-nourished  HENT: moist mucous membranes  Eyes: No conjunctival injection  Pulm: CTAB, no increased work of breathing  CV: Regular rate and rhythm, no murmur noted  Abdomen: Nondistended, tender to RLQ with mild RLQ rebound tenderness  MSK: No LE edema  Skin: No rash noted  Neuro: No facial  asymmetry, grossly normal movements of arms and legs  Psychiatric: Cooperative    ED Course   Procedures  Labs Reviewed   CBC W/ AUTO DIFFERENTIAL - Abnormal       Result Value    WBC 10.58      RBC 4.05      Hemoglobin 11.5 (*)     Hematocrit 37.7      MCV 93      MCH 28.4      MCHC 30.5 (*)     RDW 13.6      Platelets 225      MPV 10.0      Immature Granulocytes 0.2      Gran # (ANC) 8.7 (*)     Immature Grans (Abs) 0.02      Lymph # 0.8 (*)     Mono # 0.6      Eos # 0.4      Baso # 0.06      nRBC 0      Gran % 81.9 (*)     Lymph % 7.8 (*)     Mono % 5.9      Eosinophil % 3.6      Basophil % 0.6      Differential Method Automated     COMPREHENSIVE METABOLIC PANEL - Abnormal    Sodium 139      Potassium 4.1      Chloride 107      CO2 23      Glucose 126 (*)     BUN 19      Creatinine 2.1 (*)     Calcium 10.7 (*)     Total Protein 7.4      Albumin 3.6      Total Bilirubin 0.6      Alkaline Phosphatase 73      AST 20      ALT 10      eGFR 24.3 (*)     Anion Gap 9     URINALYSIS, REFLEX TO URINE CULTURE - Abnormal    Specimen UA Urine, Clean Catch      Color, UA Colorless (*)     Appearance, UA Clear      pH, UA 7.0      Specific Gravity, UA 1.010      Protein, UA 1+ (*)     Glucose, UA Negative      Ketones, UA Negative      Bilirubin (UA) Negative      Occult Blood UA 1+ (*)     Nitrite, UA Negative      Leukocytes, UA Negative      Narrative:     Specimen Source->Urine   BASIC METABOLIC PANEL - Abnormal    Sodium 141      Potassium 3.1 (*)     Chloride 112 (*)     CO2 21 (*)     Glucose 93      BUN 16      Creatinine 1.6 (*)     Calcium 9.5      Anion Gap 8      eGFR 33.6 (*)    PHOSPHORUS - Abnormal    Phosphorus 2.2 (*)    CBC W/ AUTO DIFFERENTIAL - Abnormal    WBC 7.82      RBC 3.42 (*)     Hemoglobin 9.5 (*)     Hematocrit 32.9 (*)     MCV 96      MCH 27.8      MCHC 28.9 (*)     RDW 13.4      Platelets 183      MPV 9.4      Immature Granulocytes 0.1      Gran # (ANC) 5.8      Immature Grans (Abs) 0.01       Lymph # 1.0      Mono # 0.6      Eos # 0.3      Baso # 0.05      nRBC 0      Gran % 74.2 (*)     Lymph % 12.8 (*)     Mono % 8.2      Eosinophil % 4.1      Basophil % 0.6      Differential Method Automated     URINALYSIS MICROSCOPIC    RBC, UA 0      WBC, UA 1      Bacteria Rare      Squam Epithel, UA 0      Hyaline Casts, UA 0      Microscopic Comment SEE COMMENT      Narrative:     Specimen Source->Urine   PROTIME-INR    Prothrombin Time 11.8      INR 1.1     TROPONIN I    Troponin I <0.006     MAGNESIUM    Magnesium 2.2          ECG Results              EKG 12-lead (Final result)        Collection Time Result Time QRS Duration OHS QTC Calculation    08/19/24 18:27:08 08/20/24 16:55:58 94 410                     Final result by Interface, Lab In [facility] (08/20/24 16:56:01)                   Narrative:    Test Reason : Z86.79,    Vent. Rate : 060 BPM     Atrial Rate : 060 BPM     P-R Int : 328 ms          QRS Dur : 094 ms      QT Int : 410 ms       P-R-T Axes : 000 049 029 degrees     QTc Int : 410 ms    Atrial-paced rhythm with prolonged AV conduction  Abnormal R wave progression in the precordial leads  Abnormal ECG  When compared with ECG of 06-MAY-2024 13:27,  Electronic atrial pacemaker has replaced Electronic ventricular pacemaker  Confirmed by Frank CALDERON, Jefferson AGUILAR ([id number]) on 8/20/2024 4:55:54 PM    Referred By: [email or web address]   SELF           Confirmed By:Jefferson Marie MD                                  Imaging Results    None          Medications   potassium bicarbonate disintegrating tablet 25 mEq (has no administration in time range)   clindamycin in D5W 900 mg/50 mL IVPB 900 mg (900 mg Intravenous New Bag 8/20/24 1115)   aztreonam (AZACTAM) 2,000 mg in D5W 100 mL IVPB (MB+) (2 g Intravenous New Bag 8/20/24 1135)   HYDROmorphone injection 0.2 mg (0.2 mg Intravenous Given 8/20/24 1649)   potassium chloride SA CR tablet 40 mEq (40 mEq Oral Given 8/21/24 1224)     Medical Decision Making  Pt nontoxic appearing,  afebrile, hemodynamically stable.  Review of clinic labs shows mild anemia, CKD similar to baseline, granulocytosis.  CT was suggestive of early appendicitis as is her clinical exam.  I consulted general surgery and discussed the case with them.  Will defer antibiotics until after surgery eval.  They evaluated and admitted pt for likely operative intervention.      Amount and/or Complexity of Data Reviewed  Labs: ordered.  Discussion of management or test interpretation with external provider(s): Discussed with general surgery    Risk  Decision regarding hospitalization.  Elective major surgery with identified risk factors.                                      Clinical Impression:  Final diagnoses:  [Z86.79] History of atrial fibrillation  [K35.80] Acute appendicitis, unspecified acute appendicitis type          ED Disposition Condition    Observation                 Enzo Shafer MD  08/22/24 2547

## 2024-08-20 ENCOUNTER — ANESTHESIA (OUTPATIENT)
Dept: SURGERY | Facility: HOSPITAL | Age: 74
DRG: 398 | End: 2024-08-20
Payer: MEDICARE

## 2024-08-20 ENCOUNTER — DOCUMENTATION ONLY (OUTPATIENT)
Dept: CARDIOLOGY | Facility: HOSPITAL | Age: 74
End: 2024-08-20
Payer: MEDICARE

## 2024-08-20 VITALS — HEART RATE: 61 BPM

## 2024-08-20 LAB
ANION GAP SERPL CALC-SCNC: 8 MMOL/L (ref 8–16)
BASOPHILS # BLD AUTO: 0.05 K/UL (ref 0–0.2)
BASOPHILS NFR BLD: 0.6 % (ref 0–1.9)
BUN SERPL-MCNC: 16 MG/DL (ref 8–23)
CALCIUM SERPL-MCNC: 9.5 MG/DL (ref 8.7–10.5)
CHLORIDE SERPL-SCNC: 112 MMOL/L (ref 95–110)
CO2 SERPL-SCNC: 21 MMOL/L (ref 23–29)
CREAT SERPL-MCNC: 1.6 MG/DL (ref 0.5–1.4)
DIFFERENTIAL METHOD BLD: ABNORMAL
EOSINOPHIL # BLD AUTO: 0.3 K/UL (ref 0–0.5)
EOSINOPHIL NFR BLD: 4.1 % (ref 0–8)
ERYTHROCYTE [DISTWIDTH] IN BLOOD BY AUTOMATED COUNT: 13.4 % (ref 11.5–14.5)
EST. GFR  (NO RACE VARIABLE): 33.6 ML/MIN/1.73 M^2
GLUCOSE SERPL-MCNC: 93 MG/DL (ref 70–110)
HCT VFR BLD AUTO: 32.9 % (ref 37–48.5)
HGB BLD-MCNC: 9.5 G/DL (ref 12–16)
IMM GRANULOCYTES # BLD AUTO: 0.01 K/UL (ref 0–0.04)
IMM GRANULOCYTES NFR BLD AUTO: 0.1 % (ref 0–0.5)
LYMPHOCYTES # BLD AUTO: 1 K/UL (ref 1–4.8)
LYMPHOCYTES NFR BLD: 12.8 % (ref 18–48)
MAGNESIUM SERPL-MCNC: 2.2 MG/DL (ref 1.6–2.6)
MCH RBC QN AUTO: 27.8 PG (ref 27–31)
MCHC RBC AUTO-ENTMCNC: 28.9 G/DL (ref 32–36)
MCV RBC AUTO: 96 FL (ref 82–98)
MONOCYTES # BLD AUTO: 0.6 K/UL (ref 0.3–1)
MONOCYTES NFR BLD: 8.2 % (ref 4–15)
NEUTROPHILS # BLD AUTO: 5.8 K/UL (ref 1.8–7.7)
NEUTROPHILS NFR BLD: 74.2 % (ref 38–73)
NRBC BLD-RTO: 0 /100 WBC
OHS QRS DURATION: 94 MS
OHS QTC CALCULATION: 410 MS
PHOSPHATE SERPL-MCNC: 2.2 MG/DL (ref 2.7–4.5)
PLATELET # BLD AUTO: 183 K/UL (ref 150–450)
PMV BLD AUTO: 9.4 FL (ref 9.2–12.9)
POTASSIUM SERPL-SCNC: 3.1 MMOL/L (ref 3.5–5.1)
RBC # BLD AUTO: 3.42 M/UL (ref 4–5.4)
SODIUM SERPL-SCNC: 141 MMOL/L (ref 136–145)
WBC # BLD AUTO: 7.82 K/UL (ref 3.9–12.7)

## 2024-08-20 PROCEDURE — G0378 HOSPITAL OBSERVATION PER HR: HCPCS | Mod: NTX

## 2024-08-20 PROCEDURE — 36000709 HC OR TIME LEV III EA ADD 15 MIN: Mod: NTX | Performed by: SURGERY

## 2024-08-20 PROCEDURE — 63600175 PHARM REV CODE 636 W HCPCS: Mod: JG,NTX

## 2024-08-20 PROCEDURE — 37000008 HC ANESTHESIA 1ST 15 MINUTES: Mod: NTX | Performed by: SURGERY

## 2024-08-20 PROCEDURE — 63600175 PHARM REV CODE 636 W HCPCS: Mod: NTX | Performed by: ANESTHESIOLOGY

## 2024-08-20 PROCEDURE — 36000708 HC OR TIME LEV III 1ST 15 MIN: Mod: NTX | Performed by: SURGERY

## 2024-08-20 PROCEDURE — 25000003 PHARM REV CODE 250: Mod: NTX

## 2024-08-20 PROCEDURE — 88304 TISSUE EXAM BY PATHOLOGIST: CPT | Mod: 26,NTX,, | Performed by: STUDENT IN AN ORGANIZED HEALTH CARE EDUCATION/TRAINING PROGRAM

## 2024-08-20 PROCEDURE — 99900035 HC TECH TIME PER 15 MIN (STAT): Mod: NTX

## 2024-08-20 PROCEDURE — 27201423 OPTIME MED/SURG SUP & DEVICES STERILE SUPPLY: Mod: NTX | Performed by: SURGERY

## 2024-08-20 PROCEDURE — 71000016 HC POSTOP RECOV ADDL HR: Mod: NTX | Performed by: SURGERY

## 2024-08-20 PROCEDURE — 63600175 PHARM REV CODE 636 W HCPCS: Mod: JZ,JG,NTX | Performed by: SURGERY

## 2024-08-20 PROCEDURE — 71000033 HC RECOVERY, INTIAL HOUR: Mod: NTX | Performed by: SURGERY

## 2024-08-20 PROCEDURE — 0DTJ4ZZ RESECTION OF APPENDIX, PERCUTANEOUS ENDOSCOPIC APPROACH: ICD-10-PCS | Performed by: SURGERY

## 2024-08-20 PROCEDURE — 37000009 HC ANESTHESIA EA ADD 15 MINS: Mod: NTX | Performed by: SURGERY

## 2024-08-20 PROCEDURE — 71000015 HC POSTOP RECOV 1ST HR: Mod: NTX | Performed by: SURGERY

## 2024-08-20 PROCEDURE — 44970 LAPAROSCOPY APPENDECTOMY: CPT | Mod: NTX,,, | Performed by: SURGERY

## 2024-08-20 PROCEDURE — 83735 ASSAY OF MAGNESIUM: CPT | Mod: NTX

## 2024-08-20 PROCEDURE — 27000221 HC OXYGEN, UP TO 24 HOURS: Mod: NTX

## 2024-08-20 PROCEDURE — 84100 ASSAY OF PHOSPHORUS: CPT | Mod: NTX

## 2024-08-20 PROCEDURE — 88304 TISSUE EXAM BY PATHOLOGIST: CPT | Mod: NTX | Performed by: STUDENT IN AN ORGANIZED HEALTH CARE EDUCATION/TRAINING PROGRAM

## 2024-08-20 PROCEDURE — 94761 N-INVAS EAR/PLS OXIMETRY MLT: CPT | Mod: NTX

## 2024-08-20 PROCEDURE — 85025 COMPLETE CBC W/AUTO DIFF WBC: CPT | Mod: NTX

## 2024-08-20 PROCEDURE — 63600175 PHARM REV CODE 636 W HCPCS: Mod: NTX

## 2024-08-20 PROCEDURE — 71000039 HC RECOVERY, EACH ADD'L HOUR: Mod: NTX | Performed by: SURGERY

## 2024-08-20 PROCEDURE — 63600175 PHARM REV CODE 636 W HCPCS: Mod: JZ,JG,NTX | Performed by: STUDENT IN AN ORGANIZED HEALTH CARE EDUCATION/TRAINING PROGRAM

## 2024-08-20 PROCEDURE — 25000003 PHARM REV CODE 250: Mod: NTX | Performed by: STUDENT IN AN ORGANIZED HEALTH CARE EDUCATION/TRAINING PROGRAM

## 2024-08-20 PROCEDURE — 80048 BASIC METABOLIC PNL TOTAL CA: CPT | Mod: NTX

## 2024-08-20 PROCEDURE — 99232 SBSQ HOSP IP/OBS MODERATE 35: CPT | Mod: NTX,,, | Performed by: STUDENT IN AN ORGANIZED HEALTH CARE EDUCATION/TRAINING PROGRAM

## 2024-08-20 RX ORDER — HEPARIN SODIUM 5000 [USP'U]/ML
5000 INJECTION, SOLUTION INTRAVENOUS; SUBCUTANEOUS EVERY 8 HOURS
Status: DISCONTINUED | OUTPATIENT
Start: 2024-08-21 | End: 2024-08-21

## 2024-08-20 RX ORDER — ONDANSETRON HYDROCHLORIDE 2 MG/ML
4 INJECTION, SOLUTION INTRAVENOUS DAILY PRN
Status: DISCONTINUED | OUTPATIENT
Start: 2024-08-20 | End: 2024-08-21 | Stop reason: HOSPADM

## 2024-08-20 RX ORDER — GLUCAGON 1 MG
1 KIT INJECTION
Status: DISCONTINUED | OUTPATIENT
Start: 2024-08-20 | End: 2024-08-21 | Stop reason: HOSPADM

## 2024-08-20 RX ORDER — ONDANSETRON HYDROCHLORIDE 2 MG/ML
INJECTION, SOLUTION INTRAVENOUS
Status: DISCONTINUED | OUTPATIENT
Start: 2024-08-20 | End: 2024-08-20

## 2024-08-20 RX ORDER — LIDOCAINE HYDROCHLORIDE 20 MG/ML
INJECTION INTRAVENOUS
Status: DISCONTINUED | OUTPATIENT
Start: 2024-08-20 | End: 2024-08-20

## 2024-08-20 RX ORDER — VASOPRESSIN 20 [USP'U]/ML
INJECTION, SOLUTION INTRAMUSCULAR; SUBCUTANEOUS
Status: DISCONTINUED | OUTPATIENT
Start: 2024-08-20 | End: 2024-08-20

## 2024-08-20 RX ORDER — METRONIDAZOLE 500 MG/100ML
500 INJECTION, SOLUTION INTRAVENOUS
Status: DISCONTINUED | OUTPATIENT
Start: 2024-08-20 | End: 2024-08-20

## 2024-08-20 RX ORDER — CLINDAMYCIN PHOSPHATE 900 MG/50ML
900 INJECTION, SOLUTION INTRAVENOUS ONCE
Status: COMPLETED | OUTPATIENT
Start: 2024-08-20 | End: 2024-08-20

## 2024-08-20 RX ORDER — PROPOFOL 10 MG/ML
VIAL (ML) INTRAVENOUS
Status: DISCONTINUED | OUTPATIENT
Start: 2024-08-20 | End: 2024-08-20

## 2024-08-20 RX ORDER — SODIUM CHLORIDE 0.9 % (FLUSH) 0.9 %
3 SYRINGE (ML) INJECTION
Status: DISCONTINUED | OUTPATIENT
Start: 2024-08-20 | End: 2024-08-21 | Stop reason: HOSPADM

## 2024-08-20 RX ORDER — DEXAMETHASONE SODIUM PHOSPHATE 4 MG/ML
INJECTION, SOLUTION INTRA-ARTICULAR; INTRALESIONAL; INTRAMUSCULAR; INTRAVENOUS; SOFT TISSUE
Status: DISCONTINUED | OUTPATIENT
Start: 2024-08-20 | End: 2024-08-20

## 2024-08-20 RX ORDER — HALOPERIDOL 5 MG/ML
0.5 INJECTION INTRAMUSCULAR EVERY 10 MIN PRN
Status: DISCONTINUED | OUTPATIENT
Start: 2024-08-20 | End: 2024-08-21 | Stop reason: HOSPADM

## 2024-08-20 RX ORDER — ACETAMINOPHEN 500 MG
1000 TABLET ORAL EVERY 8 HOURS
Status: DISCONTINUED | OUTPATIENT
Start: 2024-08-20 | End: 2024-08-21 | Stop reason: HOSPADM

## 2024-08-20 RX ORDER — CIPROFLOXACIN 2 MG/ML
400 INJECTION, SOLUTION INTRAVENOUS
Status: DISCONTINUED | OUTPATIENT
Start: 2024-08-20 | End: 2024-08-20

## 2024-08-20 RX ORDER — BUPIVACAINE HYDROCHLORIDE 2.5 MG/ML
INJECTION, SOLUTION EPIDURAL; INFILTRATION; INTRACAUDAL
Status: DISCONTINUED | OUTPATIENT
Start: 2024-08-20 | End: 2024-08-20 | Stop reason: HOSPADM

## 2024-08-20 RX ORDER — HYDROMORPHONE HYDROCHLORIDE 1 MG/ML
0.2 INJECTION, SOLUTION INTRAMUSCULAR; INTRAVENOUS; SUBCUTANEOUS EVERY 5 MIN PRN
Status: COMPLETED | OUTPATIENT
Start: 2024-08-20 | End: 2024-08-20

## 2024-08-20 RX ORDER — ROCURONIUM BROMIDE 10 MG/ML
INJECTION, SOLUTION INTRAVENOUS
Status: DISCONTINUED | OUTPATIENT
Start: 2024-08-20 | End: 2024-08-20

## 2024-08-20 RX ORDER — OXYCODONE AND ACETAMINOPHEN 5; 325 MG/1; MG/1
1 TABLET ORAL
Status: DISCONTINUED | OUTPATIENT
Start: 2024-08-20 | End: 2024-08-21 | Stop reason: HOSPADM

## 2024-08-20 RX ORDER — FENTANYL CITRATE 50 UG/ML
INJECTION, SOLUTION INTRAMUSCULAR; INTRAVENOUS
Status: DISCONTINUED | OUTPATIENT
Start: 2024-08-20 | End: 2024-08-20

## 2024-08-20 RX ADMIN — HYDROMORPHONE HYDROCHLORIDE 0.2 MG: 1 INJECTION, SOLUTION INTRAMUSCULAR; INTRAVENOUS; SUBCUTANEOUS at 12:08

## 2024-08-20 RX ADMIN — ACETAMINOPHEN 1000 MG: 500 TABLET ORAL at 02:08

## 2024-08-20 RX ADMIN — OXYCODONE HYDROCHLORIDE 5 MG: 5 TABLET ORAL at 12:08

## 2024-08-20 RX ADMIN — VASOPRESSIN 1 UNITS: 20 INJECTION INTRAVENOUS at 11:08

## 2024-08-20 RX ADMIN — HYDROMORPHONE HYDROCHLORIDE 0.2 MG: 1 INJECTION, SOLUTION INTRAMUSCULAR; INTRAVENOUS; SUBCUTANEOUS at 04:08

## 2024-08-20 RX ADMIN — ACETAMINOPHEN 1000 MG: 500 TABLET ORAL at 10:08

## 2024-08-20 RX ADMIN — OXYCODONE HYDROCHLORIDE 5 MG: 5 TABLET ORAL at 04:08

## 2024-08-20 RX ADMIN — SODIUM CHLORIDE, SODIUM GLUCONATE, SODIUM ACETATE, POTASSIUM CHLORIDE, MAGNESIUM CHLORIDE, SODIUM PHOSPHATE, DIBASIC, AND POTASSIUM PHOSPHATE: .53; .5; .37; .037; .03; .012; .00082 INJECTION, SOLUTION INTRAVENOUS at 11:08

## 2024-08-20 RX ADMIN — HYDROMORPHONE HYDROCHLORIDE 0.2 MG: 1 INJECTION, SOLUTION INTRAMUSCULAR; INTRAVENOUS; SUBCUTANEOUS at 01:08

## 2024-08-20 RX ADMIN — METOPROLOL TARTRATE 50 MG: 50 TABLET, FILM COATED ORAL at 08:08

## 2024-08-20 RX ADMIN — LIDOCAINE HYDROCHLORIDE 100 MG: 20 INJECTION INTRAVENOUS at 11:08

## 2024-08-20 RX ADMIN — CLINDAMYCIN IN 5 PERCENT DEXTROSE 900 MG: 18 INJECTION, SOLUTION INTRAVENOUS at 11:08

## 2024-08-20 RX ADMIN — OXYCODONE HYDROCHLORIDE 5 MG: 5 TABLET ORAL at 10:08

## 2024-08-20 RX ADMIN — SUGAMMADEX 400 MG: 100 INJECTION, SOLUTION INTRAVENOUS at 12:08

## 2024-08-20 RX ADMIN — ROCURONIUM BROMIDE 50 MG: 10 INJECTION, SOLUTION INTRAVENOUS at 11:08

## 2024-08-20 RX ADMIN — FENTANYL CITRATE 50 MCG: 50 INJECTION, SOLUTION INTRAMUSCULAR; INTRAVENOUS at 11:08

## 2024-08-20 RX ADMIN — DEXAMETHASONE SODIUM PHOSPHATE 8 MG: 4 INJECTION, SOLUTION INTRAMUSCULAR; INTRAVENOUS at 11:08

## 2024-08-20 RX ADMIN — FENTANYL CITRATE 50 MCG: 50 INJECTION, SOLUTION INTRAMUSCULAR; INTRAVENOUS at 12:08

## 2024-08-20 RX ADMIN — TOPIRAMATE 25 MG: 25 TABLET, FILM COATED ORAL at 08:08

## 2024-08-20 RX ADMIN — AZTREONAM 2 G: 2 INJECTION, POWDER, LYOPHILIZED, FOR SOLUTION INTRAMUSCULAR; INTRAVENOUS at 11:08

## 2024-08-20 RX ADMIN — ROCURONIUM BROMIDE 10 MG: 10 INJECTION, SOLUTION INTRAVENOUS at 11:08

## 2024-08-20 RX ADMIN — PROPOFOL 160 MG: 10 INJECTION, EMULSION INTRAVENOUS at 11:08

## 2024-08-20 RX ADMIN — SODIUM CHLORIDE: 0.9 INJECTION, SOLUTION INTRAVENOUS at 10:08

## 2024-08-20 RX ADMIN — ONDANSETRON 4 MG: 2 INJECTION INTRAMUSCULAR; INTRAVENOUS at 11:08

## 2024-08-20 RX ADMIN — METOPROLOL TARTRATE 50 MG: 50 TABLET, FILM COATED ORAL at 10:08

## 2024-08-20 NOTE — HPI
Jesenia Curiel is a 73 year old female with a PMH of a fib on eliquis, CHF (last EF 55/60%), interstitial lung disease/hypersensitivity pneumonitis (on cellcept), HTN, HLD, sick sinus syndrome (s/p pacemake placement), CKD3 who presents with RLQ abdominal pain that began yesterday morning. The pain has been persistent in her RLQ and it does not radiate. She endorses nausea but denies vomiting. She does have diarrhea, but this is at her baseline secondary to the cellcept. She denies fevers and chills at home. She does get SOB with exertion but denies home O2 or inhaler use.     Past surgical history of DOLORES/BSO.  Last dose of eliquis was 8/19 at 11 am.     Labs with granulocytosis, no leukocytosis. CT scan with dilated appendix with associated inflammatory changes consistent with early appendicitis.

## 2024-08-20 NOTE — PROGRESS NOTES
[facility] - Emergency Dept  General Surgery  Progress Note    Subjective:     History of Present Illness:  Jesenia Curiel is a 73 year old female with a PMH of a fib on eliquis, CHF (last EF 55/60%), interstitial lung disease/hypersensitivity pneumonitis (on cellcept), HTN, HLD, sick sinus syndrome (s/p pacemake placement), CKD3 who presents with RLQ abdominal pain that began yesterday morning. The pain has been persistent in her RLQ and it does not radiate. She endorses nausea but denies vomiting. She does have diarrhea, but this is at her baseline secondary to the cellcept. She denies fevers and chills at home. She does get SOB with exertion but denies home O2 or inhaler use.     Past surgical history of DOLORES/BSO.  Last dose of eliquis was 8/19 at 11 am.     Labs with granulocytosis, no leukocytosis. CT scan with dilated appendix with associated inflammatory changes consistent with early appendicitis.     Post-Op Info:  Procedure(s) (LRB):  APPENDECTOMY, LAPAROSCOPIC (N/A)         Interval History: NAEON. Afebrile. HDS. Pain is controlled with current regimen. No new symptoms or concerns this morning. All questions answered.   To OR today for appendectomy   WBC 10 > 7      Medications:  Continuous Infusions:   lactated ringers   Intravenous Continuous 75 mL/hr at 08/19/24 2052 New Bag at 08/19/24 2052     Scheduled Meds:   metoprolol tartrate  50 mg Oral BID    montelukast  10 mg Oral QHS    propafenone  325 mg Oral Q12H    topiramate  25 mg Oral BID     PRN Meds:  Current Facility-Administered Medications:     acetaminophen, 650 mg, Oral, Q6H PRN    ciprofloxacin, 400 mg, Intravenous, On Call Procedure    LIDOcaine (PF) 10 mg/ml (1%), 1 mL, Intradermal, Once PRN    melatonin, 6 mg, Oral, Nightly PRN    metroNIDAZOLE IV (PEDS and ADULTS), 500 mg, Intravenous, On Call Procedure    ondansetron, 8 mg, Oral, Q8H PRN    oxyCODONE, 5 mg, Oral, Q4H PRN    sodium chloride 0.9%, 10 mL, Intravenous, PRN     Review of  patient's allergies indicates:   Allergen Reactions    Iodinated contrast media Anaphylaxis    Penicillins Anaphylaxis    Allopurinol analogues      Muscle cramps    Ciprofloxacin Rash    Quinolones Rash    Sulfa (sulfonamide antibiotics) Rash    Tetracyclines Rash     Objective:     Vital Signs (Most Recent):  Temp: 97.8 °F (36.6 °C) (08/20/24 0410)  Pulse: 60 (08/20/24 0410)  Resp: 18 (08/20/24 0452)  BP: 130/66 (08/20/24 0410)  SpO2: 96 % (08/20/24 0410) Vital Signs (24h Range):  Temp:  [97.8 °F (36.6 °C)-98.5 °F (36.9 °C)] 97.8 °F (36.6 °C)  Pulse:  [60-82] 60  Resp:  [16-18] 18  SpO2:  [96 %-97 %] 96 %  BP: (120-142)/(63-78) 130/66     Weight: 81.6 kg (180 lb)  Body mass index is 34.01 kg/m².    Intake/Output - Last 3 Shifts       None             Physical Exam  Vitals and nursing note reviewed.   Constitutional:       General: She is not in acute distress.     Appearance: She is not diaphoretic.      Comments: Room air   HENT:      Head: Normocephalic and atraumatic.      Mouth/Throat:      Mouth: Mucous membranes are moist.      Pharynx: Oropharynx is clear.   Eyes:      Extraocular Movements: Extraocular movements intact.      Conjunctiva/sclera: Conjunctivae normal.   Cardiovascular:      Rate and Rhythm: Normal rate.   Pulmonary:      Effort: Pulmonary effort is normal. No respiratory distress.   Abdominal:      General: There is no distension.      Palpations: Abdomen is soft.      Comments: TTP in RLQ with voluntary guarding.    Musculoskeletal:         General: No deformity.   Skin:     General: Skin is warm and dry.   Neurological:      Mental Status: She is alert and oriented to person, place, and time.          Significant Labs:  I have reviewed all pertinent lab results within the past 24 hours.    Significant Diagnostics:  I have reviewed all pertinent imaging results/findings within the past 24 hours.  Assessment/Plan:     * Acute appendicitis with localized peritonitis, without perforation,  abscess, or gangrene  73 year old female with a PMH of a fib on eliquis, CHF (last EF 55/60%), interstitial lung disease/hypersensitivity pneumonitis (on cellcept), HTN, HLD, sick sinus syndrome (s/p pacemake placement), CKD3 with acute appendicitis. Lack of leukocytosis is likely secondary to the cellcept.     - NPO  - To OR today for appendectomy  - Consent obtained   - Hold home eliquis   - IVF  - Continue IV antibiotics   - Tele   - PRN pain and nausea medications  - Daily labs  - Replete electrolytes PRN  - DVT prophylaxis (SCDs and heparin)  - OOB, ambulate  - Home meds reviewed and reconciled    Dispo: not ter medically stable for dc        Case discussed with Dr. Muñoz.       KELSEA VernonC  General Surgery  [facility] - Emergency Dept

## 2024-08-20 NOTE — H&P
Please see consult noted dated 8/19/24 for full H&P.    Zenobia Castillo MD  General Surgery, PGY-4

## 2024-08-20 NOTE — ASSESSMENT & PLAN NOTE
73 year old female with a PMH of a fib on eliquis, CHF (last EF 55/60%), interstitial lung disease/hypersensitivity pneumonitis (on cellcept), HTN, HLD, sick sinus syndrome (s/p pacemake placement), CKD3 with acute appendicitis. Lack of leukocytosis is likely secondary to the cellcept.     - admit to general surgery  - NPO, mIVF  - IV abx, patient has extensive allergy list but will look for regimen with appropriate coverage  - PRN pain medications, nausea medications  - Hold eliquis  - Will plan for OR tomorrow for laparoscopic appendectomy. Consent obtained.

## 2024-08-20 NOTE — BRIEF OP NOTE
[facility] - Surgery ([facility])  Brief Operative Note    SUMMARY     Surgery Date: 8/20/2024     Surgeons and Role:     * Siddhartha Muñoz MD - Primary     * Leon Neely MD - Resident - Assisting        Pre-op Diagnosis:  Acute appendicitis with localized peritonitis, without perforation, abscess, or gangrene [K35.30]    Post-op Diagnosis:  Post-Op Diagnosis Codes:     * Acute appendicitis with localized peritonitis, without perforation, abscess, or gangrene [K35.30]    Procedure(s) (LRB):  APPENDECTOMY, LAPAROSCOPIC (N/A)    Anesthesia: General    Implants:  * No implants in log *    Operative Findings: laparoscopic appendectomy    Estimated Blood Loss: 5 cc    Estimated Blood Loss has been documented.         Specimens:   Specimen (24h ago, onward)       Start     Ordered    08/20/24 1155  Specimen to Pathology, Surgery General Surgery  Once        Comments: Pre-op Diagnosis: Acute appendicitis with localized peritonitis, without perforation, abscess, or gangrene [K35.30]Procedure(s):APPENDECTOMY, LAPAROSCOPIC Number of specimens: 1Name of specimens: 1. APPENDIX     References:    Click here for ordering Quick Tip   Question Answer Comment   Procedure Type: General Surgery    Specimen Class: Routine/Screening    Release to patient Immediate        08/20/24 4460                    [id number]      
29 y.o.  @ 37.1 weeks c/o chest pain 6/10 since Friday. Pt reports that the pain is constant. Pain is worse around 2am. Pt denies any shortness of breath, n/v, diarrhea, constipation, fevers. Pt reports good fetal movement. Denies vb, lof, contractions.

## 2024-08-20 NOTE — CONSULTS
[facility] - Emergency Dept  General Surgery  Consult Note    Patient Name: Jesenia Curiel  MRN: [id number]  Code Status: Prior  Admission Date: 8/19/2024  Hospital Length of Stay: 0 days  Attending Physician: Enzo Shafer MD  Primary Care Provider: Sandra Michaud MD    Patient information was obtained from patient, spouse/SO, past medical records, and ER records.     Inpatient consult to General surgery  Consult performed by: Zenobia Castillo MD  Consult ordered by: Enzo Shafer MD  Reason for consult: appendicitis  Assessment/Recommendations: surgery        Subjective:     Principal Problem: Acute appendicitis with localized peritonitis, without perforation, abscess, or gangrene    History of Present Illness: Jesenia Curiel is a 73 year old female with a PMH of a fib on eliquis, CHF (last EF 55/60%), interstitial lung disease/hypersensitivity pneumonitis (on cellcept), HTN, HLD, sick sinus syndrome (s/p pacemake placement), CKD3 who presents with RLQ abdominal pain that began yesterday morning. The pain has been persistent in her RLQ and it does not radiate. She endorses nausea but denies vomiting. She does have diarrhea, but this is at her baseline secondary to the cellcept. She denies fevers and chills at home. She does get SOB with exertion but denies home O2 or inhaler use.     Past surgical history of DOLORES/BSO.  Last dose of eliquis was 8/19 at 11 am.     Labs with granulocytosis, no leukocytosis. CT scan with dilated appendix with associated inflammatory changes consistent with early appendicitis.     Current Facility-Administered Medications on File Prior to Encounter   Medication    vancomycin (VANCOCIN) 1,000 mg in dextrose 5 % (D5W) 250 mL IVPB (Vial-Mate)     Current Outpatient Medications on File Prior to Encounter   Medication Sig    apixaban (ELIQUIS) 5 mg Tab Take 1 tablet (5 mg total) by mouth 2 (two) times daily.    aspirin (ECOTRIN) 81 MG EC tablet Take 1 tablet (81 mg total) by mouth  once daily.    bisoprolol (ZEBETA) 5 MG tablet Take 1 tablet (5 mg total) by mouth once daily.    clobetasoL (OLUX) 0.05 % Foam Apply 1 application topically every 30 days.    ERGOCALCIFEROL, VITAMIN D2, (VITAMIN D ORAL) Take 2,000 Units by mouth once daily.    furosemide (LASIX) 20 MG tablet Take 1 tablet (20 mg total) by mouth once daily.    montelukast (SINGULAIR) 10 mg tablet Take 1 tablet (10 mg total) by mouth every evening.    mycophenolate (CELLCEPT) 500 mg Tab Take 1 tablet (500 mg) BID x 7 days, then increase to 2 tablets (1000 mg) BID thereafter    nitroGLYCERIN (NITROSTAT) 0.4 MG SL tablet Place 1 tablet (0.4 mg total) under the tongue every 5 (five) minutes as needed for Chest pain.    potassium citrate (UROCIT-K) 10 mEq (1,080 mg) TbSR TAKE 1 TABLET BY MOUTH EVERY DAY    propafenone (RYTHMOL SR) 325 MG Cp12 Take 1 capsule (325 mg total) by mouth every 12 (twelve) hours.    rosuvastatin (CRESTOR) 40 MG Tab Take 1 tablet (40 mg total) by mouth every evening. (Patient taking differently: Take 40 mg by mouth once daily.)    topiramate (TOPAMAX) 25 MG tablet TAKE 1 TABLET TWICE A DAY       Review of patient's allergies indicates:   Allergen Reactions    Iodinated contrast media Anaphylaxis    Penicillins Anaphylaxis    Allopurinol analogues      Muscle cramps    Ciprofloxacin Rash    Quinolones Rash    Sulfa (sulfonamide antibiotics) Rash    Tetracyclines Rash       Past Medical History:   Diagnosis Date    Allergy     Arthritis     Atrial fibrillation 5/29/2017    Chronic diastolic heart failure 9/29/2023    CKD (chronic kidney disease) stage 3, GFR 30-59 ml/min 6/26/2017    Coronary artery calcification seen on CAT scan 9/29/2023    Disorder of kidney and ureter     GERD (gastroesophageal reflux disease)     Hyperlipidemia     Hypertension     Impaired fasting glucose 11/19/2021    Pre-diabetes 6/3/2017     Past Surgical History:   Procedure Laterality Date    ADENOIDECTOMY      BACK SURGERY  2000     lamenectomy    BREAST SURGERY      BRONCHOSCOPY N/A 2023    Procedure: BRONCHOSCOPY;  Surgeon: [facility] Diagnostic Provider;  Location: [facility] OR 2ND FLR;  Service: Anesthesiology;  Laterality: N/A;    BRONCHOSCOPY N/A 10/20/2023    Procedure: BRONCHOSCOPY;  Surgeon: Anatloy Tiwari MD;  Location: [facility] OR 2ND FLR;  Service: Cardiothoracic;  Laterality: N/A;    CARDIAC SURGERY      [employer] paolo pacemaker     CATARACT EXTRACTION W/  INTRAOCULAR LENS IMPLANT Right 6/3/2019    Procedure: EXTRACTION, CATARACT, WITH IOL INSERTION;  Surgeon: Raisa Moreno MD;  Location: [facility];  Service: Ophthalmology;  Laterality: Right;  Laser    CATARACT EXTRACTION W/  INTRAOCULAR LENS IMPLANT Left 2019    Procedure: EXTRACTION, CATARACT, WITH IOL INSERTION;  Surgeon: Raisa Moreno MD;  Location: [facility] OR;  Service: Ophthalmology;  Laterality: Left;  LASER ASSISTED     SECTION      1980    COLONOSCOPY N/A 9/15/2023    Procedure: COLONOSCOPY;  Surgeon: Tao Gomez MD;  Location: [facility] (2ND FLR);  Service: Endoscopy;  Laterality: N/A;  inst to portal/[employer] pacemaker   2nd floor for airway protection patient with lung disease elevated pulmonary artery pressure pacemaker and fecal occult blood positive stool,   cleared by pulmonary Dr Bhakta-see note on -    ENDOSCOPIC ULTRASOUND OF UPPER GASTROINTESTINAL TRACT N/A 2023    Procedure: ULTRASOUND, UPPER GI TRACT, ENDOSCOPIC;  Surgeon: Casimiro De Los Santos MD;  Location: [facility] ENDO;  Service: Endoscopy;  Laterality: N/A;  23: instructions send via portal. [employer] ppm- GD    EYE SURGERY      FRACTURE SURGERY      HYSTERECTOMY      INJECTION OF ANESTHETIC AGENT AROUND MULTIPLE INTERCOSTAL NERVES  10/20/2023    Procedure: BLOCK, NERVE, INTERCOSTAL, 2 OR MORE;  Surgeon: Anatoly Tiwari MD;  Location: [facility] OR 2ND FLR;  Service: Cardiothoracic;;    INSERT / REPLACE / REMOVE PACEMAKER      placement    INSERT / REPLACE / REMOVE PACEMAKER  2014     [employer] Model #[id number] replacement    pace maker  2009    replacement 2014    REPLACEMENT OF PACEMAKER GENERATOR Left 1/26/2024    Procedure: REPLACEMENT, PACEMAKER GENERATOR;  Surgeon: Trell Hodgson MD;  Location: [facility] EP LAB;  Service: Cardiology;  Laterality: Left;  DEVORAH, PPM gen chg, SJM, MAC, GP, 3prep, *SJM dcPPM in situ*    REVISION OF PROCEDURE INVOLVING PACEMAKER LEAD Left 1/13/2022    Procedure: REVISION, ELECTRODE LEAD, CARDIAC PACEMAKER;  Surgeon: Trell Hodgson MD;  Location: [facility] EP LAB;  Service: Cardiology;  Laterality: Left;  PPM lead Malfx, RA lead revision, SJM, MAC, GP, 3 PREP*dPPM SJM in situ**Contrast prep given*    salpingo opherectomy Bilateral 1998    SPINE SURGERY      TONSILLECTOMY  1953    TUBAL LIGATION      VIDEO-ASSISTED THORACOSCOPIC SURGERY (VATS) Right 10/20/2023    Procedure: VATS WEDGE;  Surgeon: Anatoly Tiwari MD;  Location: [facility] OR [address] [place] [address];  Service: Cardiothoracic;  Laterality: Right;     Family History       Problem Relation (Age of Onset)    Coronary artery disease Mother, Father, Brother    Diabetes Mother, Father, Paternal Grandmother, Paternal Grandfather    Heart disease Mother, Father, Brother    Hyperlipidemia Mother, Father    Hypertension Mother, Father    Hyperthyroidism Brother    Stroke Mother          Tobacco Use    Smoking status: Never     Passive exposure: Past    Smokeless tobacco: Never   Substance and Sexual Activity    Alcohol use: Not Currently     Alcohol/week: 5.0 standard drinks of alcohol     Types: 5 Glasses of wine per week    Drug use: No    Sexual activity: Not Currently     Partners: Male     Review of Systems   Constitutional:  Negative for appetite change, chills and fever.   Respiratory:  Positive for shortness of breath (exertional).    Cardiovascular:  Negative for chest pain and palpitations.   Gastrointestinal:  Positive for abdominal pain, diarrhea (chronic) and nausea. Negative for vomiting.     Objective:     Vital Signs (Most  Recent):  Temp: 98.5 °F (36.9 °C) (08/19/24 1608)  Pulse: 62 (08/19/24 1608)  Resp: 18 (08/19/24 1608)  BP: 131/65 (08/19/24 1608)  SpO2: 96 % (08/19/24 1608) Vital Signs (24h Range):  Temp:  [98.5 °F (36.9 °C)] 98.5 °F (36.9 °C)  Pulse:  [62-82] 62  Resp:  [16-18] 18  SpO2:  [96 %-97 %] 96 %  BP: (131-142)/(65-78) 131/65     Weight: 81.6 kg (180 lb)  Body mass index is 34.01 kg/m².     Physical Exam  Constitutional:       General: She is not in acute distress.  HENT:      Head: Normocephalic and atraumatic.      Mouth/Throat:      Mouth: Mucous membranes are moist.   Cardiovascular:      Rate and Rhythm: Normal rate and regular rhythm.   Pulmonary:      Effort: Pulmonary effort is normal. No respiratory distress.   Abdominal:      General: There is no distension.      Palpations: Abdomen is soft.      Tenderness: There is abdominal tenderness (RLQ).   Skin:     General: Skin is warm and dry.   Neurological:      General: No focal deficit present.      Mental Status: She is alert and oriented to person, place, and time.            I have reviewed all pertinent lab results within the past 24 hours.  CBC:   Recent Labs   Lab 08/19/24  1718   WBC 10.58   RBC 4.05   HGB 11.5*   HCT 37.7      MCV 93   MCH 28.4   MCHC 30.5*     CMP:   Recent Labs   Lab 08/19/24  1718   *   CALCIUM 10.7*   ALBUMIN 3.6   PROT 7.4      K 4.1   CO2 23      BUN 19   CREATININE 2.1*   ALKPHOS 73   ALT 10   AST 20   BILITOT 0.6       Significant Diagnostics:  I have reviewed all pertinent imaging results/findings within the past 24 hours.    Assessment/Plan:     * Acute appendicitis with localized peritonitis, without perforation, abscess, or gangrene  73 year old female with a PMH of a fib on eliquis, CHF (last EF 55/60%), interstitial lung disease/hypersensitivity pneumonitis (on cellcept), HTN, HLD, sick sinus syndrome (s/p pacemake placement), CKD3 with acute appendicitis. Lack of leukocytosis is likely secondary to  the cellcept.     - admit to general surgery  - NPO, mIVF  - IV abx, patient has extensive allergy list but will look for regimen with appropriate coverage  - PRN pain medications, nausea medications  - Hold eliquis  - Will plan for OR tomorrow for laparoscopic appendectomy. Consent obtained.       VTE Risk Mitigation (From admission, onward)      None            Thank you for your consult. I will follow-up with patient. Please contact us if you have any additional questions.    Zenobia Castillo MD  General Surgery  [facility] - Emergency Dept        I have personally performed a detailed history and physical examination on this patient. My findings are summarized in the resident's note included in the record.   Plan appendectomy 8/20

## 2024-08-20 NOTE — PLAN OF CARE
Dr Dinero at bedside, performing assessment and conversing with patient and family. Will hold propafenone 12 hr capsule 325 mg   at this time per discussion with Dr Davalos and patient.

## 2024-08-20 NOTE — TRANSFER OF CARE
"Anesthesia Transfer of Care Note    Patient: Jesenia Curiel    Procedure(s) Performed: Procedure(s) (LRB):  APPENDECTOMY, LAPAROSCOPIC (N/A)    Patient location: PACU    Anesthesia Type: general    Transport from OR: Transported from OR on 6-10 L/min O2 by face mask with adequate spontaneous ventilation    Post pain: adequate analgesia    Post assessment: no apparent anesthetic complications    Post vital signs: stable    Level of consciousness: awake and alert    Nausea/Vomiting: no nausea/vomiting    Complications: none    Transfer of care protocol was followed      Last vitals: Visit Vitals  BP (!) 123/59 (BP Location: Left arm, Patient Position: Lying)   Pulse 63   Temp 36.1 °C (97 °F) (Temporal)   Resp 16   Ht 5' 1" (1.549 m)   Wt 81.6 kg (179 lb 14.3 oz)   SpO2 96%   Breastfeeding No   BMI 33.99 kg/m²     "

## 2024-08-20 NOTE — PROGRESS NOTES
Patient has been identified as having an implanted cardiac rhythm device (CRD); the implanted device is a [employer] pacemaker.      Planned procedure:  APPENDECTOMY, LAPAROSCOPIC     Pacer dependency has been noted.       PACEMAKERS/DEPENDENT ABOVE WAIST     The reported surgical procedure is above the umbilicus.  Per protocol, apply a magnet directly over the implanted device during entire surgical procedure if electrocautery will be used.    For additional questions, please contact the Arrhythmia Department at Ext [phone].

## 2024-08-20 NOTE — ANESTHESIA PROCEDURE NOTES
Intubation    Date/Time: 8/20/2024 11:09 AM    Performed by: Yimi Gomez MD  Authorized by: Yimi Davalos Jr., MD    Intubation:     Induction:  Intravenous    Mask Ventilation:  Easy with oral airway    Attempts:  1    Attempted By:  Resident anesthesiologist    Method of Intubation:  Direct    Blade:  Becker 3    Laryngeal View Grade: Grade I - full view of cords      Difficult Airway Encountered?: No      Complications:  None    Airway Device:  Oral endotracheal tube    Airway Device Size:  7.0    Style/Cuff Inflation:  Cuffed    Inflation Amount (mL):  6    Tube secured:  21    Secured at:  The lips    Placement Verified By:  Capnometry    Complicating Factors:  None    Findings Post-Intubation:  BS equal bilateral

## 2024-08-20 NOTE — ANESTHESIA POSTPROCEDURE EVALUATION
Anesthesia Post Evaluation    Patient: Jesenia Curiel    Procedure(s) Performed: Procedure(s) (LRB):  APPENDECTOMY, LAPAROSCOPIC (N/A)    Final Anesthesia Type: general      Patient location during evaluation: PACU  Patient participation: Yes- Able to Participate  Level of consciousness: awake and alert and oriented  Post-procedure vital signs: reviewed and stable  Pain management: adequate  Airway patency: patent    PONV status at discharge: No PONV  Anesthetic complications: no      Cardiovascular status: stable  Respiratory status: unassisted, spontaneous ventilation and room air  Hydration status: euvolemic  Follow-up not needed.              Vitals Value Taken Time   /52 08/20/24 1247   Temp 36.1 °C (97 °F) 08/20/24 1217   Pulse 64 08/20/24 1255   Resp 14 08/20/24 1255   SpO2 95 % 08/20/24 1255   Vitals shown include unfiled device data.      No case tracking events are documented in the log.      Pain/Criss Score: Pain Rating Prior to Med Admin: 6 (8/20/2024 12:48 PM)  Pain Rating Post Med Admin: 2 (8/20/2024  6:01 AM)  Criss Score: 8 (8/20/2024 12:17 PM)

## 2024-08-20 NOTE — SUBJECTIVE & OBJECTIVE
Current Facility-Administered Medications on File Prior to Encounter   Medication    vancomycin (VANCOCIN) 1,000 mg in dextrose 5 % (D5W) 250 mL IVPB (Vial-Mate)     Current Outpatient Medications on File Prior to Encounter   Medication Sig    apixaban (ELIQUIS) 5 mg Tab Take 1 tablet (5 mg total) by mouth 2 (two) times daily.    aspirin (ECOTRIN) 81 MG EC tablet Take 1 tablet (81 mg total) by mouth once daily.    bisoprolol (ZEBETA) 5 MG tablet Take 1 tablet (5 mg total) by mouth once daily.    clobetasoL (OLUX) 0.05 % Foam Apply 1 application topically every 30 days.    ERGOCALCIFEROL, VITAMIN D2, (VITAMIN D ORAL) Take 2,000 Units by mouth once daily.    furosemide (LASIX) 20 MG tablet Take 1 tablet (20 mg total) by mouth once daily.    montelukast (SINGULAIR) 10 mg tablet Take 1 tablet (10 mg total) by mouth every evening.    mycophenolate (CELLCEPT) 500 mg Tab Take 1 tablet (500 mg) BID x 7 days, then increase to 2 tablets (1000 mg) BID thereafter    nitroGLYCERIN (NITROSTAT) 0.4 MG SL tablet Place 1 tablet (0.4 mg total) under the tongue every 5 (five) minutes as needed for Chest pain.    potassium citrate (UROCIT-K) 10 mEq (1,080 mg) TbSR TAKE 1 TABLET BY MOUTH EVERY DAY    propafenone (RYTHMOL SR) 325 MG Cp12 Take 1 capsule (325 mg total) by mouth every 12 (twelve) hours.    rosuvastatin (CRESTOR) 40 MG Tab Take 1 tablet (40 mg total) by mouth every evening. (Patient taking differently: Take 40 mg by mouth once daily.)    topiramate (TOPAMAX) 25 MG tablet TAKE 1 TABLET TWICE A DAY       Review of patient's allergies indicates:   Allergen Reactions    Iodinated contrast media Anaphylaxis    Penicillins Anaphylaxis    Allopurinol analogues      Muscle cramps    Ciprofloxacin Rash    Quinolones Rash    Sulfa (sulfonamide antibiotics) Rash    Tetracyclines Rash       Past Medical History:   Diagnosis Date    Allergy     Arthritis     Atrial fibrillation 5/29/2017    Chronic diastolic heart failure 9/29/2023     CKD (chronic kidney disease) stage 3, GFR 30-59 ml/min 2017    Coronary artery calcification seen on CAT scan 2023    Disorder of kidney and ureter     GERD (gastroesophageal reflux disease)     Hyperlipidemia     Hypertension     Impaired fasting glucose 2021    Pre-diabetes 6/3/2017     Past Surgical History:   Procedure Laterality Date    ADENOIDECTOMY      BACK SURGERY      lamenectomy    BREAST SURGERY      BRONCHOSCOPY N/A 2023    Procedure: BRONCHOSCOPY;  Surgeon: [facility] Diagnostic Provider;  Location: [facility] OR 2ND FLR;  Service: Anesthesiology;  Laterality: N/A;    BRONCHOSCOPY N/A 10/20/2023    Procedure: BRONCHOSCOPY;  Surgeon: Anatoly Tiwari MD;  Location: [facility] OR 2ND FLR;  Service: Cardiothoracic;  Laterality: N/A;    CARDIAC SURGERY      [employer] paolo pacemaker     CATARACT EXTRACTION W/  INTRAOCULAR LENS IMPLANT Right 6/3/2019    Procedure: EXTRACTION, CATARACT, WITH IOL INSERTION;  Surgeon: Raisa Moreno MD;  Location: [facility] OR;  Service: Ophthalmology;  Laterality: Right;  Laser    CATARACT EXTRACTION W/  INTRAOCULAR LENS IMPLANT Left 2019    Procedure: EXTRACTION, CATARACT, WITH IOL INSERTION;  Surgeon: Raisa Moreno MD;  Location: [facility] OR;  Service: Ophthalmology;  Laterality: Left;  LASER ASSISTED     SECTION      ,     COLONOSCOPY N/A 9/15/2023    Procedure: COLONOSCOPY;  Surgeon: Tao Gomez MD;  Location: [facility] (2ND FLR);  Service: Endoscopy;  Laterality: N/A;  inst to [place]/[employer] pacemaker   2nd floor for airway protection patient with lung disease elevated pulmonary artery pressure pacemaker and fecal occult blood positive stool,   cleared by pulmonary Dr Bhakta-see note on -GT    ENDOSCOPIC ULTRASOUND OF UPPER GASTROINTESTINAL TRACT N/A 2023    Procedure: ULTRASOUND, UPPER GI TRACT, ENDOSCOPIC;  Surgeon: Casimiro De Los Santos MD;  Location: [facility] ENDO;  Service: Endoscopy;  Laterality: N/A;  23: instructions send via portal. St  [employer] ppm- GD    EYE SURGERY      FRACTURE SURGERY      HYSTERECTOMY      INJECTION OF ANESTHETIC AGENT AROUND MULTIPLE INTERCOSTAL NERVES  10/20/2023    Procedure: BLOCK, NERVE, INTERCOSTAL, 2 OR MORE;  Surgeon: Anatoly Tiwari MD;  Location: [facility] OR [address] [place] [address];  Service: Cardiothoracic;;    INSERT / REPLACE / REMOVE PACEMAKER  2009    placement    INSERT / REPLACE / REMOVE PACEMAKER  09/22/2014    [employer] Model #[id number] replacement    pace maker  2009    replacement 2014    REPLACEMENT OF PACEMAKER GENERATOR Left 1/26/2024    Procedure: REPLACEMENT, PACEMAKER GENERATOR;  Surgeon: Trell Hodgson MD;  Location: [facility] EP LAB;  Service: Cardiology;  Laterality: Left;  DEVORAH, PPM gen chg, SJM, MAC, GP, 3prep, *SJM dcPPM in situ*    REVISION OF PROCEDURE INVOLVING PACEMAKER LEAD Left 1/13/2022    Procedure: REVISION, ELECTRODE LEAD, CARDIAC PACEMAKER;  Surgeon: Trell Hodgson MD;  Location: [facility] EP LAB;  Service: Cardiology;  Laterality: Left;  PPM lead Malfx, RA lead revision, SJM, MAC, GP, 3 PREP*dPPM SJM in situ**Contrast prep given*    salpingo opherectomy Bilateral 1998    SPINE SURGERY      TONSILLECTOMY  1953    TUBAL LIGATION      VIDEO-ASSISTED THORACOSCOPIC SURGERY (VATS) Right 10/20/2023    Procedure: VATS WEDGE;  Surgeon: Anatoly Tiwari MD;  Location: [address];  Service: Cardiothoracic;  Laterality: Right;     Family History       Problem Relation (Age of Onset)    Coronary artery disease Mother, Father, Brother    Diabetes Mother, Father, Paternal Grandmother, Paternal Grandfather    Heart disease Mother, Father, Brother    Hyperlipidemia Mother, Father    Hypertension Mother, Father    Hyperthyroidism Brother    Stroke Mother          Tobacco Use    Smoking status: Never     Passive exposure: Past    Smokeless tobacco: Never   Substance and Sexual Activity    Alcohol use: Not Currently     Alcohol/week: 5.0 standard drinks of alcohol     Types: 5 Glasses of wine per week    Drug use: No    Sexual  activity: Not Currently     Partners: Male     Review of Systems   Constitutional:  Negative for appetite change, chills and fever.   Respiratory:  Positive for shortness of breath (exertional).    Cardiovascular:  Negative for chest pain and palpitations.   Gastrointestinal:  Positive for abdominal pain, diarrhea (chronic) and nausea. Negative for vomiting.     Objective:     Vital Signs (Most Recent):  Temp: 98.5 °F (36.9 °C) (08/19/24 1608)  Pulse: 62 (08/19/24 1608)  Resp: 18 (08/19/24 1608)  BP: 131/65 (08/19/24 1608)  SpO2: 96 % (08/19/24 1608) Vital Signs (24h Range):  Temp:  [98.5 °F (36.9 °C)] 98.5 °F (36.9 °C)  Pulse:  [62-82] 62  Resp:  [16-18] 18  SpO2:  [96 %-97 %] 96 %  BP: (131-142)/(65-78) 131/65     Weight: 81.6 kg (180 lb)  Body mass index is 34.01 kg/m².     Physical Exam  Constitutional:       General: She is not in acute distress.  HENT:      Head: Normocephalic and atraumatic.      Mouth/Throat:      Mouth: Mucous membranes are moist.   Cardiovascular:      Rate and Rhythm: Normal rate and regular rhythm.   Pulmonary:      Effort: Pulmonary effort is normal. No respiratory distress.   Abdominal:      General: There is no distension.      Palpations: Abdomen is soft.      Tenderness: There is abdominal tenderness (RLQ).   Skin:     General: Skin is warm and dry.   Neurological:      General: No focal deficit present.      Mental Status: She is alert and oriented to person, place, and time.            I have reviewed all pertinent lab results within the past 24 hours.  CBC:   Recent Labs   Lab 08/19/24  1718   WBC 10.58   RBC 4.05   HGB 11.5*   HCT 37.7      MCV 93   MCH 28.4   MCHC 30.5*     CMP:   Recent Labs   Lab 08/19/24  1718   *   CALCIUM 10.7*   ALBUMIN 3.6   PROT 7.4      K 4.1   CO2 23      BUN 19   CREATININE 2.1*   ALKPHOS 73   ALT 10   AST 20   BILITOT 0.6       Significant Diagnostics:  I have reviewed all pertinent imaging results/findings within the past  24 hours.

## 2024-08-20 NOTE — ASSESSMENT & PLAN NOTE
73 year old female with a PMH of a fib on eliquis, CHF (last EF 55/60%), interstitial lung disease/hypersensitivity pneumonitis (on cellcept), HTN, HLD, sick sinus syndrome (s/p pacemake placement), CKD3 with acute appendicitis. Lack of leukocytosis is likely secondary to the cellcept.     - NPO  - To OR today for appendectomy  - Consent obtained   - Hold home eliquis   - IVF  - Continue IV antibiotics   - Tele   - PRN pain and nausea medications  - Daily labs  - Replete electrolytes PRN  - DVT prophylaxis (SCDs and heparin)  - OOB, ambulate  - Home meds reviewed and reconciled    Dispo: not ter medically stable for dc

## 2024-08-20 NOTE — SUBJECTIVE & OBJECTIVE
Interval History: NAEON. Afebrile. HDS. Pain is controlled with current regimen. No new symptoms or concerns this morning. All questions answered.   To OR today for appendectomy   WBC 10 > 7      Medications:  Continuous Infusions:   lactated ringers   Intravenous Continuous 75 mL/hr at 08/19/24 2052 New Bag at 08/19/24 2052     Scheduled Meds:   metoprolol tartrate  50 mg Oral BID    montelukast  10 mg Oral QHS    propafenone  325 mg Oral Q12H    topiramate  25 mg Oral BID     PRN Meds:  Current Facility-Administered Medications:     acetaminophen, 650 mg, Oral, Q6H PRN    ciprofloxacin, 400 mg, Intravenous, On Call Procedure    LIDOcaine (PF) 10 mg/ml (1%), 1 mL, Intradermal, Once PRN    melatonin, 6 mg, Oral, Nightly PRN    metroNIDAZOLE IV (PEDS and ADULTS), 500 mg, Intravenous, On Call Procedure    ondansetron, 8 mg, Oral, Q8H PRN    oxyCODONE, 5 mg, Oral, Q4H PRN    sodium chloride 0.9%, 10 mL, Intravenous, PRN     Review of patient's allergies indicates:   Allergen Reactions    Iodinated contrast media Anaphylaxis    Penicillins Anaphylaxis    Allopurinol analogues      Muscle cramps    Ciprofloxacin Rash    Quinolones Rash    Sulfa (sulfonamide antibiotics) Rash    Tetracyclines Rash     Objective:     Vital Signs (Most Recent):  Temp: 97.8 °F (36.6 °C) (08/20/24 0410)  Pulse: 60 (08/20/24 0410)  Resp: 18 (08/20/24 0452)  BP: 130/66 (08/20/24 0410)  SpO2: 96 % (08/20/24 0410) Vital Signs (24h Range):  Temp:  [97.8 °F (36.6 °C)-98.5 °F (36.9 °C)] 97.8 °F (36.6 °C)  Pulse:  [60-82] 60  Resp:  [16-18] 18  SpO2:  [96 %-97 %] 96 %  BP: (120-142)/(63-78) 130/66     Weight: 81.6 kg (180 lb)  Body mass index is 34.01 kg/m².    Intake/Output - Last 3 Shifts       None             Physical Exam  Vitals and nursing note reviewed.   Constitutional:       General: She is not in acute distress.     Appearance: She is not diaphoretic.      Comments: Room air   HENT:      Head: Normocephalic and atraumatic.       Mouth/Throat:      Mouth: Mucous membranes are moist.      Pharynx: Oropharynx is clear.   Eyes:      Extraocular Movements: Extraocular movements intact.      Conjunctiva/sclera: Conjunctivae normal.   Cardiovascular:      Rate and Rhythm: Normal rate.   Pulmonary:      Effort: Pulmonary effort is normal. No respiratory distress.   Abdominal:      General: There is no distension.      Palpations: Abdomen is soft.      Comments: TTP in RLQ with voluntary guarding.    Musculoskeletal:         General: No deformity.   Skin:     General: Skin is warm and dry.   Neurological:      Mental Status: She is alert and oriented to person, place, and time.          Significant Labs:  I have reviewed all pertinent lab results within the past 24 hours.    Significant Diagnostics:  I have reviewed all pertinent imaging results/findings within the past 24 hours.

## 2024-08-20 NOTE — HPI
Jesenia Curiel is a 73 year old female with a PMH of a fib on eliquis, CHF (last EF 55/60%), interstitial lung disease/hypersensitivity pneumonitis (on cellcept), HTN, HLD, sick sinus syndrome (s/p pacemake placement), CKD3 who presents with RLQ abdominal pain that began yesterday morning. The pain has been persistent in her RLQ and it does not radiate. She endorses nausea but denies vomiting. She does have diarrhea, but this is at her baseline secondary to the cellcept. She denies fevers and chills at home. She does get SOB with exertion but denies home O2 or inhaler use.     Past surgical history of DOLORES/BSO.    Labs with granulocytosis, no leukocytosis. CT scan with dilated appendix with inflammatory changes consistent with early appendicitis.

## 2024-08-20 NOTE — ANESTHESIA PREPROCEDURE EVALUATION
[facility]  Anesthesia Pre-Operative Evaluation         Patient Name: Jesenia Curiel  YOB: 1950  MRN: [id number]    SUBJECTIVE:     Pre-operative evaluation for Procedure(s) (LRB):  APPENDECTOMY, LAPAROSCOPIC (N/A)  Scheduled for 8/20/2024    HPI 08/19/2024:  Jesenia Curiel is a 73 y.o. female with hx of afib on eliquis, CHF, SSS s/p pacemaker (predominantly RA paced), interstitial lung disease/hypersensitive pneumonitis on cellcept, CKD admitted with acute appendicitis. Presents for above procedure.    TTE 2/5/24:    Left Ventricle: The left ventricle is normal in size. Ventricular mass is normal. Mildly increased wall thickness. There is concentric remodeling. Normal wall motion. There is normal systolic function with a visually estimated ejection fraction of 55 - 60%. Ejection fraction by visual approximation is 55%. There is normal diastolic function.    Right Ventricle: Normal right ventricular cavity size. Wall thickness is normal. Right ventricle wall motion  is normal. Systolic function is normal. Pacemaker lead present in the ventricle.    Left Atrium: Left atrium is mild -moderately dilated.    Aortic Valve: There is mild aortic valve sclerosis. There is mild annular calcification present.    Mitral Valve: There is moderate regurgitation.    Aorta: Aortic root is normal in size measuring 3.10 cm. Ascending aorta is  upper-normal measuring 3.60 cm.    Pulmonary Artery: There is mild pulmonary hypertension. The estimated pulmonary artery systolic pressure is 42 mmHg.    IVC/SVC: Normal venous pressure at 3 mmHg.    Pericardium: There is a trivial posterior effusion.             Patient Active Problem List   Diagnosis    Paroxysmal A-fib    Benign essential hypertension    Hyperlipidemia    Vitamin D deficiency    Sick sinus syndrome    S/P placement of cardiac pacemaker    Osteopenia of left thigh    Stage 3b chronic kidney disease    Morbid obesity    Nuclear  sclerosis, bilateral    Nuclear sclerotic cataract of left eye    Paraproteinemia    Impaired fasting glucose    NAVA (dyspnea on exertion)    Tortuous aorta    Obstructive sleep apnea    Aortic atherosclerosis    Calcified granuloma of lung    Interstitial lung disease    Abnormal CAT scan    Adrenal incidentaloma    Primary hyperparathyroidism    Osteoporosis    Coronary artery calcification seen on CAT scan    Chronic diastolic heart failure    Metabolic acidosis    Weakness    Pancreas cyst    Acute appendicitis with localized peritonitis, without perforation, abscess, or gangrene       Review of patient's allergies indicates:   Allergen Reactions    Iodinated contrast media Anaphylaxis    Penicillins Anaphylaxis    Allopurinol analogues      Muscle cramps    Ciprofloxacin Rash    Quinolones Rash    Sulfa (sulfonamide antibiotics) Rash    Tetracyclines Rash       Outpatient Medications:  Current Facility-Administered Medications on File Prior to Encounter   Medication Dose Route Frequency Provider Last Rate Last Admin    vancomycin (VANCOCIN) 1,000 mg in dextrose 5 % (D5W) 250 mL IVPB (Vial-Mate)  1,000 mg Intravenous On Call Procedure Martha Jasmine, NP   1,000 mg at 01/26/24 1640     Current Outpatient Medications on File Prior to Encounter   Medication Sig Dispense Refill    apixaban (ELIQUIS) 5 mg Tab Take 1 tablet (5 mg total) by mouth 2 (two) times daily. 180 tablet 3    aspirin (ECOTRIN) 81 MG EC tablet Take 1 tablet (81 mg total) by mouth once daily.      bisoprolol (ZEBETA) 5 MG tablet Take 1 tablet (5 mg total) by mouth once daily. 90 tablet 3    clobetasoL (OLUX) 0.05 % Foam Apply 1 application topically every 30 days. 100 g 3    ERGOCALCIFEROL, VITAMIN D2, (VITAMIN D ORAL) Take 2,000 Units by mouth once daily.      furosemide (LASIX) 20 MG tablet Take 1 tablet (20 mg total) by mouth once daily.      montelukast (SINGULAIR) 10 mg tablet Take 1 tablet (10 mg total) by mouth every evening. 30  tablet 11    mycophenolate (CELLCEPT) 500 mg Tab Take 1 tablet (500 mg) BID x 7 days, then increase to 2 tablets (1000 mg) BID thereafter 110 tablet 6    nitroGLYCERIN (NITROSTAT) 0.4 MG SL tablet Place 1 tablet (0.4 mg total) under the tongue every 5 (five) minutes as needed for Chest pain. 20 tablet 3    potassium citrate (UROCIT-K) 10 mEq (1,080 mg) TbSR TAKE 1 TABLET BY MOUTH EVERY DAY 90 tablet 3    propafenone (RYTHMOL SR) 325 MG Cp12 Take 1 capsule (325 mg total) by mouth every 12 (twelve) hours. 180 capsule 3    rosuvastatin (CRESTOR) 40 MG Tab Take 1 tablet (40 mg total) by mouth every evening. (Patient taking differently: Take 40 mg by mouth once daily.) 90 tablet 3    topiramate (TOPAMAX) 25 MG tablet TAKE 1 TABLET TWICE A  tablet 3        Current Inpatient Medications:   metoprolol tartrate  50 mg Oral BID    montelukast  10 mg Oral QHS    propafenone  325 mg Oral Q12H    topiramate  25 mg Oral BID       Past Surgical History:   Procedure Laterality Date    ADENOIDECTOMY      BACK SURGERY      lamenectomy    BREAST SURGERY      BRONCHOSCOPY N/A 2023    Procedure: BRONCHOSCOPY;  Surgeon: [facility] Diagnostic Provider;  Location: [address];  Service: Anesthesiology;  Laterality: N/A;    BRONCHOSCOPY N/A 10/20/2023    Procedure: BRONCHOSCOPY;  Surgeon: Anatoly Tiwari MD;  Location: [address];  Service: Cardiothoracic;  Laterality: N/A;    CARDIAC SURGERY      st loida pacemaker     CATARACT EXTRACTION W/  INTRAOCULAR LENS IMPLANT Right 6/3/2019    Procedure: EXTRACTION, CATARACT, WITH IOL INSERTION;  Surgeon: Raisa Moreno MD;  Location: [facility];  Service: Ophthalmology;  Laterality: Right;  Laser    CATARACT EXTRACTION W/  INTRAOCULAR LENS IMPLANT Left 2019    Procedure: EXTRACTION, CATARACT, WITH IOL INSERTION;  Surgeon: Raisa Moreno MD;  Location: [facility];  Service: Ophthalmology;  Laterality: Left;  LASER ASSISTED     SECTION      1980    COLONOSCOPY N/A  9/15/2023    Procedure: COLONOSCOPY;  Surgeon: Tao Gomez MD;  Location: [facility] (2ND FLR);  Service: Endoscopy;  Laterality: N/A;  inst to portal/[employer] pacemaker   2nd floor for airway protection patient with lung disease elevated pulmonary artery pressure pacemaker and fecal occult blood positive stool,   cleared by pulmonary Dr Bhakta-see note on 7/11-GT    ENDOSCOPIC ULTRASOUND OF UPPER GASTROINTESTINAL TRACT N/A 9/19/2023    Procedure: ULTRASOUND, UPPER GI TRACT, ENDOSCOPIC;  Surgeon: Casimiro De Los Santos MD;  Location: [facility] END[facility];  Service: Endoscopy;  Laterality: N/A;  9/13/23: instructions send via portal. [employer] ppm- GD    EYE SURGERY      FRACTURE SURGERY      HYSTERECTOMY      INJECTION OF ANESTHETIC AGENT AROUND MULTIPLE INTERCOSTAL NERVES  10/20/2023    Procedure: BLOCK, NERVE, INTERCOSTAL, 2 OR MORE;  Surgeon: Anatoly Tiwari MD;  Location: [facility] OR 2ND FLR;  Service: Cardiothoracic;;    INSERT / REPLACE / REMOVE PACEMAKER  2009    placement    INSERT / REPLACE / REMOVE PACEMAKER  09/22/2014    [employer] Model #[id number] replacement    pace maker  2009    replacement 2014    REPLACEMENT OF PACEMAKER GENERATOR Left 1/26/2024    Procedure: REPLACEMENT, PACEMAKER GENERATOR;  Surgeon: Trell Hodgson MD;  Location: [facility];  Service: Cardiology;  Laterality: Left;  DEVORAH, PPM gen chg, SJM, MAC, GP, 3prep, *SJM dcPPM in situ*    REVISION OF PROCEDURE INVOLVING PACEMAKER LEAD Left 1/13/2022    Procedure: REVISION, ELECTRODE LEAD, CARDIAC PACEMAKER;  Surgeon: Trell Hodgson MD;  Location: [facility];  Service: Cardiology;  Laterality: Left;  PPM lead Malfx, RA lead revision, SJM, MAC, GP, 3 PREP*dPPM SJM in situ**Contrast prep given*    salpingo opherectomy Bilateral 1998    SPINE SURGERY      TONSILLECTOMY  1953    TUBAL LIGATION      VIDEO-ASSISTED THORACOSCOPIC SURGERY (VATS) Right 10/20/2023    Procedure: VATS WEDGE;  Surgeon: Anatoly Tiwari MD;  [facility];  Service:  Cardiothoracic;  Laterality: Right;       Social History     Socioeconomic History    Marital status:    Tobacco Use    Smoking status: Never     Passive exposure: Past    Smokeless tobacco: Never   Substance and Sexual Activity    Alcohol use: Not Currently     Alcohol/week: 5.0 standard drinks of alcohol     Types: 5 Glasses of wine per week    Drug use: No    Sexual activity: Not Currently     Partners: Male   Social History Narrative    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 Walks daily along the [place].     Social Determinants of Health     Financial Resource Strain: Low Risk  (4/16/2024)    Overall Financial Resource Strain (CARDIA)     Difficulty of Paying Living Expenses: Not hard at all   Food Insecurity: No Food Insecurity (4/16/2024)    Hunger Vital Sign     Worried About Running Out of Food in the Last Year: Never true     Ran Out of Food in the Last Year: Never true   Transportation Needs: No Transportation Needs (4/16/2024)    PRAPARE - Transportation     Lack of Transportation (Medical): No     Lack of Transportation (Non-Medical): No   Physical Activity: Insufficiently Active (4/16/2024)    Exercise Vital Sign     Days of Exercise per Week: 3 days     Minutes of Exercise per Session: 20 min   Stress: No Stress Concern Present (4/16/2024)    [employer] - Occupational Stress Questionnaire     Feeling of Stress : Only a little   Housing Stability: Low Risk  (10/23/2023)    Housing Stability Vital Sign     Unable to Pay for Housing in the Last Year: No     Number of Places Lived in the Last Year: 1     Unstable Housing in the Last Year: No       OBJECTIVE:     Weight:  Wt Readings from Last 1 Encounters:   08/19/24 81.6 kg (180 lb)     Body mass index is 34.01 kg/m².    Recent Blood Pressure Readings:  BP Readings from Last 3 Encounters:   08/19/24 137/63   08/19/24 (!) 142/78   07/11/24 128/66       Vital Signs Range (Last 24H):  Temp:  [36.9 °C (98.4 °F)-36.9  °C (98.5 °F)]   Pulse:  [60-82]   Resp:  [16-18]   BP: (131-142)/(63-78)   SpO2:  [96 %-97 %]       CBC:   Lab Results   Component Value Date    WBC 10.58 08/19/2024    HGB 11.5 (L) 08/19/2024    HCT 37.7 08/19/2024    MCV 93 08/19/2024     08/19/2024       CMP:     Chemistry        Component Value Date/Time     08/19/2024 1718    K 4.1 08/19/2024 1718     08/19/2024 1718    CO2 23 08/19/2024 1718    BUN 19 08/19/2024 1718    CREATININE 2.1 (H) 08/19/2024 1718     (H) 08/19/2024 1718        Component Value Date/Time    CALCIUM 10.7 (H) 08/19/2024 1718    ALKPHOS 73 08/19/2024 1718    AST 20 08/19/2024 1718    ALT 10 08/19/2024 1718    BILITOT 0.6 08/19/2024 1718    ESTGFRAFRICA 52.2 (A) 03/14/2022 1145    EGFRNONAA 45.3 (A) 03/14/2022 1145            INR:  Lab Results   Component Value Date    INR 1.1 08/19/2024    INR 1.0 01/19/2024    INR 1.0 10/13/2023       Diagnostic Studies:      EKG:     Results for orders placed or performed during the hospital encounter of 01/26/24   EKG 12-lead    Collection Time: 01/26/24  5:25 PM    Narrative    Test Reason : I49.9,    Vent. Rate : 060 BPM     Atrial Rate : 060 BPM     P-R Int : 364 ms          QRS Dur : 096 ms      QT Int : 460 ms       P-R-T Axes : 090 060 -87 degrees     QTc Int : 460 ms    Atrial-paced rhythm with prolonged AV conduction  ST and T wave abnormality, consider inferior ischemia  ST and T wave abnormality, consider anterolateral ischemia  Abnormal ECG  When compared with ECG of 26-JAN-2024 12:46,  Electronic atrial pacemaker has replaced Electronic ventricular pacemaker  Confirmed by BC CALDERON, HOMEYAR ([id number]) on 1/27/2024 9:17:47 AM    Referred By: AMINTA OSCAR           Confirmed By:MIKY YANCEY MD       2D Echo:    No results found for this or any previous visit.    Results for orders placed or performed during the hospital encounter of 02/05/24   Echo   Result Value Ref Range    RA Width 2.99 cm    LA volume (mod) 85.41 cm3  "   Left Atrium Major Axis 5.39 cm    Left Atrium Minor Axis 5.37 cm    RA Major Axis 3.87 cm    LV Diastolic Volume 88.71 mL    LV Systolic Volume 38.36 mL    PV Peak D Benny 0.69 m/s    PV Peak S Benny 0.42 m/s    TR Max Benny 3.12 m/s    MV Peak E Benny 1.01 m/s    Ao VTI 31.86 cm    Ao peak benny 1.47 m/s    LVOT peak VTI 20.96 cm    LVOT peak benny 0.93 m/s    LVOT diameter 2.12 cm    MV "A" wave duration 35.39 msec    IVRT 77.07 msec    AV mean gradient 5 mmHg    TAPSE 1.88 cm    RVDD 2.88 cm    LA size 3.32 cm    Ascending aorta 3.60 cm    STJ 2.66 cm    Sinus 3.10 cm    LVIDs 3.11 2.1 - 4.0 cm    Posterior Wall 1.21 (A) 0.6 - 1.1 cm    IVS 0.90 0.6 - 1.1 cm    LVIDd 4.42 3.5 - 6.0 cm    TDI LATERAL 0.09 m/s    LA WIDTH 4.23 cm    TDI SEPTAL 0.07 m/s    LV LATERAL E/E' RATIO 11.22 m/s    LV SEPTAL E/E' RATIO 14.43 m/s    FS 30 28 - 44 %    LA volume 64.22 cm3    LV mass 160.42 g    Left Ventricle Relative Wall Thickness 0.55 cm    AV valve area 2.32 cm²    AV Velocity Ratio 0.63     AV index (prosthetic) 0.66     Mean e' 0.08 m/s    Pulm vein S/D ratio 0.61     LVOT area 3.5 cm2    LVOT stroke volume 73.95 cm3    AV peak gradient 9 mmHg    E/E' ratio 12.63 m/s    Triscuspid Valve Regurgitation Peak Gradient 39 mmHg    CORINA by Velocity Ratio 2.23 cm²    BSA 1.96 m2    LV Systolic Volume Index 20.4 mL/m2    LV Diastolic Volume Index 47.19 mL/m2    LV Mass Index 85 g/m2    LA Volume Index 34.2 mL/m2    LA Volume Index (Mod) 45.4 mL/m2    ZLVIDS -0.32     ZLVIDD -1.72     TV resting pulmonary artery pressure 42 mmHg    RV TB RVSP 6 mmHg    Est. RA pres 3 mmHg    EF 55 %    Narrative      Left Ventricle: The left ventricle is normal in size. Ventricular mass   is normal. Mildly increased wall thickness. There is concentric   remodeling. Normal wall motion. There is normal systolic function with a   visually estimated ejection fraction of 55 - 60%. Ejection fraction by   visual approximation is 55%. There is normal diastolic " function.    Right Ventricle: Normal right ventricular cavity size. Wall thickness   is normal. Right ventricle wall motion  is normal. Systolic function is   normal. Pacemaker lead present in the ventricle.    Left Atrium: Left atrium is mild -moderately dilated.    Aortic Valve: There is mild aortic valve sclerosis. There is mild   annular calcification present.    Mitral Valve: There is moderate regurgitation.    Aorta: Aortic root is normal in size measuring 3.10 cm. Ascending aorta   is  upper-normal measuring 3.60 cm.    Pulmonary Artery: There is mild pulmonary hypertension. The estimated   pulmonary artery systolic pressure is 42 mmHg.    IVC/SVC: Normal venous pressure at 3 mmHg.    Pericardium: There is a trivial posterior effusion.         No results found for this or any previous visit.      ASSESSMENT/PLAN:           Pre-op Assessment    I have reviewed the Patient Summary Reports.    I have reviewed the NPO Status.      Review of Systems  Anesthesia Hx:  No problems with previous Anesthesia               Denies Personal Hx of Anesthesia complications.                    Social:  Non-Smoker       Cardiovascular:    Pacemaker Hypertension   CAD    Denies CABG/stent. Dysrhythmias atrial fibrillation  CHF                                 Pulmonary:      Shortness of breath  Sleep Apnea Interstitial lung disease               Renal/:  Chronic Renal Disease, CKD                Hepatic/GI:     GERD             Musculoskeletal:  Arthritis               Neurological:    Denies CVA.    Denies Seizures.         Denies Dementia                         Endocrine:  Denies Diabetes.         Obesity / BMI > 30      Physical Exam  General: Well nourished, Cooperative, Alert and Oriented    Airway:  Mallampati: II   Mouth Opening: Normal  TM Distance: Normal  Tongue: Normal  Neck ROM: Normal ROM    Dental:  Intact    Chest/Lungs:  Normal Respiratory Rate        Anesthesia Plan  Type of Anesthesia, risks & benefits  discussed:    Anesthesia Type: Gen ETT  Intra-op Monitoring Plan: Standard ASA Monitors  Post Op Pain Control Plan: multimodal analgesia and IV/PO Opioids PRN  Induction:  IV  Airway Plan: Video  Informed Consent: Informed consent signed with the Patient and all parties understand the risks and agree with anesthesia plan.  All questions answered.   ASA Score: 3  Day of Surgery Review of History & Physical: H&P Update referred to the surgeon/provider.    Ready For Surgery From Anesthesia Perspective.     .

## 2024-08-20 NOTE — OP NOTE
Date of procedure - August 20, 2024  Preoperative diagnosis - acute appendicitis  Postoperative diagnosis - same  Procedure - laparoscopic appendectomy  Surgeon - Toni  Assist - Du  Anesthesia - general  Blood loss - minimal  Indications - 73-year-old patient on immunosuppressive therapy for chronic inflammatory condition with clinical and radiographic evidence of acute appendicitis  Operative report in detail - patient brought to the operating room placed in supine position prepped and draped in sterile fashion after satisfactory general anesthesia was induced  An incision was made the base of the umbilicus carried down through fashion peritoneum  A 12 mm Shin cannula was inserted through the fascial defect after stay sutures were placed on either side of the divided edges of fascia  A pneumoperitoneum was created  Adhesions to the lower midline abdominal wall were taken down with LigaSure after a 5 mm port was placed without difficulty in the left lower quadrant  This cleared the the lower midline and allowed for placement of a 2nd port in that area  The cecum was identified grasped and retracted cephalad which resulted in identification of the acutely inflamed appendix  A window was created between the base of the appendix and mesoappendix  There was no evidence of perforation or any purulent fluid in the abdomen  A ANDRAE stapler was fired across the base of the appendix  LigaSure was then utilized to control the mesoappendix  Pressure was utilized because the patient is chronically anticoagulated on Eliquis and has only missed a single dose therefore optimal hemostasis was desired and LigaSure will be a superior device for that goal  This was accomplished the appendix was removed in an Endo-Catch bag and removed from the abdominal cavity  The right lower quadrant was irrigated visualized hemostasis was considered excellent  Trocars were removed under direct vision  The umbilical fascial defect was closed by  tying the stay sutures placed on either edge of divided fascia  All 3 trocar sites were irrigated and closed with a subcuticular stitch  Needle sponge instrument counts were correct

## 2024-08-21 ENCOUNTER — PATIENT MESSAGE (OUTPATIENT)
Dept: TRANSPLANT | Facility: CLINIC | Age: 74
End: 2024-08-21
Payer: MEDICARE

## 2024-08-21 ENCOUNTER — TELEPHONE (OUTPATIENT)
Dept: TRANSPLANT | Facility: CLINIC | Age: 74
End: 2024-08-21
Payer: MEDICARE

## 2024-08-21 VITALS
SYSTOLIC BLOOD PRESSURE: 118 MMHG | HEART RATE: 66 BPM | DIASTOLIC BLOOD PRESSURE: 59 MMHG | TEMPERATURE: 98 F | RESPIRATION RATE: 18 BRPM | BODY MASS INDEX: 33.96 KG/M2 | OXYGEN SATURATION: 96 % | HEIGHT: 61 IN | WEIGHT: 179.88 LBS

## 2024-08-21 PROBLEM — K35.30 ACUTE APPENDICITIS WITH LOCALIZED PERITONITIS, WITHOUT PERFORATION, ABSCESS, OR GANGRENE: Status: RESOLVED | Noted: 2024-08-19 | Resolved: 2024-08-21

## 2024-08-21 LAB
ANION GAP SERPL CALC-SCNC: 9 MMOL/L (ref 8–16)
BASOPHILS # BLD AUTO: 0.02 K/UL (ref 0–0.2)
BASOPHILS NFR BLD: 0.3 % (ref 0–1.9)
BUN SERPL-MCNC: 16 MG/DL (ref 8–23)
CALCIUM SERPL-MCNC: 9.3 MG/DL (ref 8.7–10.5)
CHLORIDE SERPL-SCNC: 109 MMOL/L (ref 95–110)
CO2 SERPL-SCNC: 21 MMOL/L (ref 23–29)
CREAT SERPL-MCNC: 1.5 MG/DL (ref 0.5–1.4)
DIFFERENTIAL METHOD BLD: ABNORMAL
EOSINOPHIL # BLD AUTO: 0 K/UL (ref 0–0.5)
EOSINOPHIL NFR BLD: 0 % (ref 0–8)
ERYTHROCYTE [DISTWIDTH] IN BLOOD BY AUTOMATED COUNT: 13.4 % (ref 11.5–14.5)
EST. GFR  (NO RACE VARIABLE): 36.3 ML/MIN/1.73 M^2
GLUCOSE SERPL-MCNC: 157 MG/DL (ref 70–110)
HCT VFR BLD AUTO: 33.4 % (ref 37–48.5)
HGB BLD-MCNC: 10 G/DL (ref 12–16)
IMM GRANULOCYTES # BLD AUTO: 0.03 K/UL (ref 0–0.04)
IMM GRANULOCYTES NFR BLD AUTO: 0.4 % (ref 0–0.5)
LYMPHOCYTES # BLD AUTO: 0.5 K/UL (ref 1–4.8)
LYMPHOCYTES NFR BLD: 7.8 % (ref 18–48)
MAGNESIUM SERPL-MCNC: 2.2 MG/DL (ref 1.6–2.6)
MCH RBC QN AUTO: 28.1 PG (ref 27–31)
MCHC RBC AUTO-ENTMCNC: 29.9 G/DL (ref 32–36)
MCV RBC AUTO: 94 FL (ref 82–98)
MONOCYTES # BLD AUTO: 0.4 K/UL (ref 0.3–1)
MONOCYTES NFR BLD: 6.4 % (ref 4–15)
NEUTROPHILS # BLD AUTO: 5.8 K/UL (ref 1.8–7.7)
NEUTROPHILS NFR BLD: 85.1 % (ref 38–73)
NRBC BLD-RTO: 0 /100 WBC
PHOSPHATE SERPL-MCNC: 2.1 MG/DL (ref 2.7–4.5)
PLATELET # BLD AUTO: 208 K/UL (ref 150–450)
PMV BLD AUTO: 10.1 FL (ref 9.2–12.9)
POTASSIUM SERPL-SCNC: 3.7 MMOL/L (ref 3.5–5.1)
RBC # BLD AUTO: 3.56 M/UL (ref 4–5.4)
SODIUM SERPL-SCNC: 139 MMOL/L (ref 136–145)
WBC # BLD AUTO: 6.83 K/UL (ref 3.9–12.7)

## 2024-08-21 PROCEDURE — 85025 COMPLETE CBC W/AUTO DIFF WBC: CPT | Mod: NTX

## 2024-08-21 PROCEDURE — 25000003 PHARM REV CODE 250: Mod: NTX | Performed by: STUDENT IN AN ORGANIZED HEALTH CARE EDUCATION/TRAINING PROGRAM

## 2024-08-21 PROCEDURE — 25000003 PHARM REV CODE 250: Mod: NTX

## 2024-08-21 PROCEDURE — 80048 BASIC METABOLIC PNL TOTAL CA: CPT | Mod: NTX

## 2024-08-21 PROCEDURE — 84100 ASSAY OF PHOSPHORUS: CPT | Mod: NTX

## 2024-08-21 PROCEDURE — 36415 COLL VENOUS BLD VENIPUNCTURE: CPT | Mod: NTX

## 2024-08-21 PROCEDURE — 83735 ASSAY OF MAGNESIUM: CPT | Mod: NTX

## 2024-08-21 PROCEDURE — 21400001 HC TELEMETRY ROOM: Mod: NTX

## 2024-08-21 RX ORDER — ACETAMINOPHEN 500 MG
1000 TABLET ORAL EVERY 8 HOURS
COMMUNITY
Start: 2024-08-21 | End: 2024-08-31

## 2024-08-21 RX ORDER — FUROSEMIDE 20 MG/1
20 TABLET ORAL DAILY
Status: DISCONTINUED | OUTPATIENT
Start: 2024-08-21 | End: 2024-08-21 | Stop reason: HOSPADM

## 2024-08-21 RX ORDER — POTASSIUM CHLORIDE 20 MEQ/1
40 TABLET, EXTENDED RELEASE ORAL ONCE
Status: COMPLETED | OUTPATIENT
Start: 2024-08-21 | End: 2024-08-21

## 2024-08-21 RX ORDER — SODIUM,POTASSIUM PHOSPHATES 280-250MG
2 POWDER IN PACKET (EA) ORAL EVERY 4 HOURS
Status: DISCONTINUED | OUTPATIENT
Start: 2024-08-21 | End: 2024-08-21 | Stop reason: HOSPADM

## 2024-08-21 RX ORDER — OXYCODONE HYDROCHLORIDE 5 MG/1
5 TABLET ORAL EVERY 6 HOURS PRN
Qty: 16 TABLET | Refills: 0 | Status: SHIPPED | OUTPATIENT
Start: 2024-08-21

## 2024-08-21 RX ORDER — OXYCODONE HYDROCHLORIDE 5 MG/1
5 TABLET ORAL EVERY 4 HOURS PRN
Qty: 10 TABLET | Refills: 0 | OUTPATIENT
Start: 2024-08-21

## 2024-08-21 RX ORDER — POLYETHYLENE GLYCOL 3350 17 G/17G
17 POWDER, FOR SOLUTION ORAL DAILY
COMMUNITY
Start: 2024-08-21 | End: 2024-08-31

## 2024-08-21 RX ADMIN — POTASSIUM CHLORIDE 40 MEQ: 1500 TABLET, EXTENDED RELEASE ORAL at 12:08

## 2024-08-21 RX ADMIN — FUROSEMIDE 20 MG: 20 TABLET ORAL at 10:08

## 2024-08-21 RX ADMIN — METOPROLOL TARTRATE 50 MG: 50 TABLET, FILM COATED ORAL at 08:08

## 2024-08-21 RX ADMIN — OXYCODONE HYDROCHLORIDE 5 MG: 5 TABLET ORAL at 02:08

## 2024-08-21 RX ADMIN — TOPIRAMATE 25 MG: 25 TABLET, FILM COATED ORAL at 08:08

## 2024-08-21 RX ADMIN — OXYCODONE HYDROCHLORIDE 5 MG: 5 TABLET ORAL at 08:08

## 2024-08-21 NOTE — ASSESSMENT & PLAN NOTE
73 year old female with a PMH of a fib on eliquis, CHF (last EF 55/60%), interstitial lung disease/hypersensitivity pneumonitis (on cellcept), HTN, HLD, sick sinus syndrome (s/p pacemake placement), CKD3 with acute appendicitis. Lack of leukocytosis is likely secondary to the cellcept. Now s/p lap appendectomy 8/20.    - regular diet  - Hb stable today, will restart eliquis  - resume home lasix  - Continue IV antibiotics   - Tele   - PRN pain and nausea medications  - Daily labs  - Replete electrolytes PRN  - DVT prophylaxis (SCDs and heparin)  - OOB, ambulate  - Home meds reviewed and reconciled    Dispo: likely stable for discharge this PM if pain controlled

## 2024-08-21 NOTE — SUBJECTIVE & OBJECTIVE
Interval History: Moderate abdominal pain in the RLQ. Tolerating diet with no nausea or vomiting. VSS. No acute events overnight.    Medications:  Continuous Infusions:  Scheduled Meds:   acetaminophen  1,000 mg Oral Q8H    furosemide  20 mg Oral Daily    heparin (porcine)  5,000 Units Subcutaneous Q8H    metoprolol tartrate  50 mg Oral BID    montelukast  10 mg Oral QHS    propafenone  325 mg Oral Q12H    sodium phosphate 20.01 mmol in D5W 250 mL IVPB  20.01 mmol Intravenous Once    topiramate  25 mg Oral BID     PRN Meds:  Current Facility-Administered Medications:     dextrose 10%, 12.5 g, Intravenous, PRN    dextrose 10%, 25 g, Intravenous, PRN    LIDOcaine (PF) 10 mg/ml (1%), 1 mL, Intradermal, Once PRN    melatonin, 6 mg, Oral, Nightly PRN    metroNIDAZOLE IV (PEDS and ADULTS), 500 mg, Intravenous, On Call Procedure    ondansetron, 8 mg, Oral, Q8H PRN    oxyCODONE, 5 mg, Oral, Q4H PRN    sodium chloride 0.9%, 10 mL, Intravenous, PRN     Review of patient's allergies indicates:   Allergen Reactions    Iodinated contrast media Anaphylaxis    Penicillins Anaphylaxis    Allopurinol analogues      Muscle cramps    Ciprofloxacin Rash    Quinolones Rash    Sulfa (sulfonamide antibiotics) Rash    Tetracyclines Rash     Objective:     Vital Signs (Most Recent):  Temp: 97.5 °F (36.4 °C) (08/21/24 0753)  Pulse: 65 (08/21/24 1032)  Resp: 17 (08/21/24 0832)  BP: (!) 117/58 (08/21/24 0753)  SpO2: 96 % (08/21/24 0753) Vital Signs (24h Range):  Temp:  [97 °F (36.1 °C)-98.2 °F (36.8 °C)] 97.5 °F (36.4 °C)  Pulse:  [60-94] 65  Resp:  [10-24] 17  SpO2:  [90 %-100 %] 96 %  BP: ()/(44-77) 117/58     Weight: 81.6 kg (179 lb 14.3 oz)  Body mass index is 33.99 kg/m².    Intake/Output - Last 3 Shifts         08/19 0700  08/20 0659 08/20 0700 08/21 0659 08/21 0700 08/22 0659    IV Piggyback  150     Total Intake(mL/kg)  150 (1.8)     Net  +150                     Physical Exam  Vitals and nursing note reviewed.    Constitutional:       General: She is not in acute distress.     Appearance: She is not diaphoretic.      Comments: Room air   HENT:      Head: Normocephalic and atraumatic.      Mouth/Throat:      Mouth: Mucous membranes are moist.      Pharynx: Oropharynx is clear.   Eyes:      Extraocular Movements: Extraocular movements intact.      Conjunctiva/sclera: Conjunctivae normal.   Cardiovascular:      Rate and Rhythm: Normal rate.   Pulmonary:      Effort: Pulmonary effort is normal. No respiratory distress.   Abdominal:      General: There is no distension.      Palpations: Abdomen is soft.      Comments: Mildly TTP in the RLQ. Lap sites c/d/I. No drainage noted.   Musculoskeletal:         General: No deformity.   Skin:     General: Skin is warm and dry.   Neurological:      Mental Status: She is alert and oriented to person, place, and time.          Significant Labs:  I have reviewed all pertinent lab results within the past 24 hours.    Significant Diagnostics:  I have reviewed all pertinent imaging results/findings within the past 24 hours.

## 2024-08-21 NOTE — NURSING TRANSFER
Nursing Transfer Note      8/21/2024   5:58 AM    Nurse giving handoff:sebastian rn  Nurse receiving handoff:WILLIAM RN    Reason patient is being transferred: recovery care complete    Transfer To: [id number]    Transfer via stretcher    Transfer with cardiac monitoring    Transported by RN      Telemetry: portable  Order for Tele Monitor? Yes    4eyes on Skin: yes    Medicines sent: n/a    Any special needs or follow-up needed: routine    Patient belongings transferred with patient:  n/a    Chart send with patient: Yes    Notified: spouse    Patient reassessed at: 08/20/24 2516

## 2024-08-21 NOTE — PLAN OF CARE
[facility]  Initial Discharge Assessment       Primary Care Provider: Sandra Michaud MD    Admission Diagnosis: Chronic diastolic heart failure [I50.32]  Benign essential hypertension [I10]  Interstitial lung disease [J84.9]  History of atrial fibrillation [Z86.79]  Paroxysmal A-fib [I48.0]  S/P placement of cardiac pacemaker [Z95.0]  Stage 3b chronic kidney disease [N18.32]  Acute appendicitis with localized peritonitis, without perforation, abscess, or gangrene [K35.30]    Admission Date: 8/19/2024  Expected Discharge Date: 8/21/2024    Transition of Care Barriers: None    Payor: MEDICARE / Plan: MEDICARE PART A & B / Product Type: Government /     Extended Emergency Contact Information  Primary Emergency Contact: Glen Curiel  Address: [address] [place] [address] [facility]  Home Phone: [phone]  Mobile Phone: [phone]  Relation: Spouse    Discharge Plan A: Home with family  Discharge Plan B: Home with family, Home Health      [id number] - [facility] [id number] [place] [id number]- [facility] [address]  Phone: [phone] Fax: [phone]    [id number] - [address] AT [place] [address]  Phone: [phone] Fax: [phone]      Initial Assessment (most recent)       Adult Discharge Assessment - 08/21/24 0900          Discharge Assessment    Assessment Type Discharge Planning Assessment     Confirmed/corrected address, phone number and insurance Yes     Confirmed Demographics Correct on Facesheet     Source of Information patient     People in Home spouse     Do you expect to return to your current living situation? Yes     Do you have help at home or someone to help you manage your care at home? Yes     Who are your caregiver(s) and their phone number(s)? Spouse     Prior to hospitilization cognitive status: Alert/Oriented     Current  cognitive status: Alert/Oriented     Walking or Climbing Stairs Difficulty no     Dressing/Bathing Difficulty no     Home Accessibility stairs to enter home;stairs within home     Number of Stairs, Within Home, Primary eight     Surface of Stairs, Within Home, Primary carpeting     Stair Railings, Within Home, Primary railings safe and in good condition     Home Layout Able to live on 1st floor     Equipment Currently Used at Home none     Patient currently being followed by outpatient case management? No     Do you currently have service(s) that help you manage your care at home? No     Do you take prescription medications? Yes     Do you have prescription coverage? Yes     Do you have any problems affording any of your prescribed medications? No     Is the patient taking medications as prescribed? yes     How do you get to doctors appointments? car, drives self;family or friend will provide     Are you on dialysis? No     Do you take coumadin? No     Discharge Plan A Home with family     Discharge Plan B Home with family;Home Health     DME Needed Upon Discharge  none     Discharge Plan discussed with: Patient     Transition of Care Barriers None                       SW completed discharge planning assessment with the patient at bedside. SW verified demographic information listed on the pt.'s Face sheet. Pt reports living with her spouse (Glen) in a two story home, master room located on the 1st floor. Pt reports being independent prior to this admit. Pt reports driving, as well as her spouse. Pt plans for her spouse to help manage her care upon discharge and provide transportation.SW is following this Pt for DC planning needs. There are no identified needs at this time.    Angeline Carroll, YEVGENIY  Case Management   [facility]   Ext. [phone]

## 2024-08-21 NOTE — DISCHARGE SUMMARY
[facility]  General Surgery  Discharge Summary      Patient Name: Jesenia Curiel  MRN: [id number]  Admission Date: 8/19/2024  Hospital Length of Stay: 0 days  Discharge Date and Time:  08/21/2024 2:27 PM  Attending Physician: Siddhartha Muñoz MD   Discharging Provider: Robbie Oakley PA-C  Primary Care Provider: Sandra Michaud MD     HPI:   Jesenia Curiel is a 73 year old female with a PMH of a fib on eliquis, CHF (last EF 55/60%), interstitial lung disease/hypersensitivity pneumonitis (on cellcept), HTN, HLD, sick sinus syndrome (s/p pacemake placement), CKD3 who presents with RLQ abdominal pain that began yesterday morning. The pain has been persistent in her RLQ and it does not radiate. She endorses nausea but denies vomiting. She does have diarrhea, but this is at her baseline secondary to the cellcept. She denies fevers and chills at home. She does get SOB with exertion but denies home O2 or inhaler use.      Past surgical history of DOLORES/BSO.  Last dose of eliquis was 8/19 at 11 am.      Labs with granulocytosis, no leukocytosis. CT scan with dilated appendix with associated inflammatory changes consistent with early appendicitis.     Procedure(s) (LRB):  APPENDECTOMY, LAPAROSCOPIC (N/A)     Hospital Course:   Patient was admitted to the general surgery service. she was made NPO, given IVF, as well as pain and nausea control. Started on antibiotics. She underwent lap appendectomy on 8/20/24. The patient tolerated the procedure well, was transferred to recovery post-op, and then transferred to the POSS unit for continuation of medical care. The patient's clinical condition progressively improved. BARBARA improved. Patient was HDS throughout admission. By the time of discharge, she was meeting all post op milestones, tolerating a diet without nausea or vomiting, pain was well controlled with oral medications, and she was ambulating without difficulty. Voiding appropriately. On POD 1, the  patient was discharged to home. On discharge, the patient's incisions were c/d/i and the surgical site was soft and appropriately tender to palpation. The patient will follow up in Dr. Muñoz's clinic in 2 weeks. Discussed POC and ED precautions with patient. Patient verbalized understanding and is agreeable to plan. All questions answered.    Please see hospital and op notes for further detail regarding patient's admission.    Patient's discharge was discussed with Dr. Muñoz.       Consults (From admission, onward)          Status Ordering Provider     Inpatient consult to General surgery  Once        Provider:  (Not yet assigned)    Completed MILTON WEBSTER              Indwelling Lines/Drains at time of discharge:   Lines/Drains/Airways       None                   Significant Diagnostic Studies: Labs: CMP   Recent Labs   Lab 08/19/24 1718 08/20/24 0315 08/21/24  0332    141 139   K 4.1 3.1* 3.7    112* 109   CO2 23 21* 21*   * 93 157*   BUN 19 16 16   CREATININE 2.1* 1.6* 1.5*   CALCIUM 10.7* 9.5 9.3   PROT 7.4  --   --    ALBUMIN 3.6  --   --    BILITOT 0.6  --   --    ALKPHOS 73  --   --    AST 20  --   --    ALT 10  --   --    ANIONGAP 9 8 9   , CBC   Recent Labs   Lab 08/19/24 1718 08/20/24 0315 08/21/24  0332   WBC 10.58 7.82 6.83   HGB 11.5* 9.5* 10.0*   HCT 37.7 32.9* 33.4*    183 208   , and All labs within the past 24 hours have been reviewed  Radiology: CT scan: CT ABDOMEN PELVIS WITHOUT CONTRAST: No results found for this visit on 08/19/24.    Pending Diagnostic Studies:       Procedure Component Value Units Date/Time    Specimen to Pathology, Surgery General Surgery [[id number]] Collected: 08/20/24 1155    Order Status: Sent Lab Status: In process Updated: 08/20/24 9867    Specimen: Tissue             Final Active Diagnoses:    Diagnosis Date Noted POA    Coronary artery calcification seen on CAT scan [I25.10] 09/29/2023 Yes    Chronic diastolic heart failure  [I50.32] 09/29/2023 Yes    Interstitial lung disease [J84.9] 07/10/2023 Yes    Obstructive sleep apnea [G47.33] 06/06/2023 Yes    Stage 3b chronic kidney disease [N18.32] 06/26/2017 Yes    S/P placement of cardiac pacemaker [Z95.0] 05/29/2017 Yes    Paroxysmal A-fib [I48.0] 05/29/2017 Yes    Benign essential hypertension [I10] 05/29/2017 Yes      Problems Resolved During this Admission:    Diagnosis Date Noted Date Resolved POA    PRINCIPAL PROBLEM:  Acute appendicitis with localized peritonitis, without perforation, abscess, or gangrene [K35.30] 08/19/2024 08/21/2024 Yes        Discharged Condition: good    Disposition: Home or Self Care    Follow Up:   Follow-up Information       Siddhartha Muñoz MD Follow up in 2 week(s).    Specialties: General Surgery, Surgery  Why: For wound check and post op follow up  Contact information:  [facility] [address]  [phone]                             Patient Instructions:      Diet Cardiac     No driving until:   Order Comments: Discharge Instructions     WOUND CARE  -- You have skin glue over your incision(s); this will slowly flake away over the next few weeks. Do not pick at surgical glue, it will come off on its own.   -- Ok to shower; however, no baths or submerging in water (I.e. swimming, submerging in water) for at least two weeks.  -- Please keep the incision clean with soap and water, pat your incision dry, do not scrub hard over your incisions.     MEDICATIONS AND PAIN CONTROL  -- Please resume all home medications as instructed and take any newly prescribed medications.  -- Most people find that over-the-counter pain medications (Tylenol combined with Ibuprofen) will be sufficient for most pain control.  -- You have been prescribed a narcotic pain medication. Please wean narcotic over the next few daysIf you're taking prescription narcotics, do not drive or operate heavy machinery. Do not drive if your pain is not controlled enough for you  to react quickly safely.  -- No straining, avoid constipation. May take OTC stool softeners as described and laxatives as needed.     OTHER INSTRUCTIONS  -- Monitor for temp > 101 F, bleeding, redness, purulent drainage, or any sudden, new extreme pain. If any occur, please call our clinic or go to the emergency department if after normal business hours.  -- You may resume your regular diet as tolerated.   -- No heavy lifting (anything >10 lbs or = to a gallon of milk) or strenuous exercise until cleared by a physician. No pushing or pulling activities such as vacuuming or raking. Otherwise, activity as tolerated.   -- Follow up with Dr. Muñoz in 2 weeks in clinic for a post-op check. Message sent to clinic for scheduling. Clinic # is [phone]     Lifting restrictions     Notify your health care provider if you experience any of the following:  increased confusion or weakness     Notify your health care provider if you experience any of the following:  persistent dizziness, light-headedness, or visual disturbances     Notify your health care provider if you experience any of the following:  worsening rash     Notify your health care provider if you experience any of the following:  severe persistent headache     Notify your health care provider if you experience any of the following:  difficulty breathing or increased cough     Notify your health care provider if you experience any of the following:  severe uncontrolled pain     Notify your health care provider if you experience any of the following:  temperature >100.4     Notify your health care provider if you experience any of the following:  persistent nausea and vomiting or diarrhea     Notify your health care provider if you experience any of the following:  redness, tenderness, or signs of infection (pain, swelling, redness, odor or green/yellow discharge around incision site)     No dressing needed     Activity as tolerated        Medications:  Reconciled Home Medications:      Medication List        START taking these medications      acetaminophen 500 MG tablet  Commonly known as: TYLENOL  Take 2 tablets (1,000 mg total) by mouth every 8 (eight) hours. for 10 days     oxyCODONE 5 MG immediate release tablet  Commonly known as: ROXICODONE  Take 1 tablet (5 mg total) by mouth every 6 (six) hours as needed for Pain.     polyethylene glycol 17 gram Pwpk  Commonly known as: GLYCOLAX  Take 17 g by mouth once daily. for 10 days            CHANGE how you take these medications      rosuvastatin 40 MG Tab  Commonly known as: CRESTOR  Take 1 tablet (40 mg total) by mouth every evening.  What changed: when to take this            CONTINUE taking these medications      apixaban 5 mg Tab  Commonly known as: ELIQUIS  Take 1 tablet (5 mg total) by mouth 2 (two) times daily.     aspirin 81 MG EC tablet  Commonly known as: ECOTRIN  Take 1 tablet (81 mg total) by mouth once daily.     bisoprolol 5 MG tablet  Commonly known as: ZEBETA  Take 1 tablet (5 mg total) by mouth once daily.     clobetasoL 0.05 % Foam  Commonly known as: OLUX  Apply 1 application topically every 30 days.     furosemide 20 MG tablet  Commonly known as: LASIX  Take 1 tablet (20 mg total) by mouth once daily.     montelukast 10 mg tablet  Commonly known as: SINGULAIR  Take 1 tablet (10 mg total) by mouth every evening.     mycophenolate 500 mg Tab  Commonly known as: CELLCEPT  Take 1 tablet (500 mg) BID x 7 days, then increase to 2 tablets (1000 mg) BID thereafter     nitroGLYCERIN 0.4 MG SL tablet  Commonly known as: NITROSTAT  Place 1 tablet (0.4 mg total) under the tongue every 5 (five) minutes as needed for Chest pain.     potassium citrate 10 mEq (1,080 mg) Tbsr  Commonly known as: UROCIT-K  TAKE 1 TABLET BY MOUTH EVERY DAY     propafenone 325 MG Cp12  Commonly known as: RYTHMOL SR  Take 1 capsule (325 mg total) by mouth every 12 (twelve) hours.     topiramate 25 MG  tablet  Commonly known as: TOPAMAX  TAKE 1 TABLET TWICE A DAY     VITAMIN D ORAL  Take 2,000 Units by mouth once daily.              Patient was seen and examined on the date of discharge and determined to be suitable for discharge.  Total time spent preparing discharge services: 20 minutes.  Time was spent speaking with consultants and case management, reviewing records, and/or discussing the plan of care with patient/family.    Robbie Oakley PA-C  General Surgery   [facility]

## 2024-08-21 NOTE — NURSING
Nurses Note -- 4 Eyes      8/21/2024   6:36 AM      Skin assessed during: Admit      [x] No Altered Skin Integrity Present    []Prevention Measures Documented      [] Yes- Altered Skin Integrity Present or Discovered   [] LDA Added if Not in Epic (Describe Wound)   [] New Altered Skin Integrity was Present on Admit and Documented in LDA   [] Wound Image Taken    Wound Care Consulted? No    Attending Nurse:  Mila Calero RN/Staff Member:  Carley

## 2024-08-21 NOTE — PROGRESS NOTES
[facility]  General Surgery  Progress Note    Subjective:     History of Present Illness:  Jesenia Curiel is a 73 year old female with a PMH of a fib on eliquis, CHF (last EF 55/60%), interstitial lung disease/hypersensitivity pneumonitis (on cellcept), HTN, HLD, sick sinus syndrome (s/p pacemake placement), CKD3 who presents with RLQ abdominal pain that began yesterday morning. The pain has been persistent in her RLQ and it does not radiate. She endorses nausea but denies vomiting. She does have diarrhea, but this is at her baseline secondary to the cellcept. She denies fevers and chills at home. She does get SOB with exertion but denies home O2 or inhaler use.     Past surgical history of DOLORES/BSO.  Last dose of eliquis was 8/19 at 11 am.     Labs with granulocytosis, no leukocytosis. CT scan with dilated appendix with associated inflammatory changes consistent with early appendicitis.     Post-Op Info:  Procedure(s) (LRB):  APPENDECTOMY, LAPAROSCOPIC (N/A)   1 Day Post-Op     Interval History: Moderate abdominal pain in the RLQ. Tolerating diet with no nausea or vomiting. VSS. No acute events overnight.    Medications:  Continuous Infusions:  Scheduled Meds:   acetaminophen  1,000 mg Oral Q8H    furosemide  20 mg Oral Daily    heparin (porcine)  5,000 Units Subcutaneous Q8H    metoprolol tartrate  50 mg Oral BID    montelukast  10 mg Oral QHS    propafenone  325 mg Oral Q12H    sodium phosphate 20.01 mmol in D5W 250 mL IVPB  20.01 mmol Intravenous Once    topiramate  25 mg Oral BID     PRN Meds:  Current Facility-Administered Medications:     dextrose 10%, 12.5 g, Intravenous, PRN    dextrose 10%, 25 g, Intravenous, PRN    LIDOcaine (PF) 10 mg/ml (1%), 1 mL, Intradermal, Once PRN    melatonin, 6 mg, Oral, Nightly PRN    metroNIDAZOLE IV (PEDS and ADULTS), 500 mg, Intravenous, On Call Procedure    ondansetron, 8 mg, Oral, Q8H PRN    oxyCODONE, 5 mg, Oral, Q4H PRN    sodium chloride 0.9%, 10 mL,  Intravenous, PRN     Review of patient's allergies indicates:   Allergen Reactions    Iodinated contrast media Anaphylaxis    Penicillins Anaphylaxis    Allopurinol analogues      Muscle cramps    Ciprofloxacin Rash    Quinolones Rash    Sulfa (sulfonamide antibiotics) Rash    Tetracyclines Rash     Objective:     Vital Signs (Most Recent):  Temp: 97.5 °F (36.4 °C) (08/21/24 0753)  Pulse: 65 (08/21/24 1032)  Resp: 17 (08/21/24 0832)  BP: (!) 117/58 (08/21/24 0753)  SpO2: 96 % (08/21/24 0753) Vital Signs (24h Range):  Temp:  [97 °F (36.1 °C)-98.2 °F (36.8 °C)] 97.5 °F (36.4 °C)  Pulse:  [60-94] 65  Resp:  [10-24] 17  SpO2:  [90 %-100 %] 96 %  BP: ()/(44-77) 117/58     Weight: 81.6 kg (179 lb 14.3 oz)  Body mass index is 33.99 kg/m².    Intake/Output - Last 3 Shifts         08/19 0700  08/20 0659 08/20 0700  08/21 0659 08/21 0700  08/22 0659    IV Piggyback  150     Total Intake(mL/kg)  150 (1.8)     Net  +150                     Physical Exam  Vitals and nursing note reviewed.   Constitutional:       General: She is not in acute distress.     Appearance: She is not diaphoretic.      Comments: Room air   HENT:      Head: Normocephalic and atraumatic.      Mouth/Throat:      Mouth: Mucous membranes are moist.      Pharynx: Oropharynx is clear.   Eyes:      Extraocular Movements: Extraocular movements intact.      Conjunctiva/sclera: Conjunctivae normal.   Cardiovascular:      Rate and Rhythm: Normal rate.   Pulmonary:      Effort: Pulmonary effort is normal. No respiratory distress.   Abdominal:      General: There is no distension.      Palpations: Abdomen is soft.      Comments: Mildly TTP in the RLQ. Lap sites c/d/I. No drainage noted.   Musculoskeletal:         General: No deformity.   Skin:     General: Skin is warm and dry.   Neurological:      Mental Status: She is alert and oriented to person, place, and time.          Significant Labs:  I have reviewed all pertinent lab results within the past 24  hours.    Significant Diagnostics:  I have reviewed all pertinent imaging results/findings within the past 24 hours.  Assessment/Plan:     * Acute appendicitis with localized peritonitis, without perforation, abscess, or gangrene  73 year old female with a PMH of a fib on eliquis, CHF (last EF 55/60%), interstitial lung disease/hypersensitivity pneumonitis (on cellcept), HTN, HLD, sick sinus syndrome (s/p pacemake placement), CKD3 with acute appendicitis. Lack of leukocytosis is likely secondary to the cellcept. Now s/p lap appendectomy 8/20.    - regular diet  - Hb stable today, will restart eliquis  - resume home lasix  - Continue IV antibiotics   - Tele   - PRN pain and nausea medications  - Daily labs  - Replete electrolytes PRN  - DVT prophylaxis (SCDs and heparin)  - OOB, ambulate  - Home meds reviewed and reconciled    Dispo: likely stable for discharge this PM if pain controlled          Dahiana Muller MD  General Surgery  [facility]

## 2024-08-21 NOTE — NURSING
Discharge orders in place. Instructions reviewed with patient. PIV discontinued. Pharmacy medications delivered to bedside. Patient waiting on family to provide transport home via car.

## 2024-08-21 NOTE — NURSING
"Pt refused am tylenol and heparin stating "why am I taking this that's silly let the doctor know I refused"  "

## 2024-08-22 ENCOUNTER — PATIENT OUTREACH (OUTPATIENT)
Dept: ADMINISTRATIVE | Facility: CLINIC | Age: 74
End: 2024-08-22
Payer: MEDICARE

## 2024-08-22 LAB
FINAL PATHOLOGIC DIAGNOSIS: NORMAL
GROSS: NORMAL
Lab: NORMAL

## 2024-08-22 NOTE — PROGRESS NOTES
C3 nurse spoke with Jesenia Curiel  for a TCC post hospital discharge follow up call. The patient has a scheduled appointment with Siddhartha Muñoz MD (General Surgery) on 9/9/24 @ 2:30 pm. The patient does not yet have a scheduled HOSFU with PCP Sandra Michaud MD. Patient states she has contacted PCP and declines assistance with HOSFU at this time.

## 2024-08-24 LAB
BACTERIA BLD CULT: NORMAL
BACTERIA BLD CULT: NORMAL

## 2024-08-25 NOTE — PHYSICIAN QUERY
Due to the conflicting clinical picture, please clinically validate the BARBARA. If validated, please provide additional clinical support for the BARBARA.   The condition is not confirmed and/or it has been ruled out

## 2024-09-06 DIAGNOSIS — R93.89 ABNORMAL CT OF THE CHEST: ICD-10-CM

## 2024-09-06 DIAGNOSIS — R06.09 DOE (DYSPNEA ON EXERTION): ICD-10-CM

## 2024-09-09 ENCOUNTER — OFFICE VISIT (OUTPATIENT)
Dept: SURGERY | Facility: CLINIC | Age: 74
End: 2024-09-09
Payer: MEDICARE

## 2024-09-09 VITALS
HEIGHT: 61 IN | SYSTOLIC BLOOD PRESSURE: 138 MMHG | HEART RATE: 60 BPM | OXYGEN SATURATION: 96 % | BODY MASS INDEX: 33.79 KG/M2 | WEIGHT: 179 LBS | DIASTOLIC BLOOD PRESSURE: 63 MMHG

## 2024-09-09 DIAGNOSIS — Z90.49 S/P LAPAROSCOPIC APPENDECTOMY: Primary | ICD-10-CM

## 2024-09-09 PROCEDURE — 99999 PR PBB SHADOW E&M-EST. PATIENT-LVL III: CPT | Mod: PBBFAC,TXP,, | Performed by: SURGERY

## 2024-09-09 PROCEDURE — 99213 OFFICE O/P EST LOW 20 MIN: CPT | Mod: PBBFAC,TXP | Performed by: SURGERY

## 2024-09-09 PROCEDURE — 99024 POSTOP FOLLOW-UP VISIT: CPT | Mod: NTX,POP,, | Performed by: SURGERY

## 2024-09-09 RX ORDER — FUROSEMIDE 40 MG/1
40 TABLET ORAL
COMMUNITY
Start: 2024-06-09 | End: 2024-09-10 | Stop reason: SDUPTHER

## 2024-09-09 NOTE — PROGRESS NOTES
[facility]  Post Op Visit    SUBJECTIVE:  Jesenia Curiel is a 73 y.o. y/o female who presents to clinic today for post op follow up after lap appendectomy on 8/20/24. She denies pain, fevers, chills, nausea, vomiting, diarrhea, constipation, or hematochezia. Returning to usual activity level. Tolerating diet with normal appetite and bowel function. Denies redness around or drainage from incisions.    OBJECTIVE:  There were no vitals filed for this visit.    General: female in NAD. Not toxic appearing.   HENT: EOM intact.   Pulmonary: No respiratory distress. Effort normal.  Cardiovascular: Regular rate.   Abdomen: soft, non-tender, non-distended  Skin: Incisions are healing well without signs of infection. No erythema, drainage, or increased warmth noted.       PATHOLOGY:     Appendix, appendectomy:      - Acute appendicitis with acute periappendicitis         ASSESSMENT/PLAN:    Jesenia Curiel is a 73 y.o. y/o female who presents to clinic today for f/u s/p lap appendectomy on 8/20/24. Clinically improving and meeting all post op milestones     Patient is advised to avoid heavy lifting or strenuous activity 6 weeks post op   Path reviewed  Follow-up PRN.   Continue any current medications   Call clinic with any questions or concerns  ED precautions given.   All questions answered; patient is comfortable with the above follow-up plan and verbalized understanding.    Case discussed with Dr. Muñoz. Patient seen and examined by Dr. Muñoz.     Robbie Oakley PA-C  General Surgery   [facility]

## 2024-09-10 RX ORDER — FUROSEMIDE 20 MG/1
20 TABLET ORAL DAILY
Qty: 90 TABLET | Refills: 1 | Status: SHIPPED | OUTPATIENT
Start: 2024-09-10

## 2024-09-11 DIAGNOSIS — E78.2 MIXED HYPERLIPIDEMIA: ICD-10-CM

## 2024-09-12 RX ORDER — ROSUVASTATIN CALCIUM 40 MG/1
40 TABLET, COATED ORAL NIGHTLY
Qty: 90 TABLET | Refills: 3 | Status: SHIPPED | OUTPATIENT
Start: 2024-09-12

## 2024-10-05 ENCOUNTER — CLINICAL SUPPORT (OUTPATIENT)
Dept: CARDIOLOGY | Facility: HOSPITAL | Age: 74
End: 2024-10-05
Attending: INTERNAL MEDICINE
Payer: MEDICARE

## 2024-10-05 ENCOUNTER — CLINICAL SUPPORT (OUTPATIENT)
Dept: CARDIOLOGY | Facility: HOSPITAL | Age: 74
End: 2024-10-05

## 2024-10-05 DIAGNOSIS — I49.5 SICK SINUS SYNDROME: ICD-10-CM

## 2024-10-05 DIAGNOSIS — Z95.0 PRESENCE OF CARDIAC PACEMAKER: ICD-10-CM

## 2024-10-05 PROCEDURE — 93294 REM INTERROG EVL PM/LDLS PM: CPT | Mod: NTX,,, | Performed by: INTERNAL MEDICINE

## 2024-10-09 ENCOUNTER — PATIENT MESSAGE (OUTPATIENT)
Dept: ADMINISTRATIVE | Facility: OTHER | Age: 74
End: 2024-10-09
Payer: MEDICARE

## 2024-10-10 ENCOUNTER — TELEPHONE (OUTPATIENT)
Dept: PRIMARY CARE CLINIC | Facility: CLINIC | Age: 74
End: 2024-10-10
Payer: MEDICARE

## 2024-10-10 LAB
OHS CV AF BURDEN PERCENT: < 1
OHS CV DC REMOTE DEVICE TYPE: NORMAL
OHS CV RV PACING PERCENT: 19 %

## 2024-10-21 ENCOUNTER — HOSPITAL ENCOUNTER (OUTPATIENT)
Dept: PULMONOLOGY | Facility: CLINIC | Age: 74
Discharge: HOME | End: 2024-10-21
Payer: MEDICARE

## 2024-10-21 ENCOUNTER — OFFICE VISIT (OUTPATIENT)
Dept: TRANSPLANT | Facility: CLINIC | Age: 74
End: 2024-10-21
Payer: MEDICARE

## 2024-10-21 VITALS
RESPIRATION RATE: 18 BRPM | WEIGHT: 178 LBS | TEMPERATURE: 98 F | SYSTOLIC BLOOD PRESSURE: 172 MMHG | BODY MASS INDEX: 33.61 KG/M2 | HEIGHT: 61 IN | HEART RATE: 63 BPM | OXYGEN SATURATION: 98 % | DIASTOLIC BLOOD PRESSURE: 75 MMHG

## 2024-10-21 DIAGNOSIS — I49.5 SICK SINUS SYNDROME: ICD-10-CM

## 2024-10-21 DIAGNOSIS — J84.9 INTERSTITIAL LUNG DISEASE: ICD-10-CM

## 2024-10-21 DIAGNOSIS — J84.9 INTERSTITIAL LUNG DISEASE: Primary | ICD-10-CM

## 2024-10-21 DIAGNOSIS — J67.9 HYPERSENSITIVITY PNEUMONITIS: ICD-10-CM

## 2024-10-21 LAB
DLCO SINGLE BREATH LLN: 12.96
DLCO SINGLE BREATH PRE REF: 54 %
DLCO SINGLE BREATH REF: 18.69
DLCOC SBVA LLN: 2.61
DLCOC SBVA REF: 4.21
DLCOC SINGLE BREATH LLN: 12.96
DLCOC SINGLE BREATH REF: 18.69
DLCOCSBVAULN: 5.81
DLCOCSINGLEBREATHULN: 24.42
DLCOSINGLEBREATHULN: 24.42
DLCOSINGLEBREATHZSCORE: -2.46
DLCOVA LLN: 2.61
DLCOVA PRE REF: 88.3 %
DLCOVA PRE: 3.72 ML/(MIN*MMHG*L) (ref 2.61–5.81)
DLCOVA REF: 4.21
DLCOVAULN: 5.81
ERV LLN: -16449.46
ERV PRE REF: 93.9 %
ERV REF: 0.54
ERVULN: ABNORMAL
FEF 25 75 LLN: 0.91
FEF 25 75 PRE REF: 27.8 %
FEF 25 75 REF: 2.31
FEV05 LLN: 0.63
FEV05 REF: 1.49
FEV1 FVC LLN: 64
FEV1 FVC PRE REF: 86.5 %
FEV1 FVC REF: 78
FEV1 LLN: 1.33
FEV1 PRE REF: 63.8 %
FEV1 REF: 1.87
FEV1FVCZSCORE: -1.26
FEV1ZSCORE: -2.07
FRCPLETH LLN: 1.72
FRCPLETH PREREF: 67 %
FRCPLETH REF: 2.55
FRCPLETHULN: 3.37
FVC LLN: 1.73
FVC PRE REF: 73.1 %
FVC REF: 2.41
FVCZSCORE: -1.55
IVC PRE: 1.78 L (ref 1.73–3.14)
IVC SINGLE BREATH LLN: 1.73
IVC SINGLE BREATH PRE REF: 73.7 %
IVC SINGLE BREATH REF: 2.41
IVCSINGLEBREATHULN: 3.14
LLN IC: -16448.4
PEF LLN: 3.24
PEF PRE REF: 104.7 %
PEF REF: 4.79
PHYSICIAN COMMENT: ABNORMAL
PRE DLCO: 10.1 ML/(MIN*MMHG) (ref 12.96–24.42)
PRE ERV: 0.51 L (ref -16449.46–16450.54)
PRE FEF 25 75: 0.64 L/S (ref 0.91–3.71)
PRE FET 100: 6.64 SEC
PRE FEV05 REF: 66.3 %
PRE FEV1 FVC: 67.45 % (ref 63.91–90.06)
PRE FEV1: 1.19 L (ref 1.33–2.38)
PRE FEV5: 0.99 L (ref 0.63–2.34)
PRE FRC PL: 1.71 L (ref 1.72–3.37)
PRE FVC: 1.76 L (ref 1.73–3.14)
PRE IC: 1.28 L (ref -16448.4–16451.6)
PRE PEF: 5.02 L/S (ref 3.24–6.35)
PRE REF IC: 80.4 %
PRE RV: 1.2 L (ref 1.43–2.58)
PRE TLC: 2.99 L (ref 3.45–5.42)
RAW PRE REF: 153.3 %
RAW PRE: 4.69 CMH2O*S/L (ref 3.06–3.06)
RAW REF: 3.06
REF IC: 1.6
RV LLN: 1.43
RV PRE REF: 59.8 %
RV REF: 2
RVTLC LLN: 35
RVTLC PRE REF: 90.1 %
RVTLC PRE: 40.05 % (ref 34.87–54.05)
RVTLC REF: 44
RVTLCULN: 54
RVULN: 2.58
SGAW PRE REF: 100.2 %
SGAW PRE: 0.1 1/(CMH2O*S) (ref 0.1–0.1)
SGAW REF: 0.1
TLC LLN: 3.45
TLC PRE REF: 67.4 %
TLC REF: 4.44
TLC ULN: 5.42
TLCZSCORE: -2.41
ULN IC: ABNORMAL
VA PRE: 2.71 L (ref 4.29–4.29)
VA SINGLE BREATH LLN: 4.29
VA SINGLE BREATH PRE REF: 63.3 %
VA SINGLE BREATH REF: 4.29
VASINGLEBREATHULN: 4.29
VC LLN: 1.73
VC PRE REF: 74.3 %
VC PRE: 1.79 L (ref 1.73–3.14)
VC REF: 2.41
VC ULN: 3.14

## 2024-10-21 PROCEDURE — 94726 PLETHYSMOGRAPHY LUNG VOLUMES: CPT | Mod: 26,S$PBB,NTX, | Performed by: INTERNAL MEDICINE

## 2024-10-21 PROCEDURE — 94010 BREATHING CAPACITY TEST: CPT | Mod: PBBFAC,NTX | Performed by: INTERNAL MEDICINE

## 2024-10-21 PROCEDURE — 99214 OFFICE O/P EST MOD 30 MIN: CPT | Mod: PBBFAC,TXP,25 | Performed by: INTERNAL MEDICINE

## 2024-10-21 PROCEDURE — 94726 PLETHYSMOGRAPHY LUNG VOLUMES: CPT | Mod: PBBFAC,NTX | Performed by: INTERNAL MEDICINE

## 2024-10-21 PROCEDURE — 94729 DIFFUSING CAPACITY: CPT | Mod: PBBFAC,NTX | Performed by: INTERNAL MEDICINE

## 2024-10-21 PROCEDURE — 99999 PR PBB SHADOW E&M-EST. PATIENT-LVL IV: CPT | Mod: PBBFAC,TXP,, | Performed by: INTERNAL MEDICINE

## 2024-10-21 PROCEDURE — 99214 OFFICE O/P EST MOD 30 MIN: CPT | Mod: 25,S$PBB,NTX, | Performed by: INTERNAL MEDICINE

## 2024-10-21 PROCEDURE — 94729 DIFFUSING CAPACITY: CPT | Mod: 26,S$PBB,NTX, | Performed by: INTERNAL MEDICINE

## 2024-10-21 PROCEDURE — 94010 BREATHING CAPACITY TEST: CPT | Mod: 26,S$PBB,NTX, | Performed by: INTERNAL MEDICINE

## 2024-10-21 NOTE — PROGRESS NOTES
ADVANCED LUNG DISEASE FOLLOW UP VISIT                                                                                                                                            Reason for Visit:  ILD    Referring Physician: Bay Sosa NP    History of Present Illness: Jeseina Curiel is a 74 y.o. female who is on  0 at present, previously on 3 L of oxygen.  She is on no assisted ventilation.  Her New [place] Heart Association Class is III and a Karnofsky score of 70% - Cares for self: Unable to carry on normal activity or active work. She is not diabetic.    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  Since her last visit, she had been doing very well from a respiratory standpoint.  She did have to undergo an appendectomy, but reports that she has recovered well from the surgery.  Had an incident of shortness of breath while visiting an [employer] store that resolved after exiting the store.  Takes MMF 500mg BID and is tolerating well.  Follows with EP for sick sinus syndrome and pacemaker.  Denies any overall changes in her respiratory status.  Does not take inhaler therapy but does take singulair.  Overall, remains stable from a pulmonary standpoint.        HISTORY FROM INITIAL VISIT:  ILD.  Has CKD, AFIB, Sick Sinus Syndrome with pacemaker, HTN, hyperlipidemia, and JOSÉ (could not tolerate CPAP).    Had right VATS wedge biopsy on 10/23/23 that showed NSIP.  She presently has shortness of breath with exertion, but feels as if she is improving.  Has AFib (on Xeralto and Rhythmol) which she feels is contributing to her breathing issues.  Has chronic, intermittent dry cough.  Denies any GERD or sinus issues.  Never smoked.  Is a retired gastroenterologist.  Had a weakly positive BOLIVAR, but otherwise negative autoimmune/CTD workup.  Chest CT shows scatterd GGOs and bibasilar scarring with fibrosis and mild traction bronchiectasis.  Denies any known exposures.  Most recent Echo showed EF of 55% with PA  systolic of 37.  Also has sick sinus syndrome with Pacemaker--is followed closely by cardiology.  Spirometry is normal with mild restrictive lung volumes.  DLCO is moderately decreased.  Admits to a sedentary lifestyle.  Was recently discharged for heart failure on 3L NC oxygen.  Has been diuresing well since discharged and feel that she is getting better.  Denies shortness of breath at rest.      Past Medical History:   Diagnosis Date    Allergy     Arthritis     Atrial fibrillation 2017    Chronic diastolic heart failure 2023    CKD (chronic kidney disease) stage 3, GFR 30-59 ml/min 2017    Coronary artery calcification seen on CAT scan 2023    Disorder of kidney and ureter     GERD (gastroesophageal reflux disease)     Hyperlipidemia     Hypertension     Impaired fasting glucose 2021    Pre-diabetes 6/3/2017       Past Surgical History:   Procedure Laterality Date    ADENOIDECTOMY      BACK SURGERY      lamenectomy    BREAST SURGERY      BRONCHOSCOPY N/A 2023    Procedure: BRONCHOSCOPY;  Surgeon: Paul Diagnostic Provider;  Location: [facility] OR [address] [place] [address];  Service: Anesthesiology;  Laterality: N/A;    BRONCHOSCOPY N/A 10/20/2023    Procedure: BRONCHOSCOPY;  Surgeon: Anatoly Tiwari MD;  Location: [facility] OR [address] [place] [address];  Service: Cardiothoracic;  Laterality: N/A;    CARDIAC SURGERY      st loida pacemaker     CATARACT EXTRACTION W/  INTRAOCULAR LENS IMPLANT Right 6/3/2019    Procedure: EXTRACTION, CATARACT, WITH IOL INSERTION;  Surgeon: Raisa Moreno MD;  Location: [facility];  Service: Ophthalmology;  Laterality: Right;  Laser    CATARACT EXTRACTION W/  INTRAOCULAR LENS IMPLANT Left 2019    Procedure: EXTRACTION, CATARACT, WITH IOL INSERTION;  Surgeon: Raisa Moreno MD;  Location: [facility];  Service: Ophthalmology;  Laterality: Left;  LASER ASSISTED     SECTION      1980    COLONOSCOPY N/A 9/15/2023    Procedure: COLONOSCOPY;  Surgeon: Tao Gomez MD;  Location:  [facility] ENDO (2ND FLR);  Service: Endoscopy;  Laterality: N/A;  inst to portal/[employer] pacemaker   2nd floor for airway protection patient with lung disease elevated pulmonary artery pressure pacemaker and fecal occult blood positive stool,   cleared by pulmonary Dr Bhakta-see note on 7/11-GT    ENDOSCOPIC ULTRASOUND OF UPPER GASTROINTESTINAL TRACT N/A 9/19/2023    Procedure: ULTRASOUND, UPPER GI TRACT, ENDOSCOPIC;  Surgeon: Casimiro De Los Santos MD;  Location: [facility] ENDO;  Service: Endoscopy;  Laterality: N/A;  9/13/23: instructions send via portal. [employer] ppm- GD    EYE SURGERY      FRACTURE SURGERY      HYSTERECTOMY      INJECTION OF ANESTHETIC AGENT AROUND MULTIPLE INTERCOSTAL NERVES  10/20/2023    Procedure: BLOCK, NERVE, INTERCOSTAL, 2 OR MORE;  Surgeon: Anatoly Tiwari MD;  [facility];  Service: Cardiothoracic;;    INSERT / REPLACE / REMOVE PACEMAKER  2009    placement    INSERT / REPLACE / REMOVE PACEMAKER  09/22/2014    [employer] Model #[id number] replacement    LAPAROSCOPIC APPENDECTOMY N/A 8/20/2024    Procedure: APPENDECTOMY, LAPAROSCOPIC;  Surgeon: Siddhartha Muñoz MD;  [facility];  Service: General;  Laterality: N/A;    pace maker  2009    replacement 2014    REPLACEMENT OF PACEMAKER GENERATOR Left 1/26/2024    Procedure: REPLACEMENT, PACEMAKER GENERATOR;  Surgeon: Trell Hodgson MD;  Location: [facility] EP LAB;  Service: Cardiology;  Laterality: Left;  DEVORAH, PPM gen chg, SJM, MAC, GP, 3prep, *SJM dcPPM in situ*    REVISION OF PROCEDURE INVOLVING PACEMAKER LEAD Left 1/13/2022    Procedure: REVISION, ELECTRODE LEAD, CARDIAC PACEMAKER;  Surgeon: Trell Hodgson MD;  Location: [facility] EP LAB;  Service: Cardiology;  Laterality: Left;  PPM lead Malfx, RA lead revision, SJM, MAC, GP, 3 PREP*dPPM SJM in situ**Contrast prep given*    salpingo opherectomy Bilateral 1998    SPINE SURGERY      TONSILLECTOMY  1953    TUBAL LIGATION      VIDEO-ASSISTED THORACOSCOPIC SURGERY (VATS) Right 10/20/2023     Procedure: VATS WEDGE;  Surgeon: Anatoly Tiwari MD;  Location: [facility] OR [address] [place] [address];  Service: Cardiothoracic;  Laterality: Right;       Allergies: Iodinated contrast media, Penicillins, Allopurinol analogues, Ciprofloxacin, Quinolones, Sulfa (sulfonamide antibiotics), and Tetracyclines    Current Outpatient Medications   Medication Sig    apixaban (ELIQUIS) 5 mg Tab Take 1 tablet (5 mg total) by mouth 2 (two) times daily.    bisoprolol (ZEBETA) 5 MG tablet Take 1 tablet (5 mg total) by mouth once daily.    clobetasoL (OLUX) 0.05 % Foam Apply 1 application topically every 30 days.    ERGOCALCIFEROL, VITAMIN D2, (VITAMIN D ORAL) Take 2,000 Units by mouth once daily.    furosemide (LASIX) 20 MG tablet Take 1 tablet (20 mg total) by mouth once daily.    montelukast (SINGULAIR) 10 mg tablet Take 1 tablet (10 mg total) by mouth every evening.    mycophenolate (CELLCEPT) 500 mg Tab Take 1 tablet (500 mg) BID x 7 days, then increase to 2 tablets (1000 mg) BID thereafter    potassium citrate (UROCIT-K) 10 mEq (1,080 mg) TbSR TAKE 1 TABLET BY MOUTH EVERY DAY    propafenone (RYTHMOL SR) 325 MG Cp12 Take 1 capsule (325 mg total) by mouth every 12 (twelve) hours.    rosuvastatin (CRESTOR) 40 MG Tab TAKE 1 TABLET BY MOUTH EVERY DAY IN THE EVENING    topiramate (TOPAMAX) 25 MG tablet TAKE 1 TABLET TWICE A DAY    aspirin (ECOTRIN) 81 MG EC tablet Take 1 tablet (81 mg total) by mouth once daily. (Patient not taking: Reported on 10/21/2024)    nitroGLYCERIN (NITROSTAT) 0.4 MG SL tablet Place 1 tablet (0.4 mg total) under the tongue every 5 (five) minutes as needed for Chest pain.    oxyCODONE (ROXICODONE) 5 MG immediate release tablet Take 1 tablet (5 mg total) by mouth every 6 (six) hours as needed for Pain. (Patient not taking: Reported on 10/21/2024)     No current facility-administered medications for this visit.     Facility-Administered Medications Ordered in Other Visits   Medication    vancomycin (VANCOCIN) 1,000 mg in  dextrose 5 % (D5W) 250 mL IVPB (Vial-Mate)       Immunization History   Administered Date(s) Administered    COVID-19, MRNA, LN-S, PF (Pfizer) (Gray Cap) 04/12/2022    COVID-19, MRNA, LN-S, PF (Pfizer) (Purple Cap) 01/12/2021, 02/02/2021, 10/01/2021    COVID-19, mRNA, LNP-S, PF, gian-sucrose, 30 mcg/0.3 mL (Pfizer Ages 12+) 10/15/2024    COVID-19, mRNA, LNP-S, bivalent booster, PF (PFIZER OMICRON) 11/21/2022    Influenza (FLUAD) - Quadrivalent - Adjuvanted - PF *Preferred* (65+) 10/01/2021, 10/11/2022    Influenza - Quadrivalent 11/21/2016    Influenza - Trivalent - Fluzone High Dose - PF (65 years and older) 11/20/2015, 01/05/2018, 11/09/2018    Pneumococcal Conjugate - 13 Valent 11/20/2015    Pneumococcal Polysaccharide - 23 Valent 11/21/2016     Family History:    Family History   Problem Relation Name Age of Onset    Hypertension Mother 62     Diabetes Mother 62     Hyperlipidemia Mother 62     Coronary artery disease Mother 62     Heart disease Mother 62     Stroke Mother 62     Hypertension Father 50     Diabetes Father 50     Hyperlipidemia Father 50     Coronary artery disease Father 50     Heart disease Father 50     Coronary artery disease Brother 64     Heart disease Brother 64     Hyperthyroidism Brother 64     Diabetes Paternal Grandmother Mejia     Diabetes Paternal Grandfather Sigfrid     Heart attack Neg Hx       Social History     Substance and Sexual Activity   Alcohol Use Not Currently    Alcohol/week: 5.0 standard drinks of alcohol    Types: 5 Glasses of wine per week      Social History     Substance and Sexual Activity   Drug Use No      Social History     Socioeconomic History    Marital status:    Tobacco Use    Smoking status: Never     Passive exposure: Past    Smokeless tobacco: Never   Substance and Sexual Activity    Alcohol use: Not Currently     Alcohol/week: 5.0 standard drinks of alcohol     Types: 5 Glasses of wine per week    Drug use: No    Sexual activity: Not Currently  "    Partners: Male   Social History Narrative    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     Social Drivers of Health     Financial Resource Strain: Low Risk  (4/16/2024)    Overall Financial Resource Strain (CARDIA)     Difficulty of Paying Living Expenses: Not hard at all   Food Insecurity: No Food Insecurity (4/16/2024)    Hunger Vital Sign     Worried About Running Out of Food in the Last Year: Never true     Ran Out of Food in the Last Year: Never true   Transportation Needs: No Transportation Needs (4/16/2024)    PRAPARE - Transportation     Lack of Transportation (Medical): No     Lack of Transportation (Non-Medical): No   Physical Activity: Insufficiently Active (4/16/2024)    Exercise Vital Sign     Days of Exercise per Week: 3 days     Minutes of Exercise per Session: 20 min   Stress: No Stress Concern Present (4/16/2024)    [employer] - Occupational Stress Questionnaire     Feeling of Stress : Only a little   Housing Stability: Low Risk  (10/23/2023)    Housing Stability Vital Sign     Unable to Pay for Housing in the Last Year: No     Number of Places Lived in the Last Year: 1     Unstable Housing in the Last Year: No     Review of Systems   Constitutional:  Negative for malaise/fatigue.   HENT: Negative.     Respiratory:  Positive for shortness of breath (stable). Negative for cough.    Gastrointestinal:  Negative for abdominal pain, heartburn and vomiting.   Skin: Negative.    Endo/Heme/Allergies:  Bruises/bleeds easily (on apixiban).     Vitals  BP (!) 172/75 (BP Location: Right arm, Patient Position: Sitting)   Pulse 63   Temp 97.7 °F (36.5 °C) (Oral)   Resp 18   Ht 5' 1" (1.549 m)   Wt 80.7 kg (178 lb)   SpO2 98%   BMI 33.63 kg/m²   Physical Exam  Constitutional:       Appearance: Normal appearance.   HENT:      Head: Normocephalic and atraumatic.   Eyes:      Extraocular Movements: Extraocular movements intact.   Pulmonary:     "  Effort: Pulmonary effort is normal. No respiratory distress.      Breath sounds: No stridor. No wheezing, rhonchi or rales.   Chest:      Chest wall: No tenderness.   Musculoskeletal:      Right lower leg: No edema.      Left lower leg: No edema.   Skin:     General: Skin is warm and dry.   Neurological:      Mental Status: She is alert and oriented to person, place, and time.   Psychiatric:         Mood and Affect: Mood normal.         Behavior: Behavior normal.         Thought Content: Thought content normal.         Judgment: Judgment normal.         Labs:  Hospital Outpatient Visit on 10/21/2024   Component Date Value    Pre FVC 10/21/2024 1.76     PRE FEV5 10/21/2024 0.99     Pre FEV1 10/21/2024 1.19 (L)     Pre FEV1 FVC 10/21/2024 67.45     Pre FEF 25 75 10/21/2024 0.64 (L)     Pre PEF 10/21/2024 5.02     Pre  10/21/2024 6.64     Pre DLCO 10/21/2024 10.10 (L)     DLCOVA PRE 10/21/2024 3.72     VA PRE 10/21/2024 2.71 (L)     IVC PRE 10/21/2024 1.78     Pre TLC 10/21/2024 2.99 (L)     VC PRE 10/21/2024 1.79     PRE IC 10/21/2024 1.28     Pre FRC PL 10/21/2024 1.71 (L)     Pre ERV 10/21/2024 0.51     Pre RV 10/21/2024 1.20 (L)     RVTLC PRE 10/21/2024 40.05     Raw PRE 10/21/2024 4.69 (H)     sGaw PRE 10/21/2024 0.10 (H)     FVC Ref 10/21/2024 2.41     FVC LLN 10/21/2024 1.73     FVC Pre Ref 10/21/2024 73.1     FEV05 REF 10/21/2024 1.49     FEV05 LLN 10/21/2024 0.63     PRE FEV05 REF 10/21/2024 66.3     FEV1 Ref 10/21/2024 1.87     FEV1 LLN 10/21/2024 1.33     FEV1 Pre Ref 10/21/2024 63.8     FEV1 FVC Ref 10/21/2024 78     FEV1 FVC LLN 10/21/2024 64     FEV1 FVC Pre Ref 10/21/2024 86.5     FEF 25 75 Ref 10/21/2024 2.31     FEF 25 75 LLN 10/21/2024 0.91     FEF 25 75 Pre Ref 10/21/2024 27.8     PEF Ref 10/21/2024 4.79     PEF LLN 10/21/2024 3.24     PEF Pre Ref 10/21/2024 104.7     TLC Ref 10/21/2024 4.44     TLC LLN 10/21/2024 3.45     TLC ULN 10/21/2024 5.42     TLC Pre Ref 10/21/2024 67.4     VC Ref  10/21/2024 2.41     VC LLN 10/21/2024 1.73     VC ULN 10/21/2024 3.14     VC Pre Ref 10/21/2024 74.3     REF IC 10/21/2024 1.60     LLN IC 10/21/2024 -30384.40     ULN IC 10/21/2024 88631.60     PRE REF IC 10/21/2024 80.4     FRCpleth Ref 10/21/2024 2.55     FRCpleth LLN 10/21/2024 1.72     FRC PLETH ULN 10/21/2024 3.37     FRCpleth PreRef 10/21/2024 67.0     ERV Ref 10/21/2024 0.54     ERV LLN 10/21/2024 -31814.46     ERV ULN 10/21/2024 73289.54     ERV Pre Ref 10/21/2024 93.9     RV Ref 10/21/2024 2.00     RV LLN 10/21/2024 1.43     RV ULN 10/21/2024 2.58     RV Pre Ref 10/21/2024 59.8     RVTLC Ref 10/21/2024 44     RVTLC LLN 10/21/2024 35     RV TLC ULN 10/21/2024 54     RVTLC Pre Ref 10/21/2024 90.1     Raw Ref 10/21/2024 3.06     Raw Pre Ref 10/21/2024 153.3     sGaw Ref 10/21/2024 0.10     sGaw Pre Ref 10/21/2024 100.2     DLCO Single Breath Ref 10/21/2024 18.69     DLCO Single Breath LLN 10/21/2024 12.96     DLCO SINGLEBREATH ULN 10/21/2024 24.42     DLCO Single Breath Pre R* 10/21/2024 54.0     DLCOc Single Breath Ref 10/21/2024 18.69     DLCOc Single Breath LLN 10/21/2024 12.96     DLCOC SINGLEBREATH ULN 10/21/2024 24.42     DLCOVA Ref 10/21/2024 4.21     DLCOVA LLN 10/21/2024 2.61     DLCOVA ULN 10/21/2024 5.81     DLCOVA Pre Ref 10/21/2024 88.3     DLCOc SBVA Ref 10/21/2024 4.21     DLCOc SBVA LLN 10/21/2024 2.61     DLCOCSBVA ULN 10/21/2024 5.81     VA Single Breath Ref 10/21/2024 4.29     VA Single Breath LLN 10/21/2024 4.29     VA SINGLEBREATH ULN 10/21/2024 4.29     VA Single Breath Pre Ref 10/21/2024 63.3     IVC Single Breath Ref 10/21/2024 2.41     IVC Single Breath LLN 10/21/2024 1.73     IVC SINGLEBREATH ULN 10/21/2024 3.14     IVC Single Breath Pre Ref 10/21/2024 73.7     FVCZSCORE 10/21/2024 -1.55     FQI8BPBKVS 10/21/2024 -2.07     KZH3RIMUYGTPA 10/21/2024 -1.26     TLCZSCORE 10/21/2024 -2.41     DLCOSINGLEBREATHZSCORE 10/21/2024 -2.46            10/21/2024    11:05 AM 7/11/2024    12:03 PM  4/2/2024    11:44 AM 1/4/2024    11:31 AM 11/13/2023     2:24 PM   Pulmonary Function Tests   FVC 1.76 liters 1.82 liters 1.8 liters 1.53 liters 1.89 liters   FEV1 1.19 liters 1.28 liters 1.29 liters 1.07 liters 1.42 liters   TLC (liters) 2.99 liters 2.74 liters 2.77 liters 2.34 liters 3.04 liters   DLCO (ml/mmHg sec) 10.1 ml/mmHg sec 10.41 ml/mmHg sec 9.2 ml/mmHg sec 8.1 ml/mmHg sec 9.68 ml/mmHg sec   FVC% 73.1 75 74 62.7 77.5   FEV1% 63.8 68 68 56.8 75   FEF 25-75 0.64 0.73 0.83 0.65 1.05   FEF 25-75% 27.8 31 51 40.2 64.5   TLC% 67.4 61 62 52.7 68.4   RV 1.2 0.88 0.98 0.76 1.14   RV% 59.8 44 49 38.1 58   DLCO% 54 55 49 24.57 51         4/30/2024    11:20 AM 4/2/2024    11:11 AM 1/4/2024    10:49 AM 11/13/2023     1:37 PM 3/31/2023    12:23 PM   6MW   6MWT Status completed without stopping completed without stopping completed with stops completed without stopping completed without stopping   Patient Reported No complaints Dyspnea Dyspnea Dyspnea;Dizziness;Other (Comment) Dyspnea   Was O2 used? No No No No No   6MW Distance walked (feet) 905 feet 900 feet 900 feet 675 feet 800 feet   Distance walked (meters) 275.84 meters 274.32 meters 274.32 meters 205.74 meters 243.84 meters   Did patient stop? No No No No No   Oxygen Saturation 95 % 96 % 96 % 95 % 94 %   Supplemental Oxygen Room Air Room Air Room Air Room Air Room Air   Heart Rate 62 bpm 65 bpm 75 bpm 70 bpm 77 bpm   Blood Pressure 134/78 120/64 149/67 131/64 125/66   Drew Dyspnea Rating  light very light light light nothing at all   Oxygen Saturation 94 % 95 % 92 % 94 % 94 %   Supplemental Oxygen Room Air Room Air Room Air Room Air Room Air   Heart Rate 110 bpm 97 bpm 118 bpm 77 bpm 101 bpm   Blood Pressure 129/74 137/67 130/64 123/58 118/65   Drew Dyspnea Rating  heavy somewhat heavy somewhat heavy heavy moderate   Recovery Time (seconds) 180 seconds 71 seconds 82 seconds 78 seconds 58 seconds   Oxygen Saturation 97 % 98 % 95 % 95 % 96 %   Supplemental Oxygen  "Room Air Room Air Room Air Room Air Room Air   Heart Rate 60 bpm 74 bpm 89 bpm 80 bpm 82 bpm       Imaging:  EXAMINATION:  CT CHEST WITHOUT CONTRAST     CLINICAL HISTORY:  "Interstitial lung disease;"     COMPARISON:  10/22/2023     TECHNIQUE:  High-resolution CT of the chest with volumetric data acquisition was obtained without intravenous contrast. Sagittal and coronal multiplanar reconstructions were performed.  Expiratory phase on prone position was performed.  Lack of IV contrast material limits the assessment of mediastinal and abdominal structures.     FINDINGS:  Lungs and large airways: Improvement of the previously seen linear radiopaque density in the right upper and lower lobes with adjacent consolidation in this patient with the history of VATS procedure (axial image 181 and 246 series 4).  Minimal interlobular septal thickening in the subpleural area of the bilateral lower lobes, right greater than left, better visualized on the prone series (series 8).  No evidence of traction bronchiectasis or honeycombing pattern.  The minimal linear atelectasis in the lingula.  The trachea and main bronchi are patent     Pleura: Small right pleural effusion.  No evidence of left pleural effusion.  No pneumothorax noted in the present study.     Heart and pericardium: Cardiac silhouette is in the upper limits of normal in size.  No evidence of pericardial effusion.  The left-sided cardiac device with tips in right atrium and right ventricle.     Mediastinum and alex: Subcentimeter lymph nodes in mediastinum.  No evidence of mediastinal or hilar lymphadenopathy according to the CT criteria.     Chest wall and lower neck: Thyroid gland is normal in size.  No evidence of lymphadenopathy in the axillary region.  Interval resolution of the subcutaneous emphysema and fat stranding in the right lateral chest wall.  New localized fluid collection measuring 6 x 3 cm in the right lateral chest wall.  The large fat containing " mass with tiny calcification measuring 12.2 x 5.6 cm in the left posterior chest wall is again noted and grossly unchanged.     Vessels: Left-sided aortic arch.  Aorta is normal in caliber.  The aortic, supraaortic and coronary atheromatous calcifications.  Main pulmonary and right and left pulmonary arteries are normal in size.     Bones: Unchanged     Upper abdomen: Small hiatal hernia.  The liver is normal in size with subcentimeter focal hypodensity in the right hepatic lobe, too small to characterize.  The grossly stable 1.7 cm nodule in the right adrenal gland.  The multiple cystic lesions within the pancreas, the largest measuring 1.7 cm again noted and grossly unchanged.  Left adrenal gland is normal in size.  Spleen is normal in size with no evidence of focal lesion.  The nonobstructing small left renal stone.     Impression:     1. Minimal interlobular septal thickening in the subpleural area of the bilateral lower lobes with no evidence of traction bronchiectasis could represent a early interstitial lung disease.  No evidence of pulmonary fibrosis.  2. Improvement of the previously seen linear radiopaque density with chest and consolidation areas in the right upper and lower lobes.  3. Small right pleural effusion with no evidence of ipsilateral pneumothorax in the present study.  4. Interval resolution of the fat stranding and subcutaneous emphysema in the right lateral chest wall.  5. New localized fluid collection measuring 6 x 3 cm in the right lateral chest wall may represent a hematoma.  6. Grossly stable large fat containing mass with tiny calcification measuring 12.2 x 5.6 cm in the left posterior chest wall may represent lipoma.  7. Additional findings.  Please see the above discussion.        Electronically signed by:Scott Quispe  Date:                                            01/07/2024  Time:                                           13:47    Results for orders placed or performed  during the hospital encounter of 08/19/24 (from the past 2160 hours)   CT Abdomen Pelvis  Without Contrast    Narrative    EXAMINATION:  CT ABDOMEN PELVIS WITHOUT CONTRAST    CLINICAL HISTORY:  right lower quadrant/umbilical pain w/ chills and on cellcept; Right lower quadrant pain    TECHNIQUE:  Low dose axial images, sagittal and coronal reformations were obtained from the lung bases to the pubic symphysis.    COMPARISON:  None    FINDINGS:  Right lower lobe granuloma.  Lung bases are clear.    Limited solid organ evaluation due to lack of intravenous contrast.    A few punctate hypodensities in the liver.  Largest measures 0.9 cm and is of fluid attenuation, typical of a cyst.    Spleen, bile ducts, and left adrenal gland are unremarkable.    At least 1 fluid density focus along the tail of the pancreas measures 1.5 cm.    Several right adrenal nodules.  Nodule rising from the medial limb measuring 1.3 cm measure less than 10 Hounsfield units typical of an adenoma.  Nodule along the lateral limb does not meet criteria for an adenoma and measures 0.9 cm.    Kidneys show intrarenal calcifications which may represent stones or may be arterial.  No hydronephrosis or hydroureter.  Urinary bladder is mostly collapsed.    Stomach and loops of bowel are normal caliber.  High-density foci in the terminal ileum related to food or medication.  Appendix is dilated measuring 11 mm.  Mild inflammatory stranding surround the appendix.  A few tiny lymph nodes.  No free fluid.    Regional skeleton shows degenerative change.      Impression    Appendix is dilated though not completely fluid filled.  Early inflammatory changes with no significant free fluid.  Findings are nonspecific and can be seen with early acute appendicitis in this patient with right lower quadrant pain.    At least 1 fluid density focus along the pancreas which may represent a cyst, pseudocyst, or IPMN.  Finding can be further evaluated with MRI as an  outpatient.    Right adrenal nodule does not meet criteria for an adenoma.  This can be followed at the same time as the pancreatic cyst.    This report was flagged in Epic as abnormal.      Electronically signed by: Feli Bergeron  Date:    08/19/2024  Time:    13:39             Cardiodiagnostics:  Results for orders placed during the hospital encounter of 01/20/22    Echo    Interpretation Summary  · The left ventricle is normal in size with low normal systolic function.  · The estimated ejection fraction is 53%.  · There are segmental left ventricular wall motion abnormalities.  · There is abnormal septal wall motion.  · Normal right ventricular size with normal right ventricular systolic function.  · Normal left ventricular diastolic function.  · Mild mitral regurgitation.  · Normal central venous pressure (3 mmHg).  · The estimated PA systolic pressure is 25 mmHg.  · Trivial posterior pericardial    Results for orders placed during the hospital encounter of 02/05/24    Echo    Interpretation Summary    Left Ventricle: The left ventricle is normal in size. Ventricular mass is normal. Mildly increased wall thickness. There is concentric remodeling. Normal wall motion. There is normal systolic function with a visually estimated ejection fraction of 55 - 60%. Ejection fraction by visual approximation is 55%. There is normal diastolic function.    Right Ventricle: Normal right ventricular cavity size. Wall thickness is normal. Right ventricle wall motion  is normal. Systolic function is normal. Pacemaker lead present in the ventricle.    Left Atrium: Left atrium is mild -moderately dilated.    Aortic Valve: There is mild aortic valve sclerosis. There is mild annular calcification present.    Mitral Valve: There is moderate regurgitation.    Aorta: Aortic root is normal in size measuring 3.10 cm. Ascending aorta is  upper-normal measuring 3.60 cm.    Pulmonary Artery: There is mild pulmonary hypertension. The  estimated pulmonary artery systolic pressure is 42 mmHg.    IVC/SVC: Normal venous pressure at 3 mmHg.    Pericardium: There is a trivial posterior effusion.        PATHOLOGY    1.  Lung, right lower lobe, wedge resection:   - Chronic interstitial pneumonitis with NSIP-like injury pattern   - No evidence of malignancy   - See comment     2. Lung, right middle lobe, wedge resection:   - Chronic interstitial pneumonitis with NSIP-like injury pattern   - No evidence of malignancy           Assessment:  1. Interstitial lung disease    2. Hypersensitivity pneumonitis    3. Sick sinus syndrome          Plan: CTD workup negative.  Wedge biopsy shows NSIP, but likely HP.  Chest CT with GGO and mosaic attenuation.  Patient with chronic diastolic heart failure.  On room air.  Patient has JOSÉ but could not tolerate CPAP.   Symptomatically, reports that she is feeling well with stable respiratory status.        Continue mycophenolate.  Currently on 500mg BID due to GI side effects; tolerating well with stable PFTs.  Did not tolerate Breztri.  Continue with singulair.      2.    Shortness of breath is stable.  It is likely multi factorial.  Echo with increase in ePAP to 42 mmHg and evidence of mitral valve regurgitation.  Dr. Shore previously explained that the majority of her issue with elevated ePAP is from WHO group 2 PH.  She does have lung disease but her spirometry and symptoms are stable so if there's a component of WHO group 3, it has not changed and therefore would not be the cause of her elevation of her PA pressures.  She is on diuretics and follows closely with cardiology.  Has a pacemaker for sick sinus syndrome          3.   RTC in 3 months with PFTs only.    Bay Sosa NP  Lung Transplant/Advanced Lung Disease

## 2024-10-21 NOTE — LETTER
October 21, 2024                      [facility] Trans[facility] 1st Fl  [address]  [facility] [address]  Phone: [phone]   Patient: Jesenia Curiel   MR Number: [id number]   YOB: 1950   Date of Visit: 10/21/2024       Dear       Thank you for referring Jesenia Curiel to me for evaluation. Attached you will find relevant portions of my assessment and plan of care.    If you have questions, please do not hesitate to call me. I look forward to following Jesenia Curiel along with you.    Sincerely,    My Shore, DO    Enclosure    If you would like to receive this communication electronically, please contact [email or web address] or [phone] to request [employer] access.    [employer] is a tool which provides read-only access to select patient information with whom you have a relationship. Its easy to use and provides real time access to review your patients record including encounter summaries, notes, results, and demographic information.    If you feel you have received this communication in error or would no longer like to receive these types of communications, please e-mail [email or web address]

## 2024-10-22 ENCOUNTER — PATIENT MESSAGE (OUTPATIENT)
Dept: PRIMARY CARE CLINIC | Facility: CLINIC | Age: 74
End: 2024-10-22
Payer: MEDICARE

## 2024-11-01 ENCOUNTER — TELEPHONE (OUTPATIENT)
Dept: PRIMARY CARE CLINIC | Facility: CLINIC | Age: 74
End: 2024-11-01
Payer: MEDICARE

## 2024-11-05 DIAGNOSIS — J84.9 INTERSTITIAL LUNG DISEASE: ICD-10-CM

## 2024-11-05 RX ORDER — MYCOPHENOLATE MOFETIL 500 MG/1
TABLET ORAL
Qty: 330 TABLET | Refills: 2 | Status: SHIPPED | OUTPATIENT
Start: 2024-11-05

## 2024-11-12 ENCOUNTER — PATIENT MESSAGE (OUTPATIENT)
Dept: TRANSPLANT | Facility: CLINIC | Age: 74
End: 2024-11-12
Payer: MEDICARE

## 2024-11-12 ENCOUNTER — SOCIAL WORK (OUTPATIENT)
Dept: TRANSPLANT | Facility: CLINIC | Age: 74
End: 2024-11-12
Payer: MEDICARE

## 2024-11-12 DIAGNOSIS — J84.9 INTERSTITIAL LUNG DISEASE: Primary | ICD-10-CM

## 2024-11-12 NOTE — PROGRESS NOTES
"ESTHELA routed "Discontinuation of Oxygen" orders to pt's current oxygen provider [facility] (ph , fx ). ESTHELA in communication w/ ABEBA Charles. ESTHELA is following and remains available.       "

## 2024-11-13 ENCOUNTER — TELEPHONE (OUTPATIENT)
Dept: CARDIOLOGY | Facility: CLINIC | Age: 74
End: 2024-11-13
Payer: MEDICARE

## 2024-11-14 ENCOUNTER — OFFICE VISIT (OUTPATIENT)
Dept: CARDIOLOGY | Facility: CLINIC | Age: 74
End: 2024-11-14
Payer: MEDICARE

## 2024-11-14 VITALS
DIASTOLIC BLOOD PRESSURE: 75 MMHG | WEIGHT: 178.13 LBS | OXYGEN SATURATION: 96 % | HEART RATE: 66 BPM | SYSTOLIC BLOOD PRESSURE: 140 MMHG | HEIGHT: 62 IN | BODY MASS INDEX: 32.78 KG/M2

## 2024-11-14 DIAGNOSIS — G47.33 OBSTRUCTIVE SLEEP APNEA: ICD-10-CM

## 2024-11-14 DIAGNOSIS — E78.49 OTHER HYPERLIPIDEMIA: ICD-10-CM

## 2024-11-14 DIAGNOSIS — I48.0 PAROXYSMAL A-FIB: Primary | ICD-10-CM

## 2024-11-14 DIAGNOSIS — N18.32 STAGE 3B CHRONIC KIDNEY DISEASE: ICD-10-CM

## 2024-11-14 DIAGNOSIS — I25.10 CORONARY ARTERY CALCIFICATION SEEN ON CAT SCAN: ICD-10-CM

## 2024-11-14 DIAGNOSIS — I70.0 AORTIC ATHEROSCLEROSIS: ICD-10-CM

## 2024-11-14 DIAGNOSIS — Z95.0 S/P PLACEMENT OF CARDIAC PACEMAKER: ICD-10-CM

## 2024-11-14 DIAGNOSIS — I50.32 CHRONIC DIASTOLIC HEART FAILURE: ICD-10-CM

## 2024-11-14 DIAGNOSIS — E66.01 MORBID OBESITY: ICD-10-CM

## 2024-11-14 PROCEDURE — 99213 OFFICE O/P EST LOW 20 MIN: CPT | Mod: PBBFAC,TXP | Performed by: INTERNAL MEDICINE

## 2024-11-14 PROCEDURE — 99999 PR PBB SHADOW E&M-EST. PATIENT-LVL III: CPT | Mod: PBBFAC,TXP,, | Performed by: INTERNAL MEDICINE

## 2024-11-14 RX ORDER — BISOPROLOL FUMARATE 5 MG/1
TABLET, FILM COATED ORAL
Qty: 90 TABLET | Refills: 3 | Status: SHIPPED | OUTPATIENT
Start: 2024-11-14

## 2024-11-14 NOTE — PROGRESS NOTES
Subjective:   Patient ID:  Jesenia Curiel is a 74 y.o. female is a new patient who presents for evaluation of No chief complaint on file.         Subjective:   Patient ID:  Jesenia Curiel is a 72 y.o. female who presents for follow-up of No chief complaint on file.     The patient location is: home  The chief complaint leading to consultation is: hand     Visit type: audiovisual     Face to Face time with patient: 30min  30 minutes of total time spent on the encounter, which includes face to face time and non-face to face time preparing to see the patient (eg, review of tests), Obtaining and/or reviewing separately obtained history, Documenting clinical information in the electronic or other health record, Independently interpreting results (not separately reported) and communicating results to the patient/family/caregiver, or Care coordination (not separately reported).            Each patient to whom he or she provides medical services by telemedicine is:  (1) informed of the relationship between the physician and patient and the respective role of any other health care provider with respect to management of the patient; and (2) notified that he or she may decline to receive medical services by telemedicine and may withdraw from such care at any time.     Notes:         PROBLEM LIST:  PAF  SSS  PPM  HFpEF  Coronary artery calcification on CT  HTN  HLD  CKD3  IFG  JOSÉ  ILD     HPI 6/17:She recently moved from [place] and presents today to establish care. 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 She has a history of atrial fibrillation and sick sinus syndrome. She had a [employer] pacemaker placed in 2009 and was diagnosed with atrial fibrillation in 2008. She has never required cardioversion. She has maintained sinus rhythm on Propafenone. A stress test in 2009 was normal per her report. Jesenia Curiel denies any chest pain, shortness of breath, PND, orthopnea, palpitations, leg edema, or syncope. She  does not exercise on a regular basis. She just started taking Belvique and has lost 5 lbs.     Interval history:She has been diagnosed with ILD and undergoing evaluation with Pulmonary. She has recently had some changes to her antihypertensive regimen by Nephrology. Her AM systolic BP's were running in the 90's. She was asymptomatic but there was concern of renal hypoperfusion. Her olmesartan and amlodipine doses were cut in half and bisoprolol was stopped. After the changes were made she noted two episodes of afib each lasting 4-5 hours. Her lasix was also increased to 40 mg by Pulmonary which did not improve her shortness of breath but helped slightly with LE edema. Her recent BNP was 70. She denies chest pain or orthopnea. Weight has been stable. BP at goal after recent changes.     ECG 29-JUN-2023 15:07:21: Personally reviewed by me shows atrial paced rhythm with prolonged AV conduction     DSE 3/23:  Summary     The stress echo portion of this study is negative for myocardial ischemia.  The ECG portion of this study is abnormal but not diagnostic for ischemia.  The patient reached the end of the protocol.  There were no arrhythmias during stress.  The left ventricle is normal in size with normal systolic function.  The estimated ejection fraction is 55%.  There is abnormal septal wall motion.  Normal left ventricular diastolic function.  The estimated PA systolic pressure is 37 mmHg.  Normal right ventricular size with normal right ventricular systolic function.  Normal central venous pressure (3 mmHg).     Device interrogation 7/23:  Conclusion     Additional Comments   REMOTE Interrogation Report   Dual Chamber PPM programmed DDDR 60   Presenting egram demonstrates AP/VS   Device fxn WNL   Autocapture algorithms are RA-Monitor/RV-On   RA pacing 99%, RV pacing 6.6%   Atrial arrhythmias: <1% burden, max 14 mins 40 secs   Anticoagulation status: None   Ventricular arrhythmias: None   Battery Status/Longevity: <3  months   F/U via remote interrogation monthly   Report prepared by Nina Cleaning RN     Echo 1/22     Summary  The left ventricle is normal in size with low normal systolic function.  The estimated ejection fraction is 53%.  There are segmental left ventricular wall motion abnormalities.  There is abnormal septal wall motion.  Normal right ventricular size with normal right ventricular systolic function.  Normal left ventricular diastolic function.  Mild mitral regurgitation.  Normal central venous pressure (3 mmHg).  The estimated PA systolic pressure is 25 mmHg.  Trivial posterior pericardial effusion.              HPI:   Patient to Dr. Nataly Hodgson wants to see another doctor  She has hypersensitivity pneumonitis (ILD)  She checks her BP at home and it runs normal  A year ago her BP was low   She has had HTN since 29  She did not have preeclampsia with pregnancies - 2 section  Hysterectomy at 48 and was perimenopausal at that time.   Walks on treadmill 3.4 times a week  She has lost weight since being on cellcept  She went to pulmonary rehab for 6 months  She does not feel palpitations      EKG- Atrial paced.       CT chest  There are mild calcifications of the coronary arteries. Calcification of the aortic valve annulus.   Echo 2/24    Left Ventricle: The left ventricle is normal in size. Ventricular mass is normal. Mildly increased wall thickness. There is concentric remodeling. Normal wall motion. There is normal systolic function with a visually estimated ejection fraction of 55 - 60%. Ejection fraction by visual approximation is 55%. There is normal diastolic function.    Right Ventricle: Normal right ventricular cavity size. Wall thickness is normal. Right ventricle wall motion  is normal. Systolic function is normal. Pacemaker lead present in the ventricle.    Left Atrium: Left atrium is mild -moderately dilated.    Aortic Valve: There is mild aortic valve sclerosis. There is mild annular calcification  "present.    Mitral Valve: There is moderate regurgitation.    Aorta: Aortic root is normal in size measuring 3.10 cm. Ascending aorta is  upper-normal measuring 3.60 cm.    Pulmonary Artery: There is mild pulmonary hypertension. The estimated pulmonary artery systolic pressure is 42 mmHg.    IVC/SVC: Normal venous pressure at 3 mmHg.    Pericardium: There is a trivial posterior effusion.       Patient Active Problem List   Diagnosis    Paroxysmal A-fib    Benign essential hypertension    Hyperlipidemia    Vitamin D deficiency    Sick sinus syndrome    S/P placement of cardiac pacemaker    Osteopenia of left thigh    Stage 3b chronic kidney disease    Morbid obesity    Nuclear sclerosis, bilateral    Nuclear sclerotic cataract of left eye    Paraproteinemia    Impaired fasting glucose    NAVA (dyspnea on exertion)    Tortuous aorta    Obstructive sleep apnea    Aortic atherosclerosis    Calcified granuloma of lung    Interstitial lung disease    Abnormal CAT scan    Adrenal incidentaloma    Primary hyperparathyroidism    Osteoporosis    Coronary artery calcification seen on CAT scan    Chronic diastolic heart failure    Metabolic acidosis    Weakness    Pancreas cyst     BP (!) 140/75   Pulse 66   Ht 5' 1.5" (1.562 m)   Wt 80.8 kg (178 lb 2.1 oz)   SpO2 96%   BMI 33.11 kg/m²   Body mass index is 33.11 kg/m².  CrCl cannot be calculated (Patient's most recent lab result is older than the maximum 7 days allowed.).    Lab Results   Component Value Date     08/21/2024    K 3.7 08/21/2024     08/21/2024    CO2 21 (L) 08/21/2024    BUN 16 08/21/2024    CREATININE 1.5 (H) 08/21/2024     (H) 08/21/2024    HGBA1C 5.9 (H) 12/28/2023    MG 2.2 08/21/2024    AST 20 08/19/2024    ALT 10 08/19/2024    ALBUMIN 3.6 08/19/2024    PROT 7.4 08/19/2024    BILITOT 0.6 08/19/2024    WBC 4.72 10/21/2024    HGB 11.2 (L) 10/21/2024    HCT 36.2 (L) 10/21/2024    HCT 35 (L) 10/20/2023    MCV 92 10/21/2024     " 10/21/2024    INR 1.1 08/19/2024    TSH 0.506 10/22/2023    CHOL 155 12/28/2023    HDL 41 12/28/2023    LDLCALC 86.2 12/28/2023    TRIG 139 12/28/2023       Current Outpatient Medications   Medication Sig    apixaban (ELIQUIS) 5 mg Tab Take 1 tablet (5 mg total) by mouth 2 (two) times daily.    bisoprolol (ZEBETA) 5 MG tablet Take 1 tablet (5 mg total) by mouth once daily.    clobetasoL (OLUX) 0.05 % Foam Apply 1 application topically every 30 days.    ERGOCALCIFEROL, VITAMIN D2, (VITAMIN D ORAL) Take 2,000 Units by mouth once daily.    furosemide (LASIX) 20 MG tablet Take 1 tablet (20 mg total) by mouth once daily.    montelukast (SINGULAIR) 10 mg tablet Take 1 tablet (10 mg total) by mouth every evening.    mycophenolate (CELLCEPT) 500 mg Tab TAKE 1 TABLET (500 MG) TWICE A DAY FOR 7 DAYS, THEN INCREASE TO 2 TABLETS (1000 MG) TWICE A DAY THEREAFTER    nitroGLYCERIN (NITROSTAT) 0.4 MG SL tablet Place 1 tablet (0.4 mg total) under the tongue every 5 (five) minutes as needed for Chest pain.    oxyCODONE (ROXICODONE) 5 MG immediate release tablet Take 1 tablet (5 mg total) by mouth every 6 (six) hours as needed for Pain.    potassium citrate (UROCIT-K) 10 mEq (1,080 mg) TbSR TAKE 1 TABLET BY MOUTH EVERY DAY    propafenone (RYTHMOL SR) 325 MG Cp12 Take 1 capsule (325 mg total) by mouth every 12 (twelve) hours.    rosuvastatin (CRESTOR) 40 MG Tab TAKE 1 TABLET BY MOUTH EVERY DAY IN THE EVENING    topiramate (TOPAMAX) 25 MG tablet TAKE 1 TABLET TWICE A DAY     No current facility-administered medications for this visit.     Facility-Administered Medications Ordered in Other Visits   Medication    vancomycin (VANCOCIN) 1,000 mg in dextrose 5 % (D5W) 250 mL IVPB (Vial-Mate)       ROS    Objective:   Physical Exam  Constitutional:       General: She is not in acute distress.     Appearance: She is well-developed. She is not diaphoretic.   HENT:      Head: Normocephalic and atraumatic.      Nose: Nose normal.       Mouth/Throat:      Pharynx: No oropharyngeal exudate.   Eyes:      General: No scleral icterus.        Right eye: No discharge.         Left eye: No discharge.      Conjunctiva/sclera: Conjunctivae normal.      Pupils: Pupils are equal, round, and reactive to light.   Neck:      Thyroid: No thyromegaly.      Vascular: No JVD.      Trachea: No tracheal deviation.   Cardiovascular:      Rate and Rhythm: Normal rate and regular rhythm.      Pulses: Intact distal pulses.      Heart sounds: Normal heart sounds. No murmur heard.     No friction rub. No gallop.   Pulmonary:      Effort: Pulmonary effort is normal. No respiratory distress.      Breath sounds: Normal breath sounds. No stridor. No wheezing or rales.   Chest:      Chest wall: No tenderness.   Abdominal:      General: Bowel sounds are normal. There is no distension.      Palpations: Abdomen is soft. There is no mass.      Tenderness: There is no abdominal tenderness. There is no guarding or rebound.   Musculoskeletal:         General: No tenderness. Normal range of motion.      Cervical back: Normal range of motion and neck supple.   Lymphadenopathy:      Cervical: No cervical adenopathy.   Skin:     General: Skin is warm.      Coloration: Skin is not pale.      Findings: No erythema or rash.   Neurological:      Mental Status: She is alert and oriented to person, place, and time.      Cranial Nerves: No cranial nerve deficit.      Motor: No abnormal muscle tone.      Coordination: Coordination normal.      Deep Tendon Reflexes: Reflexes are normal and symmetric. Reflexes normal.   Psychiatric:         Behavior: Behavior normal.         Thought Content: Thought content normal.         Judgment: Judgment normal.         Assessment:     1. Paroxysmal A-fib    2. S/P placement of cardiac pacemaker    3. Other hyperlipidemia    4. Coronary artery calcification seen on CAT scan    5. Chronic diastolic heart failure    6. Aortic atherosclerosis    7. Stage 3b chronic  kidney disease    8. Morbid obesity    9. Obstructive sleep apnea        Plan:     Diagnoses and all orders for this visit:    Paroxysmal A-fib    S/P placement of cardiac pacemaker    Other hyperlipidemia    Coronary artery calcification seen on CAT scan  -     Lipid Panel; Future    Chronic diastolic heart failure    Aortic atherosclerosis    Stage 3b chronic kidney disease    Morbid obesity    Obstructive sleep apnea    Other orders  -     bisoprolol (ZEBETA) 5 MG tablet; 1.5 tab daily      Doing well. Recommend considering addition of ACE/ARB and keeping bisoprolol to 5 mg of ok with the nephrologist  Counseled on importance of heart healthy diet low in saturated and trans fat and salt as well gradually starting a regular aerobic exercise regimen with goal of 30min 5x/week. Recommend BP diary. Call if systolic BP > 130 mmHg on checking repeatedly

## 2024-11-17 NOTE — TELEPHONE ENCOUNTER
Outgoing call  Spoke to patient return call left on voicemail   Reason for the call: Review prep instruction confirm date and time of procedure   Patient verbalized understanding     Information confirmed:   Date of procedure:  09/15/2023  Arrival time: 08:45 am  Procedure (s): colonoscopy  Prep: patient picked up prep  Ride: patient have a family member that will pick her up   
Yes

## 2024-11-22 ENCOUNTER — OFFICE VISIT (OUTPATIENT)
Dept: NEPHROLOGY | Facility: CLINIC | Age: 74
End: 2024-11-22
Payer: MEDICARE

## 2024-11-22 VITALS
DIASTOLIC BLOOD PRESSURE: 84 MMHG | OXYGEN SATURATION: 97 % | HEIGHT: 62 IN | HEART RATE: 79 BPM | SYSTOLIC BLOOD PRESSURE: 134 MMHG | WEIGHT: 176.56 LBS | BODY MASS INDEX: 32.49 KG/M2

## 2024-11-22 DIAGNOSIS — N18.4 STAGE 4 CHRONIC KIDNEY DISEASE: ICD-10-CM

## 2024-11-22 DIAGNOSIS — D89.2 PARAPROTEINEMIA: ICD-10-CM

## 2024-11-22 DIAGNOSIS — N18.4 CHRONIC KIDNEY DISEASE (CKD), STAGE IV (SEVERE): Primary | ICD-10-CM

## 2024-11-22 DIAGNOSIS — I10 BENIGN ESSENTIAL HYPERTENSION: ICD-10-CM

## 2024-11-22 PROCEDURE — 99999 PR PBB SHADOW E&M-EST. PATIENT-LVL IV: CPT | Mod: PBBFAC,,, | Performed by: INTERNAL MEDICINE

## 2024-11-22 PROCEDURE — 99214 OFFICE O/P EST MOD 30 MIN: CPT | Mod: PBBFAC | Performed by: INTERNAL MEDICINE

## 2024-11-22 PROCEDURE — 99214 OFFICE O/P EST MOD 30 MIN: CPT | Mod: S$PBB,,, | Performed by: INTERNAL MEDICINE

## 2024-11-22 RX ORDER — OLMESARTAN MEDOXOMIL 5 MG/1
10 TABLET ORAL DAILY
Qty: 180 TABLET | Refills: 1 | Status: SHIPPED | OUTPATIENT
Start: 2024-11-22 | End: 2025-05-21

## 2024-11-22 RX ORDER — BISOPROLOL FUMARATE 5 MG/1
TABLET, FILM COATED ORAL
Start: 2024-11-22

## 2024-11-22 NOTE — PROGRESS NOTES
REASON FOR CONSULT/CHIEF COMPLAIN: Chronic Kidney Disease    REFERRING PHYSICIAN: Sandra Michaud MD    HISTORY OF PRESENT ILLNESS: 74 y.o. female  patient was referred here for abnormal renal function.   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 She had one episode of calcium urate stone at her very young age. She did use some NSAID's in the remote past and suffered few BARBARA's.   Has been having dyspnea on exertion for which she has been seeing cardiology and pulmonary. Denied any new issues with urination including dysuria, hematuria, urgency, hesitancy, nocturia, incomplete voiding. Denied chest pain, nausea, vomiting, abd pain, nausea, vomiting, diarrhea, shortness of breath, pedal edema, Orthopnea, PND.    ROS:  General: negative for chills, or fatigue  Respiratory: No cough, or wheezing  Cardiovascular: No chest pain or dyspnea   Gastrointestinal: No abdominal pain, change in bowel habits      PAST MEDICAL HISTORY:  Past Medical History:   Diagnosis Date    Allergy     Arthritis     Atrial fibrillation 5/29/2017    Chronic diastolic heart failure 9/29/2023    CKD (chronic kidney disease) stage 3, GFR 30-59 ml/min 6/26/2017    Coronary artery calcification seen on CAT scan 9/29/2023    Disorder of kidney and ureter     GERD (gastroesophageal reflux disease)     Hyperlipidemia     Hypertension     Impaired fasting glucose 11/19/2021    Pre-diabetes 6/3/2017       PAST SURGICAL HISTORY:  Past Surgical History:   Procedure Laterality Date    ADENOIDECTOMY      BACK SURGERY  2000    lamenectomy    BREAST SURGERY      BRONCHOSCOPY N/A 7/13/2023    Procedure: BRONCHOSCOPY;  Surgeon: Paul Diagnostic Provider;  Location: [facility] OR [address] [place] [address];  Service: Anesthesiology;  Laterality: N/A;    BRONCHOSCOPY N/A 10/20/2023    Procedure: BRONCHOSCOPY;  Surgeon: Anatoly Tiwari MD;  Location: [facility] OR [address] [place] [address];  Service: Cardiothoracic;  Laterality: N/A;    CARDIAC  SURGERY      [employer] pacemaker     CATARACT EXTRACTION W/  INTRAOCULAR LENS IMPLANT Right 6/3/2019    Procedure: EXTRACTION, CATARACT, WITH IOL INSERTION;  Surgeon: Raisa Moreno MD;  Location: [facility] OR;  Service: Ophthalmology;  Laterality: Right;  Laser    CATARACT EXTRACTION W/  INTRAOCULAR LENS IMPLANT Left 2019    Procedure: EXTRACTION, CATARACT, WITH IOL INSERTION;  Surgeon: Raisa Moreno MD;  Location: [facility] OR;  Service: Ophthalmology;  Laterality: Left;  LASER ASSISTED     SECTION      ,     COLONOSCOPY N/A 9/15/2023    Procedure: COLONOSCOPY;  Surgeon: Tao Gomez MD;  Location: [facility] (2ND FLR);  Service: Endoscopy;  Laterality: N/A;  inst to portal/[employer] pacemaker   2nd floor for airway protection patient with lung disease elevated pulmonary artery pressure pacemaker and fecal occult blood positive stool,   cleared by pulmonary Dr Yony-see note on -    ENDOSCOPIC ULTRASOUND OF UPPER GASTROINTESTINAL TRACT N/A 2023    Procedure: ULTRASOUND, UPPER GI TRACT, ENDOSCOPIC;  Surgeon: Casimiro De Los Santos MD;  Location: [facility] ENDO;  Service: Endoscopy;  Laterality: N/A;  23: instructions send via portal. [employer] ppm- GD    EYE SURGERY      FRACTURE SURGERY      HYSTERECTOMY      INJECTION OF ANESTHETIC AGENT AROUND MULTIPLE INTERCOSTAL NERVES  10/20/2023    Procedure: BLOCK, NERVE, INTERCOSTAL, 2 OR MORE;  Surgeon: Anatoly Tiwari MD;  [facility];  Service: Cardiothoracic;;    INSERT / REPLACE / REMOVE PACEMAKER      placement    INSERT / REPLACE / REMOVE PACEMAKER  2014    [employer] Model #[id number] replacement    LAPAROSCOPIC APPENDECTOMY N/A 2024    Procedure: APPENDECTOMY, LAPAROSCOPIC;  Surgeon: Siddhartha Muñoz MD;  [facility];  Service: General;  Laterality: N/A;    pace maker      replacement     REPLACEMENT OF PACEMAKER GENERATOR Left 2024    Procedure: REPLACEMENT, PACEMAKER GENERATOR;  Surgeon:  Trell Hodgson MD;  Location: [facility] EP LAB;  Service: Cardiology;  Laterality: Left;  DEVORAH, PPM gen chg, SJM, MAC, GP, 3prep, *SJM dcPPM in situ*    REVISION OF PROCEDURE INVOLVING PACEMAKER LEAD Left 1/13/2022    Procedure: REVISION, ELECTRODE LEAD, CARDIAC PACEMAKER;  Surgeon: Trell Hodgson MD;  Location: [facility] EP LAB;  Service: Cardiology;  Laterality: Left;  PPM lead Malfx, RA lead revision, SJM, MAC, GP, 3 PREP*dPPM SJM in situ**Contrast prep given*    salpingo opherectomy Bilateral 1998    SPINE SURGERY      TONSILLECTOMY  1953    TUBAL LIGATION      VIDEO-ASSISTED THORACOSCOPIC SURGERY (VATS) Right 10/20/2023    Procedure: VATS WEDGE;  Surgeon: Anatoly Tiwari MD;  Location: [facility] OR [address] [place] [address];  Service: Cardiothoracic;  Laterality: Right;       FAMILY HISTORY:   Family History   Problem Relation Name Age of Onset    Hypertension Mother 62     Diabetes Mother 62     Hyperlipidemia Mother 62     Coronary artery disease Mother 62     Heart disease Mother 62     Stroke Mother 62     Hypertension Father 50     Diabetes Father 50     Hyperlipidemia Father 50     Coronary artery disease Father 50     Heart disease Father 50     Coronary artery disease Brother 64     Heart disease Brother 64     Hyperthyroidism Brother 64     Diabetes Paternal Grandmother Mejia     Diabetes Paternal Grandfather Sigfrid     Heart attack Neg Hx         SOCIAL HISTORY:  Social History     Socioeconomic History    Marital status:    Tobacco Use    Smoking status: Never     Passive exposure: Past    Smokeless tobacco: Never   Substance and Sexual Activity    Alcohol use: Not Currently     Alcohol/week: 5.0 standard drinks of alcohol     Types: 5 Glasses of wine per week    Drug use: No    Sexual activity: Not Currently     Partners: Male   Social History Narrative    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 [place]     Social Drivers of Health     Financial Resource Strain: Low Risk  (4/16/2024)     Overall Financial Resource Strain (CARDIA)     Difficulty of Paying Living Expenses: Not hard at all   Food Insecurity: No Food Insecurity (4/16/2024)    Hunger Vital Sign     Worried About Running Out of Food in the Last Year: Never true     Ran Out of Food in the Last Year: Never true   Transportation Needs: No Transportation Needs (4/16/2024)    PRAPARE - Transportation     Lack of Transportation (Medical): No     Lack of Transportation (Non-Medical): No   Physical Activity: Insufficiently Active (4/16/2024)    Exercise Vital Sign     Days of Exercise per Week: 3 days     Minutes of Exercise per Session: 20 min   Stress: No Stress Concern Present (4/16/2024)    [employer] - Occupational Stress Questionnaire     Feeling of Stress : Only a little   Housing Stability: Low Risk  (10/23/2023)    Housing Stability Vital Sign     Unable to Pay for Housing in the Last Year: No     Number of Places Lived in the Last Year: 1     Unstable Housing in the Last Year: No       ALLERGIES:  Review of patient's allergies indicates:   Allergen Reactions    Iodinated contrast media Anaphylaxis    Penicillins Anaphylaxis    Allopurinol analogues      Muscle cramps    Ciprofloxacin Rash    Quinolones Rash    Sulfa (sulfonamide antibiotics) Rash    Tetracyclines Rash       MEDICATIONS:    Current Outpatient Medications:     apixaban (ELIQUIS) 5 mg Tab, Take 1 tablet (5 mg total) by mouth 2 (two) times daily., Disp: 180 tablet, Rfl: 3    bisoprolol (ZEBETA) 5 MG tablet, 1.5 tab daily, Disp: 90 tablet, Rfl: 3    clobetasoL (OLUX) 0.05 % Foam, Apply 1 application topically every 30 days., Disp: 100 g, Rfl: 3    ERGOCALCIFEROL, VITAMIN D2, (VITAMIN D ORAL), Take 2,000 Units by mouth once daily., Disp: , Rfl:     furosemide (LASIX) 20 MG tablet, Take 1 tablet (20 mg total) by mouth once daily., Disp: 90 tablet, Rfl: 1    montelukast (SINGULAIR) 10 mg tablet, Take 1 tablet (10 mg total) by mouth every evening.,  Disp: 30 tablet, Rfl: 11    mycophenolate (CELLCEPT) 500 mg Tab, TAKE 1 TABLET (500 MG) TWICE A DAY FOR 7 DAYS, THEN INCREASE TO 2 TABLETS (1000 MG) TWICE A DAY THEREAFTER, Disp: 330 tablet, Rfl: 2    oxyCODONE (ROXICODONE) 5 MG immediate release tablet, Take 1 tablet (5 mg total) by mouth every 6 (six) hours as needed for Pain., Disp: 16 tablet, Rfl: 0    potassium citrate (UROCIT-K) 10 mEq (1,080 mg) TbSR, TAKE 1 TABLET BY MOUTH EVERY DAY, Disp: 90 tablet, Rfl: 3    propafenone (RYTHMOL SR) 325 MG Cp12, Take 1 capsule (325 mg total) by mouth every 12 (twelve) hours., Disp: 180 capsule, Rfl: 3    rosuvastatin (CRESTOR) 40 MG Tab, TAKE 1 TABLET BY MOUTH EVERY DAY IN THE EVENING, Disp: 90 tablet, Rfl: 3    topiramate (TOPAMAX) 25 MG tablet, TAKE 1 TABLET TWICE A DAY, Disp: 180 tablet, Rfl: 3    nitroGLYCERIN (NITROSTAT) 0.4 MG SL tablet, Place 1 tablet (0.4 mg total) under the tongue every 5 (five) minutes as needed for Chest pain., Disp: 20 tablet, Rfl: 3  No current facility-administered medications for this visit.    Facility-Administered Medications Ordered in Other Visits:     vancomycin (VANCOCIN) 1,000 mg in dextrose 5 % (D5W) 250 mL IVPB (Vial-Mate), 1,000 mg, Intravenous, On Call Procedure, Martha Jasmine NP, 1,000 mg at 01/26/24 1640   Medication List with Changes/Refills   Current Medications    APIXABAN (ELIQUIS) 5 MG TAB    Take 1 tablet (5 mg total) by mouth 2 (two) times daily.    BISOPROLOL (ZEBETA) 5 MG TABLET    1.5 tab daily    CLOBETASOL (OLUX) 0.05 % FOAM    Apply 1 application topically every 30 days.    ERGOCALCIFEROL, VITAMIN D2, (VITAMIN D ORAL)    Take 2,000 Units by mouth once daily.    FUROSEMIDE (LASIX) 20 MG TABLET    Take 1 tablet (20 mg total) by mouth once daily.    MONTELUKAST (SINGULAIR) 10 MG TABLET    Take 1 tablet (10 mg total) by mouth every evening.    MYCOPHENOLATE (CELLCEPT) 500 MG TAB    TAKE 1 TABLET (500 MG) TWICE A DAY FOR 7 DAYS, THEN INCREASE TO 2 TABLETS (1000  "MG) TWICE A DAY THEREAFTER    NITROGLYCERIN (NITROSTAT) 0.4 MG SL TABLET    Place 1 tablet (0.4 mg total) under the tongue every 5 (five) minutes as needed for Chest pain.    OXYCODONE (ROXICODONE) 5 MG IMMEDIATE RELEASE TABLET    Take 1 tablet (5 mg total) by mouth every 6 (six) hours as needed for Pain.    POTASSIUM CITRATE (UROCIT-K) 10 MEQ (1,080 MG) TBSR    TAKE 1 TABLET BY MOUTH EVERY DAY    PROPAFENONE (RYTHMOL SR) 325 MG CP12    Take 1 capsule (325 mg total) by mouth every 12 (twelve) hours.    ROSUVASTATIN (CRESTOR) 40 MG TAB    TAKE 1 TABLET BY MOUTH EVERY DAY IN THE EVENING    TOPIRAMATE (TOPAMAX) 25 MG TABLET    TAKE 1 TABLET TWICE A DAY        PHYSICAL EXAM:  /84   Pulse 79   Ht 5' 1.5" (1.562 m)   Wt 80.1 kg (176 lb 9.4 oz)   SpO2 97%   BMI 32.83 kg/m²     General: No distress, No fever or chills  Lungs:Clear to auscultation bilaterally, No Crackles  Heart: Regular rate and rhythm, no murmur, gallops or rubs  Abdomen: Soft, no tenderness, bowel sounds normal  Extremities: Atraumatic, no edema in LE  Skin: Turgor normal. No rashes or ulcers  Neurologic: No focal weakness, oriented.  Dialysis Access: Non applicable        LABS:  Lab Results   Component Value Date    HGB 11.2 (L) 10/21/2024        Lab Results   Component Value Date    CREATININE 1.5 (H) 08/21/2024       Prot/Creat Ratio, Urine   Date Value Ref Range Status   04/02/2024 1.10 (H) 0.00 - 0.20 Final   02/16/2024 1.09 (H) 0.00 - 0.20 Final   06/15/2023 Unable to calculate 0.00 - 0.20 Final       Lab Results   Component Value Date     08/21/2024    K 3.7 08/21/2024    CO2 21 (L) 08/21/2024       last PTH   Lab Results   Component Value Date    .2 (H) 02/16/2024    CALCIUM 9.3 08/21/2024    CAION 1.25 01/20/2022    PHOS 2.1 (L) 08/21/2024       Lab Results   Component Value Date    HGBA1C 5.9 (H) 12/28/2023       Lab Results   Component Value Date    LDLCALC 86.2 12/28/2023         Creatinine, Urine   Date Value Ref Range " Status   04/02/2024 92.0 15.0 - 325.0 mg/dL Final   02/16/2024 57.0 15.0 - 325.0 mg/dL Final   06/15/2023 30.0 15.0 - 325.0 mg/dL Final       Protein, Urine Random   Date Value Ref Range Status   04/02/2024 101 (H) 0 - 15 mg/dL Final   02/16/2024 62 (H) 0 - 15 mg/dL Final   06/15/2023 <7 0 - 15 mg/dL Final       Prot/Creat Ratio, Urine   Date Value Ref Range Status   04/02/2024 1.10 (H) 0.00 - 0.20 Final   02/16/2024 1.09 (H) 0.00 - 0.20 Final   06/15/2023 Unable to calculate 0.00 - 0.20 Final       ASSESSMENT:    1) Chronic Kidney disease stage 3b  2) Hypokalemia   3) Hypertension  4) Chronic asymptomatic mild Hypercalcemia  5) MGUS   Dr. Curiel creatinine has been around 1.4-1.5 mg/dl till 2020. 2021 it has bumped to 2.0, now fluctuating between 1.2-1.5 mg/dl (Fluctuates based on her volume status). But cystatin C estimated eGFR is higher than creatinine eGFR). Urine microscopy done by me during initial visits did not show any RBC/casts. Urine analysis has been negative for proteinuria or hematuria till 2023. Renal ultrasound from march 2021 showed 10.8 and 9.9 cm kidneys. . CKD due to combination of age related nephron loss, HTN, previous BARBARA and NSAID use. Serological work was positive for monoclonal protein and was evaluated by he onc.   Hypokalemia due to diuretic improved with potassium 10 meq daily. Acid Base and hemoglobin in acceptable range. On topamax for weight loss but no acidosis noted with current dose.   Did work up for borderline hypercalcemia and low phosphate. Vitamin D, Vitamin 1,25 Vitamin D, PTHrP, FGF 23 are in acceptable range. 24 hour urine showed low calcium excretion despite high normal PTH. This could go along with Familial hypocalciuria hypercalcemia/due to HCTZ? . Switched HCTZ to lasix.    - Can continue Lasix dose of 20 mg daily.  - Olmesartan 5 mg daily as long as she tolerates it with goal blood pressure goal of 110-130 mmhg systolic.  - Proteinuria likely resurfaced due to  discontinuation of olmesartan? -reintroduced it.  - Continue potassium 10 meq (switched to potassium citrate to compensate Topamax use) daily for lasix induced hypokalemia.  - Avoid NSAIDs intake  - Discussed about CKD, low sodium, and low animal protein diet.  - Discontinued Allopurinol previously due to side effects    RTC in 3-4 months.    Diagnosis and plan of care explained to the patient.  Pt Verbalized understanding. Answered all questions.  Thanks for allowing me to participate in the care of this patient.     9:02 PM    Venkatesh Mcgarry MD  Nephrology & Critical Care

## 2024-11-25 ENCOUNTER — PATIENT MESSAGE (OUTPATIENT)
Dept: TRANSPLANT | Facility: CLINIC | Age: 74
End: 2024-11-25
Payer: MEDICARE

## 2024-11-26 PROBLEM — N18.4 CHRONIC KIDNEY DISEASE (CKD), STAGE IV (SEVERE): Status: ACTIVE | Noted: 2024-11-26

## 2024-12-13 NOTE — ED NOTES
Lonny Butts MD;  Location: [facility];  Service: Orthopedics    MO 

## 2024-12-21 ENCOUNTER — PATIENT MESSAGE (OUTPATIENT)
Dept: TRANSPLANT | Facility: CLINIC | Age: 74
End: 2024-12-21
Payer: MEDICARE

## 2024-12-23 ENCOUNTER — PATIENT MESSAGE (OUTPATIENT)
Dept: TRANSPLANT | Facility: CLINIC | Age: 74
End: 2024-12-23
Payer: MEDICARE

## 2024-12-24 ENCOUNTER — CLINICAL SUPPORT (OUTPATIENT)
Dept: CARDIOLOGY | Facility: HOSPITAL | Age: 74
End: 2024-12-24

## 2024-12-24 DIAGNOSIS — I49.5 SICK SINUS SYNDROME: ICD-10-CM

## 2024-12-24 DIAGNOSIS — Z95.0 PRESENCE OF CARDIAC PACEMAKER: ICD-10-CM

## 2025-01-04 ENCOUNTER — CLINICAL SUPPORT (OUTPATIENT)
Dept: CARDIOLOGY | Facility: HOSPITAL | Age: 75
End: 2025-01-04
Attending: INTERNAL MEDICINE
Payer: MEDICARE

## 2025-01-04 DIAGNOSIS — Z95.0 PRESENCE OF CARDIAC PACEMAKER: ICD-10-CM

## 2025-01-04 DIAGNOSIS — I49.5 SICK SINUS SYNDROME: ICD-10-CM

## 2025-01-04 PROCEDURE — 93294 REM INTERROG EVL PM/LDLS PM: CPT | Mod: NTX,,, | Performed by: INTERNAL MEDICINE

## 2025-01-13 DIAGNOSIS — Z00.00 ENCOUNTER FOR MEDICARE ANNUAL WELLNESS EXAM: ICD-10-CM

## 2025-01-19 DIAGNOSIS — J84.9 INTERSTITIAL LUNG DISEASE: ICD-10-CM

## 2025-01-19 DIAGNOSIS — J67.9 HYPERSENSITIVITY PNEUMONITIS: ICD-10-CM

## 2025-01-20 RX ORDER — MONTELUKAST SODIUM 10 MG/1
10 TABLET ORAL NIGHTLY
Qty: 90 TABLET | Refills: 3 | Status: SHIPPED | OUTPATIENT
Start: 2025-01-20

## 2025-02-03 ENCOUNTER — TELEPHONE (OUTPATIENT)
Dept: OPHTHALMOLOGY | Facility: CLINIC | Age: 75
End: 2025-02-03
Payer: MEDICARE

## 2025-02-03 NOTE — TELEPHONE ENCOUNTER
----- Message from [employer] sent at 2/3/2025 11:24 AM CST -----  Regarding: Scheduling Request  Contact: Jesenia Curiel  Scheduling Request           Appt Type:  EP     Date/Time Preference:ASAP     Treating Provider:Stef     Caller Name:Jesenia Curiel      Contact Preference:[phone] (home)       Comments/notes:Patient is calling to schedule fro the lesion on right eye ASAP. Requesting a call back

## 2025-02-07 LAB
OHS CV AF BURDEN PERCENT: < 1
OHS CV DC REMOTE DEVICE TYPE: NORMAL
OHS CV RV PACING PERCENT: 35 %

## 2025-02-14 DIAGNOSIS — N18.4 CHRONIC KIDNEY DISEASE (CKD), STAGE IV (SEVERE): Primary | ICD-10-CM

## 2025-02-24 ENCOUNTER — OFFICE VISIT (OUTPATIENT)
Dept: NEPHROLOGY | Facility: CLINIC | Age: 75
End: 2025-02-24
Payer: MEDICARE

## 2025-02-24 VITALS
SYSTOLIC BLOOD PRESSURE: 120 MMHG | DIASTOLIC BLOOD PRESSURE: 78 MMHG | WEIGHT: 174.19 LBS | BODY MASS INDEX: 32.37 KG/M2 | HEART RATE: 64 BPM | OXYGEN SATURATION: 95 %

## 2025-02-24 DIAGNOSIS — I10 BENIGN ESSENTIAL HYPERTENSION: ICD-10-CM

## 2025-02-24 DIAGNOSIS — N18.32 STAGE 3B CHRONIC KIDNEY DISEASE: Primary | ICD-10-CM

## 2025-02-24 DIAGNOSIS — N25.81 SECONDARY HYPERPARATHYROIDISM: ICD-10-CM

## 2025-02-24 DIAGNOSIS — I10 HYPERTENSION, UNSPECIFIED TYPE: ICD-10-CM

## 2025-02-24 PROBLEM — E66.01 MORBID OBESITY: Status: RESOLVED | Noted: 2018-07-26 | Resolved: 2025-02-24

## 2025-02-24 PROBLEM — I50.32 CHRONIC DIASTOLIC HEART FAILURE: Status: RESOLVED | Noted: 2023-09-29 | Resolved: 2025-02-24

## 2025-02-24 PROBLEM — N18.4 CHRONIC KIDNEY DISEASE (CKD), STAGE IV (SEVERE): Status: RESOLVED | Noted: 2024-11-26 | Resolved: 2025-02-24

## 2025-02-24 PROCEDURE — 99214 OFFICE O/P EST MOD 30 MIN: CPT | Mod: S$PBB,,, | Performed by: INTERNAL MEDICINE

## 2025-02-24 PROCEDURE — 99999 PR PBB SHADOW E&M-EST. PATIENT-LVL III: CPT | Mod: PBBFAC,,, | Performed by: INTERNAL MEDICINE

## 2025-02-24 PROCEDURE — 99213 OFFICE O/P EST LOW 20 MIN: CPT | Mod: PBBFAC | Performed by: INTERNAL MEDICINE

## 2025-02-24 RX ORDER — OLMESARTAN MEDOXOMIL 20 MG/1
20 TABLET ORAL DAILY
Qty: 90 TABLET | Refills: 1 | Status: SHIPPED | OUTPATIENT
Start: 2025-02-24 | End: 2025-08-23

## 2025-02-24 NOTE — PROGRESS NOTES
HPI  This 74 y.o. y/o female with PMH of HTN, HLD, Afib s/p PPM 2009 on propafenone, CKD, kidney stones, on cellcept for ILD c/b fungal infection underneath the breast came in for CKD.    Renal history  Creatinine   Date Value Ref Range Status   02/19/2025 1.7 (H) 0.5 - 1.4 mg/dL Final   11/25/2024 1.8 (H) 0.5 - 1.4 mg/dL Final   08/21/2024 1.5 (H) 0.5 - 1.4 mg/dL Final   08/20/2024 1.6 (H) 0.5 - 1.4 mg/dL Final   08/19/2024 2.1 (H) 0.5 - 1.4 mg/dL Final   04/26/2024 1.8 (H) 0.5 - 1.4 mg/dL Final   02/16/2024 1.58 (H) 0.50 - 1.40 mg/dL Final   01/19/2024 2.02 (H) 0.50 - 1.40 mg/dL Final   01/05/2024 1.45 (H) 0.50 - 1.40 mg/dL Final   12/28/2023 2.00 (H) 0.50 - 1.40 mg/dL Final   12/28/2023 2.00 (H) 0.50 - 1.40 mg/dL Final   Cr 1.4-2.1 since 2021 (weight has been fluctuated)  Cr 1.4-1.5 till 2021  Prot/Creat Ratio, Urine   Date Value Ref Range Status   02/19/2025 0.95 (H) 0.00 - 0.20 Final   11/25/2024 0.68 (H) 0.00 - 0.20 Final     Has been drinking 90 oz of fluid a day  Not taking NSAIDs    Nephrolithiasis  Episodes: one time 50 years ago  Stone type: calcium urate stones    HTN  40+ years  BP this visit 120/78 mmHg  BP at home 130/70s-80s  Current medication: bisoprolol 5 mg, olmesartan 10 mg daily, lasix 20 mg daily (stopped CCB due to low BP in 2024)  Significant weight loss while on Topamax    Pre-DM  Lab Results   Component Value Date    HGBA1C 5.9 (H) 12/28/2023     Abdominal CT 2024  FINDINGS:  Right lower lobe granuloma.  Lung bases are clear.     Limited solid organ evaluation due to lack of intravenous contrast.     A few punctate hypodensities in the liver.  Largest measures 0.9 cm and is of fluid attenuation, typical of a cyst.     Spleen, bile ducts, and left adrenal gland are unremarkable.     At least 1 fluid density focus along the tail of the pancreas measures 1.5 cm.     Several right adrenal nodules.  Nodule rising from the medial limb measuring 1.3 cm measure less than 10 Hounsfield units  typical of an adenoma.  Nodule along the lateral limb does not meet criteria for an adenoma and measures 0.9 cm.     Kidneys show intrarenal calcifications which may represent stones or may be arterial.  No hydronephrosis or hydroureter.  Urinary bladder is mostly collapsed.     Stomach and loops of bowel are normal caliber.  High-density foci in the terminal ileum related to food or medication.  Appendix is dilated measuring 11 mm.  Mild inflammatory stranding surround the appendix.  A few tiny lymph nodes.  No free fluid.     Regional skeleton shows degenerative change.     Impression:     Appendix is dilated though not completely fluid filled.  Early inflammatory changes with no significant free fluid.  Findings are nonspecific and can be seen with early acute appendicitis in this patient with right lower quadrant pain.     At least 1 fluid density focus along the pancreas which may represent a cyst, pseudocyst, or IPMN.  Finding can be further evaluated with MRI as an outpatient.     Right adrenal nodule does not meet criteria for an adenoma.  This can be followed at the same time as the pancreatic cyst.    Past Medical History:   Diagnosis Date    Allergy     Arthritis     Atrial fibrillation 5/29/2017    Chronic diastolic heart failure 9/29/2023    CKD (chronic kidney disease) stage 3, GFR 30-59 ml/min 6/26/2017    Coronary artery calcification seen on CAT scan 9/29/2023    Disorder of kidney and ureter     GERD (gastroesophageal reflux disease)     Hyperlipidemia     Hypertension     Impaired fasting glucose 11/19/2021    Pre-diabetes 6/3/2017       Past Surgical History:   Procedure Laterality Date    ADENOIDECTOMY      BACK SURGERY  2000    lamenectomy    BREAST SURGERY      BRONCHOSCOPY N/A 7/13/2023    Procedure: BRONCHOSCOPY;  Surgeon: [employer] [facility] Provider;  Location: [facility] OR [address] [place] [address];  Service: Anesthesiology;  Laterality: N/A;    BRONCHOSCOPY N/A 10/20/2023    Procedure: BRONCHOSCOPY;  Surgeon:  Anatoly Tiwari MD;  [address];  Service: Cardiothoracic;  Laterality: N/A;    CARDIAC SURGERY      [employer] pacemaker     CATARACT EXTRACTION W/  INTRAOCULAR LENS IMPLANT Right 6/3/2019    Procedure: EXTRACTION, CATARACT, WITH IOL INSERTION;  Surgeon: Raisa Moreno MD;  Location: [facility];  Service: Ophthalmology;  Laterality: Right;  Laser    CATARACT EXTRACTION W/  INTRAOCULAR LENS IMPLANT Left 2019    Procedure: EXTRACTION, CATARACT, WITH IOL INSERTION;  Surgeon: Raisa Moreno MD;  Location: [facility] OR;  Service: Ophthalmology;  Laterality: Left;  LASER ASSISTED     SECTION      ,     COLONOSCOPY N/A 9/15/2023    Procedure: COLONOSCOPY;  Surgeon: Tao Gomez MD;  Location: [facility] (2ND FLR);  Service: Endoscopy;  Laterality: N/A;  inst to portal/[employer] pacemaker   2nd floor for airway protection patient with lung disease elevated pulmonary artery pressure pacemaker and fecal occult blood positive stool,   cleared by pulmonary Dr Bhakta-see note on     ENDOSCOPIC ULTRASOUND OF UPPER GASTROINTESTINAL TRACT N/A 2023    Procedure: ULTRASOUND, UPPER GI TRACT, ENDOSCOPIC;  Surgeon: Casimiro De Los Santos MD;  [facility] ENDO;  Service: Endoscopy;  Laterality: N/A;  23: instructions send via portal. [employer] ppm- GD    EYE SURGERY      FRACTURE SURGERY      HYSTERECTOMY      INJECTION OF ANESTHETIC AGENT AROUND MULTIPLE INTERCOSTAL NERVES  10/20/2023    Procedure: BLOCK, NERVE, INTERCOSTAL, 2 OR MORE;  Surgeon: Anatoly Tiwari MD;  [facility];  Service: Cardiothoracic;;    INSERT / REPLACE / REMOVE PACEMAKER      placement    INSERT / REPLACE / REMOVE PACEMAKER  2014    [employer] Model #[id number] replacement    LAPAROSCOPIC APPENDECTOMY N/A 2024    Procedure: APPENDECTOMY, LAPAROSCOPIC;  Surgeon: Siddhartha Muñoz MD;  [facility];  Service: General;  Laterality: N/A;    pace maker      replacement      REPLACEMENT OF PACEMAKER GENERATOR Left 1/26/2024    Procedure: REPLACEMENT, PACEMAKER GENERATOR;  Surgeon: Trell Hodgson MD;  Location: [facility] EP LAB;  Service: Cardiology;  Laterality: Left;  DEVORAH, PPM gen chg, SJM, MAC, GP, 3prep, *SJM dcPPM in situ*    REVISION OF PROCEDURE INVOLVING PACEMAKER LEAD Left 1/13/2022    Procedure: REVISION, ELECTRODE LEAD, CARDIAC PACEMAKER;  Surgeon: Trell Hodgson MD;  Location: [facility] EP LAB;  Service: Cardiology;  Laterality: Left;  PPM lead Malfx, RA lead revision, SJM, MAC, GP, 3 PREP*dPPM SJM in situ**Contrast prep given*    salpingo opherectomy Bilateral 1998    SPINE SURGERY      TONSILLECTOMY  1953    TUBAL LIGATION      VIDEO-ASSISTED THORACOSCOPIC SURGERY (VATS) Right 10/20/2023    Procedure: VATS WEDGE;  Surgeon: Anatoly Tiwari MD;  Location: [facility] OR [address] [place] [address];  Service: Cardiothoracic;  Laterality: Right;       Review of patient's allergies indicates:   Allergen Reactions    Iodinated contrast media Anaphylaxis    Penicillins Anaphylaxis    Allopurinol analogues      Muscle cramps    Ciprofloxacin Rash    Quinolones Rash    Sulfa (sulfonamide antibiotics) Rash    Tetracyclines Rash         Social History[1]  The patient is a retired GI doctor    Family History   Problem Relation Name Age of Onset    Hypertension Mother 62     Diabetes Mother 62     Hyperlipidemia Mother 62     Coronary artery disease Mother 62     Heart disease Mother 62     Stroke Mother 62     Hypertension Father 50     Diabetes Father 50     Hyperlipidemia Father 50     Coronary artery disease Father 50     Heart disease Father 50     Coronary artery disease Brother 64     Heart disease Brother 64     Hyperthyroidism Brother 64     Diabetes Paternal Grandmother Mejia     Diabetes Paternal Grandfather Sigfrid     Heart attack Neg Hx         ROS negative except listed above    PHYSICAL EXAMINATION:  Blood pressure 120/78, pulse 64, weight 79 kg (174 lb 2.6 oz), SpO2 95%.  Constitutional - No acute  distress  HEENT - Grossly normal  Neck - supple.   Cardiovascular - JVP normal  Respiratory - Clear  Musculoskeletal - Peripheral edema no  Dermatologic/Skin - Skin warm and dry.  No rashes.    Neurologic - No acute neurological deficit  Psychiatric - AAOx3    Assessment and Plan  1. CKD Stage 3B: likely 2/2 long standing HTN      Metabolic acidosis      Nephrolithiasis    Urine Protein Creatinine Ratios:    Prot/Creat Ratio, Urine   Date Value Ref Range Status   02/19/2025 0.95 (H) 0.00 - 0.20 Final   11/25/2024 0.68 (H) 0.00 - 0.20 Final   04/02/2024 1.10 (H) 0.00 - 0.20 Final         Acid-Base:   Lab Results   Component Value Date     02/19/2025    K 3.9 02/19/2025    CO2 23 02/19/2025     -Counseled to avoid NSAIDs  -Counseled to drink 80 oz of fluid a day (for the stone)  -Continue K citrate 10 meq for hypoK, stone and metabolic acidosis   -Discussed with the patient, will not start Jardiance given hx of yeast infection on cellcept  -Adrenal nodule has been worked up in the past and follows with Endocrine    2. HTN: BP goal 130/80 mmHg  -Counseled for low salt diet  -Continue bisoprolol 5 mg, lasix 20 mg daily  -Will increase olmesartan to 20 mg daily for proteinuria and will repeat lab in 2-3 weeks    3. Secondary hyperparathyroidism of renal origin:   Lab Results   Component Value Date    PTH 91.4 (H) 11/25/2024    .2 (H) 02/16/2024    CALCIUM 10.1 02/19/2025    CALCIUM 10.1 11/25/2024    CAION 1.25 01/20/2022    CAION 1.37 05/26/2021    PHOS 3.0 11/25/2024    PHOS 2.1 (L) 08/21/2024     Vit D, 25-Hydroxy   Date Value Ref Range Status   11/25/2024 61 30 - 96 ng/mL Final     Comment:     Vitamin D deficiency.........<10 ng/mL                              Vitamin D insufficiency......10-29 ng/mL       Vitamin D sufficiency........> or equal to 30 ng/mL  Vitamin D toxicity............>100 ng/mL        Will continue to monitor    4. No Anemia:   Lab Results   Component Value Date    HGB 12.3  02/19/2025    FERRITIN 55 11/25/2024    IRON 87 11/25/2024    TRANSFERRIN 260 11/25/2024    TIBC 385 11/25/2024    FESATURATED 23 11/25/2024        5. Pre-DM:  Last HbA1C   Lab Results   Component Value Date    HGBA1C 5.9 (H) 12/28/2023       6. Lipid management:   Lab Results   Component Value Date    LDLCALC 77.8 11/25/2024   Continue statin      ESRD planing when eGFR < 15, likely will choose conservative care per the pt.    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     Social Drivers of Health     Financial Resource Strain: Low Risk  (4/16/2024)    Overall Financial Resource Strain (CARDIA)     Difficulty of Paying Living Expenses: Not hard at all   Food Insecurity: No Food Insecurity (4/16/2024)    Hunger Vital Sign     Worried About Running Out of Food in the Last Year: Never true     Ran Out of Food in the Last Year: Never true   Transportation Needs: No Transportation Needs (4/16/2024)    PRAPARE - Transportation     Lack of Transportation (Medical): No     Lack of Transportation (Non-Medical): No   Physical Activity: Insufficiently Active (4/16/2024)    Exercise Vital Sign     Days of Exercise per Week: 3 days     Minutes of Exercise per Session: 20 min   Stress: No Stress Concern Present (4/16/2024)    [employer] - Occupational Stress Questionnaire     Feeling of Stress : Only a little   Housing Stability: Low Risk  (10/23/2023)    Housing Stability Vital Sign     Unable to Pay for Housing in the Last Year:  No     Number of Places Lived in the Last Year: 1     Unstable Housing in the Last Year: No

## 2025-03-12 DIAGNOSIS — R93.89 ABNORMAL CT OF THE CHEST: ICD-10-CM

## 2025-03-12 DIAGNOSIS — R06.09 DOE (DYSPNEA ON EXERTION): ICD-10-CM

## 2025-03-12 RX ORDER — FUROSEMIDE 20 MG/1
20 TABLET ORAL DAILY
Qty: 90 TABLET | Refills: 1 | Status: SHIPPED | OUTPATIENT
Start: 2025-03-12

## 2025-03-13 ENCOUNTER — OFFICE VISIT (OUTPATIENT)
Dept: OPHTHALMOLOGY | Facility: CLINIC | Age: 75
End: 2025-03-13
Payer: MEDICARE

## 2025-03-13 DIAGNOSIS — Z96.1 PSEUDOPHAKIA OF BOTH EYES: ICD-10-CM

## 2025-03-13 DIAGNOSIS — H02.413 MECHANICAL PTOSIS, ACQUIRED, BILATERAL: ICD-10-CM

## 2025-03-13 DIAGNOSIS — H00.11 CHALAZION RIGHT UPPER EYELID: Primary | ICD-10-CM

## 2025-03-13 PROCEDURE — 99999 PR PBB SHADOW E&M-EST. PATIENT-LVL III: CPT | Mod: PBBFAC,,, | Performed by: OPHTHALMOLOGY

## 2025-03-13 PROCEDURE — 99213 OFFICE O/P EST LOW 20 MIN: CPT | Mod: PBBFAC | Performed by: OPHTHALMOLOGY

## 2025-03-13 PROCEDURE — 92285 EXTERNAL OCULAR PHOTOGRAPHY: CPT | Mod: PBBFAC | Performed by: OPHTHALMOLOGY

## 2025-03-13 PROCEDURE — 99214 OFFICE O/P EST MOD 30 MIN: CPT | Mod: S$PBB,,, | Performed by: OPHTHALMOLOGY

## 2025-03-13 NOTE — PROGRESS NOTES
JON    Jesenia Curiel is a/an 74 y.o. female here for lesion RUL evaluation.   Referred by: self   Eye Meds: Systane 2-3x daily       Last edited by Briseyda Peck on 3/13/2025 11:07 AM.            Assessment /Plan     For exam results, see Encounter Report.    Chalazion right upper eyelid    Pseudophakia of both eyes      The patient is a pleasant 74-year-old female here for evaluation of medial right upper eyelid growth.  The patient noticed the growth while palpating her upper eyelid January 21st.  The patient has and on immunotherapy for interstitial lung disease since early 2024.  The patient has noticed a couple of chalazia of the bilateral lower eyelids which have resolved with warm compresses.  She did associate tenderness and discomfort with these chalazia.  She has not noticed any discomfort with the new right upper eyelid growth.  She has not used any warm compresses on the growth as well.      On exam, the patient has a firm nodule at the medial aspect of the right upper eyelid.  There was no associated loss of lashes.  There was no preauricular or submandibular adenopathy.  The patient has full extraocular motility.  The right upper eyelid was everted and there is 2+ conjunctival injection in the area of the chalazion.  The patient has bilateral upper eyelid mechanical ptosis with skin lash touch.      These findings were discussed with the patient.  We discussed the option of incision and drainage of the right upper eyelid chalazion versus monitoring it.  The patient is not bothered by the right upper eyelid chalazion at this time.  The plan is to monitor.  If there should be sudden increase in size or the growth becomes uncomfortable, would recommend biopsy.

## 2025-03-14 DIAGNOSIS — E66.01 SEVERE OBESITY (BMI 35.0-39.9) WITH COMORBIDITY: ICD-10-CM

## 2025-03-14 RX ORDER — TOPIRAMATE 25 MG/1
25 TABLET ORAL 2 TIMES DAILY
Qty: 180 TABLET | Refills: 3 | Status: SHIPPED | OUTPATIENT
Start: 2025-03-14

## 2025-03-18 ENCOUNTER — PATIENT MESSAGE (OUTPATIENT)
Dept: NEPHROLOGY | Facility: CLINIC | Age: 75
End: 2025-03-18
Payer: MEDICARE

## 2025-03-24 ENCOUNTER — TELEPHONE (OUTPATIENT)
Dept: PRIMARY CARE CLINIC | Facility: CLINIC | Age: 75
End: 2025-03-24
Payer: MEDICARE

## 2025-03-24 NOTE — TELEPHONE ENCOUNTER
Called Ms. Ba to reschedule her appointment spoke to her  will call back to reschedule did not want a virtual visit.

## 2025-03-28 ENCOUNTER — PATIENT MESSAGE (OUTPATIENT)
Dept: ELECTROPHYSIOLOGY | Facility: CLINIC | Age: 75
End: 2025-03-28
Payer: MEDICARE

## 2025-03-28 ENCOUNTER — PATIENT MESSAGE (OUTPATIENT)
Dept: PRIMARY CARE CLINIC | Facility: CLINIC | Age: 75
End: 2025-03-28
Payer: MEDICARE

## 2025-03-31 ENCOUNTER — OFFICE VISIT (OUTPATIENT)
Dept: OPTOMETRY | Facility: CLINIC | Age: 75
End: 2025-03-31
Payer: MEDICARE

## 2025-03-31 DIAGNOSIS — H52.4 MYOPIA WITH PRESBYOPIA OF BOTH EYES: ICD-10-CM

## 2025-03-31 DIAGNOSIS — H26.493 BILATERAL POSTERIOR CAPSULAR OPACIFICATION: ICD-10-CM

## 2025-03-31 DIAGNOSIS — H52.13 MYOPIA WITH PRESBYOPIA OF BOTH EYES: ICD-10-CM

## 2025-03-31 DIAGNOSIS — H52.222 REGULAR ASTIGMATISM OF LEFT EYE: ICD-10-CM

## 2025-03-31 DIAGNOSIS — Z96.1 PRESENCE OF INTRAOCULAR LENS: Primary | ICD-10-CM

## 2025-03-31 DIAGNOSIS — H53.8 BLURRED VISION: ICD-10-CM

## 2025-03-31 PROCEDURE — 99999 PR PBB SHADOW E&M-EST. PATIENT-LVL II: CPT | Mod: PBBFAC,TXP,, | Performed by: OPTOMETRIST

## 2025-03-31 PROCEDURE — 99204 OFFICE O/P NEW MOD 45 MIN: CPT | Mod: S$PBB,TXP,, | Performed by: OPTOMETRIST

## 2025-03-31 PROCEDURE — 99212 OFFICE O/P EST SF 10 MIN: CPT | Mod: PBBFAC,PN,NTX | Performed by: OPTOMETRIST

## 2025-03-31 PROCEDURE — 92015 DETERMINE REFRACTIVE STATE: CPT | Mod: TXP,,, | Performed by: OPTOMETRIST

## 2025-03-31 NOTE — PROGRESS NOTES
HPI    ISHA: 2020, Dr. Ordonez  Chief complaint (CC): 75 yo F presents today for for routine eye exam. Pt   reports noticeable dist vision changes. Wear OTC readers when reading   really small print  Glasses? OTC readers for reading  Contacts? -  H/o eye surgery, injections or laser: PC IOL OU  H/o eye injury: -  Known eye conditions? Dry eyes  Family h/o eye conditions? -  Eye gtts? Systane gtts      (-) Flashes (-)  Floaters (-) Mucous   (-)  Tearing (-) Itching (-) Burning   (-) Headaches (-) Eye Pain/discomfort (+) Irritation, dry eyes  (-)  Redness (-) Double vision (-) Blurry vision    Diabetic? -  A1c? -     Last edited by Elizabeth Zepeda MA on 3/31/2025  1:20 PM.            Assessment /Plan     For exam results, see Encounter Report.    Presence of intraocular lens    Blurred vision    Bilateral posterior capsular opacification    Myopia with presbyopia of both eyes    Regular astigmatism of left eye      MONITOR. PT ED ON ALL EXAM FINDINGS.  RX FINAL SPEC  S/P PCIOL OU; UV PROTECTION; MONITOR  DISCUSSED DRY EYE THERAPIES, CONTINUE OTC AT'S; MONITOR  RTC 1YR//PRN FOR REE AND DFE

## 2025-04-04 ENCOUNTER — TELEPHONE (OUTPATIENT)
Dept: TRANSPLANT | Facility: CLINIC | Age: 75
End: 2025-04-04
Payer: MEDICARE

## 2025-04-04 NOTE — TELEPHONE ENCOUNTER
Instructed by Dr. Shore to reschedule the patient's 11:30 am appointment on 4/7/25, as she has a conflict at that time. Called patient to discuss re-booking her appointment and to apologize for the last minute change. Offered to either reschedule her provider visit to a virtual visit on a day/time later in the week or to change her appointment time to 12:00 pm on 4/7/25. The patient opted to keep her in-person visit and change the time to 12:00 pm on 4/7/25. All questions at this time were answered to her satisfaction.

## 2025-04-07 ENCOUNTER — HOSPITAL ENCOUNTER (OUTPATIENT)
Dept: PULMONOLOGY | Facility: CLINIC | Age: 75
Discharge: HOME | End: 2025-04-07
Payer: MEDICARE

## 2025-04-07 ENCOUNTER — OFFICE VISIT (OUTPATIENT)
Dept: TRANSPLANT | Facility: CLINIC | Age: 75
End: 2025-04-07
Payer: MEDICARE

## 2025-04-07 ENCOUNTER — RESULTS FOLLOW-UP (OUTPATIENT)
Dept: TRANSPLANT | Facility: CLINIC | Age: 75
End: 2025-04-07
Payer: MEDICARE

## 2025-04-07 VITALS
BODY MASS INDEX: 33.68 KG/M2 | HEIGHT: 61 IN | RESPIRATION RATE: 16 BRPM | SYSTOLIC BLOOD PRESSURE: 127 MMHG | HEART RATE: 67 BPM | WEIGHT: 178.38 LBS | TEMPERATURE: 98 F | DIASTOLIC BLOOD PRESSURE: 76 MMHG | OXYGEN SATURATION: 95 %

## 2025-04-07 DIAGNOSIS — I49.5 SICK SINUS SYNDROME: ICD-10-CM

## 2025-04-07 DIAGNOSIS — G47.33 OSA (OBSTRUCTIVE SLEEP APNEA): ICD-10-CM

## 2025-04-07 DIAGNOSIS — J84.9 INTERSTITIAL LUNG DISEASE: ICD-10-CM

## 2025-04-07 DIAGNOSIS — J84.9 INTERSTITIAL PULMONARY DISEASE, UNSPECIFIED: Primary | ICD-10-CM

## 2025-04-07 DIAGNOSIS — I48.91 ATRIAL FIBRILLATION WITH RVR: ICD-10-CM

## 2025-04-07 LAB
DLCO ADJ PRE: 11.88 ML/(MIN*MMHG) (ref 12.96–24.42)
DLCO SINGLE BREATH LLN: 12.96
DLCO SINGLE BREATH PRE REF: 61.3 %
DLCO SINGLE BREATH REF: 18.69
DLCOC SBVA LLN: 2.61
DLCOC SBVA PRE REF: 97.1 %
DLCOC SBVA REF: 4.21
DLCOC SINGLE BREATH LLN: 12.96
DLCOC SINGLE BREATH PRE REF: 63.5 %
DLCOC SINGLE BREATH REF: 18.69
DLCOCSBVAULN: 5.81
DLCOCSINGLEBREATHULN: 24.42
DLCOCSINGLEBREATHZSCORE: -1.96
DLCOSINGLEBREATHULN: 24.42
DLCOSINGLEBREATHZSCORE: -2.08
DLCOVA LLN: 2.61
DLCOVA PRE REF: 93.6 %
DLCOVA PRE: 3.94 ML/(MIN*MMHG*L) (ref 2.61–5.81)
DLCOVA REF: 4.21
DLCOVAULN: 5.81
DLVAADJ PRE: 4.09 ML/(MIN*MMHG*L) (ref 2.61–5.81)
ERV LLN: -16449.46
ERV PRE REF: 73.1 %
ERV REF: 0.54
ERVULN: ABNORMAL
FEF 25 75 LLN: 0.91
FEF 25 75 PRE REF: 30.5 %
FEF 25 75 REF: 2.31
FEV05 LLN: 0.63
FEV05 REF: 1.49
FEV1 FVC LLN: 64
FEV1 FVC PRE REF: 88.6 %
FEV1 FVC REF: 78
FEV1 LLN: 1.32
FEV1 PRE REF: 69.2 %
FEV1 REF: 1.85
FEV1FVCZSCORE: -1.07
FEV1ZSCORE: -1.76
FRCPLETH LLN: 1.72
FRCPLETH PREREF: 67.9 %
FRCPLETH REF: 2.55
FRCPLETHULN: 3.37
FVC LLN: 1.71
FVC PRE REF: 77.5 %
FVC REF: 2.4
FVCZSCORE: -1.29
IVC PRE: 1.98 L (ref 1.71–3.12)
IVC SINGLE BREATH LLN: 1.71
IVC SINGLE BREATH PRE REF: 82.5 %
IVC SINGLE BREATH REF: 2.4
IVCSINGLEBREATHULN: 3.12
LLN IC: -16448.4
PEF LLN: 3.24
PEF PRE REF: 107.2 %
PEF REF: 4.79
PHYSICIAN COMMENT: ABNORMAL
PRE DLCO: 11.45 ML/(MIN*MMHG) (ref 12.96–24.42)
PRE ERV: 0.4 L (ref -16449.46–16450.54)
PRE FEF 25 75: 0.7 L/S (ref 0.91–3.71)
PRE FET 100: 6.42 SEC
PRE FEV05 REF: 72.2 %
PRE FEV1 FVC: 69.03 % (ref 63.79–90.07)
PRE FEV1: 1.28 L (ref 1.32–2.36)
PRE FEV5: 1.07 L (ref 0.63–2.34)
PRE FRC PL: 1.73 L (ref 1.72–3.37)
PRE FVC: 1.86 L (ref 1.71–3.12)
PRE IC: 1.58 L (ref -16448.4–16451.6)
PRE PEF: 5.14 L/S (ref 3.24–6.35)
PRE REF IC: 99.1 %
PRE RV: 1.33 L (ref 1.43–2.58)
PRE TLC: 3.31 L (ref 3.45–5.42)
RAW PRE REF: 115.7 %
RAW PRE: 3.54 CMH2O*S/L (ref 3.06–3.06)
RAW REF: 3.06
REF IC: 1.6
RV LLN: 1.43
RV PRE REF: 66.5 %
RV REF: 2
RVTLC LLN: 35
RVTLC PRE REF: 90.5 %
RVTLC PRE: 40.25 % (ref 34.87–54.05)
RVTLC REF: 44
RVTLCULN: 54
RVULN: 2.58
SGAW PRE REF: 135 %
SGAW PRE: 0.14 1/(CMH2O*S) (ref 0.1–0.1)
SGAW REF: 0.1
TLC LLN: 3.45
TLC PRE REF: 74.7 %
TLC REF: 4.44
TLC ULN: 5.42
TLCZSCORE: -1.87
ULN IC: ABNORMAL
VA PRE: 2.9 L (ref 4.29–4.29)
VA SINGLE BREATH LLN: 4.29
VA SINGLE BREATH PRE REF: 67.7 %
VA SINGLE BREATH REF: 4.29
VASINGLEBREATHULN: 4.29
VC LLN: 1.71
VC PRE REF: 82.5 %
VC PRE: 1.98 L (ref 1.71–3.12)
VC REF: 2.4
VC ULN: 3.12

## 2025-04-07 PROCEDURE — 94010 BREATHING CAPACITY TEST: CPT | Mod: PBBFAC,NTX | Performed by: INTERNAL MEDICINE

## 2025-04-07 PROCEDURE — 94729 DIFFUSING CAPACITY: CPT | Mod: PBBFAC,NTX | Performed by: INTERNAL MEDICINE

## 2025-04-07 PROCEDURE — 99999 PR PBB SHADOW E&M-EST. PATIENT-LVL IV: CPT | Mod: PBBFAC,TXP,, | Performed by: INTERNAL MEDICINE

## 2025-04-07 PROCEDURE — 94010 BREATHING CAPACITY TEST: CPT | Mod: 26,S$PBB,NTX, | Performed by: INTERNAL MEDICINE

## 2025-04-07 PROCEDURE — 94726 PLETHYSMOGRAPHY LUNG VOLUMES: CPT | Mod: 26,S$PBB,NTX, | Performed by: INTERNAL MEDICINE

## 2025-04-07 PROCEDURE — 94729 DIFFUSING CAPACITY: CPT | Mod: 26,S$PBB,NTX, | Performed by: INTERNAL MEDICINE

## 2025-04-07 PROCEDURE — 94726 PLETHYSMOGRAPHY LUNG VOLUMES: CPT | Mod: PBBFAC,NTX | Performed by: INTERNAL MEDICINE

## 2025-04-07 PROCEDURE — 99214 OFFICE O/P EST MOD 30 MIN: CPT | Mod: PBBFAC,TXP,25 | Performed by: INTERNAL MEDICINE

## 2025-04-07 NOTE — LETTER
April 8, 2025                      [facility] 1st Fl  [address]  [facility] [address]  Phone: [phone]   Patient: Jesenia Curiel   MR Number: [id number]   YOB: 1950   Date of Visit: 4/7/2025       Dear       Thank you for referring Jesenia Curiel to me for evaluation. Attached you will find relevant portions of my assessment and plan of care.    If you have questions, please do not hesitate to call me. I look forward to following Jesenia Curiel along with you.    Sincerely,    My Shore, DO    Enclosure    If you would like to receive this communication electronically, please contact [email or web address] or [phone] to request [employer] access.    [employer] is a tool which provides read-only access to select patient information with whom you have a relationship. Its easy to use and provides real time access to review your patients record including encounter summaries, notes, results, and demographic information.    If you feel you have received this communication in error or would no longer like to receive these types of communications, please e-mail [email or web address]

## 2025-04-08 ENCOUNTER — PATIENT MESSAGE (OUTPATIENT)
Dept: TRANSPLANT | Facility: CLINIC | Age: 75
End: 2025-04-08
Payer: MEDICARE

## 2025-04-08 NOTE — PROGRESS NOTES
ADVANCED LUNG DISEASE FOLLOW UP VISIT                                                                                                                                              Reason for Visit:  ILD    Referring Physician: Bay Sosa NP    History of Present Illness: Jesenia Curiel is a 74 y.o. female who is on  0 at present, previously on 3 L of oxygen.  She is on no assisted ventilation.  Her New York Heart Association Class is III and a Karnofsky score of 70% - Cares for self: Unable to carry on normal activity or active work. She is not diabetic.    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  Since her last visit, she had been doing very well from a respiratory standpoint.  Takes MMF 500mg BID and is tolerating well.  Follows with EP for sick sinus syndrome and pacemaker.  Denies any overall changes in her respiratory status.  Does not take inhaler therapy but does take singulair.  Overall, remains stable from a pulmonary standpoint.        HISTORY FROM INITIAL VISIT:  ILD.  Has CKD, AFIB, Sick Sinus Syndrome with pacemaker, HTN, hyperlipidemia, and JOSÉ (could not tolerate CPAP).    Had right VATS wedge biopsy on 10/23/23 that showed NSIP.  She presently has shortness of breath with exertion, but feels as if she is improving.  Has AFib (on Xeralto and Rhythmol) which she feels is contributing to her breathing issues.  Has chronic, intermittent dry cough.  Denies any GERD or sinus issues.  Never smoked.  Is a retired gastroenterologist.  Had a weakly positive BOLIVAR, but otherwise negative autoimmune/CTD workup.  Chest CT shows scatterd GGOs and bibasilar scarring with fibrosis and mild traction bronchiectasis.  Denies any known exposures.  Most recent Echo showed EF of 55% with PA systolic of 37.  Also has sick sinus syndrome with Pacemaker--is followed closely by cardiology.  Spirometry is normal with mild restrictive lung volumes.  DLCO is moderately decreased.  Admits to a sedentary  lifestyle.  Was recently discharged for heart failure on 3L NC oxygen.  Has been diuresing well since discharged and feel that she is getting better.  Denies shortness of breath at rest.      Past Medical History:   Diagnosis Date    Allergy     Arthritis     Atrial fibrillation 2017    Chronic diastolic heart failure 2023    CKD (chronic kidney disease) stage 3, GFR 30-59 ml/min 2017    Coronary artery calcification seen on CAT scan 2023    Disorder of kidney and ureter     GERD (gastroesophageal reflux disease)     Hyperlipidemia     Hypertension     Impaired fasting glucose 2021    Pre-diabetes 6/3/2017       Past Surgical History:   Procedure Laterality Date    ADENOIDECTOMY      BACK SURGERY      lamenectomy    BREAST SURGERY      BRONCHOSCOPY N/A 2023    Procedure: BRONCHOSCOPY;  Surgeon: [facility] Diagnostic Provider;  [facility];  Service: Anesthesiology;  Laterality: N/A;    BRONCHOSCOPY N/A 10/20/2023    Procedure: BRONCHOSCOPY;  Surgeon: Anatoly Tiwari MD;  [facility];  Service: Cardiothoracic;  Laterality: N/A;    CARDIAC SURGERY      [employer] pacemaker     CATARACT EXTRACTION W/  INTRAOCULAR LENS IMPLANT Right 6/3/2019    Procedure: EXTRACTION, CATARACT, WITH IOL INSERTION;  Surgeon: Raisa Moreno MD;  Location: [facility] OR;  Service: Ophthalmology;  Laterality: Right;  Laser    CATARACT EXTRACTION W/  INTRAOCULAR LENS IMPLANT Left 2019    Procedure: EXTRACTION, CATARACT, WITH IOL INSERTION;  Surgeon: Raisa Moreno MD;  Location: [facility] OR;  Service: Ophthalmology;  Laterality: Left;  LASER ASSISTED     SECTION      1980    COLONOSCOPY N/A 9/15/2023    Procedure: COLONOSCOPY;  Surgeon: Tao Gomez MD;  Location: [facility] (2ND FLR);  Service: Endoscopy;  Laterality: N/A;  inst to portal/[employer] pacemaker   2nd floor for airway protection patient with lung disease elevated pulmonary artery pressure pacemaker and fecal  occult blood positive stool,   cleared by pulmonary Dr Bhakta-see note on 7/11-GT    ENDOSCOPIC ULTRASOUND OF UPPER GASTROINTESTINAL TRACT N/A 9/19/2023    Procedure: ULTRASOUND, UPPER GI TRACT, ENDOSCOPIC;  Surgeon: Casimiro De Los Santos MD;  Location: [facility] ENDO;  Service: Endoscopy;  Laterality: N/A;  9/13/23: instructions send via portal. [facility] [employer] ppm- GD    EYE SURGERY      FRACTURE SURGERY      HYSTERECTOMY      INJECTION OF ANESTHETIC AGENT AROUND MULTIPLE INTERCOSTAL NERVES  10/20/2023    Procedure: BLOCK, NERVE, INTERCOSTAL, 2 OR MORE;  Surgeon: Anatoly Tiwari MD;  Location: [facility] OR [address] [place] [address];  Service: Cardiothoracic;;    INSERT / REPLACE / REMOVE PACEMAKER  2009    placement    INSERT / REPLACE / REMOVE PACEMAKER  09/22/2014    [employer] Model #[id number] replacement    LAPAROSCOPIC APPENDECTOMY N/A 8/20/2024    Procedure: APPENDECTOMY, LAPAROSCOPIC;  Surgeon: Siddhartha Muñoz MD;  Location: [facility] OR [address] [place] [address];  Service: General;  Laterality: N/A;    pace maker  2009    replacement 2014    REPLACEMENT OF PACEMAKER GENERATOR Left 1/26/2024    Procedure: REPLACEMENT, PACEMAKER GENERATOR;  Surgeon: Trell Hodgson MD;  Location: [facility] EP LAB;  Service: Cardiology;  Laterality: Left;  DEVORAH, PPM gen chg, SJM, MAC, GP, 3prep, *SJM dcPPM in situ*    REVISION OF PROCEDURE INVOLVING PACEMAKER LEAD Left 1/13/2022    Procedure: REVISION, ELECTRODE LEAD, CARDIAC PACEMAKER;  Surgeon: Trell Hodgson MD;  Location: [facility] EP LAB;  Service: Cardiology;  Laterality: Left;  PPM lead Malfx, RA lead revision, SJM, MAC, GP, 3 PREP*dPPM SJM in situ**Contrast prep given*    salpingo opherectomy Bilateral 1998    SPINE SURGERY      TONSILLECTOMY  1953    TUBAL LIGATION      VIDEO-ASSISTED THORACOSCOPIC SURGERY (VATS) Right 10/20/2023    Procedure: VATS WEDGE;  Surgeon: Anatoly Tiwari MD;  [facility];  Service: Cardiothoracic;  Laterality: Right;       Allergies: Iodinated contrast media, Penicillins, Allopurinol analogues,  Ciprofloxacin, Quinolones, Sulfa (sulfonamide antibiotics), and Tetracyclines    Current Outpatient Medications   Medication Sig    apixaban (ELIQUIS) 5 mg Tab Take 1 tablet (5 mg total) by mouth 2 (two) times daily.    bisoprolol (ZEBETA) 5 MG tablet 1 tab daily    clobetasoL (OLUX) 0.05 % Foam Apply 1 application topically every 30 days.    ERGOCALCIFEROL, VITAMIN D2, (VITAMIN D ORAL) Take 2,000 Units by mouth once daily.    furosemide (LASIX) 20 MG tablet Take 1 tablet (20 mg total) by mouth once daily.    montelukast (SINGULAIR) 10 mg tablet TAKE 1 TABLET BY MOUTH EVERY DAY IN THE EVENING    mycophenolate (CELLCEPT) 500 mg Tab TAKE 1 TABLET (500 MG) TWICE A DAY FOR 7 DAYS, THEN INCREASE TO 2 TABLETS (1000 MG) TWICE A DAY THEREAFTER    olmesartan (BENICAR) 20 MG tablet Take 1 tablet (20 mg total) by mouth once daily.    potassium citrate (UROCIT-K) 10 mEq (1,080 mg) TbSR TAKE 1 TABLET BY MOUTH EVERY DAY    propafenone (RYTHMOL SR) 325 MG Cp12 Take 1 capsule (325 mg total) by mouth every 12 (twelve) hours.    rosuvastatin (CRESTOR) 40 MG Tab TAKE 1 TABLET BY MOUTH EVERY DAY IN THE EVENING    topiramate (TOPAMAX) 25 MG tablet TAKE 1 TABLET BY MOUTH TWICE A DAY    nitroGLYCERIN (NITROSTAT) 0.4 MG SL tablet Place 1 tablet (0.4 mg total) under the tongue every 5 (five) minutes as needed for Chest pain.    oxyCODONE (ROXICODONE) 5 MG immediate release tablet Take 1 tablet (5 mg total) by mouth every 6 (six) hours as needed for Pain. (Patient not taking: Reported on 4/7/2025)     No current facility-administered medications for this visit.     Facility-Administered Medications Ordered in Other Visits   Medication    vancomycin (VANCOCIN) 1,000 mg in dextrose 5 % (D5W) 250 mL IVPB (Vial-Mate)       Immunization History   Administered Date(s) Administered    COVID-19, MRNA, LN-S, PF (Pfizer) (Gray Cap) 04/12/2022    COVID-19, MRNA, LN-S, PF (Pfizer) (Purple Cap) 01/12/2021, 02/02/2021, 10/01/2021    COVID-19, mRNA, JESUSITA-S,  PF, gian-sucrose, 30 mcg/0.3 mL (Pfizer Ages 12+) 10/15/2024    COVID-19, mRNA, LNP-S, bivalent booster, PF (PFIZER OMICRON) 11/21/2022    Influenza (FLUAD) - Quadrivalent - Adjuvanted - PF *Preferred* (65+) 10/01/2021, 10/11/2022    Influenza - Quadrivalent 11/21/2016    Influenza - Trivalent - Fluzone High Dose - PF (65 years and older) 11/20/2015, 01/05/2018, 11/09/2018    Pneumococcal Conjugate - 13 Valent 11/20/2015    Pneumococcal Polysaccharide - 23 Valent 11/21/2016     Family History:    Family History   Problem Relation Name Age of Onset    Hypertension Mother 62     Diabetes Mother 62     Hyperlipidemia Mother 62     Coronary artery disease Mother 62     Heart disease Mother 62     Stroke Mother 62     Hypertension Father 50     Diabetes Father 50     Hyperlipidemia Father 50     Coronary artery disease Father 50     Heart disease Father 50     Coronary artery disease Brother 64     Heart disease Brother 64     Hyperthyroidism Brother 64     Diabetes Paternal Grandmother Mejia     Diabetes Paternal Grandfather Sigfrid     Heart attack Neg Hx       Social History     Substance and Sexual Activity   Alcohol Use Yes    Alcohol/week: 5.0 standard drinks of alcohol    Types: 5 Glasses of wine per week    Comment: Occaisonal - Once a week      Social History     Substance and Sexual Activity   Drug Use No      Social History     Socioeconomic History    Marital status:    Tobacco Use    Smoking status: Never     Passive exposure: Past    Smokeless tobacco: Never   Substance and Sexual Activity    Alcohol use: Yes     Alcohol/week: 5.0 standard drinks of alcohol     Types: 5 Glasses of wine per week     Comment: Occaisonal - Once a week    Drug use: No    Sexual activity: Not Currently     Partners: Male   Social History Narrative    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 [facility] [employer]     Social Drivers of Health     Financial Resource Strain: Low Risk  (4/16/2024)    Overall  "Financial Resource Strain (CARDIA)     Difficulty of Paying Living Expenses: Not hard at all   Food Insecurity: No Food Insecurity (4/16/2024)    Hunger Vital Sign     Worried About Running Out of Food in the Last Year: Never true     Ran Out of Food in the Last Year: Never true   Transportation Needs: No Transportation Needs (4/16/2024)    PRAPARE - Transportation     Lack of Transportation (Medical): No     Lack of Transportation (Non-Medical): No   Physical Activity: Insufficiently Active (4/16/2024)    Exercise Vital Sign     Days of Exercise per Week: 3 days     Minutes of Exercise per Session: 20 min   Stress: No Stress Concern Present (4/16/2024)    [employer] [place] [employer] - Occupational Stress Questionnaire     Feeling of Stress : Only a little   Housing Stability: Low Risk  (10/23/2023)    Housing Stability Vital Sign     Unable to Pay for Housing in the Last Year: No     Number of Places Lived in the Last Year: 1     Unstable Housing in the Last Year: No     Review of Systems   Constitutional:  Negative for malaise/fatigue.   HENT: Negative.     Respiratory:  Positive for shortness of breath (stable). Negative for cough.    Gastrointestinal:  Negative for abdominal pain, heartburn and vomiting.   Skin: Negative.    Endo/Heme/Allergies:  Bruises/bleeds easily (on apixiban).     Vitals  /76 (BP Location: Right arm, Patient Position: Sitting)   Pulse 67   Temp 98 °F (36.7 °C) (Oral)   Resp 16   Ht 5' 1" (1.549 m)   Wt 80.9 kg (178 lb 6.4 oz)   SpO2 95%   BMI 33.71 kg/m²   Physical Exam  Constitutional:       Appearance: Normal appearance.   HENT:      Head: Normocephalic and atraumatic.   Eyes:      Extraocular Movements: Extraocular movements intact.   Pulmonary:      Effort: Pulmonary effort is normal. No respiratory distress.      Breath sounds: No stridor. No wheezing, rhonchi or rales.   Chest:      Chest wall: No tenderness.   Musculoskeletal:      Right lower leg: No " edema.      Left lower leg: No edema.   Skin:     General: Skin is warm and dry.   Neurological:      Mental Status: She is alert and oriented to person, place, and time.   Psychiatric:         Mood and Affect: Mood normal.         Behavior: Behavior normal.         Thought Content: Thought content normal.         Judgment: Judgment normal.         Labs:  Hospital Outpatient Visit on 04/07/2025   Component Date Value    Physician Comment 04/07/2025                      Value:Spirometry shows a low FEF 25-75 suggesting mild obstruction may be present. Lung volumes show mild restriction is present. Airway mechanics show airway resistance is normal. Specific conductance is increased. DLCO is mildly decreased.   Â   Notes: DLCO interpretation is based upon the reported hemoglobin level.      Pre FVC 04/07/2025 1.86     PRE FEV5 04/07/2025 1.07     Pre FEV1 04/07/2025 1.28 (L)     Pre FEV1 FVC 04/07/2025 69.03     Pre FEF 25 75 04/07/2025 0.70 (L)     Pre PEF 04/07/2025 5.14     Pre  04/07/2025 6.42     Pre DLCO 04/07/2025 11.45 (L)     DLCO ADJ PRE 04/07/2025 11.88 (L)     DLCOVA PRE 04/07/2025 3.94     DLVAAdj PRE 04/07/2025 4.09     VA PRE 04/07/2025 2.90 (L)     IVC PRE 04/07/2025 1.98     Pre TLC 04/07/2025 3.31 (L)     VC PRE 04/07/2025 1.98     PRE IC 04/07/2025 1.58     Pre FRC PL 04/07/2025 1.73     Pre ERV 04/07/2025 0.40     Pre RV 04/07/2025 1.33 (L)     RVTLC PRE 04/07/2025 40.25     Raw PRE 04/07/2025 3.54 (H)     sGaw PRE 04/07/2025 0.14 (H)     FVC Ref 04/07/2025 2.40     FVC LLN 04/07/2025 1.71     FVC Pre Ref 04/07/2025 77.5     FEV05 REF 04/07/2025 1.49     FEV05 LLN 04/07/2025 0.63     PRE FEV05 REF 04/07/2025 72.2     FEV1 Ref 04/07/2025 1.85     FEV1 LLN 04/07/2025 1.32     FEV1 Pre Ref 04/07/2025 69.2     FEV1 FVC Ref 04/07/2025 78     FEV1 FVC LLN 04/07/2025 64     FEV1 FVC Pre Ref 04/07/2025 88.6     FEF 25 75 Ref 04/07/2025 2.31     FEF 25 75 LLN 04/07/2025 0.91     FEF 25 75 Pre Ref  04/07/2025 30.5     PEF Ref 04/07/2025 4.79     PEF LLN 04/07/2025 3.24     PEF Pre Ref 04/07/2025 107.2     TLC Ref 04/07/2025 4.44     TLC LLN 04/07/2025 3.45     TLC ULN 04/07/2025 5.42     TLC Pre Ref 04/07/2025 74.7     VC Ref 04/07/2025 2.40     VC LLN 04/07/2025 1.71     VC ULN 04/07/2025 3.12     VC Pre Ref 04/07/2025 82.5     REF IC 04/07/2025 1.60     LLN IC 04/07/2025 -44491.40     ULN IC 04/07/2025 99694.60     PRE REF IC 04/07/2025 99.1     FRCpleth Ref 04/07/2025 2.55     FRCpleth LLN 04/07/2025 1.72     FRC PLETH ULN 04/07/2025 3.37     FRCpleth PreRef 04/07/2025 67.9     ERV Ref 04/07/2025 0.54     ERV LLN 04/07/2025 -26470.46     ERV ULN 04/07/2025 90959.54     ERV Pre Ref 04/07/2025 73.1     RV Ref 04/07/2025 2.00     RV LLN 04/07/2025 1.43     RV ULN 04/07/2025 2.58     RV Pre Ref 04/07/2025 66.5     RVTLC Ref 04/07/2025 44     RVTLC LLN 04/07/2025 35     RV TLC ULN 04/07/2025 54     RVTLC Pre Ref 04/07/2025 90.5     Raw Ref 04/07/2025 3.06     Raw Pre Ref 04/07/2025 115.7     sGaw Ref 04/07/2025 0.10     sGaw Pre Ref 04/07/2025 135.0     DLCO Single Breath Ref 04/07/2025 18.69     DLCO Single Breath LLN 04/07/2025 12.96     DLCO SINGLEBREATH ULN 04/07/2025 24.42     DLCO Single Breath Pre R* 04/07/2025 61.3     DLCOc Single Breath Ref 04/07/2025 18.69     DLCOc Single Breath LLN 04/07/2025 12.96     DLCOC SINGLEBREATH ULN 04/07/2025 24.42     DLCOc Single Breath Pre * 04/07/2025 63.5     DLCOVA Ref 04/07/2025 4.21     DLCOVA LLN 04/07/2025 2.61     DLCOVA ULN 04/07/2025 5.81     DLCOVA Pre Ref 04/07/2025 93.6     DLCOc SBVA Ref 04/07/2025 4.21     DLCOc SBVA LLN 04/07/2025 2.61     DLCOCSBVA ULN 04/07/2025 5.81     DLCOc SBVA Pre Ref 04/07/2025 97.1     VA Single Breath Ref 04/07/2025 4.29     VA Single Breath LLN 04/07/2025 4.29     VA SINGLEBREATH ULN 04/07/2025 4.29     VA Single Breath Pre Ref 04/07/2025 67.7     IVC Single Breath Ref 04/07/2025 2.40     IVC Single Breath LLN 04/07/2025 1.71      IVC SINGLEBREATH ULN 04/07/2025 3.12     IVC Single Breath Pre Ref 04/07/2025 82.5     FVCZSCORE 04/07/2025 -1.29     CBG9ONRDHW 04/07/2025 -1.76     HCO7ETXFHQCOM 04/07/2025 -1.07     TLCZSCORE 04/07/2025 -1.87     DLCOSINGLEBREATHZSCORE 04/07/2025 -2.08     DLCOcSingleBreathZSCORE 04/07/2025 -1.96    Lab Visit on 04/07/2025   Component Date Value    WBC 04/07/2025 6.35     RBC 04/07/2025 4.32     HGB 04/07/2025 12.3     HCT 04/07/2025 41.2     MCV 04/07/2025 95     MCH 04/07/2025 28.5     MCHC 04/07/2025 29.9 (L)     RDW 04/07/2025 12.8     Platelet Count 04/07/2025 207     MPV 04/07/2025 9.7     Nucleated RBC 04/07/2025 0     Neut % 04/07/2025 63.3     Lymph % 04/07/2025 23.5     Mono % 04/07/2025 8.5     Eos % 04/07/2025 3.0     Basophil % 04/07/2025 1.4     Imm Grans % 04/07/2025 0.3     Neut # 04/07/2025 4.02     Lymph # 04/07/2025 1.49     Mono # 04/07/2025 0.54     Eos # 04/07/2025 0.19     Baso # 04/07/2025 0.09     Imm Grans # 04/07/2025 0.02            4/7/2025    12:14 PM 10/21/2024    11:05 AM 7/11/2024    12:03 PM 4/2/2024    11:44 AM 1/4/2024    11:31 AM 11/13/2023     2:24 PM   Pulmonary Function Tests   FVC 1.86 liters 1.76 liters 1.82 liters 1.8 liters 1.53 liters 1.89 liters   FEV1 1.28 liters 1.19 liters 1.28 liters 1.29 liters 1.07 liters 1.42 liters   TLC (liters) 3.31 liters 2.99 liters 2.74 liters 2.77 liters 2.34 liters 3.04 liters   DLCO (ml/mmHg sec) 11.45 ml/mmHg sec 10.1 ml/mmHg sec 10.41 ml/mmHg sec 9.2 ml/mmHg sec 8.1 ml/mmHg sec 9.68 ml/mmHg sec   FVC% 77 73.1 75 74 62.7 77.5   FEV1% 69 63.8 68 68 56.8 75   FEF 25-75 0.7 0.64 0.73 0.83 0.65 1.05   FEF 25-75% 30 27.8 31 51 40.2 64.5   TLC% 74 67.4 61 62 52.7 68.4   RV 1.33 1.2 0.88 0.98 0.76 1.14   RV% 66 59.8 44 49 38.1 58   DLCO% 61 54 55 49 24.57 51         4/30/2024    11:20 AM 4/2/2024    11:11 AM 1/4/2024    10:49 AM 11/13/2023     1:37 PM 3/31/2023    12:23 PM   6MW   6MWT Status completed without stopping completed without  "stopping completed with stops completed without stopping completed without stopping   Patient Reported No complaints  Dyspnea  Dyspnea  Dyspnea;Dizziness;Other (Comment)  Dyspnea    Was O2 used? No No No No No   6MW Distance walked (feet) 905 feet 900 feet 900 feet 675 feet 800 feet   Distance walked (meters) 275.84 meters 274.32 meters 274.32 meters 205.74 meters 243.84 meters   Did patient stop? No No No No No   Oxygen Saturation 95 % 96 % 96 % 95 % 94 %   Supplemental Oxygen Room Air  Room Air  Room Air  Room Air  Room Air    Heart Rate 62 bpm 65 bpm 75 bpm 70 bpm 77 bpm   Blood Pressure 134/78 120/64 149/67 131/64 125/66   Drew Dyspnea Rating  light very light light light nothing at all   Oxygen Saturation 94 % 95 % 92 % 94 % 94 %   Supplemental Oxygen Room Air  Room Air  Room Air  Room Air  Room Air    Heart Rate 110 bpm 97 bpm 118 bpm 77 bpm 101 bpm   Blood Pressure 129/74 137/67 130/64 123/58 118/65   Drew Dyspnea Rating  heavy somewhat heavy somewhat heavy heavy moderate   Recovery Time (seconds) 180 seconds 71 seconds 82 seconds 78 seconds 58 seconds   Oxygen Saturation 97 % 98 % 95 % 95 % 96 %   Supplemental Oxygen Room Air  Room Air  Room Air  Room Air  Room Air    Heart Rate 60 bpm 74 bpm 89 bpm 80 bpm 82 bpm       Data saved with a previous flowsheet row definition       Imaging:  EXAMINATION:  CT CHEST WITHOUT CONTRAST     CLINICAL HISTORY:  "Interstitial lung disease;"     COMPARISON:  10/22/2023     TECHNIQUE:  High-resolution CT of the chest with volumetric data acquisition was obtained without intravenous contrast. Sagittal and coronal multiplanar reconstructions were performed.  Expiratory phase on prone position was performed.  Lack of IV contrast material limits the assessment of mediastinal and abdominal structures.     FINDINGS:  Lungs and large airways: Improvement of the previously seen linear radiopaque density in the right upper and lower lobes with adjacent consolidation in this patient " with the history of VATS procedure (axial image 181 and 246 series 4).  Minimal interlobular septal thickening in the subpleural area of the bilateral lower lobes, right greater than left, better visualized on the prone series (series 8).  No evidence of traction bronchiectasis or honeycombing pattern.  The minimal linear atelectasis in the lingula.  The trachea and main bronchi are patent     Pleura: Small right pleural effusion.  No evidence of left pleural effusion.  No pneumothorax noted in the present study.     Heart and pericardium: Cardiac silhouette is in the upper limits of normal in size.  No evidence of pericardial effusion.  The left-sided cardiac device with tips in right atrium and right ventricle.     Mediastinum and alex: Subcentimeter lymph nodes in mediastinum.  No evidence of mediastinal or hilar lymphadenopathy according to the CT criteria.     Chest wall and lower neck: Thyroid gland is normal in size.  No evidence of lymphadenopathy in the axillary region.  Interval resolution of the subcutaneous emphysema and fat stranding in the right lateral chest wall.  New localized fluid collection measuring 6 x 3 cm in the right lateral chest wall.  The large fat containing mass with tiny calcification measuring 12.2 x 5.6 cm in the left posterior chest wall is again noted and grossly unchanged.     Vessels: Left-sided aortic arch.  Aorta is normal in caliber.  The aortic, supraaortic and coronary atheromatous calcifications.  Main pulmonary and right and left pulmonary arteries are normal in size.     Bones: Unchanged     Upper abdomen: Small hiatal hernia.  The liver is normal in size with subcentimeter focal hypodensity in the right hepatic lobe, too small to characterize.  The grossly stable 1.7 cm nodule in the right adrenal gland.  The multiple cystic lesions within the pancreas, the largest measuring 1.7 cm again noted and grossly unchanged.  Left adrenal gland is normal in size.  Spleen is  normal in size with no evidence of focal lesion.  The nonobstructing small left renal stone.     Impression:     1. Minimal interlobular septal thickening in the subpleural area of the bilateral lower lobes with no evidence of traction bronchiectasis could represent a early interstitial lung disease.  No evidence of pulmonary fibrosis.  2. Improvement of the previously seen linear radiopaque density with chest and consolidation areas in the right upper and lower lobes.  3. Small right pleural effusion with no evidence of ipsilateral pneumothorax in the present study.  4. Interval resolution of the fat stranding and subcutaneous emphysema in the right lateral chest wall.  5. New localized fluid collection measuring 6 x 3 cm in the right lateral chest wall may represent a hematoma.  6. Grossly stable large fat containing mass with tiny calcification measuring 12.2 x 5.6 cm in the left posterior chest wall may represent lipoma.  7. Additional findings.  Please see the above discussion.        Electronically signed by:Scott Quispe  Date:                                            01/07/2024  Time:                                           13:47    No results found for this or any previous visit (from the past 2160 hours).            Cardiodiagnostics:  Results for orders placed during the hospital encounter of 01/20/22    Echo    Interpretation Summary  · The left ventricle is normal in size with low normal systolic function.  · The estimated ejection fraction is 53%.  · There are segmental left ventricular wall motion abnormalities.  · There is abnormal septal wall motion.  · Normal right ventricular size with normal right ventricular systolic function.  · Normal left ventricular diastolic function.  · Mild mitral regurgitation.  · Normal central venous pressure (3 mmHg).  · The estimated PA systolic pressure is 25 mmHg.  · Trivial posterior pericardial    Results for orders placed during the hospital encounter  of 02/05/24    Echo    Interpretation Summary    Left Ventricle: The left ventricle is normal in size. Ventricular mass is normal. Mildly increased wall thickness. There is concentric remodeling. Normal wall motion. There is normal systolic function with a visually estimated ejection fraction of 55 - 60%. Ejection fraction by visual approximation is 55%. There is normal diastolic function.    Right Ventricle: Normal right ventricular cavity size. Wall thickness is normal. Right ventricle wall motion  is normal. Systolic function is normal. Pacemaker lead present in the ventricle.    Left Atrium: Left atrium is mild -moderately dilated.    Aortic Valve: There is mild aortic valve sclerosis. There is mild annular calcification present.    Mitral Valve: There is moderate regurgitation.    Aorta: Aortic root is normal in size measuring 3.10 cm. Ascending aorta is  upper-normal measuring 3.60 cm.    Pulmonary Artery: There is mild pulmonary hypertension. The estimated pulmonary artery systolic pressure is 42 mmHg.    IVC/SVC: Normal venous pressure at 3 mmHg.    Pericardium: There is a trivial posterior effusion.        PATHOLOGY    1.  Lung, right lower lobe, wedge resection:   - Chronic interstitial pneumonitis with NSIP-like injury pattern   - No evidence of malignancy   - See comment     2. Lung, right middle lobe, wedge resection:   - Chronic interstitial pneumonitis with NSIP-like injury pattern   - No evidence of malignancy           Assessment:  1. Interstitial pulmonary disease, unspecified    2. JOSÉ (obstructive sleep apnea)    3. Sick sinus syndrome    4. Atrial fibrillation with RVR      Plan:   Followed for ILD.  Serologies negative for autoimmune etiology.  Wedge bx showed NSIP although theres concerns for chronic HP.  CT chest with GGOs and mosaic attenuation.  Has chronic diastolic HF.  FVC and DLCO increased from previous.  Symptomatically doing very well.  Symptomatically doing well.  Last TTE with  evidence of PH ePAP in the 40's likely related to untreated JOSÉ.  Continue with MMF 500mg BID.  CBC reviewed.  No changes.  Recommend annual skin exams.  Not on inhalers as she states she did not tolerate them.  On singulair.  Trialed OFEV but did not tolerate due to GI side effects.       Has JOSÉ but cannot tolerate CPAP machine.  Encouraged to reach out to sleep medicine for options.  Follows with cardiology for SSS and Afib.  On eliquis.  Diuretics and appears euvolemic today.     Follow up in 6 months.  Due for CT chest and TTE.  PFTs and 6MWT at next visit.        My Shore D.O.  Pulmonary/Critical Care and Lung Transplantation  [facility] [place]

## 2025-04-21 ENCOUNTER — HOSPITAL ENCOUNTER (OUTPATIENT)
Dept: CARDIOLOGY | Facility: HOSPITAL | Age: 75
Discharge: HOME | End: 2025-04-21
Attending: INTERNAL MEDICINE
Payer: MEDICARE

## 2025-04-21 VITALS
HEART RATE: 63 BPM | BODY MASS INDEX: 33.68 KG/M2 | DIASTOLIC BLOOD PRESSURE: 77 MMHG | HEIGHT: 61 IN | SYSTOLIC BLOOD PRESSURE: 131 MMHG | WEIGHT: 178.38 LBS

## 2025-04-21 DIAGNOSIS — J84.9 INTERSTITIAL PULMONARY DISEASE, UNSPECIFIED: ICD-10-CM

## 2025-04-21 LAB
ASCENDING AORTA: 3.34 CM
AV AREA BY CONTINUOUS VTI: 1.8 CM2
AV INDEX (PROSTH): 0.64
AV LVOT MEAN GRADIENT: 1 MMHG
AV LVOT PEAK GRADIENT: 3 MMHG
AV MEAN GRADIENT: 3 MMHG
AV PEAK GRADIENT: 6 MMHG
AV VALVE AREA BY VELOCITY RATIO: 1.9 CM²
AV VALVE AREA: 1.8 CM2
AV VELOCITY RATIO: 0.67
BSA FOR ECHO PROCEDURE: 1.87 M2
CV ECHO LV RWT: 0.52 CM
DOP CALC AO PEAK VEL: 1.2 M/S
DOP CALC AO VTI: 25.4 CM
DOP CALC LVOT AREA: 2.8 CM2
DOP CALC LVOT DIAMETER: 1.9 CM
DOP CALC LVOT PEAK VEL: 0.8 M/S
DOP CALC LVOT STROKE VOLUME: 45.9 CM3
DOP CALC RVOT AREA: 3.59 CM2
DOP CALC RVOT DIAMETER: 2.14 CM
DOP CALCLVOT PEAK VEL VTI: 16.2 CM
E WAVE DECELERATION TIME: 150 MS
E/A RATIO: 3.63
E/E' RATIO: 12 M/S
ECHO EF ESTIMATED: 57 %
ECHO LV POSTERIOR WALL: 1.2 CM (ref 0.6–1.1)
FRACTIONAL SHORTENING: 30.4 % (ref 28–44)
HR MV ECHO: 63 BPM
INTERVENTRICULAR SEPTUM: 0.8 CM (ref 0.6–1.1)
IVC DIAMETER: 0.99 CM
IVRT: 65 MS
LA MAJOR: 5.9 CM
LA MINOR: 5.6 CM
LA WIDTH: 4 CM
LEFT ATRIUM SIZE: 3.6 CM
LEFT ATRIUM VOLUME INDEX MOD: 26 ML/M2
LEFT ATRIUM VOLUME INDEX: 39 ML/M2
LEFT ATRIUM VOLUME MOD: 47 ML
LEFT ATRIUM VOLUME: 70 CM3
LEFT INTERNAL DIMENSION IN SYSTOLE: 3.2 CM (ref 2.1–4)
LEFT VENTRICLE DIASTOLIC VOLUME INDEX: 54.44 ML/M2
LEFT VENTRICLE DIASTOLIC VOLUME: 98 ML
LEFT VENTRICLE MASS INDEX: 88.2 G/M2
LEFT VENTRICLE SYSTOLIC VOLUME INDEX: 23.3 ML/M2
LEFT VENTRICLE SYSTOLIC VOLUME: 42 ML
LEFT VENTRICULAR INTERNAL DIMENSION IN DIASTOLE: 4.6 CM (ref 3.5–6)
LEFT VENTRICULAR MASS: 158.8 G
LV LATERAL E/E' RATIO: 12.3
LV SEPTAL E/E' RATIO: 10.9
MR PISA EROA: 0.16 CM2
MV MEAN GRADIENT: 1 MMHG
MV PEAK A VEL: 0.27 M/S
MV PEAK E VEL: 0.98 M/S
MV PEAK GRADIENT: 5 MMHG
OHS CV RV/LV RATIO: 0.67 CM
PISA MRMAX VEL: 5.51 M/S
PISA RADIUS: 0.66 CM
PISA TR MAX VEL: 2.4 M/S
PISA VN NYQUIST: 0.33 M/S
PULM VEIN S/D RATIO: 0.48
PV PEAK D VEL: 0.77 M/S
PV PEAK S VEL: 0.37 M/S
RA MAJOR: 4.9 CM
RA WIDTH: 3.68 CM
RIGHT ATRIAL AREA: 13 CM2
RIGHT VENTRICLE DIASTOLIC BASEL DIMENSION: 3.1 CM
RV TISSUE DOPPLER FREE WALL SYSTOLIC VELOCITY 1 (APICAL 4 CHAMBER VIEW): 14.9 CM/S
SINUS: 2.67 CM
STJ: 2.49 CM
TDI LATERAL: 0.08 M/S
TDI SEPTAL: 0.09 M/S
TDI: 0.09 M/S
TRICUSPID ANNULAR PLANE SYSTOLIC EXCURSION: 1.9 CM
TV PEAK GRADIENT: 28 MMHG
Z-SCORE OF LEFT VENTRICULAR DIMENSION IN END DIASTOLE: -0.79
Z-SCORE OF LEFT VENTRICULAR DIMENSION IN END SYSTOLE: 0.31

## 2025-04-21 PROCEDURE — 93306 TTE W/DOPPLER COMPLETE: CPT | Mod: TXP

## 2025-04-21 PROCEDURE — 93306 TTE W/DOPPLER COMPLETE: CPT | Mod: 26,NTX,, | Performed by: INTERNAL MEDICINE

## 2025-04-22 ENCOUNTER — RESULTS FOLLOW-UP (OUTPATIENT)
Dept: TRANSPLANT | Facility: HOSPITAL | Age: 75
End: 2025-04-22

## 2025-04-22 ENCOUNTER — PATIENT MESSAGE (OUTPATIENT)
Dept: TRANSPLANT | Facility: CLINIC | Age: 75
End: 2025-04-22
Payer: MEDICARE

## 2025-04-23 ENCOUNTER — DOCUMENTATION ONLY (OUTPATIENT)
Dept: TRANSPLANT | Facility: CLINIC | Age: 75
End: 2025-04-23
Payer: MEDICARE

## 2025-04-23 DIAGNOSIS — I49.5 SICK SINUS SYNDROME: Primary | ICD-10-CM

## 2025-04-23 NOTE — PROGRESS NOTES
"Messaged patient regarding chest CT findings, including questionable small pancreatic lesion.  Patients states: "I've been thoroughly investigated by GI already. I have multiple benign simple cysts in the pancreas. Thanks for your & Dr. Shore's concern."       "

## 2025-04-29 ENCOUNTER — CLINICAL SUPPORT (OUTPATIENT)
Dept: CARDIOLOGY | Facility: HOSPITAL | Age: 75
End: 2025-04-29
Attending: INTERNAL MEDICINE
Payer: MEDICARE

## 2025-04-29 ENCOUNTER — CLINICAL SUPPORT (OUTPATIENT)
Dept: CARDIOLOGY | Facility: HOSPITAL | Age: 75
End: 2025-04-29
Payer: MEDICARE

## 2025-04-29 DIAGNOSIS — Z95.0 PRESENCE OF CARDIAC PACEMAKER: ICD-10-CM

## 2025-04-29 DIAGNOSIS — I49.5 SICK SINUS SYNDROME: ICD-10-CM

## 2025-04-29 PROCEDURE — 93294 REM INTERROG EVL PM/LDLS PM: CPT | Mod: NTX,,, | Performed by: INTERNAL MEDICINE

## 2025-05-01 ENCOUNTER — OFFICE VISIT (OUTPATIENT)
Dept: PRIMARY CARE CLINIC | Facility: CLINIC | Age: 75
End: 2025-05-01
Payer: MEDICARE

## 2025-05-01 VITALS
DIASTOLIC BLOOD PRESSURE: 84 MMHG | OXYGEN SATURATION: 98 % | SYSTOLIC BLOOD PRESSURE: 128 MMHG | BODY MASS INDEX: 33.74 KG/M2 | TEMPERATURE: 98 F | HEART RATE: 60 BPM | WEIGHT: 178.69 LBS | HEIGHT: 61 IN

## 2025-05-01 DIAGNOSIS — Z79.01 CHRONIC ANTICOAGULATION: ICD-10-CM

## 2025-05-01 DIAGNOSIS — Z79.899 DRUG-INDUCED IMMUNODEFICIENCY: ICD-10-CM

## 2025-05-01 DIAGNOSIS — E78.5 HYPERLIPIDEMIA, UNSPECIFIED HYPERLIPIDEMIA TYPE: ICD-10-CM

## 2025-05-01 DIAGNOSIS — M85.80 OSTEOPENIA, UNSPECIFIED LOCATION: ICD-10-CM

## 2025-05-01 DIAGNOSIS — N18.4 CKD (CHRONIC KIDNEY DISEASE) STAGE 4, GFR 15-29 ML/MIN: ICD-10-CM

## 2025-05-01 DIAGNOSIS — J84.9 ILD (INTERSTITIAL LUNG DISEASE): Primary | ICD-10-CM

## 2025-05-01 DIAGNOSIS — E21.3 HYPERPARATHYROIDISM: ICD-10-CM

## 2025-05-01 DIAGNOSIS — I27.20 PULMONARY HYPERTENSION: ICD-10-CM

## 2025-05-01 DIAGNOSIS — H91.93 BILATERAL HEARING LOSS, UNSPECIFIED HEARING LOSS TYPE: ICD-10-CM

## 2025-05-01 DIAGNOSIS — Z95.0 PACEMAKER: ICD-10-CM

## 2025-05-01 DIAGNOSIS — I70.0 AORTIC ATHEROSCLEROSIS: ICD-10-CM

## 2025-05-01 DIAGNOSIS — I49.5 SICK SINUS SYNDROME: ICD-10-CM

## 2025-05-01 DIAGNOSIS — Z12.83 SKIN CANCER SCREENING: ICD-10-CM

## 2025-05-01 DIAGNOSIS — R73.03 PREDIABETES: ICD-10-CM

## 2025-05-01 DIAGNOSIS — I48.0 PAROXYSMAL ATRIAL FIBRILLATION: ICD-10-CM

## 2025-05-01 DIAGNOSIS — D84.821 DRUG-INDUCED IMMUNODEFICIENCY: ICD-10-CM

## 2025-05-01 DIAGNOSIS — K86.2 PANCREATIC CYST: ICD-10-CM

## 2025-05-01 PROCEDURE — 99215 OFFICE O/P EST HI 40 MIN: CPT | Mod: PBBFAC,PN,TXP | Performed by: INTERNAL MEDICINE

## 2025-05-01 PROCEDURE — 99999 PR PBB SHADOW E&M-EST. PATIENT-LVL V: CPT | Mod: PBBFAC,TXP,, | Performed by: INTERNAL MEDICINE

## 2025-05-01 NOTE — PROGRESS NOTES
INTERNAL MEDICINE ANNUAL VISIT NOTE      CHIEF COMPLAINT     ANNUAL    HPI     Jesenia Curiel is a 74 y.o. C female who presents for her annual appointment.  MMG - declines.   Cscope - 9/15/23 - diverticulosis; 4mm polyp in rectum. Nonbleeding internal hemorrhoids.   DEXA - last one was 2022 w/ osteopenia. Does not want to repeat. Taking vit D 2000 IU daily. Has hyperPTH so does not take oral Ca.  Eye exam w/ Dr. Sánchez 3/31/25  Last saw pt in Aug 2024 when she was having abdominal pain and sent to ED for lap appy for appendicitis.  Reading and watching TV. Doing what she can around the house. Does do treadmill when she can. Does have some weights.  Hasn't seen derm for years.     ILD - MMF 500mg BID; overall symptom wise feels better; can go to grocery store; able to do her ADLs at home. Feels stronger. Occ nausea w/ rhythmol and cellcept.   Had lung bx w/ CTS Dr. Tiwari 10/20/23 - path w/ chronic interstitial pneumonitis w/ NSIP-like injury pattern. No malignancy.   Ofev-->GI side effects  Home sleep study 4/3/23 w/ mod-severe JOSÉ - cannot tolerate CPAP  S/p pulm rehab last yr.  CT chest 4/15/25  large deep left flank chest wall fatty mass, at least 12 x 4 x 10 cm, partially excluded from the field-of-view, with intermixed small intermediate soft tissue densities with calcifications; mildly complex lipoma versus liposarcoma.   There are clustered tree-in-bud opacities peripheral right upper lobe characteristic of atypical infection (MARA, fungal) versus inflammation sequela. There are pleural parenchymal bands of atelectasis or scarring right upper lobe, right middle, and bibasilar lower lobe distribution. Lungs otherwise clear. No pulmonary fibrosis. No airspace consolidation. No worrisome pulmonary nodule. No pleural effusion.   Question small distal pancreatic body lesion, 1.8 x 1.6 x 1.2 cm, lobular parenchymal contour/slightly decreased attenuation (image 120 series 2).   Follows w/ Dr. Shore - LOV  4/7/25; f/u in 6 mo    For the pancreatic lesion:  EUS 9/19/23 -   Impression:            - Normal esophagus.                          - Normal stomach.                          - Normal examined duodenum.                          - Three cystic lesions were seen in the pancreatic                          head, pancreatic body and pancreatic tail.                          - No specimens collected.   -->f/u in 6 mo w/ GI  Had virtual visit 4/19/24 w/ Dr. De Los Santos. Stable size of cyst. F/u in 1 yr. Does not want to continue surveillance.     SSS s/p PM; can feel the PM.   Afib - propafenone 325mg BID, eliquis 5mg BID. No blood in stool or urine. Haven't felt any palpitations.   HFpEF; previously w/ elevated PAP on echo.  TTE 4/21/25 -     Left Ventricle: The left ventricle is normal in size. Normal wall thickness. There is concentric remodeling. There is low normal systolic function with a visually estimated ejection fraction of 50 - 55%. There is indeterminate diastolic function.    Right Ventricle: The right ventricle is normal in size. Wall thickness is normal. Systolic function is normal. Pacemaker lead present in the ventricle.    Left Atrium: Mildly dilated    Mitral Valve: There is mild to moderate regurgitation.    Pulmonary Artery: The estimated pulmonary artery systolic pressure is under 30 mmHg.    IVC/SVC: Low central venous pressure    CKD 3b/4 - Cr baseline 1.5-1.8, most recently 1.8, eGFR 29 3/17/25.  K citrate 10mEq daily for hypokalemia, kidney stone and metabolic acidosis.  Follows w/ Dr. Hendrix - LOV 2/24/25; pt reports would never want HD.  Worried about infection being on cellcept and SGLT-2-.    HTN - olmesartan 20mg daily, bisoprolol 5mg day, lasix 20mg daily.  Checks her BP at home and 120s/70s.     Primary hyperparathyroidism w/ mild hyperCa. Does have osteopenia w/ high FRAX score.   Follows w/ endo.    HLD - crestor 40mg daily  Mild aortic atherosclerosis.     C/O hearing loss in the R ear  mostly. But in the recent years feels like it's B.     Past Medical History:  Past Medical History:   Diagnosis Date    Allergy     Arthritis     Atrial fibrillation 2017    Chronic diastolic heart failure 2023    CKD (chronic kidney disease) stage 3, GFR 30-59 ml/min 2017    Coronary artery calcification seen on CAT scan 2023    Disorder of kidney and ureter     GERD (gastroesophageal reflux disease)     Hyperlipidemia     Hypertension     Impaired fasting glucose 2021    Pre-diabetes 6/3/2017       Past Surgical History:  Past Surgical History:   Procedure Laterality Date    ADENOIDECTOMY      BACK SURGERY      lamenectomy    BREAST SURGERY      BRONCHOSCOPY N/A 2023    Procedure: BRONCHOSCOPY;  Surgeon: [facility] Diagnostic Provider;  [facility];  Service: Anesthesiology;  Laterality: N/A;    BRONCHOSCOPY N/A 10/20/2023    Procedure: BRONCHOSCOPY;  Surgeon: Anatoly Tiwari MD;  Location: [facility] OR [address] [place] [address];  Service: Cardiothoracic;  Laterality: N/A;    CARDIAC SURGERY      [employer] pacemaker     CATARACT EXTRACTION W/  INTRAOCULAR LENS IMPLANT Right 6/3/2019    Procedure: EXTRACTION, CATARACT, WITH IOL INSERTION;  Surgeon: Raisa Moreno MD;  Location: [facility];  Service: Ophthalmology;  Laterality: Right;  Laser    CATARACT EXTRACTION W/  INTRAOCULAR LENS IMPLANT Left 2019    Procedure: EXTRACTION, CATARACT, WITH IOL INSERTION;  Surgeon: Raisa Moreno MD;  Location: [facility];  Service: Ophthalmology;  Laterality: Left;  LASER ASSISTED     SECTION      ,     COLONOSCOPY N/A 9/15/2023    Procedure: COLONOSCOPY;  Surgeon: Tao Gomez MD;  [facility]  Service: Endoscopy;  Laterality: N/A;  inst to portal/[employer] pacemaker   2nd floor for airway protection patient with lung disease elevated pulmonary artery pressure pacemaker and fecal occult blood positive stool,   cleared by pulmonary Dr Bhakta-see note on -GT     ENDOSCOPIC ULTRASOUND OF UPPER GASTROINTESTINAL TRACT N/A 9/19/2023    Procedure: ULTRASOUND, UPPER GI TRACT, ENDOSCOPIC;  Surgeon: Casimiro De Los Santos MD;  Location: [facility] ENDO;  Service: Endoscopy;  Laterality: N/A;  9/13/23: instructions send via portal. [employer] ppm- GD    EYE SURGERY      FRACTURE SURGERY      HYSTERECTOMY      INJECTION OF ANESTHETIC AGENT AROUND MULTIPLE INTERCOSTAL NERVES  10/20/2023    Procedure: BLOCK, NERVE, INTERCOSTAL, 2 OR MORE;  Surgeon: Anatoly Tiwari MD;  Location: [address];  Service: Cardiothoracic;;    INSERT / REPLACE / REMOVE PACEMAKER  2009    placement    INSERT / REPLACE / REMOVE PACEMAKER  09/22/2014    [employer] Model #[id number] replacement    LAPAROSCOPIC APPENDECTOMY N/A 8/20/2024    Procedure: APPENDECTOMY, LAPAROSCOPIC;  Surgeon: Siddhartha Muñoz MD;  Location: [address];  Service: General;  Laterality: N/A;    pace maker  2009    replacement 2014    REPLACEMENT OF PACEMAKER GENERATOR Left 1/26/2024    Procedure: REPLACEMENT, PACEMAKER GENERATOR;  Surgeon: Trell Hodgson MD;  Location: [facility] EP LAB;  Service: Cardiology;  Laterality: Left;  DEVORAH, PPM gen chg, SJM, MAC, GP, 3prep, *SJM dcPPM in situ*    REVISION OF PROCEDURE INVOLVING PACEMAKER LEAD Left 1/13/2022    Procedure: REVISION, ELECTRODE LEAD, CARDIAC PACEMAKER;  Surgeon: Trell Hodgson MD;  Location: [facility] EP LAB;  Service: Cardiology;  Laterality: Left;  PPM lead Malfx, RA lead revision, SJM, MAC, GP, 3 PREP*dPPM SJM in situ**Contrast prep given*    salpingo opherectomy Bilateral 1998    SPINE SURGERY      TONSILLECTOMY  1953    TUBAL LIGATION      VIDEO-ASSISTED THORACOSCOPIC SURGERY (VATS) Right 10/20/2023    Procedure: VATS WEDGE;  Surgeon: Anatoly Tiwari MD;  Location: [address];  Service: Cardiothoracic;  Laterality: Right;       Allergies:  Review of patient's allergies indicates:   Allergen Reactions    Iodinated contrast media Anaphylaxis    Penicillins Anaphylaxis    Allopurinol  analogues      Muscle cramps    Ciprofloxacin Rash    Quinolones Rash    Sulfa (sulfonamide antibiotics) Rash    Tetracyclines Rash       Home Medications:  Prior to Admission medications    Medication Sig Start Date End Date Taking? Authorizing Provider   apixaban (ELIQUIS) 5 mg Tab Take 1 tablet (5 mg total) by mouth 2 (two) times daily. 5/6/24   Trell Hodgson MD   bisoprolol (ZEBETA) 5 MG tablet 1 tab daily 11/22/24   Venkatesh Mcgarry MD   clobetasoL (OLUX) 0.05 % Foam Apply 1 application topically every 30 days. 4/14/20   Sandra Michaud MD   ERGOCALCIFEROL, VITAMIN D2, (VITAMIN D ORAL) Take 2,000 Units by mouth once daily.    Provider, Historical   furosemide (LASIX) 20 MG tablet Take 1 tablet (20 mg total) by mouth once daily. 3/12/25   Fred Hendrix MD   montelukast (SINGULAIR) 10 mg tablet TAKE 1 TABLET BY MOUTH EVERY DAY IN THE EVENING 1/20/25   Bay Sosa, NP   mycophenolate (CELLCEPT) 500 mg Tab TAKE 1 TABLET (500 MG) TWICE A DAY FOR 7 DAYS, THEN INCREASE TO 2 TABLETS (1000 MG) TWICE A DAY THEREAFTER 11/5/24   Bay Sosa, NP   nitroGLYCERIN (NITROSTAT) 0.4 MG SL tablet Place 1 tablet (0.4 mg total) under the tongue every 5 (five) minutes as needed for Chest pain. 2/17/23 11/14/24  Paige Lewis PA-C   olmesartan (BENICAR) 20 MG tablet Take 1 tablet (20 mg total) by mouth once daily. 2/24/25 8/23/25  Fred Hendrix MD   oxyCODONE (ROXICODONE) 5 MG immediate release tablet Take 1 tablet (5 mg total) by mouth every 6 (six) hours as needed for Pain.  Patient not taking: Reported on 4/7/2025 8/21/24   Robbie Oakley PA-C   potassium citrate (UROCIT-K) 10 mEq (1,080 mg) TbSR TAKE 1 TABLET BY MOUTH EVERY DAY 5/20/24   Venkatesh Mcgarry MD   propafenone (RYTHMOL SR) 325 MG Cp12 Take 1 capsule (325 mg total) by mouth every 12 (twelve) hours. 10/22/24 10/22/25  Trell Hodgson MD   rosuvastatin (CRESTOR) 40 MG Tab TAKE 1 TABLET BY MOUTH EVERY DAY IN THE EVENING 9/12/24   Verna Hodgson,  "MD   topiramate (TOPAMAX) 25 MG tablet TAKE 1 TABLET BY MOUTH TWICE A DAY 3/14/25   Sandra Michaud MD       Family History:  Family History   Problem Relation Name Age of Onset    Hypertension Mother 62     Diabetes Mother 62     Hyperlipidemia Mother 62     Coronary artery disease Mother 62     Heart disease Mother 62     Stroke Mother 62     Hypertension Father 50     Diabetes Father 50     Hyperlipidemia Father 50     Coronary artery disease Father 50     Heart disease Father 50     Coronary artery disease Brother 64     Heart disease Brother 64     Hyperthyroidism Brother 64     Diabetes Paternal Grandmother Mejia     Diabetes Paternal Grandfather Sigfrid     Heart attack Neg Hx         Social History:  Social History[1]    Review of Systems:  Review of Systems  Comprehensive review of systems otherwise negative. See history/subjective section for more details.    Health Maintainence:   HM reviewed.     PHYSICAL EXAM     /84   Pulse 60   Temp 97.9 °F (36.6 °C) (Oral)   Ht 5' 1" (1.549 m)   Wt 81.1 kg (178 lb 10.9 oz)   SpO2 98%   BMI 33.76 kg/m²     GEN - A+OX4, NAD   HEENT - PERRL, EOMI, OP clear. MMM. TM normal.   Neck - No thyromegaly or cervical LAD. No thyroid masses felt.  CV - RRR, no m/r   Chest - CTAB, no wheezing or rhonchi  Abd - S/NT/ND/+BS.   Ext - 2+BDP and radial pulses. No LE edema.   Neuro - 5/5 BUE and BLE strength. Sensation to light touch intact throughout. 2+ DTRs. Normal gait.   MSK - No spinal tenderness to palpation. Normal gait.   Skin - No rash. Fair skin.    LABS     Previous labs reviewed.    ASSESSMENT/PLAN     Jesenia Curiel is a 74 y.o. female with  Jesenia was seen today for annual exam.    Diagnoses and all orders for this visit:    ILD (interstitial lung disease) - doing well on cellcept. F/u w/ Dr. Shore. Cont exercise at home.     Drug-induced immunodeficiency - no recent infections.     Pancreatic cyst - declines further surveillance.     Sick sinus syndrome " s/p Pacemaker - working well.    Skin cancer screening - on cellcept and also fair skinned.  -     Ambulatory referral/consult to Dermatology; Future    Paroxysmal atrial fibrillation - on Chronic anticoagulation. Cont current meds.     Pulmonary hypertension - controlled on meds.     CKD (chronic kidney disease) stage 4, GFR 15-29 ml/min - f/u w/ nephrology.  -     Microalbumin/Creatinine Ratio, Urine; Future    Hyperparathyroidism - avoid excess oral Ca. Declines repeat DEXA. Taking Vit D.    Aortic atherosclerosis - cont statin.     Hyperlipidemia, unspecified hyperlipidemia type - cont statin.  -     Lipid Panel; Future    Prediabetes  -     Hemoglobin A1C; Future    Osteopenia, unspecified location - as above.     Bilateral hearing loss, unspecified hearing loss type  -     Comprehensive audiogram; Future  -     Ambulatory referral/consult to Audiology; Future  -     Ambulatory referral/consult to ENT; Future    BMI 33.76 - was on topamax 25mg BID. Off x 1 mo. No weight changes. Will remain off of med.    Advance Care Planning     Date: 05/01/2025    ACP Reviewed/No Changes  Voluntary advance care planning discussion had today with patient. Previously completed HCPOA and LW in electronic medical record is current, no changes made.      A total of 3 min was spent on advance care planning, goals of care discussion, emotional support, formulating and communicating prognosis and exploring burden/benefit of various approaches of treatment. This discussion occurred on a fully voluntary basis with the verbal consent of the patient and/or family.       I spent a total of 45 minutes on the day of the visit.This includes face to face time and non-face to face time preparing to see the patient (eg, review of tests), obtaining and/or reviewing separately obtained history, documenting clinical information in the electronic or other health record, independently interpreting results and communicating results to the  patient/family/caregiver, or care coordinator.      RTC in 6 months (patient preference), sooner if needed and depending on labs.    Sandra Michaud MD  Department of Internal Medicine - [facility]  7:55 AM         [1]   Social History  Tobacco Use    Smoking status: Never     Passive exposure: Past    Smokeless tobacco: Never   Substance Use Topics    Alcohol use: Yes     Alcohol/week: 5.0 standard drinks of alcohol     Types: 5 Glasses of wine per week     Comment: Occaisonal - Once a week    Drug use: No

## 2025-05-02 LAB
OHS CV AF BURDEN PERCENT: < 1
OHS CV DC REMOTE DEVICE TYPE: NORMAL
OHS CV RV PACING PERCENT: 20 %

## 2025-05-26 PROCEDURE — 82570 ASSAY OF URINE CREATININE: CPT | Mod: TXP | Performed by: INTERNAL MEDICINE

## 2025-05-28 ENCOUNTER — RESULTS FOLLOW-UP (OUTPATIENT)
Dept: TRANSPLANT | Facility: HOSPITAL | Age: 75
End: 2025-05-28

## 2025-05-28 ENCOUNTER — PATIENT MESSAGE (OUTPATIENT)
Dept: PRIMARY CARE CLINIC | Facility: CLINIC | Age: 75
End: 2025-05-28
Payer: MEDICARE

## 2025-05-28 DIAGNOSIS — N18.32 STAGE 3B CHRONIC KIDNEY DISEASE: ICD-10-CM

## 2025-05-28 RX ORDER — POTASSIUM CITRATE 1080 MG/1
10 TABLET, EXTENDED RELEASE ORAL DAILY
Qty: 90 TABLET | Refills: 3 | Status: SHIPPED | OUTPATIENT
Start: 2025-05-28

## 2025-06-02 ENCOUNTER — OFFICE VISIT (OUTPATIENT)
Dept: OTOLARYNGOLOGY | Facility: CLINIC | Age: 75
End: 2025-06-02
Payer: MEDICARE

## 2025-06-02 ENCOUNTER — CLINICAL SUPPORT (OUTPATIENT)
Dept: OTOLARYNGOLOGY | Facility: CLINIC | Age: 75
End: 2025-06-02
Payer: MEDICARE

## 2025-06-02 ENCOUNTER — PATIENT MESSAGE (OUTPATIENT)
Dept: OTOLARYNGOLOGY | Facility: CLINIC | Age: 75
End: 2025-06-02
Payer: MEDICARE

## 2025-06-02 VITALS
HEART RATE: 65 BPM | BODY MASS INDEX: 33.64 KG/M2 | WEIGHT: 178 LBS | SYSTOLIC BLOOD PRESSURE: 120 MMHG | DIASTOLIC BLOOD PRESSURE: 76 MMHG

## 2025-06-02 DIAGNOSIS — H93.13 TINNITUS OF BOTH EARS: Primary | ICD-10-CM

## 2025-06-02 DIAGNOSIS — H90.3 ASYMMETRIC SNHL (SENSORINEURAL HEARING LOSS): Primary | ICD-10-CM

## 2025-06-02 DIAGNOSIS — H91.93 BILATERAL HEARING LOSS, UNSPECIFIED HEARING LOSS TYPE: ICD-10-CM

## 2025-06-02 DIAGNOSIS — H90.3 ASYMMETRICAL SENSORINEURAL HEARING LOSS: ICD-10-CM

## 2025-06-02 DIAGNOSIS — H93.13 TINNITUS OF BOTH EARS: ICD-10-CM

## 2025-06-02 PROCEDURE — 92557 COMPREHENSIVE HEARING TEST: CPT | Mod: PBBFAC,PN,TXP

## 2025-06-02 PROCEDURE — 99999 PR PBB SHADOW E&M-EST. PATIENT-LVL I: CPT | Mod: PBBFAC,TXP,,

## 2025-06-02 PROCEDURE — 99999 PR PBB SHADOW E&M-EST. PATIENT-LVL III: CPT | Mod: PBBFAC,TXP,, | Performed by: NURSE PRACTITIONER

## 2025-06-02 PROCEDURE — 99213 OFFICE O/P EST LOW 20 MIN: CPT | Mod: PBBFAC,27,PN,TXP | Performed by: NURSE PRACTITIONER

## 2025-06-02 PROCEDURE — 92567 TYMPANOMETRY: CPT | Mod: PBBFAC,PN,TXP

## 2025-06-02 PROCEDURE — 99211 OFF/OP EST MAY X REQ PHY/QHP: CPT | Mod: PBBFAC,PN,TXP,25

## 2025-06-02 PROCEDURE — 99203 OFFICE O/P NEW LOW 30 MIN: CPT | Mod: S$PBB,TXP,, | Performed by: NURSE PRACTITIONER

## 2025-06-03 DIAGNOSIS — J84.9 INTERSTITIAL PULMONARY DISEASE, UNSPECIFIED: Primary | ICD-10-CM

## 2025-06-17 ENCOUNTER — PATIENT MESSAGE (OUTPATIENT)
Dept: OTOLARYNGOLOGY | Facility: CLINIC | Age: 75
End: 2025-06-17

## 2025-06-17 ENCOUNTER — CLINICAL SUPPORT (OUTPATIENT)
Dept: OTOLARYNGOLOGY | Facility: CLINIC | Age: 75
End: 2025-06-17
Payer: MEDICARE

## 2025-06-17 DIAGNOSIS — H90.3 SENSORINEURAL HEARING LOSS, BILATERAL: Primary | ICD-10-CM

## 2025-06-17 NOTE — PROGRESS NOTES
Jesenia Curiel was seen today in the clinic for a hearing aid consult.  She was able to demo a pair of Phonak trial hearing aids during the appointment.  I discussed in detail with her the hearing aid technology levels, pricing, policies and procedures and a recommendation was made for a pair of Performance level hearing aids.  She decided to order the hearing aids and measurements were made, #1 M  color P4 small vented dome right ear and small open dome left ear.  The hearing aid order form was signed and a copy given to Ms. Curiel.  She paid the $250 non-refundable deposit and her hearing aid fitting was schedule Tues 7/1/25 at 3:00.

## 2025-06-24 NOTE — TELEPHONE ENCOUNTER
Pt has been scheduled.    well developed, well nourished , in no acute distress , ambulating without difficulty , normal communication ability

## 2025-07-01 ENCOUNTER — CLINICAL SUPPORT (OUTPATIENT)
Dept: OTOLARYNGOLOGY | Facility: CLINIC | Age: 75
End: 2025-07-01
Payer: MEDICARE

## 2025-07-01 DIAGNOSIS — H90.3 SENSORINEURAL HEARING LOSS, BILATERAL: Primary | ICD-10-CM

## 2025-07-01 NOTE — PROGRESS NOTES
Jesenia Curiel was seen today in the clinic for a hearing aid fitting.  She was fit with a pair of Phonak Audeo I50-R hearing aids.  Real ear and feedback test was completed and the hearing aids were programmed.  The fit was good and she reported good subjective benefit.  I reviewed the use/care manual with her and she practiced insertion and removal of the hearing aids.  She was not interested in bluetooth features or waleska at this time.  The hearing aid purchase agreement was signed and a copy given to her.  She paid the balance and a 2 week hearing aid follow up was scheduled 7/22/25 at 11:00.  She will contact me prior to that appointment with any questions or concerns.    7/1/2025    Hearing Aid Information:    Right ear  : Phonak  Model:  Audeo I50-R  Type:  RADHA  Color: P4 chestnut  Battery: rechargeable  Tube/ length & power: #1 M  Dome size & style:  small vented  Serial number: [id number]  Warranty expiration: 7/17/28  L and D expiration:  7/17/28     Left ear  :  Phonak  Model:  Audeo I50-R  Type:  RADHA  Color:  P4 chestnut  Battery: rechargeable  Tube/ length & power: #1 M    Dome size & style:  small vented  Serial number: [id number]  Warranty expiration: 7/17/28  L and D expiration:  7/17/28

## 2025-07-03 NOTE — PROGRESS NOTES
Subjective:     HPI     I had the pleasure of seeing Jesenia Curiel in follow-up for her history of atrial fibrillation. Last seen in 5/2024. 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 dual chamber pacemaker implantation in 5/2009 (gen change 9/2014). She has been on Rythmol since 2009, which has significantly brought down her arrhythmia burden (episodes used to last for hours but now last seconds to minutes). At her 2017 office visit, she was found to have a <1% AF burden, with the longest episode lasting <90 seconds. At her 7/2018 visit, her device recorded no mode-switch episodes. Xarelto was stopped at that visit. At her last visit in 92020 no mode-switch episodes were seen. Ms. Curiel described episodes of regular tachycardia at 110 bpm range, which would occur once monthly and last several minutes. We discussed option of event monitor but she wanted to hold the course.    An echo done in 9/2019 showed an EF of 55%, and grade 2 DD.    At her 10/2021 visit a device check showed intermittent atrial lead noise. In 1/2022 an RA lead revision was performed.    At her 5/2022 visit, Ms. Curiel described mild pocket discomfort, particularly when she would sleep on her side at night. Device check showed 9 AMS episodes, longest 46 seconds.    At 6/2023 visit device check showed AMS <1% (likely undersensing). Regarding AF episodes, 4 hour episodes on 6/25/2023 between 3-7 am. Pt described NAVA with minimal exertion. Stress echo unremarkable. Per pt, CT chest consistent with diastolic heart failure, although aggressive diuresis had not improved her symptoms. At that visit, accelerometer adjusted.    Anticoagulation was stopped in late 2023 after she suffered a fall following lung biopsy.    Pacemaker generator change in 1/2024. Echo in 2/2024 showed EF 55-60%.    5/2024 visit: Today in the office Ms. Curiel has NAVA, and was  recently started on cellcept for hypersensitivity pneumonitis. PFTs have improved, along with some improvement in NAVA. Device check showed an AF burden of 1%. At the pts request eliquis 5 mg bid was started.    Echo in 4/2025 showed EF 50-55%, mild LAE.    Today in the office Ms. Curiel denies any new complaints. Pulmonary issues stable on cellcept.    I reviewed today's device interrogation which shows stable device/lead function, RA pacing 99%, RV pacing 39%, no AF noted over the past year, battery 6.2 years.    My interpretation of today's ECG is AV pacing at 62 bpm.    Review of Systems   Constitutional: Positive for malaise/fatigue. Negative for decreased appetite, weight gain and weight loss.   HENT:  Negative for sore throat.    Eyes:  Negative for blurred vision.   Cardiovascular:  Positive for dyspnea on exertion. Negative for chest pain, irregular heartbeat, leg swelling, near-syncope, orthopnea, palpitations, paroxysmal nocturnal dyspnea and syncope.   Respiratory:  Negative for shortness of breath.    Skin:  Negative for rash.   Musculoskeletal:  Negative for arthritis.   Gastrointestinal:  Negative for abdominal pain.   Neurological:  Negative for focal weakness.   Psychiatric/Behavioral:  Negative for altered mental status.         Objective:    Physical Exam  Vitals reviewed.   Constitutional:       General: She is not in acute distress.     Appearance: She is well-developed.   HENT:      Head: Normocephalic and atraumatic.   Eyes:      General: No scleral icterus.     Conjunctiva/sclera: Conjunctivae normal.      Pupils: Pupils are equal, round, and reactive to light.   Neck:      Thyroid: No thyromegaly.      Vascular: No JVD.   Cardiovascular:      Rate and Rhythm: Normal rate and regular rhythm.      Pulses: Intact distal pulses.      Heart sounds: Normal heart sounds. No murmur heard.     No friction rub. No gallop.   Pulmonary:      Effort: Pulmonary effort is normal. No respiratory distress.       Breath sounds: Normal breath sounds. No rales.   Abdominal:      General: Bowel sounds are normal. There is no distension.      Palpations: Abdomen is soft.   Musculoskeletal:      Cervical back: Normal range of motion and neck supple.   Skin:     General: Skin is warm and dry.   Neurological:      Mental Status: She is alert and oriented to person, place, and time.   Psychiatric:         Behavior: Behavior normal.           Assessment:       1. Paroxysmal A-fib    2. Benign essential hypertension    3. S/P placement of cardiac pacemaker    4. Mixed hyperlipidemia    5. Obstructive sleep apnea         Plan:       In summary, Jesenia Curiel is a 74 year old female with sick sinus syndrome, pacemaker implantation, status-post RA lead revision in 2022, and symptomatic PAF. His [id number] Score is 4--plan is to continue eliquis.    Pts AF overall remains under excellent control. Plan to continue rhythmol sr 325 mg bid and eliquis, follow-up 1 year.    Thank you for allowing me to participate in the care of this patient. Please do not hesitate to call me with any questions or concerns.

## 2025-07-07 ENCOUNTER — TELEPHONE (OUTPATIENT)
Dept: TRANSPLANT | Facility: CLINIC | Age: 75
End: 2025-07-07
Payer: MEDICARE

## 2025-07-07 ENCOUNTER — CLINICAL SUPPORT (OUTPATIENT)
Dept: CARDIOLOGY | Facility: HOSPITAL | Age: 75
End: 2025-07-07
Attending: INTERNAL MEDICINE
Payer: MEDICARE

## 2025-07-07 ENCOUNTER — HOSPITAL ENCOUNTER (OUTPATIENT)
Dept: CARDIOLOGY | Facility: CLINIC | Age: 75
Discharge: HOME | End: 2025-07-07
Payer: MEDICARE

## 2025-07-07 ENCOUNTER — OFFICE VISIT (OUTPATIENT)
Dept: ELECTROPHYSIOLOGY | Facility: CLINIC | Age: 75
End: 2025-07-07
Payer: MEDICARE

## 2025-07-07 VITALS
SYSTOLIC BLOOD PRESSURE: 122 MMHG | DIASTOLIC BLOOD PRESSURE: 79 MMHG | WEIGHT: 180.31 LBS | HEIGHT: 61 IN | HEART RATE: 62 BPM | BODY MASS INDEX: 34.04 KG/M2

## 2025-07-07 DIAGNOSIS — E78.2 MIXED HYPERLIPIDEMIA: ICD-10-CM

## 2025-07-07 DIAGNOSIS — I49.5 SICK SINUS SYNDROME: ICD-10-CM

## 2025-07-07 DIAGNOSIS — I48.0 PAROXYSMAL A-FIB: Primary | ICD-10-CM

## 2025-07-07 DIAGNOSIS — G47.33 OBSTRUCTIVE SLEEP APNEA: ICD-10-CM

## 2025-07-07 DIAGNOSIS — I10 BENIGN ESSENTIAL HYPERTENSION: ICD-10-CM

## 2025-07-07 DIAGNOSIS — Z95.0 S/P PLACEMENT OF CARDIAC PACEMAKER: ICD-10-CM

## 2025-07-07 LAB
OHS QRS DURATION: 218 MS
OHS QTC CALCULATION: 517 MS

## 2025-07-07 PROCEDURE — 99213 OFFICE O/P EST LOW 20 MIN: CPT | Mod: PBBFAC,25,TXP | Performed by: INTERNAL MEDICINE

## 2025-07-07 PROCEDURE — 93010 ELECTROCARDIOGRAM REPORT: CPT | Mod: S$PBB,NTX,, | Performed by: STUDENT IN AN ORGANIZED HEALTH CARE EDUCATION/TRAINING PROGRAM

## 2025-07-07 PROCEDURE — 93280 PM DEVICE PROGR EVAL DUAL: CPT | Mod: TXP

## 2025-07-07 PROCEDURE — 99999 PR PBB SHADOW E&M-EST. PATIENT-LVL III: CPT | Mod: PBBFAC,TXP,, | Performed by: INTERNAL MEDICINE

## 2025-07-07 PROCEDURE — 99214 OFFICE O/P EST MOD 30 MIN: CPT | Mod: S$PBB,NTX,, | Performed by: INTERNAL MEDICINE

## 2025-07-07 PROCEDURE — 93005 ELECTROCARDIOGRAM TRACING: CPT | Mod: PBBFAC,NTX | Performed by: STUDENT IN AN ORGANIZED HEALTH CARE EDUCATION/TRAINING PROGRAM

## 2025-07-07 RX ORDER — TOPIRAMATE 25 MG/1
25 TABLET, FILM COATED ORAL 2 TIMES DAILY
COMMUNITY
Start: 2025-06-12

## 2025-07-11 LAB
OHS CV AF BURDEN PERCENT: < 1
OHS CV DC REMOTE DEVICE TYPE: NORMAL
OHS CV RV PACING PERCENT: 39

## 2025-07-22 ENCOUNTER — CLINICAL SUPPORT (OUTPATIENT)
Dept: OTOLARYNGOLOGY | Facility: CLINIC | Age: 75
End: 2025-07-22
Payer: MEDICARE

## 2025-07-22 DIAGNOSIS — H90.3 SENSORINEURAL HEARING LOSS, BILATERAL: Primary | ICD-10-CM

## 2025-07-22 PROCEDURE — 99499 UNLISTED E&M SERVICE: CPT | Mod: S$PBB,TXP,, | Performed by: AUDIOLOGIST

## 2025-07-22 NOTE — PROGRESS NOTES
Jesenia Curiel was seen today in the clinic for a hearing aid follow up.  She reported doing very well with the hearing aids and receiving benefit while wearing them.  She did report that everything sounds a little harsh and tinny.  I decreased the gain for soft sounds and high frequency sounds.  She reported much better sound quality.  She will contact me as needed and a 1 year follow up is recommended.    Hearing Aid Information:     Right ear  : Phonak  Model:  [employer]o I50-R  Type:  RADHA  Color: P4 chestnut  Battery: rechargeable  Tube/ length & power: #1 M  Dome size & style:  small vented  Serial number: [id number]  Warranty expiration: 7/17/28  L and D expiration:  7/17/28     Left ear  :  Phonak  Model:  Audeo I50-R  Type:  RADHA  Color:  P4 chestnut  Battery: rechargeable  Tube/ length & power: #1 M    Dome size & style:  small vented  Serial number: [id number]  Warranty expiration: 7/17/28  L and D expiration:  7/17/28

## 2025-07-29 ENCOUNTER — CLINICAL SUPPORT (OUTPATIENT)
Dept: CARDIOLOGY | Facility: HOSPITAL | Age: 75
End: 2025-07-29
Attending: INTERNAL MEDICINE
Payer: MEDICARE

## 2025-07-29 ENCOUNTER — CLINICAL SUPPORT (OUTPATIENT)
Dept: CARDIOLOGY | Facility: HOSPITAL | Age: 75
End: 2025-07-29
Payer: MEDICARE

## 2025-07-29 DIAGNOSIS — I49.5 SICK SINUS SYNDROME: ICD-10-CM

## 2025-07-29 DIAGNOSIS — Z95.0 PRESENCE OF CARDIAC PACEMAKER: ICD-10-CM

## 2025-07-29 PROCEDURE — 93294 REM INTERROG EVL PM/LDLS PM: CPT | Mod: NTX,,, | Performed by: INTERNAL MEDICINE

## 2025-07-31 LAB
OHS CV AF BURDEN PERCENT: < 1
OHS CV DC REMOTE DEVICE TYPE: NORMAL
OHS CV RV PACING PERCENT: 93 %

## 2025-07-31 NOTE — TELEPHONE ENCOUNTER
"Refill Routing Note   Medication(s) are not appropriate for processing by [facility] for the following reason(s):        Clarification of medication (Rx) details  No active prescription written by provider-Therapy adjusted by another provider to 1qd, but set to "No Print" 11/22/24    [facility] action(s):  Defer               Appointments  past 12m or future 3m with PCP    Date Provider   Last Visit   11/14/2024 Kae Alvares MD   Next Visit   Visit date not found Kae Alvares MD   ED visits in past 90 days: 0        Note composed:9:28 AM 07/31/2025                "

## 2025-08-04 RX ORDER — BISOPROLOL FUMARATE 5 MG/1
7.5 TABLET, FILM COATED ORAL
Qty: 135 TABLET | Refills: 2 | Status: SHIPPED | OUTPATIENT
Start: 2025-08-04

## 2025-08-18 ENCOUNTER — PATIENT MESSAGE (OUTPATIENT)
Dept: ELECTROPHYSIOLOGY | Facility: CLINIC | Age: 75
End: 2025-08-18
Payer: MEDICARE

## 2025-08-18 RX ORDER — CLOBETASOL PROPIONATE 0.5 MG/G
1 AEROSOL, FOAM TOPICAL
Qty: 100 G | Refills: 3 | Status: SHIPPED | OUTPATIENT
Start: 2025-08-18

## 2025-08-25 PROCEDURE — 82570 ASSAY OF URINE CREATININE: CPT | Mod: TXP | Performed by: INTERNAL MEDICINE

## 2025-08-27 DIAGNOSIS — E78.2 MIXED HYPERLIPIDEMIA: ICD-10-CM

## 2025-08-27 RX ORDER — ROSUVASTATIN CALCIUM 40 MG/1
40 TABLET, COATED ORAL NIGHTLY
Qty: 90 TABLET | Refills: 3 | Status: SHIPPED | OUTPATIENT
Start: 2025-08-27

## 2025-08-28 ENCOUNTER — PATIENT MESSAGE (OUTPATIENT)
Dept: NEPHROLOGY | Facility: CLINIC | Age: 75
End: 2025-08-28
Payer: MEDICARE

## (undated) DEVICE — SPONGE COTTON TRAY 4X4IN

## (undated) DEVICE — INTRODUCER SAFESHEATH II 8FR

## (undated) DEVICE — SOL BETADINE 5%

## (undated) DEVICE — GLOVE BIOGEL ORTHOPEDIC 7.5

## (undated) DEVICE — BLADE ELECTRO EDGE INSULATED

## (undated) DEVICE — PAD DEFIB CADENCE ADULT R2

## (undated) DEVICE — CLOSURE SKIN STERI STRIP 1/2X4

## (undated) DEVICE — SYR SLIP TIP 1CC

## (undated) DEVICE — SUT MCRYL PLUS 4-0 PS2 27IN

## (undated) DEVICE — STAPLER GIA HANDLE STD

## (undated) DEVICE — PACK PACER PERMANENT

## (undated) DEVICE — ADHESIVE DERMABOND ADVANCED

## (undated) DEVICE — SPONGE GAUZE 16PLY 4X4

## (undated) DEVICE — ADHESIVE MASTISOL VIAL 48/BX

## (undated) DEVICE — GLOVE BIOGEL SKINSENSE PI 7.5

## (undated) DEVICE — CASSETTE INFINITI

## (undated) DEVICE — TAPE MEDIPORE 4IN X 2YDS

## (undated) DEVICE — SOL NS 1000CC

## (undated) DEVICE — KIT ANTIFOG

## (undated) DEVICE — SUT 0 VICRYL / CT-1

## (undated) DEVICE — CONTAINER SPECIMEN STRL 4OZ

## (undated) DEVICE — ELECTRODE BLADE INSULATED 1 IN

## (undated) DEVICE — SUT 0 30IN ETHIBOND EXCEL G

## (undated) DEVICE — SLING SWATHE UNIVERSAL FOAM

## (undated) DEVICE — TOWEL OR DISP STRL BLUE 4/PK

## (undated) DEVICE — GLOVE BIOGEL SKINSENSE PI 6.5

## (undated) DEVICE — CART STAPLE FLEX ETX 3.5MM BLU

## (undated) DEVICE — ELECTRODE REM PLYHSV RETURN 9

## (undated) DEVICE — KIT STYLET 46CM

## (undated) DEVICE — DRAPE INCISE IOBAN 2 23X17IN

## (undated) DEVICE — DRAPE STERI INSTRUMENT 1018

## (undated) DEVICE — SCISSOR 5MMX35CM DIRECT DRIVE

## (undated) DEVICE — TRAY MINOR GEN SURG OMC

## (undated) DEVICE — SAFESHEATH II 8FR 13CM

## (undated) DEVICE — KIT ANTIFOG W/SPONG & FLUID

## (undated) DEVICE — TROCAR ENDOPATH XCEL 12MM 10CM

## (undated) DEVICE — BAG INZII TISS RETRV 12/15MM

## (undated) DEVICE — DRESSING AQUACEL AG 3.5X10IN

## (undated) DEVICE — Device

## (undated) DEVICE — SUT CTD VICRYL 4-0 PS-4 UND

## (undated) DEVICE — DRAPE ABDOMINAL TIBURON 14X11

## (undated) DEVICE — GOWN POLY REINF BRTH SLV XL

## (undated) DEVICE — SOL IRR NACL .9% 3000ML

## (undated) DEVICE — TROCAR ENDOPATH XCEL 5X75MM

## (undated) DEVICE — TUBING HF INSUFFLATION W/ FLTR

## (undated) DEVICE — DRESSING TEGADERM 2 3/X2.75IN

## (undated) DEVICE — SUT 2/0 30IN SILK BLK BRAI

## (undated) DEVICE — TROCAR ENDOPATH XCEL 5MM 7.5CM

## (undated) DEVICE — GOWN SMART IMP BREATHABLE XXLG

## (undated) DEVICE — PAD CURAD NONADH 3X4IN

## (undated) DEVICE — PLEDGET SUT SOFT 3/8X3/16X1/16

## (undated) DEVICE — RELOAD ENDO GIA TRISTAPLE 45MM

## (undated) DEVICE — KIT WRENCH

## (undated) DEVICE — GUIDEWIRE STD .035X180CM ANG

## (undated) DEVICE — STAPLER INT LINEAR ARTC 3.5-45

## (undated) DEVICE — DISSECTOR 5MM ENDOPATH

## (undated) DEVICE — PACK PACER PERMANENT OMC

## (undated) DEVICE — SUT 0 VICRYL / UR6 (J603)

## (undated) DEVICE — DRAIN CHEST DRY SUCTION

## (undated) DEVICE — TUBING SUC UNIV W/CONN 12FT

## (undated) DEVICE — SUT 2-0 VICRYL / CT-1